# Patient Record
Sex: FEMALE | Race: WHITE | Employment: UNEMPLOYED | URBAN - METROPOLITAN AREA
[De-identification: names, ages, dates, MRNs, and addresses within clinical notes are randomized per-mention and may not be internally consistent; named-entity substitution may affect disease eponyms.]

---

## 2017-01-02 ENCOUNTER — APPOINTMENT (OUTPATIENT)
Dept: INFUSION THERAPY | Age: 62
End: 2017-01-02

## 2017-01-16 ENCOUNTER — APPOINTMENT (OUTPATIENT)
Dept: INFUSION THERAPY | Age: 62
End: 2017-01-16

## 2017-01-24 ENCOUNTER — OFFICE VISIT (OUTPATIENT)
Dept: SLEEP MEDICINE | Age: 62
End: 2017-01-24

## 2017-01-24 VITALS
HEART RATE: 108 BPM | HEIGHT: 64 IN | DIASTOLIC BLOOD PRESSURE: 68 MMHG | BODY MASS INDEX: 36.02 KG/M2 | SYSTOLIC BLOOD PRESSURE: 118 MMHG | OXYGEN SATURATION: 96 % | WEIGHT: 211 LBS

## 2017-01-24 DIAGNOSIS — G47.33 OSA (OBSTRUCTIVE SLEEP APNEA): Primary | ICD-10-CM

## 2017-01-24 DIAGNOSIS — I10 ESSENTIAL HYPERTENSION, BENIGN: ICD-10-CM

## 2017-01-24 NOTE — PATIENT INSTRUCTIONS
217 [facility] Miguel Angel. [place], [address]  Tel.  [phone]  Fax. 100 [facility] 60  [place], [address] [facility]  Tel.  [phone]  Fax. [phone] [address]  Tel.  [phone]  Fax. [phone]     Learning About CPAP for Sleep Apnea  What is CPAP? CPAP is a small machine that you use at home every night while you sleep. It increases air pressure in your throat to keep your airway open. When you have sleep apnea, this can help you sleep better so you feel much better. CPAP stands for \"continuous positive airway pressure. \"  The CPAP machine will have one of the following:  · A mask that covers your nose and mouth  · Prongs that fit into your nose  · A mask that covers your nose only, the most common type. This type is called NCPAP. The N stands for \"nasal.\"  Why is it done? CPAP is usually the best treatment for obstructive sleep apnea. It is the first treatment choice and the most widely used. Your doctor may suggest CPAP if you have:  · Moderate to severe sleep apnea. · Sleep apnea and coronary artery disease (CAD) or heart failure. How does it help? · CPAP can help you have more normal sleep, so you feel less sleepy and more alert during the daytime. · CPAP may help keep heart failure or other heart problems from getting worse. · NCPAP may help lower your blood pressure. · If you use CPAP, your bed partner may also sleep better because you are not snoring or restless. What are the side effects? Some people who use CPAP have:  · A dry or stuffy nose and a sore throat. · Irritated skin on the face. · Sore eyes. · Bloating. If you have any of these problems, work with your doctor to fix them. Here are some things you can try:  · Be sure the mask or nasal prongs fit well. · See if your doctor can adjust the pressure of your CPAP. · If your nose is dry, try a humidifier.   · If your nose is runny or stuffy, try decongestant medicine or a steroid nasal spray. If these things do not help, you might try a different type of machine. Some machines have air pressure that adjusts on its own. Others have air pressures that are different when you breathe in than when you breathe out. This may reduce discomfort caused by too much pressure in your nose. Where can you learn more? Go to Anzu.be  Enter Bernardomirian Mays in the search box to learn more about \"Learning About CPAP for Sleep Apnea. \"   © 6317-8818 Healthwise, [employer]. Care instructions adapted under license by Kirsty Soriano (which disclaims liability or warranty for this information). This care instruction is for use with your licensed healthcare professional. If you have questions about a medical condition or this instruction, always ask your healthcare professional. [address] any warranty or liability for your use of this information. Content Version: 7.2.54333; Last Revised: January 11, 2010  PROPER SLEEP HYGIENE    What to avoid  · Do not have drinks with caffeine, such as coffee or black tea, for 8 hours before bed. · Do not smoke or use other types of tobacco near bedtime. Nicotine is a stimulant and can keep you awake. · Avoid drinking alcohol late in the evening, because it can cause you to wake in the middle of the night. · Do not eat a big meal close to bedtime. If you are hungry, eat a light snack. · Do not drink a lot of water close to bedtime, because the need to urinate may wake you up during the night. · Do not read or watch TV in bed. Use the bed only for sleeping and sexual activity. What to try  · Go to bed at the same time every night, and wake up at the same time every morning. Do not take naps during the day. · Keep your bedroom quiet, dark, and cool. · Get regular exercise, but not within 3 to 4 hours of your bedtime. .  · Sleep on a comfortable pillow and mattress.   · If watching the clock makes you anxious, turn it facing away from you so you cannot see the time. · If you worry when you lie down, start a worry book. Well before bedtime, write down your worries, and then set the book and your concerns aside. · Try meditation or other relaxation techniques before you go to bed. · If you cannot fall asleep, get up and go to another room until you feel sleepy. Do something relaxing. Repeat your bedtime routine before you go to bed again. · Make your house quiet and calm about an hour before bedtime. Turn down the lights, turn off the TV, log off the computer, and turn down the volume on music. This can help you relax after a busy day. Drowsy Driving: The [facility] cites drowsiness as a causing factor in more than 323,320 police reported crashes annually, resulting in 76,000 injuries and 1,500 deaths. Other surveys suggest 55% of people polled have driven while drowsy in the past year, 23% had fallen asleep but not crashed, 3% crashed, and 2% had and accident due to drowsy driving. Who is at risk? Young Drivers: One study of drowsy driving accidents states that 55% of the drivers were under 25 years. Of those, 75% were male. Shift Workers and Travelers: People who work overnight or travel across time zones frequently are at higher risk of experiencing Circadian Rhythm Disorders. They are trying to work and function when their body is programed to sleep. Sleep Deprived: Lack of sleep has a serious impact on your ability to pay attention or focus on a task. Consistently getting less than the average of 8 hours your body needs creates partial or cumulative sleep deprivation. Untreated Sleep Disorders: Sleep Apnea, Narcolepsy, R.L.S., and other sleep disorders (untreated) prevent a person from getting enough restful sleep. This leads to excessive daytime sleepiness and increases the risk for drowsy driving accidents by up to 7 times.   Medications / Alcohol: Even over the counter medications can cause drowsiness. Medications that impair a drivers attention should have a warning label. Alcohol naturally makes you sleepy and on its own can cause accidents. Combined with excessive drowsiness its effects are amplified. Signs of Drowsy Driving:   * You don't remember driving the last few miles   * You may drift out of your jennyfer   * You are unable to focus and your thoughts wander   * You may yawn more often than normal   * You have difficulty keeping your eyes open / nodding off   * Missing traffic signs, speeding, or tailgating  Prevention-   Good sleep hygiene, lifestyle and behavioral choices have the most impact on drowsy driving. There is no substitute for sleep and the average person requires 8 hours nightly. If you find yourself driving drowsy, stop and sleep. Consider the sleep hygiene tips provided during your visit as well. Medication Refill Policy: Refills for all medications require 1 week advance notice. Please have your pharmacy fax a refill request. We are unable to fax, or call in \"controled substance\" medications and you will need to pick these prescriptions up from our office. [facility]HP Activation    Thank you for requesting access to [facility]. Please follow the instructions below to securely access and download your online medical record. [facility]HP allows you to send messages to your doctor, view your test results, renew your prescriptions, schedule appointments, and more. How Do I Sign Up? 1. In your internet browser, go to [email or web address]. 2. Click on the First Time User? Click Here link in the Sign In box. You will see the New Member Sign Up page. 3. Enter your NEHP Access Code exactly as it appears below. You will not need to use this code after youve completed the sign-up process. If you do not sign up before the expiration date, you must request a new code. [facility] Access Code:  Activation code not generated  Current RaftOut Status: Active (This is the date your RaftOut access code will )    4. Enter the last four digits of your Social Security Number (xxxx) and Date of Birth (mm/dd/yyyy) as indicated and click Submit. You will be taken to the next sign-up page. 5. Create a aCommercet ID. This will be your RaftOut login ID and cannot be changed, so think of one that is secure and easy to remember. 6. Create a RaftOut password. You can change your password at any time. 7. Enter your Password Reset Question and Answer. This can be used at a later time if you forget your password. 8. Enter your e-mail address. You will receive e-mail notification when new information is available in [address] 9. Click Sign Up. You can now view and download portions of your medical record. 10. Click the Download Summary menu link to download a portable copy of your medical information. Additional Information    If you have questions, please call [phone]. Remember, RaftOut is NOT to be used for urgent needs. For medical emergencies, dial 911.

## 2017-01-24 NOTE — PROGRESS NOTES
217 [facility]., Miguel Angel. [place], [address]  Tel.  [phone]  Fax. 100 [facility] 60  [place], [address] [facility]  Tel.  [phone]  Fax. [phone] [address]   Tel.  [phone]  Fax. [phone]     S>Dhara Mcpherson is a 64 y.o. female seen for a positive airway pressure follow-up. She reports no problems using the device. She is 23% compliant over the past 30 days. The following problems are identified:    Drowsiness no Problems exhaling no   Snoring no Forget to put on yes   Mask Comfortable yes Can't fall asleep no   Dry Mouth no Mask falls off no   Air Leaking no Frequent awakenings no       She admits that her sleep is improved when PAP therapy is used. She on some nights feels that she is not getting enough pressure and takes the mask off without realizing that she has done so and would return to sleep without therapy. Cord to device was loose - patient did not realize initially but later resolved the issue. Initial mask kept getting dislodge new mask works better. She states that she was getting wrong supplies from the home medical company. Allergies   Allergen Reactions    Hydrocodone Nausea and Vomiting    Lisinopril Cough    Lortab [Hydrocodone-Acetaminophen] Nausea and Vomiting    Percocet [Oxycodone-Acetaminophen] Nausea and Vomiting       She has a current medication list which includes the following prescription(s): pregabalin, hydromorphone, insulin glargine, losartan, nebulizer & compressor, albuterol-ipratropium, omeprazole, constulose, furosemide, humalog kwikpen, metformin, diane pen needle, potassium chloride sr, propranolol, ondansetron, ferrous sulfate, amitriptyline, ropinirole, albuterol, and fluticasone-salmeterol. .      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 [place] Sleepiness Score: 2   and Modified F.O.S.Q. Score Total / 2: 18      O>    Visit Vitals    /68    Pulse (!) 108    Ht 5' 4\" (1.626 m)    Wt 211 lb (95.7 kg)    SpO2 96%    BMI 36.22 kg/m2         General:   Not in acute distress   Eyes:  Anicteric sclerae, no obvious strabismus   Nose:  No obvious nasal septum deviation    Oropharynx:   Class 4 oropharyngeal outlet, thick tongue base, uvula not seen due to low-lying soft palate, narrow tonsilo-pharyngeal pilars   Tonsils:   tonsils are not visualized due to low-lying soft palate   Neck:   midline trachea   Chest/Lungs:  Equal lung expansion, clear on auscultation    CVS:  Normal rate, regular rhythm; no JVD   Skin:  Warm to touch; no obvious rashes   Neuro:  No focal deficits ; no obvious tremor    Psych:  Normal affect,  normal countenance;           A>    ICD-10-CM ICD-9-CM    1. IBIS (obstructive sleep apnea) G47.33 327.23 SLEEP LAB (PAP TITRATION)   2. Essential hypertension, benign I10 401.1    3. BMI 36.0-36.9,adult Z68.36 V85.36      AHI = 8.9. On Bi - Level :  10/04 cmH2O. Compliant:      no    Therapeutic Response:  Negative    P>    * We have recommended a dedicated weight loss through appropriate diet and an exercise regiment as significant weight reduction has been shown to reduce severity of obstructive sleep apnea. * Follow-up Disposition:  Return if symptoms worsen or fail to improve. * She was asked to contact our office for any problems regarding PAP therapy. * Counseling was provided regarding the importance of regular PAP use and on proper sleep hygiene and safe driving.     * Re-enforced proper and regular cleaning for the device. Thank you for allowing us to participate in your patient's medical care. Kumar Hernandez MD, FAASM  Diplomate American Board of Sleep Medicine  Diplomate in Sleep Medicine - ABP  Electronically signed.

## 2017-01-30 ENCOUNTER — APPOINTMENT (OUTPATIENT)
Dept: INFUSION THERAPY | Age: 62
End: 2017-01-30

## 2017-02-13 ENCOUNTER — APPOINTMENT (OUTPATIENT)
Dept: INFUSION THERAPY | Age: 62
End: 2017-02-13

## 2017-02-13 ENCOUNTER — TELEPHONE (OUTPATIENT)
Dept: HEMATOLOGY | Age: 62
End: 2017-02-13

## 2017-02-13 ENCOUNTER — HOSPITAL ENCOUNTER (EMERGENCY)
Age: 62
Discharge: HOME | End: 2017-02-13
Attending: EMERGENCY MEDICINE
Payer: MEDICARE

## 2017-02-13 VITALS
TEMPERATURE: 98.8 F | SYSTOLIC BLOOD PRESSURE: 155 MMHG | WEIGHT: 216.38 LBS | RESPIRATION RATE: 23 BRPM | HEART RATE: 103 BPM | DIASTOLIC BLOOD PRESSURE: 71 MMHG | OXYGEN SATURATION: 98 % | HEIGHT: 64 IN | BODY MASS INDEX: 36.94 KG/M2

## 2017-02-13 DIAGNOSIS — R05.4 COUGH SYNCOPE: Primary | ICD-10-CM

## 2017-02-13 DIAGNOSIS — R55 COUGH SYNCOPE: Primary | ICD-10-CM

## 2017-02-13 LAB
ALBUMIN SERPL BCP-MCNC: 3.3 G/DL (ref 3.5–5)
ALBUMIN/GLOB SERPL: 0.7 {RATIO} (ref 1.1–2.2)
ALP SERPL-CCNC: 150 U/L (ref 45–117)
ALT SERPL-CCNC: 30 U/L (ref 12–78)
ANION GAP BLD CALC-SCNC: 8 MMOL/L (ref 5–15)
APTT PPP: 27 SEC (ref 22.1–32.5)
AST SERPL W P-5'-P-CCNC: 22 U/L (ref 15–37)
BASOPHILS # BLD AUTO: 0 K/UL (ref 0–0.1)
BASOPHILS # BLD: 1 % (ref 0–1)
BILIRUB SERPL-MCNC: 0.5 MG/DL (ref 0.2–1)
BUN SERPL-MCNC: 11 MG/DL (ref 6–20)
BUN/CREAT SERPL: 20 (ref 12–20)
CALCIUM SERPL-MCNC: 8.8 MG/DL (ref 8.5–10.1)
CHLORIDE SERPL-SCNC: 101 MMOL/L (ref 97–108)
CO2 SERPL-SCNC: 28 MMOL/L (ref 21–32)
CREAT SERPL-MCNC: 0.54 MG/DL (ref 0.55–1.02)
EOSINOPHIL # BLD: 0.1 K/UL (ref 0–0.4)
EOSINOPHIL NFR BLD: 2 % (ref 0–7)
ERYTHROCYTE [DISTWIDTH] IN BLOOD BY AUTOMATED COUNT: 19.5 % (ref 11.5–14.5)
GLOBULIN SER CALC-MCNC: 4.8 G/DL (ref 2–4)
GLUCOSE SERPL-MCNC: 165 MG/DL (ref 65–100)
HCT VFR BLD AUTO: 29.8 % (ref 35–47)
HGB BLD-MCNC: 8.7 G/DL (ref 11.5–16)
INR PPP: 1.2 (ref 0.9–1.1)
LYMPHOCYTES # BLD AUTO: 27 % (ref 12–49)
LYMPHOCYTES # BLD: 1.6 K/UL (ref 0.8–3.5)
MCH RBC QN AUTO: 23.5 PG (ref 26–34)
MCHC RBC AUTO-ENTMCNC: 29.2 G/DL (ref 30–36.5)
MCV RBC AUTO: 80.3 FL (ref 80–99)
MONOCYTES # BLD: 0.5 K/UL (ref 0–1)
MONOCYTES NFR BLD AUTO: 8 % (ref 5–13)
NEUTS SEG # BLD: 3.7 K/UL (ref 1.8–8)
NEUTS SEG NFR BLD AUTO: 62 % (ref 32–75)
PLATELET # BLD AUTO: 94 K/UL (ref 150–400)
POTASSIUM SERPL-SCNC: 3.8 MMOL/L (ref 3.5–5.1)
PROT SERPL-MCNC: 8.1 G/DL (ref 6.4–8.2)
PROTHROMBIN TIME: 11.8 SEC (ref 9–11.1)
RBC # BLD AUTO: 3.71 M/UL (ref 3.8–5.2)
SODIUM SERPL-SCNC: 137 MMOL/L (ref 136–145)
THERAPEUTIC RANGE,PTTT: NORMAL SECS (ref 58–77)
WBC # BLD AUTO: 5.9 K/UL (ref 3.6–11)

## 2017-02-13 PROCEDURE — 99284 EMERGENCY DEPT VISIT MOD MDM: CPT

## 2017-02-13 PROCEDURE — 85610 PROTHROMBIN TIME: CPT | Performed by: EMERGENCY MEDICINE

## 2017-02-13 PROCEDURE — 36415 COLL VENOUS BLD VENIPUNCTURE: CPT | Performed by: EMERGENCY MEDICINE

## 2017-02-13 PROCEDURE — 85730 THROMBOPLASTIN TIME PARTIAL: CPT | Performed by: EMERGENCY MEDICINE

## 2017-02-13 PROCEDURE — 80053 COMPREHEN METABOLIC PANEL: CPT | Performed by: EMERGENCY MEDICINE

## 2017-02-13 PROCEDURE — 85025 COMPLETE CBC W/AUTO DIFF WBC: CPT | Performed by: EMERGENCY MEDICINE

## 2017-02-13 NOTE — ED NOTES
I have reviewed discharge instructions with the patient. The patient verbalized understanding. Pt ambulates to front door with steady gait and in stable condition.

## 2017-02-13 NOTE — ED PROVIDER NOTES
HPI Comments: 64 y.o. female with extensive past medical history, please see list, significant for diabetes, COPD, back pain, restless leg syndrome, fibromyalgia, GERD, diabetes, hypercholesteremia, asthma, liver disease, and esophageal varices in cirrhosis who presents from home with chief complaint of syncope. Patient states she had an episode of syncope yesterday and another one 3 days ago. Patient reports she was just sitting on her bed 3 days and started to cough. Patient then remembers waking up with a nose bleed that lasted for a couple of minutes. Patient reports both episodes were similar in nature and has a hx of these types of syncopal episodes, which typically occur \"every year. \" Patient reports she usually has these episodes with bleeding from cirrhosis. Patient also complains of right leg swelling that is greater than the left. Patient admits she has been dealing with this leg swelling issue for approximately 3 years. Patient reports she is currently taking her medications as prescribed by her PCP and Dr. Kemi Craig, her nephrologist. Patient states hx of diabetes and is currently a smoker. Patient denies black tarry stool, drinking alcohol, blood in clots (had 2 doppler studies in past), and blood in stool. There are no other acute medical concerns at this time. Social hx: Tobacco Use: Yes (1 pack a day), Alcohol Use: Yes    PCP: Antonietta King NP  Gastroenterology: Patrick Baron MD    Note written by Luli Carl, as dictated by Jose Barton MD 4:55 PM      The history is provided by the patient.         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 ([facility]     Liver disease     Other and unspecified hyperlipidemia     Restless leg syndrome     Type II or unspecified type diabetes mellitus without mention of complication, uncontrolled     Unspecified essential hypertension        Past Surgical History:   Procedure Laterality Date    Hx back surgery      Hx hysterectomy       plus 1/2 of an ovary removed    Hx carpal tunnel release       on right    Pr colsc flx w/rmvl of tumor polyp lesion snare tq  5/30/2013          Upper gi endoscopy,ligat varix  2/6/2015               Family History:   Problem Relation Age of Onset    Diabetes Mother     Stroke Sister     Diabetes Paternal Aunt     Diabetes Paternal Uncle     Heart Disease Neg Hx        Social History     Social History    Marital status:      Spouse name: N/A    Number of children: N/A    Years of education: N/A     Occupational History    Not on file. Social History Main Topics    Smoking status: Current Every Day Smoker     Packs/day: 1.00     Years: 40.00    Smokeless tobacco: Never Used    Alcohol use Yes      Comment: rare    Drug use: No    Sexual activity: Not on file     Other Topics Concern    Not on file     Social History Narrative    Lives in [facility] with . Has 2 daughters and 1 granddaughter who she cares for. On disability for her back. Used to work as a  and . Used to bowl and fish. ALLERGIES: Hydrocodone; Lisinopril; Lortab [hydrocodone-acetaminophen]; and Percocet [oxycodone-acetaminophen]    Review of Systems   Constitutional: Negative for appetite change, chills and fever. HENT: Positive for nosebleeds. Negative for rhinorrhea, sore throat and trouble swallowing. Eyes: Negative for photophobia. Respiratory: Negative for cough and shortness of breath. Cardiovascular: Positive for leg swelling. Negative for chest pain and palpitations. Gastrointestinal: Negative for abdominal pain, nausea and vomiting. Genitourinary: Negative for dysuria, frequency and hematuria. Musculoskeletal: Negative for arthralgias. Neurological: Positive for syncope. Negative for dizziness and weakness. Psychiatric/Behavioral: Negative for behavioral problems. The patient is not nervous/anxious. All other systems reviewed and are negative. Vitals:    02/13/17 1453   BP: 176/74   Pulse: (!) 122   Resp: 20   Temp: 98.3 °F (36.8 °C)   SpO2: 97%   Weight: 98.1 kg (216 lb 6 oz)   Height: 5' 4\" (1.626 m)            Physical Exam   Constitutional: She appears well-developed and well-nourished. HENT:   Head: Normocephalic and atraumatic. Mouth/Throat: Oropharynx is clear and moist.   Eyes: EOM are normal. Pupils are equal, round, and reactive to light. Neck: Normal range of motion. Neck supple. Cardiovascular: Normal rate, regular rhythm, normal heart sounds and intact distal pulses. Exam reveals no gallop and no friction rub. No murmur heard. Pulmonary/Chest: Effort normal. No respiratory distress. She has no wheezes. She has no rales. Abdominal: Soft. There is no tenderness. There is no rebound. Patient has small bruising on the abdomen   Musculoskeletal: Normal range of motion. She exhibits tenderness. Patient has edema that worse on the right side   Neurological: She is alert. No cranial nerve deficit. Motor; symmetric   Skin: No erythema. Patient has a well-healed surgical scar in the lumbat region   Psychiatric: She has a normal mood and affect. Her behavior is normal.   Nursing note and vitals reviewed. Note written by Luli Carbajal, as dictated by Mike Castellon MD 4:55 PM    [facility]  ED Course       Procedures    CONSULT NOTE:  6:04 PM Mike Castellon MD spoke with Dr. Isabella Fink, Consult for hepatology. Discussed available diagnostic tests and clinical findings. He is in agreement with care plans as outlined.   Dr. Isabella Fink recommends discharging the patient for follow up with him in a few days.

## 2017-02-13 NOTE — TELEPHONE ENCOUNTER
Patient called complaining of syncope and epistaxis and has not gone to ER or sought any medical attention. Patient states she had this happen previously and Dr. Francisco Owen found a GI bleed. Spoke with April who instructed me to advise the patient to go to ER for evaluation. Patient will come to [facility] and wanted me to advise Dr. Francisco Owen of her call.

## 2017-02-13 NOTE — ED TRIAGE NOTES
Pt c/o a buzzing sensation after coughing and then she passes out, pt stated when she wakes up she had a bloody nose, pt stated she was sent by md to check for bleeding from her cirrhosis, pt stated she passed out yesterday and Friday, denies any vomit in blood- pt stated it will happen, denies blood in stools or dark tarry stools

## 2017-02-13 NOTE — DISCHARGE INSTRUCTIONS
Cough: Care Instructions  Your Care Instructions  A cough is your body's response to something that bothers your throat or airways. Many things can cause a cough. You might cough because of a cold or the flu, bronchitis, or asthma. Smoking, postnasal drip, allergies, and stomach acid that backs up into your throat also can cause coughs. A cough is a symptom, not a disease. Most coughs stop when the cause, such as a cold, goes away. You can take a few steps at home to cough less and feel better. Follow-up care is a key part of your treatment and safety. Be sure to make and go to all appointments, and call your doctor if you are having problems. It's also a good idea to know your test results and keep a list of the medicines you take. How can you care for yourself at home? · Drink lots of water and other fluids. This helps thin the mucus and soothes a dry or sore throat. Honey or lemon juice in hot water or tea may ease a dry cough. · Take cough medicine as directed by your doctor. · Prop up your head on pillows to help you breathe and ease a dry cough. · Try cough drops to soothe a dry or sore throat. Cough drops don't stop a cough. Medicine-flavored cough drops are no better than candy-flavored drops or hard candy. · Do not smoke. Avoid secondhand smoke. If you need help quitting, talk to your doctor about stop-smoking programs and medicines. These can increase your chances of quitting for good. When should you call for help? Call 911 anytime you think you may need emergency care. For example, call if:  · You have severe trouble breathing. Call your doctor now or seek immediate medical care if:  · You cough up blood. · You have new or worse trouble breathing. · You have a new or higher fever. · You have a new rash.   Watch closely for changes in your health, and be sure to contact your doctor if:  · You cough more deeply or more often, especially if you notice more mucus or a change in the color of your mucus. · You have new symptoms, such as a sore throat, an earache, or sinus pain. · You do not get better as expected. Where can you learn more? Go to http://joanna-linnette.info/. Enter D279 in the search box to learn more about \"Cough: Care Instructions. \"  Current as of: May 27, 2016  Content Version: 11.1  © 5464-4636 [employer]. Care instructions adapted under license by [employer] (which disclaims liability or warranty for this information). If you have questions about a medical condition or this instruction, always ask your healthcare professional. [place] [address] any warranty or liability for your use of this information. We hope that we have addressed all of your medical concerns. The examination and treatment you received in the Emergency Department were for an emergent problem and were not intended as complete care. It is important that you follow up with your healthcare provider(s) for ongoing care. If your symptoms worsen or do not improve as expected, and you are unable to reach your usual health care provider(s), you should return to the Emergency Department. Today's healthcare is undergoing tremendous change, and patient satisfaction surveys are one of the many tools to assess the quality of medical care. You may receive a survey from the [employer] regarding your experience in the Emergency Department. I hope that your experience has been completely positive, particularly the medical care that I provided. As such, please participate in the survey; anything less than excellent does not meet my expectations or intentions. [facility] and [address] [place] [address] participate in nationally recognized quality of care measures.   If your blood pressure is greater than 120/80, as reported below, we urge that you seek medical care to address the potential of high blood pressure, commonly known as hypertension. Hypertension can be hereditary or can be caused by certain medical conditions, pain, stress, or \"white coat syndrome. \"       Please make an appointment with your health care provider(s) for follow up of your Emergency Department visit. VITALS:   Patient Vitals for the past 8 hrs:   Temp Pulse Resp BP SpO2   02/13/17 1800 - (!) 103 23 155/71 98 %   02/13/17 1730 - (!) 105 15 165/72 98 %   02/13/17 1453 98.3 °F (36.8 °C) (!) 122 20 176/74 97 %          Thank you for allowing us to provide you with medical care today. We realize that you have many choices for your emergency care needs. Please choose us in the future for any continued health care needs. Quinn Crisostomo MD    [address] [place] [address].   Office: [phone]            Recent Results (from the past 24 hour(s))   CBC WITH AUTOMATED DIFF    Collection Time: 02/13/17  3:54 PM   Result Value Ref Range    WBC 5.9 3.6 - 11.0 K/uL    RBC 3.71 (L) 3.80 - 5.20 M/uL    HGB 8.7 (L) 11.5 - 16.0 g/dL    HCT 29.8 (L) 35.0 - 47.0 %    MCV 80.3 80.0 - 99.0 FL    MCH 23.5 (L) 26.0 - 34.0 PG    MCHC 29.2 (L) 30.0 - 36.5 g/dL    RDW 19.5 (H) 11.5 - 14.5 %    PLATELET 94 (L) 761 - 400 K/uL    NEUTROPHILS 62 32 - 75 %    LYMPHOCYTES 27 12 - 49 %    MONOCYTES 8 5 - 13 %    EOSINOPHILS 2 0 - 7 %    BASOPHILS 1 0 - 1 %    ABS. NEUTROPHILS 3.7 1.8 - 8.0 K/UL    ABS. LYMPHOCYTES 1.6 0.8 - 3.5 K/UL    ABS. MONOCYTES 0.5 0.0 - 1.0 K/UL    ABS. EOSINOPHILS 0.1 0.0 - 0.4 K/UL    ABS.  BASOPHILS 0.0 0.0 - 0.1 K/UL   METABOLIC PANEL, COMPREHENSIVE    Collection Time: 02/13/17  3:54 PM   Result Value Ref Range    Sodium 137 136 - 145 mmol/L    Potassium 3.8 3.5 - 5.1 mmol/L    Chloride 101 97 - 108 mmol/L    CO2 28 21 - 32 mmol/L    Anion gap 8 5 - 15 mmol/L    Glucose 165 (H) 65 - 100 mg/dL    BUN 11 6 - 20 MG/DL    Creatinine 0.54 (L) 0.55 - 1.02 MG/DL    BUN/Creatinine ratio 20 12 - 20      GFR est AA >60 >60 ml/min/1.73m2    GFR est non-AA >60 >60 ml/min/1.73m2    Calcium 8.8 8.5 - 10.1 MG/DL    Bilirubin, total 0.5 0.2 - 1.0 MG/DL    ALT (SGPT) 30 12 - 78 U/L    AST (SGOT) 22 15 - 37 U/L    Alk. phosphatase 150 (H) 45 - 117 U/L    Protein, total 8.1 6.4 - 8.2 g/dL    Albumin 3.3 (L) 3.5 - 5.0 g/dL    Globulin 4.8 (H) 2.0 - 4.0 g/dL    A-G Ratio 0.7 (L) 1.1 - 2.2     PROTHROMBIN TIME + INR    Collection Time: 02/13/17  3:54 PM   Result Value Ref Range    INR 1.2 (H) 0.9 - 1.1      Prothrombin time 11.8 (H) 9.0 - 11.1 sec   PTT    Collection Time: 02/13/17  3:54 PM   Result Value Ref Range    aPTT 27.0 22.1 - 32.5 sec    aPTT, therapeutic range     58.0 - 77.0 SECS       No results found.

## 2017-02-27 ENCOUNTER — APPOINTMENT (OUTPATIENT)
Dept: INFUSION THERAPY | Age: 62
End: 2017-02-27

## 2017-03-09 ENCOUNTER — HOSPITAL ENCOUNTER (OUTPATIENT)
Dept: NON INVASIVE DIAGNOSTICS | Age: 62
Discharge: HOME | End: 2017-03-09
Payer: MEDICARE

## 2017-03-09 DIAGNOSIS — Z00.6 EXAMINATION OF PARTICIPANT IN CLINICAL TRIAL: ICD-10-CM

## 2017-03-09 PROCEDURE — 93005 ELECTROCARDIOGRAM TRACING: CPT

## 2017-03-10 LAB
ATRIAL RATE: 97 BPM
CALCULATED P AXIS, ECG09: 73 DEGREES
CALCULATED R AXIS, ECG10: 59 DEGREES
CALCULATED T AXIS, ECG11: 67 DEGREES
DIAGNOSIS, 93000: NORMAL
P-R INTERVAL, ECG05: 166 MS
Q-T INTERVAL, ECG07: 368 MS
QRS DURATION, ECG06: 82 MS
QTC CALCULATION (BEZET), ECG08: 467 MS
VENTRICULAR RATE, ECG03: 97 BPM

## 2017-03-28 ENCOUNTER — HOSPITAL ENCOUNTER (INPATIENT)
Age: 62
LOS: 2 days | Discharge: HOME | DRG: 871 | End: 2017-03-30
Attending: EMERGENCY MEDICINE | Admitting: INTERNAL MEDICINE
Payer: MEDICARE

## 2017-03-28 ENCOUNTER — APPOINTMENT (OUTPATIENT)
Dept: GENERAL RADIOLOGY | Age: 62
DRG: 871 | End: 2017-03-28
Attending: EMERGENCY MEDICINE
Payer: MEDICARE

## 2017-03-28 ENCOUNTER — APPOINTMENT (OUTPATIENT)
Dept: CT IMAGING | Age: 62
DRG: 871 | End: 2017-03-28
Attending: EMERGENCY MEDICINE
Payer: MEDICARE

## 2017-03-28 DIAGNOSIS — R91.8 HILAR MASS: ICD-10-CM

## 2017-03-28 DIAGNOSIS — J44.1 COPD EXACERBATION (HCC): ICD-10-CM

## 2017-03-28 DIAGNOSIS — J96.01 ACUTE RESPIRATORY FAILURE WITH HYPOXIA (HCC): Primary | ICD-10-CM

## 2017-03-28 DIAGNOSIS — Z92.29 HISTORY OF ANTICOAGULANT THERAPY: ICD-10-CM

## 2017-03-28 DIAGNOSIS — Z91.199 NONCOMPLIANCE: ICD-10-CM

## 2017-03-28 PROBLEM — J44.9 COPD (CHRONIC OBSTRUCTIVE PULMONARY DISEASE) (HCC): Status: ACTIVE | Noted: 2017-03-28

## 2017-03-28 LAB
ALBUMIN SERPL BCP-MCNC: 3.2 G/DL (ref 3.5–5)
ALBUMIN/GLOB SERPL: 0.6 {RATIO} (ref 1.1–2.2)
ALP SERPL-CCNC: 102 U/L (ref 45–117)
ALT SERPL-CCNC: 21 U/L (ref 12–78)
ANION GAP BLD CALC-SCNC: 10 MMOL/L (ref 5–15)
AST SERPL W P-5'-P-CCNC: 13 U/L (ref 15–37)
BASOPHILS # BLD AUTO: 0 K/UL (ref 0–0.1)
BASOPHILS # BLD: 0 % (ref 0–1)
BILIRUB SERPL-MCNC: 1.2 MG/DL (ref 0.2–1)
BNP SERPL-MCNC: 195 PG/ML (ref 0–125)
BUN SERPL-MCNC: 11 MG/DL (ref 6–20)
BUN/CREAT SERPL: 21 (ref 12–20)
CALCIUM SERPL-MCNC: 8.6 MG/DL (ref 8.5–10.1)
CHLORIDE SERPL-SCNC: 101 MMOL/L (ref 97–108)
CK SERPL-CCNC: 42 U/L (ref 26–192)
CO2 SERPL-SCNC: 26 MMOL/L (ref 21–32)
CREAT SERPL-MCNC: 0.52 MG/DL (ref 0.55–1.02)
EOSINOPHIL # BLD: 0 K/UL (ref 0–0.4)
EOSINOPHIL NFR BLD: 0 % (ref 0–7)
ERYTHROCYTE [DISTWIDTH] IN BLOOD BY AUTOMATED COUNT: 18.4 % (ref 11.5–14.5)
GLOBULIN SER CALC-MCNC: 5.2 G/DL (ref 2–4)
GLUCOSE BLD STRIP.AUTO-MCNC: 359 MG/DL (ref 65–100)
GLUCOSE SERPL-MCNC: 149 MG/DL (ref 65–100)
HCT VFR BLD AUTO: 28.1 % (ref 35–47)
HGB BLD-MCNC: 8.1 G/DL (ref 11.5–16)
LACTATE SERPL-SCNC: 2.1 MMOL/L (ref 0.4–2)
LYMPHOCYTES # BLD AUTO: 14 % (ref 12–49)
LYMPHOCYTES # BLD: 1.7 K/UL (ref 0.8–3.5)
MCH RBC QN AUTO: 22 PG (ref 26–34)
MCHC RBC AUTO-ENTMCNC: 28.8 G/DL (ref 30–36.5)
MCV RBC AUTO: 76.4 FL (ref 80–99)
MONOCYTES # BLD: 1 K/UL (ref 0–1)
MONOCYTES NFR BLD AUTO: 8 % (ref 5–13)
NEUTS SEG # BLD: 9.5 K/UL (ref 1.8–8)
NEUTS SEG NFR BLD AUTO: 78 % (ref 32–75)
PLATELET # BLD AUTO: 78 K/UL (ref 150–400)
POTASSIUM SERPL-SCNC: 4 MMOL/L (ref 3.5–5.1)
PROT SERPL-MCNC: 8.4 G/DL (ref 6.4–8.2)
RBC # BLD AUTO: 3.68 M/UL (ref 3.8–5.2)
RBC MORPH BLD: ABNORMAL
RBC MORPH BLD: ABNORMAL
SERVICE CMNT-IMP: ABNORMAL
SODIUM SERPL-SCNC: 137 MMOL/L (ref 136–145)
TROPONIN I SERPL-MCNC: <0.04 NG/ML
WBC # BLD AUTO: 12.2 K/UL (ref 3.6–11)
WBC MORPH BLD: ABNORMAL

## 2017-03-28 PROCEDURE — 74011250637 HC RX REV CODE- 250/637: Performed by: INTERNAL MEDICINE

## 2017-03-28 PROCEDURE — 36415 COLL VENOUS BLD VENIPUNCTURE: CPT | Performed by: INTERNAL MEDICINE

## 2017-03-28 PROCEDURE — 80053 COMPREHEN METABOLIC PANEL: CPT | Performed by: EMERGENCY MEDICINE

## 2017-03-28 PROCEDURE — 93005 ELECTROCARDIOGRAM TRACING: CPT

## 2017-03-28 PROCEDURE — 94640 AIRWAY INHALATION TREATMENT: CPT

## 2017-03-28 PROCEDURE — 74011250636 HC RX REV CODE- 250/636: Performed by: INTERNAL MEDICINE

## 2017-03-28 PROCEDURE — 74011250636 HC RX REV CODE- 250/636: Performed by: EMERGENCY MEDICINE

## 2017-03-28 PROCEDURE — 77010033678 HC OXYGEN DAILY

## 2017-03-28 PROCEDURE — 82962 GLUCOSE BLOOD TEST: CPT

## 2017-03-28 PROCEDURE — 74011000250 HC RX REV CODE- 250: Performed by: EMERGENCY MEDICINE

## 2017-03-28 PROCEDURE — 74011636320 HC RX REV CODE- 636/320: Performed by: EMERGENCY MEDICINE

## 2017-03-28 PROCEDURE — 83605 ASSAY OF LACTIC ACID: CPT | Performed by: INTERNAL MEDICINE

## 2017-03-28 PROCEDURE — 71260 CT THORAX DX C+: CPT

## 2017-03-28 PROCEDURE — 65660000000 HC RM CCU STEPDOWN

## 2017-03-28 PROCEDURE — 99284 EMERGENCY DEPT VISIT MOD MDM: CPT

## 2017-03-28 PROCEDURE — 71010 XR CHEST PORT: CPT

## 2017-03-28 PROCEDURE — 83880 ASSAY OF NATRIURETIC PEPTIDE: CPT | Performed by: EMERGENCY MEDICINE

## 2017-03-28 PROCEDURE — 96374 THER/PROPH/DIAG INJ IV PUSH: CPT

## 2017-03-28 PROCEDURE — 84484 ASSAY OF TROPONIN QUANT: CPT | Performed by: EMERGENCY MEDICINE

## 2017-03-28 PROCEDURE — 85025 COMPLETE CBC W/AUTO DIFF WBC: CPT | Performed by: EMERGENCY MEDICINE

## 2017-03-28 PROCEDURE — 82550 ASSAY OF CK (CPK): CPT | Performed by: EMERGENCY MEDICINE

## 2017-03-28 PROCEDURE — 77030013140 HC MSK NEB VYRM -A

## 2017-03-28 RX ORDER — SODIUM CHLORIDE 0.9 % (FLUSH) 0.9 %
10 SYRINGE (ML) INJECTION
Status: COMPLETED | OUTPATIENT
Start: 2017-03-28 | End: 2017-03-28

## 2017-03-28 RX ORDER — INSULIN LISPRO 100 [IU]/ML
20 INJECTION, SOLUTION INTRAVENOUS; SUBCUTANEOUS
Status: DISCONTINUED | OUTPATIENT
Start: 2017-03-29 | End: 2017-03-30 | Stop reason: HOSPADM

## 2017-03-28 RX ORDER — DEXTROSE 50 % IN WATER (D50W) INTRAVENOUS SYRINGE
12.5-25 AS NEEDED
Status: DISCONTINUED | OUTPATIENT
Start: 2017-03-28 | End: 2017-03-30 | Stop reason: HOSPADM

## 2017-03-28 RX ORDER — INSULIN LISPRO 100 [IU]/ML
INJECTION, SOLUTION INTRAVENOUS; SUBCUTANEOUS
Status: DISCONTINUED | OUTPATIENT
Start: 2017-03-28 | End: 2017-03-30 | Stop reason: HOSPADM

## 2017-03-28 RX ORDER — ALBUTEROL SULFATE 0.83 MG/ML
2.5 SOLUTION RESPIRATORY (INHALATION)
Status: DISCONTINUED | OUTPATIENT
Start: 2017-03-28 | End: 2017-03-30 | Stop reason: HOSPADM

## 2017-03-28 RX ORDER — PROPRANOLOL HYDROCHLORIDE 10 MG/1
20 TABLET ORAL 2 TIMES DAILY
Status: DISCONTINUED | OUTPATIENT
Start: 2017-03-29 | End: 2017-03-30 | Stop reason: HOSPADM

## 2017-03-28 RX ORDER — FACIAL-BODY WIPES
10 EACH TOPICAL DAILY PRN
Status: DISCONTINUED | OUTPATIENT
Start: 2017-03-28 | End: 2017-03-30 | Stop reason: HOSPADM

## 2017-03-28 RX ORDER — FLUTICASONE FUROATE AND VILANTEROL 100; 25 UG/1; UG/1
1 POWDER RESPIRATORY (INHALATION) DAILY
Status: DISCONTINUED | OUTPATIENT
Start: 2017-03-29 | End: 2017-03-30 | Stop reason: HOSPADM

## 2017-03-28 RX ORDER — LACTULOSE 10 G/15ML
20 SOLUTION ORAL; RECTAL 2 TIMES DAILY
Status: DISCONTINUED | OUTPATIENT
Start: 2017-03-29 | End: 2017-03-30 | Stop reason: HOSPADM

## 2017-03-28 RX ORDER — SODIUM CHLORIDE 0.9 % (FLUSH) 0.9 %
5-10 SYRINGE (ML) INJECTION EVERY 8 HOURS
Status: DISCONTINUED | OUTPATIENT
Start: 2017-03-28 | End: 2017-03-30 | Stop reason: HOSPADM

## 2017-03-28 RX ORDER — AMITRIPTYLINE HYDROCHLORIDE 50 MG/1
150 TABLET, FILM COATED ORAL
Status: DISCONTINUED | OUTPATIENT
Start: 2017-03-28 | End: 2017-03-30 | Stop reason: HOSPADM

## 2017-03-28 RX ORDER — GABAPENTIN 300 MG/1
600 CAPSULE ORAL 3 TIMES DAILY
Status: DISCONTINUED | OUTPATIENT
Start: 2017-03-28 | End: 2017-03-30 | Stop reason: HOSPADM

## 2017-03-28 RX ORDER — NALOXONE HYDROCHLORIDE 0.4 MG/ML
0.4 INJECTION, SOLUTION INTRAMUSCULAR; INTRAVENOUS; SUBCUTANEOUS AS NEEDED
Status: DISCONTINUED | OUTPATIENT
Start: 2017-03-28 | End: 2017-03-30 | Stop reason: HOSPADM

## 2017-03-28 RX ORDER — VALSARTAN 80 MG/1
80 TABLET ORAL DAILY
Status: DISCONTINUED | OUTPATIENT
Start: 2017-03-29 | End: 2017-03-30 | Stop reason: HOSPADM

## 2017-03-28 RX ORDER — INSULIN GLARGINE 100 [IU]/ML
60 INJECTION, SOLUTION SUBCUTANEOUS DAILY
Status: DISCONTINUED | OUTPATIENT
Start: 2017-03-29 | End: 2017-03-30 | Stop reason: HOSPADM

## 2017-03-28 RX ORDER — IPRATROPIUM BROMIDE AND ALBUTEROL SULFATE 2.5; .5 MG/3ML; MG/3ML
3 SOLUTION RESPIRATORY (INHALATION)
Status: DISCONTINUED | OUTPATIENT
Start: 2017-03-28 | End: 2017-03-30 | Stop reason: HOSPADM

## 2017-03-28 RX ORDER — ONDANSETRON 2 MG/ML
4 INJECTION INTRAMUSCULAR; INTRAVENOUS
Status: DISCONTINUED | OUTPATIENT
Start: 2017-03-28 | End: 2017-03-30 | Stop reason: HOSPADM

## 2017-03-28 RX ORDER — SODIUM CHLORIDE 0.9 % (FLUSH) 0.9 %
5-10 SYRINGE (ML) INJECTION AS NEEDED
Status: DISCONTINUED | OUTPATIENT
Start: 2017-03-28 | End: 2017-03-30 | Stop reason: HOSPADM

## 2017-03-28 RX ORDER — OXYCODONE AND ACETAMINOPHEN 5; 325 MG/1; MG/1
1 TABLET ORAL
Status: DISCONTINUED | OUTPATIENT
Start: 2017-03-28 | End: 2017-03-28

## 2017-03-28 RX ORDER — INSULIN GLARGINE 100 [IU]/ML
120 INJECTION, SOLUTION SUBCUTANEOUS DAILY
Status: DISCONTINUED | OUTPATIENT
Start: 2017-03-29 | End: 2017-03-28 | Stop reason: SDUPTHER

## 2017-03-28 RX ORDER — IPRATROPIUM BROMIDE AND ALBUTEROL SULFATE 2.5; .5 MG/3ML; MG/3ML
3 SOLUTION RESPIRATORY (INHALATION)
Status: COMPLETED | OUTPATIENT
Start: 2017-03-28 | End: 2017-03-28

## 2017-03-28 RX ORDER — ENOXAPARIN SODIUM 100 MG/ML
40 INJECTION SUBCUTANEOUS EVERY 24 HOURS
Status: DISCONTINUED | OUTPATIENT
Start: 2017-03-29 | End: 2017-03-30

## 2017-03-28 RX ORDER — LEVOFLOXACIN 5 MG/ML
750 INJECTION, SOLUTION INTRAVENOUS EVERY 24 HOURS
Status: DISCONTINUED | OUTPATIENT
Start: 2017-03-28 | End: 2017-03-30

## 2017-03-28 RX ORDER — HYDROMORPHONE HYDROCHLORIDE 2 MG/1
2 TABLET ORAL
Status: DISCONTINUED | OUTPATIENT
Start: 2017-03-28 | End: 2017-03-29

## 2017-03-28 RX ORDER — ACETAMINOPHEN 325 MG/1
650 TABLET ORAL
Status: DISCONTINUED | OUTPATIENT
Start: 2017-03-28 | End: 2017-03-30 | Stop reason: HOSPADM

## 2017-03-28 RX ORDER — SODIUM CHLORIDE 9 MG/ML
50 INJECTION, SOLUTION INTRAVENOUS
Status: COMPLETED | OUTPATIENT
Start: 2017-03-28 | End: 2017-03-28

## 2017-03-28 RX ORDER — SODIUM CHLORIDE 9 MG/ML
50 INJECTION, SOLUTION INTRAVENOUS CONTINUOUS
Status: DISCONTINUED | OUTPATIENT
Start: 2017-03-28 | End: 2017-03-29

## 2017-03-28 RX ORDER — ROPINIROLE 1 MG/1
4 TABLET, FILM COATED ORAL
Status: DISCONTINUED | OUTPATIENT
Start: 2017-03-28 | End: 2017-03-30 | Stop reason: HOSPADM

## 2017-03-28 RX ORDER — MAGNESIUM SULFATE 100 %
4 CRYSTALS MISCELLANEOUS AS NEEDED
Status: DISCONTINUED | OUTPATIENT
Start: 2017-03-28 | End: 2017-03-30 | Stop reason: HOSPADM

## 2017-03-28 RX ORDER — FUROSEMIDE 20 MG/1
20 TABLET ORAL DAILY
Status: DISCONTINUED | OUTPATIENT
Start: 2017-03-29 | End: 2017-03-30 | Stop reason: HOSPADM

## 2017-03-28 RX ADMIN — AMITRIPTYLINE HYDROCHLORIDE 150 MG: 50 TABLET, FILM COATED ORAL at 23:17

## 2017-03-28 RX ADMIN — ROPINIROLE HYDROCHLORIDE 4 MG: 1 TABLET, FILM COATED ORAL at 21:15

## 2017-03-28 RX ADMIN — INSULIN LISPRO 5 UNITS: 100 INJECTION, SOLUTION INTRAVENOUS; SUBCUTANEOUS at 21:23

## 2017-03-28 RX ADMIN — Medication 10 ML: at 17:53

## 2017-03-28 RX ADMIN — METHYLPREDNISOLONE SODIUM SUCCINATE 40 MG: 40 INJECTION, POWDER, FOR SOLUTION INTRAMUSCULAR; INTRAVENOUS at 23:17

## 2017-03-28 RX ADMIN — LEVOFLOXACIN 750 MG: 5 INJECTION, SOLUTION INTRAVENOUS at 23:18

## 2017-03-28 RX ADMIN — IPRATROPIUM BROMIDE AND ALBUTEROL SULFATE 3 ML: .5; 3 SOLUTION RESPIRATORY (INHALATION) at 16:51

## 2017-03-28 RX ADMIN — Medication 10 ML: at 21:15

## 2017-03-28 RX ADMIN — SODIUM CHLORIDE 50 ML/HR: 900 INJECTION, SOLUTION INTRAVENOUS at 17:53

## 2017-03-28 RX ADMIN — HYDROMORPHONE HYDROCHLORIDE 2 MG: 2 TABLET ORAL at 20:35

## 2017-03-28 RX ADMIN — SODIUM CHLORIDE 100 ML/HR: 900 INJECTION, SOLUTION INTRAVENOUS at 23:18

## 2017-03-28 RX ADMIN — GABAPENTIN 600 MG: 300 CAPSULE ORAL at 23:17

## 2017-03-28 RX ADMIN — METHYLPREDNISOLONE SODIUM SUCCINATE 125 MG: 125 INJECTION, POWDER, FOR SOLUTION INTRAMUSCULAR; INTRAVENOUS at 16:13

## 2017-03-28 RX ADMIN — IPRATROPIUM BROMIDE AND ALBUTEROL SULFATE 3 ML: .5; 3 SOLUTION RESPIRATORY (INHALATION) at 16:13

## 2017-03-28 RX ADMIN — METHYLPREDNISOLONE SODIUM SUCCINATE 40 MG: 40 INJECTION, POWDER, FOR SOLUTION INTRAMUSCULAR; INTRAVENOUS at 18:55

## 2017-03-28 RX ADMIN — IOPAMIDOL 100 ML: 612 INJECTION, SOLUTION INTRAVENOUS at 17:53

## 2017-03-28 NOTE — IP AVS SNAPSHOT
[address] [facility] 
[phone] Patient: Carla Porter MRN: [id number] OLD:[birth year] You are allergic to the following Allergen Reactions Hydrocodone Nausea and Vomiting Lisinopril Cough Lortab (Hydrocodone-Acetaminophen) Nausea and Vomiting Percocet (Oxycodone-Acetaminophen) Nausea and Vomiting Immunizations Administered for This Admission Name Date Influenza Vaccine (Quad) PF 3/30/2017 Recent Documentation Height Weight BMI OB Status Smoking Status 1.626 m 93.8 kg 35.5 kg/m2 Hysterectomy Current Every Day Smoker Emergency Contacts Name Discharge Info Relation Home Work Mobile Corinne Leader DISCHARGE CAREGIVER [3] Spouse [3] [phone] About your hospitalization You were admitted on:  March 28, 2017 You last received care in the:  [facility] 2 CARDIOPULMONARY CARE You were discharged on:  March 30, 2017 Unit phone number:  [phone] Why you were hospitalized Your primary diagnosis was:  Respiratory Failure (Hcc) Your diagnoses also included:  Copd (Chronic Obstructive Pulmonary Disease) (Hcc), Judeen [place] (Nonalcoholic Steatohepatitis), Diabetes Mellitus With Neurological Manifestations, Uncontrolled (Hcc), Essential Hypertension, Benign, Thrombocytopenia (Hcc), Cirrhosis (Hcc), Anemia, Sepsis (Hcc), Cap (Community Acquired Pneumonia), Jimbo (Obstructive Sleep Apnea), Tobacco Abuse, Neuropathy Providers Seen During Your Hospitalizations Provider Role Specialty Primary office phone Dez Mai MD Attending Provider Emergency Medicine [phone] Valdez Rodriguez MD Attending Provider Hospitalist [phone] Edwina Joshi MD Attending Provider Internal Medicine [phone] Your Primary Care Physician (PCP) Primary Care Physician Office Phone Office Fax Kirstin Espinosa [phone] Follow-up Information Follow up With Details Comments Contact Info Ryan Toro NP In 1 week  [address] [place] [address] 
[phone] Your Appointments Monday April 10, 2017 10:20 AM EDT  
LAB with MD [facility] [address] [place] [address]) [facility] [address] [id number] [phone] Monday April 17, 2017 10:30 AM EDT Follow Up with MD [facility] [address] [place] [address]) [facility] [address] [id number] [phone] Thursday May 11, 2017  9:30 PM EDT SLEEP TITRATION with BEDROOM 1 [facility] AT [facility] (SLEEP LAB [address] [facility]) [address] [place] [address] [address] [place] [address]  
[phone] 1. Do not take a nap the day of the study  2. No caffeine after 12 noon the day of the study  3. Bring a 2 piece sleeping garment  4. Hair should be clean and dry, no oils, sprays, powders and remove wigs, weaves or other hair accessories  6. Patient should eat dinner prior to arriving for the test and a light breakfast will be provided upon discharge in the morning  7. Patient encouraged to bring activity items such as books, magazines, laptop, IPAD, etc, as well as toiletry items needed for the next morning  8. Bring all medications with you to the center  9. For specific questions please contact the sleep center directly, [phone] to [phone]  10. Do not arrive more than 15 minutes prior to appt time and use the doorbell to enter the sleep center.   
  
    
Sleep Center at [address] Entrance across from the [place] parking [address] (seventh floor) [address] (parking: in the parking deck) Phone number: [phone]  Do not arrive more than 15 minutes prior to appt time and use the doorbell to the left of the door to enter the sleep center. Current Discharge Medication List  
  
START taking these medications Dose & Instructions Dispensing Information Comments Morning Noon Evening Bedtime  
 guaiFENesin  mg ER tablet Commonly known as:  [address] Your next dose is: Today Dose:  600 mg Take 1 Tab by mouth two (2) times a day. Quantity:  14 Tab Refills:  0  
     
   
   
  
   
  
 levoFLOXacin 750 mg tablet Commonly known as:  Aldean Lefort Your next dose is:  Tomorrow Dose:  750 mg Take 1 Tab by mouth every twenty-four (24) hours. Quantity:  6 Tab Refills:  0  
     
  
   
   
   
  
 predniSONE 10 mg tablet Commonly known as:  Cristiana Manfred Your next dose is:  Tomorrow Dose:  40 mg Take 4 Tabs by mouth daily (with breakfast). 4 tab once a day for 2 days, 3 tab once a day for 2 days, 2 tab once a day for 2 days, then 1 tab once a day for 2 days. Quantity:  20 Tab Refills:  0 CONTINUE these medications which have NOT CHANGED Dose & Instructions Dispensing Information Comments Morning Noon Evening Bedtime ADVAIR DISKUS 500-50 mcg/dose diskus inhaler Generic drug:  fluticasone-salmeterol Your next dose is: Today Dose:  1 Puff Take 1 Puff by inhalation two (2) times a day. Refills:  0  
     
   
   
  
   
  
 albuterol-ipratropium 2.5 mg-0.5 mg/3 ml Nebu Commonly known as:  Betsy Foot Your next dose is: Today Dose:  3 mL  
3 mL by Nebulization route every four (4) hours as needed. Quantity:  30 Nebule Refills:  0  
     
   
   
  
   
  
 amitriptyline 150 mg tablet Commonly known as:  ELAVIL Your next dose is: Today Dose:  150 mg Take 150 mg by mouth nightly. Refills:  0 CONSTULOSE 10 gram/15 mL solution Generic drug:  lactulose Your next dose is: Today 
  
   
 take 2 tablespoonfuls by mouth twice a day Quantity:  1000 mL Refills:  5  
     
   
   
  
   
  
 ferrous sulfate 325 mg (65 mg iron) tablet Your next dose is:  Tomorrow Dose:  325 mg Take 1 Tab by mouth daily (with breakfast). Quantity:  30 Tab Refills:  1  
     
  
   
   
   
  
 furosemide 20 mg tablet Commonly known as:  LASIX Your next dose is:  Tomorrow 
  
   
 take 1 tablet by mouth once daily Quantity:  30 Tab Refills:  5 HumaLOG KwikPen 100 unit/mL kwikpen Generic drug:  insulin lispro Your next dose is: Today with dinner 
  
   
 inject 35 units with meals (+5 UNITS FOR EVERY 50MG/DL ABOVE 150 MG/DL) -  UNITS PER DAY Quantity:  45 mL Refills:  11 HYDROmorphone 2 mg tablet Commonly known as:  DILAUDID Your next dose is:  Anytime today Dose:  2 mg Take 1 Tab by mouth every four (4) hours as needed for Pain. Max Daily Amount: 12 mg. Quantity:  10 Tab Refills:  0  
     
   
   
   
  
 insulin glargine 100 unit/mL (3 mL) pen Commonly known as:  LANTUS SOLOSTAR Your next dose is:  Tomorrow Inject 120 units every morning as 2 separate shots of 60 units back to back--Dose change 11/18/16--updated med list--did not send prescription to the pharmacy Quantity:  45 mL Refills:  11  
     
  
   
   
   
  
 losartan 25 mg tablet Commonly known as:  COZAAR Your next dose is:  Tomorrow Dose:  25 mg Take 1 Tab by mouth daily. Replaces lisinopril for blood pressure and kidney protection Quantity:  30 Tab Refills:  11  
     
  
   
   
   
  
 metFORMIN 1,000 mg tablet Commonly known as:  GLUCOPHAGE Your next dose is: Today 
  
   
 take 1 tablet by mouth twice a day with meals Quantity:  180 Tab Refills:  3 Bambi Pen Needle 32 gauge x 5/32\" Ndle Generic drug:  Insulin Needles (Disposable)  
   
 use as directed four times a day Quantity:  100 Pen Needle Refills:  11 Nebulizer & Compressor machine UAD Quantity:  1 Each Refills:  0  
     
   
   
   
  
 omeprazole 20 mg capsule Commonly known as:  PRILOSEC Your next dose is:  Tomorrow 
  
   
 take 1 capsule by mouth once daily Quantity:  30 Cap Refills:  5  
     
  
   
   
   
  
 ondansetron 4 mg disintegrating tablet Commonly known as:  ZOFRAN ODT Your next dose is:  As needed 
  
   
 dissolve 1 tablet ON TONGUE every 8 hours if needed for nausea Quantity:  45 Tab Refills:  3  
     
   
   
   
  
 potassium chloride SR 10 mEq tablet Commonly known as:  KLOR-CON 10 Your next dose is:  Tomorrow Take 1 tab once daily Refills:  0  
     
  
   
   
   
  
 pregabalin 100 mg capsule Commonly known as:  Sidonie Lycoming Your next dose is: Today Dose:  100 mg Take 1 Cap by mouth three (3) times daily. Max Daily Amount: 300 mg. Quantity:  90 Cap Refills:  5 PROAIR HFA 90 mcg/actuation inhaler Generic drug:  albuterol Your next dose is: Today Dose:  2 Puff Take 2 Puffs by inhalation every four (4) hours as needed. Refills:  0  
     
   
   
  
   
  
 propranolol 20 mg tablet Commonly known as:  INDERAL Your next dose is: Today Dose:  20 mg Take 1 Tab by mouth two (2) times a day. Quantity:  60 Tab Refills:  11  
     
   
   
  
   
  
 rOPINIRole 4 mg Tab TAB Commonly known as:  Myriam Jean-Baptiste Your next dose is: Today Dose:  4 mg Take 4 mg by mouth nightly. Refills:  0 Where to Get Your Medications Information on where to get these meds will be given to you by the nurse or doctor. ! Ask your nurse or doctor about these medications albuterol-ipratropium 2.5 mg-0.5 mg/3 ml Nebu  
 guaiFENesin  mg ER tablet  
 levoFLOXacin 750 mg tablet  
 predniSONE 10 mg tablet Discharge Instructions Patient Discharge Instructions Pt Name  Trina Smith Date of Birth 1955 Age  64 y.o. Medical Record Number  [id number] PCP Yandy Kirkland NP Admit date:  3/28/2017 @    [address] [place] [address] Room Number  2203/01 Date of Discharge 3/30/2017 Admission Diagnoses:     Respiratory failure ([address] Allergies Allergen Reactions  Hydrocodone Nausea and Vomiting  Lisinopril Cough  Lortab [Hydrocodone-Acetaminophen] Nausea and Vomiting  Percocet [Oxycodone-Acetaminophen] Nausea and Vomiting You were admitted to [address] [place] [address] for  Respiratory failure [facility]) YOUR OTHER MEDICAL DIAGNOSES INCLUDE (BUT NOT LIMITED TO ): 
Present on Admission: 
 COPD (chronic obstructive pulmonary disease) ([address]  BLADERAS (nonalcoholic steatohepatitis)  Diabetes mellitus with neurological manifestations, uncontrolled ([address]  Essential hypertension, benign  Thrombocytopenia ([address]  Cirrhosis ([address]  Anemia  Sepsis ([address]  CAP (community acquired pneumonia)  IBSI (obstructive sleep apnea)  Tobacco abuse  Neuropathy  Respiratory failure ([address] DIET:  Cardiac Diet and Diabetic Diet Recommended activity: Activity as tolerated Follow up : Follow-up Information Follow up With Details Comments Contact Andry Christiansen NP In 1 week  [address] [place] [address] 
[phone] Skilled nursing facility MD responsible for above upon discharge. · It is important that you take the medication exactly as they are prescribed. · Keep your medication in the bottles provided by the pharmacist and keep a list of the medication names, dosages, and times to be taken in your wallet. · Do not take other medications without consulting your doctor. ADDITIONAL INFORMATION: If you experience any of the following symptoms or have any health problem not listed below, then please call your primary care physician or return to the emergency room if you cannot get hold of your doctor: Fever, chills, nausea, vomiting, diarrhea, change in mentation, falling, bleeding, shortness of breath. I understand that if any problems occur once I am discharged, I am supposed to call my Primary care physician for further care or seek help in the Emergency Department at the nearest Healthcare facility. I have had an opportunity to discuss my clinical issues with my doctor and nursing staff. I understand and acknowledge receipt of the above instructions. Physician's or R.N.'s Signature                                                            Date/Time Patient or Representative Signature                                                 Date/Time Discharge Orders None ACO Transitions of Care Introducing [employer] [address] offers a voluntary care coordination program to provide high quality service and care to [facility] fee-for-service beneficiaries. Claudia Hernandez was designed to help you enhance your health and well-being through the following services: ? Transitions of Care  support for individuals who are transitioning from one care setting to another (example: Hospital to home). ?  Chronic and Complex Care Coordination  support for individuals and caregivers of those with serious or chronic illnesses or with more than one chronic (ongoing) condition and those who take a number of different medications. If you meet specific medical criteria, a [facility] may call you directly to coordinate your care with your primary care physician and your other care providers. For questions about the [facility] programs, please, contact your physicians office. For general questions or additional information about Accountable Care Organizations: 
Please visit www.medicare.gov/acos. html or call 1-800-MEDICARE (4-945.695.6621) TTY users should call 1-834.354.6964. [employer] Announcement We are excited to announce that we are making your provider's discharge notes available to you in [employer]. You will see these notes when they are completed and signed by the physician that discharged you from your recent hospital stay. If you have any questions or concerns about any information you see in [employer], please call the Health Information Department where you were seen or reach out to your Primary Care Provider for more information about your plan of care. Introducing [facility] & HEALTH SERVICES! Dear Pierce Mayer: Thank you for requesting a [employer] account. Our records indicate that you already have an active [employer] account. You can access your account anytime at [email or web address] Ma Did you know that you can access your hospital and ER discharge instructions at any time in [employer]? You can also review all of your test results from your hospital stay or ER visit. Additional Information If you have questions, please visit the Frequently Asked Questions section of the [employer] website at [email or web address] Remember, [employer] is NOT to be used for urgent needs. For medical emergencies, dial 911. Now available from your iPhone and Android! General Information Please provide this summary of care documentation to your next provider. Patient Signature:  ____________________________________________________________ Date:  ____________________________________________________________  
  
[facility] Provider Signature:  ____________________________________________________________ Date:  ____________________________________________________________

## 2017-03-28 NOTE — ED NOTES
TRANSFER - OUT REPORT:    Verbal report given to Kirstin FLORES(name) on Jahaira Marino  being transferred to [facility](unit) for routine progression of care       Report consisted of patients Situation, Background, Assessment and   Recommendations(SBAR). Information from the following report(s) SBAR, ED Summary and OR Summary was reviewed with the receiving nurse. Lines:   Peripheral IV 03/28/17 Left Forearm (Active)   Site Assessment Clean, dry, & intact 3/28/2017  4:12 PM   Phlebitis Assessment 0 3/28/2017  4:12 PM   Infiltration Assessment 0 3/28/2017  4:12 PM   Dressing Status Clean, dry, & intact 3/28/2017  4:12 PM   Dressing Type Transparent 3/28/2017  4:12 PM   Hub Color/Line Status Pink 3/28/2017  4:12 PM        Opportunity for questions and clarification was provided.       Patient transported with:   O2 @ 2 liters and transport

## 2017-03-28 NOTE — ED PROVIDER NOTES
HPI Comments: Ria Cortes is a 64 y.o. female with PMHx significant for DM, COPD, GERD , who presents ambulatory to ED [facility] ED with cc of worsening dyspnea on exertion since last night. Associated symptoms include dry cough and wheezing. She reports an episode of left sided CP last night that resolved independently. Patient notes using her albuterol inhaler today. She reports seeing her PCP today who sent her to the ED for evaluation of PE. Patient admits to tobacco use (1ppd) with her last cigarette yesterday. Patient denies having a pulmologist. She specifically denies any fever or abdominal pain. PCP: Randall Mckeon NP      There are no other complaints, changes, or physical findings at this time. Written by Raquel Collet, ED Scribe, as dictated by Nando Dewitt M.D       The history is provided by the patient. No  was used.         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 (HCC)     Liver disease     Other and unspecified hyperlipidemia     Restless leg syndrome     Type II or unspecified type diabetes mellitus without mention of complication, uncontrolled     Unspecified essential hypertension        Past Surgical History:   Procedure Laterality Date    HX BACK SURGERY      HX CARPAL TUNNEL RELEASE      on right    HX HYSTERECTOMY      plus 1/2 of an ovary removed    OR COLSC FLX W/RMVL OF TUMOR POLYP LESION SNARE TQ  5/30/2013         UPPER GI ENDOSCOPY,LIGAT VARIX  2/6/2015              Family History:   Problem Relation Age of Onset    Diabetes Mother     Stroke Sister     Diabetes Paternal Aunt     Diabetes Paternal Uncle     Heart Disease Neg Hx        Social History     Social History    Marital status:      Spouse name: N/A    Number of children: N/A    Years of education: N/A     Occupational History    Not on file. Social History Main Topics    Smoking status: Current Every Day Smoker     Packs/day: 1.00     Years: 40.00    Smokeless tobacco: Never Used    Alcohol use Yes      Comment: rare    Drug use: No    Sexual activity: Not on file     Other Topics Concern    Not on file     Social History Narrative    Lives in [place] with . Has 2 daughters and 1 granddaughter who she cares for. On disability for her back. Used to work as a  and . Used to bowl and fish. ALLERGIES: Hydrocodone; Lisinopril; Lortab [hydrocodone-acetaminophen]; and Percocet [oxycodone-acetaminophen]    Review of Systems   Constitutional: Negative. Negative for chills, diaphoresis and fever. HENT: Negative. Negative for congestion, sore throat and trouble swallowing. Eyes: Negative. Negative for photophobia, pain and redness. Respiratory: Positive for cough, shortness of breath and wheezing. Negative for chest tightness. Cardiovascular: Positive for chest pain. Negative for palpitations. Gastrointestinal: Negative. Negative for abdominal pain, blood in stool, diarrhea, nausea and vomiting. Genitourinary: Negative for difficulty urinating, dysuria and frequency. Musculoskeletal: Negative. Negative for arthralgias, myalgias, neck pain and neck stiffness. Skin: Negative. Neurological: Negative. Negative for dizziness, tremors, seizures, syncope, speech difficulty, light-headedness and headaches. Psychiatric/Behavioral: Negative. Negative for confusion. The patient is not nervous/anxious. All other systems reviewed and are negative.     Patient Vitals for the past 12 hrs:   Temp Pulse Resp BP SpO2   03/28/17 1734 - - - 133/54 90 %   03/28/17 1633 - - - - 94 %   03/28/17 1547 98.8 °F (37.1 °C) (!) 112 28 159/69 (!) 89 %         Physical Exam   Constitutional: She is oriented to person, place, and time. She appears well-developed and well-nourished. Patient is obese and smells of smoke   HENT:   Head: Normocephalic and atraumatic. Eyes: Conjunctivae and EOM are normal.   Neck: Normal range of motion. Neck supple. Cardiovascular: Normal rate and regular rhythm. Pulmonary/Chest:   Coarse wheezing throughout all lung fields  Poor inspiratory effort  Patient has a hacking cough   Abdominal: Soft. She exhibits no distension. There is no tenderness. Musculoskeletal: Normal range of motion. Neurological: She is alert and oriented to person, place, and time. Skin: Skin is warm and dry. Psychiatric: She has a normal mood and affect. Nursing note and vitals reviewed. MDM  Number of Diagnoses or Management Options  Acute respiratory failure with hypoxia ([address]):   COPD exacerbation ([address]):   Hilar mass:   History of anticoagulant therapy:   Noncompliance:   Diagnosis management comments: Patient presents with SOB. DDx: COPD/Asthma exacerbation, CHF exacerbation, ACS, PE, PNA, PTX, Anxiety. Will get labs and cxr and ekg. Low suspicion for PE given on wheezing exam.       Amount and/or Complexity of Data Reviewed  Clinical lab tests: ordered and reviewed  Tests in the radiology section of CPT®: ordered and reviewed  Tests in the medicine section of CPT®: reviewed and ordered  Review and summarize past medical records: yes  Discuss the patient with other providers: yes (Hospitalist)  Independent visualization of images, tracings, or specimens: yes      ED Course       Procedures    EKG interpretation: (Preliminary) 15:57  Rhythm: sinus tachycardia; and regular . Rate (approx.): 111; Axis: normal; UT interval: normal; QRS interval: normal ; ST/T wave: normal; Other findings: otherwise normal.  Written by Flaco Hogan ED Scribgloria, as dictated by Erin Diamond M.D.     PROGRESS NOTE  5:10 PM Informed by tech when patient ambulates she dstated to 80 with RR in the 35s. Patient states her symptoms have not improved after nebulizer treatment and Solu-Medrol. Plan to admit her for hypoxic respiratory failure. Written by Gely Liang ED Scribe, as dictated by Sally Doss M.D.    CONSULT NOTE:   5:21 Karen Jones M.D spoke with Dr. Estefania Huertas,   Specialty: Hospitalist  Discussed pt's hx, disposition, and available diagnostic and imaging results. Reviewed care plans. Consultant will evaluate pt for admission. Written by Gely Liang ED Scribe, as dictated by Sally Doss M.D.        LABORATORY TESTS:  Recent Results (from the past 12 hour(s))   EKG, 12 LEAD, INITIAL    Collection Time: 03/28/17  3:57 PM   Result Value Ref Range    Ventricular Rate 111 BPM    Atrial Rate 111 BPM    P-R Interval 154 ms    QRS Duration 82 ms    Q-T Interval 332 ms    QTC Calculation (Bezet) 451 ms    Calculated P Axis 76 degrees    Calculated R Axis 62 degrees    Calculated T Axis 75 degrees    Diagnosis       Sinus tachycardia  Otherwise normal ECG  When compared with ECG of 09-MAR-2017 12:17,  No significant change was found     CBC WITH AUTOMATED DIFF    Collection Time: 03/28/17  4:11 PM   Result Value Ref Range    WBC 12.2 (H) 3.6 - 11.0 K/uL    RBC 3.68 (L) 3.80 - 5.20 M/uL    HGB 8.1 (L) 11.5 - 16.0 g/dL    HCT 28.1 (L) 35.0 - 47.0 %    MCV 76.4 (L) 80.0 - 99.0 FL    MCH 22.0 (L) 26.0 - 34.0 PG    MCHC 28.8 (L) 30.0 - 36.5 g/dL    RDW 18.4 (H) 11.5 - 14.5 %    PLATELET 78 (L) 801 - 400 K/uL    NEUTROPHILS 78 (H) 32 - 75 %    LYMPHOCYTES 14 12 - 49 %    MONOCYTES 8 5 - 13 %    EOSINOPHILS 0 0 - 7 %    BASOPHILS 0 0 - 1 %    ABS. NEUTROPHILS 9.5 (H) 1.8 - 8.0 K/UL    ABS. LYMPHOCYTES 1.7 0.8 - 3.5 K/UL    ABS. MONOCYTES 1.0 0.0 - 1.0 K/UL    ABS. EOSINOPHILS 0.0 0.0 - 0.4 K/UL    ABS.  BASOPHILS 0.0 0.0 - 0.1 K/UL    RBC COMMENTS ANISOCYTOSIS  1+        RBC COMMENTS HYPOCHROMIA  1+        WBC COMMENTS TOXIC GRANULATION     METABOLIC PANEL, COMPREHENSIVE    Collection Time: 03/28/17  4:11 PM   Result Value Ref Range    Sodium 137 136 - 145 mmol/L    Potassium 4.0 3.5 - 5.1 mmol/L    Chloride 101 97 - 108 mmol/L    CO2 26 21 - 32 mmol/L    Anion gap 10 5 - 15 mmol/L    Glucose 149 (H) 65 - 100 mg/dL    BUN 11 6 - 20 MG/DL    Creatinine 0.52 (L) 0.55 - 1.02 MG/DL    BUN/Creatinine ratio 21 (H) 12 - 20      GFR est AA >60 >60 ml/min/1.73m2    GFR est non-AA >60 >60 ml/min/1.73m2    Calcium 8.6 8.5 - 10.1 MG/DL    Bilirubin, total 1.2 (H) 0.2 - 1.0 MG/DL    ALT (SGPT) 21 12 - 78 U/L    AST (SGOT) 13 (L) 15 - 37 U/L    Alk. phosphatase 102 45 - 117 U/L    Protein, total 8.4 (H) 6.4 - 8.2 g/dL    Albumin 3.2 (L) 3.5 - 5.0 g/dL    Globulin 5.2 (H) 2.0 - 4.0 g/dL    A-G Ratio 0.6 (L) 1.1 - 2.2     CK W/ REFLX CKMB    Collection Time: 03/28/17  4:11 PM   Result Value Ref Range    CK 42 26 - 192 U/L   TROPONIN I    Collection Time: 03/28/17  4:11 PM   Result Value Ref Range    Troponin-I, Qt. <0.04 <0.05 ng/mL   PRO-BNP    Collection Time: 03/28/17  4:11 PM   Result Value Ref Range    NT pro- (H) 0 - 125 PG/ML       IMAGING RESULTS:  CT CHEST W CONT   Final Result   INDICATION: Shortness of breath with hypoxemia. Possible hilar mass.     COMPARISON: Earlier chest x-ray same date as well as CT 11/10/2016     TECHNIQUE:   Multislice helical CT arteriography was performed from the diaphragm to the  thoracic inlet during uneventful rapid bolus intravenous administration of 100  mL of Isovue 300. Lung and soft tissue windows were generated. Coronal and  sagittal reformatted images were post processed. CT dose reduction was achieved through use of a standardized protocol tailored  for this examination and automatic exposure control for dose modulation.        FINDINGS:  LUNGS: There are multiple patchy areas of airspace process scattered throughout  both lungs. These involve both upper and lower lung fields.  The most  concentrated area of parenchymal consolidation is in the lingula which extends  anteriorly and borders the anterior pleural surface at the level of the aortic  arch. This corresponds to one of the densities noted on the previous portable  chest x-ray. PLEURA: No pleural effusion or pneumothorax   TRACHEA/BRONCHI: Patent         MEDIASTINUM/LAURA: There is no mediastinal or hilar adenopathy or mass.     THYROID: Tiny subcentimeter lucency noted in the left lobe of the thyroid gland.        AORTA: The aorta enhances normally without evidence of aneurysm or dissection.     UPPER ABDOMEN: Splenomegaly and varices again noted consistent with portal  hypertension noted on previous study. There is opacification of the portal vein.     BONES: Unremarkable      IMPRESSION  IMPRESSION:   1. Multiple foci of airspace process scattered throughout both lungs. This was  not present on previous studies and is consistent with airspace process which is  acute. Most significant area of infiltrate is in the anterior lingula. 2. Findings in the upper abdomen again consistent with portal hypertension and  splenomegaly. .   XR CHEST PORT   Final Result   EXAM: XR CHEST PORT     INDICATION: Chest Pain     COMPARISON: 11/19/2016     FINDINGS: A portable AP radiograph of the chest was obtained at 1649 hours. The  heart size is within normal limits. There is mild atherosclerotic change of the  aorta. There is an ill-defined nodule-like density in the left perihilar region  measuring approximately 3 x 2 cm. This could represent neoplastic process.     Additionally, there is now apparent widening of the superior mediastinum left  greater than right could relate adenopathy. This could be better evaluated with  PA and lateral chest film.     Right lung is clear.     IMPRESSION  IMPRESSION:  1. Abnormal exam as described above. Left perihilar opacity could represent a  nodule from neoplastic process. This possibly adenopathy in mediastinum. This  could be better evaluated with PA and lateral chest or CT of the chest.       MEDICATIONS GIVEN:  Medications   albuterol-ipratropium (DUO-NEB) 2.5 MG-0.5 MG/3 ML (not administered)   methylPREDNISolone (PF) (SOLU-MEDROL) injection 40 mg (not administered)   albuterol (PROVENTIL VENTOLIN) nebulizer solution 2.5 mg (not administered)   sodium chloride (NS) flush 5-10 mL (not administered)   sodium chloride (NS) flush 5-10 mL (not administered)   acetaminophen (TYLENOL) tablet 650 mg (not administered)   oxyCODONE-acetaminophen (PERCOCET) 5-325 mg per tablet 1 Tab (not administered)   naloxone (NARCAN) injection 0.4 mg (not administered)   ondansetron (ZOFRAN) injection 4 mg (not administered)   bisacodyl (DULCOLAX) suppository 10 mg (not administered)   enoxaparin (LOVENOX) injection 40 mg (not administered)   albuterol-ipratropium (DUO-NEB) 2.5 MG-0.5 MG/3 ML (3 mL Nebulization Given 3/28/17 1651)   albuterol-ipratropium (DUO-NEB) 2.5 MG-0.5 MG/3 ML (3 mL Nebulization Given 3/28/17 1613)   methylPREDNISolone (PF) (SOLU-MEDROL) injection 125 mg (125 mg IntraVENous Given 3/28/17 1613)   0.9% sodium chloride infusion (50 mL/hr IntraVENous New Bag 3/28/17 1753)   iopamidol (ISOVUE 300) 61 % contrast injection 100 mL (100 mL IntraVENous Given 3/28/17 1753)   sodium chloride (NS) flush 10 mL (10 mL IntraVENous Given 3/28/17 1753)       IMPRESSION:  1. Acute respiratory failure with hypoxia ([address]    2. COPD exacerbation ([address]    3. Noncompliance    4. History of anticoagulant therapy    5. Hilar mass        PLAN:    1. Admit to Hospitalist    Admission Note:  5:23 PM  Patient is being admitted to the hospital by Dr. Claudia Solitario. The results of their tests and reasons for their admission have been discussed with them and available family. They convey agreement and understanding for the need to be admitted and for their admission diagnosis. Written by Emily Capps, SUKHI Scribgloria, as dictated by Ninfa Kelley M.D.     This note is prepared by Muna Dominguez, acting as Scribe for  Iban Estevez M.D. Iban Estevez M.D,: The scribe's documentation has been prepared under my direction and personally reviewed by me in its entirety. I confirm that the note above accurately reflects all work, treatment, procedures, and medical decision making performed by me.

## 2017-03-28 NOTE — ED NOTES
Bedside shift change report given to Gagandeep Newberry (oncoming nurse) by Holger Nguyen (offgoing nurse). Report included the following information SBAR, Kardex, ED Summary and MAR.      Pt has bed assigned attempted to call report to room 2203

## 2017-03-29 LAB
ALBUMIN SERPL BCP-MCNC: 2.9 G/DL (ref 3.5–5)
ALBUMIN/GLOB SERPL: 0.6 {RATIO} (ref 1.1–2.2)
ALP SERPL-CCNC: 91 U/L (ref 45–117)
ALT SERPL-CCNC: 19 U/L (ref 12–78)
ANION GAP BLD CALC-SCNC: 8 MMOL/L (ref 5–15)
AST SERPL W P-5'-P-CCNC: 12 U/L (ref 15–37)
ATRIAL RATE: 111 BPM
BASOPHILS # BLD AUTO: 0 K/UL (ref 0–0.1)
BASOPHILS # BLD: 0 % (ref 0–1)
BILIRUB SERPL-MCNC: 0.9 MG/DL (ref 0.2–1)
BNP SERPL-MCNC: 101 PG/ML (ref 0–100)
BUN SERPL-MCNC: 12 MG/DL (ref 6–20)
BUN/CREAT SERPL: 23 (ref 12–20)
CALCIUM SERPL-MCNC: 8.4 MG/DL (ref 8.5–10.1)
CALCULATED P AXIS, ECG09: 76 DEGREES
CALCULATED R AXIS, ECG10: 62 DEGREES
CALCULATED T AXIS, ECG11: 75 DEGREES
CHLORIDE SERPL-SCNC: 101 MMOL/L (ref 97–108)
CO2 SERPL-SCNC: 27 MMOL/L (ref 21–32)
CREAT SERPL-MCNC: 0.53 MG/DL (ref 0.55–1.02)
DIAGNOSIS, 93000: NORMAL
DIFFERENTIAL METHOD BLD: ABNORMAL
EOSINOPHIL # BLD: 0 K/UL (ref 0–0.4)
EOSINOPHIL NFR BLD: 0 % (ref 0–7)
ERYTHROCYTE [DISTWIDTH] IN BLOOD BY AUTOMATED COUNT: 18.2 % (ref 11.5–14.5)
EST. AVERAGE GLUCOSE BLD GHB EST-MCNC: 169 MG/DL
FERRITIN SERPL-MCNC: 17 NG/ML (ref 8–252)
GLOBULIN SER CALC-MCNC: 5.1 G/DL (ref 2–4)
GLUCOSE BLD STRIP.AUTO-MCNC: 250 MG/DL (ref 65–100)
GLUCOSE BLD STRIP.AUTO-MCNC: 295 MG/DL (ref 65–100)
GLUCOSE BLD STRIP.AUTO-MCNC: 315 MG/DL (ref 65–100)
GLUCOSE BLD STRIP.AUTO-MCNC: 364 MG/DL (ref 65–100)
GLUCOSE SERPL-MCNC: 276 MG/DL (ref 65–100)
HBA1C MFR BLD: 7.5 % (ref 4.2–6.3)
HCT VFR BLD AUTO: 26.7 % (ref 35–47)
HGB BLD-MCNC: 7.8 G/DL (ref 11.5–16)
IRON SATN MFR SERPL: 5 % (ref 20–50)
IRON SERPL-MCNC: 20 UG/DL (ref 35–150)
LACTATE SERPL-SCNC: 1 MMOL/L (ref 0.4–2)
LYMPHOCYTES # BLD AUTO: 10 % (ref 12–49)
LYMPHOCYTES # BLD: 0.8 K/UL (ref 0.8–3.5)
MCH RBC QN AUTO: 22.5 PG (ref 26–34)
MCHC RBC AUTO-ENTMCNC: 29.2 G/DL (ref 30–36.5)
MCV RBC AUTO: 76.9 FL (ref 80–99)
MONOCYTES # BLD: 0.3 K/UL (ref 0–1)
MONOCYTES NFR BLD AUTO: 3 % (ref 5–13)
NEUTS SEG # BLD: 7.3 K/UL (ref 1.8–8)
NEUTS SEG NFR BLD AUTO: 87 % (ref 32–75)
P-R INTERVAL, ECG05: 154 MS
PLATELET # BLD AUTO: 77 K/UL (ref 150–400)
POTASSIUM SERPL-SCNC: 4.2 MMOL/L (ref 3.5–5.1)
PROT SERPL-MCNC: 8 G/DL (ref 6.4–8.2)
Q-T INTERVAL, ECG07: 332 MS
QRS DURATION, ECG06: 82 MS
QTC CALCULATION (BEZET), ECG08: 451 MS
RBC # BLD AUTO: 3.47 M/UL (ref 3.8–5.2)
RBC MORPH BLD: ABNORMAL
SERVICE CMNT-IMP: ABNORMAL
SODIUM SERPL-SCNC: 136 MMOL/L (ref 136–145)
TIBC SERPL-MCNC: 384 UG/DL (ref 250–450)
TROPONIN I SERPL-MCNC: <0.04 NG/ML
VENTRICULAR RATE, ECG03: 111 BPM
WBC # BLD AUTO: 8.4 K/UL (ref 3.6–11)
WBC MORPH BLD: ABNORMAL

## 2017-03-29 PROCEDURE — 84484 ASSAY OF TROPONIN QUANT: CPT | Performed by: INTERNAL MEDICINE

## 2017-03-29 PROCEDURE — 94640 AIRWAY INHALATION TREATMENT: CPT

## 2017-03-29 PROCEDURE — 36415 COLL VENOUS BLD VENIPUNCTURE: CPT | Performed by: INTERNAL MEDICINE

## 2017-03-29 PROCEDURE — 74011250637 HC RX REV CODE- 250/637: Performed by: INTERNAL MEDICINE

## 2017-03-29 PROCEDURE — G8978 MOBILITY CURRENT STATUS: HCPCS

## 2017-03-29 PROCEDURE — 80053 COMPREHEN METABOLIC PANEL: CPT | Performed by: INTERNAL MEDICINE

## 2017-03-29 PROCEDURE — G8989 SELF CARE D/C STATUS: HCPCS

## 2017-03-29 PROCEDURE — 82728 ASSAY OF FERRITIN: CPT | Performed by: INTERNAL MEDICINE

## 2017-03-29 PROCEDURE — 74011250636 HC RX REV CODE- 250/636: Performed by: INTERNAL MEDICINE

## 2017-03-29 PROCEDURE — G8988 SELF CARE GOAL STATUS: HCPCS

## 2017-03-29 PROCEDURE — 83540 ASSAY OF IRON: CPT | Performed by: INTERNAL MEDICINE

## 2017-03-29 PROCEDURE — 97162 PT EVAL MOD COMPLEX 30 MIN: CPT

## 2017-03-29 PROCEDURE — 97530 THERAPEUTIC ACTIVITIES: CPT

## 2017-03-29 PROCEDURE — 83880 ASSAY OF NATRIURETIC PEPTIDE: CPT | Performed by: INTERNAL MEDICINE

## 2017-03-29 PROCEDURE — 74011636637 HC RX REV CODE- 636/637: Performed by: INTERNAL MEDICINE

## 2017-03-29 PROCEDURE — 83036 HEMOGLOBIN GLYCOSYLATED A1C: CPT | Performed by: INTERNAL MEDICINE

## 2017-03-29 PROCEDURE — G8987 SELF CARE CURRENT STATUS: HCPCS

## 2017-03-29 PROCEDURE — 97165 OT EVAL LOW COMPLEX 30 MIN: CPT

## 2017-03-29 PROCEDURE — 74011000250 HC RX REV CODE- 250: Performed by: INTERNAL MEDICINE

## 2017-03-29 PROCEDURE — 77010033678 HC OXYGEN DAILY

## 2017-03-29 PROCEDURE — 85025 COMPLETE CBC W/AUTO DIFF WBC: CPT | Performed by: INTERNAL MEDICINE

## 2017-03-29 PROCEDURE — 74011250637 HC RX REV CODE- 250/637: Performed by: NURSE PRACTITIONER

## 2017-03-29 PROCEDURE — 97116 GAIT TRAINING THERAPY: CPT

## 2017-03-29 PROCEDURE — 83605 ASSAY OF LACTIC ACID: CPT | Performed by: INTERNAL MEDICINE

## 2017-03-29 PROCEDURE — G8980 MOBILITY D/C STATUS: HCPCS

## 2017-03-29 PROCEDURE — G8979 MOBILITY GOAL STATUS: HCPCS

## 2017-03-29 PROCEDURE — 82962 GLUCOSE BLOOD TEST: CPT

## 2017-03-29 PROCEDURE — 65660000000 HC RM CCU STEPDOWN

## 2017-03-29 RX ORDER — HYDROMORPHONE HYDROCHLORIDE 2 MG/1
1 TABLET ORAL
Status: DISCONTINUED | OUTPATIENT
Start: 2017-03-29 | End: 2017-03-30 | Stop reason: HOSPADM

## 2017-03-29 RX ORDER — LANOLIN ALCOHOL/MO/W.PET/CERES
325 CREAM (GRAM) TOPICAL
Status: DISCONTINUED | OUTPATIENT
Start: 2017-03-30 | End: 2017-03-30 | Stop reason: HOSPADM

## 2017-03-29 RX ADMIN — GABAPENTIN 600 MG: 300 CAPSULE ORAL at 21:32

## 2017-03-29 RX ADMIN — IPRATROPIUM BROMIDE AND ALBUTEROL SULFATE 3 ML: .5; 3 SOLUTION RESPIRATORY (INHALATION) at 00:25

## 2017-03-29 RX ADMIN — INSULIN GLARGINE 60 UNITS: 100 INJECTION, SOLUTION SUBCUTANEOUS at 09:30

## 2017-03-29 RX ADMIN — INSULIN LISPRO 5 UNITS: 100 INJECTION, SOLUTION INTRAVENOUS; SUBCUTANEOUS at 18:10

## 2017-03-29 RX ADMIN — INSULIN GLARGINE 60 UNITS: 100 INJECTION, SOLUTION SUBCUTANEOUS at 09:31

## 2017-03-29 RX ADMIN — INSULIN LISPRO 5 UNITS: 100 INJECTION, SOLUTION INTRAVENOUS; SUBCUTANEOUS at 21:38

## 2017-03-29 RX ADMIN — METHYLPREDNISOLONE SODIUM SUCCINATE 40 MG: 40 INJECTION, POWDER, FOR SOLUTION INTRAMUSCULAR; INTRAVENOUS at 12:40

## 2017-03-29 RX ADMIN — IPRATROPIUM BROMIDE AND ALBUTEROL SULFATE 3 ML: .5; 3 SOLUTION RESPIRATORY (INHALATION) at 16:13

## 2017-03-29 RX ADMIN — LACTULOSE 20 G: 10 SOLUTION ORAL at 18:12

## 2017-03-29 RX ADMIN — METHYLPREDNISOLONE SODIUM SUCCINATE 40 MG: 40 INJECTION, POWDER, FOR SOLUTION INTRAMUSCULAR; INTRAVENOUS at 06:00

## 2017-03-29 RX ADMIN — GABAPENTIN 600 MG: 300 CAPSULE ORAL at 09:29

## 2017-03-29 RX ADMIN — IPRATROPIUM BROMIDE AND ALBUTEROL SULFATE 3 ML: .5; 3 SOLUTION RESPIRATORY (INHALATION) at 08:50

## 2017-03-29 RX ADMIN — IPRATROPIUM BROMIDE AND ALBUTEROL SULFATE 3 ML: .5; 3 SOLUTION RESPIRATORY (INHALATION) at 12:19

## 2017-03-29 RX ADMIN — FLUTICASONE FUROATE AND VILANTEROL TRIFENATATE 1 PUFF: 100; 25 POWDER RESPIRATORY (INHALATION) at 10:26

## 2017-03-29 RX ADMIN — ACETAMINOPHEN 650 MG: 325 TABLET, FILM COATED ORAL at 09:37

## 2017-03-29 RX ADMIN — INSULIN LISPRO 20 UNITS: 100 INJECTION, SOLUTION INTRAVENOUS; SUBCUTANEOUS at 09:34

## 2017-03-29 RX ADMIN — INSULIN LISPRO 20 UNITS: 100 INJECTION, SOLUTION INTRAVENOUS; SUBCUTANEOUS at 18:09

## 2017-03-29 RX ADMIN — Medication 10 ML: at 15:24

## 2017-03-29 RX ADMIN — AMITRIPTYLINE HYDROCHLORIDE 150 MG: 50 TABLET, FILM COATED ORAL at 21:32

## 2017-03-29 RX ADMIN — VALSARTAN 80 MG: 80 TABLET ORAL at 09:32

## 2017-03-29 RX ADMIN — GABAPENTIN 600 MG: 300 CAPSULE ORAL at 18:09

## 2017-03-29 RX ADMIN — METHYLPREDNISOLONE SODIUM SUCCINATE 40 MG: 40 INJECTION, POWDER, FOR SOLUTION INTRAMUSCULAR; INTRAVENOUS at 18:09

## 2017-03-29 RX ADMIN — PROPRANOLOL HYDROCHLORIDE 20 MG: 10 TABLET ORAL at 18:09

## 2017-03-29 RX ADMIN — INSULIN LISPRO 7 UNITS: 100 INJECTION, SOLUTION INTRAVENOUS; SUBCUTANEOUS at 12:40

## 2017-03-29 RX ADMIN — ENOXAPARIN SODIUM 40 MG: 40 INJECTION SUBCUTANEOUS at 09:28

## 2017-03-29 RX ADMIN — IPRATROPIUM BROMIDE AND ALBUTEROL SULFATE 3 ML: .5; 3 SOLUTION RESPIRATORY (INHALATION) at 19:11

## 2017-03-29 RX ADMIN — HYDROMORPHONE HYDROCHLORIDE 1 MG: 2 TABLET ORAL at 21:36

## 2017-03-29 RX ADMIN — PROPRANOLOL HYDROCHLORIDE 20 MG: 10 TABLET ORAL at 09:31

## 2017-03-29 RX ADMIN — INSULIN LISPRO 5 UNITS: 100 INJECTION, SOLUTION INTRAVENOUS; SUBCUTANEOUS at 09:35

## 2017-03-29 RX ADMIN — Medication 10 ML: at 21:32

## 2017-03-29 RX ADMIN — LACTULOSE 20 G: 10 SOLUTION ORAL at 10:28

## 2017-03-29 RX ADMIN — ROPINIROLE HYDROCHLORIDE 4 MG: 1 TABLET, FILM COATED ORAL at 21:32

## 2017-03-29 RX ADMIN — FUROSEMIDE 20 MG: 20 TABLET ORAL at 09:29

## 2017-03-29 RX ADMIN — INSULIN LISPRO 20 UNITS: 100 INJECTION, SOLUTION INTRAVENOUS; SUBCUTANEOUS at 12:40

## 2017-03-29 RX ADMIN — Medication 10 ML: at 06:49

## 2017-03-29 NOTE — PROGRESS NOTES
Primary Nurse Ani Lloyd and Daphne De Leon RN performed a dual skin assessment on this patient Impairment noted- see wound doc flow sheet: abrasion left elbow from recent fall PTA.    Nithin score is 18

## 2017-03-29 NOTE — CDMP QUERY
Please clarify if this patient is being treated/managed for:    =>What is the clinical significance of pts abnormal CCT findings? 1. Portal hypertension POA?    =>Other Explanation of clinical findings  =>Unable to Determine (no explanation of clinical findings)    The medical record reflects the following clinical findings, treatment, and risk factors:    Risk Factors: pmh: COPD/asthma, cirrhosis d/t BALDERAS with chr TTP, h/o esoph varix, chr LE pain 2/2 diab neuropathy, ppd smoker. Clinical Indicators: BMI: 35.5kg, cxr-nodule-like density in the left perihilar region-3 x 2 cm, could represent neoplastic process w/ new widening of the superior mediastinum L>R could be adenopathy. CCT=patchy ASD ant & lingula. Wbc-12.2, HR-112, dyspneic in respiratory distress, desats 89%    Treatment: IV steroids, nebs, O2, advair, ssi, serial CE, BC, IV LVQ, advair, cont lactulose-Lasix-propranolol-Neurontin, lvnx, prn dilaudid. Please clarify and document your clinical opinion in the progress notes and discharge summary. Thanks!   Sergei Ledesma [facility], . [address]

## 2017-03-29 NOTE — CDMP QUERY
Please clarify if this patient is being treated/managed for:    =>5. What is the clinical significance of pts abnormal CXR showing L hilar mass/3.2cm nodule like density in active smoker w/ current PNA, with new widening mediastinum L>R which radiologist states could be adenopathy? Neoplastic L lung mass POA? Benign L lung mass POA? Unspecified L lung mass POA? Associated PNA related process POA?    =>Other Explanation of clinical findings  =>Unable to Determine (no explanation of clinical findings)    The medical record reflects the following clinical findings, treatment, and risk factors:    Risk Factors: pmh: COPD/asthma, cirrhosis d/t BALDERAS with chr TTP, h/o esoph varix, chr LE pain 2/2 diab neuropathy, ppd smoker. Clinical Indicators: BMI: 35.5kg, cxr-nodule-like density in the left perihilar region-3 x 2 cm, could represent neoplastic process w/ new widening of the superior mediastinum L>R could be adenopathy. CCT=patchy ASD ant & lingula. Wbc-12.2, HR-112, dyspneic in respiratory distress, desats 89%    Treatment: IV steroids, nebs, O2, advair, ssi, serial CE, BC, IV LVQ, advair, cont lactulose-Lasix-propranolol-Neurontin, lvnx, prn dilaudid. Please clarify and document your clinical opinion in the progress notes and discharge summary. Thanks!   Elise Carcamo, [facility]. [address]

## 2017-03-29 NOTE — H&P
Hospitalist Admission Note    NAME:  Florina Billy   :   1955   MRN:   [id number]     Date of admit:  3/28/2017    PCP: Jett Mares NP    Assessment/Plan:      Severe sepsis POA with WBC 12,200, , lactate 2.1  Bilateral patchy community-acquired pneumonia POA  Acute respiratory distress with hypoxia and MARTINEZ POA   Admit  O2  IV steroids and nebs, continue advair  IV levaquin  Check lactate  Blood culture  Serial labs  IVF    COPD (chronic obstructive pulmonary disease) with acute exacerbation POA  Actively wheezing in ED, severe MARTINEZ  IV steroids, nebs, O2, advair  Wean O2 as tolerated    DM type 2 with neuropathy, on long-term insulin POA  Baseline 120 units lantus and humalog 30 to 40 units with SSI  Hyperglycemia will worsen with the sterodis  Continue home lantus  Humalog 20 units with meals plus SSI    Essential HTN POA  Continue ARB  PRN hydralazine    Left sided chest pain POA due to pneumonia  PRN pain meds  Repeat cardiac enzymes in AM    Cirrhosis with BADLERAS POA  History of esophageal varices POA  Continue lasix and propranolol  Continue lactulose    Chronic LE neuropathy POA  Neurontin(no longer takes lyrica)  PRN dilaudid  Continue elvail    Chronic thrombocytopenia POA due to cirrhosis, baseline PLTs 70,000 to 100,000  Currently at baseline    DVT prophylaxis: lovenox SQ    Stress ulcer prophylaxis: Not indicated    Code status: Full code    Next of Kin:     Subjective:     CHIEF COMPLAINT: \"I started having left-sided chest pain, getting   short of breath when I exert myself, and coughing more for the past   day. \"      HISTORY OF PRESENT ILLNESS: A 71-year-old white female with   known COPD/asthma. She has cirrhosis due to BALDERAS with chronic   thrombocytopenia and history of esophageal varices. She has chronic   pain in her legs from her diabetic neuropathy. At baseline she does not   wear home oxygen. She does have a home nebulizer but does not use   it much.  She does use rescue inhalers and Advair. She continues to   smoke 1 pack of cigarettes per day.       The patient was driving home from [place] the past 2 days. Over   the past 24 hours, she began to develop some increasing left-sided   chest pain that was worse when she would cough or take a breath. She began to get progressively short of breath when she would exert   herself and began to hear herself actively wheezing. She has a chronic   baseline cough, but says it became worse than her baseline cough   over the past 24 hours. She has not had any new leg swelling or leg   pain. No hemoptysis. No fevers or chills. No nausea, vomiting, or   abdominal pain. She does get more short of breath when she lays flat,   but she says she normally sleeps on a few pillows at baseline.       Because of the chest pain, the increasing dyspnea on exertion and the   wheezing, she went to see her primary care provider. They referred her   to the emergency room. In the emergency room, she was tachypneic   and actively wheezing. She dropped her room air saturations at rest as   low as 89%. Chest x-ray had questionable left hilar mass. CT scan of   the chest showed a patchy bilateral airspace disease, which was new;   the most significant areas in the anterior and lingula. She was given   nebulizers and steroids. We were called to admit the patient.       Past Medical History:   Diagnosis Date    Asthma     Back pain     COPD (chronic obstructive pulmonary disease) (HCC)     Diabetes (HCC)     Esophageal varices in cirrhosis ([facility] [address]     6/2014 banding x 2    Fibromyalgia     Gastrointestinal disorder     GERD (gastroesophageal reflux disease)     Hypercholesteremia     Liver cirrhosis secondary to BALDERAS (HCC)     Liver disease     Other and unspecified hyperlipidemia     Restless leg syndrome     Type II or unspecified type diabetes mellitus without mention of complication, uncontrolled     Unspecified essential hypertension Past Surgical History:   Procedure Laterality Date    HX BACK SURGERY      HX CARPAL TUNNEL RELEASE      on right    HX HYSTERECTOMY      plus 1/2 of an ovary removed    LA COLSC FLX W/RMVL OF TUMOR POLYP LESION SNARE TQ  5/30/2013         UPPER GI ENDOSCOPY,LIGAT VARIX  2/6/2015            Social History:  Social History   Substance Use Topics    Smoking status: Current Every Day Smoker     Packs/day: 1.00     Years: 40.00    Smokeless tobacco: Never Used    Alcohol use Yes      Comment: rare    Lives with     FAMILY HISTORY:  Family History   Problem Relation Age of Onset    Diabetes Mother     Stroke Sister     Diabetes Paternal Aunt     Diabetes Paternal Uncle     Heart Disease Neg Hx    2 children healthy    Allergies   Allergen Reactions    Hydrocodone Nausea and Vomiting    Lisinopril Cough    Lortab [Hydrocodone-Acetaminophen] Nausea and Vomiting    Percocet [Oxycodone-Acetaminophen] Nausea and Vomiting        Prior to Admission medications    Medication Sig Start Date End Date Taking? Authorizing Provider   insulin glargine (LANTUS SOLOSTAR) 100 unit/mL (3 mL) pen Inject 120 units every morning as 2 separate shots of 60 units back to back--Dose change 11/18/16--updated med list--did not send prescription to the pharmacy 11/18/16  Yes Bashir Campos MD   losartan (COZAAR) 25 mg tablet Take 1 Tab by mouth daily. Replaces lisinopril for blood pressure and kidney protection 11/10/16  Yes Bashir Campos MD   albuterol-ipratropium (DUO-NEB) 2.5 mg-0.5 mg/3 ml nebu 3 mL by Nebulization route every four (4) hours as needed.  11/10/16  Yes Alma Dominguez MD   omeprazole (PRILOSEC) 20 mg capsule take 1 capsule by mouth once daily 10/24/16  Yes Cathi Barksdale NP   furosemide (LASIX) 20 mg tablet take 1 tablet by mouth once daily 9/21/16  Yes Cathi Barksdale NP   metFORMIN (GLUCOPHAGE) 1,000 mg tablet take 1 tablet by mouth twice a day with meals 7/25/16  Yes Gianluca ZAIDI Norma Connors MD   ondansetron (ZOFRAN ODT) 4 mg disintegrating tablet dissolve 1 tablet ON TONGUE every 8 hours if needed for nausea 4/12/16  Yes April G ALEJANDRO Barksdale   rOPINIRole (REQUIP) 4 mg tab TAB Take 4 mg by mouth nightly. Yes Historical Provider   pregabalin (LYRICA) 100 mg capsule Take 1 Cap by mouth three (3) times daily. Max Daily Amount: 300 mg. 11/21/16   Rosa Benson MD   HYDROmorphone (DILAUDID) 2 mg tablet Take 1 Tab by mouth every four (4) hours as needed for Pain. Max Daily Amount: 12 mg. 11/19/16   Beni Herzog MD   Nebulizer & Compressor machine UAD 11/10/16   Zana West MD   CONSTULOSE 10 gram/15 mL solution take 2 tablespoonfuls by mouth twice a day 9/21/16 April G Staci Sullivan NP   HUMALOG KWIKPEN 100 unit/mL kwikpen inject 35 units with meals (+5 UNITS FOR EVERY 50MG/DL ABOVE 150 MG/DL) -  UNITS PER DAY 9/21/16   Rosa Benson MD   TREVOR PEN NEEDLE 32 gauge x 5/32\" ndle use as directed four times a day 6/29/16   Rosa Benson MD   potassium chloride SR (KLOR-CON 10) 10 mEq tablet Take 1 tab once daily 6/7/16   Rosa Benson MD   propranolol (INDERAL) 20 mg tablet Take 1 Tab by mouth two (2) times a day. 5/24/16   Rosa Benson MD   ferrous sulfate 325 mg (65 mg iron) tablet Take 1 Tab by mouth daily (with breakfast). 3/3/16   Nicol Webb NP   amitriptyline (ELAVIL) 150 mg tablet Take 150 mg by mouth nightly. Historical Provider   albuterol (PROAIR HFA) 90 mcg/actuation inhaler Take 2 Puffs by inhalation every four (4) hours as needed. Leonarda Osman MD   fluticasone-salmeterol (ADVAIR DISKUS) 500-50 mcg/dose diskus inhaler Take 1 Puff by inhalation two (2) times a day.     Historical Provider       REVIEW OF SYSTEMS:  bold equals positive    Yes Total of 12 systems reviewed as follows:  Constitutional: fever Chills  weight loss  Eyes:   Diplopia visual changes  eye pain  ENT:   coryza  Sore throat Dysphagia  Respiratory:  Acute on chronic cough Hemoptysis Dyspnea on exertion  Cards:  Left sided chest pain  orthopnea Chronic LE edema  GI:   Nausea/vomiting Diarrhea abdominal pain    melena  BRBPR  Genitourinary:  frequency  dysuria hematuria  Integument:  Rash Ulcers  Nodules  Hematologic:  Easy bruising, negative gum/nose bleeding  Endocrine: Polyuria/poldipsia heat/cold intolerance  Musculoskel: Myalgias Joint paiin/swelling/redness Gout    Chronic neuropathy pain  Neurological:  Headaches  focal motor or sensory changes    Objective:   VITALS:    Patient Vitals for the past 24 hrs:   Temp Pulse Resp BP SpO2   17 1918 - (!) 101 20 140/57 97 %   17 1904 - (!) 101 14 - 97 %   17 1734 - - - 133/54 90 %   17 1712 - - - - (!) 89 %   17 1658 - - - - 92 %   17 1633 - - - - 94 %   17 1630 - - - - 93 %   17 1555 - - - - 90 %   17 1547 98.8 °F (37.1 °C) (!) 112 28 159/69 (!) 89 %     Temp (24hrs), Av.8 °F (37.1 °C), Min:98.8 °F (37.1 °C), Max:98.8 °F (37.1 °C)      O2 Device: Room air    PHYSICAL EXAM:   General:    Alert, cooperative in no distress, dyspneic with activity    HEENT: Normocephalic, atraumatic    PERRL, EOMI  Sclera no icterus    Nasal mucosa without masses or discharge  Hearing intact to voice    Oropharynx without erythema or exudate  No thrush  Pink MM  Neck:  No meningismus, trachea midline, no carotid bruits     Thyroid not enlarged, no nodules or tenderness  Lungs:   Decreased BS with diffuse expiratory wheezes bilaterally. Crackles at bases    No accessory muscle use or retractions. Heart:   Regular rate and rhythm,  no murmur or gallop. Trace LE edema    Peripheral pulses 2+, symmetric  Abdomen:   Soft, non-tender. Not distended. Bowel sounds normal.     No masses, No Hepatosplenomegaly, No Rebound or guarding  Lymph nodes: No cervical or inguinal KIKI  Musculoskeletal:  No Joint swelling, erythema, warmth. No Cyanosis or clubbing  Skin:      No rashes  .       Not Jaundiced No nodules or thickening    Normal capillary refill  Neurologic: Alert and oriented X 3, follows commands     Cranial nerves 2 to 12 intact    Symmetric motor strength bilaterally         LAB DATA REVIEWED:    Recent Results (from the past 24 hour(s))   EKG, 12 LEAD, INITIAL    Collection Time: 03/28/17  3:57 PM   Result Value Ref Range    Ventricular Rate 111 BPM    Atrial Rate 111 BPM    P-R Interval 154 ms    QRS Duration 82 ms    Q-T Interval 332 ms    QTC Calculation (Bezet) 451 ms    Calculated P Axis 76 degrees    Calculated R Axis 62 degrees    Calculated T Axis 75 degrees    Diagnosis       Sinus tachycardia  Otherwise normal ECG  When compared with ECG of 09-MAR-2017 12:17,  No significant change was found     CBC WITH AUTOMATED DIFF    Collection Time: 03/28/17  4:11 PM   Result Value Ref Range    WBC 12.2 (H) 3.6 - 11.0 K/uL    RBC 3.68 (L) 3.80 - 5.20 M/uL    HGB 8.1 (L) 11.5 - 16.0 g/dL    HCT 28.1 (L) 35.0 - 47.0 %    MCV 76.4 (L) 80.0 - 99.0 FL    MCH 22.0 (L) 26.0 - 34.0 PG    MCHC 28.8 (L) 30.0 - 36.5 g/dL    RDW 18.4 (H) 11.5 - 14.5 %    PLATELET 78 (L) 098 - 400 K/uL    NEUTROPHILS 78 (H) 32 - 75 %    LYMPHOCYTES 14 12 - 49 %    MONOCYTES 8 5 - 13 %    EOSINOPHILS 0 0 - 7 %    BASOPHILS 0 0 - 1 %    ABS. NEUTROPHILS 9.5 (H) 1.8 - 8.0 K/UL    ABS. LYMPHOCYTES 1.7 0.8 - 3.5 K/UL    ABS. MONOCYTES 1.0 0.0 - 1.0 K/UL    ABS. EOSINOPHILS 0.0 0.0 - 0.4 K/UL    ABS.  BASOPHILS 0.0 0.0 - 0.1 K/UL    RBC COMMENTS ANISOCYTOSIS  1+        RBC COMMENTS HYPOCHROMIA  1+        WBC COMMENTS TOXIC GRANULATION     METABOLIC PANEL, COMPREHENSIVE    Collection Time: 03/28/17  4:11 PM   Result Value Ref Range    Sodium 137 136 - 145 mmol/L    Potassium 4.0 3.5 - 5.1 mmol/L    Chloride 101 97 - 108 mmol/L    CO2 26 21 - 32 mmol/L    Anion gap 10 5 - 15 mmol/L    Glucose 149 (H) 65 - 100 mg/dL    BUN 11 6 - 20 MG/DL    Creatinine 0.52 (L) 0.55 - 1.02 MG/DL    BUN/Creatinine ratio 21 (H) 12 - 20      GFR est AA >60 >60 ml/min/1.73m2    GFR est non-AA >60 >60 ml/min/1.73m2    Calcium 8.6 8.5 - 10.1 MG/DL    Bilirubin, total 1.2 (H) 0.2 - 1.0 MG/DL    ALT (SGPT) 21 12 - 78 U/L    AST (SGOT) 13 (L) 15 - 37 U/L    Alk. phosphatase 102 45 - 117 U/L    Protein, total 8.4 (H) 6.4 - 8.2 g/dL    Albumin 3.2 (L) 3.5 - 5.0 g/dL    Globulin 5.2 (H) 2.0 - 4.0 g/dL    A-G Ratio 0.6 (L) 1.1 - 2.2     CK W/ REFLX CKMB    Collection Time: 03/28/17  4:11 PM   Result Value Ref Range    CK 42 26 - 192 U/L   TROPONIN I    Collection Time: 03/28/17  4:11 PM   Result Value Ref Range    Troponin-I, Qt. <0.04 <0.05 ng/mL   PRO-BNP    Collection Time: 03/28/17  4:11 PM   Result Value Ref Range    NT pro- (H) 0 - 125 PG/ML       I have reviewed the results of all available imaging studies. Yes I have personally reviewed the actual    xray     EKG as read by me shows ST rate 111, normal axis, no LVH  Normal intervals, no ischemic ST-T changes    CXR read by radiology and reviewed by myself shows FINDINGS:   A portable AP radiograph of the chest was obtained at 1649 hours. The  heart size is within normal limits. There is mild atherosclerotic change of the  aorta. There is an ill-defined nodule-like density in the left perihilar region  measuring approximately 3 x 2 cm. This could represent neoplastic process. Additionally, there is now apparent widening of the superior mediastinum left  greater than right could relate adenopathy. This could be better evaluated with  PA and lateral chest film. Right lung is clear. IMPRESSION:  1. Abnormal exam as described above. Left perihilar opacity could represent a  nodule from neoplastic process. This possibly adenopathy in mediastinum. This  could be better evaluated with PA and lateral chest or CT of the chest.    CT scan chest FINDINGS:  LUNGS: There are multiple patchy areas of airspace process scattered throughout  both lungs. These involve both upper and lower lung fields.  The most  concentrated area of parenchymal consolidation is in the lingula which extends  anteriorly and borders the anterior pleural surface at the level of the aortic  arch. This corresponds to one of the densities noted on the previous portable  chest x-ray. PLEURA: No pleural effusion or pneumothorax   TRACHEA/BRONCHI: Patent  MEDIASTINUM/LAURA: There is no mediastinal or hilar adenopathy or mass. THYROID: Tiny subcentimeter lucency noted in the left lobe of the thyroid gland. AORTA: The aorta enhances normally without evidence of aneurysm or dissection. UPPER ABDOMEN: Splenomegaly and varices again noted consistent with portal  hypertension noted on previous study. There is opacification of the portal vein. BONES: Unremarkable   IMPRESSION:   1. Multiple foci of airspace process scattered throughout both lungs. This was  not present on previous studies and is consistent with airspace process which is  acute. Most significant area of infiltrate is in the anterior lingula. 2. Findings in the upper abdomen again consistent with portal hypertension and  splenomegaly. .  ___________________________________________________    Inpatient is warranted for this patient because they presents with increasing SOB. I have a high level of concern for Pneumonia  I anticipate the stay in the hospital will span at least 2 midnights. My assessment of the clinical condition and my plan of care is as outlined above  __________________________________________________    Care Plan discussed with:    Patient, ED Doc     I saw the patient personally, took a history and did a complete physical exam at the bedside. I performed complex decision making in coming up with a diagnostic and treatment plan for the patient. I reviewed the patient's past medical records, current laboratory and radiology results, and actual Xray films/EKG. I have also discussed this case with the involved ED physician.     Risk of deterioration: High    Total Time Coordinating Admission:  65    minutes    Total Critical Care Time:     Admitting Physician: Burnice Carrel, MD

## 2017-03-29 NOTE — PROGRESS NOTES
Hospitalist Progress Note    NAME: Marialuisa Reese   :  1955   MRN:  [id number]       Interim Hospital Summary: 64 y.o. female whom presented on 3/28/2017 with      Assessment / Plan:  Severe sepsis POA (with WBC 12,200, , lactate 2.1 in ER)  Bilateral patchy community-acquired pneumonia POA  Acute respiratory distress with hypoxia and MARTINEZ POA   COPD (chronic obstructive pulmonary disease) with acute exacerbation POA  - Leukocytosis has resolved; WBC 8.4  - O2 @ 2L via n/c, O2 Sat 94%; wean O2 as tolerate  - IV steroids (will start taper tomorrow), continue with nebs, Breo (uses advair at home)   - continue with IV levaquin  - lactate 1.0 this am  - d/c IVF  - chest CT; Multiple foci of airspace process scattered throughout both lungs. This was  not present on previous studies and is consistent with airspace process which is  acute.       DM type 2 with neuropathy, on long-term insulin POA  -  Baseline 120 units lantus and humalog 30 to 40 units with SSI  -  Hyperglycemia will worsen with the sterodis  -  Continue home lantus  -  Humalog 20 units with meals plus SSI  -  HgbA1C 7.5 (slowly trending down; it was 8.7 on 2016)     Essential HTN POA  -  Continue with Valsartn  -  PRN hydralazine     Left sided chest pain POA due to pneumonia  -  PRN pain meds; decreased the pain medication dose, pt was sound asleep during rounds (around 11am), difficult to wake her up. -  Repeat cardiac enzymes (-)     Cirrhosis with BALDERAS POA  History of esophageal varices POA  -  Continue lasix and propranolol  -  Continue lactulose  -  Incidental findings from chest CT; portal hypertension and splenomegaly.   Follow up with her GI specialist as outpatient    Chronic LE neuropathy POA  -  Neurontin(no longer takes lyrica)  -  PRN dilaudid  -  Continue with elvail     Chronic thrombocytopenia POA due to cirrhosis, baseline PLTs 70,000 to 100,000  -  Currently at baseline    IBIS  - has been recommended to use CPAP machine    Iron deficiency anemia  - no s/sx of bleeding, will follow h & h  - continue Iron supplements    Tobacco Abuse   - offered Nicotine patch, pt declined  - discussed about the importance of smoking cessation, pt nodded her head during conversation    Morbid Obesity  - discussed about life style changes and adopting health diet with exercise as she feels better    DVT prophylaxis: lovenox SQ     Stress ulcer prophylaxis: Not indicated     Code status: Full code     Next of Kin:      Recommended Disposition: Home w/Family     Subjective:     Chief Complaint / Reason for Physician Visit  \"I am sleepy\". Discussed with RN events overnight. Review of Systems:  Symptom Y/N Comments  Symptom Y/N Comments   Fever/Chills n   Chest Pain n    Poor Appetite n   Edema     Cough y   Abdominal Pain     Sputum n   Joint Pain     SOB/MARTINEZ y   Pruritis/Rash     Nausea/vomit n   Tolerating PT/OT     Diarrhea n   Tolerating Diet     Constipation n   Other       Could NOT obtain due to:      Objective:     VITALS:   Last 24hrs VS reviewed since prior progress note.  Most recent are:  Patient Vitals for the past 24 hrs:   Temp Pulse Resp BP SpO2   03/29/17 1219 - - - - 94 %   03/29/17 1203 97.8 °F (36.6 °C) 82 18 125/57 98 %   03/29/17 0850 - - - - 96 %   03/29/17 0723 97.3 °F (36.3 °C) 92 18 137/63 98 %   03/29/17 0408 97.9 °F (36.6 °C) 98 19 117/72 98 %   03/29/17 0025 - - - - 97 %   03/28/17 2346 98 °F (36.7 °C) (!) 104 20 129/78 98 %   03/28/17 2013 98.1 °F (36.7 °C) (!) 103 19 159/64 98 %   03/28/17 1918 - (!) 101 20 140/57 97 %   03/28/17 1904 - (!) 101 14 - 97 %   03/28/17 1734 - - - 133/54 90 %   03/28/17 1712 - - - - (!) 89 %   03/28/17 1658 - - - - 92 %   03/28/17 1633 - - - - 94 %   03/28/17 1630 - - - - 93 %   03/28/17 1555 - - - - 90 %   03/28/17 1547 98.8 °F (37.1 °C) (!) 112 28 159/69 (!) 89 %       Intake/Output Summary (Last 24 hours) at 03/29/17 1231  Last data filed at 03/29/17 0408   Gross per 24 hour   Intake           633.33 ml   Output             1100 ml   Net          -466.67 ml        PHYSICAL EXAM:  General: WD, WN. She was very sleepy during rounds, but was able to carry short conversation, cooperative, no acute distress    EENT:  EOMI. Anicteric sclerae. MMM  Resp:  Tight air exchange with diffuse expiratory wheezing . No accessory muscle use  CV:  Regular  rhythm,  trace of pitting edema   GI:  Soft, obese, Non tender.  +Bowel sounds  Neurologic:  Alert and oriented X 3, normal speech when awake  Psych:   Good insight. Not anxious nor agitated  Skin:  No rashes. No jaundice    Reviewed most current lab test results and cultures  YES  Reviewed most current radiology test results   YES  Review and summation of old records today    NO  Reviewed patient's current orders and MAR    YES  PMH/SH reviewed - no change compared to H&P  ________________________________________________________________________  Care Plan discussed with:    Comments   Patient y    Family      RN y    Care Manager y    Consultant                       y Multidiciplinary team rounds were held today with , nursing, pharmacist and clinical coordinator. Patient's plan of care was discussed; medications were reviewed and discharge planning was addressed. ________________________________________________________________________  Total NON critical care TIME:  30  Minutes    Total CRITICAL CARE TIME Spent:   Minutes non procedure based      Comments   >50% of visit spent in counseling and coordination of care     ________________________________________________________________________  Enrrique Stevenson NP     Procedures: see electronic medical records for all procedures/Xrays and details which were not copied into this note but were reviewed prior to creation of Plan. LABS:  I reviewed today's most current labs and imaging studies.   Pertinent labs include:  Recent Labs      03/29/17   0200  03/28/17   1611   WBC  8.4 12.2*   HGB  7.8*  8.1*   HCT  26.7*  28.1*   PLT  77*  78*     Recent Labs      03/29/17   0200  03/28/17   1611   NA  136  137   K  4.2  4.0   CL  101  101   CO2  27  26   GLU  276*  149*   BUN  12  11   CREA  0.53*  0.52*   CA  8.4*  8.6   ALB  2.9*  3.2*   TBILI  0.9  1.2*   SGOT  12*  13*   ALT  19  21       Signed: )Sagar Lee, NP

## 2017-03-29 NOTE — PROGRESS NOTES
Occupational Therapy EVALUATION/discharge  Patient: Griffin Harding (36 y.o. female)  Date: 3/29/2017  Primary Diagnosis: COPD (chronic obstructive pulmonary disease) (HCC)        Precautions: fall       ASSESSMENT:   Based on the objective data described below, the patient presents with decreased balance, decreased activity tolerance and reported decline in her ability to perform IADLs at home due to increased MARTINEZ and fatigue. Pt received supine via 2 L with O2 95%, asking why therapy was ordered. Pt was independent for bed mobility and sit to stand. Pt demonstrated slightly unsteady mobility to bathroom, appeared woozy and dizzy. However, pt denied any symptoms and daughter reported this is how pt appears when she is MARTINEZ. Pt's O2 stable on room air with activity and >94% however, pt requesting 2 L at end of session for comfort. Pt is completed ADLs mod I to min A due to decreased functional reach R foot. Pt reported multiple falls at home and despite attempts at education and rationale for therapy, pt declined further therapy acutely or at discharge. Pt reported she is up ad dustin. Further skilled acute occupational therapy is not indicated at this time. Discharge Recommendations: Pt is declining all HH and OP recommendations. Further Equipment Recommendations for Discharge: None      SUBJECTIVE:   Patient stated Karely Rudolph are you here?     OBJECTIVE DATA SUMMARY:   HISTORY:   Past Medical History:   Diagnosis Date    Asthma     Back pain     [facility]     Diabetes ([facility]     Esophageal varices in cirrhosis ([facility]     6/2014 banding x 2    Fibromyalgia     Gastrointestinal disorder     GERD (gastroesophageal reflux disease)     Hypercholesteremia     Liver cirrhosis secondary to BALDERAS (HCC)     Liver disease     Other and unspecified hyperlipidemia     Restless leg syndrome     Type II or unspecified type diabetes mellitus without mention of complication, uncontrolled     Unspecified essential hypertension      Past Surgical History:   Procedure Laterality Date    HX BACK SURGERY      HX CARPAL TUNNEL RELEASE      on right    HX HYSTERECTOMY      plus 1/2 of an ovary removed    NE COLSC FLX W/RMVL OF TUMOR POLYP LESION SNARE TQ  5/30/2013         UPPER GI ENDOSCOPY,LIGAT VARIX  2/6/2015            Prior Level of Function/Home Situation: patient was independent with all aspects of functional mobility and ADLs. She lives with her  and has family close by. Patient has cut back on activities due to lack of energy and breathing. She does not cook as much and unable to perform household cleaning. Patient reports multiple falls recently, one resulting in an ankle fracture and another a toe injury. Expanded or extensive additional review of patient history: COPD, recent L ankle fx from fall in [facility] parking lot    [address]: Private residence  # Steps to Enter: 3  Rails to Enter: Yes  Hand Rails : Left  One/Two Story Residence: One story  Living Alone: No  Support Systems: Family member(s), Spouse/Significant Other/Partner  Patient Expects to be Discharged to[de-identified] Private residence  Current DME Used/Available at Home: Eveleen Frames, straight, Shower chair  Tub or Shower Type: Shower  [x]  Right hand dominant   []  Left hand dominant    EXAMINATION OF PERFORMANCE DEFICITS:  Cognitive/Behavioral Status:                      Skin: intact    Edema: B LEs    Hearing: Auditory  Auditory Impairment: None    Vision/Perceptual:                                     Range of Motion:    AROM: Within functional limits  PROM: Within functional limits                      Strength:    Strength: Generally decreased, functional                Coordination:  Coordination: Within functional limits  Fine Motor Skills-Upper: Left Intact; Right Intact    Gross Motor Skills-Upper: Left Intact; Right Intact    Tone & Sensation:    Tone: Normal  Sensation: Intact Balance:  Sitting: Intact; Without support  Standing: Impaired; Without support  Standing - Static: Good  Standing - Dynamic : Fair    Functional Mobility and Transfers for ADLs:  Bed Mobility:  Rolling: Independent  Supine to Sit: Independent  Scooting: Independent    Transfers:  Sit to Stand: Independent  Stand to Sit: Independent  Bed to Chair: Independent  Toilet Transfer : Stand-by asssistance    ADL Assessment:  Feeding: Independent    Oral Facial Hygiene/Grooming: Stand-by assistance    Bathing: Stand-by assistance    Upper Body Dressing: Stand-by assistance    Lower Body Dressing: Minimum assistance (R sock)    Toileting: Stand by assistance                ADL Intervention and task modifications:     Pt educated on role of OT and required verbal cues for safety and proper hand placement during ADLs/functional transfers. Educated role of OT and use of OT to assist with energy conservation education as well as home safety during ADL and IADL tasks. Functional Measure:  Barthel Index:    Bathin  Bladder: 10  Bowels: 10  Groomin  Dressing: 10  Feeding: 10  Mobility: 0  Stairs: 0  Toilet Use: 10  Transfer (Bed to Chair and Back): 10  Total: 70       Barthel and G-code impairment scale:  Percentage of impairment CH  0% CI  1-19% CJ  20-39% CK  40-59% CL  60-79% CM  80-99% CN  100%   Barthel Score 0-100 100 99-80 79-60 59-40 20-39 1-19   0   Barthel Score 0-20 20 17-19 13-16 9-12 5-8 1-4 0      The Barthel ADL Index: Guidelines  1. The index should be used as a record of what a patient does, not as a record of what a patient could do. 2. The main aim is to establish degree of independence from any help, physical or verbal, however minor and for whatever reason. 3. The need for supervision renders the patient not independent. 4. A patient's performance should be established using the best available evidence.  Asking the patient, friends/relatives and nurses are the usual sources, but direct observation and common sense are also important. However direct testing is not needed. 5. Usually the patient's performance over the preceding 24-48 hours is important, but occasionally longer periods will be relevant. 6. Middle categories imply that the patient supplies over 50 per cent of the effort. 7. Use of aids to be independent is allowed. Harlee Cowden., Barthel, D.W. ([id number]). Functional evaluation: the Barthel Index. [address] (14)2. Shon Noriega christiano RADHA Stark, [place] Rolon, Stefan Burr., [place], [address] [facility] (1999). Measuring the change indisability after inpatient rehabilitation; comparison of the responsiveness of the Barthel Index and Functional [place] Measure. Journal of Neurology, Neurosurgery, and Psychiatry, 66(4), 213-263. GINGER Bonilla, WILBUR Lui, & Jarred Mancini M.A. (2004.) Assessment of post-stroke quality of life in cost-effectiveness studies: The usefulness of the Barthel Index and the EuroQoL-5D. Quality of Life Research, 13, 403-60         G codes: In compliance with CMSs Claims Based Outcome Reporting, the following G-code set was chosen for this patient based on their primary functional limitation being treated: The outcome measure chosen to determine the severity of the functional limitation was the Barthel Index with a score of 70/100 which was correlated with the impairment scale. ?  Self Care:     - CURRENT STATUS: CJ - 20%-39% impaired, limited or restricted    - GOAL STATUS: CJ - 20%-39% impaired, limited or restricted    - D/C STATUS:  CJ - 20%-39% impaired, limited or restricted     Occupational Therapy Evaluation Charge Determination   History Examination Decision-Making   MEDIUM Complexity : Expanded review of history including physical, cognitive and psychosocial  history  MEDIUM Complexity : 3-5 performance deficits relating to physical, cognitive , or psychosocial skils that result in activity limitations and / or participation restrictions MEDIUM Complexity : Patient may present with comorbidities that affect occupational performnce. Miniml to moderate modification of tasks or assistance (eg, physical or verbal ) with assesment(s) is necessary to enable patient to complete evaluation       Based on the above components, the patient evaluation is determined to be of the following complexity level: MEDIUM  Pain:  Pain Scale 1: Numeric (0 - 10)  Pain Intensity 1: 6  Pain Location 1: Back        Pain Intervention(s) 1: Emotional support  Activity Tolerance:   VSS via room air  Please refer to the flowsheet for vital signs taken during this treatment. After treatment:   [x]  Patient left in no apparent distress sitting up in chair  []  Patient left in no apparent distress in bed  [x]  Call bell left within reach  [x]  Nursing notified  [x]  Caregiver present  []  Bed alarm activated    COMMUNICATION/EDUCATION:   Communication/Collaboration:  []      Home safety education was provided and the patient/caregiver indicated understanding. [x]      Patient/family have participated as able and agree with findings and recommendations. []      Patient is unable to participate in plan of care at this time.   Findings and recommendations were discussed with: Physical Therapist and Registered Nurse    Liam Hernandez OT  Time Calculation: 25 mins

## 2017-03-29 NOTE — CDMP QUERY
Please clarify if this patient is being treated/managed for:    =>What is the clinical significance of pts abnormal CCT findings? 3. Splenomegally POA?    =>Other Explanation of clinical findings  =>Unable to Determine (no explanation of clinical findings)    The medical record reflects the following clinical findings, treatment, and risk factors:    Risk Factors: pmh: COPD/asthma, cirrhosis d/t BALDERAS with chr TTP, h/o esoph varix, chr LE pain 2/2 diab neuropathy, ppd smoker. Clinical Indicators: BMI: 35.5kg, cxr-nodule-like density in the left perihilar region-3 x 2 cm, could represent neoplastic process w/ new widening of the superior mediastinum L>R could be adenopathy. CCT=patchy ASD ant & lingula. Wbc-12.2, HR-112, dyspneic in respiratory distress, desats 89%    Treatment: IV steroids, nebs, O2, advair, ssi, serial CE, BC, IV LVQ, advair, cont lactulose-Lasix-propranolol-Neurontin, lvnx, prn dilaudid. Please clarify and document your clinical opinion in the progress notes and discharge summary. Thanks!   Eugene Villegas [facility], . [address]

## 2017-03-29 NOTE — PROGRESS NOTES
Bedside shift change report given to Raine Lau (oncoming nurse) by Anaid Almonte RN (offgoing nurse). Report included the following information SBAR.

## 2017-03-29 NOTE — DIABETES MGMT
DTC Progress Note    Recommendations/ Comments: Noted pt started on Lantus and mealtime insulin today. Will continue to follow as needed. Chart reviewed on Griffin Harding for hyperglycemia. Patient is a 64 y.o. female with known history of Type 2 Diabetes on Metformin 1,000mg bid, Humalog 35 units ac tid, and Lantus 120 units daily at home. A1c:   Lab Results   Component Value Date/Time    Hemoglobin A1c 7.5 03/29/2017 02:00 AM    Hemoglobin A1c 7.4 11/01/2016 02:59 PM       Recent Glucose Results:   Lab Results   Component Value Date/Time     (H) 03/29/2017 02:00 AM     (H) 03/28/2017 04:11 PM    GLUCPOC 315 (H) 03/29/2017 11:24 AM    GLUCPOC 295 (H) 03/29/2017 08:32 AM    GLUCPOC 359 (H) 03/28/2017 09:21 PM        Lab Results   Component Value Date/Time    Creatinine 0.53 03/29/2017 02:00 AM       Active Orders   Diet    DIET DIABETIC CONSISTENT CARB Regular        PO intake: No data found. Current hospital DM medication:   -Lantus 120 units daily   -Humalog normal sensitivity correction  -Humalog 20 units ac tid    Will continue to follow as needed.     Thank you    Torie Stewart, [address] [facility]  Office: [phone]

## 2017-03-29 NOTE — PROGRESS NOTES
physical Therapy EVALUATION/DISCHARGE  Patient: Vilma Pereira (9 y.o. female)  Date: 3/29/2017  Primary Diagnosis: COPD (chronic obstructive pulmonary disease) ([facility])        Precautions:      ASSESSMENT :  Based on the objective data described below, the patient presents with decreased strength, good functional mobility, fair O2 sats on RA and 2L O2, decreased endurance, and unsteady gait following admission for COPD. PTA patient was independent with all aspects of functional mobility and ADLs. She lives with her  and has family close by. Patient has cut back on activities due to lack of energy and breathing. She does not cook as much and unable to perform household cleaning. Currently, patient received in bed on 2L O2. Patient is independent with bed mobility and transfers. Once standing, patient appears woozy and dizzy although denying. Patient with unsteady gait to the bathroom, requiring CGA. She demonstrates increased path deviations and trunk sway. Patient denies need for therapy during hospital stay. Educated patient at length regarding role and therapy at home, in which patient is refusing. Patient is up ad dustin in the room. O2 sats remained 91-95% on RA with ambulation although patient donned 2LO2 at end of session for \"comfort and need\". Notified nursing. Will sign off. Skilled physical therapy is not indicated at this time. PLAN :  Discharge Recommendations: None (declining need)  Further Equipment Recommendations for Discharge: none-owns SPC     SUBJECTIVE:   Patient stated This is how I always walk.     OBJECTIVE DATA SUMMARY:   HISTORY:    Past Medical History:   Diagnosis Date    Asthma     Back pain     [facility]     Diabetes ([facility]     Esophageal varices in cirrhosis ([facility] [address]     6/2014 banding x 2    Fibromyalgia     Gastrointestinal disorder     GERD (gastroesophageal reflux disease)     Hypercholesteremia     Liver cirrhosis [facility] to BALDERAS ([facility] [address]     Liver disease     Other and unspecified hyperlipidemia     Restless leg syndrome     Type II or unspecified type diabetes mellitus without mention of complication, uncontrolled     Unspecified essential hypertension      Past Surgical History:   Procedure Laterality Date    HX BACK SURGERY      HX CARPAL TUNNEL RELEASE      on right    HX HYSTERECTOMY      plus 1/2 of an ovary removed    [place] COLSC FLX W/RMVL OF TUMOR POLYP LESION SNARE TQ  5/30/2013         UPPER GI ENDOSCOPY,LIGAT VARIX  2/6/2015          Prior Level of Function/Home Situation: patient was independent with all aspects of functional mobility and ADLs. She lives with her  and has family close by. Patient has cut back on activities due to lack of energy and breathing. She does not cook as much and unable to perform household cleaning. Patient reports multiple falls recently, one resulting in an ankle fracture and another a toe injury. Personal factors and/or comorbidities impacting plan of care: COPD, history of falls    Home Situation  Home Environment: Private residence  # Steps to Enter: 3  Rails to Enter: Yes  Hand Rails : Left  One/Two Story Residence: One story  Living Alone: No  Support Systems: Family member(s), Spouse/Significant Other/Partner  Patient Expects to be Discharged to[de-identified] Private residence  Current DME Used/Available at Home: Girtha Sharon, straight, Shower chair  Tub or Shower Type: Shower    EXAMINATION/PRESENTATION/DECISION MAKING:   Critical Behavior:              Hearing:   Auditory  Auditory Impairment: None  Skin:  Appears intact  Edema: mild  Range Of Motion:  AROM: Within functional limits           PROM: Within functional limits           Strength:    Strength: Generally decreased, functional                    Tone & Sensation:   Tone: Normal              Sensation: Intact               Coordination:  Coordination: Within functional limits  Vision:      Functional Mobility:  Bed Mobility:  Rolling: Independent  Supine to Sit: Independent     Scooting: Independent  Transfers:  Sit to Stand: Independent  Stand to Sit: Independent        Bed to Chair: Independent              Balance:   Sitting: Intact; Without support  Standing: Impaired; Without support  Standing - Static: Good  Standing - Dynamic : Fair  Ambulation/Gait Training:  Distance (ft): 30 Feet (ft)  Assistive Device: Gait belt  Ambulation - Level of Assistance: Contact guard assistance;Stand-by asssistance     Gait Description (WDL): Exceptions to WDL  Gait Abnormalities: Decreased step clearance;Trunk sway increased        Base of Support: Widened     Speed/Mey: Pace decreased (<100 feet/min)  Step Length: Right shortened;Left shortened                         Stairs: Therapeutic Exercises:       Functional Measure:  Barthel Index:    Bathin  Bladder: 10  Bowels: 10  Groomin  Dressing: 10  Feeding: 10  Mobility: 0  Stairs: 0  Toilet Use: 10  Transfer (Bed to Chair and Back): 10  Total: 70       Barthel and G-code impairment scale:  Percentage of impairment CH  0% CI  1-19% CJ  20-39% CK  40-59% CL  60-79% CM  80-99% CN  100%   Barthel Score 0-100 100 99-80 79-60 59-40 20-39 1-19   0   Barthel Score 0-20 20 17-19 13-16 9-12 5-8 1-4 0      The Barthel ADL Index: Guidelines  1. The index should be used as a record of what a patient does, not as a record of what a patient could do. 2. The main aim is to establish degree of independence from any help, physical or verbal, however minor and for whatever reason. 3. The need for supervision renders the patient not independent. 4. A patient's performance should be established using the best available evidence. Asking the patient, friends/relatives and nurses are the usual sources, but direct observation and common sense are also important. However direct testing is not needed.   5. Usually the patient's performance over the preceding 24-48 hours is important, but occasionally longer periods will be relevant. 6. Middle categories imply that the patient supplies over 50 per cent of the effort. 7. Use of aids to be independent is allowed. Carley Izaguirre., Barthel, NUBIA. ([id number]). Functional evaluation: the Barthel Index. [address] [facility] (14)2. Joanne Chappell christiano RADHA Stark, Nina Wade., Nina Palms., [place], [address] (1999). Measuring the change indisability after inpatient rehabilitation; comparison of the responsiveness of the Barthel Index and Functional [place] Measure. Journal of Neurology, Neurosurgery, and Psychiatry, 66(4), 872-633. Raymond Leach, N.J.A, WILBUR Lui, & Jovany Sadler MLISSY. (2004.) Assessment of post-stroke quality of life in cost-effectiveness studies: The usefulness of the Barthel Index and the EuroQoL-5D. Quality of Life Research, 13, 677-33         G codes: In compliance with CMSs Claims Based Outcome Reporting, the following G-code set was chosen for this patient based on their primary functional limitation being treated: The outcome measure chosen to determine the severity of the functional limitation was the Barthel with a score of 70/100 which was correlated with the impairment scale.     ? Mobility - Walking and Moving Around:     - CURRENT STATUS: CJ - 20%-39% impaired, limited or restricted    - GOAL STATUS: CJ - 20%-39% impaired, limited or restricted    - D/C STATUS:  CJ - 20%-39% impaired, limited or restricted        Physical Therapy Evaluation Charge Determination   History Examination Presentation Decision-Making   MEDIUM  Complexity : 1-2 comorbidities / personal factors will impact the outcome/ POC  MEDIUM Complexity : 3 Standardized tests and measures addressing body structure, function, activity limitation and / or participation in recreation  MEDIUM Complexity : Evolving with changing characteristics  Other outcome measures Barthel  MEDIUM      Based on the above components, the patient evaluation is determined to be of the following complexity level: MEDIUM    Pain:  Pain Scale 1: Numeric (0 - 10)  Pain Intensity 1: 6  Pain Location 1: Back  Activity Tolerance: At baseline. O2 sats stable on RA with activity. Please refer to the flowsheet for vital signs taken during this treatment. After treatment:   [x]   Patient left in no apparent distress sitting up in chair  []   Patient left in no apparent distress in bed  [x]   Call bell left within reach  [x]   Nursing notified  [x]   Caregiver present  []   Bed alarm activated    COMMUNICATION/EDUCATION:   Communication/Collaboration:  [x]   Fall prevention education was provided and the patient/caregiver indicated understanding. [x]   Patient/family have participated as able and agree with findings and recommendations. []   Patient is unable to participate in plan of care at this time.   Findings and recommendations were discussed with: Occupational Therapist, Registered Nurse and     Thank you for this referral.  Gabriel Humphries, PT, DPT   Time Calculation: 32 mins

## 2017-03-29 NOTE — CDMP QUERY
Please clarify if this patient is being treated/managed for:    =>4. What is the clinical significance of pts h/o obesity with BMI: 35kg? Obesity POA?    =>Other Explanation of clinical findings  =>Unable to Determine (no explanation of clinical findings)    The medical record reflects the following clinical findings, treatment, and risk factors:    Risk Factors: pmh: COPD/asthma, cirrhosis d/t BALDERAS with chr TTP, h/o esoph varix, chr LE pain 2/2 diab neuropathy, ppd smoker. Clinical Indicators: BMI: 35.5kg, cxr-nodule-like density in the left perihilar region-3 x 2 cm, could represent neoplastic process w/ new widening of the superior mediastinum L>R could be adenopathy. CCT=patchy ASD ant & lingula. Wbc-12.2, HR-112, dyspneic in respiratory distress, desats 89%    Treatment: IV steroids, nebs, O2, advair, ssi, serial CE, BC, IV LVQ, advair, cont lactulose-Lasix-propranolol-Neurontin, lvnx, prn dilaudid. Please clarify and document your clinical opinion in the progress notes and discharge summary. Thanks!   Brennen Parker, [address] [facility], . [address]

## 2017-03-29 NOTE — CDMP QUERY
Please clarify if this patient is being treated/managed for:    =>What is the clinical significance of pts abnormal CCT findings? 2. Esophageal varices POA?    =>Other Explanation of clinical findings  =>Unable to Determine (no explanation of clinical findings)    The medical record reflects the following clinical findings, treatment, and risk factors:    Risk Factors: pmh: COPD/asthma, cirrhosis d/t BALDERAS with chr TTP, h/o esoph varix, chr LE pain 2/2 diab neuropathy, ppd smoker. Clinical Indicators: BMI: 35.5kg, cxr-nodule-like density in the left perihilar region-3 x 2 cm, could represent neoplastic process w/ new widening of the superior mediastinum L>R could be adenopathy. CCT=patchy ASD ant & lingula. Wbc-12.2, HR-112, dyspneic in respiratory distress, desats 89%    Treatment: IV steroids, nebs, O2, advair, ssi, serial CE, BC, IV LVQ, advair, cont lactulose-Lasix-propranolol-Neurontin, lvnx, prn dilaudid. Please clarify and document your clinical opinion in the progress notes and discharge summary. Thanks!   Krysta Islas [facility] . [address]

## 2017-03-29 NOTE — PROGRESS NOTES
Report received from [facility]. SBAR, Kardex, Procedure Summary, Intake/Output, MAR and Recent Results were discussed.     Katiana Samaniego

## 2017-03-30 ENCOUNTER — RESEARCH ENCOUNTER (OUTPATIENT)
Dept: HEMATOLOGY | Age: 62
End: 2017-03-30

## 2017-03-30 VITALS
TEMPERATURE: 97.4 F | OXYGEN SATURATION: 97 % | WEIGHT: 206.79 LBS | SYSTOLIC BLOOD PRESSURE: 133 MMHG | HEIGHT: 64 IN | BODY MASS INDEX: 35.3 KG/M2 | HEART RATE: 81 BPM | DIASTOLIC BLOOD PRESSURE: 66 MMHG | RESPIRATION RATE: 20 BRPM

## 2017-03-30 DIAGNOSIS — Z00.6 EXAMINATION OF PARTICIPANT IN CLINICAL TRIAL: Primary | ICD-10-CM

## 2017-03-30 PROBLEM — A41.9 SEPSIS (HCC): Status: ACTIVE | Noted: 2017-03-30

## 2017-03-30 PROBLEM — G47.33 OSA (OBSTRUCTIVE SLEEP APNEA): Status: ACTIVE | Noted: 2017-03-30

## 2017-03-30 PROBLEM — J96.90 RESPIRATORY FAILURE (HCC): Status: ACTIVE | Noted: 2017-03-30

## 2017-03-30 PROBLEM — Z72.0 TOBACCO ABUSE: Status: ACTIVE | Noted: 2017-03-30

## 2017-03-30 PROBLEM — G62.9 NEUROPATHY: Status: ACTIVE | Noted: 2017-03-30

## 2017-03-30 PROBLEM — J18.9 CAP (COMMUNITY ACQUIRED PNEUMONIA): Status: ACTIVE | Noted: 2017-03-30

## 2017-03-30 LAB
GLUCOSE BLD STRIP.AUTO-MCNC: 282 MG/DL (ref 65–100)
GLUCOSE BLD STRIP.AUTO-MCNC: 352 MG/DL (ref 65–100)
SERVICE CMNT-IMP: ABNORMAL
SERVICE CMNT-IMP: ABNORMAL

## 2017-03-30 PROCEDURE — 90471 IMMUNIZATION ADMIN: CPT

## 2017-03-30 PROCEDURE — 90686 IIV4 VACC NO PRSV 0.5 ML IM: CPT | Performed by: INTERNAL MEDICINE

## 2017-03-30 PROCEDURE — 77010033678 HC OXYGEN DAILY

## 2017-03-30 PROCEDURE — 94640 AIRWAY INHALATION TREATMENT: CPT

## 2017-03-30 PROCEDURE — 74011000250 HC RX REV CODE- 250: Performed by: INTERNAL MEDICINE

## 2017-03-30 PROCEDURE — 74011636637 HC RX REV CODE- 636/637: Performed by: INTERNAL MEDICINE

## 2017-03-30 PROCEDURE — 74011636637 HC RX REV CODE- 636/637: Performed by: NURSE PRACTITIONER

## 2017-03-30 PROCEDURE — 74011250637 HC RX REV CODE- 250/637: Performed by: INTERNAL MEDICINE

## 2017-03-30 PROCEDURE — 74011250636 HC RX REV CODE- 250/636: Performed by: INTERNAL MEDICINE

## 2017-03-30 PROCEDURE — 82962 GLUCOSE BLOOD TEST: CPT

## 2017-03-30 PROCEDURE — 74011250637 HC RX REV CODE- 250/637: Performed by: NURSE PRACTITIONER

## 2017-03-30 RX ORDER — GUAIFENESIN/DEXTROMETHORPHAN 100-10MG/5
5 SYRUP ORAL ONCE
Status: COMPLETED | OUTPATIENT
Start: 2017-03-30 | End: 2017-03-30

## 2017-03-30 RX ORDER — LEVOFLOXACIN 750 MG/1
750 TABLET ORAL EVERY 24 HOURS
Status: DISCONTINUED | OUTPATIENT
Start: 2017-03-30 | End: 2017-03-30 | Stop reason: HOSPADM

## 2017-03-30 RX ORDER — PREDNISONE 20 MG/1
40 TABLET ORAL
Status: DISCONTINUED | OUTPATIENT
Start: 2017-03-30 | End: 2017-03-30 | Stop reason: HOSPADM

## 2017-03-30 RX ORDER — IPRATROPIUM BROMIDE AND ALBUTEROL SULFATE 2.5; .5 MG/3ML; MG/3ML
3 SOLUTION RESPIRATORY (INHALATION)
Qty: 30 NEBULE | Refills: 0 | Status: ON HOLD | OUTPATIENT
Start: 2017-03-30 | End: 2018-01-01 | Stop reason: DRUGHIGH

## 2017-03-30 RX ORDER — GUAIFENESIN 600 MG/1
600 TABLET, EXTENDED RELEASE ORAL 2 TIMES DAILY
Qty: 14 TAB | Refills: 0 | Status: SHIPPED | OUTPATIENT
Start: 2017-03-30 | End: 2017-10-06

## 2017-03-30 RX ORDER — GUAIFENESIN 600 MG/1
600 TABLET, EXTENDED RELEASE ORAL 2 TIMES DAILY
Status: DISCONTINUED | OUTPATIENT
Start: 2017-03-30 | End: 2017-03-30 | Stop reason: HOSPADM

## 2017-03-30 RX ORDER — PREDNISONE 10 MG/1
40 TABLET ORAL
Qty: 20 TAB | Refills: 0 | Status: ON HOLD | OUTPATIENT
Start: 2017-03-30 | End: 2017-04-06

## 2017-03-30 RX ORDER — LEVOFLOXACIN 750 MG/1
750 TABLET ORAL EVERY 24 HOURS
Qty: 6 TAB | Refills: 0 | Status: SHIPPED | OUTPATIENT
Start: 2017-03-30 | End: 2017-10-06

## 2017-03-30 RX ADMIN — INSULIN LISPRO 9 UNITS: 100 INJECTION, SOLUTION INTRAVENOUS; SUBCUTANEOUS at 12:49

## 2017-03-30 RX ADMIN — INSULIN GLARGINE 60 UNITS: 100 INJECTION, SOLUTION SUBCUTANEOUS at 09:00

## 2017-03-30 RX ADMIN — GUAIFENESIN AND DEXTROMETHORPHAN 5 ML: 100; 10 SYRUP ORAL at 14:16

## 2017-03-30 RX ADMIN — FLUTICASONE FUROATE AND VILANTEROL TRIFENATATE 1 PUFF: 100; 25 POWDER RESPIRATORY (INHALATION) at 08:53

## 2017-03-30 RX ADMIN — FUROSEMIDE 20 MG: 20 TABLET ORAL at 08:51

## 2017-03-30 RX ADMIN — INSULIN LISPRO 20 UNITS: 100 INJECTION, SOLUTION INTRAVENOUS; SUBCUTANEOUS at 08:42

## 2017-03-30 RX ADMIN — LEVOFLOXACIN 750 MG: 750 TABLET, FILM COATED ORAL at 12:48

## 2017-03-30 RX ADMIN — HYDROMORPHONE HYDROCHLORIDE 1 MG: 2 TABLET ORAL at 01:28

## 2017-03-30 RX ADMIN — FERROUS SULFATE TAB 325 MG (65 MG ELEMENTAL FE) 325 MG: 325 (65 FE) TAB at 08:51

## 2017-03-30 RX ADMIN — INSULIN LISPRO 5 UNITS: 100 INJECTION, SOLUTION INTRAVENOUS; SUBCUTANEOUS at 08:43

## 2017-03-30 RX ADMIN — IPRATROPIUM BROMIDE AND ALBUTEROL SULFATE 3 ML: .5; 3 SOLUTION RESPIRATORY (INHALATION) at 07:55

## 2017-03-30 RX ADMIN — INFLUENZA VIRUS VACCINE 0.5 ML: 15; 15; 15; 15 SUSPENSION INTRAMUSCULAR at 14:39

## 2017-03-30 RX ADMIN — METHYLPREDNISOLONE SODIUM SUCCINATE 40 MG: 40 INJECTION, POWDER, FOR SOLUTION INTRAMUSCULAR; INTRAVENOUS at 08:47

## 2017-03-30 RX ADMIN — INSULIN LISPRO 20 UNITS: 100 INJECTION, SOLUTION INTRAVENOUS; SUBCUTANEOUS at 12:48

## 2017-03-30 RX ADMIN — GUAIFENESIN 600 MG: 600 TABLET, EXTENDED RELEASE ORAL at 10:11

## 2017-03-30 RX ADMIN — PROPRANOLOL HYDROCHLORIDE 20 MG: 10 TABLET ORAL at 08:51

## 2017-03-30 RX ADMIN — METHYLPREDNISOLONE SODIUM SUCCINATE 40 MG: 40 INJECTION, POWDER, FOR SOLUTION INTRAMUSCULAR; INTRAVENOUS at 01:28

## 2017-03-30 RX ADMIN — Medication 10 ML: at 01:28

## 2017-03-30 RX ADMIN — INSULIN GLARGINE 60 UNITS: 100 INJECTION, SOLUTION SUBCUTANEOUS at 08:44

## 2017-03-30 RX ADMIN — PREDNISONE 40 MG: 20 TABLET ORAL at 12:48

## 2017-03-30 RX ADMIN — VALSARTAN 80 MG: 80 TABLET ORAL at 08:51

## 2017-03-30 RX ADMIN — ENOXAPARIN SODIUM 40 MG: 40 INJECTION SUBCUTANEOUS at 08:47

## 2017-03-30 RX ADMIN — IPRATROPIUM BROMIDE AND ALBUTEROL SULFATE 3 ML: .5; 3 SOLUTION RESPIRATORY (INHALATION) at 11:09

## 2017-03-30 RX ADMIN — GABAPENTIN 600 MG: 300 CAPSULE ORAL at 08:51

## 2017-03-30 NOTE — PROGRESS NOTES
Patient discharged. I have reviewed discharge instructions with the patient. Telemetry and PIV were removed by primary care RN. The patient verbalized understanding. Prescriptions given to patient and instructed to call PCP to schedule a one-week follow-up appointment. Patient verbalized understanding. No personal belongings left in /bedsdie. Family members at bedside. Patient escorted by PCT to car.

## 2017-03-30 NOTE — DISCHARGE INSTRUCTIONS
Patient Discharge Instructions     Pt Name  Oscar Grant   Date of Birth 1955   Age  64 y.o. Medical Record Number  [id number]   PCP Diana Oreilly NP    Admit date:  3/28/2017 @    [address] [place] [address]    Room Number  2203/01   Date of Discharge 3/30/2017     Admission Diagnoses:     Respiratory failure ([facility] [address]          Allergies   Allergen Reactions    Hydrocodone Nausea and Vomiting    Lisinopril Cough    Lortab [Hydrocodone-Acetaminophen] Nausea and Vomiting    Percocet [Oxycodone-Acetaminophen] Nausea and Vomiting        You were admitted to [address] [place] [address] for  Respiratory failure ([facility] [address]    YOUR OTHER MEDICAL DIAGNOSES INCLUDE (BUT NOT LIMITED TO ):  Present on Admission:   COPD (chronic obstructive pulmonary disease) ([facility] [address]   BALDERAS (nonalcoholic steatohepatitis)   Diabetes mellitus with neurological manifestations, uncontrolled ([facility] [address]   Essential hypertension, benign   Thrombocytopenia (HCC)   Cirrhosis ([facility] [address]   Anemia   Sepsis ([facility] [address]   CAP (community acquired pneumonia)   IBIS (obstructive sleep apnea)   Tobacco abuse   Neuropathy   Respiratory failure (HCC)      DIET:  Cardiac Diet and Diabetic Diet     Recommended activity: Activity as tolerated  Follow up : Follow-up Information     Follow up With Details Comments Molly Noble NP In 1 week  [place] [id number]             Skilled nursing facility MD responsible for above upon discharge. · It is important that you take the medication exactly as they are prescribed. · Keep your medication in the bottles provided by the pharmacist and keep a list of the medication names, dosages, and times to be taken in your wallet. · Do not take other medications without consulting your doctor.        ADDITIONAL INFORMATION: If you experience any of the following symptoms or have any health problem not listed below, then please call your primary care physician or return to the emergency room if you cannot get hold of your doctor: Fever, chills, nausea, vomiting, diarrhea, change in mentation, falling, bleeding, shortness of breath. I understand that if any problems occur once I am discharged, I am supposed to call my Primary care physician for further care or seek help in the Emergency Department at the nearest Healthcare facility. I have had an opportunity to discuss my clinical issues with my doctor and nursing staff. I understand and acknowledge receipt of the above instructions.                                                                                                                                            Physician's or R.N.'s Signature                                                            Date/Time                                                                                                                                              Patient or Representative Signature                                                 Date/Time

## 2017-03-30 NOTE — PROGRESS NOTES
Bedside shift change report given to Damon Marte (oncoming nurse) by Cherry Vieira RN (offgoing nurse). Report included the following information SBAR.     0945: IV infiltrated. Pt refusing another one. MD paged. 10:02 MD aware of IV refusal. Awaiting new orders. 1050:   O2 sats on RA at rest: 98%. O2 sats on RA ambulatin%    1445: Tele removed. Discharge instructions reviewed with pt. Time allotted for questions and answers. Pt stable for discharge at this time.

## 2017-03-30 NOTE — DISCHARGE SUMMARY
Hospitalist Discharge Summary     Patient ID:  Ava Harris  [id number]  64 y.o.  1955    PCP on record: Laisha Fuentes NP    Admit date: 3/28/2017  Discharge date and time: 3/30/2017      DISCHARGE DIAGNOSIS:  Severe sepsis POA  Bilateral patchy community-acquired pneumonia POA  Acute respiratory distress with hypoxia and MARTINEZ POA   COPD (chronic obstructive pulmonary disease) with acute exacerbation POA    DM type 2 with neuropathy, on long-term insulin POA  Essential HTN POA   Left sided chest pain POA due to pneumonia  Cirrhosis with BALDERAS POA  History of esophageal varices POA  Chronic LE neuropathy POA  Chronic thrombocytopenia POA   IBIS  Iron deficiency anemia  Tobacco Abuse   Morbid Obesity    CONSULTATIONS:  None    Excerpted HPI from H&P of Dionicio Doyle MD:  A 60-year-old white female with   known COPD/asthma. She has cirrhosis due to BALDERAS with chronic   thrombocytopenia and history of esophageal varices. She has chronic   pain in her legs from her diabetic neuropathy. At baseline she does not   wear home oxygen. She does have a home nebulizer but does not use   it much. She does use rescue inhalers and Advair. She continues to   smoke 1 pack of cigarettes per day.       The patient was driving home from [place] the past 2 days. Over   the past 24 hours, she began to develop some increasing left-sided   chest pain that was worse when she would cough or take a breath. She began to get progressively short of breath when she would exert   herself and began to hear herself actively wheezing. She has a chronic   baseline cough, but says it became worse than her baseline cough   over the past 24 hours. She has not had any new leg swelling or leg   pain. No hemoptysis. No fevers or chills. No nausea, vomiting, or   abdominal pain.  She does get more short of breath when she lays flat,   but she says she normally sleeps on a few pillows at baseline.       Because of the chest pain, the increasing dyspnea on exertion and the   wheezing, she went to see her primary care provider. They referred her   to the emergency room. In the emergency room, she was tachypneic   and actively wheezing. She dropped her room air saturations at rest as   low as 89%. Chest x-ray had questionable left hilar mass. CT scan of   the chest showed a patchy bilateral airspace disease, which was new;   the most significant areas in the anterior and lingula. She was given   nebulizers and steroids. We were called to admit the patient.       ______________________________________________________________________  DISCHARGE SUMMARY/HOSPITAL COURSE:  for full details see H&P, daily progress notes, labs, consult notes. 64year old female admitted with chest pain. Chest pain work up was negative. She was diagnosed with bilateral community acquired pneumonia. She was treated initially treated with IV ABT, IV Solumedrol, and her usual COPD management. O2 was wean off to room air; resting 98% and 95% ambulating. Patient has been advised to continue with her usual COPD medications (Advair, Albuterol HFA or duoneb as need), complete steroid and antibiotic therapy. The patient is medically stable to discharged to home.     Below are the detail medical diagnosis that patient was treated it during this hospitalization;    Severe sepsis POA (with WBC 12,200, , lactate 2.1 in ER)  Bilateral patchy community-acquired pneumonia POA  Acute respiratory distress with hypoxia and MARTINEZ POA   COPD (chronic obstructive pulmonary disease) with acute exacerbation POA  - Leukocytosis has resolved; WBC 8.4  - O2 @ 2L via n/c, O2 Sat 94%; wean O2 as tolerate  - complete steroid and Levaquin as directed  - lactate 1.0   - chest CT; Multiple foci of airspace process scattered throughout both lungs.       DM type 2 with neuropathy, on long-term insulin POA  - resume her home diabetic management  - HgbA1C 7.5 (slowly trending down; it was 8.7 on 5/2016)      Essential HTN POA  - Continue with Valsartn    Left sided chest pain POA due to pneumonia   - cardiac enzymes (-)      Cirrhosis with BALDERAS POA  History of esophageal varices POA  - Continue lasix and propranolol  - Continue lactulose  - Incidental findings from chest CT; portal hypertension and splenomegaly. Follow up with her GI specialist as outpatient     Chronic LE neuropathy POA  - Neurontin(no longer takes lyrica)  - PRN dilaudid  - Continue with elavail      Chronic thrombocytopenia POA due to cirrhosis, baseline PLTs 70,000 to 100,000  - Currently at baseline     IBIS  - has been recommended to use CPAP machine     Iron deficiency anemia  - no s/sx of bleeding  - continue Iron supplements     Tobacco Abuse   - offered Nicotine patch, pt declined  - discussed about the importance of smoking cessation, pt nodded her head during conversation     Morbid Obesity  - discussed about life style changes and adopting health diet with exercise as she feels better        _______________________________________________________________________  Patient seen and examined by me on discharge day. Pertinent Findings:  Gen:    Not in distress  Chest: Clear in apex with decreased breath sounds at bases  CVS:   Regular rhythm. No edema  Abd:  Soft, not distended, not tender  Neuro:  Alert, orient x 4  _______________________________________________________________________  DISCHARGE MEDICATIONS:   Current Discharge Medication List      START taking these medications    Details   predniSONE (DELTASONE) 10 mg tablet Take 4 Tabs by mouth daily (with breakfast). 4 tab once a day for 2 days, 3 tab once a day for 2 days, 2 tab once a day for 2 days, then 1 tab once a day for 2 days. Qty: 20 Tab, Refills: 0      levoFLOXacin (LEVAQUIN) 750 mg tablet Take 1 Tab by mouth every twenty-four (24) hours.   Qty: 6 Tab, Refills: 0      guaiFENesin ER (MUCINEX) 600 mg ER tablet Take 1 Tab by mouth two (2) times a day.  Qty: 14 Tab, Refills: 0         CONTINUE these medications which have CHANGED    Details   albuterol-ipratropium (DUO-NEB) 2.5 mg-0.5 mg/3 ml nebu 3 mL by Nebulization route every four (4) hours as needed. Qty: 30 Nebule, Refills: 0         CONTINUE these medications which have NOT CHANGED    Details   insulin glargine (LANTUS SOLOSTAR) 100 unit/mL (3 mL) pen Inject 120 units every morning as 2 separate shots of 60 units back to back--Dose change 11/18/16--updated med list--did not send prescription to the pharmacy  Qty: 45 mL, Refills: 11      losartan (COZAAR) 25 mg tablet Take 1 Tab by mouth daily. Replaces lisinopril for blood pressure and kidney protection  Qty: 30 Tab, Refills: 11      omeprazole (PRILOSEC) 20 mg capsule take 1 capsule by mouth once daily  Qty: 30 Cap, Refills: 5      furosemide (LASIX) 20 mg tablet take 1 tablet by mouth once daily  Qty: 30 Tab, Refills: 5      metFORMIN (GLUCOPHAGE) 1,000 mg tablet take 1 tablet by mouth twice a day with meals  Qty: 180 Tab, Refills: 3      ondansetron (ZOFRAN ODT) 4 mg disintegrating tablet dissolve 1 tablet ON TONGUE every 8 hours if needed for nausea  Qty: 45 Tab, Refills: 3      amitriptyline (ELAVIL) 150 mg tablet Take 150 mg by mouth nightly. rOPINIRole (REQUIP) 4 mg tab TAB Take 4 mg by mouth nightly. albuterol (PROAIR HFA) 90 mcg/actuation inhaler Take 2 Puffs by inhalation every four (4) hours as needed. pregabalin (LYRICA) 100 mg capsule Take 1 Cap by mouth three (3) times daily. Max Daily Amount: 300 mg. Qty: 90 Cap, Refills: 5      HYDROmorphone (DILAUDID) 2 mg tablet Take 1 Tab by mouth every four (4) hours as needed for Pain.  Max Daily Amount: 12 mg.  Qty: 10 Tab, Refills: 0      Nebulizer & Compressor machine UAD  Qty: 1 Each, Refills: 0      CONSTULOSE 10 gram/15 mL solution take 2 tablespoonfuls by mouth twice a day  Qty: 1000 mL, Refills: 5      HUMALOG KWIKPEN 100 unit/mL kwikpen inject 35 units with meals (+5 UNITS FOR EVERY 50MG/DL ABOVE 150 MG/DL) -  UNITS PER DAY  Qty: 45 mL, Refills: 11      TREVOR PEN NEEDLE 32 gauge x 5/32\" ndle use as directed four times a day  Qty: 100 Pen Needle, Refills: 11      potassium chloride SR (KLOR-CON 10) 10 mEq tablet Take 1 tab once daily      propranolol (INDERAL) 20 mg tablet Take 1 Tab by mouth two (2) times a day. Qty: 60 Tab, Refills: 11      ferrous sulfate 325 mg (65 mg iron) tablet Take 1 Tab by mouth daily (with breakfast). Qty: 30 Tab, Refills: 1      fluticasone-salmeterol (ADVAIR DISKUS) 500-50 mcg/dose diskus inhaler Take 1 Puff by inhalation two (2) times a day. My Recommended Diet, Activity, Wound Care, and follow-up labs are listed in the patient's Discharge Insturctions which I have personally completed and reviewed.     _______________________________________________________________________  DISPOSITION:    Home with Family: y   Home with HH/PT/OT/RN:    SNF/LTC:    CARMINE:    OTHER:        Condition at Discharge:  Stable  _______________________________________________________________________  Follow up with:   PCP : Samson Lyons NP  Follow-up Information     Follow up With Details Comments Molly Noble NP In 1 week  [place] [phone]                Total time in minutes spent coordinating this discharge (includes going over instructions, follow-up, prescriptions, and preparing report for sign off to her PCP) :  35 minutes    Signed:  Sagar Ulrich NP

## 2017-04-03 ENCOUNTER — TELEPHONE (OUTPATIENT)
Dept: SLEEP MEDICINE | Age: 62
End: 2017-04-03

## 2017-04-05 ENCOUNTER — OFFICE VISIT (OUTPATIENT)
Dept: HEMATOLOGY | Age: 62
End: 2017-04-05

## 2017-04-06 ENCOUNTER — HOSPITAL ENCOUNTER (OUTPATIENT)
Dept: INTERVENTIONAL RADIOLOGY/VASCULAR | Age: 62
Discharge: HOME | End: 2017-04-06
Attending: INTERNAL MEDICINE | Admitting: INTERNAL MEDICINE
Payer: MEDICARE

## 2017-04-06 VITALS
SYSTOLIC BLOOD PRESSURE: 144 MMHG | WEIGHT: 205 LBS | TEMPERATURE: 98.5 F | BODY MASS INDEX: 35 KG/M2 | OXYGEN SATURATION: 98 % | HEART RATE: 110 BPM | HEIGHT: 64 IN | DIASTOLIC BLOOD PRESSURE: 59 MMHG | RESPIRATION RATE: 18 BRPM

## 2017-04-06 DIAGNOSIS — Z00.6 EXAMINATION OF PARTICIPANT IN CLINICAL TRIAL: ICD-10-CM

## 2017-04-06 PROCEDURE — C1894 INTRO/SHEATH, NON-LASER: HCPCS

## 2017-04-06 PROCEDURE — C1769 GUIDE WIRE: HCPCS

## 2017-04-06 PROCEDURE — C1892 INTRO/SHEATH,FIXED,PEEL-AWAY: HCPCS

## 2017-04-06 PROCEDURE — C1887 CATHETER, GUIDING: HCPCS

## 2017-04-06 PROCEDURE — 74011636320 HC RX REV CODE- 636/320: Performed by: RADIOLOGY

## 2017-04-06 PROCEDURE — C2628 CATHETER, OCCLUSION: HCPCS

## 2017-04-06 PROCEDURE — 77030014021 HC SYR ANGI FIX LOK MRTM -A

## 2017-04-06 PROCEDURE — 74011250636 HC RX REV CODE- 250/636: Performed by: RADIOLOGY

## 2017-04-06 PROCEDURE — 75889 VEIN X-RAY LIVER W/HEMODYNAM: CPT

## 2017-04-06 PROCEDURE — 99152 MOD SED SAME PHYS/QHP 5/>YRS: CPT

## 2017-04-06 PROCEDURE — 74011000250 HC RX REV CODE- 250: Performed by: RADIOLOGY

## 2017-04-06 RX ORDER — SODIUM CHLORIDE 9 MG/ML
25 INJECTION, SOLUTION INTRAVENOUS ONCE
Status: COMPLETED | OUTPATIENT
Start: 2017-04-06 | End: 2017-04-06

## 2017-04-06 RX ORDER — FENTANYL CITRATE 50 UG/ML
100 INJECTION, SOLUTION INTRAMUSCULAR; INTRAVENOUS
Status: DISCONTINUED | OUTPATIENT
Start: 2017-04-06 | End: 2017-04-06 | Stop reason: ALTCHOICE

## 2017-04-06 RX ORDER — LIDOCAINE HYDROCHLORIDE 20 MG/ML
20 INJECTION, SOLUTION INFILTRATION; PERINEURAL
Status: COMPLETED | OUTPATIENT
Start: 2017-04-06 | End: 2017-04-06

## 2017-04-06 RX ORDER — GABAPENTIN 800 MG/1
800 TABLET ORAL 3 TIMES DAILY
COMMUNITY
End: 2017-07-14 | Stop reason: CLARIF

## 2017-04-06 RX ORDER — MIDAZOLAM HYDROCHLORIDE 1 MG/ML
5 INJECTION, SOLUTION INTRAMUSCULAR; INTRAVENOUS
Status: DISCONTINUED | OUTPATIENT
Start: 2017-04-06 | End: 2017-04-06 | Stop reason: ALTCHOICE

## 2017-04-06 RX ORDER — SODIUM CHLORIDE 0.9 % (FLUSH) 0.9 %
10 SYRINGE (ML) INJECTION
Status: DISCONTINUED | OUTPATIENT
Start: 2017-04-06 | End: 2017-04-06 | Stop reason: HOSPADM

## 2017-04-06 RX ADMIN — IOPAMIDOL 15 ML: 612 INJECTION, SOLUTION INTRAVENOUS at 14:05

## 2017-04-06 RX ADMIN — FENTANYL CITRATE 25 MCG: 50 INJECTION, SOLUTION INTRAMUSCULAR; INTRAVENOUS at 13:19

## 2017-04-06 RX ADMIN — MIDAZOLAM HYDROCHLORIDE 1 MG: 1 INJECTION, SOLUTION INTRAMUSCULAR; INTRAVENOUS at 13:24

## 2017-04-06 RX ADMIN — LIDOCAINE HYDROCHLORIDE 10 ML: 20 INJECTION, SOLUTION INFILTRATION; PERINEURAL at 14:05

## 2017-04-06 RX ADMIN — FENTANYL CITRATE 25 MCG: 50 INJECTION, SOLUTION INTRAMUSCULAR; INTRAVENOUS at 13:46

## 2017-04-06 RX ADMIN — MIDAZOLAM HYDROCHLORIDE 1 MG: 1 INJECTION, SOLUTION INTRAMUSCULAR; INTRAVENOUS at 13:33

## 2017-04-06 RX ADMIN — SODIUM CHLORIDE 25 ML/HR: 900 INJECTION, SOLUTION INTRAVENOUS at 12:53

## 2017-04-06 RX ADMIN — FENTANYL CITRATE 25 MCG: 50 INJECTION, SOLUTION INTRAMUSCULAR; INTRAVENOUS at 13:43

## 2017-04-06 RX ADMIN — FENTANYL CITRATE 25 MCG: 50 INJECTION, SOLUTION INTRAMUSCULAR; INTRAVENOUS at 13:29

## 2017-04-06 RX ADMIN — MIDAZOLAM HYDROCHLORIDE 1 MG: 1 INJECTION, SOLUTION INTRAMUSCULAR; INTRAVENOUS at 13:18

## 2017-04-06 RX ADMIN — SODIUM BICARBONATE 1 ML: 0.2 INJECTION, SOLUTION INTRAVENOUS at 14:06

## 2017-04-06 RX ADMIN — MIDAZOLAM HYDROCHLORIDE 0.5 MG: 1 INJECTION, SOLUTION INTRAMUSCULAR; INTRAVENOUS at 13:54

## 2017-04-06 NOTE — IP AVS SNAPSHOT
[address] 
[phone] Patient: Cherry Cole MRN: [id number] SIE:[id number] You are allergic to the following Allergen Reactions Hydrocodone Nausea and Vomiting Lisinopril Cough Lortab (Hydrocodone-Acetaminophen) Nausea and Vomiting Percocet (Oxycodone-Acetaminophen) Nausea and Vomiting Recent Documentation Height Weight BMI OB Status Smoking Status 1.626 m 93 kg 35.19 kg/m2 Hysterectomy Current Every Day Smoker Emergency Contacts Name Discharge Info Relation Home Work Mobile Pennelope Pinks DISCHARGE CAREGIVER [3] Spouse [3] [phone] Chirag Mahoney  Spouse [3] [phone] About your hospitalization You were admitted on:  April 6, 2017 You last received care in the:  [facility] RAD ANGIO IR You were discharged on:  April 6, 2017 Unit phone number:  [phone] Why you were hospitalized Your primary diagnosis was:  Not on File Providers Seen During Your Hospitalizations Provider Role Specialty Primary office phone Gonzalo Valladares MD Attending Provider Hepatology [phone] Your Primary Care Physician (PCP) Primary Care Physician Office Phone Office Fax Campos Brownlee [phone] Follow-up Information None Your Appointments Monday April 10, 2017 10:20 AM EDT  
LAB with MD Sally Espinoton Diabetes and Endocrinology [facility]) [place] [address] [address] [phone] Monday April 17, 2017 10:30 AM EDT Follow Up with MD Marcelo Espino Diabetes and Endocrinology [facility] [place] [address] [address] [phone] Thursday May 11, 2017  9:30 PM EDT SLEEP TITRATION with BEDROOM 1 [facility] SLEEP CTR AT [facility] (SLEEP LAB [address]) [address] [place] [address] [address] Tamara 78  
[phone] 1. Do not take a nap the day of the study  2. No caffeine after 12 noon the day of the study  3. Bring a 2 piece sleeping garment  4. Hair should be clean and dry, no oils, sprays, powders and remove wigs, weaves or other hair accessories  6. Patient should eat dinner prior to arriving for the test and a light breakfast will be provided upon discharge in the morning  7. Patient encouraged to bring activity items such as books, magazines, laptop, IPAD, etc, as well as toiletry items needed for the next morning  8. Bring all medications with you to the center  9. For specific questions please contact the sleep center directly, 830a to 5p  10. Do not arrive more than 15 minutes prior to appt time and use the doorbell to enter the sleep center. Sleep Center at [address] [place] [address] Entrance across from the [facility] parking [address] (seventh floor) [address] (parking: in the parking deck) Phone number: [phone]  Do not arrive more than 15 minutes prior to appt time and use the doorbell to the left of the door to enter the sleep center. Current Discharge Medication List  
  
ASK your doctor about these medications Dose & Instructions Dispensing Information Comments Morning Noon Evening Bedtime ADVAIR DISKUS 500-50 mcg/dose diskus inhaler Generic drug:  fluticasone-salmeterol Your last dose was: Your next dose is:    
   
   
 Dose:  1 Puff Take 1 Puff by inhalation two (2) times a day. Refills:  0  
     
   
   
   
  
 albuterol-ipratropium 2.5 mg-0.5 mg/3 ml Nebu Commonly known as:  Ismael Sevilla Your last dose was: Your next dose is:    
   
   
 Dose:  3 mL  
3 mL by Nebulization route every four (4) hours as needed. Quantity:  30 Nebule Refills:  0  
     
   
   
   
  
 amitriptyline 150 mg tablet Commonly known as:  ELAVIL Your last dose was: Your next dose is:    
   
   
 Dose:  150 mg Take 150 mg by mouth nightly. Refills:  0  
     
   
   
   
  
 CONSTULOSE 10 gram/15 mL solution Generic drug:  lactulose Your last dose was: Your next dose is:    
   
   
 take 2 tablespoonfuls by mouth twice a day Quantity:  1000 mL Refills:  5  
     
   
   
   
  
 ferrous sulfate 325 mg (65 mg iron) tablet Your last dose was: Your next dose is:    
   
   
 Dose:  325 mg Take 1 Tab by mouth daily (with breakfast). Quantity:  30 Tab Refills:  1  
     
   
   
   
  
 furosemide 20 mg tablet Commonly known as:  LASIX Your last dose was: Your next dose is:    
   
   
 take 1 tablet by mouth once daily Quantity:  30 Tab Refills:  5  
     
   
   
   
  
 gabapentin 800 mg tablet Commonly known as:  NEURONTIN Your last dose was: Your next dose is:    
   
   
 Dose:  800 mg Take 800 mg by mouth three (3) times daily. Refills:  0  
     
   
   
   
  
 guaiFENesin  mg ER tablet Commonly known as:  Roe & Roe Your last dose was: Your next dose is:    
   
   
 Dose:  600 mg Take 1 Tab by mouth two (2) times a day. Quantity:  14 Tab Refills:  0 HumaLOG KwikPen 100 unit/mL kwikpen Generic drug:  insulin lispro Your last dose was: Your next dose is:    
   
   
 inject 35 units with meals (+5 UNITS FOR EVERY 50MG/DL ABOVE 150 MG/DL) -  UNITS PER DAY Quantity:  45 mL Refills:  11 HYDROmorphone 2 mg tablet Commonly known as:  DILAUDID Your last dose was: Your next dose is:    
   
   
 Dose:  2 mg Take 1 Tab by mouth every four (4) hours as needed for Pain. Max Daily Amount: 12 mg. Quantity:  10 Tab Refills:  0  
     
   
   
   
  
 insulin glargine 100 unit/mL (3 mL) pen Commonly known as:  LANTUS SOLOSTAR Your last dose was: Your next dose is:    
   
   
 Inject 120 units every morning as 2 separate shots of 60 units back to back--Dose change 11/18/16--updated med list--did not send prescription to the pharmacy Quantity:  45 mL Refills:  11  
     
   
   
   
  
 levoFLOXacin 750 mg tablet Commonly known as:  Verba Courser Your last dose was: Your next dose is:    
   
   
 Dose:  750 mg Take 1 Tab by mouth every twenty-four (24) hours. Quantity:  6 Tab Refills:  0  
     
   
   
   
  
 losartan 25 mg tablet Commonly known as:  COZAAR Your last dose was: Your next dose is:    
   
   
 Dose:  25 mg Take 1 Tab by mouth daily. Replaces lisinopril for blood pressure and kidney protection Quantity:  30 Tab Refills:  11  
     
   
   
   
  
 metFORMIN 1,000 mg tablet Commonly known as:  GLUCOPHAGE Your last dose was: Your next dose is:    
   
   
 take 1 tablet by mouth twice a day with meals Quantity:  180 Tab Refills:  3 Bamib Pen Needle 32 gauge x 5/32\" Ndle Generic drug:  Insulin Needles (Disposable) Your last dose was: Your next dose is:    
   
   
 use as directed four times a day Quantity:  100 Pen Needle Refills:  11 Nebulizer & Compressor machine Your last dose was: Your next dose is:    
   
   
 UAD Quantity:  1 Each Refills:  0  
     
   
   
   
  
 omeprazole 20 mg capsule Commonly known as:  PRILOSEC Your last dose was: Your next dose is:    
   
   
 take 1 capsule by mouth once daily Quantity:  30 Cap Refills:  5  
     
   
   
   
  
 ondansetron 4 mg disintegrating tablet Commonly known as:  ZOFRAN ODT Your last dose was: Your next dose is: dissolve 1 tablet ON TONGUE every 8 hours if needed for nausea Quantity:  45 Tab Refills:  3  
     
   
   
   
  
 potassium chloride SR 10 mEq tablet Commonly known as:  KLOR-CON 10 Your last dose was: Your next dose is: Take 1 tab once daily Refills:  0 PROAIR HFA 90 mcg/actuation inhaler Generic drug:  albuterol Your last dose was: Your next dose is:    
   
   
 Dose:  2 Puff Take 2 Puffs by inhalation every four (4) hours as needed. Refills:  0  
     
   
   
   
  
 propranolol 20 mg tablet Commonly known as:  INDERAL Your last dose was: Your next dose is:    
   
   
 Dose:  20 mg Take 1 Tab by mouth two (2) times a day. Quantity:  60 Tab Refills:  11  
     
   
   
   
  
 rOPINIRole 4 mg Tab TAB Commonly known as:  Arvella Res Your last dose was: Your next dose is:    
   
   
 Dose:  4 mg Take 4 mg by mouth nightly. Refills:  0 Discharge Instructions Kam Zambrano [address] Department of Interventional Radiology Venogram Discharge Instructions General Information: A venogram is a procedure that allows the interventional radiologist to take pictures of the blood flow in the vascular system. A radiology contrast (or dye) is injected into the veins so that the condition of the veins and valves may be visualized. This procedure can be used to diagnose narrowing of the veins or the presence of a blood clot in the deep veins of the body. During this process, a stent, filter or a special intravenous line could be placed. Home Care Instructions: You can resume your regular diet. Do not shower, bathe, swim, drink alcohol, or make any important legal decisions in the next 48 hours. Do not lift anything heavier than a gallon of milk for 5 days.  If you take Glucophage (metformin) for diabetes, do not take it for the next 48 hours. If you were asked to hold any blood thinners prior to the procedure, you may restart that medicine the day after the procedure is completed. Recline your car seat for the ride home. Call If: 
 You should call your Physician and/or the Radiology Nurse if you have any signs of infection; fever, drainage, redness, and/or swelling. If the puncture site should ooze, please call. Also call if you have any pain, decreased sensation, numbness, tingling, swelling, or change in color to the affected extremity. SEEK IMMEDIATE MEDICAL CARE IF YOUR PUNCTURE SITE STARTS TO BLEED. APPLY ENOUGH FIRM PRESSURE TO THE SITE WITH THE TIPS OF YOUR FINGERS TO STOP THE BLEEDING. Follow-Up Instructions:  Please see your ordering doctor as he/she has requested. To Reach Us: Side effects of sedation medications and other medications used today have been reviewed. Notify us of nausea, itching, hives, dizziness, or anything else out of the ordinary. Should you experience any of these significant changes, please call [phone] between the hours of 7:30 am and 10 pm or [phone] after hours. After hours, ask the  to page the [address] Technologist, and describe the problem to the technologist.  
 
 
 
Patient Signature: 
Date: 4/6/2017 Discharging Nurse: Beverley Lockwood RN Discharge Orders None ACO Transitions of Care Introducing [facility] [address] offers a voluntary care coordination program to provide high quality service and care to [facility] fee-for-service beneficiaries. Mansi Lerma was designed to help you enhance your health and well-being through the following services: ? Transitions of Care  support for individuals who are transitioning from one care setting to another (example: Hospital to home). ? Chronic and Complex Care Coordination  support for individuals and caregivers of those with serious or chronic illnesses or with more than one chronic (ongoing) condition and those who take a number of different medications. If you meet specific medical criteria, a [address] [place] [address] Rd may call you directly to coordinate your care with your primary care physician and your other care providers. For questions about the [facility] programs, please, contact your physicians office. For general questions or additional information about Accountable Care Organizations: 
Please visit www.medicare.gov/acos. html or call 1-800-MEDICARE (9-789.327.1351) Y users should call 8-550.980.7378. Introducing [facility]! Dear Kimmie Conklin: Thank you for requesting a [employer] account. Our records indicate that you already have an active [employer] account. You can access your account anytime at [email or web address] Did you know that you can access your hospital and ER discharge instructions at any time in [employer]? You can also review all of your test results from your hospital stay or ER visit. Additional Information If you have questions, please visit the Frequently Asked Questions section of the [employer] website at [email or web address] Remember, [employer] is NOT to be used for urgent needs. For medical emergencies, dial 911. Now available from your iPhone and Android! General Information Please provide this summary of care documentation to your next provider. Patient Signature:  ____________________________________________________________ Date:  ____________________________________________________________  
  
Garret Cyn Provider Signature:  ____________________________________________________________ Date:  ____________________________________________________________

## 2017-04-06 NOTE — DISCHARGE INSTRUCTIONS
[address] [facility] Drew Zambrano [address]  Department of Interventional Radiology       Venogram Discharge Instructions    General Information:   A venogram is a procedure that allows the interventional radiologist to take pictures of the blood flow in the vascular system. A radiology contrast (or dye) is injected into the veins so that the condition of the veins and valves may be visualized. This procedure can be used to diagnose narrowing of the veins or the presence of a blood clot in the deep veins of the body. During this process, a stent, filter or a special intravenous line could be placed. Home Care Instructions: You can resume your regular diet. Do not shower, bathe, swim, drink alcohol, or make any important legal decisions in the next 48 hours. Do not lift anything heavier than a gallon of milk for 5 days. If you take Glucophage (metformin) for diabetes, do not take it for the next 48 hours. If you were asked to hold any blood thinners prior to the procedure, you may restart that medicine the day after the procedure is completed. Recline your car seat for the ride home. Call If:   You should call your Physician and/or the Radiology Nurse if you have any signs of infection; fever, drainage, redness, and/or swelling. If the puncture site should ooze, please call. Also call if you have any pain, decreased sensation, numbness, tingling, swelling, or change in color to the affected extremity. SEEK IMMEDIATE MEDICAL CARE IF YOUR PUNCTURE SITE STARTS TO BLEED. APPLY ENOUGH FIRM PRESSURE TO THE SITE WITH THE TIPS OF YOUR FINGERS TO STOP THE BLEEDING. Follow-Up Instructions:  Please see your ordering doctor as he/she has requested. To Reach Us: Side effects of sedation medications and other medications used today have been reviewed. Notify us of nausea, itching, hives, dizziness, or anything else out of the ordinary.        Should you experience any of these significant changes, please call [phone] between the hours of 7:30 am and 10 pm or [phone] after hours.  After hours, ask the  to page the [address] Technologist, and describe the problem to the technologist.         Patient Signature:  Date: 4/6/2017  Discharging Nurse: Luis Cleary RN

## 2017-04-06 NOTE — IP AVS SNAPSHOT
Current Discharge Medication List  
  
ASK your doctor about these medications Dose & Instructions Dispensing Information Comments Morning Noon Evening Bedtime ADVAIR DISKUS 500-50 mcg/dose diskus inhaler Generic drug:  fluticasone-salmeterol Your last dose was: Your next dose is:    
   
   
 Dose:  1 Puff Take 1 Puff by inhalation two (2) times a day. Refills:  0  
     
   
   
   
  
 albuterol-ipratropium 2.5 mg-0.5 mg/3 ml Nebu Commonly known as:  Radha Laud Your last dose was: Your next dose is:    
   
   
 Dose:  3 mL  
3 mL by Nebulization route every four (4) hours as needed. Quantity:  30 Nebule Refills:  0  
     
   
   
   
  
 amitriptyline 150 mg tablet Commonly known as:  ELAVIL Your last dose was: Your next dose is:    
   
   
 Dose:  150 mg Take 150 mg by mouth nightly. Refills:  0  
     
   
   
   
  
 CONSTULOSE 10 gram/15 mL solution Generic drug:  lactulose Your last dose was: Your next dose is:    
   
   
 take 2 tablespoonfuls by mouth twice a day Quantity:  1000 mL Refills:  5  
     
   
   
   
  
 ferrous sulfate 325 mg (65 mg iron) tablet Your last dose was: Your next dose is:    
   
   
 Dose:  325 mg Take 1 Tab by mouth daily (with breakfast). Quantity:  30 Tab Refills:  1  
     
   
   
   
  
 furosemide 20 mg tablet Commonly known as:  LASIX Your last dose was: Your next dose is:    
   
   
 take 1 tablet by mouth once daily Quantity:  30 Tab Refills:  5  
     
   
   
   
  
 gabapentin 800 mg tablet Commonly known as:  NEURONTIN Your last dose was: Your next dose is:    
   
   
 Dose:  800 mg Take 800 mg by mouth three (3) times daily. Refills:  0  
     
   
   
   
  
 guaiFENesin  mg ER tablet Commonly known as:  [place] [address] Your last dose was: Your next dose is: Dose:  600 mg Take 1 Tab by mouth two (2) times a day. Quantity:  14 Tab Refills:  0 HumaLOG KwikPen 100 unit/mL kwikpen Generic drug:  insulin lispro Your last dose was: Your next dose is:    
   
   
 inject 35 units with meals (+5 UNITS FOR EVERY 50MG/DL ABOVE 150 MG/DL) -  UNITS PER DAY Quantity:  45 mL Refills:  11 HYDROmorphone 2 mg tablet Commonly known as:  DILAUDID Your last dose was: Your next dose is:    
   
   
 Dose:  2 mg Take 1 Tab by mouth every four (4) hours as needed for Pain. Max Daily Amount: 12 mg. Quantity:  10 Tab Refills:  0  
     
   
   
   
  
 insulin glargine 100 unit/mL (3 mL) pen Commonly known as:  LANTUS SOLOSTAR Your last dose was: Your next dose is:    
   
   
 Inject 120 units every morning as 2 separate shots of 60 units back to back--Dose change 11/18/16--updated med list--did not send prescription to the pharmacy Quantity:  45 mL Refills:  11  
     
   
   
   
  
 levoFLOXacin 750 mg tablet Commonly known as:  Neal More Your last dose was: Your next dose is:    
   
   
 Dose:  750 mg Take 1 Tab by mouth every twenty-four (24) hours. Quantity:  6 Tab Refills:  0  
     
   
   
   
  
 losartan 25 mg tablet Commonly known as:  COZAAR Your last dose was: Your next dose is:    
   
   
 Dose:  25 mg Take 1 Tab by mouth daily. Replaces lisinopril for blood pressure and kidney protection Quantity:  30 Tab Refills:  11  
     
   
   
   
  
 metFORMIN 1,000 mg tablet Commonly known as:  GLUCOPHAGE Your last dose was: Your next dose is:    
   
   
 take 1 tablet by mouth twice a day with meals Quantity:  180 Tab Refills:  3 Bambi Pen Needle 32 gauge x 5/32\" Ndle Generic drug:  Insulin Needles (Disposable) Your last dose was: Your next dose is: use as directed four times a day Quantity:  100 Pen Needle Refills:  11 Nebulizer & Compressor machine Your last dose was: Your next dose is:    
   
   
 UAD Quantity:  1 Each Refills:  0  
     
   
   
   
  
 omeprazole 20 mg capsule Commonly known as:  PRILOSEC Your last dose was: Your next dose is:    
   
   
 take 1 capsule by mouth once daily Quantity:  30 Cap Refills:  5  
     
   
   
   
  
 ondansetron 4 mg disintegrating tablet Commonly known as:  ZOFRAN ODT Your last dose was: Your next dose is:    
   
   
 dissolve 1 tablet ON TONGUE every 8 hours if needed for nausea Quantity:  45 Tab Refills:  3  
     
   
   
   
  
 potassium chloride SR 10 mEq tablet Commonly known as:  KLOR-CON 10 Your last dose was: Your next dose is: Take 1 tab once daily Refills:  0 PROAIR HFA 90 mcg/actuation inhaler Generic drug:  albuterol Your last dose was: Your next dose is:    
   
   
 Dose:  2 Puff Take 2 Puffs by inhalation every four (4) hours as needed. Refills:  0  
     
   
   
   
  
 propranolol 20 mg tablet Commonly known as:  INDERAL Your last dose was: Your next dose is:    
   
   
 Dose:  20 mg Take 1 Tab by mouth two (2) times a day. Quantity:  60 Tab Refills:  11  
     
   
   
   
  
 rOPINIRole 4 mg Tab TAB Commonly known as:  Hannah Gonzalez Your last dose was: Your next dose is:    
   
   
 Dose:  4 mg Take 4 mg by mouth nightly. Refills:  0

## 2017-04-06 NOTE — H&P
Radiology History and Physical    Patient: Eloisa Still 64 y.o. female       Chief Complaint: No chief complaint on file. 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 Social History Main Topics    Smoking status: Current Every Day Smoker     Packs/day: 1.00     Years: 40.00    Smokeless tobacco: Never Used    Alcohol use Yes      Comment: rare    Drug use: No    Sexual activity: Not on file     Other Topics Concern    Not on file     Social History Narrative    Lives in [facility] with . Has 2 daughters and 1 granddaughter who she cares for. On disability for her back. Used to work as a  and . Used to bowl and fish. Allergies:    Allergies   Allergen Reactions    Hydrocodone Nausea and Vomiting    Lisinopril Cough    Lortab [Hydrocodone-Acetaminophen] Nausea and Vomiting    Percocet [Oxycodone-Acetaminophen] Nausea and Vomiting       Current Medications:  Current Facility-Administered Medications Medication Dose Route Frequency    lidocaine (XYLOCAINE) 20 mg/mL (2 %) injection 400 mg  20 mL SubCUTAneous RAD ONCE    sodium bicarbonate (NEUT) injection 5 mL  5 mL SubCUTAneous RAD ONCE    iopamidol (ISOVUE 300) 61 % contrast injection 50 mL  50 mL IntraVENous RAD ONCE    fentaNYL citrate (PF) injection 100 mcg  100 mcg IntraVENous Multiple    0.9% sodium chloride infusion  25 mL/hr IntraVENous ONCE    midazolam (VERSED) injection 5 mg  5 mg IntraVENous Multiple    sodium chloride (NS) flush 10 mL  10 mL IntraVENous RAD ONCE        Physical Exam:  Blood pressure 148/53, pulse (!) 104, temperature 98.5 °F (36.9 °C), resp. rate 20, height 5' 4\" (1.626 m), weight 93 kg (205 lb), SpO2 98 %. LUNG: clear to auscultation bilaterally, HEART: regular rate and rhythm      Alerts:    Hospital Problems  Date Reviewed: 3/30/2017    None          Laboratory:    No results for input(s): HGB, HCT, WBC, PLT, INR, BUN, CREA, K, CRCLT, HGBEXT, HCTEXT, PLTEXT in the last 72 hours. No lab exists for component: PTT, PT, INREXT      Plan of Care/Planned Procedure:  Risks, benefits, and alternatives reviewed with patient and she agrees to proceed with the procedure.        Shirley Dotson MD

## 2017-04-06 NOTE — ROUTINE PROCESS
Pt. D/c'ed to home and transported to d/c lot via w/c. Discharge instructions reviewed with pt. and copy given. Verbalized understanding of instructions.

## 2017-04-13 ENCOUNTER — HOSPITAL ENCOUNTER (OUTPATIENT)
Dept: ULTRASOUND IMAGING | Age: 62
Discharge: HOME | End: 2017-04-13
Attending: INTERNAL MEDICINE
Payer: MEDICARE

## 2017-04-13 DIAGNOSIS — Z00.6 EXAMINATION OF PARTICIPANT IN CLINICAL TRIAL: ICD-10-CM

## 2017-04-13 PROCEDURE — 76700 US EXAM ABDOM COMPLETE: CPT

## 2017-05-02 DIAGNOSIS — D50.0 IRON DEFICIENCY ANEMIA DUE TO CHRONIC BLOOD LOSS: Primary | ICD-10-CM

## 2017-05-04 LAB
ERYTHROCYTE [DISTWIDTH] IN BLOOD BY AUTOMATED COUNT: 17.9 % (ref 12.3–15.4)
HCT VFR BLD AUTO: 25.8 % (ref 34–46.6)
HGB BLD-MCNC: 7.5 G/DL (ref 11.1–15.9)
IRON SATN MFR SERPL: 5 % (ref 15–55)
IRON SERPL-MCNC: 18 UG/DL (ref 27–139)
MCH RBC QN AUTO: 20.1 PG (ref 26.6–33)
MCHC RBC AUTO-ENTMCNC: 29.1 G/DL (ref 31.5–35.7)
MCV RBC AUTO: 69 FL (ref 79–97)
MORPHOLOGY BLD-IMP: ABNORMAL
PLATELET # BLD AUTO: ABNORMAL X10E3/UL (ref 150–379)
RBC # BLD AUTO: 3.73 X10E6/UL (ref 3.77–5.28)
TIBC SERPL-MCNC: 386 UG/DL (ref 250–450)
UIBC SERPL-MCNC: 368 UG/DL (ref 118–369)
WBC # BLD AUTO: 6.2 X10E3/UL (ref 3.4–10.8)

## 2017-05-05 ENCOUNTER — ANESTHESIA EVENT (OUTPATIENT)
Dept: ENDOSCOPY | Age: 62
End: 2017-05-05
Payer: MEDICARE

## 2017-05-05 ENCOUNTER — HOSPITAL ENCOUNTER (OUTPATIENT)
Age: 62
Setting detail: OUTPATIENT SURGERY
Discharge: HOME | End: 2017-05-05
Attending: INTERNAL MEDICINE | Admitting: INTERNAL MEDICINE
Payer: MEDICARE

## 2017-05-05 ENCOUNTER — ANESTHESIA (OUTPATIENT)
Dept: ENDOSCOPY | Age: 62
End: 2017-05-05
Payer: MEDICARE

## 2017-05-05 VITALS
RESPIRATION RATE: 9 BRPM | HEART RATE: 109 BPM | DIASTOLIC BLOOD PRESSURE: 82 MMHG | SYSTOLIC BLOOD PRESSURE: 165 MMHG | TEMPERATURE: 98.2 F | WEIGHT: 205 LBS | OXYGEN SATURATION: 92 % | BODY MASS INDEX: 35.19 KG/M2

## 2017-05-05 LAB
GLUCOSE BLD STRIP.AUTO-MCNC: 331 MG/DL (ref 65–100)
SERVICE CMNT-IMP: ABNORMAL

## 2017-05-05 PROCEDURE — 77030014243 HC BND LIG VRCES BSC -D: Performed by: INTERNAL MEDICINE

## 2017-05-05 PROCEDURE — 76060000031 HC ANESTHESIA FIRST 0.5 HR: Performed by: INTERNAL MEDICINE

## 2017-05-05 PROCEDURE — 74011250636 HC RX REV CODE- 250/636: Performed by: INTERNAL MEDICINE

## 2017-05-05 PROCEDURE — 76040000019: Performed by: INTERNAL MEDICINE

## 2017-05-05 PROCEDURE — 74011250636 HC RX REV CODE- 250/636

## 2017-05-05 PROCEDURE — 82962 GLUCOSE BLOOD TEST: CPT

## 2017-05-05 RX ORDER — PROPOFOL 10 MG/ML
INJECTION, EMULSION INTRAVENOUS AS NEEDED
Status: DISCONTINUED | OUTPATIENT
Start: 2017-05-05 | End: 2017-05-05 | Stop reason: HOSPADM

## 2017-05-05 RX ORDER — SODIUM CHLORIDE 0.9 % (FLUSH) 0.9 %
5-10 SYRINGE (ML) INJECTION EVERY 8 HOURS
Status: DISCONTINUED | OUTPATIENT
Start: 2017-05-05 | End: 2017-05-05 | Stop reason: HOSPADM

## 2017-05-05 RX ORDER — ONDANSETRON 2 MG/ML
4-8 INJECTION INTRAMUSCULAR; INTRAVENOUS ONCE
Status: COMPLETED | OUTPATIENT
Start: 2017-05-05 | End: 2017-05-05

## 2017-05-05 RX ORDER — DEXTROMETHORPHAN/PSEUDOEPHED 2.5-7.5/.8
1.2 DROPS ORAL
Status: DISCONTINUED | OUTPATIENT
Start: 2017-05-05 | End: 2017-05-05 | Stop reason: HOSPADM

## 2017-05-05 RX ORDER — SODIUM CHLORIDE 0.9 % (FLUSH) 0.9 %
5-10 SYRINGE (ML) INJECTION AS NEEDED
Status: DISCONTINUED | OUTPATIENT
Start: 2017-05-05 | End: 2017-05-05 | Stop reason: HOSPADM

## 2017-05-05 RX ORDER — MIDAZOLAM HYDROCHLORIDE 1 MG/ML
5-10 INJECTION, SOLUTION INTRAMUSCULAR; INTRAVENOUS
Status: DISCONTINUED | OUTPATIENT
Start: 2017-05-05 | End: 2017-05-05 | Stop reason: HOSPADM

## 2017-05-05 RX ORDER — SODIUM CHLORIDE 9 MG/ML
INJECTION, SOLUTION INTRAVENOUS
Status: DISCONTINUED | OUTPATIENT
Start: 2017-05-05 | End: 2017-05-05 | Stop reason: HOSPADM

## 2017-05-05 RX ORDER — FLUMAZENIL 0.1 MG/ML
0.2 INJECTION INTRAVENOUS
Status: DISCONTINUED | OUTPATIENT
Start: 2017-05-05 | End: 2017-05-05 | Stop reason: HOSPADM

## 2017-05-05 RX ORDER — FENTANYL CITRATE 50 UG/ML
50-200 INJECTION, SOLUTION INTRAMUSCULAR; INTRAVENOUS
Status: DISCONTINUED | OUTPATIENT
Start: 2017-05-05 | End: 2017-05-05 | Stop reason: HOSPADM

## 2017-05-05 RX ORDER — EPINEPHRINE 0.1 MG/ML
1 INJECTION INTRACARDIAC; INTRAVENOUS
Status: DISCONTINUED | OUTPATIENT
Start: 2017-05-05 | End: 2017-05-05 | Stop reason: HOSPADM

## 2017-05-05 RX ORDER — ATROPINE SULFATE 0.1 MG/ML
0.5 INJECTION INTRAVENOUS
Status: DISCONTINUED | OUTPATIENT
Start: 2017-05-05 | End: 2017-05-05 | Stop reason: HOSPADM

## 2017-05-05 RX ORDER — SODIUM CHLORIDE 9 MG/ML
50 INJECTION, SOLUTION INTRAVENOUS CONTINUOUS
Status: DISCONTINUED | OUTPATIENT
Start: 2017-05-05 | End: 2017-05-05 | Stop reason: HOSPADM

## 2017-05-05 RX ORDER — NALOXONE HYDROCHLORIDE 0.4 MG/ML
0.4 INJECTION, SOLUTION INTRAMUSCULAR; INTRAVENOUS; SUBCUTANEOUS
Status: DISCONTINUED | OUTPATIENT
Start: 2017-05-05 | End: 2017-05-05 | Stop reason: HOSPADM

## 2017-05-05 RX ADMIN — ONDANSETRON 4 MG: 2 INJECTION INTRAMUSCULAR; INTRAVENOUS at 09:07

## 2017-05-05 RX ADMIN — PROPOFOL 50 MG: 10 INJECTION, EMULSION INTRAVENOUS at 08:06

## 2017-05-05 RX ADMIN — PROPOFOL 50 MG: 10 INJECTION, EMULSION INTRAVENOUS at 08:15

## 2017-05-05 RX ADMIN — SODIUM CHLORIDE: 9 INJECTION, SOLUTION INTRAVENOUS at 07:58

## 2017-05-05 RX ADMIN — PROPOFOL 50 MG: 10 INJECTION, EMULSION INTRAVENOUS at 08:10

## 2017-05-05 NOTE — DISCHARGE INSTRUCTIONS
[address], MD, [facility]     April ALEJANDRO Sandhu PA-C Terris Bast, MD, [place], MD Deysi Garcia NP Helaine Bobo, NP        [address] [facility], [address] [facility], [address]     [phone]     FAX: [address], [address] [facility]     [phone]     FAX: [phone]         ENDOSCOPY WITH BANDING DISCHARGE INSTRUCTIONS    Nicko Cartagena  1955  Date: 5/5/2017    DISCOMFORT:  Use lozenges or warm salt water gargle for sore thoat  Apply warm compress to IV site if red. If redness or soreness persists call the office. You may experience gas and bloating. Walking and belching will help relieve this. You may experience chest pain or discomfort or feel as though food is \"sticking\" in your food pipe for a few days after the procedure. This is a normal feeling after banding of esophageal varices. DIET:  Regular food may dislodge the bands placed on the varices. For this reason you should only have liquid for the rest of today. Eat only soft food that does not need to be chewed all day tomorrow. You may advance to your regular diet in 2 days. ACTIVITY:  Spend the remainder of the day resting. Avoid any strenuous activity. You may not operate a vehicle for 12 hours. You may not engage in an occupation involving machinery or appliances for rest of today. Avoid making any critical decisions for at least 24 hour. Call the [employer] of [address] [place] [address] if you have any of the following:  Increasing chest or abdominal pain, nausea, vomiting, vomiting blood, abdominal distension or swelling, fever or chills, bloody discharge from nose or mouth or shortness of breath.     Follow-up Instructions:  Call Dr. Norman Garcia for any questions or problems at the phone number listed above.  If a biopsy was performed, you will be contacted by the office staff or Dr Noris Hurst within 1 week. If you have not heard from us by then you may call the office at the phone number listed above to inquire about the results. ENDOSCOPY FINDINGS:  A few varices were found in the esophagus (food tube). 3 bands were placed to seal the varices and reduce the risk of bleeding. DISCHARGE SUMMARY from the Nurse:   The following personal items collected during your admission are returned to you:   Dental Appliance: Dental Appliances: None  Vision:    Hearing Aid:    Jewelry:    Clothing:    Other Valuables:    Valuables sent to safe:

## 2017-05-05 NOTE — PROCEDURES
[address] MD, ALEJANDRO Zambrano PA-C Mardelle Macadam, MD, MD Deysi Kendrick NP Elza Bent, NP [address], [address] [facility], [address]     [phone]     FAX: [address] [place] [address], [address] [place] [address], 300 [facility] - [address]     [phone]     FAX: [phone]         UPPER ENDOSCOPY PROCEDURE NOTE    Bernadetteyajaira Prabhakar  1955    INDICATION: Cirrhosis. Screening for esophageal varices with variceal ligation if indicated. : Kyra Doyle MD    ANESTHESIA/SEDATION: Propofol per anesthesia    PROCEDURE DESCRIPTION:  [employer] consent was obtained from the patient for the procedure. All risks and benefits of the procedure explained. The patient was taken to the endoscopy suite and placed in the left lateral decubitus position. Following sequential administration of sedation to doses as indicated above the endoscope was inserted into the mouth and advanced under direct vision to the second portion of the duodenum. Careful inspection of upper gastrointestinal tract was made as the endoscope was inserted and withdrawn. Retroflexion of the endoscope to view of the cardia of the stomach was performed. After withdrawing the endoscope the banding devise was placed on the tip of the endoscope. The scope was then reinserted under direct inspection and advanced to the esophagus. Banding of esophageal varices was performed as described below. The scope was then removed. FINDINGS:  Esophagus:    A few medium esophageal varices were identified. Varices had red markings. Banding of esophageal varices was performed. Excellent hemostasis was achieved after banding.   Scars of prior banding    Stomach:   Mild portal hypertensive gastropathy of the body of the stomach. No gastric varicies identified. Retained food    Duodenum:   Retained food in the duodenal bulb    INTERVENTION:   3 bands placed were placed on esophageal varices. COMPLICATIONS: None. The patient tolerated the procedure well. EBL: Negligible. RECOMMENDATIONS:  Observe until discharge parameters are achieved. Liquid diet today. Soft food tomorrow. Resume general diet thereafter. Repeat endoscopy to reassess varices and need for additional banding in 6 months. Follow-up [facility] office as scheduled.       Jeri Mcduffie MD  5/5/2017  8:27 AM

## 2017-05-05 NOTE — IP AVS SNAPSHOT
[address] 
[phone] Patient: Cliff Fountain MRN: [id number]:[id number] You are allergic to the following Allergen Reactions Hydrocodone Nausea and Vomiting Lisinopril Cough Lortab (Hydrocodone-Acetaminophen) Nausea and Vomiting Percocet (Oxycodone-Acetaminophen) Nausea and Vomiting Recent Documentation Weight BMI OB Status Smoking Status 93 kg 35.19 kg/m2 Hysterectomy Current Every Day Smoker Emergency Contacts Name Discharge Info Relation Home Work Mobile MINDImaya Sandoval DISCHARGE CAREGIVER [3] Spouse [3] [phone] Chirag Mahoney  Spouse [3] [phone] About your hospitalization You were admitted on: May 5, 2017 You last received care in the:  [facility] ENDOSCOPY You were discharged on: May 5, 2017 Unit phone number:  [phone] Why you were hospitalized Your primary diagnosis was:  Not on File Providers Seen During Your Hospitalizations Provider Role Specialty Primary office phone Maci Ramirez MD Attending Provider Hepatology [phone] Your Primary Care Physician (PCP) Primary Care Physician Office Phone Office Fax Ziamarcelagloria Lovelacegalina [phone] Follow-up Information Follow up With Details Comments Contact Info Everett Vázquez NP   [address] [place] [address] 
[phone] Your Appointments Thursday May 11, 2017  9:30 PM EDT SLEEP TITRATION with BEDROOM 1 [facility] SLEEP [facility] AT [facility] (SLEEP LAB [address] [facility]) [address] [place] [address] [address] [place] [address].   
[phone] 1. Do not take a nap the day of the study  2. No caffeine after 12 noon the day of the study  3. Bring a 2 piece sleeping garment  4.  Hair should be clean and dry, no oils, sprays, powders and remove wigs, weaves or other hair accessories  6. Patient should eat dinner prior to arriving for the test and a light breakfast will be provided upon discharge in the morning  7. Patient encouraged to bring activity items such as books, magazines, laptop, IPAD, etc, as well as toiletry items needed for the next morning  8. Bring all medications with you to the center  9. For specific questions please contact the sleep center directly, 830a to 5p  10. Do not arrive more than 15 minutes prior to appt time and use the doorbell to enter the sleep center. Sleep Center at [facility] 30 Entrance across from the [place] parking [address] (seventh floor) [address] (parking: in the parking deck) Phone number: [phone]  Do not arrive more than 15 minutes prior to appt time and use the doorbell to the left of the door to enter the sleep center. Tuesday May 16, 2017 10:00 AM EDT RESEARCH with [facility] ALEJANDRO Silverio [facility] ROSENDOMar[facility] JunctionDEJUAN ([address] [place] [address]) 200 [facility] [phone] [address] [place] [address]  
[phone] Tuesday June 13, 2017 10:00 AM EDT RESEARCH with [facility]JANDRO [facility]io [facility] ([address] [place] [address]) 200 [facility] [phone] [address] [place] [address]  
[phone] Current Discharge Medication List  
  
CONTINUE these medications which have NOT CHANGED Dose & Instructions Dispensing Information Comments Morning Noon Evening Bedtime ADVAIR DISKUS 500-50 mcg/dose diskus inhaler Generic drug:  fluticasone-salmeterol Your last dose was: Your next dose is:    
   
   
 Dose:  1 Puff Take 1 Puff by inhalation two (2) times a day. Refills:  0  
     
   
   
   
  
 albuterol-ipratropium 2.5 mg-0.5 mg/3 ml Nebu Commonly known as:  Robin Gauthier Your last dose was: Your next dose is:    
   
   
 Dose:  3 mL  
3 mL by Nebulization route every four (4) hours as needed. Quantity:  30 Nebule Refills:  0  
     
   
   
   
  
 amitriptyline 150 mg tablet Commonly known as:  ELAVIL Your last dose was: Your next dose is:    
   
   
 Dose:  150 mg Take 150 mg by mouth nightly. Refills:  0  
     
   
   
   
  
 CONSTULOSE 10 gram/15 mL solution Generic drug:  lactulose Your last dose was: Your next dose is:    
   
   
 take 2 tablespoonfuls by mouth twice a day Quantity:  1000 mL Refills:  5  
     
   
   
   
  
 ferrous sulfate 325 mg (65 mg iron) tablet Your last dose was: Your next dose is:    
   
   
 Dose:  325 mg Take 1 Tab by mouth daily (with breakfast). Quantity:  30 Tab Refills:  1  
     
   
   
   
  
 furosemide 20 mg tablet Commonly known as:  LASIX Your last dose was: Your next dose is:    
   
   
 take 1 tablet by mouth once daily Quantity:  30 Tab Refills:  5  
     
   
   
   
  
 gabapentin 800 mg tablet Commonly known as:  NEURONTIN Your last dose was: Your next dose is:    
   
   
 Dose:  800 mg Take 800 mg by mouth three (3) times daily. Refills:  0  
     
   
   
   
  
 guaiFENesin  mg ER tablet Commonly known as:  [place] Your last dose was: Your next dose is:    
   
   
 Dose:  600 mg Take 1 Tab by mouth two (2) times a day. Quantity:  14 Tab Refills:  0 HumaLOG KwikPen 100 unit/mL kwikpen Generic drug:  insulin lispro Your last dose was: Your next dose is:    
   
   
 inject 35 units with meals (+5 UNITS FOR EVERY 50MG/DL ABOVE 150 MG/DL) -  UNITS PER DAY Quantity:  45 mL Refills:  11 HYDROmorphone 2 mg tablet Commonly known as:  DILAUDID Your last dose was: Your next dose is:    
   
   
 Dose:  2 mg Take 1 Tab by mouth every four (4) hours as needed for Pain. Max Daily Amount: 12 mg. Quantity:  10 Tab Refills:  0  
     
   
   
   
  
 insulin glargine 100 unit/mL (3 mL) pen Commonly known as:  LANTUS SOLOSTAR Your last dose was: Your next dose is:    
   
   
 Inject 120 units every morning as 2 separate shots of 60 units back to back--Dose change 11/18/16--updated med list--did not send prescription to the pharmacy Quantity:  45 mL Refills:  11  
     
   
   
   
  
 levoFLOXacin 750 mg tablet Commonly known as:  Roberta Boyd Your last dose was: Your next dose is:    
   
   
 Dose:  750 mg Take 1 Tab by mouth every twenty-four (24) hours. Quantity:  6 Tab Refills:  0  
     
   
   
   
  
 losartan 25 mg tablet Commonly known as:  COZAAR Your last dose was: Your next dose is:    
   
   
 Dose:  25 mg Take 1 Tab by mouth daily. Replaces lisinopril for blood pressure and kidney protection Quantity:  30 Tab Refills:  11  
     
   
   
   
  
 metFORMIN 1,000 mg tablet Commonly known as:  GLUCOPHAGE Your last dose was: Your next dose is:    
   
   
 take 1 tablet by mouth twice a day with meals Quantity:  180 Tab Refills:  3 Bambi Pen Needle 32 gauge x 5/32\" Ndle Generic drug:  Insulin Needles (Disposable) Your last dose was: Your next dose is:    
   
   
 use as directed four times a day Quantity:  100 Pen Needle Refills:  11 Nebulizer & Compressor machine Your last dose was: Your next dose is:    
   
   
 UAD Quantity:  1 Each Refills:  0  
     
   
   
   
  
 omeprazole 20 mg capsule Commonly known as:  PRILOSEC Your last dose was: Your next dose is:    
   
   
 take 1 capsule by mouth once daily Quantity:  30 Cap Refills:  5  
     
   
   
   
  
 ondansetron 4 mg disintegrating tablet Commonly known as:  ZOFRAN ODT Your last dose was: Your next dose is:    
   
   
 dissolve 1 tablet ON TONGUE every 8 hours if needed for nausea Quantity:  45 Tab Refills:  3  
     
   
   
   
  
 potassium chloride SR 10 mEq tablet Commonly known as:  KLOR-CON 10 Your last dose was: Your next dose is: Take 1 tab once daily Refills:  0 PROAIR HFA 90 mcg/actuation inhaler Generic drug:  albuterol Your last dose was: Your next dose is:    
   
   
 Dose:  2 Puff Take 2 Puffs by inhalation every four (4) hours as needed. Refills:  0  
     
   
   
   
  
 propranolol 20 mg tablet Commonly known as:  INDERAL Your last dose was: Your next dose is:    
   
   
 Dose:  20 mg Take 1 Tab by mouth two (2) times a day. Quantity:  60 Tab Refills:  11  
     
   
   
   
  
 rOPINIRole 4 mg Tab TAB Commonly known as:  Karl Arriola Your last dose was: Your next dose is:    
   
   
 Dose:  4 mg Take 4 mg by mouth nightly. Refills:  0 Discharge Instructions [address] [place] [address] Jeny Mejía MD, ALEJANDRO Kennedy PA-C Elihu Safe, MD, MD Deysi Kramer NP Yves Grimes, NP [address] [facility], [address] [facility] 22. 
   [phone] FAX: [address] [place] [address], [address] [place] [address], [address] 
   [phone] FAX: [phone] ENDOSCOPY WITH BANDING DISCHARGE INSTRUCTIONS Axel Hewitt 1955 Date: 5/5/2017 DISCOMFORT: 
Use lozenges or warm salt water gargle for sore thoat Apply warm compress to IV site if red. If redness or soreness persists call the office. You may experience gas and bloating. Walking and belching will help relieve this. You may experience chest pain or discomfort or feel as though food is \"sticking\" in your food pipe for a few days after the procedure. This is a normal feeling after banding of esophageal varices. DIET: 
Regular food may dislodge the bands placed on the varices. For this reason you should only have liquid for the rest of today. Eat only soft food that does not need to be chewed all day tomorrow. You may advance to your regular diet in 2 days. ACTIVITY: 
Spend the remainder of the day resting. Avoid any strenuous activity. You may not operate a vehicle for 12 hours. You may not engage in an occupation involving machinery or appliances for rest of today. Avoid making any critical decisions for at least 24 hour. Call the [employer] [facility] [employer] [address] [employer] if you have any of the following: 
Increasing chest or abdominal pain, nausea, vomiting, vomiting blood, abdominal distension or swelling, fever or chills, bloody discharge from nose or mouth or shortness of breath. Follow-up Instructions: 
Call Dr. Jennifer Treadwell for any questions or problems at the phone number listed above. If a biopsy was performed, you will be contacted by the office staff or Dr Jennifer Treadwell within 1 week. If you have not heard from us by then you may call the office at the phone number listed above to inquire about the results. ENDOSCOPY FINDINGS: 
A few varices were found in the esophagus (food tube). 3 bands were placed to seal the varices and reduce the risk of bleeding. DISCHARGE SUMMARY from the Nurse: The following personal items collected during your admission are returned to you:  
Dental Appliance: Dental Appliances: None Vision:   
Hearing Aid:   
Jewelry:   
Clothing:   
Other Valuables:   
Valuables sent to safe:   
 
 
Discharge Orders None ACO Transitions of Care Introducing [facility] offers a voluntary care coordination program to provide high quality service and care to [facility] fee-for-service beneficiaries. [facility] was designed to help you enhance your health and well-being through the following services: ? Transitions of Care  support for individuals who are transitioning from one care setting to another (example: Hospital to home). ? Chronic and Complex Care Coordination  support for individuals and caregivers of those with serious or chronic illnesses or with more than one chronic (ongoing) condition and those who take a number of different medications. If you meet specific medical criteria, a [address] [place] [address] Rd may call you directly to coordinate your care with your primary care physician and your other care providers. For questions about the [facility] programs, please, contact your physicians office. For general questions or additional information about Accountable Care Organizations: 
Please visit www.medicare.gov/acos. html or call 1-800-MEDICARE (9-604.142.8855) TTY users should call 7-625.957.4626. Introducing [facility]! Dear Marietta Navarro: Thank you for requesting a [employer] account. Our records indicate that you already have an active [employer] account. You can access your account anytime at [email or web address] Did you know that you can access your hospital and ER discharge instructions at any time in [employer]? You can also review all of your test results from your hospital stay or ER visit. Additional Information If you have questions, please visit the Frequently Asked Questions section of the [employer] website at [email or web address] Remember, [employer] is NOT to be used for urgent needs. For medical emergencies, dial 911. Now available from your iPhone and Android! General Information Please provide this summary of care documentation to your next provider. Patient Signature:  ____________________________________________________________ Date:  ____________________________________________________________  
  
Granda Smita Provider Signature:  ____________________________________________________________ Date:  ____________________________________________________________

## 2017-05-05 NOTE — ANESTHESIA POSTPROCEDURE EVALUATION
Post-Anesthesia Evaluation and Assessment    Patient: Nicko Cartagena MRN: [id number]  SSN: [id number]    YOB: 1955  Age: 64 y.o. Sex: female       Cardiovascular Function/Vital Signs  Visit Vitals    /76    Pulse (!) 112    Temp 36.8 °C (98.2 °F)    Resp 15    Wt 93 kg (205 lb)    SpO2 92%    BMI 35.19 kg/m2       Patient is status post MAC anesthesia for Procedure(s):  ESOPHAGOGASTRODUODENOSCOPY (EGD)  ENDOSCOPIC BANDING OR LIGATION. Nausea/Vomiting: None    Postoperative hydration reviewed and adequate. Pain:  Pain Scale 1: Numeric (0 - 10) (05/05/17 0842)  Pain Intensity 1: 5 (Generalized pain 5/10. Baseline pre-procedure 7/10) (05/05/17 6098)   Managed    Neurological Status: At baseline    Mental Status and Level of Consciousness: Arousable    Pulmonary Status:   O2 Device: Room air (05/05/17 0842)   Adequate oxygenation and airway patent    Complications related to anesthesia: None    Post-anesthesia assessment completed.  No concerns    Signed By: Deirdre Purdy MD     May 5, 2017

## 2017-05-05 NOTE — PERIOP NOTES
Banding of esophageal varices x 3 per MD. Minimal bleeding noted. No s/s of complications noted.     [employer]  REF [id number]  Lot [id number]  Exp 03/31/2018

## 2017-05-05 NOTE — H&P
[address], MD, FACP, [facility]     April ALEJANDRO Goddard PA-C Russ Haver, MD, MD Deysi Cantrell, ALEJANDRO Dee NP        [address] [facility], [address] [address] [facility].     [phone]     FAX: [address] [place] [address], [address] [place] [address], [address] [facility] [address]     [phone]     FAX: [phone]         PRE-PROCEDURE NOTE - EGD    H and P from last office visit reviewed. Allergies reviewed. Out-patient medicaton list reviewed. Patient Active Problem List   Diagnosis Code    Diabetes mellitus with neurological manifestations, uncontrolled ([facility] [address] E11.49, E11.65    Essential hypertension, benign I10    Hyperlipidemia LDL goal <100 E78.5    Thrombocytopenia (HCC) D69.6    Previous back surgery Z98.890    S/P CHACHO (total abdominal hysterectomy) Z90.710    Cirrhosis (HCC) K74.60    Acute upper GI bleeding K92.2    Anemia D64.9    GI bleed K92.2    BALDERAS (nonalcoholic steatohepatitis) K75.81    Acute deep vein thrombosis (DVT) of right lower extremity (HCC) I82.401    COPD (chronic obstructive pulmonary disease) (HCC) J44.9    Sepsis (HCC) A41.9    CAP (community acquired pneumonia) J18.9    IBIS (obstructive sleep apnea) G47.33    Tobacco abuse Z72.0    Neuropathy G62.9    Respiratory failure (HCC) J96.90       Allergies   Allergen Reactions    Hydrocodone Nausea and Vomiting    Lisinopril Cough    Lortab [Hydrocodone-Acetaminophen] Nausea and Vomiting    Percocet [Oxycodone-Acetaminophen] Nausea and Vomiting       No current facility-administered medications on file prior to encounter. Current Outpatient Prescriptions on File Prior to Encounter   Medication Sig Dispense Refill    gabapentin (NEURONTIN) 800 mg tablet Take 800 mg by mouth three (3) times daily.       albuterol-ipratropium (DUO-NEB) 2.5 mg-0.5 mg/3 ml nebu 3 mL by Nebulization route every four (4) hours as needed. 30 Nebule 0    levoFLOXacin (LEVAQUIN) 750 mg tablet Take 1 Tab by mouth every twenty-four (24) hours. 6 Tab 0    guaiFENesin ER (MUCINEX) 600 mg ER tablet Take 1 Tab by mouth two (2) times a day. 14 Tab 0    HYDROmorphone (DILAUDID) 2 mg tablet Take 1 Tab by mouth every four (4) hours as needed for Pain. Max Daily Amount: 12 mg. 10 Tab 0    insulin glargine (LANTUS SOLOSTAR) 100 unit/mL (3 mL) pen Inject 120 units every morning as 2 separate shots of 60 units back to back--Dose change 11/18/16--updated med list--did not send prescription to the pharmacy 45 mL 11    losartan (COZAAR) 25 mg tablet Take 1 Tab by mouth daily. Replaces lisinopril for blood pressure and kidney protection 30 Tab 11    Nebulizer & Compressor machine UAD 1 Each 0    omeprazole (PRILOSEC) 20 mg capsule take 1 capsule by mouth once daily 30 Cap 5    CONSTULOSE 10 gram/15 mL solution take 2 tablespoonfuls by mouth twice a day 1000 mL 5    furosemide (LASIX) 20 mg tablet take 1 tablet by mouth once daily 30 Tab 5    HUMALOG KWIKPEN 100 unit/mL kwikpen inject 35 units with meals (+5 UNITS FOR EVERY 50MG/DL ABOVE 150 MG/DL) -  UNITS PER DAY 45 mL 11    metFORMIN (GLUCOPHAGE) 1,000 mg tablet take 1 tablet by mouth twice a day with meals 180 Tab 3    TREVOR PEN NEEDLE 32 gauge x 5/32\" ndle use as directed four times a day 100 Pen Needle 11    potassium chloride SR (KLOR-CON 10) 10 mEq tablet Take 1 tab once daily      propranolol (INDERAL) 20 mg tablet Take 1 Tab by mouth two (2) times a day. 60 Tab 11    ondansetron (ZOFRAN ODT) 4 mg disintegrating tablet dissolve 1 tablet ON TONGUE every 8 hours if needed for nausea 45 Tab 3    ferrous sulfate 325 mg (65 mg iron) tablet Take 1 Tab by mouth daily (with breakfast). 30 Tab 1    amitriptyline (ELAVIL) 150 mg tablet Take 150 mg by mouth nightly.       rOPINIRole (REQUIP) 4 mg tab TAB Take 4 mg by mouth nightly.  albuterol (PROAIR HFA) 90 mcg/actuation inhaler Take 2 Puffs by inhalation every four (4) hours as needed.  fluticasone-salmeterol (ADVAIR DISKUS) 500-50 mcg/dose diskus inhaler Take 1 Puff by inhalation two (2) times a day. For EGD to assess for esophageal and gastric varices. Plan to perform banding if indicated based upon variceal size and appearance. The risks of the procedure were discussed with the patient. These included reaction to anesthesia, pain, perforation and bleeding. All questions were answered. The patient wishes to proceed with the procedure. PHYSICAL EXAMINATION:  VS: per nursing note  General: No acute distress. Eyes: Sclera anicteric. ENT: No oral lesions. Thyroid normal.  Nodes: No adenopathy. Skin: No spider angiomata. No jaundice. No palmar erythema. Respiratory: Lungs clear to auscultation. Cardiovascular: Regular heart rate. No murmurs. No JVD. Abdomen: Soft non-tender, liver size normal to percussion/palpation. Spleen not palpable. No obvious ascites. Extremities: No edema. No muscle wasting. No gross arthritic changes. Neurologic: Alert and oriented. Cranial nerves grossly intact. No asterixis. MOST RECENT LABORATORY STUDIES:  Lab Results   Component Value Date/Time    WBC 6.2 05/03/2017 12:00 AM    HGB 7.5 05/03/2017 12:00 AM    HCT 25.8 05/03/2017 12:00 AM    PLATELET Comment 86/45/1889 12:00 AM    MCV 69 05/03/2017 12:00 AM     Lab Results   Component Value Date/Time    INR 1.2 02/13/2017 03:54 PM    INR 1.1 11/10/2016 02:48 PM    INR 1.1 03/07/2016 02:05 PM    Prothrombin time 11.8 02/13/2017 03:54 PM    Prothrombin time 11.5 11/10/2016 02:48 PM    Prothrombin time 11.6 03/07/2016 02:05 PM       ASSESSMENT AND PLAN:  EGD to assess for esophageal and/or gastric varices. Conscious sedation with fentanyl and versed.     Norlene Leyden, MD  [place] [place] [address] [address] [facility], [facility] [address], [place]  22.  [phone]

## 2017-05-05 NOTE — ANESTHESIA PREPROCEDURE EVALUATION
Anesthetic History   No history of anesthetic complications            Review of Systems / Medical History  Patient summary reviewed, nursing notes reviewed and pertinent labs reviewed    Pulmonary  Within defined limits  COPD    Sleep apnea    Asthma        Neuro/Psych   Within defined limits           Cardiovascular  Within defined limits  Hypertension                   GI/Hepatic/Renal  Within defined limits   GERD      Liver disease     Endo/Other  Within defined limits  Diabetes    Obesity     Other Findings              Physical Exam    Airway  Mallampati: II  TM Distance: > 6 cm  Neck ROM: normal range of motion   Mouth opening: Normal     Cardiovascular  Regular rate and rhythm,  S1 and S2 normal,  no murmur, click, rub, or gallop             Dental  No notable dental hx       Pulmonary  Breath sounds clear to auscultation               Abdominal  GI exam deferred       Other Findings            Anesthetic Plan    ASA: 3  Anesthesia type: MAC          Induction: Intravenous  Anesthetic plan and risks discussed with: Patient

## 2017-05-05 NOTE — ROUTINE PROCESS
Arcadio Nunez  1955  [id number]    Situation:  Verbal report received from: Fidel Ibrahim  Procedure: Procedure(s) with comments:  ESOPHAGOGASTRODUODENOSCOPY (EGD) - egd  ENDOSCOPIC BANDING OR LIGATION    Background:    Preoperative diagnosis: egd  Postoperative diagnosis: Esophageal Varices    :  Dr. Celia Stevens  Assistant(s): Endoscopy Technician-1: Lelo Randall  Endoscopy RN-1: Bindu Read RN    Specimens: * No specimens in log *  H. Pylori  no    Assessment:  Intra-procedure medications     Anesthesia gave intra-procedure sedation and medications, see anesthesia flow sheet yes    Intravenous fluids: NS@ KVO     Vital signs stable     Abdominal assessment: round and soft     Recommendation:  Discharge patient per MD order.     Family or Friend   Permission to share finding with family or friend yes

## 2017-05-16 ENCOUNTER — HOSPITAL ENCOUNTER (OUTPATIENT)
Dept: NON INVASIVE DIAGNOSTICS | Age: 62
Discharge: HOME | End: 2017-05-16
Payer: MEDICARE

## 2017-05-16 DIAGNOSIS — Z00.6 EXAMINATION OF PARTICIPANT IN CLINICAL TRIAL: ICD-10-CM

## 2017-05-16 LAB
ATRIAL RATE: 108 BPM
CALCULATED P AXIS, ECG09: 76 DEGREES
CALCULATED R AXIS, ECG10: 60 DEGREES
CALCULATED T AXIS, ECG11: 60 DEGREES
DIAGNOSIS, 93000: NORMAL
P-R INTERVAL, ECG05: 174 MS
Q-T INTERVAL, ECG07: 336 MS
QRS DURATION, ECG06: 86 MS
QTC CALCULATION (BEZET), ECG08: 450 MS
VENTRICULAR RATE, ECG03: 108 BPM

## 2017-05-16 PROCEDURE — 93005 ELECTROCARDIOGRAM TRACING: CPT

## 2017-05-16 RX ORDER — LANOLIN ALCOHOL/MO/W.PET/CERES
325 CREAM (GRAM) TOPICAL
Qty: 30 TAB | Refills: 5 | Status: SHIPPED | OUTPATIENT
Start: 2017-05-16 | End: 2017-12-20 | Stop reason: SDUPTHER

## 2017-06-13 RX ORDER — ONDANSETRON 4 MG/1
TABLET, ORALLY DISINTEGRATING ORAL
Qty: 45 TAB | Refills: 3 | Status: SHIPPED | OUTPATIENT
Start: 2017-06-13 | End: 2018-01-01 | Stop reason: SDUPTHER

## 2017-06-19 RX ORDER — POTASSIUM CHLORIDE 750 MG/1
TABLET, FILM COATED, EXTENDED RELEASE ORAL
Qty: 60 TAB | Refills: 5 | Status: SHIPPED | OUTPATIENT
Start: 2017-06-19 | End: 2018-01-01 | Stop reason: SDUPTHER

## 2017-06-19 RX ORDER — FUROSEMIDE 20 MG/1
TABLET ORAL
Qty: 30 TAB | Refills: 5 | Status: SHIPPED | OUTPATIENT
Start: 2017-06-19 | End: 2018-04-09 | Stop reason: SDUPTHER

## 2017-06-19 RX ORDER — OMEPRAZOLE 20 MG/1
CAPSULE, DELAYED RELEASE ORAL
Qty: 30 CAP | Refills: 5 | Status: SHIPPED | OUTPATIENT
Start: 2017-06-19 | End: 2018-01-14 | Stop reason: SDUPTHER

## 2017-07-06 ENCOUNTER — HOSPITAL ENCOUNTER (OUTPATIENT)
Dept: SLEEP MEDICINE | Age: 62
Discharge: HOME | End: 2017-07-06
Attending: INTERNAL MEDICINE
Payer: MEDICARE

## 2017-07-06 VITALS
HEART RATE: 112 BPM | DIASTOLIC BLOOD PRESSURE: 63 MMHG | SYSTOLIC BLOOD PRESSURE: 133 MMHG | TEMPERATURE: 98.4 F | WEIGHT: 210 LBS | BODY MASS INDEX: 35.85 KG/M2 | HEIGHT: 64 IN | OXYGEN SATURATION: 94 %

## 2017-07-06 DIAGNOSIS — G47.33 OSA (OBSTRUCTIVE SLEEP APNEA): ICD-10-CM

## 2017-07-06 PROCEDURE — 95811 POLYSOM 6/>YRS CPAP 4/> PARM: CPT | Performed by: INTERNAL MEDICINE

## 2017-07-10 ENCOUNTER — TELEPHONE (OUTPATIENT)
Dept: SLEEP MEDICINE | Age: 62
End: 2017-07-10

## 2017-07-10 NOTE — TELEPHONE ENCOUNTER
Titration study interpreted. * Device pressure change to Bi - Level  12/06 cmH2O by lead technologist either remotely or at PAP clinic (notify patient).     * PAP card download in 4 weeks.     * Office visit in 6 months or as needed.

## 2017-07-14 ENCOUNTER — TELEPHONE (OUTPATIENT)
Dept: SLEEP MEDICINE | Age: 62
End: 2017-07-14

## 2017-07-14 RX ORDER — GABAPENTIN 600 MG/1
TABLET ORAL
Qty: 180 TAB | Refills: 11 | Status: SHIPPED | OUTPATIENT
Start: 2017-07-14 | End: 2018-01-01 | Stop reason: SDUPTHER

## 2017-07-14 NOTE — TELEPHONE ENCOUNTER
Patient was called 7/14/17 to notify of pressure adjustment per doctor's request on her Bi-Level device from 10cm/4cm to 12cm/6cm done remotely in lab. See will schedule a follow up in 6 months or call if problems develop sooner.

## 2017-07-31 NOTE — TELEPHONE ENCOUNTER
Patient stated that she takes Gabapentin 2 tabs tid for her feet pain and stated she stated that she still continues to be in constant pain. She states that her pain has been on going for two months and she does not know what she should do. Please advise.

## 2017-07-31 NOTE — TELEPHONE ENCOUNTER
Patient stated she did try the lyrica for one month with no relief. She also stated that it cost more.

## 2017-07-31 NOTE — TELEPHONE ENCOUNTER
Did she ever try the Lyrica, per Dr. Nereyda Hurley last note he recommended the next step would be Lyrica 100mg Three times per day.

## 2017-08-01 RX ORDER — TRAMADOL HYDROCHLORIDE 50 MG/1
TABLET ORAL
Qty: 120 TAB | Refills: 5 | Status: SHIPPED | OUTPATIENT
Start: 2017-08-01 | End: 2018-03-05 | Stop reason: SDUPTHER

## 2017-08-01 NOTE — TELEPHONE ENCOUNTER
Pt notified of message per Dr. Juan Barnes and voiced understanding of what was read to her. She would like to try the Tramadol.

## 2017-08-01 NOTE — TELEPHONE ENCOUNTER
----- Message from Real Hartman sent at 7/31/2017  5:33 PM EDT -----  Regarding: /Telephone  Pt returning call from practice. Best contact number is [phone].

## 2017-08-01 NOTE — TELEPHONE ENCOUNTER
Please let her know that I don't have much else to offer but I can prescribe tramadol if she would like to try this in addition to the gabapentin. She would take one 50 mg tablet at breakfast and bedtime for the pain and if no effect, she can take 2 tabs with breakfast and bedtime. This is a controlled substance and would need to be faxed to the pharmacy of her choice. Ask her if she would like to try this and if so, pend to me and I'll take care of this.

## 2017-08-06 ENCOUNTER — APPOINTMENT (OUTPATIENT)
Dept: GENERAL RADIOLOGY | Age: 62
End: 2017-08-06
Attending: PHYSICIAN ASSISTANT
Payer: MEDICARE

## 2017-08-06 ENCOUNTER — HOSPITAL ENCOUNTER (EMERGENCY)
Age: 62
Discharge: HOME | End: 2017-08-06
Attending: EMERGENCY MEDICINE
Payer: MEDICARE

## 2017-08-06 VITALS
TEMPERATURE: 97.9 F | HEART RATE: 100 BPM | BODY MASS INDEX: 35.85 KG/M2 | WEIGHT: 210 LBS | OXYGEN SATURATION: 98 % | HEIGHT: 64 IN | SYSTOLIC BLOOD PRESSURE: 154 MMHG | DIASTOLIC BLOOD PRESSURE: 70 MMHG | RESPIRATION RATE: 20 BRPM

## 2017-08-06 DIAGNOSIS — R07.89 CHEST WALL PAIN: ICD-10-CM
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
## 2017-08-06 PROCEDURE — 71101 X-RAY EXAM UNILAT RIBS/CHEST: CPT

## 2017-08-06 PROCEDURE — 74011250636 HC RX REV CODE- 250/636: Performed by: PHYSICIAN ASSISTANT

## 2017-08-06 PROCEDURE — 74011250637 HC RX REV CODE- 250/637: Performed by: PHYSICIAN ASSISTANT

## 2017-08-06 PROCEDURE — 99282 EMERGENCY DEPT VISIT SF MDM: CPT

## 2017-08-06 PROCEDURE — 96372 THER/PROPH/DIAG INJ SC/IM: CPT

## 2017-08-06 RX ORDER — ONDANSETRON 4 MG/1
4 TABLET, ORALLY DISINTEGRATING ORAL
Status: COMPLETED | OUTPATIENT
Start: 2017-08-06 | End: 2017-08-06

## 2017-08-06 RX ORDER — OXYCODONE AND ACETAMINOPHEN 5; 325 MG/1; MG/1
1 TABLET ORAL
Status: DISCONTINUED | OUTPATIENT
Start: 2017-08-06 | End: 2017-08-06 | Stop reason: CLARIF

## 2017-08-06 RX ORDER — HYDROMORPHONE HYDROCHLORIDE 1 MG/ML
1 INJECTION, SOLUTION INTRAMUSCULAR; INTRAVENOUS; SUBCUTANEOUS
Status: COMPLETED | OUTPATIENT
Start: 2017-08-06 | End: 2017-08-06

## 2017-08-06 RX ORDER — HYDROMORPHONE HYDROCHLORIDE 2 MG/1
2 TABLET ORAL
Qty: 10 TAB | Refills: 0 | Status: SHIPPED | OUTPATIENT
Start: 2017-08-06 | End: 2017-10-06

## 2017-08-06 RX ORDER — DIAZEPAM 5 MG/1
5 TABLET ORAL
Status: COMPLETED | OUTPATIENT
Start: 2017-08-06 | End: 2017-08-06

## 2017-08-06 RX ADMIN — DIAZEPAM 5 MG: 5 TABLET ORAL at 15:58

## 2017-08-06 RX ADMIN — ONDANSETRON 4 MG: 4 TABLET, ORALLY DISINTEGRATING ORAL at 16:15

## 2017-08-06 RX ADMIN — HYDROMORPHONE HYDROCHLORIDE 1 MG: 1 INJECTION, SOLUTION INTRAMUSCULAR; INTRAVENOUS; SUBCUTANEOUS at 16:15

## 2017-08-06 NOTE — DISCHARGE INSTRUCTIONS
Preventing Falls: Care Instructions  Your Care Instructions  Getting around your home safely can be a challenge if you have injuries or health problems that make it easy for you to fall. Loose rugs and furniture in walkways are among the dangers for many older people who have problems walking or who have poor eyesight. People who have conditions such as arthritis, osteoporosis, or dementia also have to be careful not to fall. You can make your home safer with a few simple measures. Follow-up care is a key part of your treatment and safety. Be sure to make and go to all appointments, and call your doctor if you are having problems. It's also a good idea to know your test results and keep a list of the medicines you take. How can you care for yourself at home? Taking care of yourself  · You may get dizzy if you do not drink enough water. To prevent dehydration, drink plenty of fluids, enough so that your urine is light yellow or clear like water. Choose water and other caffeine-free clear liquids. If you have kidney, heart, or liver disease and have to limit fluids, talk with your doctor before you increase the amount of fluids you drink. · Exercise regularly to improve your strength, muscle tone, and balance. Walk if you can. Swimming may be a good choice if you cannot walk easily. · Have your vision and hearing checked each year or any time you notice a change. If you have trouble seeing and hearing, you might not be able to avoid objects and could lose your balance. · Know the side effects of the medicines you take. Ask your doctor or pharmacist whether the medicines you take can affect your balance. Sleeping pills or sedatives can affect your balance. · Limit the amount of alcohol you drink. Alcohol can impair your balance and other senses. · Ask your doctor whether calluses or corns on your feet need to be removed.  If you wear loose-fitting shoes because of calluses or corns, you can lose your balance and fall. · Talk to your doctor if you have numbness in your feet. Preventing falls at home  · Remove raised doorway thresholds, throw rugs, and clutter. Repair loose carpet or raised areas in the floor. · Move furniture and electrical cords to keep them out of walking paths. · Use nonskid floor wax, and wipe up spills right away, especially on ceramic tile floors. · If you use a walker or cane, put rubber tips on it. If you use crutches, clean the bottoms of them regularly with an abrasive pad, such as steel wool. · Keep your house well lit, especially [place], and outside walkways. Use night-lights in areas such as hallways and bathrooms. Add extra light switches or use remote switches (such as switches that go on or off when you clap your hands) to make it easier to turn lights on if you have to get up during the night. · Install sturdy handrails on stairways. · Move items in your cabinets so that the things you use a lot are on the lower shelves (about waist level). · Keep a cordless phone and a flashlight with new batteries by your bed. If possible, put a phone in each of the main rooms of your house, or carry a cell phone in case you fall and cannot reach a phone. Or, you can wear a device around your neck or wrist. You push a button that sends a signal for help. · Wear low-heeled shoes that fit well and give your feet good support. Use footwear with nonskid soles. Check the heels and soles of your shoes for wear. Repair or replace worn heels or soles. · Do not wear socks without shoes on wood floors. · Walk on the grass when the sidewalks are slippery. If you live in an area that gets snow and ice in the winter, sprinkle salt on slippery steps and sidewalks. Preventing falls in the bath  · Install grab bars and nonskid mats inside and outside your shower or tub and near the toilet and sinks. · Use shower chairs and bath benches.   · Use a hand-held shower head that will allow you to sit while showering. · Get into a tub or shower by putting the weaker leg in first. Get out of a tub or shower with your strong side first.  · Repair loose toilet seats and consider installing a raised toilet seat to make getting on and off the toilet easier. · Keep your bathroom door unlocked while you are in the shower. Where can you learn more? Go to http://joanna-linnette.info/. Enter 0476 [id number] 69 [id number] in the search box to learn more about \"Preventing Falls: Care Instructions. \"  Current as of: August 4, 2016  Content Version: 11.3  © 6579-8816 [employer]. Care instructions adapted under license by [employer] (which disclaims liability or warranty for this information). If you have questions about a medical condition or this instruction, always ask your healthcare professional. [place] [address] any warranty or liability for your use of this information. Musculoskeletal Chest Pain: Care Instructions  Your Care Instructions  Chest pain is not always a sign that something is wrong with your heart or that you have another serious problem. The doctor thinks your chest pain is caused by strained muscles or ligaments, inflamed chest cartilage, or another problem in your chest, rather than by your heart. You may need more tests to find the cause of your chest pain. Follow-up care is a key part of your treatment and safety. Be sure to make and go to all appointments, and call your doctor if you are having problems. Its also a good idea to know your test results and keep a list of the medicines you take. How can you care for yourself at home? · Take pain medicines exactly as directed. ¨ If the doctor gave you a prescription medicine for pain, take it as prescribed. ¨ If you are not taking a prescription pain medicine, ask your doctor if you can take an over-the-counter medicine. · Rest and protect the sore area.   · Stop, change, or take a break from any activity that may be causing your pain or soreness. · Put ice or a cold pack on the sore area for 10 to 20 minutes at a time. Try to do this every 1 to 2 hours for the next 3 days (when you are awake) or until the swelling goes down. Put a thin cloth between the ice and your skin. · After 2 or 3 days, apply a heating pad set on low or a warm cloth to the area that hurts. Some doctors suggest that you go back and forth between hot and cold. · Do not wrap or tape your ribs for support. This may cause you to take smaller breaths, which could increase your risk of lung problems. · Mentholated creams such as Bengay or Icy Hot may soothe sore muscles. Follow the instructions on the package. · Follow your doctor's instructions for exercising. · Gentle stretching and massage may help you get better faster. Stretch slowly to the point just before pain begins, and hold the stretch for at least 15 to 30 seconds. Do this 3 or 4 times a day. Stretch just after you have applied heat. · As your pain gets better, slowly return to your normal activities. Any increased pain may be a sign that you need to rest a while longer. When should you call for help? Call 911 anytime you think you may need emergency care. For example, call if:  · You have chest pain or pressure. This may occur with:  ¨ Sweating. ¨ Shortness of breath. ¨ Nausea or vomiting. ¨ Pain that spreads from the chest to the neck, jaw, or one or both shoulders or arms. ¨ Dizziness or lightheadedness. ¨ A fast or uneven pulse. After calling 911, chew 1 adult-strength aspirin. Wait for an ambulance. Do not try to drive yourself. · You have sudden chest pain and shortness of breath, or you cough up blood. Call your doctor now or seek immediate medical care if:  · You have any trouble breathing. · Your chest pain gets worse.   · Your chest pain occurs consistently with exercise and is relieved by rest.  Watch closely for changes in your health, and be sure to contact your doctor if:  · Your chest pain does not get better after 1 week. Where can you learn more? Go to http://joanna-linnette.info/. Enter V293 in the search box to learn more about \"Musculoskeletal Chest Pain: Care Instructions. \"  Current as of: March 20, 2017  Content Version: 11.3  © 8025-4682 [employer]. Care instructions adapted under license by [employer] (which disclaims liability or warranty for this information). If you have questions about a medical condition or this instruction, always ask your healthcare professional. [place] [address] any warranty or liability for your use of this information.

## 2017-08-06 NOTE — ED PROVIDER NOTES
HPI Comments: Candida Lomax is a 64 y.o. female with PMhx significant for T2 DM, COPD, fibromyalgia, asthma who presents ambulatory to the ED with cc of left sided rib pain s/p GLF that occurred 2 days ago. Pt states that she was outside smoking when she slipped and fell onto her right side. She states since then she has experienced constant left sided rib pain that is worse with movement, alleviated with rest. She denies any head injury or LOC. She denies any N/V, fever, or further symptoms. Social Hx: + Tobacco (1ppd), - EtOH, - Illicit Drugs    PCP: LANCE PA, NP    There are no other complaints, changes or physical findings at this time. The history is provided by the patient. No  was used.         Past Medical History:   Diagnosis Date    Asthma     Back pain     COPD (chronic obstructive pulmonary disease) (HCC)     Diabetes (HCC)     Esophageal varices in cirrhosis ([facility] [place] [address]     6/2014 banding x 2    Fibromyalgia     Gastrointestinal disorder     GERD (gastroesophageal reflux disease)     Hypercholesteremia     Liver cirrhosis secondary to BALDERAS (HCC)     Liver disease     Other and unspecified hyperlipidemia     Restless leg syndrome     Type II or unspecified type diabetes mellitus without mention of complication, uncontrolled     Unspecified essential hypertension        Past Surgical History:   Procedure Laterality Date    HX BACK SURGERY      HX CARPAL TUNNEL RELEASE      on right    HX HYSTERECTOMY      plus 1/2 of an ovary removed    CT COLSC FLX W/RMVL OF TUMOR POLYP LESION SNARE TQ  5/30/2013         UPPER GI ENDOSCOPY,LIGAT VARIX  2/6/2015              Family History:   Problem Relation Age of Onset    Diabetes Mother     Stroke Sister     Diabetes Paternal Aunt     Diabetes Paternal Uncle     Heart Disease Neg Hx        Social History     Social History    Marital status:      Spouse name: N/A    Number of children: N/A    Years of education: N/A     Occupational History    Not on file. Social History Main Topics    Smoking status: Current Every Day Smoker     Packs/day: 1.00     Years: 40.00    Smokeless tobacco: Never Used    Alcohol use Yes      Comment: rare    Drug use: No    Sexual activity: Not on file     Other Topics Concern    Not on file     Social History Narrative    Lives in [facility] with . Has 2 daughters and 1 granddaughter who she cares for. On disability for her back. Used to work as a  and . Used to bowl and fish. ALLERGIES: Hydrocodone; Lisinopril; Lortab [hydrocodone-acetaminophen]; and Percocet [oxycodone-acetaminophen]    Review of Systems   Constitutional: Negative. Negative for fever. HENT: Negative. Eyes: Negative. Respiratory: Negative. Cardiovascular: Negative. Gastrointestinal: Negative. Negative for nausea and vomiting. Genitourinary: Negative. Musculoskeletal:        Positive for left sided rib pain. Skin: Negative. Neurological: Negative. Hematological: Negative. Psychiatric/Behavioral: Negative. All other systems reviewed and are negative. Vitals:    08/06/17 1413 08/06/17 1626   BP: 154/70    Pulse: (!) 110 100   Resp: 20 20   Temp: 97.9 °F (36.6 °C)    SpO2: 97% 98%   Weight: 95.3 kg (210 lb)    Height: 5' 4\" (1.626 m)             Physical Exam   Constitutional: She is oriented to person, place, and time. She appears well-developed and well-nourished. No distress. HENT:   Head: Normocephalic and atraumatic. Right Ear: External ear normal.   Left Ear: External ear normal.   Nose: Nose normal.   Mouth/Throat: Oropharynx is clear and moist. No oropharyngeal exudate. Eyes: Conjunctivae and EOM are normal. Pupils are equal, round, and reactive to light. Right eye exhibits no discharge. Left eye exhibits no discharge. No scleral icterus. Neck: Normal range of motion. Neck supple. No JVD present.  No tracheal deviation present. Cardiovascular: Normal rate, regular rhythm, normal heart sounds and intact distal pulses. Exam reveals no gallop and no friction rub. No murmur heard. Pulmonary/Chest: Effort normal and breath sounds normal. No respiratory distress. She has no wheezes. She has no rales. She exhibits no tenderness. Abdominal: Soft. Bowel sounds are normal. She exhibits no distension and no mass. There is no tenderness. There is no rebound and no guarding. Musculoskeletal: Normal range of motion. She exhibits no edema. There is bruising and tenderness along the left anterolateral ribs and abdominal wall. Lymphadenopathy:     She has no cervical adenopathy. Neurological: She is alert and oriented to person, place, and time. She has normal reflexes. No cranial nerve deficit. She exhibits normal muscle tone. Coordination normal.   Skin: Skin is warm and dry. She is not diaphoretic. Psychiatric: She has a normal mood and affect. Her behavior is normal. Judgment and thought content normal.   Nursing note and vitals reviewed. MDM  Number of Diagnoses or Management Options  Diagnosis management comments: DDx: Sprain, strain, fracture, pneumothorax       Amount and/or Complexity of Data Reviewed  Tests in the radiology section of CPT®: ordered and reviewed  Review and summarize past medical records: yes    Patient Progress  Patient progress: stable    ED Course       Procedures      IMAGING RESULTS:  XR RIBS LT W PA CXR MIN 3 V   Final Result   EXAM:  XR RIBS LT W PA CXR MIN 3 V     INDICATION:   Pain     COMPARISON: None.     FINDINGS: A frontal radiograph of the chest and 3 oblique views of the left ribs  demonstrate no fracture. There is no pneumothorax or pleural effusion. The heart  is normal size.     IMPRESSION  IMPRESSION:  No rib fracture identified.        MEDICATIONS GIVEN:  Medications   diazePAM (VALIUM) tablet 5 mg (5 mg Oral Given 8/6/17 9264)   HYDROmorphone (PF) (DILAUDID) injection 1 mg (1 mg IntraMUSCular Given 8/6/17 1615)   ondansetron (ZOFRAN ODT) tablet 4 mg (4 mg Oral Given 8/6/17 1615)       IMPRESSION:  1. Fall, initial encounter    2. Chest wall pain        PLAN:  1. Current Discharge Medication List      START taking these medications    Details   !! HYDROmorphone (DILAUDID) 2 mg tablet Take 1 Tab by mouth every six (6) hours as needed for Pain. Max Daily Amount: 8 mg. Qty: 10 Tab, Refills: 0       !! - Potential duplicate medications found. Please discuss with provider. CONTINUE these medications which have NOT CHANGED    Details   !! HYDROmorphone (DILAUDID) 2 mg tablet Take 1 Tab by mouth every four (4) hours as needed for Pain. Max Daily Amount: 12 mg.  Qty: 10 Tab, Refills: 0       !! - Potential duplicate medications found. Please discuss with provider. 2.   Follow-up Information     Follow up With Details Comments Contact Info    Eliazar Starr NP In 2 days As needed [place] [phone]          Return to ED if worse     Discharge Note:  4:20 PM  The patient has been re-evaluated and is ready for discharge. Reviewed available results with patient. Counseled patient on diagnosis and care plan. Patient has expressed understanding, and all questions have been answered. Patient agrees with plan and agrees to follow up as recommended, or return to the ED if their symptoms worsen. Discharge instructions have been provided and explained to the patient, along with reasons to return to the ED. Attestation: This note is prepared by Bernarda Ingram, acting as Scribe for [employer]. JANEL Teresa: The scribe's documentation has been prepared under my direction and personally reviewed by me in its entirety. I confirm that the note above accurately reflects all work, treatment, procedures, and medical decision making performed by me.

## 2017-08-17 RX ORDER — METFORMIN HYDROCHLORIDE 1000 MG/1
TABLET ORAL
Qty: 180 TAB | Refills: 3 | Status: SHIPPED | OUTPATIENT
Start: 2017-08-17 | End: 2018-01-01 | Stop reason: SDUPTHER

## 2017-08-21 RX ORDER — SODIUM CHLORIDE 9 MG/ML
25 INJECTION, SOLUTION INTRAVENOUS CONTINUOUS
Status: DISPENSED | OUTPATIENT
Start: 2017-08-24 | End: 2017-08-25

## 2017-08-24 ENCOUNTER — HOSPITAL ENCOUNTER (OUTPATIENT)
Dept: INFUSION THERAPY | Age: 62
Discharge: HOME | End: 2017-08-24
Payer: MEDICARE

## 2017-08-24 VITALS
TEMPERATURE: 96.9 F | HEART RATE: 95 BPM | RESPIRATION RATE: 18 BRPM | OXYGEN SATURATION: 95 % | DIASTOLIC BLOOD PRESSURE: 70 MMHG | SYSTOLIC BLOOD PRESSURE: 139 MMHG

## 2017-08-24 PROCEDURE — 96365 THER/PROPH/DIAG IV INF INIT: CPT

## 2017-08-24 PROCEDURE — 74011250636 HC RX REV CODE- 250/636: Performed by: PHYSICIAN ASSISTANT

## 2017-08-24 PROCEDURE — 96374 THER/PROPH/DIAG INJ IV PUSH: CPT

## 2017-08-24 RX ORDER — SODIUM CHLORIDE 9 MG/ML
25 INJECTION, SOLUTION INTRAVENOUS AS NEEDED
Status: DISPENSED | OUTPATIENT
Start: 2017-08-24 | End: 2017-08-25

## 2017-08-24 RX ORDER — SODIUM CHLORIDE 0.9 % (FLUSH) 0.9 %
5-10 SYRINGE (ML) INJECTION AS NEEDED
Status: ACTIVE | OUTPATIENT
Start: 2017-08-24 | End: 2017-08-25

## 2017-08-24 RX ADMIN — Medication 10 ML: at 11:20

## 2017-08-24 RX ADMIN — SODIUM CHLORIDE 25 ML/HR: 900 INJECTION, SOLUTION INTRAVENOUS at 11:23

## 2017-08-24 RX ADMIN — FERRIC CARBOXYMALTOSE INJECTION 750 MG: 50 INJECTION, SOLUTION INTRAVENOUS at 11:32

## 2017-08-24 NOTE — PROGRESS NOTES
1110 Pt admit to [facility] for Day 1 of 2 Injectafer ambulatory in stable condition. Accompanied by supportive daughter, Francisco Marquez. Assessment completed, continues to have anemia with ice cravings. Continues to have back pain which she feels has been improved by her new bed. . No new concerns voiced. Peripheral IV access established with positive blood return. Normal Saline at [facility]. Patient education given on Iron. Visit Vitals    /72 (BP 1 Location: Right arm, BP Patient Position: Sitting)    Pulse (!) 101    Temp 98.5 °F (36.9 °C)    Resp 18    SpO2 98%       Medications:  Normal Saline  Injectefer over 15 minutes    1225 Pt tolerated treatment well, monitored X 30 minutes post infusion. . Peripheral IV access removed at discharge. D/c home ambulatory in no distress. Pt aware of next appointment scheduled for Injectafer #2 8/31/17.

## 2017-08-30 RX ORDER — SODIUM CHLORIDE 0.9 % (FLUSH) 0.9 %
5-10 SYRINGE (ML) INJECTION AS NEEDED
Status: CANCELLED | OUTPATIENT
Start: 2017-08-31 | End: 2017-08-31

## 2017-08-30 RX ORDER — SODIUM CHLORIDE 9 MG/ML
25 INJECTION, SOLUTION INTRAVENOUS AS NEEDED
Status: CANCELLED | OUTPATIENT
Start: 2017-08-31 | End: 2017-08-31

## 2017-08-31 ENCOUNTER — HOSPITAL ENCOUNTER (OUTPATIENT)
Dept: INFUSION THERAPY | Age: 62
Discharge: HOME | End: 2017-08-31
Payer: MEDICARE

## 2017-08-31 VITALS — SYSTOLIC BLOOD PRESSURE: 155 MMHG | DIASTOLIC BLOOD PRESSURE: 78 MMHG | HEART RATE: 103 BPM

## 2017-08-31 PROCEDURE — 96374 THER/PROPH/DIAG INJ IV PUSH: CPT

## 2017-08-31 PROCEDURE — 96365 THER/PROPH/DIAG IV INF INIT: CPT

## 2017-08-31 PROCEDURE — 74011250636 HC RX REV CODE- 250/636: Performed by: PHYSICIAN ASSISTANT

## 2017-08-31 RX ADMIN — FERRIC CARBOXYMALTOSE INJECTION 750 MG: 50 INJECTION, SOLUTION INTRAVENOUS at 15:50

## 2017-08-31 NOTE — PROGRESS NOTES
0 Pt admit to [facility] for Day 2 of 2 Injectafer ambulatory in stable condition. Accompanied by supportive daughter, Emi Fink. Assessment completed, continues to have anemia with ice cravings. No new concerns voiced. Peripheral IV access established with positive blood return. Normal Saline at [facility]. Patient education given on Iron. Visit Vitals    /78    Pulse (!) 103       Medications:  Normal Saline  Injectefer over 15 minutes    1645 Pt tolerated treatment well, monitored X 30 minutes post infusion. . Peripheral IV access removed at discharge. D/c home ambulatory in no distress.  Pt to follow up with MD.

## 2017-09-12 DIAGNOSIS — Z00.6 EXAMINATION OF PARTICIPANT IN CLINICAL TRIAL: Primary | ICD-10-CM

## 2017-09-25 RX ORDER — INSULIN LISPRO 100 [IU]/ML
INJECTION, SOLUTION INTRAVENOUS; SUBCUTANEOUS
Qty: 45 ML | Refills: 11 | Status: SHIPPED | OUTPATIENT
Start: 2017-09-25 | End: 2017-10-06 | Stop reason: SDUPTHER

## 2017-10-02 ENCOUNTER — HOSPITAL ENCOUNTER (OUTPATIENT)
Dept: INTERVENTIONAL RADIOLOGY/VASCULAR | Age: 62
Discharge: HOME | End: 2017-10-02
Attending: INTERNAL MEDICINE | Admitting: INTERNAL MEDICINE

## 2017-10-02 VITALS
TEMPERATURE: 97.9 F | WEIGHT: 210 LBS | BODY MASS INDEX: 35.85 KG/M2 | HEIGHT: 64 IN | RESPIRATION RATE: 20 BRPM | HEART RATE: 102 BPM | OXYGEN SATURATION: 96 % | DIASTOLIC BLOOD PRESSURE: 77 MMHG | SYSTOLIC BLOOD PRESSURE: 166 MMHG

## 2017-10-02 DIAGNOSIS — Z00.6 EXAMINATION OF PARTICIPANT IN CLINICAL TRIAL: ICD-10-CM

## 2017-10-02 LAB
GLUCOSE BLD STRIP.AUTO-MCNC: 226 MG/DL (ref 65–100)
SERVICE CMNT-IMP: ABNORMAL

## 2017-10-02 PROCEDURE — 74011000250 HC RX REV CODE- 250: Performed by: RADIOLOGY

## 2017-10-02 PROCEDURE — C1894 INTRO/SHEATH, NON-LASER: HCPCS

## 2017-10-02 PROCEDURE — C1887 CATHETER, GUIDING: HCPCS

## 2017-10-02 PROCEDURE — 82962 GLUCOSE BLOOD TEST: CPT

## 2017-10-02 PROCEDURE — C2628 CATHETER, OCCLUSION: HCPCS

## 2017-10-02 PROCEDURE — 74011250636 HC RX REV CODE- 250/636: Performed by: RADIOLOGY

## 2017-10-02 PROCEDURE — 74011636320 HC RX REV CODE- 636/320: Performed by: RADIOLOGY

## 2017-10-02 PROCEDURE — C1892 INTRO/SHEATH,FIXED,PEEL-AWAY: HCPCS

## 2017-10-02 PROCEDURE — 75889 VEIN X-RAY LIVER W/HEMODYNAM: CPT

## 2017-10-02 PROCEDURE — 77030014021 HC SYR ANGI FIX LOK MRTM -A

## 2017-10-02 PROCEDURE — C1769 GUIDE WIRE: HCPCS

## 2017-10-02 RX ORDER — SODIUM CHLORIDE 9 MG/ML
25 INJECTION, SOLUTION INTRAVENOUS CONTINUOUS
Status: DISCONTINUED | OUTPATIENT
Start: 2017-10-02 | End: 2017-10-02 | Stop reason: HOSPADM

## 2017-10-02 RX ORDER — FLUMAZENIL 0.1 MG/ML
0.5 INJECTION INTRAVENOUS ONCE
Status: DISCONTINUED | OUTPATIENT
Start: 2017-10-02 | End: 2017-10-02 | Stop reason: HOSPADM

## 2017-10-02 RX ORDER — NALOXONE HYDROCHLORIDE 0.4 MG/ML
0.4 INJECTION, SOLUTION INTRAMUSCULAR; INTRAVENOUS; SUBCUTANEOUS AS NEEDED
Status: DISCONTINUED | OUTPATIENT
Start: 2017-10-02 | End: 2017-10-02 | Stop reason: HOSPADM

## 2017-10-02 RX ORDER — HYDROMORPHONE HYDROCHLORIDE 1 MG/ML
2 INJECTION, SOLUTION INTRAMUSCULAR; INTRAVENOUS; SUBCUTANEOUS
Status: DISCONTINUED | OUTPATIENT
Start: 2017-10-02 | End: 2017-10-02 | Stop reason: HOSPADM

## 2017-10-02 RX ORDER — LIDOCAINE HYDROCHLORIDE 20 MG/ML
20 INJECTION, SOLUTION INFILTRATION; PERINEURAL
Status: COMPLETED | OUTPATIENT
Start: 2017-10-02 | End: 2017-10-02

## 2017-10-02 RX ORDER — FENTANYL CITRATE 50 UG/ML
100 INJECTION, SOLUTION INTRAMUSCULAR; INTRAVENOUS
Status: DISCONTINUED | OUTPATIENT
Start: 2017-10-02 | End: 2017-10-02 | Stop reason: HOSPADM

## 2017-10-02 RX ORDER — MIDAZOLAM HYDROCHLORIDE 1 MG/ML
5 INJECTION, SOLUTION INTRAMUSCULAR; INTRAVENOUS
Status: DISCONTINUED | OUTPATIENT
Start: 2017-10-02 | End: 2017-10-02 | Stop reason: HOSPADM

## 2017-10-02 RX ADMIN — MIDAZOLAM HYDROCHLORIDE 1 MG: 1 INJECTION, SOLUTION INTRAMUSCULAR; INTRAVENOUS at 11:22

## 2017-10-02 RX ADMIN — HYDROMORPHONE HYDROCHLORIDE 1 MG: 1 INJECTION, SOLUTION INTRAMUSCULAR; INTRAVENOUS; SUBCUTANEOUS at 11:19

## 2017-10-02 RX ADMIN — HYDROMORPHONE HYDROCHLORIDE 0.5 MG: 1 INJECTION, SOLUTION INTRAMUSCULAR; INTRAVENOUS; SUBCUTANEOUS at 11:41

## 2017-10-02 RX ADMIN — MIDAZOLAM HYDROCHLORIDE 1 MG: 1 INJECTION, SOLUTION INTRAMUSCULAR; INTRAVENOUS at 11:18

## 2017-10-02 RX ADMIN — LIDOCAINE HYDROCHLORIDE 4 ML: 20 INJECTION, SOLUTION INFILTRATION; PERINEURAL at 11:25

## 2017-10-02 RX ADMIN — IOPAMIDOL 13 ML: 612 INJECTION, SOLUTION INTRAVENOUS at 11:45

## 2017-10-02 RX ADMIN — HYDROMORPHONE HYDROCHLORIDE 0.5 MG: 1 INJECTION, SOLUTION INTRAMUSCULAR; INTRAVENOUS; SUBCUTANEOUS at 11:48

## 2017-10-02 RX ADMIN — MIDAZOLAM HYDROCHLORIDE 0.5 MG: 1 INJECTION, SOLUTION INTRAMUSCULAR; INTRAVENOUS at 11:27

## 2017-10-02 NOTE — DISCHARGE INSTRUCTIONS
Home Care Instructions: You can resume your regular diet. Do not shower, bathe, swim, drink alcohol, or make any important legal decisions in the next 48 hours. Do not lift anything heavier than a gallon of milk for 5 days. If you take Glucophage (metformin) for diabetes, do not take it for the next 48 hours. If you were asked to hold any blood thinners prior to the procedure, you may restart that medicine the day after the procedure is completed. Recline your car seat for the ride home. You may remove neck bandage tomorrow and let warm soap and water wash over puncture site; replace with bandaid. Keep replacing site with bandaid for at least 3 days until site is covered. Call If:   You should call your Physician and/or the Radiology Nurse if you have any signs of infection; fever, drainage, redness, and/or swelling. If the puncture site should ooze, please call. Also call if you have any pain, decreased sensation, numbness, tingling, swelling, or change in color to the affected extremity. SEEK IMMEDIATE MEDICAL CARE IF YOUR PUNCTURE SITE STARTS TO BLEED. APPLY ENOUGH FIRM PRESSURE TO THE SITE WITH THE TIPS OF YOUR FINGERS TO STOP THE BLEEDING. Follow-Up Instructions:  Please see your ordering doctor as he/she has requested. To Reach Us: Side effects of sedation medications and other medications used today have been reviewed. Notify us of nausea, itching, hives, dizziness, or anything else out of the ordinary. Should you experience any of these significant changes, please call [phone] between the hours of 7:30 am and 10 pm or [phone] after hours.  After hours, ask the  to page the [address] Technologist, and describe the problem to the technologist.         Patient Signature:  Date: 10/2/2017  Discharging Nurse: Jamir Schumacher RN

## 2017-10-02 NOTE — IP AVS SNAPSHOT
[address] 
[phone] Patient: Parth Shelton MRN: [id number] Current Discharge Medication List  
  
ASK your doctor about these medications Dose & Instructions Dispensing Information Comments Morning Noon Evening Bedtime ADVAIR DISKUS 500-50 mcg/dose diskus inhaler Generic drug:  fluticasone-salmeterol Your last dose was: Your next dose is:    
   
   
 Dose:  1 Puff Take 1 Puff by inhalation two (2) times a day. Refills:  0  
     
   
   
   
  
 albuterol-ipratropium 2.5 mg-0.5 mg/3 ml Nebu Commonly known as:  Alexia Keenan Your last dose was: Your next dose is:    
   
   
 Dose:  3 mL  
3 mL by Nebulization route every four (4) hours as needed. Quantity:  30 Nebule Refills:  0  
     
   
   
   
  
 amitriptyline 150 mg tablet Commonly known as:  ELAVIL Your last dose was: Your next dose is:    
   
   
 Dose:  150 mg Take 150 mg by mouth nightly. Refills:  0  
     
   
   
   
  
 CONSTULOSE 10 gram/15 mL solution Generic drug:  lactulose Your last dose was: Your next dose is:    
   
   
 take 2 tablespoonfuls by mouth twice a day Quantity:  1000 mL Refills:  5  
     
   
   
   
  
 ferrous sulfate 325 mg (65 mg iron) tablet Your last dose was: Your next dose is:    
   
   
 Dose:  325 mg Take 1 Tab by mouth daily (with breakfast). Quantity:  30 Tab Refills:  5  
     
   
   
   
  
 furosemide 20 mg tablet Commonly known as:  LASIX Your last dose was: Your next dose is:    
   
   
 take 1 tablet by mouth once daily Quantity:  30 Tab Refills:  5  
     
   
   
   
  
 gabapentin 600 mg tablet Commonly known as:  NEURONTIN Your last dose was: Your next dose is:    
   
   
 take 2 tablets by mouth three times a day (NEW HIGHER DOSE) Quantity:  180 Tab Refills:  11 New higher dose replaces prior script on file  
    
   
   
   
  
 guaiFENesin  mg ER tablet Commonly known as:  [employer] Your last dose was: Your next dose is:    
   
   
 Dose:  600 mg Take 1 Tab by mouth two (2) times a day. Quantity:  14 Tab Refills:  0 HumaLOG KwikPen 100 unit/mL kwikpen Generic drug:  insulin lispro Your last dose was: Your next dose is:    
   
   
 inject 35 units with meals (+5 UNITS FOR EVERY 50 MG/DL ABOVE 150 MG/DL)  UNITS PER DAY Quantity:  45 mL Refills:  11  
     
   
   
   
  
 * HYDROmorphone 2 mg tablet Commonly known as:  DILAUDID Your last dose was: Your next dose is:    
   
   
 Dose:  2 mg Take 1 Tab by mouth every four (4) hours as needed for Pain. Max Daily Amount: 12 mg. Quantity:  10 Tab Refills:  0  
     
   
   
   
  
 * HYDROmorphone 2 mg tablet Commonly known as:  DILAUDID Your last dose was: Your next dose is:    
   
   
 Dose:  2 mg Take 1 Tab by mouth every six (6) hours as needed for Pain. Max Daily Amount: 8 mg. Quantity:  10 Tab Refills:  0  
     
   
   
   
  
 insulin glargine 100 unit/mL (3 mL) Inpn Commonly known as:  LANTUS SOLOSTAR Your last dose was: Your next dose is:    
   
   
 Inject 120 units every morning as 2 separate shots of 60 units back to back--Dose change 11/18/16--updated med list--did not send prescription to the pharmacy Quantity:  45 mL Refills:  11  
     
   
   
   
  
 levoFLOXacin 750 mg tablet Commonly known as:  Maude [place] Your last dose was: Your next dose is:    
   
   
 Dose:  750 mg Take 1 Tab by mouth every twenty-four (24) hours. Quantity:  6 Tab Refills:  0  
     
   
   
   
  
 losartan 25 mg tablet Commonly known as:  COZAAR Your last dose was:     
   
Your next dose is:    
   
   
 Dose:  25 mg  
 Take 1 Tab by mouth daily. Replaces lisinopril for blood pressure and kidney protection Quantity:  30 Tab Refills:  11  
     
   
   
   
  
 metFORMIN 1,000 mg tablet Commonly known as:  GLUCOPHAGE Your last dose was: Your next dose is:    
   
   
 take 1 tablet by mouth twice a day with meals Quantity:  180 Tab Refills:  3 Bambi Pen Needle 32 gauge x 5/32\" Ndle Generic drug:  Insulin Needles (Disposable) Your last dose was: Your next dose is:    
   
   
 use as directed four times a day Quantity:  100 Pen Needle Refills:  11 Nebulizer & Compressor machine Your last dose was: Your next dose is:    
   
   
 UAD Quantity:  1 Each Refills:  0  
     
   
   
   
  
 omeprazole 20 mg capsule Commonly known as:  PRILOSEC Your last dose was: Your next dose is:    
   
   
 take 1 capsule by mouth once daily Quantity:  30 Cap Refills:  5  
     
   
   
   
  
 ondansetron 4 mg disintegrating tablet Commonly known as:  ZOFRAN ODT Your last dose was: Your next dose is:    
   
   
 dissolve 1 tablet ON TONGUE every 8 hours if needed for nausea Quantity:  45 Tab Refills:  3  
     
   
   
   
  
 potassium chloride SR 10 mEq tablet Commonly known as:  KLOR-CON 10 Your last dose was: Your next dose is:    
   
   
 take 1 tablet by mouth twice a day Quantity:  60 Tab Refills:  5 PROAIR HFA 90 mcg/actuation inhaler Generic drug:  albuterol Your last dose was: Your next dose is:    
   
   
 Dose:  2 Puff Take 2 Puffs by inhalation every four (4) hours as needed. Refills:  0  
     
   
   
   
  
 propranolol 20 mg tablet Commonly known as:  INDERAL Your last dose was: Your next dose is:    
   
   
 Dose:  20 mg Take 1 Tab by mouth two (2) times a day. Quantity:  60 Tab Refills:  11  
     
   
   
   
  
 rOPINIRole 4 mg Tab TAB Commonly known as:  Baron Velez Your last dose was: Your next dose is:    
   
   
 Dose:  4 mg Take 4 mg by mouth nightly. Refills:  0  
     
   
   
   
  
 traMADol 50 mg tablet Commonly known as:  ULTRAM  
   
Your last dose was: Your next dose is: Take 1-2 tablets twice daily as needed for diabetic neuropathy pain. Dx. O26.566 Quantity:  120 Tab Refills:  5 Fax to [facility]: [phone] * Notice: This list has 2 medication(s) that are the same as other medications prescribed for you. Read the directions carefully, and ask your doctor or other care provider to review them with you.

## 2017-10-02 NOTE — H&P
Radiology History and Physical    Patient: Ria Cortes 58 y.o. female       Chief Complaint: No chief complaint on file. 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 Social History Main Topics    Smoking status: Current Every Day Smoker     Packs/day: 1.00     Years: 40.00    Smokeless tobacco: Current User    Alcohol use Yes      Comment: rare    Drug use: No    Sexual activity: Not on file     Other Topics Concern    Not on file     Social History Narrative    Lives in [facility] with . Has 2 daughters and 1 granddaughter who she cares for. On disability for her back. Used to work as a  and . Used to bowl and fish. Allergies:    Allergies   Allergen Reactions    Hydrocodone Nausea and Vomiting    Lisinopril Cough    Lortab [Hydrocodone-Acetaminophen] Nausea and Vomiting    Percocet [Oxycodone-Acetaminophen] Nausea and Vomiting       Current Medications:  Current Facility-Administered Medications Medication Dose Route Frequency    lidocaine (XYLOCAINE) 20 mg/mL (2 %) injection 400 mg  20 mL SubCUTAneous RAD ONCE    iopamidol (ISOVUE 300) 61 % contrast injection 100 mL  100 mL IntraVENous RAD ONCE    fentaNYL citrate (PF) injection 100 mcg  100 mcg IntraVENous Multiple    midazolam (VERSED) injection 5 mg  5 mg IntraVENous Multiple    flumazenil (ROMAZICON) 0.1 mg/mL injection 0.5 mg  0.5 mg IntraVENous ONCE    naloxone (NARCAN) injection 0.4 mg  0.4 mg IntraVENous PRN    0.9% sodium chloride infusion  25 mL/hr IntraVENous CONTINUOUS    HYDROmorphone (PF) (DILAUDID) injection 2 mg  2 mg IntraVENous Multiple        Physical Exam:  Blood pressure 145/55, pulse 93, temperature 97.9 °F (36.6 °C), resp. rate 20, height 5' 4\" (1.626 m), weight 95.3 kg (210 lb), SpO2 97 %. LUNG: clear to auscultation bilaterally, HEART: regular rate and rhythm      Alerts:    Hospital Problems  Date Reviewed: 3/30/2017    None          Laboratory:    No results for input(s): HGB, HCT, WBC, PLT, INR, BUN, CREA, K, CRCLT, HGBEXT, HCTEXT, PLTEXT in the last 72 hours. No lab exists for component: PTT, PT, INREXT      Plan of Care/Planned Procedure:  Risks, benefits, and alternatives reviewed with patient and she agrees to proceed with the procedure.        Ernesto Borjas MD

## 2017-10-02 NOTE — PROGRESS NOTES
Problem: Patient Education: Go to Patient Education Activity  Goal: Patient/Family Education  Outcome: Progressing Towards Goal  Reviewed pain management plan with patient and/or family to include using dilaudid as pain med of choice to manage pain. Reviewed with patient and/or family possible side effects of pain med. Reviewed sedation plan to include medicating under conscious sedation using versed and dilaudidPatient provided with discharge instructions and stated understanding. Patient provided with discharge instructions and stated understanding. IV.   Reviewed potential side effects with patient and possible interactions with patient's current meds

## 2017-10-02 NOTE — IP AVS SNAPSHOT
2700 [facility] [address] [place] [address] 
[phone] Patient: Marialuisa Reese MRN: [id number] KMF:[id number] You are allergic to the following Allergen Reactions Hydrocodone Nausea and Vomiting Lisinopril Cough Lortab (Hydrocodone-Acetaminophen) Nausea and Vomiting Percocet (Oxycodone-Acetaminophen) Nausea and Vomiting Recent Documentation Height Weight BMI OB Status Smoking Status 1.626 m 95.3 kg 36.05 kg/m2 Hysterectomy Current Every Day Smoker Emergency Contacts Name Discharge Info Relation Home Work Mobile Leonel Sprain DISCHARGE CAREGIVER [3] Spouse [3] [phone] Chirag Mahoney  Spouse [3] [phone] About your hospitalization You were admitted on:  October 2, 2017 [facility] You were discharged on:  October 2, 2017 Unit phone number:  [phone] Why you were hospitalized Your primary diagnosis was:  Not on File Providers Seen During Your Hospitalizations Provider Role Specialty Primary office phone Juan Manuel Robison MD Attending Provider Hepatology [phone] Your Primary Care Physician (PCP) Primary Care Physician Office Phone Office Fax Dayne Davila [phone] Follow-up Information None Your Appointments Wednesday October 04, 2017  9:15 AM EDT Fibroscan with ALEJANDRO Wills 75 ([address] [place] [address]) 200 [facility] Miguel Angel [phone] [address] [place] [address]  
[phone] Wednesday October 04, 2017  9:30 AM EDT RESEARCH with ALEJANDRO Roberts 75 ([address] [place] [address]) 200 [facility] Miguel Angel [phone] [address] [place] [address]  
[phone] Friday October 06, 2017  2:10 PM EDT Follow Up with Eunice Guerrier MD  
[facility] [address] [place] [address]) [facility] [address] [facility] [address] [id number] [phone] Current Discharge Medication List  
  
ASK your doctor about these medications Dose & Instructions Dispensing Information Comments Morning Noon Evening Bedtime ADVAIR DISKUS 500-50 mcg/dose diskus inhaler Generic drug:  fluticasone-salmeterol Your last dose was: Your next dose is:    
   
   
 Dose:  1 Puff Take 1 Puff by inhalation two (2) times a day. Refills:  0  
     
   
   
   
  
 albuterol-ipratropium 2.5 mg-0.5 mg/3 ml Nebu Commonly known as:  John Bence Your last dose was: Your next dose is:    
   
   
 Dose:  3 mL  
3 mL by Nebulization route every four (4) hours as needed. Quantity:  30 Nebule Refills:  0  
     
   
   
   
  
 amitriptyline 150 mg tablet Commonly known as:  ELAVIL Your last dose was: Your next dose is:    
   
   
 Dose:  150 mg Take 150 mg by mouth nightly. Refills:  0  
     
   
   
   
  
 CONSTULOSE 10 gram/15 mL solution Generic drug:  lactulose Your last dose was: Your next dose is:    
   
   
 take 2 tablespoonfuls by mouth twice a day Quantity:  1000 mL Refills:  5  
     
   
   
   
  
 ferrous sulfate 325 mg (65 mg iron) tablet Your last dose was: Your next dose is:    
   
   
 Dose:  325 mg Take 1 Tab by mouth daily (with breakfast). Quantity:  30 Tab Refills:  5  
     
   
   
   
  
 furosemide 20 mg tablet Commonly known as:  LASIX Your last dose was: Your next dose is:    
   
   
 take 1 tablet by mouth once daily Quantity:  30 Tab Refills:  5  
     
   
   
   
  
 gabapentin 600 mg tablet Commonly known as:  NEURONTIN Your last dose was: Your next dose is:    
   
   
 take 2 tablets by mouth three times a day (NEW HIGHER DOSE) Quantity:  180 Tab Refills:  11  
 New higher dose replaces prior script on file  
    
   
   
   
  
 guaiFENesin  mg ER tablet Commonly known as:  VenJuvo Your last dose was: Your next dose is:    
   
   
 Dose:  600 mg Take 1 Tab by mouth two (2) times a day. Quantity:  14 Tab Refills:  0 HumaLOG KwikPen 100 unit/mL kwikpen Generic drug:  insulin lispro Your last dose was: Your next dose is:    
   
   
 inject 35 units with meals (+5 UNITS FOR EVERY 50 MG/DL ABOVE 150 MG/DL)  UNITS PER DAY Quantity:  45 mL Refills:  11  
     
   
   
   
  
 * HYDROmorphone 2 mg tablet Commonly known as:  DILAUDID Your last dose was: Your next dose is:    
   
   
 Dose:  2 mg Take 1 Tab by mouth every four (4) hours as needed for Pain. Max Daily Amount: 12 mg. Quantity:  10 Tab Refills:  0  
     
   
   
   
  
 * HYDROmorphone 2 mg tablet Commonly known as:  DILAUDID Your last dose was: Your next dose is:    
   
   
 Dose:  2 mg Take 1 Tab by mouth every six (6) hours as needed for Pain. Max Daily Amount: 8 mg. Quantity:  10 Tab Refills:  0  
     
   
   
   
  
 insulin glargine 100 unit/mL (3 mL) Inpn Commonly known as:  LANTUS SOLOSTAR Your last dose was: Your next dose is:    
   
   
 Inject 120 units every morning as 2 separate shots of 60 units back to back--Dose change 11/18/16--updated med list--did not send prescription to the pharmacy Quantity:  45 mL Refills:  11  
     
   
   
   
  
 levoFLOXacin 750 mg tablet Commonly known as:  Rljoceline Sierrae Your last dose was: Your next dose is:    
   
   
 Dose:  750 mg Take 1 Tab by mouth every twenty-four (24) hours. Quantity:  6 Tab Refills:  0  
     
   
   
   
  
 losartan 25 mg tablet Commonly known as:  COZAAR Your last dose was:     
   
Your next dose is:    
   
   
 Dose:  25 mg  
 Take 1 Tab by mouth daily. Replaces lisinopril for blood pressure and kidney protection Quantity:  30 Tab Refills:  11  
     
   
   
   
  
 metFORMIN 1,000 mg tablet Commonly known as:  GLUCOPHAGE Your last dose was: Your next dose is:    
   
   
 take 1 tablet by mouth twice a day with meals Quantity:  180 Tab Refills:  3 Bambi Pen Needle 32 gauge x 5/32\" Ndle Generic drug:  Insulin Needles (Disposable) Your last dose was: Your next dose is:    
   
   
 use as directed four times a day Quantity:  100 Pen Needle Refills:  11 Nebulizer & Compressor machine Your last dose was: Your next dose is:    
   
   
 UAD Quantity:  1 Each Refills:  0  
     
   
   
   
  
 omeprazole 20 mg capsule Commonly known as:  PRILOSEC Your last dose was: Your next dose is:    
   
   
 take 1 capsule by mouth once daily Quantity:  30 Cap Refills:  5  
     
   
   
   
  
 ondansetron 4 mg disintegrating tablet Commonly known as:  ZOFRAN ODT Your last dose was: Your next dose is:    
   
   
 dissolve 1 tablet ON TONGUE every 8 hours if needed for nausea Quantity:  45 Tab Refills:  3  
     
   
   
   
  
 potassium chloride SR 10 mEq tablet Commonly known as:  KLOR-CON 10 Your last dose was: Your next dose is:    
   
   
 take 1 tablet by mouth twice a day Quantity:  60 Tab Refills:  5 PROAIR HFA 90 mcg/actuation inhaler Generic drug:  albuterol Your last dose was: Your next dose is:    
   
   
 Dose:  2 Puff Take 2 Puffs by inhalation every four (4) hours as needed. Refills:  0  
     
   
   
   
  
 propranolol 20 mg tablet Commonly known as:  INDERAL Your last dose was: Your next dose is:    
   
   
 Dose:  20 mg Take 1 Tab by mouth two (2) times a day. Quantity:  60 Tab Refills:  11  
     
   
   
   
  
 rOPINIRole 4 mg Tab TAB Commonly known as:  Boneta Capes Your last dose was: Your next dose is:    
   
   
 Dose:  4 mg Take 4 mg by mouth nightly. Refills:  0  
     
   
   
   
  
 traMADol 50 mg tablet Commonly known as:  ULTRAM  
   
Your last dose was: Your next dose is: Take 1-2 tablets twice daily as needed for diabetic neuropathy pain. Dx. J14.226 Quantity:  120 Tab Refills:  5 Fax to [facility]: [phone] * Notice: This list has 2 medication(s) that are the same as other medications prescribed for you. Read the directions carefully, and ask your doctor or other care provider to review them with you. Discharge Instructions Home Care Instructions: You can resume your regular diet. Do not shower, bathe, swim, drink alcohol, or make any important legal decisions in the next 48 hours. Do not lift anything heavier than a gallon of milk for 5 days. If you take Glucophage (metformin) for diabetes, do not take it for the next 48 hours. If you were asked to hold any blood thinners prior to the procedure, you may restart that medicine the day after the procedure is completed. Recline your car seat for the ride home. You may remove neck bandage tomorrow and let warm soap and water wash over puncture site; replace with bandaid. Keep replacing site with bandaid for at least 3 days until site is covered. Call If: 
 You should call your Physician and/or the Radiology Nurse if you have any signs of infection; fever, drainage, redness, and/or swelling. If the puncture site should ooze, please call. Also call if you have any pain, decreased sensation, numbness, tingling, swelling, or change in color to the affected extremity. SEEK IMMEDIATE MEDICAL CARE IF YOUR PUNCTURE SITE STARTS TO BLEED. APPLY ENOUGH FIRM PRESSURE TO THE SITE WITH THE TIPS OF YOUR FINGERS TO STOP THE BLEEDING. Follow-Up Instructions:  Please see your ordering doctor as he/she has requested. To Reach Us: Side effects of sedation medications and other medications used today have been reviewed. Notify us of nausea, itching, hives, dizziness, or anything else out of the ordinary. Should you experience any of these significant changes, please call [phone] between the hours of 7:30 am and 10 pm or [phone] after hours. After hours, ask the  to page the [address] Technologist, and describe the problem to the technologist.  
 
 
 
Patient Signature: 
Date: 10/2/2017 Discharging Nurse: Vinnie Houston RN Discharge Orders None ACO Transitions of Care Introducing [facility] [address] offers a voluntary care coordination program to provide high quality service and care to [facility] fee-for-service beneficiaries. Maria Esther Arnold was designed to help you enhance your health and well-being through the following services: ? Transitions of Care  support for individuals who are transitioning from one care setting to another (example: Hospital to home). ? Chronic and Complex Care Coordination  support for individuals and caregivers of those with serious or chronic illnesses or with more than one chronic (ongoing) condition and those who take a number of different medications. If you meet specific medical criteria, a [facility] [address] may call you directly to coordinate your care with your primary care physician and your other care providers. For questions about the [facility] programs, please, contact your physicians office. For general questions or additional information about Accountable Care Organizations: 
Please visit www.medicare.gov/acos. html or call 1-800-MEDICARE (1-237.512.8964) TTY users should call 7-221.401.3429. Introducing [facility]! Dear Ladonna Councilman: Thank you for requesting a [employer] account. Our records indicate that you already have an active [employer] account. You can access your account anytime at [email or web address]. [email or web address]/Hear It First Did you know that you can access your hospital and ER discharge instructions at any time in [employer]? You can also review all of your test results from your hospital stay or ER visit. Additional Information If you have questions, please visit the Frequently Asked Questions section of the [employer] website at [email or web address] It [email or web address] Remember, Mobile[employer] is NOT to be used for urgent needs. For medical emergencies, dial 911. Now available from your iPhone and Android! General Information Please provide this summary of care documentation to your next provider. Patient Signature:  ____________________________________________________________ Date:  ____________________________________________________________  
  
[facility] Provider Signature:  ____________________________________________________________ Date:  ____________________________________________________________

## 2017-10-02 NOTE — ROUTINE PROCESS
Pt discharged to home with belongings and instructions via wheelchair with daughter. Right neck dsg dry and intact. Pt and daughter verbalized understanding of instructions.

## 2017-10-04 ENCOUNTER — HOSPITAL ENCOUNTER (OUTPATIENT)
Dept: NON INVASIVE DIAGNOSTICS | Age: 62
Discharge: HOME | End: 2017-10-04
Payer: MEDICARE

## 2017-10-04 DIAGNOSIS — Z00.6 EXAMINATION OF PARTICIPANT IN CLINICAL TRIAL: ICD-10-CM

## 2017-10-04 DIAGNOSIS — K74.60 CIRRHOSIS OF LIVER WITHOUT ASCITES, UNSPECIFIED HEPATIC CIRRHOSIS TYPE (HCC): ICD-10-CM

## 2017-10-04 DIAGNOSIS — K76.6 PORTAL HYPERTENSION (HCC): ICD-10-CM

## 2017-10-04 DIAGNOSIS — K75.81 NASH (NONALCOHOLIC STEATOHEPATITIS): Primary | ICD-10-CM

## 2017-10-04 LAB
ATRIAL RATE: 88 BPM
CALCULATED P AXIS, ECG09: 73 DEGREES
CALCULATED R AXIS, ECG10: 50 DEGREES
CALCULATED T AXIS, ECG11: 58 DEGREES
DIAGNOSIS, 93000: NORMAL
P-R INTERVAL, ECG05: 172 MS
Q-T INTERVAL, ECG07: 380 MS
QRS DURATION, ECG06: 86 MS
QTC CALCULATION (BEZET), ECG08: 459 MS
VENTRICULAR RATE, ECG03: 88 BPM

## 2017-10-04 PROCEDURE — 93005 ELECTROCARDIOGRAM TRACING: CPT

## 2017-10-06 ENCOUNTER — OFFICE VISIT (OUTPATIENT)
Dept: ENDOCRINOLOGY | Age: 62
End: 2017-10-06

## 2017-10-06 ENCOUNTER — TELEPHONE (OUTPATIENT)
Dept: ENDOCRINOLOGY | Age: 62
End: 2017-10-06

## 2017-10-06 VITALS
DIASTOLIC BLOOD PRESSURE: 58 MMHG | BODY MASS INDEX: 33.32 KG/M2 | SYSTOLIC BLOOD PRESSURE: 138 MMHG | WEIGHT: 195.2 LBS | HEIGHT: 64 IN | HEART RATE: 101 BPM

## 2017-10-06 DIAGNOSIS — E78.5 HYPERLIPIDEMIA LDL GOAL <100: ICD-10-CM

## 2017-10-06 DIAGNOSIS — I10 ESSENTIAL HYPERTENSION, BENIGN: ICD-10-CM

## 2017-10-06 LAB — HBA1C MFR BLD HPLC: 7 %

## 2017-10-06 RX ORDER — INSULIN LISPRO 100 [IU]/ML
INJECTION, SOLUTION INTRAVENOUS; SUBCUTANEOUS
Qty: 45 ML | Refills: 11
Start: 2017-10-06 | End: 2018-01-01 | Stop reason: SDUPTHER

## 2017-10-06 RX ORDER — INSULIN GLARGINE 100 [IU]/ML
INJECTION, SOLUTION SUBCUTANEOUS
Qty: 45 ML | Refills: 11 | Status: SHIPPED | OUTPATIENT
Start: 2017-10-06 | End: 2018-04-12 | Stop reason: SDUPTHER

## 2017-10-06 NOTE — PROGRESS NOTES
Chief Complaint   Patient presents with    Diabetes     pcp and pharmacy confirmed    Diabetic Foot Exam     due     History of Present Illness: Terri Rodríguez is a 58 y.o. female here for follow up of diabetes. Weight down 16 lbs since last visit in 11/16. States that all of her weight loss is just in the past few weeks as she continued to have leg swelling until recently when this has gone down on its own. Did go to the ER after last visit and did not have a blood clot. Was admitted in 3/17 for pneumonia and needed steroids and abx. This happened after coming back from [place] where she has been helping care for her younger sister. Has been taking lantus 120 units in the morning as 2 shots of 60 units back to back. Doesn't usually take the humalog during the day as she was finding that she was going low with taking the full dose and has not been eating as much during the day. As a result will just take a dose at bedtime if her sugar is over 150. But there are times when she drops from 200 to 80 with just the bedtime scale. May have a low sugar at night 3 times a month. Still taking metformin bid aside from holding it earlier this week for her liver test.  The tramadol has been helping her pain but she has only been taking 1 tab daily and occ 2 tabs per day as she states the pharmacy is only giving her 28 tabs at a time so I told her we would like into why this is as my prescription is written for 120 tabs for 30 days. Cough resolved with changing from lisinopril to losartan. Current Outpatient Prescriptions   Medication Sig    HUMALOG KWIKPEN 100 unit/mL kwikpen inject 35 units with meals (+5 UNITS FOR EVERY 50 MG/DL ABOVE 150 MG/DL)  UNITS PER DAY    metFORMIN (GLUCOPHAGE) 1,000 mg tablet take 1 tablet by mouth twice a day with meals    traMADol (ULTRAM) 50 mg tablet Take 1-2 tablets twice daily as needed for diabetic neuropathy pain.   Dx. N27.958    gabapentin (NEURONTIN) 600 mg tablet take 2 tablets by mouth three times a day (NEW HIGHER DOSE)    omeprazole (PRILOSEC) 20 mg capsule take 1 capsule by mouth once daily    furosemide (LASIX) 20 mg tablet take 1 tablet by mouth once daily    potassium chloride SR (KLOR-CON 10) 10 mEq tablet take 1 tablet by mouth twice a day    ondansetron (ZOFRAN ODT) 4 mg disintegrating tablet dissolve 1 tablet ON TONGUE every 8 hours if needed for nausea    ferrous sulfate 325 mg (65 mg iron) tablet Take 1 Tab by mouth daily (with breakfast).  albuterol-ipratropium (DUO-NEB) 2.5 mg-0.5 mg/3 ml nebu 3 mL by Nebulization route every four (4) hours as needed.  insulin glargine (LANTUS SOLOSTAR) 100 unit/mL (3 mL) pen Inject 120 units every morning as 2 separate shots of 60 units back to back--Dose change 11/18/16--updated med list--did not send prescription to the pharmacy    losartan (COZAAR) 25 mg tablet Take 1 Tab by mouth daily. Replaces lisinopril for blood pressure and kidney protection    Nebulizer & Compressor machine UAD    CONSTULOSE 10 gram/15 mL solution take 2 tablespoonfuls by mouth twice a day    TREVOR PEN NEEDLE 32 gauge x 5/32\" ndle use as directed four times a day    amitriptyline (ELAVIL) 150 mg tablet Take 150 mg by mouth nightly.  rOPINIRole (REQUIP) 4 mg tab TAB Take 4 mg by mouth nightly.  albuterol (PROAIR HFA) 90 mcg/actuation inhaler Take 2 Puffs by inhalation every four (4) hours as needed.  fluticasone-salmeterol (ADVAIR DISKUS) 500-50 mcg/dose diskus inhaler Take 1 Puff by inhalation two (2) times a day.  propranolol (INDERAL) 20 mg tablet Take 1 Tab by mouth two (2) times a day. No current facility-administered medications for this visit.       Allergies   Allergen Reactions    Hydrocodone Nausea and Vomiting    Lisinopril Cough    Lortab [Hydrocodone-Acetaminophen] Nausea and Vomiting    Percocet [Oxycodone-Acetaminophen] Nausea and Vomiting     Review of Systems:  - Eyes: no blurry vision or double vision  - Cardiovascular: no chest pain  - Respiratory: no shortness of breath  - Musculoskeletal: no myalgias  - Neurological: (+) numbness/tingling in extremities    Physical Examination:  Blood pressure 138/58, pulse (!) 101, height 5' 4\" (1.626 m), weight 195 lb 3.2 oz (88.5 kg). - General: pleasant, no distress, good eye contact   - Neck: no carotid bruits  - Cardiovascular: regular, normal rate, nl s1 and s2, no m/r/g,   - Respiratory: clear bilaterally  - Integumentary: no edema,   - Psychiatric: normal mood and affect         Diabetic foot exam performed by Bashir Campos MD     Measurement  Response Nurse Comment Physician Comment   Monofilament  R - reduced sensation with micro filament  L - reduced sensation with micro filament     Pulse DP R - 2+ (normal)  L - 2+ (normal)     Vibration R - decreased  L - decreased     Structural deformity R - None  L - None     Skin Integrity / Deformity R - Mild - callus  L - Mild - callus        Reviewed by:                 Data Reviewed: Component      Latest Ref Rng & Units 3/29/2017 3/29/2017           2:00 AM  2:00 AM   Sodium      136 - 145 mmol/L  136   Potassium      3.5 - 5.1 mmol/L  4.2   Chloride      97 - 108 mmol/L  101   CO2      21 - 32 mmol/L  27   Anion gap      5 - 15 mmol/L  8   Glucose      65 - 100 mg/dL  276 (H)   BUN      6 - 20 MG/DL  12   Creatinine      0.55 - 1.02 MG/DL  0.53 (L)   BUN/Creatinine ratio      12 - 20    23 (H)   GFR est AA      >60 ml/min/1.73m2  >60   GFR est non-AA      >60 ml/min/1.73m2  >60   Calcium      8.5 - 10.1 MG/DL  8.4 (L)   Bilirubin, total      0.2 - 1.0 MG/DL  0.9   ALT (SGPT)      12 - 78 U/L  19   AST      15 - 37 U/L  12 (L)   Alk.  phosphatase      45 - 117 U/L  91   Protein, total      6.4 - 8.2 g/dL  8.0   Albumin      3.5 - 5.0 g/dL  2.9 (L)   Globulin      2.0 - 4.0 g/dL  5.1 (H)   A-G Ratio      1.1 - 2.2    0.6 (L)   Hemoglobin A1c, (calculated)      4.2 - 6.3 % 7.5 (H)    Est. average glucose      mg/dL 169      Component      Latest Ref Rng & Units 10/6/2017           3:03 PM   Hemoglobin A1c (POC)      % 7.0       Assessment/Plan:     1. Type II or unspecified type diabetes mellitus without mention of complication, uncontrolled: her most recent Hgb A1c was 7% in 10/17 down from 7.5% in 3/17 up form 7.4% in 11/16 down from 8.7% in 6/16 stable from 1/16 up from 9.2% in 9/15 up from 6.8% in 4/15 (s/p transfusion in 2/15) down from 7.4% in 12/14 down from 8.4% in 4/14 up from 6.9% in 11/13 down from 8.9% in 7/13 stable from 4/13 down from 10.2% in November down from 10.4% in August up from 8.9% in April 2012 up from 8.4% in December up from 7.9% in June 2011. Her A1c has greatly improved taking both shot of lantus in the morning to help with compliance. Will slightly decrease her dose now that she has lost weight to avoid hypoglycemia. Will only use correction humalog at this point with a less aggressive scale. - decrease lantus to 110 units in the morning as 2 shots of 55 units back to back  - take humalog 4 units for every 50 mg/dl above 150 mg/dl  - cont metformin 1g bid  - cont gabapentin 1200 mg tid  - cont tramadol 50 mg up to 2 tabs bid  - check bs up to 4 times daily due to fluctuating sugars  - foot exam done 10/17  - microalbumin nl 4/15, up to 30.5 in 6/16--started lisinopril 10 mg daily and down to 8 in 11/16  - check A1c and cmp and microalbumin prior to next visit  - optho 8/17      2. Unspecified essential hypertension: her BP was at goal < 140/90   - cont losartan 25 mg daily  - cont propranolol 20 mg bid      3. Other and unspecified hyperlipidemia: Given DM, Goal LDL < 100, non-HDL < 130, and TG < 150. LDL 90 in December 2011 and 95 in April 2012 off simvastatin.   However, she restarted this on her own in April 2012 and LDL 62 in August and 52 in November and 40 in 8/13 so I stopped the simvastatin in 8/13 and LDL still 79 in 11/13 and 70 in 12/14 and 88 in 1/16 and 87 in 11/16 so will keep her off this. - stay off simva 20 mg daily  - check lipids prior to next visit           Patient Instructions   1) Your Hemoglobin A1c is a 3 month marker of your diabetes control. Goal is less than 7% which means your average blood sugar is less than 150. Your Hemoglobin A1c is 7% which means your diabetes is under better control than 7.4% at your last check. Continue to work on your diet and exercise and take all your medications as directed. 2) To be safe, decrease your lantus slightly to 110 units in the morning as 2 shots of 55 units back to back. 3) I think it's fine to only take humalog if your sugar is over 150 but take it based on the less aggressive scale below:  Blood sugar            Under 150  None    150-200  4 units      201-250  8 units     251-300  12 units      301-350  16 units      351-400  20 units    4) I will send you a reminder letter 3-4 weeks prior to your next visit and you will have the order already in the [facility] system so you can just go sometime in the 3-7 days before the next visit to have your labs drawn. Follow-up Disposition:  Return in about 6 months (around 4/6/2018).     Copy sent to:  Jose Rios [phone] [facility])  Dr. Ariana Solis via [facility]

## 2017-10-06 NOTE — PATIENT INSTRUCTIONS
1) Your Hemoglobin A1c is a 3 month marker of your diabetes control. Goal is less than 7% which means your average blood sugar is less than 150. Your Hemoglobin A1c is 7% which means your diabetes is under better control than 7.4% at your last check. Continue to work on your diet and exercise and take all your medications as directed. 2) To be safe, decrease your lantus slightly to 110 units in the morning as 2 shots of 55 units back to back. 3) I think it's fine to only take humalog if your sugar is over 150 but take it based on the less aggressive scale below:  Blood sugar            Under 150  None    150-200  4 units      201-250  8 units     251-300  12 units      301-350  16 units      351-400  20 units    4) I will send you a reminder letter 3-4 weeks prior to your next visit and you will have the order already in the [facility] system so you can just go sometime in the 3-7 days before the next visit to have your labs drawn.

## 2017-10-06 NOTE — MR AVS SNAPSHOT
Visit Information Date & Time Provider Department Dept. Phone Encounter #  
 10/6/2017  2:10 PM Krishna Heredia, 1024 [facility] Diabetes and Endocrinology [phone] [id number] Follow-up Instructions Return in about 6 months (around 4/6/2018). Your Appointments 11/28/2017 10:30 AM  
RESEARCH with HUMZA Nicholsoneti 75 ([facility]) Appt Note: Conatus 14 Week 32 #[id number]; Conatus 14 Week 640 [address] [place] [phone] Formerly [facility] [address]  
[phone]  
  
   
 [address] [place] [address]  
  
    
 1/23/2018 10:00 AM  
RESEARCH with ALEJANDRO Rojas 75 ([facility]) Appt Note: Conatus 14 Week 640 [address] [phone] Formerly [facility] [address]  
[phone]  
  
   
 [address] [place] [address]  
  
    
 3/20/2018 10:00 AM  
RESEARCH with ALEJANDRO Rojas 75 ([facility]) Appt Note: Conatus 14 Week 640 [address] [place] [phone] [address] [place] [address]  
[phone] Upcoming Health Maintenance Date Due  
 PAP AKA CERVICAL CYTOLOGY 8/26/1976 BREAST CANCER SCRN MAMMOGRAM 8/26/2005 ZOSTER VACCINE AGE 60> 6/26/2015 FOBT Q 1 YEAR AGE 50-75 3/1/2017 FOOT EXAM Q1 6/7/2017 INFLUENZA AGE 9 TO ADULT 8/1/2017 HEMOGLOBIN A1C Q6M 9/29/2017 MICROALBUMIN Q1 11/1/2017 LIPID PANEL Q1 11/1/2017 EYE EXAM RETINAL OR DILATED Q1 8/31/2018 DTaP/Tdap/Td series (2 - Td) 11/19/2026 Allergies as of 10/6/2017  Review Complete On: 10/6/2017 By: Krishna Heredia MD  
  
 Severity Noted Reaction Type Reactions Hydrocodone  02/05/2015   Systemic Nausea and Vomiting Lisinopril  11/10/2016    Cough Lortab [Hydrocodone-acetaminophen]  11/17/2016    Nausea and Vomiting Percocet [Oxycodone-acetaminophen]  02/05/2015   Systemic Nausea and Vomiting Current Immunizations  Reviewed on 8/31/2017 Name Date Influenza Vaccine 10/6/2014, 10/3/2013 Influenza Vaccine (Quad) PF 3/30/2017  2:39 PM  
 Pneumococcal Vaccine (Unspecified Type) 10/3/2013 Tdap 11/19/2016  2:59 PM  
  
 Not reviewed this visit You Were Diagnosed With   
  
 Codes Comments Diabetes mellitus with neurological manifestations, uncontrolled ([facility] 75.)    -  Primary ICD-10-CM: E11.49, E11.65 ICD-9-CM: 250.62 Vitals BP Pulse Height(growth percentile) Weight(growth percentile) BMI OB Status 138/58 (!) 101 5' 4\" (1.626 m) 195 lb 3.2 oz (88.5 kg) 33.51 kg/m2 Hysterectomy Smoking Status Current Every Day Smoker Vitals History BMI and BSA Data Body Mass Index Body Surface Area  
 33.51 kg/m 2 2 m 2 Preferred Pharmacy Pharmacy Name Phone [facility] [address] [phone] Your Updated Medication List  
  
   
This list is accurate as of: 10/6/17  3:33 PM.  Always use your most recent med list.  
  
  
  
  
 ADVAIR DISKUS 500-50 mcg/dose diskus inhaler Generic drug:  fluticasone-salmeterol Take 1 Puff by inhalation two (2) times a day. albuterol-ipratropium 2.5 mg-0.5 mg/3 ml Nebu Commonly known as:  DUO-NEB  
3 mL by Nebulization route every four (4) hours as needed. amitriptyline 150 mg tablet Commonly known as:  ELAVIL Take 150 mg by mouth nightly. CONSTULOSE 10 gram/15 mL solution Generic drug:  lactulose  
take 2 tablespoonfuls by mouth twice a day  
  
 ferrous sulfate 325 mg (65 mg iron) tablet Take 1 Tab by mouth daily (with breakfast). furosemide 20 mg tablet Commonly known as:  LASIX  
take 1 tablet by mouth once daily  
  
 gabapentin 600 mg tablet Commonly known as:  NEURONTIN  
take 2 tablets by mouth three times a day (NEW HIGHER DOSE) insulin glargine 100 unit/mL (3 mL) Inpn Commonly known as:  LANTUS SOLOSTAR  
 Inject 110 units every morning as 2 separate shots of 55 units back to back  
  
 insulin lispro 100 unit/mL kwikpen Commonly known as:  HumaLOG KwikPen Inject as needed up to 3 times per day for sugars over 150: 4 units for every 50 points--Dose change 10/6/17--updated med list--did not send prescription to the pharmacy  
  
 losartan 25 mg tablet Commonly known as:  COZAAR Take 1 Tab by mouth daily. Replaces lisinopril for blood pressure and kidney protection  
  
 metFORMIN 1,000 mg tablet Commonly known as:  GLUCOPHAGE  
take 1 tablet by mouth twice a day with meals Bambi Pen Needle 32 gauge x 5/32\" Ndle Generic drug:  Insulin Needles (Disposable)  
use as directed four times a day Nebulizer & Compressor machine UAD  
  
 omeprazole 20 mg capsule Commonly known as:  PRILOSEC  
take 1 capsule by mouth once daily  
  
 ondansetron 4 mg disintegrating tablet Commonly known as:  ZOFRAN ODT  
dissolve 1 tablet ON TONGUE every 8 hours if needed for nausea  
  
 potassium chloride SR 10 mEq tablet Commonly known as:  KLOR-CON 10  
take 1 tablet by mouth twice a day PROAIR HFA 90 mcg/actuation inhaler Generic drug:  albuterol Take 2 Puffs by inhalation every four (4) hours as needed. propranolol 20 mg tablet Commonly known as:  INDERAL Take 1 Tab by mouth two (2) times a day. rOPINIRole 4 mg Tab TAB Commonly known as:  Martínez Ramus Take 4 mg by mouth nightly. traMADol 50 mg tablet Commonly known as:  ULTRAM  
Take 1-2 tablets twice daily as needed for diabetic neuropathy pain. Dx. D15.870 Prescriptions Sent to Pharmacy Refills  
 insulin glargine (LANTUS SOLOSTAR) 100 unit/mL (3 mL) inpn 11 Sig: Inject 110 units every morning as 2 separate shots of 55 units back to back Class: Normal  
 Pharmacy: [facility] [place], [address]  #: [phone] We Performed the Following AMB POC HEMOGLOBIN A1C [88167 CPT(R)] Follow-up Instructions Return in about 6 months (around 4/6/2018). To-Do List   
 10/16/2017 8:30 AM  
  Appointment with [address] at [facility] ([phone]) General  NPO DIET RESTICTIONS Please be NPO (nothing by mouth) for 6- 8 hours prior to procedure. GENERAL INSTRUCTIONS 1. Bring any non [facility] facility films/reports pertaining to the area being studied with you on the day of appointment. 2. A written order with a valid diagnosis and Physicians signature is required for all scheduled tests. 3. Check in at registration 30 minutes before your appointment time unless you were instructed to do otherwise. Patient Instructions 1) Your Hemoglobin A1c is a 3 month marker of your diabetes control. Goal is less than 7% which means your average blood sugar is less than 150. Your Hemoglobin A1c is 7% which means your diabetes is under better control than 7.4% at your last check. Continue to work on your diet and exercise and take all your medications as directed. 2) To be safe, decrease your lantus slightly to 110 units in the morning as 2 shots of 55 units back to back. 3) I think it's fine to only take humalog if your sugar is over 150 but take it based on the less aggressive scale below: 
Blood sugar Under 150  None 150-200  4 units 201-250  8 units 251-300  12 units 301-350  16 units 351-400  20 units 4) I will send you a reminder letter 3-4 weeks prior to your next visit and you will have the order already in the [facility] system so you can just go sometime in the 3-7 days before the next visit to have your labs drawn. Introducing [address]! Dear Alexa Kendall: Thank you for requesting a [employer] account. Our records indicate that you already have an active [employer] account. You can access your account anytime at [email or web address] Did you know that you can access your hospital and ER discharge instructions at any time in [employer] You can also review all of your test results from your hospital stay or ER visit. Additional Information If you have questions, please visit the Frequently Asked Questions section of the [employer] website at [email or web address] Remember, [employer] is NOT to be used for urgent needs. For medical emergencies, dial 911. Now available from your iPhone and Android! Please provide this summary of care documentation to your next provider. Your primary care clinician is listed as Krysta Skinner. If you have any questions after today's visit, please call [phone].

## 2017-10-06 NOTE — TELEPHONE ENCOUNTER
Per Jayden Nunez with [facility] Ms. [employer] will only allow her 28 tablets every 7 days with zero copay. He stated that she could obtain a refill after the 7th day. This has been in effect with this insurance for the past 4-6 months due to controlled substances being observed closely.

## 2017-10-06 NOTE — TELEPHONE ENCOUNTER
Call [facility] and find out why they are only giving her 28 tabs of Tramadol at a time as I wrote for 120 tabs on my prescription 8/1/17.

## 2017-10-12 ENCOUNTER — TELEPHONE (OUTPATIENT)
Dept: ENDOCRINOLOGY | Age: 62
End: 2017-10-12

## 2017-10-12 NOTE — TELEPHONE ENCOUNTER
I spoke with Ana Jack with [facility] and she stated that [employer] is only going to allow her 56 tablets per 30 days. ( 28 tablets for 7 days) do you want to pursue a PA. She stated that insurance companies are tracking the tramadol scripts more closely.

## 2017-10-12 NOTE — TELEPHONE ENCOUNTER
Patient is requesting a call back in regards to her tramadol. She stated that she is having issues with the pharmacy. She can be reached at [phone].

## 2017-10-16 ENCOUNTER — HOSPITAL ENCOUNTER (OUTPATIENT)
Dept: ULTRASOUND IMAGING | Age: 62
Discharge: HOME | End: 2017-10-16
Attending: NURSE PRACTITIONER
Payer: MEDICARE

## 2017-10-16 DIAGNOSIS — K74.60 CIRRHOSIS OF LIVER WITHOUT ASCITES, UNSPECIFIED HEPATIC CIRRHOSIS TYPE (HCC): ICD-10-CM

## 2017-10-16 DIAGNOSIS — K75.81 NASH (NONALCOHOLIC STEATOHEPATITIS): ICD-10-CM

## 2017-10-16 DIAGNOSIS — K76.6 PORTAL HYPERTENSION (HCC): ICD-10-CM

## 2017-10-16 PROCEDURE — 76705 ECHO EXAM OF ABDOMEN: CPT

## 2017-10-19 ENCOUNTER — TELEPHONE (OUTPATIENT)
Dept: ENDOCRINOLOGY | Age: 62
End: 2017-10-19

## 2017-10-19 NOTE — TELEPHONE ENCOUNTER
Completed my portion on 10/12/17 through covermymeds but apparently the form was never processed so a new authorization was submitted today.

## 2017-11-12 RX ORDER — LOSARTAN POTASSIUM 25 MG/1
TABLET ORAL
Qty: 30 TAB | Refills: 11 | Status: SHIPPED | OUTPATIENT
Start: 2017-11-12 | End: 2018-01-01 | Stop reason: SDUPTHER

## 2017-11-28 ENCOUNTER — DOCUMENTATION ONLY (OUTPATIENT)
Dept: HEMATOLOGY | Age: 62
End: 2017-11-28

## 2017-11-28 DIAGNOSIS — K74.69 OTHER CIRRHOSIS OF LIVER (HCC): Primary | ICD-10-CM

## 2017-11-28 DIAGNOSIS — K74.60 CIRRHOSIS OF LIVER WITHOUT ASCITES, UNSPECIFIED HEPATIC CIRRHOSIS TYPE (HCC): ICD-10-CM

## 2017-11-29 LAB
AFP L3 MFR SERPL: 8.9 % (ref 0–9.9)
AFP SERPL-MCNC: 5.5 NG/ML (ref 0–8)
IRON SATN MFR SERPL: 9 % (ref 15–55)
IRON SERPL-MCNC: 31 UG/DL (ref 27–139)
TIBC SERPL-MCNC: 362 UG/DL (ref 250–450)
UIBC SERPL-MCNC: 331 UG/DL (ref 118–369)

## 2017-11-30 NOTE — PROGRESS NOTES
hgb in study labs was 12.4, continue with oral supplementation of one a day iron and follow-up per protocol. Pt advised.

## 2017-12-27 RX ORDER — LACTULOSE 10 G/15ML
SOLUTION ORAL
Qty: 1000 ML | Refills: 5 | Status: SHIPPED | OUTPATIENT
Start: 2017-12-27 | End: 2019-01-01 | Stop reason: SDUPTHER

## 2018-01-01 ENCOUNTER — HOSPITAL ENCOUNTER (OUTPATIENT)
Dept: VASCULAR SURGERY | Age: 63
Discharge: HOME | End: 2018-12-20
Attending: NURSE PRACTITIONER
Payer: MEDICARE

## 2018-01-01 ENCOUNTER — HOSPITAL ENCOUNTER (OUTPATIENT)
Dept: INFUSION THERAPY | Age: 63
Discharge: HOME | End: 2018-10-31
Payer: MEDICARE

## 2018-01-01 ENCOUNTER — DOCUMENTATION ONLY (OUTPATIENT)
Dept: HEMATOLOGY | Age: 63
End: 2018-01-01

## 2018-01-01 ENCOUNTER — OFFICE VISIT (OUTPATIENT)
Dept: HEMATOLOGY | Age: 63
End: 2018-01-01

## 2018-01-01 ENCOUNTER — TELEPHONE (OUTPATIENT)
Dept: ENDOCRINOLOGY | Age: 63
End: 2018-01-01

## 2018-01-01 ENCOUNTER — APPOINTMENT (OUTPATIENT)
Dept: MRI IMAGING | Age: 63
End: 2018-01-01
Attending: INTERNAL MEDICINE
Payer: MEDICARE

## 2018-01-01 ENCOUNTER — APPOINTMENT (OUTPATIENT)
Dept: CT IMAGING | Age: 63
End: 2018-01-01
Attending: EMERGENCY MEDICINE
Payer: MEDICARE

## 2018-01-01 ENCOUNTER — TELEPHONE (OUTPATIENT)
Dept: HEMATOLOGY | Age: 63
End: 2018-01-01

## 2018-01-01 ENCOUNTER — APPOINTMENT (OUTPATIENT)
Dept: GENERAL RADIOLOGY | Age: 63
End: 2018-01-01
Attending: EMERGENCY MEDICINE
Payer: MEDICARE

## 2018-01-01 ENCOUNTER — HOSPITAL ENCOUNTER (OUTPATIENT)
Dept: ULTRASOUND IMAGING | Age: 63
Discharge: HOME | End: 2018-10-22
Attending: NURSE PRACTITIONER
Payer: MEDICARE

## 2018-01-01 ENCOUNTER — OFFICE VISIT (OUTPATIENT)
Dept: ENDOCRINOLOGY | Age: 63
End: 2018-01-01

## 2018-01-01 ENCOUNTER — HOSPITAL ENCOUNTER (OUTPATIENT)
Dept: INFUSION THERAPY | Age: 63
Discharge: HOME | End: 2018-11-07
Payer: MEDICARE

## 2018-01-01 ENCOUNTER — HOSPITAL ENCOUNTER (OUTPATIENT)
Age: 63
Setting detail: OBSERVATION
Discharge: HOME | End: 2018-11-15
Attending: EMERGENCY MEDICINE | Admitting: INTERNAL MEDICINE
Payer: MEDICARE

## 2018-01-01 VITALS
WEIGHT: 187 LBS | DIASTOLIC BLOOD PRESSURE: 85 MMHG | OXYGEN SATURATION: 96 % | BODY MASS INDEX: 34.2 KG/M2 | SYSTOLIC BLOOD PRESSURE: 165 MMHG | HEART RATE: 90 BPM | TEMPERATURE: 97.2 F

## 2018-01-01 VITALS
HEIGHT: 62 IN | TEMPERATURE: 98.5 F | HEART RATE: 72 BPM | DIASTOLIC BLOOD PRESSURE: 57 MMHG | RESPIRATION RATE: 18 BRPM | SYSTOLIC BLOOD PRESSURE: 119 MMHG | BODY MASS INDEX: 34.36 KG/M2 | OXYGEN SATURATION: 93 % | WEIGHT: 186.73 LBS

## 2018-01-01 VITALS
BODY MASS INDEX: 36.1 KG/M2 | HEART RATE: 104 BPM | WEIGHT: 191.2 LBS | HEIGHT: 61 IN | SYSTOLIC BLOOD PRESSURE: 149 MMHG | DIASTOLIC BLOOD PRESSURE: 68 MMHG

## 2018-01-01 VITALS
DIASTOLIC BLOOD PRESSURE: 73 MMHG | TEMPERATURE: 98.7 F | RESPIRATION RATE: 18 BRPM | SYSTOLIC BLOOD PRESSURE: 164 MMHG | HEART RATE: 104 BPM

## 2018-01-01 VITALS
HEART RATE: 101 BPM | BODY MASS INDEX: 34.8 KG/M2 | SYSTOLIC BLOOD PRESSURE: 145 MMHG | DIASTOLIC BLOOD PRESSURE: 59 MMHG | WEIGHT: 184.2 LBS | OXYGEN SATURATION: 93 % | TEMPERATURE: 98.3 F

## 2018-01-01 VITALS
DIASTOLIC BLOOD PRESSURE: 70 MMHG | RESPIRATION RATE: 18 BRPM | TEMPERATURE: 98.2 F | HEART RATE: 84 BPM | SYSTOLIC BLOOD PRESSURE: 137 MMHG

## 2018-01-01 VITALS
TEMPERATURE: 98.5 F | WEIGHT: 196.8 LBS | DIASTOLIC BLOOD PRESSURE: 62 MMHG | HEIGHT: 61 IN | BODY MASS INDEX: 37.16 KG/M2 | OXYGEN SATURATION: 95 % | HEART RATE: 108 BPM | SYSTOLIC BLOOD PRESSURE: 146 MMHG

## 2018-01-01 DIAGNOSIS — E78.5 HYPERLIPIDEMIA LDL GOAL <100: ICD-10-CM

## 2018-01-01 DIAGNOSIS — K74.69 OTHER CIRRHOSIS OF LIVER (HCC): ICD-10-CM

## 2018-01-01 DIAGNOSIS — M79.661 PAIN OF RIGHT LOWER LEG: Primary | ICD-10-CM

## 2018-01-01 DIAGNOSIS — I85.10 SECONDARY ESOPHAGEAL VARICES WITHOUT BLEEDING (HCC): ICD-10-CM

## 2018-01-01 DIAGNOSIS — D50.8 OTHER IRON DEFICIENCY ANEMIA: ICD-10-CM

## 2018-01-01 DIAGNOSIS — K75.81 NASH (NONALCOHOLIC STEATOHEPATITIS): Primary | ICD-10-CM

## 2018-01-01 DIAGNOSIS — D50.8 IRON DEFICIENCY ANEMIA SECONDARY TO INADEQUATE DIETARY IRON INTAKE: ICD-10-CM

## 2018-01-01 DIAGNOSIS — G45.9 TIA (TRANSIENT ISCHEMIC ATTACK): ICD-10-CM

## 2018-01-01 DIAGNOSIS — I10 ESSENTIAL HYPERTENSION, BENIGN: ICD-10-CM

## 2018-01-01 DIAGNOSIS — K75.81 NASH (NONALCOHOLIC STEATOHEPATITIS): ICD-10-CM

## 2018-01-01 DIAGNOSIS — Z51.81 THERAPEUTIC DRUG MONITORING: ICD-10-CM

## 2018-01-01 DIAGNOSIS — K92.1 GASTROINTESTINAL HEMORRHAGE WITH MELENA: ICD-10-CM

## 2018-01-01 DIAGNOSIS — K74.69 OTHER CIRRHOSIS OF LIVER (HCC): Primary | ICD-10-CM

## 2018-01-01 DIAGNOSIS — Z72.0 TOBACCO ABUSE: ICD-10-CM

## 2018-01-01 DIAGNOSIS — G45.9 TIA (TRANSIENT ISCHEMIC ATTACK): Primary | ICD-10-CM

## 2018-01-01 DIAGNOSIS — D69.6 THROMBOCYTOPENIA (HCC): ICD-10-CM

## 2018-01-01 DIAGNOSIS — M79.661 PAIN OF RIGHT LOWER LEG: ICD-10-CM

## 2018-01-01 DIAGNOSIS — N30.00 ACUTE CYSTITIS WITHOUT HEMATURIA: ICD-10-CM

## 2018-01-01 DIAGNOSIS — D50.9 IRON DEFICIENCY ANEMIA, UNSPECIFIED IRON DEFICIENCY ANEMIA TYPE: Primary | ICD-10-CM

## 2018-01-01 DIAGNOSIS — E66.9 OBESITY (BMI 30.0-34.9): ICD-10-CM

## 2018-01-01 DIAGNOSIS — I65.23 BILATERAL CAROTID ARTERY STENOSIS: ICD-10-CM

## 2018-01-01 DIAGNOSIS — K76.6 PORTAL HYPERTENSION (HCC): ICD-10-CM

## 2018-01-01 LAB
AFP L3 MFR SERPL: 8.8 % (ref 0–9.9)
AFP L3 MFR SERPL: 9.7 % (ref 0–9.9)
AFP SERPL-MCNC: 4.4 NG/ML (ref 0–8)
AFP SERPL-MCNC: 5.7 NG/ML (ref 0–8)
ALBUMIN SERPL-MCNC: 3.2 G/DL (ref 3.5–5)
ALBUMIN SERPL-MCNC: 3.4 G/DL (ref 3.5–5)
ALBUMIN SERPL-MCNC: 3.7 G/DL (ref 3.6–4.8)
ALBUMIN SERPL-MCNC: 3.7 G/DL (ref 3.6–4.8)
ALBUMIN SERPL-MCNC: 3.9 G/DL (ref 3.6–4.8)
ALBUMIN SERPL-MCNC: 3.9 G/DL (ref 3.6–4.8)
ALBUMIN/GLOB SERPL: 0.7 {RATIO} (ref 1.1–2.2)
ALBUMIN/GLOB SERPL: 0.7 {RATIO} (ref 1.1–2.2)
ALBUMIN/GLOB SERPL: 1 {RATIO} (ref 1.2–2.2)
ALBUMIN/GLOB SERPL: 1 {RATIO} (ref 1.2–2.2)
ALBUMIN/GLOB SERPL: 1.1 {RATIO} (ref 1.2–2.2)
ALBUMIN/GLOB SERPL: 1.2 {RATIO} (ref 1.2–2.2)
ALP SERPL-CCNC: 128 U/L (ref 45–117)
ALP SERPL-CCNC: 136 IU/L (ref 39–117)
ALP SERPL-CCNC: 144 U/L (ref 45–117)
ALP SERPL-CCNC: 145 IU/L (ref 39–117)
ALP SERPL-CCNC: 166 IU/L (ref 39–117)
ALP SERPL-CCNC: 174 IU/L (ref 39–117)
ALT SERPL-CCNC: 25 IU/L (ref 0–32)
ALT SERPL-CCNC: 28 IU/L (ref 0–32)
ALT SERPL-CCNC: 29 IU/L (ref 0–32)
ALT SERPL-CCNC: 33 IU/L (ref 0–32)
ALT SERPL-CCNC: 33 U/L (ref 12–78)
ALT SERPL-CCNC: 34 U/L (ref 12–78)
AMMONIA PLAS-SCNC: 59 UMOL/L
ANION GAP SERPL CALC-SCNC: 4 MMOL/L (ref 5–15)
ANION GAP SERPL CALC-SCNC: 6 MMOL/L (ref 5–15)
APPEARANCE UR: CLEAR
AST SERPL-CCNC: 23 IU/L (ref 0–40)
AST SERPL-CCNC: 23 IU/L (ref 0–40)
AST SERPL-CCNC: 31 IU/L (ref 0–40)
AST SERPL-CCNC: 32 U/L (ref 15–37)
AST SERPL-CCNC: 35 U/L (ref 15–37)
AST SERPL-CCNC: 38 IU/L (ref 0–40)
BACTERIA SPEC CULT: ABNORMAL
BACTERIA SPEC CULT: ABNORMAL
BACTERIA SPEC CULT: NORMAL
BACTERIA SPEC CULT: NORMAL
BACTERIA URNS QL MICRO: ABNORMAL /HPF
BASOPHILS # BLD: 0 K/UL (ref 0–0.1)
BASOPHILS NFR BLD: 1 % (ref 0–1)
BILIRUB SERPL-MCNC: 0.7 MG/DL (ref 0.2–1)
BILIRUB SERPL-MCNC: 0.8 MG/DL (ref 0.2–1)
BILIRUB SERPL-MCNC: 0.8 MG/DL (ref 0–1.2)
BILIRUB SERPL-MCNC: 0.8 MG/DL (ref 0–1.2)
BILIRUB SERPL-MCNC: 0.9 MG/DL (ref 0–1.2)
BILIRUB SERPL-MCNC: 1 MG/DL (ref 0–1.2)
BILIRUB UR QL: NEGATIVE
BUN SERPL-MCNC: 10 MG/DL (ref 8–27)
BUN SERPL-MCNC: 11 MG/DL (ref 8–27)
BUN SERPL-MCNC: 8 MG/DL (ref 6–20)
BUN SERPL-MCNC: 9 MG/DL (ref 6–20)
BUN/CREAT SERPL: 17 (ref 12–20)
BUN/CREAT SERPL: 18 (ref 12–20)
BUN/CREAT SERPL: 20 (ref 12–28)
BUN/CREAT SERPL: 22 (ref 12–28)
BUN/CREAT SERPL: 24 (ref 12–28)
BUN/CREAT SERPL: 28 (ref 12–28)
CALCIUM SERPL-MCNC: 8.2 MG/DL (ref 8.5–10.1)
CALCIUM SERPL-MCNC: 8.2 MG/DL (ref 8.5–10.1)
CALCIUM SERPL-MCNC: 8.3 MG/DL (ref 8.7–10.3)
CALCIUM SERPL-MCNC: 8.7 MG/DL (ref 8.7–10.3)
CALCIUM SERPL-MCNC: 8.8 MG/DL (ref 8.7–10.3)
CALCIUM SERPL-MCNC: 8.8 MG/DL (ref 8.7–10.3)
CC UR VC: ABNORMAL
CHLORIDE SERPL-SCNC: 103 MMOL/L (ref 97–108)
CHLORIDE SERPL-SCNC: 104 MMOL/L (ref 96–106)
CHLORIDE SERPL-SCNC: 105 MMOL/L (ref 97–108)
CHLORIDE SERPL-SCNC: 96 MMOL/L (ref 96–106)
CHLORIDE SERPL-SCNC: 97 MMOL/L (ref 96–106)
CHLORIDE SERPL-SCNC: 98 MMOL/L (ref 96–106)
CHOLEST SERPL-MCNC: 143 MG/DL
CK MB CFR SERPL CALC: NORMAL % (ref 0–2.5)
CK MB SERPL-MCNC: <1 NG/ML (ref 5–25)
CK SERPL-CCNC: 50 U/L (ref 26–192)
CO2 SERPL-SCNC: 23 MMOL/L (ref 20–29)
CO2 SERPL-SCNC: 24 MMOL/L (ref 20–29)
CO2 SERPL-SCNC: 24 MMOL/L (ref 20–29)
CO2 SERPL-SCNC: 27 MMOL/L (ref 20–29)
CO2 SERPL-SCNC: 27 MMOL/L (ref 21–32)
CO2 SERPL-SCNC: 29 MMOL/L (ref 21–32)
COLOR UR: ABNORMAL
CREAT SERPL-MCNC: 0.4 MG/DL (ref 0.57–1)
CREAT SERPL-MCNC: 0.42 MG/DL (ref 0.57–1)
CREAT SERPL-MCNC: 0.47 MG/DL (ref 0.55–1.02)
CREAT SERPL-MCNC: 0.5 MG/DL (ref 0.57–1)
CREAT SERPL-MCNC: 0.51 MG/DL (ref 0.55–1.02)
CREAT SERPL-MCNC: 0.55 MG/DL (ref 0.57–1)
DIFFERENTIAL METHOD BLD: ABNORMAL
EOSINOPHIL # BLD: 0.1 K/UL (ref 0–0.4)
EOSINOPHIL NFR BLD: 1 % (ref 0–7)
EPITH CASTS URNS QL MICRO: ABNORMAL /LPF
ERYTHROCYTE [DISTWIDTH] IN BLOOD BY AUTOMATED COUNT: 15.4 % (ref 12.3–15.4)
ERYTHROCYTE [DISTWIDTH] IN BLOOD BY AUTOMATED COUNT: 16.2 % (ref 12.3–15.4)
ERYTHROCYTE [DISTWIDTH] IN BLOOD BY AUTOMATED COUNT: 16.6 % (ref 12.3–15.4)
ERYTHROCYTE [DISTWIDTH] IN BLOOD BY AUTOMATED COUNT: 18 % (ref 11.5–14.5)
ERYTHROCYTE [DISTWIDTH] IN BLOOD BY AUTOMATED COUNT: 18 % (ref 11.5–14.5)
ERYTHROCYTE [DISTWIDTH] IN BLOOD BY AUTOMATED COUNT: 18.9 % (ref 12.3–15.4)
EST. AVERAGE GLUCOSE BLD GHB EST-MCNC: 148 MG/DL
EST. AVERAGE GLUCOSE BLD GHB EST-MCNC: 154 MG/DL
FERRITIN SERPL-MCNC: 358 NG/ML (ref 15–150)
FERRITIN SERPL-MCNC: 36 NG/ML (ref 15–150)
FERRITIN SERPL-MCNC: 44 NG/ML (ref 15–150)
GLOBULIN SER CALC-MCNC: 3.1 G/DL (ref 1.5–4.5)
GLOBULIN SER CALC-MCNC: 3.5 G/DL (ref 1.5–4.5)
GLOBULIN SER CALC-MCNC: 3.7 G/DL (ref 1.5–4.5)
GLOBULIN SER CALC-MCNC: 3.8 G/DL (ref 1.5–4.5)
GLOBULIN SER CALC-MCNC: 4.3 G/DL (ref 2–4)
GLOBULIN SER CALC-MCNC: 4.7 G/DL (ref 2–4)
GLUCOSE BLD STRIP.AUTO-MCNC: 144 MG/DL (ref 65–100)
GLUCOSE BLD STRIP.AUTO-MCNC: 163 MG/DL (ref 65–100)
GLUCOSE BLD STRIP.AUTO-MCNC: 165 MG/DL (ref 65–100)
GLUCOSE BLD STRIP.AUTO-MCNC: 169 MG/DL (ref 65–100)
GLUCOSE BLD STRIP.AUTO-MCNC: 200 MG/DL (ref 65–100)
GLUCOSE BLD STRIP.AUTO-MCNC: 208 MG/DL (ref 65–100)
GLUCOSE BLD STRIP.AUTO-MCNC: 214 MG/DL (ref 65–100)
GLUCOSE SERPL-MCNC: 139 MG/DL (ref 65–99)
GLUCOSE SERPL-MCNC: 171 MG/DL (ref 65–99)
GLUCOSE SERPL-MCNC: 205 MG/DL (ref 65–100)
GLUCOSE SERPL-MCNC: 256 MG/DL (ref 65–100)
GLUCOSE SERPL-MCNC: 321 MG/DL (ref 65–99)
GLUCOSE SERPL-MCNC: 396 MG/DL (ref 65–99)
GLUCOSE UR STRIP.AUTO-MCNC: NEGATIVE MG/DL
HBA1C MFR BLD: 6.8 % (ref 4.2–6.3)
HBA1C MFR BLD: 7 % (ref 4.8–5.6)
HCT VFR BLD AUTO: 28.4 % (ref 34–46.6)
HCT VFR BLD AUTO: 32.5 % (ref 34–46.6)
HCT VFR BLD AUTO: 33.9 % (ref 35–47)
HCT VFR BLD AUTO: 36.1 % (ref 34–46.6)
HCT VFR BLD AUTO: 36.3 % (ref 34–46.6)
HCT VFR BLD AUTO: 36.9 % (ref 35–47)
HDLC SERPL-MCNC: 41 MG/DL
HDLC SERPL: 3.5 {RATIO} (ref 0–5)
HGB BLD-MCNC: 10.3 G/DL (ref 11.1–15.9)
HGB BLD-MCNC: 10.9 G/DL (ref 11.5–16)
HGB BLD-MCNC: 11.5 G/DL (ref 11.1–15.9)
HGB BLD-MCNC: 11.8 G/DL (ref 11.5–16)
HGB BLD-MCNC: 12.4 G/DL (ref 11.1–15.9)
HGB BLD-MCNC: 9.3 G/DL (ref 11.1–15.9)
HGB UR QL STRIP: NEGATIVE
HYALINE CASTS URNS QL MICRO: ABNORMAL /LPF (ref 0–5)
IMM GRANULOCYTES # BLD: 0 K/UL (ref 0–0.04)
IMM GRANULOCYTES NFR BLD AUTO: 0 % (ref 0–0.5)
INR PPP: 1.1 (ref 0.8–1.2)
INR PPP: 1.1 (ref 0.9–1.1)
IRON SATN MFR SERPL: 22 % (ref 15–55)
IRON SATN MFR SERPL: 31 % (ref 15–55)
IRON SATN MFR SERPL: 7 % (ref 15–55)
IRON SERPL-MCNC: 22 UG/DL (ref 27–139)
IRON SERPL-MCNC: 65 UG/DL (ref 27–139)
IRON SERPL-MCNC: 86 UG/DL (ref 27–139)
KETONES UR QL STRIP.AUTO: NEGATIVE MG/DL
LDLC SERPL CALC-MCNC: 77.6 MG/DL (ref 0–100)
LEUKOCYTE ESTERASE UR QL STRIP.AUTO: ABNORMAL
LIPID PROFILE,FLP: NORMAL
LYMPHOCYTES # BLD: 1.6 K/UL (ref 0.8–3.5)
LYMPHOCYTES NFR BLD: 26 % (ref 12–49)
MCH RBC QN AUTO: 28.7 PG (ref 26.6–33)
MCH RBC QN AUTO: 28.8 PG (ref 26.6–33)
MCH RBC QN AUTO: 29.3 PG (ref 26–34)
MCH RBC QN AUTO: 29.7 PG (ref 26–34)
MCH RBC QN AUTO: 30.1 PG (ref 26.6–33)
MCH RBC QN AUTO: 31.2 PG (ref 26.6–33)
MCHC RBC AUTO-ENTMCNC: 31.7 G/DL (ref 31.5–35.7)
MCHC RBC AUTO-ENTMCNC: 31.9 G/DL (ref 31.5–35.7)
MCHC RBC AUTO-ENTMCNC: 32 G/DL (ref 30–36.5)
MCHC RBC AUTO-ENTMCNC: 32.2 G/DL (ref 30–36.5)
MCHC RBC AUTO-ENTMCNC: 32.7 G/DL (ref 31.5–35.7)
MCHC RBC AUTO-ENTMCNC: 34.2 G/DL (ref 31.5–35.7)
MCV RBC AUTO: 88 FL (ref 79–97)
MCV RBC AUTO: 90 FL (ref 79–97)
MCV RBC AUTO: 91 FL (ref 79–97)
MCV RBC AUTO: 91.6 FL (ref 80–99)
MCV RBC AUTO: 92.4 FL (ref 80–99)
MCV RBC AUTO: 95 FL (ref 79–97)
MONOCYTES # BLD: 0.4 K/UL (ref 0–1)
MONOCYTES NFR BLD: 7 % (ref 5–13)
MORPHOLOGY BLD-IMP: ABNORMAL
NEUTS SEG # BLD: 3.9 K/UL (ref 1.8–8)
NEUTS SEG NFR BLD: 65 % (ref 32–75)
NITRITE UR QL STRIP.AUTO: POSITIVE
NRBC # BLD: 0 K/UL (ref 0–0.01)
NRBC # BLD: 0 K/UL (ref 0–0.01)
NRBC BLD-RTO: 0 PER 100 WBC
NRBC BLD-RTO: 0 PER 100 WBC
PH UR STRIP: 6 [PH] (ref 5–8)
PLATELET # BLD AUTO: 100 X10E3/UL (ref 150–379)
PLATELET # BLD AUTO: 67 X10E3/UL (ref 150–379)
PLATELET # BLD AUTO: 67 X10E3/UL (ref 150–379)
PLATELET # BLD AUTO: 85 K/UL (ref 150–400)
PLATELET # BLD AUTO: 95 K/UL (ref 150–400)
PLATELET # BLD AUTO: ABNORMAL X10E3/UL
PMV BLD AUTO: 11 FL (ref 8.9–12.9)
PMV BLD AUTO: 11.7 FL (ref 8.9–12.9)
POTASSIUM SERPL-SCNC: 3.6 MMOL/L (ref 3.5–5.1)
POTASSIUM SERPL-SCNC: 4.1 MMOL/L (ref 3.5–5.1)
POTASSIUM SERPL-SCNC: 4.2 MMOL/L (ref 3.5–5.2)
POTASSIUM SERPL-SCNC: 4.6 MMOL/L (ref 3.5–5.2)
PROT SERPL-MCNC: 6.8 G/DL (ref 6–8.5)
PROT SERPL-MCNC: 7.4 G/DL (ref 6–8.5)
PROT SERPL-MCNC: 7.4 G/DL (ref 6–8.5)
PROT SERPL-MCNC: 7.5 G/DL (ref 6.4–8.2)
PROT SERPL-MCNC: 7.7 G/DL (ref 6–8.5)
PROT SERPL-MCNC: 8.1 G/DL (ref 6.4–8.2)
PROT UR STRIP-MCNC: NEGATIVE MG/DL
PROTHROMBIN TIME: 11.5 SEC (ref 9.1–12)
PROTHROMBIN TIME: 11.6 SEC (ref 9–11.1)
RBC # BLD AUTO: 3.24 X10E6/UL (ref 3.77–5.28)
RBC # BLD AUTO: 3.42 X10E6/UL (ref 3.77–5.28)
RBC # BLD AUTO: 3.67 M/UL (ref 3.8–5.2)
RBC # BLD AUTO: 3.98 X10E6/UL (ref 3.77–5.28)
RBC # BLD AUTO: 4 X10E6/UL (ref 3.77–5.28)
RBC # BLD AUTO: 4.03 M/UL (ref 3.8–5.2)
RBC #/AREA URNS HPF: ABNORMAL /HPF (ref 0–5)
SERVICE CMNT-IMP: ABNORMAL
SERVICE CMNT-IMP: NORMAL
SODIUM SERPL-SCNC: 134 MMOL/L (ref 134–144)
SODIUM SERPL-SCNC: 135 MMOL/L (ref 134–144)
SODIUM SERPL-SCNC: 136 MMOL/L (ref 136–145)
SODIUM SERPL-SCNC: 138 MMOL/L (ref 134–144)
SODIUM SERPL-SCNC: 138 MMOL/L (ref 136–145)
SODIUM SERPL-SCNC: 140 MMOL/L (ref 134–144)
SP GR UR REFRACTOMETRY: 1.01 (ref 1–1.03)
TIBC SERPL-MCNC: 278 UG/DL (ref 250–450)
TIBC SERPL-MCNC: 290 UG/DL (ref 250–450)
TIBC SERPL-MCNC: 300 UG/DL (ref 250–450)
TRIGL SERPL-MCNC: 122 MG/DL (ref ?–150)
TROPONIN I SERPL-MCNC: <0.05 NG/ML
UA: UC IF INDICATED,UAUC: ABNORMAL
UIBC SERPL-MCNC: 192 UG/DL (ref 118–369)
UIBC SERPL-MCNC: 225 UG/DL (ref 118–369)
UIBC SERPL-MCNC: 278 UG/DL (ref 118–369)
UROBILINOGEN UR QL STRIP.AUTO: 2 EU/DL (ref 0.2–1)
VLDLC SERPL CALC-MCNC: 24.4 MG/DL
WBC # BLD AUTO: 5 X10E3/UL (ref 3.4–10.8)
WBC # BLD AUTO: 5.6 X10E3/UL (ref 3.4–10.8)
WBC # BLD AUTO: 6.1 K/UL (ref 3.6–11)
WBC # BLD AUTO: 6.4 K/UL (ref 3.6–11)
WBC # BLD AUTO: 7.8 X10E3/UL (ref 3.4–10.8)
WBC # BLD AUTO: 9.1 X10E3/UL (ref 3.4–10.8)
WBC URNS QL MICRO: ABNORMAL /HPF (ref 0–4)

## 2018-01-01 PROCEDURE — 74011250637 HC RX REV CODE- 250/637: Performed by: HOSPITALIST

## 2018-01-01 PROCEDURE — G8989 SELF CARE D/C STATUS: HCPCS | Performed by: OCCUPATIONAL THERAPIST

## 2018-01-01 PROCEDURE — 74011636637 HC RX REV CODE- 636/637: Performed by: INTERNAL MEDICINE

## 2018-01-01 PROCEDURE — 97165 OT EVAL LOW COMPLEX 30 MIN: CPT | Performed by: OCCUPATIONAL THERAPIST

## 2018-01-01 PROCEDURE — 80061 LIPID PANEL: CPT

## 2018-01-01 PROCEDURE — 77010033678 HC OXYGEN DAILY

## 2018-01-01 PROCEDURE — 74011250636 HC RX REV CODE- 250/636: Performed by: INTERNAL MEDICINE

## 2018-01-01 PROCEDURE — 96374 THER/PROPH/DIAG INJ IV PUSH: CPT

## 2018-01-01 PROCEDURE — G8988 SELF CARE GOAL STATUS: HCPCS | Performed by: OCCUPATIONAL THERAPIST

## 2018-01-01 PROCEDURE — 82962 GLUCOSE BLOOD TEST: CPT

## 2018-01-01 PROCEDURE — 36415 COLL VENOUS BLD VENIPUNCTURE: CPT

## 2018-01-01 PROCEDURE — 74011250636 HC RX REV CODE- 250/636: Performed by: NURSE PRACTITIONER

## 2018-01-01 PROCEDURE — 99218 HC RM OBSERVATION: CPT

## 2018-01-01 PROCEDURE — 74011250637 HC RX REV CODE- 250/637: Performed by: INTERNAL MEDICINE

## 2018-01-01 PROCEDURE — G8979 MOBILITY GOAL STATUS: HCPCS

## 2018-01-01 PROCEDURE — 84484 ASSAY OF TROPONIN QUANT: CPT

## 2018-01-01 PROCEDURE — G8980 MOBILITY D/C STATUS: HCPCS

## 2018-01-01 PROCEDURE — 74011000258 HC RX REV CODE- 258: Performed by: EMERGENCY MEDICINE

## 2018-01-01 PROCEDURE — 87086 URINE CULTURE/COLONY COUNT: CPT

## 2018-01-01 PROCEDURE — 70544 MR ANGIOGRAPHY HEAD W/O DYE: CPT

## 2018-01-01 PROCEDURE — 85610 PROTHROMBIN TIME: CPT

## 2018-01-01 PROCEDURE — 93970 EXTREMITY STUDY: CPT

## 2018-01-01 PROCEDURE — 82550 ASSAY OF CK (CPK): CPT

## 2018-01-01 PROCEDURE — 80053 COMPREHEN METABOLIC PANEL: CPT

## 2018-01-01 PROCEDURE — 93880 EXTRACRANIAL BILAT STUDY: CPT

## 2018-01-01 PROCEDURE — 96365 THER/PROPH/DIAG IV INF INIT: CPT

## 2018-01-01 PROCEDURE — 82140 ASSAY OF AMMONIA: CPT

## 2018-01-01 PROCEDURE — G8978 MOBILITY CURRENT STATUS: HCPCS

## 2018-01-01 PROCEDURE — 77030029684 HC NEB SM VOL KT MONA -A

## 2018-01-01 PROCEDURE — 71046 X-RAY EXAM CHEST 2 VIEWS: CPT

## 2018-01-01 PROCEDURE — 96375 TX/PRO/DX INJ NEW DRUG ADDON: CPT

## 2018-01-01 PROCEDURE — 87186 SC STD MICRODIL/AGAR DIL: CPT

## 2018-01-01 PROCEDURE — 85027 COMPLETE CBC AUTOMATED: CPT

## 2018-01-01 PROCEDURE — G8987 SELF CARE CURRENT STATUS: HCPCS | Performed by: OCCUPATIONAL THERAPIST

## 2018-01-01 PROCEDURE — 74011000258 HC RX REV CODE- 258: Performed by: INTERNAL MEDICINE

## 2018-01-01 PROCEDURE — 81001 URINALYSIS AUTO W/SCOPE: CPT

## 2018-01-01 PROCEDURE — 99284 EMERGENCY DEPT VISIT MOD MDM: CPT

## 2018-01-01 PROCEDURE — 83036 HEMOGLOBIN GLYCOSYLATED A1C: CPT

## 2018-01-01 PROCEDURE — 76705 ECHO EXAM OF ABDOMEN: CPT

## 2018-01-01 PROCEDURE — 70551 MRI BRAIN STEM W/O DYE: CPT

## 2018-01-01 PROCEDURE — 70450 CT HEAD/BRAIN W/O DYE: CPT

## 2018-01-01 PROCEDURE — 96366 THER/PROPH/DIAG IV INF ADDON: CPT

## 2018-01-01 PROCEDURE — 85025 COMPLETE CBC W/AUTO DIFF WBC: CPT

## 2018-01-01 PROCEDURE — 74011250636 HC RX REV CODE- 250/636: Performed by: EMERGENCY MEDICINE

## 2018-01-01 PROCEDURE — 97161 PT EVAL LOW COMPLEX 20 MIN: CPT

## 2018-01-01 PROCEDURE — 87077 CULTURE AEROBIC IDENTIFY: CPT

## 2018-01-01 RX ORDER — INSULIN LISPRO 100 [IU]/ML
INJECTION, SOLUTION INTRAVENOUS; SUBCUTANEOUS
Qty: 15 ML | Refills: 11 | Status: SHIPPED | OUTPATIENT
Start: 2018-01-01 | End: 2019-01-01

## 2018-01-01 RX ORDER — SODIUM CHLORIDE 9 MG/ML
25 INJECTION, SOLUTION INTRAVENOUS CONTINUOUS
Status: DISPENSED | OUTPATIENT
Start: 2018-01-01 | End: 2018-01-01

## 2018-01-01 RX ORDER — FLUTICASONE FUROATE AND VILANTEROL 200; 25 UG/1; UG/1
1 POWDER RESPIRATORY (INHALATION) 2 TIMES DAILY
Status: DISCONTINUED | OUTPATIENT
Start: 2018-01-01 | End: 2018-01-01 | Stop reason: HOSPADM

## 2018-01-01 RX ORDER — LOSARTAN POTASSIUM 25 MG/1
TABLET ORAL
Qty: 30 TAB | Refills: 11 | Status: SHIPPED | OUTPATIENT
Start: 2018-01-01

## 2018-01-01 RX ORDER — LOSARTAN POTASSIUM 25 MG/1
25 TABLET ORAL DAILY
Status: DISCONTINUED | OUTPATIENT
Start: 2018-01-01 | End: 2018-01-01 | Stop reason: HOSPADM

## 2018-01-01 RX ORDER — ACETAMINOPHEN 650 MG/1
650 SUPPOSITORY RECTAL
Status: DISCONTINUED | OUTPATIENT
Start: 2018-01-01 | End: 2018-01-01 | Stop reason: HOSPADM

## 2018-01-01 RX ORDER — METFORMIN HYDROCHLORIDE 1000 MG/1
TABLET ORAL
Qty: 180 TAB | Refills: 3 | Status: SHIPPED | OUTPATIENT
Start: 2018-01-01 | End: 2019-01-01

## 2018-01-01 RX ORDER — PREDNISONE 20 MG/1
TABLET ORAL
Refills: 0 | COMMUNITY
Start: 2018-01-01 | End: 2018-01-01

## 2018-01-01 RX ORDER — PEN NEEDLE, DIABETIC 32GX 5/32"
NEEDLE, DISPOSABLE MISCELLANEOUS
Qty: 100 PEN NEEDLE | Refills: 11 | Status: ON HOLD | OUTPATIENT
Start: 2018-01-01 | End: 2018-01-01

## 2018-01-01 RX ORDER — AMITRIPTYLINE HYDROCHLORIDE 50 MG/1
150 TABLET, FILM COATED ORAL
Status: DISCONTINUED | OUTPATIENT
Start: 2018-01-01 | End: 2018-01-01 | Stop reason: HOSPADM

## 2018-01-01 RX ORDER — FUROSEMIDE 20 MG/1
20 TABLET ORAL DAILY
Status: DISCONTINUED | OUTPATIENT
Start: 2018-01-01 | End: 2018-01-01 | Stop reason: HOSPADM

## 2018-01-01 RX ORDER — SODIUM CHLORIDE 0.9 % (FLUSH) 0.9 %
5-10 SYRINGE (ML) INJECTION AS NEEDED
Status: DISCONTINUED | OUTPATIENT
Start: 2018-01-01 | End: 2018-01-01 | Stop reason: HOSPADM

## 2018-01-01 RX ORDER — POTASSIUM CHLORIDE 750 MG/1
TABLET, FILM COATED, EXTENDED RELEASE ORAL
Qty: 60 TAB | Refills: 5 | Status: ON HOLD | COMMUNITY
Start: 2018-01-01 | End: 2018-01-01 | Stop reason: DRUGHIGH

## 2018-01-01 RX ORDER — ONDANSETRON 4 MG/1
TABLET, ORALLY DISINTEGRATING ORAL
Qty: 45 TAB | Refills: 3 | Status: SHIPPED | OUTPATIENT
Start: 2018-01-01

## 2018-01-01 RX ORDER — PROPRANOLOL HYDROCHLORIDE 20 MG/1
20 TABLET ORAL 2 TIMES DAILY
Qty: 60 TAB | Refills: 11 | Status: SHIPPED | OUTPATIENT
Start: 2018-01-01 | End: 2019-01-01

## 2018-01-01 RX ORDER — LANOLIN ALCOHOL/MO/W.PET/CERES
1 CREAM (GRAM) TOPICAL
Status: DISCONTINUED | OUTPATIENT
Start: 2018-01-01 | End: 2018-01-01 | Stop reason: HOSPADM

## 2018-01-01 RX ORDER — SODIUM CHLORIDE 0.9 % (FLUSH) 0.9 %
5-10 SYRINGE (ML) INJECTION EVERY 8 HOURS
Status: DISCONTINUED | OUTPATIENT
Start: 2018-01-01 | End: 2018-01-01 | Stop reason: SDUPTHER

## 2018-01-01 RX ORDER — GUAIFENESIN 100 MG/5ML
81 LIQUID (ML) ORAL DAILY
Status: DISCONTINUED | OUTPATIENT
Start: 2018-01-01 | End: 2018-01-01 | Stop reason: HOSPADM

## 2018-01-01 RX ORDER — TRAMADOL HYDROCHLORIDE 50 MG/1
150 TABLET ORAL 3 TIMES DAILY
COMMUNITY
End: 2018-01-01

## 2018-01-01 RX ORDER — AMPICILLIN 250 MG/1
250 CAPSULE ORAL
Qty: 12 CAP | Refills: 0 | Status: SHIPPED | OUTPATIENT
Start: 2018-01-01 | End: 2018-01-01 | Stop reason: SDUPTHER

## 2018-01-01 RX ORDER — AMOXICILLIN AND CLAVULANATE POTASSIUM 875; 125 MG/1; MG/1
TABLET, FILM COATED ORAL
Refills: 0 | COMMUNITY
Start: 2018-01-01 | End: 2018-01-01

## 2018-01-01 RX ORDER — TRAMADOL HYDROCHLORIDE 50 MG/1
TABLET ORAL
Qty: 120 TAB | Refills: 5 | Status: ON HOLD | OUTPATIENT
Start: 2018-01-01 | End: 2018-01-01 | Stop reason: DRUGHIGH

## 2018-01-01 RX ORDER — TRAMADOL HYDROCHLORIDE 50 MG/1
50 TABLET ORAL
Status: DISCONTINUED | OUTPATIENT
Start: 2018-01-01 | End: 2018-01-01 | Stop reason: HOSPADM

## 2018-01-01 RX ORDER — GABAPENTIN 300 MG/1
1200 CAPSULE ORAL 3 TIMES DAILY
Status: DISCONTINUED | OUTPATIENT
Start: 2018-01-01 | End: 2018-01-01 | Stop reason: HOSPADM

## 2018-01-01 RX ORDER — FLUTICASONE FUROATE AND VILANTEROL 200; 25 UG/1; UG/1
1 POWDER RESPIRATORY (INHALATION) DAILY
Status: DISCONTINUED | OUTPATIENT
Start: 2018-01-01 | End: 2018-01-01

## 2018-01-01 RX ORDER — PANTOPRAZOLE SODIUM 40 MG/1
40 TABLET, DELAYED RELEASE ORAL
Status: DISCONTINUED | OUTPATIENT
Start: 2018-01-01 | End: 2018-01-01 | Stop reason: HOSPADM

## 2018-01-01 RX ORDER — IPRATROPIUM BROMIDE AND ALBUTEROL SULFATE 2.5; .5 MG/3ML; MG/3ML
3 SOLUTION RESPIRATORY (INHALATION)
COMMUNITY
End: 2019-01-01

## 2018-01-01 RX ORDER — INSULIN GLARGINE 100 [IU]/ML
55 INJECTION, SOLUTION SUBCUTANEOUS
Status: DISCONTINUED | OUTPATIENT
Start: 2018-01-01 | End: 2018-01-01 | Stop reason: HOSPADM

## 2018-01-01 RX ORDER — DEXTROSE 50 % IN WATER (D50W) INTRAVENOUS SYRINGE
25
Status: DISCONTINUED | OUTPATIENT
Start: 2018-01-01 | End: 2018-01-01

## 2018-01-01 RX ORDER — CEPHALEXIN 250 MG/1
250 CAPSULE ORAL 3 TIMES DAILY
Qty: 9 CAP | Refills: 0 | Status: SHIPPED | OUTPATIENT
Start: 2018-01-01 | End: 2018-01-01

## 2018-01-01 RX ORDER — LANOLIN ALCOHOL/MO/W.PET/CERES
CREAM (GRAM) TOPICAL
Qty: 90 TAB | Refills: 1 | Status: SHIPPED | OUTPATIENT
Start: 2018-01-01

## 2018-01-01 RX ORDER — ONDANSETRON 4 MG/1
4 TABLET, ORALLY DISINTEGRATING ORAL
Status: DISCONTINUED | OUTPATIENT
Start: 2018-01-01 | End: 2018-01-01 | Stop reason: HOSPADM

## 2018-01-01 RX ORDER — ENOXAPARIN SODIUM 100 MG/ML
40 INJECTION SUBCUTANEOUS EVERY 24 HOURS
Status: DISCONTINUED | OUTPATIENT
Start: 2018-01-01 | End: 2018-01-01 | Stop reason: HOSPADM

## 2018-01-01 RX ORDER — INSULIN LISPRO 100 [IU]/ML
INJECTION, SOLUTION INTRAVENOUS; SUBCUTANEOUS
Status: DISCONTINUED | OUTPATIENT
Start: 2018-01-01 | End: 2018-01-01 | Stop reason: HOSPADM

## 2018-01-01 RX ORDER — PROMETHAZINE HYDROCHLORIDE AND DEXTROMETHORPHAN HYDROBROMIDE 6.25; 15 MG/5ML; MG/5ML
SYRUP ORAL
Refills: 0 | COMMUNITY
Start: 2018-01-01 | End: 2018-01-01

## 2018-01-01 RX ORDER — GUAIFENESIN 100 MG/5ML
81 LIQUID (ML) ORAL DAILY
Qty: 30 TAB | Refills: 6 | Status: SHIPPED | OUTPATIENT
Start: 2018-01-01

## 2018-01-01 RX ORDER — SYRINGE AND NEEDLE,INSULIN,1ML 31GX15/64"
SYRINGE, EMPTY DISPOSABLE MISCELLANEOUS
Qty: 100 PEN NEEDLE | Refills: 11 | Status: SHIPPED | OUTPATIENT
Start: 2018-01-01 | End: 2019-01-01

## 2018-01-01 RX ORDER — ATORVASTATIN CALCIUM 40 MG/1
40 TABLET, FILM COATED ORAL
Qty: 30 TAB | Refills: 6 | Status: SHIPPED | OUTPATIENT
Start: 2018-01-01

## 2018-01-01 RX ORDER — GABAPENTIN 600 MG/1
TABLET ORAL
Qty: 180 TAB | Refills: 11 | Status: SHIPPED | OUTPATIENT
Start: 2018-01-01 | End: 2019-01-01 | Stop reason: DRUGHIGH

## 2018-01-01 RX ORDER — PROPRANOLOL HYDROCHLORIDE 10 MG/1
20 TABLET ORAL 2 TIMES DAILY
Status: DISCONTINUED | OUTPATIENT
Start: 2018-01-01 | End: 2018-01-01 | Stop reason: HOSPADM

## 2018-01-01 RX ORDER — IPRATROPIUM BROMIDE AND ALBUTEROL SULFATE 2.5; .5 MG/3ML; MG/3ML
3 SOLUTION RESPIRATORY (INHALATION)
Status: DISCONTINUED | OUTPATIENT
Start: 2018-01-01 | End: 2018-01-01 | Stop reason: HOSPADM

## 2018-01-01 RX ORDER — HUMAN INSULIN 100 [IU]/ML
INJECTION, SUSPENSION SUBCUTANEOUS
Qty: 3 VIAL | Refills: 11 | Status: SHIPPED | OUTPATIENT
Start: 2018-01-01 | End: 2018-01-01 | Stop reason: SDUPTHER

## 2018-01-01 RX ORDER — SODIUM CHLORIDE 0.9 % (FLUSH) 0.9 %
5-10 SYRINGE (ML) INJECTION EVERY 8 HOURS
Status: DISCONTINUED | OUTPATIENT
Start: 2018-01-01 | End: 2018-01-01 | Stop reason: HOSPADM

## 2018-01-01 RX ORDER — AMITRIPTYLINE HYDROCHLORIDE 150 MG/1
150 TABLET, FILM COATED ORAL
COMMUNITY
Start: 2018-01-01

## 2018-01-01 RX ORDER — POTASSIUM CHLORIDE 750 MG/1
10 TABLET, FILM COATED, EXTENDED RELEASE ORAL 2 TIMES DAILY
COMMUNITY
End: 2019-01-01

## 2018-01-01 RX ORDER — OMEPRAZOLE 20 MG/1
CAPSULE, DELAYED RELEASE ORAL
Qty: 30 CAP | Refills: 5 | Status: SHIPPED | OUTPATIENT
Start: 2018-01-01

## 2018-01-01 RX ORDER — ROPINIROLE 1 MG/1
4 TABLET, FILM COATED ORAL
Status: DISCONTINUED | OUTPATIENT
Start: 2018-01-01 | End: 2018-01-01 | Stop reason: HOSPADM

## 2018-01-01 RX ORDER — AMPICILLIN 250 MG/1
250 CAPSULE ORAL
Qty: 12 CAP | Refills: 0 | Status: SHIPPED | OUTPATIENT
Start: 2018-01-01 | End: 2018-01-01

## 2018-01-01 RX ORDER — DEXTROSE 50 % IN WATER (D50W) INTRAVENOUS SYRINGE
12.5-25 AS NEEDED
Status: DISCONTINUED | OUTPATIENT
Start: 2018-01-01 | End: 2018-01-01 | Stop reason: HOSPADM

## 2018-01-01 RX ORDER — CEPHALEXIN 500 MG/1
500 CAPSULE ORAL 2 TIMES DAILY
Qty: 20 CAP | Refills: 0 | Status: SHIPPED | OUTPATIENT
Start: 2018-01-01 | End: 2018-01-01

## 2018-01-01 RX ORDER — ACETAMINOPHEN 325 MG/1
650 TABLET ORAL
Status: DISCONTINUED | OUTPATIENT
Start: 2018-01-01 | End: 2018-01-01 | Stop reason: HOSPADM

## 2018-01-01 RX ORDER — TRAMADOL HYDROCHLORIDE 50 MG/1
50 TABLET ORAL
Qty: 120 TAB | Refills: 5 | COMMUNITY
Start: 2018-01-01 | End: 2019-01-01

## 2018-01-01 RX ORDER — ATORVASTATIN CALCIUM 20 MG/1
20 TABLET, FILM COATED ORAL
Status: DISCONTINUED | OUTPATIENT
Start: 2018-01-01 | End: 2018-01-01 | Stop reason: HOSPADM

## 2018-01-01 RX ORDER — MAGNESIUM SULFATE 100 %
4 CRYSTALS MISCELLANEOUS AS NEEDED
Status: DISCONTINUED | OUTPATIENT
Start: 2018-01-01 | End: 2018-01-01 | Stop reason: HOSPADM

## 2018-01-01 RX ADMIN — FERROUS SULFATE TAB 325 MG (65 MG ELEMENTAL FE) 325 MG: 325 (65 FE) TAB at 11:36

## 2018-01-01 RX ADMIN — FUROSEMIDE 20 MG: 20 TABLET ORAL at 11:34

## 2018-01-01 RX ADMIN — LACTULOSE 30 G: 10 SOLUTION ORAL at 08:28

## 2018-01-01 RX ADMIN — AMITRIPTYLINE HYDROCHLORIDE 150 MG: 50 TABLET, FILM COATED ORAL at 21:19

## 2018-01-01 RX ADMIN — Medication 10 ML: at 22:07

## 2018-01-01 RX ADMIN — TRAMADOL HYDROCHLORIDE 50 MG: 50 TABLET, FILM COATED ORAL at 22:22

## 2018-01-01 RX ADMIN — INSULIN GLARGINE 55 UNITS: 100 INJECTION, SOLUTION SUBCUTANEOUS at 08:19

## 2018-01-01 RX ADMIN — INSULIN GLARGINE 55 UNITS: 100 INJECTION, SOLUTION SUBCUTANEOUS at 16:59

## 2018-01-01 RX ADMIN — GABAPENTIN 600 MG: 300 CAPSULE ORAL at 22:07

## 2018-01-01 RX ADMIN — INSULIN LISPRO 2 UNITS: 100 INJECTION, SOLUTION INTRAVENOUS; SUBCUTANEOUS at 08:29

## 2018-01-01 RX ADMIN — FERRIC CARBOXYMALTOSE INJECTION 750 MG: 50 INJECTION, SOLUTION INTRAVENOUS at 14:55

## 2018-01-01 RX ADMIN — ASPIRIN 81 MG CHEWABLE TABLET 81 MG: 81 TABLET CHEWABLE at 08:28

## 2018-01-01 RX ADMIN — FLUTICASONE FUROATE AND VILANTEROL TRIFENATATE 1 PUFF: 200; 25 POWDER RESPIRATORY (INHALATION) at 09:17

## 2018-01-01 RX ADMIN — FERROUS SULFATE TAB 325 MG (65 MG ELEMENTAL FE) 325 MG: 325 (65 FE) TAB at 08:28

## 2018-01-01 RX ADMIN — GABAPENTIN 1200 MG: 300 CAPSULE ORAL at 17:03

## 2018-01-01 RX ADMIN — ASPIRIN 81 MG CHEWABLE TABLET 81 MG: 81 TABLET CHEWABLE at 22:06

## 2018-01-01 RX ADMIN — GABAPENTIN 1200 MG: 300 CAPSULE ORAL at 08:28

## 2018-01-01 RX ADMIN — INSULIN GLARGINE 55 UNITS: 100 INJECTION, SOLUTION SUBCUTANEOUS at 08:28

## 2018-01-01 RX ADMIN — INSULIN LISPRO 2 UNITS: 100 INJECTION, SOLUTION INTRAVENOUS; SUBCUTANEOUS at 16:59

## 2018-01-01 RX ADMIN — LACTULOSE 30 G: 10 SOLUTION ORAL at 17:07

## 2018-01-01 RX ADMIN — PROPRANOLOL HYDROCHLORIDE 20 MG: 10 TABLET ORAL at 11:36

## 2018-01-01 RX ADMIN — AMITRIPTYLINE HYDROCHLORIDE 150 MG: 50 TABLET, FILM COATED ORAL at 22:05

## 2018-01-01 RX ADMIN — PANTOPRAZOLE SODIUM 40 MG: 40 TABLET, DELAYED RELEASE ORAL at 08:28

## 2018-01-01 RX ADMIN — TRAMADOL HYDROCHLORIDE 50 MG: 50 TABLET, FILM COATED ORAL at 21:25

## 2018-01-01 RX ADMIN — LACTULOSE 30 G: 10 SOLUTION ORAL at 11:40

## 2018-01-01 RX ADMIN — FLUTICASONE FUROATE AND VILANTEROL TRIFENATATE 1 PUFF: 200; 25 POWDER RESPIRATORY (INHALATION) at 22:35

## 2018-01-01 RX ADMIN — SODIUM CHLORIDE 25 ML/HR: 900 INJECTION, SOLUTION INTRAVENOUS at 14:55

## 2018-01-01 RX ADMIN — Medication 10 ML: at 06:00

## 2018-01-01 RX ADMIN — FUROSEMIDE 20 MG: 20 TABLET ORAL at 08:28

## 2018-01-01 RX ADMIN — ATORVASTATIN CALCIUM 20 MG: 20 TABLET, FILM COATED ORAL at 21:17

## 2018-01-01 RX ADMIN — LACTULOSE 30 G: 10 SOLUTION ORAL at 21:00

## 2018-01-01 RX ADMIN — CEFTRIAXONE 1 G: 1 INJECTION, POWDER, FOR SOLUTION INTRAMUSCULAR; INTRAVENOUS at 08:29

## 2018-01-01 RX ADMIN — Medication 10 ML: at 22:00

## 2018-01-01 RX ADMIN — PROPRANOLOL HYDROCHLORIDE 20 MG: 10 TABLET ORAL at 22:05

## 2018-01-01 RX ADMIN — ONDANSETRON 4 MG: 4 TABLET, ORALLY DISINTEGRATING ORAL at 00:48

## 2018-01-01 RX ADMIN — ROPINIROLE HYDROCHLORIDE 4 MG: 1 TABLET, FILM COATED ORAL at 22:05

## 2018-01-01 RX ADMIN — PROPRANOLOL HYDROCHLORIDE 20 MG: 10 TABLET ORAL at 17:03

## 2018-01-01 RX ADMIN — FLUTICASONE FUROATE AND VILANTEROL TRIFENATATE 1 PUFF: 200; 25 POWDER RESPIRATORY (INHALATION) at 17:01

## 2018-01-01 RX ADMIN — TRAMADOL HYDROCHLORIDE 50 MG: 50 TABLET, FILM COATED ORAL at 08:40

## 2018-01-01 RX ADMIN — INSULIN LISPRO 3 UNITS: 100 INJECTION, SOLUTION INTRAVENOUS; SUBCUTANEOUS at 08:19

## 2018-01-01 RX ADMIN — ROPINIROLE HYDROCHLORIDE 4 MG: 1 TABLET, FILM COATED ORAL at 21:19

## 2018-01-01 RX ADMIN — INSULIN LISPRO 2 UNITS: 100 INJECTION, SOLUTION INTRAVENOUS; SUBCUTANEOUS at 11:40

## 2018-01-01 RX ADMIN — PANTOPRAZOLE SODIUM 40 MG: 40 TABLET, DELAYED RELEASE ORAL at 11:34

## 2018-01-01 RX ADMIN — PROPRANOLOL HYDROCHLORIDE 20 MG: 10 TABLET ORAL at 08:28

## 2018-01-01 RX ADMIN — GABAPENTIN 1200 MG: 300 CAPSULE ORAL at 11:34

## 2018-01-01 RX ADMIN — INSULIN LISPRO 3 UNITS: 100 INJECTION, SOLUTION INTRAVENOUS; SUBCUTANEOUS at 12:01

## 2018-01-01 RX ADMIN — CEFTRIAXONE 1 G: 1 INJECTION, POWDER, FOR SOLUTION INTRAMUSCULAR; INTRAVENOUS at 14:28

## 2018-01-01 RX ADMIN — INSULIN LISPRO 2 UNITS: 100 INJECTION, SOLUTION INTRAVENOUS; SUBCUTANEOUS at 21:25

## 2018-01-01 RX ADMIN — LOSARTAN POTASSIUM 25 MG: 25 TABLET ORAL at 08:28

## 2018-01-01 RX ADMIN — Medication 10 ML: at 14:02

## 2018-01-01 RX ADMIN — FERRIC CARBOXYMALTOSE INJECTION 750 MG: 50 INJECTION, SOLUTION INTRAVENOUS at 15:09

## 2018-01-01 RX ADMIN — GABAPENTIN 1200 MG: 300 CAPSULE ORAL at 21:17

## 2018-01-01 RX ADMIN — LOSARTAN POTASSIUM 25 MG: 25 TABLET ORAL at 11:34

## 2018-01-12 ENCOUNTER — TELEPHONE (OUTPATIENT)
Dept: ENDOCRINOLOGY | Age: 63
End: 2018-01-12

## 2018-01-12 NOTE — TELEPHONE ENCOUNTER
Patient called to talk to you about why her pharmacy will not fill her Tramadol prescription. She can be reached at:   [phone].

## 2018-01-12 NOTE — TELEPHONE ENCOUNTER
I spoke with patient and she stated that she is not out of Tramadol however when she went to  her other medication the pharmacy told her she could not get a refill on the tramadol. She stated that she did not ask for a refill. I informed her that whenever she does obtain a refill she will only be allowed to get a certain amount per her insurance. Patient will contact me if she has trouble obtaining a refill when it is time.

## 2018-01-15 RX ORDER — OMEPRAZOLE 20 MG/1
CAPSULE, DELAYED RELEASE ORAL
Qty: 30 CAP | Refills: 5 | Status: SHIPPED | OUTPATIENT
Start: 2018-01-15 | End: 2018-01-01 | Stop reason: SDUPTHER

## 2018-01-23 ENCOUNTER — RESEARCH ENCOUNTER (OUTPATIENT)
Dept: HEMATOLOGY | Age: 63
End: 2018-01-23

## 2018-01-23 ENCOUNTER — HOSPITAL ENCOUNTER (OUTPATIENT)
Dept: LAB | Age: 63
Discharge: HOME | End: 2018-01-23
Payer: MEDICARE

## 2018-01-23 DIAGNOSIS — D50.8 OTHER IRON DEFICIENCY ANEMIA: ICD-10-CM

## 2018-01-23 PROCEDURE — 83550 IRON BINDING TEST: CPT

## 2018-01-23 PROCEDURE — 83036 HEMOGLOBIN GLYCOSYLATED A1C: CPT

## 2018-01-23 PROCEDURE — 36415 COLL VENOUS BLD VENIPUNCTURE: CPT

## 2018-01-23 NOTE — PROGRESS NOTES
Patient was seen today as part of clinical research protocol for management of BALDERAS cirrhosis, Conatus 14, week 40. Updated history and physical examination per protocol was performed as well as lab studies. Symptoms reported at this time include: post-prandial nausea, and dry mouth/thirst.  She denies symptoms of GI bleeding. Patient is having <1 bowel movement daily with bid use of lactulose. Patient has underlying cirrhosis and is is in need of the following surveillance testing:  EGD, this is pending for 2/8/2018 with Dr Mary Pickens in the near future. US in 10/2017 was unremarkable. Will obtain Hgb A1c and iron panel and consider repeat administration of IV iron if appropriate. Suspect her symptoms are due to poor DM control and gastroparesis. Will forward her labs to Dr Michael Stanton. Patient to follow-up per protocol.     Asif Phillip PA-C  [facility] [place] [address], [address] [place]  [place]  22.  [phone]

## 2018-01-24 LAB
EST. AVERAGE GLUCOSE BLD GHB EST-MCNC: 243 MG/DL
HBA1C MFR BLD: 10.1 % (ref 4.8–5.6)
IRON SATN MFR SERPL: 14 % (ref 15–55)
IRON SERPL-MCNC: 50 UG/DL (ref 27–139)
TIBC SERPL-MCNC: 358 UG/DL (ref 250–450)
UIBC SERPL-MCNC: 308 UG/DL (ref 118–369)

## 2018-01-29 NOTE — PROGRESS NOTES
Pt notified of elevation in Hgb A1c and need for improved DM control. Iron stores low, but adequate with stable Hgb.

## 2018-02-08 ENCOUNTER — ANESTHESIA (OUTPATIENT)
Dept: ENDOSCOPY | Age: 63
End: 2018-02-08
Payer: MEDICARE

## 2018-02-08 ENCOUNTER — HOSPITAL ENCOUNTER (OUTPATIENT)
Age: 63
Setting detail: OUTPATIENT SURGERY
Discharge: HOME | End: 2018-02-08
Attending: INTERNAL MEDICINE | Admitting: INTERNAL MEDICINE
Payer: MEDICARE

## 2018-02-08 ENCOUNTER — ANESTHESIA EVENT (OUTPATIENT)
Dept: ENDOSCOPY | Age: 63
End: 2018-02-08
Payer: MEDICARE

## 2018-02-08 VITALS
SYSTOLIC BLOOD PRESSURE: 148 MMHG | DIASTOLIC BLOOD PRESSURE: 75 MMHG | TEMPERATURE: 98.1 F | OXYGEN SATURATION: 94 % | HEART RATE: 102 BPM | RESPIRATION RATE: 19 BRPM

## 2018-02-08 LAB
GLUCOSE BLD STRIP.AUTO-MCNC: 160 MG/DL (ref 65–100)
SERVICE CMNT-IMP: ABNORMAL

## 2018-02-08 PROCEDURE — 76040000019: Performed by: INTERNAL MEDICINE

## 2018-02-08 PROCEDURE — 76060000031 HC ANESTHESIA FIRST 0.5 HR: Performed by: INTERNAL MEDICINE

## 2018-02-08 PROCEDURE — 74011250636 HC RX REV CODE- 250/636: Performed by: INTERNAL MEDICINE

## 2018-02-08 PROCEDURE — 74011250636 HC RX REV CODE- 250/636

## 2018-02-08 PROCEDURE — 74011000250 HC RX REV CODE- 250

## 2018-02-08 PROCEDURE — 77030014243 HC BND LIG VRCES BSC -D: Performed by: INTERNAL MEDICINE

## 2018-02-08 PROCEDURE — 82962 GLUCOSE BLOOD TEST: CPT

## 2018-02-08 RX ORDER — DEXTROMETHORPHAN/PSEUDOEPHED 2.5-7.5/.8
1.2 DROPS ORAL
Status: DISCONTINUED | OUTPATIENT
Start: 2018-02-08 | End: 2018-02-08 | Stop reason: HOSPADM

## 2018-02-08 RX ORDER — FENTANYL CITRATE 50 UG/ML
50-200 INJECTION, SOLUTION INTRAMUSCULAR; INTRAVENOUS
Status: DISCONTINUED | OUTPATIENT
Start: 2018-02-08 | End: 2018-02-08 | Stop reason: HOSPADM

## 2018-02-08 RX ORDER — NALOXONE HYDROCHLORIDE 0.4 MG/ML
0.4 INJECTION, SOLUTION INTRAMUSCULAR; INTRAVENOUS; SUBCUTANEOUS
Status: DISCONTINUED | OUTPATIENT
Start: 2018-02-08 | End: 2018-02-08 | Stop reason: HOSPADM

## 2018-02-08 RX ORDER — ATROPINE SULFATE 0.1 MG/ML
0.5 INJECTION INTRAVENOUS
Status: DISCONTINUED | OUTPATIENT
Start: 2018-02-08 | End: 2018-02-08 | Stop reason: HOSPADM

## 2018-02-08 RX ORDER — MIDAZOLAM HYDROCHLORIDE 1 MG/ML
5-10 INJECTION, SOLUTION INTRAMUSCULAR; INTRAVENOUS
Status: DISCONTINUED | OUTPATIENT
Start: 2018-02-08 | End: 2018-02-08 | Stop reason: HOSPADM

## 2018-02-08 RX ORDER — SODIUM CHLORIDE 9 MG/ML
50 INJECTION, SOLUTION INTRAVENOUS CONTINUOUS
Status: DISCONTINUED | OUTPATIENT
Start: 2018-02-08 | End: 2018-02-08 | Stop reason: HOSPADM

## 2018-02-08 RX ORDER — PROPOFOL 10 MG/ML
INJECTION, EMULSION INTRAVENOUS AS NEEDED
Status: DISCONTINUED | OUTPATIENT
Start: 2018-02-08 | End: 2018-02-08 | Stop reason: HOSPADM

## 2018-02-08 RX ORDER — EPINEPHRINE 0.1 MG/ML
1 INJECTION INTRACARDIAC; INTRAVENOUS
Status: DISCONTINUED | OUTPATIENT
Start: 2018-02-08 | End: 2018-02-08 | Stop reason: HOSPADM

## 2018-02-08 RX ORDER — ONDANSETRON 2 MG/ML
4-8 INJECTION INTRAMUSCULAR; INTRAVENOUS ONCE
Status: COMPLETED | OUTPATIENT
Start: 2018-02-08 | End: 2018-02-08

## 2018-02-08 RX ORDER — SODIUM CHLORIDE 0.9 % (FLUSH) 0.9 %
5-10 SYRINGE (ML) INJECTION AS NEEDED
Status: DISCONTINUED | OUTPATIENT
Start: 2018-02-08 | End: 2018-02-08 | Stop reason: HOSPADM

## 2018-02-08 RX ORDER — SODIUM CHLORIDE 9 MG/ML
INJECTION, SOLUTION INTRAVENOUS
Status: DISCONTINUED | OUTPATIENT
Start: 2018-02-08 | End: 2018-02-08 | Stop reason: HOSPADM

## 2018-02-08 RX ORDER — LIDOCAINE HYDROCHLORIDE 20 MG/ML
INJECTION, SOLUTION EPIDURAL; INFILTRATION; INTRACAUDAL; PERINEURAL AS NEEDED
Status: DISCONTINUED | OUTPATIENT
Start: 2018-02-08 | End: 2018-02-08 | Stop reason: HOSPADM

## 2018-02-08 RX ORDER — SODIUM CHLORIDE 0.9 % (FLUSH) 0.9 %
5-10 SYRINGE (ML) INJECTION EVERY 8 HOURS
Status: DISCONTINUED | OUTPATIENT
Start: 2018-02-08 | End: 2018-02-08 | Stop reason: HOSPADM

## 2018-02-08 RX ORDER — FLUMAZENIL 0.1 MG/ML
0.2 INJECTION INTRAVENOUS
Status: DISCONTINUED | OUTPATIENT
Start: 2018-02-08 | End: 2018-02-08 | Stop reason: HOSPADM

## 2018-02-08 RX ADMIN — LIDOCAINE HYDROCHLORIDE 100 MG: 20 INJECTION, SOLUTION EPIDURAL; INFILTRATION; INTRACAUDAL; PERINEURAL at 13:35

## 2018-02-08 RX ADMIN — PROPOFOL 50 MG: 10 INJECTION, EMULSION INTRAVENOUS at 13:38

## 2018-02-08 RX ADMIN — ONDANSETRON 4 MG: 2 INJECTION INTRAMUSCULAR; INTRAVENOUS at 14:09

## 2018-02-08 RX ADMIN — PROPOFOL 50 MG: 10 INJECTION, EMULSION INTRAVENOUS at 13:39

## 2018-02-08 RX ADMIN — FENTANYL CITRATE 50 MCG: 50 INJECTION, SOLUTION INTRAMUSCULAR; INTRAVENOUS at 14:19

## 2018-02-08 RX ADMIN — PROPOFOL 50 MG: 10 INJECTION, EMULSION INTRAVENOUS at 13:45

## 2018-02-08 RX ADMIN — PROPOFOL 50 MG: 10 INJECTION, EMULSION INTRAVENOUS at 13:41

## 2018-02-08 RX ADMIN — SODIUM CHLORIDE: 9 INJECTION, SOLUTION INTRAVENOUS at 13:27

## 2018-02-08 RX ADMIN — PROPOFOL 50 MG: 10 INJECTION, EMULSION INTRAVENOUS at 13:35

## 2018-02-08 RX ADMIN — PROPOFOL 50 MG: 10 INJECTION, EMULSION INTRAVENOUS at 13:36

## 2018-02-08 NOTE — ROUTINE PROCESS
Mir Munoz  1955  [id number]    Situation:  Verbal report received from: ALDO Bermudez, RN  Procedure: Procedure(s) with comments:  ESOPHAGOGASTRODUODENOSCOPY (EGD)  ENDOSCOPIC BANDING OR LIGATION - 3 bands placed    Background:    Preoperative diagnosis: NONE LISTED  Postoperative diagnosis: 1. gastric varices  2. portal gastropathy  3. esophageal varices  4. gastritis with erosions    :  Dr. Ramiro Mcguire  Assistant(s): Endoscopy Technician-1: Alejandro Hope IV  Endoscopy RN-1: Atif Cuenca RN    Specimens: * No specimens in log *  H. Pylori  no    Assessment:  Intra-procedure medications   Anesthesia gave intra-procedure sedation and medications, see anesthesia flow sheet yes    Intravenous fluids: NS@ KVO     Vital signs stable     Abdominal assessment: round and soft     Recommendation:  Discharge patient per MD order.     Family or Friend   Permission to share finding with family or friend yes

## 2018-02-08 NOTE — DISCHARGE INSTRUCTIONS
70 Dwayne Arizmendi MD, [address], [place], [place]       Steph Sides, NP    JANEL Francis, BEATRIZ-BC Cain Soulier, ALEJANDRO De La Fuente, ALEJANDRO Love [facility] 136    at [address], [address] [place]  22.    [phone]    FAX: [address] [place] [address]    at [facility], [address] [place] [address], [address]    [phone]    FAX: [phone]       ENDOSCOPY WITH BANDING DISCHARGE INSTRUCTIONS    Pia Ramirez  1955  Date: 2/8/2018    DISCOMFORT:  Use lozenges or warm salt water gargle for sore thoat  Apply warm compress to IV site if red. If redness or soreness persists call the office. You may experience gas and bloating. Walking and belching will help relieve this. You may experience chest pain or discomfort or feel as though food is \"sticking\" in your food pipe for a few days after the procedure. This is a normal feeling after banding of esophageal varices. DIET:  Regular food may dislodge the bands placed on the varices. For this reason you should only have liquid for the rest of today. Eat only soft food that does not need to be chewed all day tomorrow. You may advance to your regular diet in 2 days. ACTIVITY:  Spend the remainder of the day resting. Avoid any strenuous activity. You may not operate a vehicle for 12 hours. You may not engage in an occupation involving machinery or appliances for rest of today. Avoid making any critical decisions for at least 24 hour.     Call the [employer] [phone] of [phone] if you have any of the following:  Increasing chest or abdominal pain, nausea, vomiting, vomiting blood, abdominal distension or swelling, fever or chills, bloody discharge from nose or mouth or shortness of breath. Follow-up Instructions:  Call Dr. Brayan Loza for any questions or problems at the phone number listed above. If a biopsy was performed, you will be contacted by the office staff or Dr Brayan Loza within 1 week. If you have not heard from us by then you may call the office at the phone number listed above to inquire about the results. ENDOSCOPY FINDINGS:  Multiple varices were found in the esophagus (food tube). 3 bands were placed to seal the varices and reduce the risk of bleeding. DISCHARGE SUMMARY from the Nurse:   The following personal items collected during your admission are returned to you:   Dental Appliance: Dental Appliances: None  Vision: Visual Aid: None  Hearing Aid:    Jewelry:    Clothing:    Other Valuables:    Valuables sent to safe:

## 2018-02-08 NOTE — H&P
70 Dwayne Arizmendi MD, FACP, [place], [place]       ALEJANDRO Archuleta, JANEL Pablo, BEATRIZ-BC   Gautam Gutierrez, ALEJANDRO Barrett Jen[address] 136    at [address], [address] [facility]    [address] [facility] [place]  22.    [phone]    FAX: [address] [place] [address] Avenue    at [address], [address] [facility], [address]    [phone]    FAX: [phone]       PRE-PROCEDURE NOTE - EGD    H and P from last office visit reviewed. Allergies reviewed. Out-patient medicaton list reviewed. Patient Active Problem List   Diagnosis Code    Diabetes mellitus with neurological manifestations, uncontrolled ([facility] E11.49, E11.65    Essential hypertension, benign I10    Hyperlipidemia LDL goal <100 E78.5    Thrombocytopenia (HCC) D69.6    Previous back surgery Z98.890    S/P CHACHO (total abdominal hysterectomy) Z90.710    Cirrhosis (HCC) K74.60    Acute upper GI bleeding K92.2    Anemia D64.9    GI bleed K92.2    BALDERAS (nonalcoholic steatohepatitis) K75.81    Acute deep vein thrombosis (DVT) of right lower extremity (HCC) I82.401    COPD (chronic obstructive pulmonary disease) (HCC) J44.9    Sepsis (HCC) A41.9    CAP (community acquired pneumonia) J18.9    IBIS (obstructive sleep apnea) G47.33    Tobacco abuse Z72.0    Neuropathy G62.9    Respiratory failure (HCC) J96.90       Allergies   Allergen Reactions    Hydrocodone Nausea and Vomiting    Lisinopril Cough    Lortab [Hydrocodone-Acetaminophen] Nausea and Vomiting    Percocet [Oxycodone-Acetaminophen] Nausea and Vomiting       No current facility-administered medications on file prior to encounter.       Current Outpatient Prescriptions on File Prior to Encounter   Medication Sig Dispense Refill    omeprazole (PRILOSEC) 20 mg capsule take 1 capsule by mouth once daily 30 Cap 5    CONSTULOSE 10 gram/15 mL solution take 2 tablespoonfuls by mouth twice a day 1000 mL 5    ferrous sulfate 325 mg (65 mg iron) tablet take 1 tablet by mouth once daily WITH BREAKFAST 90 Tab 1    losartan (COZAAR) 25 mg tablet take 1 tablet by mouth once daily REPLACES LISINOPRIL FOR BLOOD PRESSURE AND KIDNEY PROTECTION 30 Tab 11    insulin glargine (LANTUS SOLOSTAR) 100 unit/mL (3 mL) inpn Inject 110 units every morning as 2 separate shots of 55 units back to back 45 mL 11    insulin lispro (HUMALOG KWIKPEN) 100 unit/mL kwikpen Inject as needed up to 3 times per day for sugars over 150: 4 units for every 50 points--Dose change 10/6/17--updated med list--did not send prescription to the pharmacy 45 mL 11    metFORMIN (GLUCOPHAGE) 1,000 mg tablet take 1 tablet by mouth twice a day with meals 180 Tab 3    traMADol (ULTRAM) 50 mg tablet Take 1-2 tablets twice daily as needed for diabetic neuropathy pain. Dx. E11.449 120 Tab 5    gabapentin (NEURONTIN) 600 mg tablet take 2 tablets by mouth three times a day (NEW HIGHER DOSE) 180 Tab 11    furosemide (LASIX) 20 mg tablet take 1 tablet by mouth once daily 30 Tab 5    potassium chloride SR (KLOR-CON 10) 10 mEq tablet take 1 tablet by mouth twice a day 60 Tab 5    ondansetron (ZOFRAN ODT) 4 mg disintegrating tablet dissolve 1 tablet ON TONGUE every 8 hours if needed for nausea 45 Tab 3    albuterol-ipratropium (DUO-NEB) 2.5 mg-0.5 mg/3 ml nebu 3 mL by Nebulization route every four (4) hours as needed. 30 Nebule 0    Nebulizer & Compressor machine UAD 1 Each 0    TREVOR PEN NEEDLE 32 gauge x 5/32\" ndle use as directed four times a day 100 Pen Needle 11    amitriptyline (ELAVIL) 150 mg tablet Take 150 mg by mouth nightly.  rOPINIRole (REQUIP) 4 mg tab TAB Take 4 mg by mouth nightly.       albuterol (PROAIR HFA) 90 mcg/actuation inhaler Take 2 Puffs by inhalation every four (4) hours as needed.  fluticasone-salmeterol (ADVAIR DISKUS) 500-50 mcg/dose diskus inhaler Take 1 Puff by inhalation two (2) times a day.  propranolol (INDERAL) 20 mg tablet Take 1 Tab by mouth two (2) times a day. 61 Tab 11       For EGD to assess for esophageal and gastric varices. Plan to perform banding if indicated based upon variceal size and appearance. The risks of the procedure were discussed with the patient. These included reaction to anesthesia, pain, perforation and bleeding. All questions were answered. The patient wishes to proceed with the procedure. PHYSICAL EXAMINATION:  VS: per nursing note  General: No acute distress. Eyes: Sclera anicteric. ENT: No oral lesions. Thyroid normal.  Nodes: No adenopathy. Skin: No spider angiomata. No jaundice. No palmar erythema. Respiratory: Lungs clear to auscultation. Cardiovascular: Regular heart rate. No murmurs. No JVD. Abdomen: Soft non-tender, liver size normal to percussion/palpation. Spleen not palpable. No obvious ascites. Extremities: No edema. No muscle wasting. No gross arthritic changes. Neurologic: Alert and oriented. Cranial nerves grossly intact. No asterixis. MOST RECENT LABORATORY STUDIES:  Lab Results   Component Value Date/Time    WBC 6.2 05/03/2017 12:00 AM    HGB 7.5 (L) 05/03/2017 12:00 AM    HCT 25.8 (L) 05/03/2017 12:00 AM    PLATELET Comment 91/21/8532 12:00 AM    MCV 69 (L) 05/03/2017 12:00 AM     Lab Results   Component Value Date/Time    INR 1.2 (H) 02/13/2017 03:54 PM    INR 1.1 11/10/2016 02:48 PM    INR 1.1 03/07/2016 02:05 PM    Prothrombin time 11.8 (H) 02/13/2017 03:54 PM    Prothrombin time 11.5 (H) 11/10/2016 02:48 PM    Prothrombin time 11.6 03/07/2016 02:05 PM       ASSESSMENT AND PLAN:  EGD to assess for esophageal and/or gastric varices. Conscious sedation with fentanyl and versed.     Janes Yousif MD  Liver [place] [facility]. [place] [address]  [place], [place]  22.  [phone]

## 2018-02-08 NOTE — PROGRESS NOTES

## 2018-02-08 NOTE — ANESTHESIA PREPROCEDURE EVALUATION
Anesthetic History   No history of anesthetic complications            Review of Systems / Medical History  Patient summary reviewed, nursing notes reviewed and pertinent labs reviewed    Pulmonary    COPD    Sleep apnea    Asthma        Neuro/Psych   Within defined limits           Cardiovascular    Hypertension                   GI/Hepatic/Renal     GERD      Liver disease     Endo/Other    Diabetes    Obesity     Other Findings              Physical Exam    Airway  Mallampati: II  TM Distance: > 6 cm  Neck ROM: normal range of motion   Mouth opening: Normal     Cardiovascular  Regular rate and rhythm,  S1 and S2 normal,  no murmur, click, rub, or gallop             Dental  No notable dental hx       Pulmonary  Breath sounds clear to auscultation               Abdominal  GI exam deferred       Other Findings            Anesthetic Plan    ASA: 3  Anesthesia type: MAC          Induction: Intravenous  Anesthetic plan and risks discussed with: Patient

## 2018-02-08 NOTE — PROCEDURES
[address] Shirin Forbes MD, [address], [place], [place]       Matthias Augustin, ALEJANDRO Lizama, PA-C Gaylan Kocher, [facility]   ALEJANDRO Charles NP [address] [facility] 136    at [address]    [address] [place], [place] [address], [address] Mayo Nielson  22.    [phone]    FAX: 126 [address], [address]    [phone]    FAX: [phone]       UPPER ENDOSCOPY PROCEDURE NOTE    Terry Pedro Luis  1955    INDICATION: Cirrhosis. Screening for esophageal varices with variceal ligation if indicated. : Micky Rios MD    ANESTHESIA/SEDATION: Propofol was administered by anesthesia      PROCEDURE DESCRIPTION:  Infomed consent was obtained from the patient for the procedure. All risks and benefits of the procedure explained. The patient was taken to the endoscopy suite and placed in the left lateral decubitus position. Following sequential administration of sedation to doses as indicated above the endoscope was inserted into the mouth and advanced under direct vision to the second portion of the duodenum. Careful inspection of upper gastrointestinal tract was made as the endoscope was inserted and withdrawn. Retroflexion of the endoscope to view of the cardia of the stomach was performed. After withdrawing the endoscope the banding devise was placed on the tip of the endoscope. The scope was then reinserted under direct inspection and advanced to the esophagus. Banding of esophageal varices was performed as described below. The scope was then removed. FINDINGS:  Esophagus:    Multiple medium, esophageal varices were identified. Banding of esophageal varices was performed.   Excellent hemostasis was achieved after banding. Stomach:   Mild portal hypertensive gastropathy of the body of the, stomach. Gastritis of the antrum,   Multiple polyps in antrum. Gastric varicies identified. Duodenum:   Normal bulb and second portion    INTERVENTION:   3 bands placed were placed on esophageal varices. COMPLICATIONS: None. The patient tolerated the procedure well. EBL: Negligible. RECOMMENDATIONS:  Observe until discharge parameters are achieved. Liquid diet today. Soft food tomorrow. Resume general diet thereafter. Repeat endoscopy to reassess varices and need for additional banding in 6 months. Follow-up [facility] office as scheduled.       Hong De MD  2/8/2018  2:19 PM

## 2018-02-08 NOTE — IP AVS SNAPSHOT
7253 [facility] 74 
[phone] Patient: Karey Alexander MRN: [id number] About your hospitalization You were admitted on:  February 8, 2018 [facility] You were discharged on:  February 8, 2018 Why you were hospitalized Your primary diagnosis was:  Not on File Follow-up Information Follow up With Details Comments Contact Info Niles Saez NP   [address] [place] [address] 
[phone] Your Scheduled Appointments Tuesday March 20, 2018 10:00 AM EDT RESEARCH with April Khloe Xie NP [address] ([facility]) 200 [facility] [phone] [facility] 74  
[phone] Discharge Orders None A check lyndsey indicates which time of day the medication should be taken. My Medications CONTINUE taking these medications Instructions Each Dose to Equal  
 Morning Noon Evening Bedtime ADVAIR DISKUS 500-50 mcg/dose diskus inhaler Generic drug:  fluticasone-salmeterol Your last dose was: Your next dose is: Take 1 Puff by inhalation two (2) times a day. 1 Puff  
    
   
   
   
  
 albuterol-ipratropium 2.5 mg-0.5 mg/3 ml Nebu Commonly known as:  Londell Sang Your last dose was: Your next dose is:    
   
   
 3 mL by Nebulization route every four (4) hours as needed. 3 mL  
    
   
   
   
  
 amitriptyline 150 mg tablet Commonly known as:  ELAVIL Your last dose was: Your next dose is: Take 150 mg by mouth nightly. 150 mg  
    
   
   
   
  
 CONSTULOSE 10 gram/15 mL solution Generic drug:  lactulose Your last dose was: Your next dose is:    
   
   
 take 2 tablespoonfuls by mouth twice a day  
     
   
   
   
  
 ferrous sulfate 325 mg (65 mg iron) tablet Your last dose was: Your next dose is:    
   
   
 take 1 tablet by mouth once daily WITH BREAKFAST  
     
   
   
   
  
 furosemide 20 mg tablet Commonly known as:  LASIX Your last dose was: Your next dose is:    
   
   
 take 1 tablet by mouth once daily  
     
   
   
   
  
 gabapentin 600 mg tablet Commonly known as:  NEURONTIN Your last dose was: Your next dose is:    
   
   
 take 2 tablets by mouth three times a day (NEW HIGHER DOSE) insulin glargine 100 unit/mL (3 mL) Inpn Commonly known as:  LANTUS SOLOSTAR Your last dose was: Your next dose is:    
   
   
 Inject 110 units every morning as 2 separate shots of 55 units back to back  
     
   
   
   
  
 insulin lispro 100 unit/mL kwikpen Commonly known as:  HumaLOG KwikPen Your last dose was: Your next dose is:    
   
   
 Inject as needed up to 3 times per day for sugars over 150: 4 units for every 50 points--Dose change 10/6/17--updated med list--did not send prescription to the pharmacy  
     
   
   
   
  
 losartan 25 mg tablet Commonly known as:  COZAAR Your last dose was: Your next dose is:    
   
   
 take 1 tablet by mouth once daily REPLACES LISINOPRIL FOR BLOOD PRESSURE AND KIDNEY PROTECTION  
     
   
   
   
  
 metFORMIN 1,000 mg tablet Commonly known as:  GLUCOPHAGE Your last dose was: Your next dose is:    
   
   
 take 1 tablet by mouth twice a day with meals Bambi Pen Needle 32 gauge x 5/32\" Ndle Generic drug:  Insulin Needles (Disposable) Your last dose was: Your next dose is:    
   
   
 use as directed four times a day Nebulizer & Compressor machine Your last dose was: Your next dose is:    
   
   
 UAD  
     
   
   
   
  
 omeprazole 20 mg capsule Commonly known as:  PRILOSEC Your last dose was: Your next dose is:    
   
   
 take 1 capsule by mouth once daily  
     
   
   
   
  
 ondansetron 4 mg disintegrating tablet Commonly known as:  ZOFRAN ODT Your last dose was: Your next dose is:    
   
   
 dissolve 1 tablet ON TONGUE every 8 hours if needed for nausea  
     
   
   
   
  
 potassium chloride SR 10 mEq tablet Commonly known as:  KLOR-CON 10 Your last dose was: Your next dose is:    
   
   
 take 1 tablet by mouth twice a day PROAIR HFA 90 mcg/actuation inhaler Generic drug:  albuterol Your last dose was: Your next dose is: Take 2 Puffs by inhalation every four (4) hours as needed. 2 Puff  
    
   
   
   
  
 propranolol 20 mg tablet Commonly known as:  INDERAL Your last dose was: Your next dose is: Take 1 Tab by mouth two (2) times a day. 20 mg  
    
   
   
   
  
 rOPINIRole 4 mg Tab TAB Commonly known as:  Hitesh Sandra Your last dose was: Your next dose is: Take 4 mg by mouth nightly. 4 mg  
    
   
   
   
  
 traMADol 50 mg tablet Commonly known as:  ULTRAM  
   
Your last dose was: Your next dose is: Take 1-2 tablets twice daily as needed for diabetic neuropathy pain. Dx. H67.415 Discharge Instructions [address] 3642 [facility] Jerica Cole MD, Roger Aguilar, ALEJANDRO Gray, BEATRIZ Hastings-CIRO Dang, [facility]ANDRO [facility] 136 
  at [address], [address] [place] [address] 22. [phone] FAX: [address] [place] [address] 
  [address], [address] [facility] [address] 98 Lorena Rodriguez, [address] 
  [phone] FAX: [phone] ENDOSCOPY WITH BANDING DISCHARGE INSTRUCTIONS Janki Caro 1955 Date: 2/8/2018 DISCOMFORT: 
Use lozenges or warm salt water gargle for sore thoat Apply warm compress to IV site if red. If redness or soreness persists call the office. You may experience gas and bloating. Walking and belching will help relieve this. You may experience chest pain or discomfort or feel as though food is \"sticking\" in your food pipe for a few days after the procedure. This is a normal feeling after banding of esophageal varices. DIET: 
Regular food may dislodge the bands placed on the varices. For this reason you should only have liquid for the rest of today. Eat only soft food that does not need to be chewed all day tomorrow. You may advance to your regular diet in 2 days. ACTIVITY: 
Spend the remainder of the day resting. Avoid any strenuous activity. You may not operate a vehicle for 12 hours. You may not engage in an occupation involving machinery or appliances for rest of today. Avoid making any critical decisions for at least 24 hour. Call the [employer] of [address] if you have any of the following: 
Increasing chest or abdominal pain, nausea, vomiting, vomiting blood, abdominal distension or swelling, fever or chills, bloody discharge from nose or mouth or shortness of breath. Follow-up Instructions: 
Call Dr. Riki Bose for any questions or problems at the phone number listed above. If a biopsy was performed, you will be contacted by the office staff or Dr Riki Bose within 1 week. If you have not heard from us by then you may call the office at the phone number listed above to inquire about the results. ENDOSCOPY FINDINGS: 
Multiple varices were found in the esophagus (food tube). 3 bands were placed to seal the varices and reduce the risk of bleeding. DISCHARGE SUMMARY from the Nurse: The following personal items collected during your admission are returned to you:  
Dental Appliance: Dental Appliances: None Vision: Visual Aid: None Hearing Aid:   
Jewelry:   
Clothing:   
Other Valuables:   
Valuables sent to safe:   
 
 
[employer]O Transitions of Care Introducing [employer] [address] Polk offers a voluntary care coordination program to provide high quality service and care to [facility] fee-for-service beneficiaries. Kieran Mckeon was designed to help you enhance your health and well-being through the following services: ? Transitions of Care  support for individuals who are transitioning from one care setting to another (example: Hospital to home). ? Chronic and Complex Care Coordination  support for individuals and caregivers of those with serious or chronic illnesses or with more than one chronic (ongoing) condition and those who take a number of different medications. If you meet specific medical criteria, a [address] [place] [address] Rd may call you directly to coordinate your care with your primary care physician and your other care providers. For questions about the [facility] programs, please, contact your physicians office. For general questions or additional information about Accountable Care Organizations: 
Please visit www.medicare.gov/acos. html or call 1-800-MEDICARE (7-308.359.4973) TTY users should call 7-310.830.6662. Introducing [facility]! Dear Tonya Alves: Thank you for requesting a Meta account. Our records indicate that you already have an active Meta account. You can access your account anytime at [email or web address] Did you know that you can access your hospital and ER discharge instructions at any time in Meta? You can also review all of your test results from your hospital stay or ER visit. Additional Information If you have questions, please visit the Frequently Asked Questions section of the [employer] website at [email or web address]. [email or web address] Remember, MyChart is NOT to be used for urgent needs. For medical emergencies, dial 911. Now available from your iPhone and Android! Providers Seen During Your Hospitalization Provider Specialty Primary office phone Lamine Glass MD Hepatology [phone] Your Primary Care Physician (PCP) Primary Care Physician Office Phone Office Fax Connie Lowemargot [phone] You are allergic to the following Allergen Reactions Hydrocodone Nausea and Vomiting Lisinopril Cough Lortab (Hydrocodone-Acetaminophen) Nausea and Vomiting Percocet (Oxycodone-Acetaminophen) Nausea and Vomiting Recent Documentation OB Status Smoking Status Hysterectomy Current Every Day Smoker Emergency Contacts Name Discharge Info Relation Home Work Mobile Clara Found DISCHARGE CAREGIVER [3] Spouse [3] [phone] Patient Belongings The following personal items are in your possession at time of discharge: 
  Dental Appliances: None  Visual Aid: None Please provide this summary of care documentation to your next provider. Signatures-by signing, you are acknowledging that this After Visit Summary has been reviewed with you and you have received a copy. Patient Signature:  ____________________________________________________________ Date:  ____________________________________________________________  
  
Anabel Edwards Provider Signature:  ____________________________________________________________ Date:  ____________________________________________________________

## 2018-02-08 NOTE — ANESTHESIA POSTPROCEDURE EVALUATION
Post-Anesthesia Evaluation and Assessment    Patient: Jeane Meier MRN: [id number]  SSN: [id number]    YOB: 1955  Age: 58 y.o. Sex: female       Cardiovascular Function/Vital Signs  Visit Vitals    BP (!) 165/96    Pulse (!) 109    Temp 36.7 °C (98.1 °F)    Resp 19    SpO2 95%       Patient is status post MAC anesthesia for Procedure(s):  ESOPHAGOGASTRODUODENOSCOPY (EGD)  ENDOSCOPIC BANDING OR LIGATION. Nausea/Vomiting: None    Postoperative hydration reviewed and adequate. Pain:  Pain Scale 1: Adult Nonverbal Pain Scale (02/08/18 1355)  Pain Intensity 1: 0 (02/08/18 1355)   Managed    Neurological Status: At baseline    Mental Status and Level of Consciousness: Arousable    Pulmonary Status:   O2 Device: Nasal cannula (02/08/18 1355)   Adequate oxygenation and airway patent    Complications related to anesthesia: None    Post-anesthesia assessment completed.  No concerns    Signed By: Valentino Simmer, MD     February 8, 2018

## 2018-03-05 RX ORDER — TRAMADOL HYDROCHLORIDE 50 MG/1
TABLET ORAL
Qty: 120 TAB | Refills: 5 | Status: SHIPPED | OUTPATIENT
Start: 2018-03-05 | End: 2018-01-01 | Stop reason: SDUPTHER

## 2018-03-14 ENCOUNTER — HOSPITAL ENCOUNTER (EMERGENCY)
Age: 63
Discharge: HOME | End: 2018-03-14
Attending: EMERGENCY MEDICINE
Payer: MEDICARE

## 2018-03-14 ENCOUNTER — APPOINTMENT (OUTPATIENT)
Dept: GENERAL RADIOLOGY | Age: 63
End: 2018-03-14
Attending: EMERGENCY MEDICINE
Payer: MEDICARE

## 2018-03-14 VITALS
RESPIRATION RATE: 26 BRPM | WEIGHT: 199.3 LBS | HEART RATE: 106 BPM | BODY MASS INDEX: 37.63 KG/M2 | DIASTOLIC BLOOD PRESSURE: 48 MMHG | TEMPERATURE: 101.3 F | HEIGHT: 61 IN | OXYGEN SATURATION: 94 % | SYSTOLIC BLOOD PRESSURE: 187 MMHG

## 2018-03-14 DIAGNOSIS — J10.1 INFLUENZA B: Primary | ICD-10-CM

## 2018-03-14 LAB
ALBUMIN SERPL-MCNC: 3.6 G/DL (ref 3.5–5)
ALBUMIN/GLOB SERPL: 0.8 {RATIO} (ref 1.1–2.2)
ALP SERPL-CCNC: 145 U/L (ref 45–117)
ALT SERPL-CCNC: 36 U/L (ref 12–78)
ANION GAP SERPL CALC-SCNC: 9 MMOL/L (ref 5–15)
AST SERPL-CCNC: 38 U/L (ref 15–37)
BASOPHILS # BLD: 0 K/UL (ref 0–0.1)
BASOPHILS NFR BLD: 1 % (ref 0–1)
BILIRUB SERPL-MCNC: 0.6 MG/DL (ref 0.2–1)
BUN SERPL-MCNC: 14 MG/DL (ref 6–20)
BUN/CREAT SERPL: 26 (ref 12–20)
CALCIUM SERPL-MCNC: 8.5 MG/DL (ref 8.5–10.1)
CHLORIDE SERPL-SCNC: 100 MMOL/L (ref 97–108)
CO2 SERPL-SCNC: 28 MMOL/L (ref 21–32)
CREAT SERPL-MCNC: 0.53 MG/DL (ref 0.55–1.02)
DIFFERENTIAL METHOD BLD: ABNORMAL
EOSINOPHIL # BLD: 0 K/UL (ref 0–0.4)
EOSINOPHIL NFR BLD: 0 % (ref 0–7)
ERYTHROCYTE [DISTWIDTH] IN BLOOD BY AUTOMATED COUNT: 16.9 % (ref 11.5–14.5)
FLUAV AG NPH QL IA: NEGATIVE
FLUBV AG NOSE QL IA: POSITIVE
GLOBULIN SER CALC-MCNC: 4.8 G/DL (ref 2–4)
GLUCOSE SERPL-MCNC: 122 MG/DL (ref 65–100)
HCT VFR BLD AUTO: 34.5 % (ref 35–47)
HGB BLD-MCNC: 10.9 G/DL (ref 11.5–16)
IMM GRANULOCYTES # BLD: 0 K/UL (ref 0–0.04)
IMM GRANULOCYTES NFR BLD AUTO: 1 % (ref 0–0.5)
LACTATE BLD-SCNC: 1.8 MMOL/L (ref 0.4–2)
LACTATE SERPL-SCNC: 2.6 MMOL/L (ref 0.4–2)
LYMPHOCYTES # BLD: 1 K/UL (ref 0.8–3.5)
LYMPHOCYTES NFR BLD: 18 % (ref 12–49)
MCH RBC QN AUTO: 29 PG (ref 26–34)
MCHC RBC AUTO-ENTMCNC: 31.6 G/DL (ref 30–36.5)
MCV RBC AUTO: 91.8 FL (ref 80–99)
MONOCYTES # BLD: 0.6 K/UL (ref 0–1)
MONOCYTES NFR BLD: 10 % (ref 5–13)
NEUTS SEG # BLD: 4.1 K/UL (ref 1.8–8)
NEUTS SEG NFR BLD: 71 % (ref 32–75)
NRBC # BLD: 0 K/UL (ref 0–0.01)
NRBC BLD-RTO: 0 PER 100 WBC
PLATELET # BLD AUTO: 63 K/UL (ref 150–400)
PMV BLD AUTO: 11.9 FL (ref 8.9–12.9)
POTASSIUM SERPL-SCNC: 3.8 MMOL/L (ref 3.5–5.1)
PROT SERPL-MCNC: 8.4 G/DL (ref 6.4–8.2)
RBC # BLD AUTO: 3.76 M/UL (ref 3.8–5.2)
SODIUM SERPL-SCNC: 137 MMOL/L (ref 136–145)
WBC # BLD AUTO: 5.7 K/UL (ref 3.6–11)

## 2018-03-14 PROCEDURE — 80053 COMPREHEN METABOLIC PANEL: CPT | Performed by: EMERGENCY MEDICINE

## 2018-03-14 PROCEDURE — 99284 EMERGENCY DEPT VISIT MOD MDM: CPT

## 2018-03-14 PROCEDURE — 87804 INFLUENZA ASSAY W/OPTIC: CPT | Performed by: EMERGENCY MEDICINE

## 2018-03-14 PROCEDURE — 71045 X-RAY EXAM CHEST 1 VIEW: CPT

## 2018-03-14 PROCEDURE — 74011250636 HC RX REV CODE- 250/636: Performed by: EMERGENCY MEDICINE

## 2018-03-14 PROCEDURE — 85025 COMPLETE CBC W/AUTO DIFF WBC: CPT | Performed by: EMERGENCY MEDICINE

## 2018-03-14 PROCEDURE — 83605 ASSAY OF LACTIC ACID: CPT

## 2018-03-14 PROCEDURE — 96374 THER/PROPH/DIAG INJ IV PUSH: CPT

## 2018-03-14 PROCEDURE — 36415 COLL VENOUS BLD VENIPUNCTURE: CPT | Performed by: EMERGENCY MEDICINE

## 2018-03-14 PROCEDURE — 74011250637 HC RX REV CODE- 250/637: Performed by: EMERGENCY MEDICINE

## 2018-03-14 PROCEDURE — 93005 ELECTROCARDIOGRAM TRACING: CPT

## 2018-03-14 PROCEDURE — 87040 BLOOD CULTURE FOR BACTERIA: CPT | Performed by: EMERGENCY MEDICINE

## 2018-03-14 PROCEDURE — 83605 ASSAY OF LACTIC ACID: CPT | Performed by: EMERGENCY MEDICINE

## 2018-03-14 PROCEDURE — 96361 HYDRATE IV INFUSION ADD-ON: CPT

## 2018-03-14 RX ORDER — KETOROLAC TROMETHAMINE 30 MG/ML
15 INJECTION, SOLUTION INTRAMUSCULAR; INTRAVENOUS
Status: COMPLETED | OUTPATIENT
Start: 2018-03-14 | End: 2018-03-14

## 2018-03-14 RX ORDER — OSELTAMIVIR PHOSPHATE 75 MG/1
75 CAPSULE ORAL
Status: COMPLETED | OUTPATIENT
Start: 2018-03-14 | End: 2018-03-14

## 2018-03-14 RX ORDER — OSELTAMIVIR PHOSPHATE 75 MG/1
75 CAPSULE ORAL 2 TIMES DAILY
Qty: 10 CAP | Refills: 0 | Status: SHIPPED | OUTPATIENT
Start: 2018-03-14 | End: 2018-03-19

## 2018-03-14 RX ORDER — SODIUM CHLORIDE 0.9 % (FLUSH) 0.9 %
5-10 SYRINGE (ML) INJECTION AS NEEDED
Status: DISCONTINUED | OUTPATIENT
Start: 2018-03-14 | End: 2018-03-14 | Stop reason: HOSPADM

## 2018-03-14 RX ORDER — ACETAMINOPHEN 325 MG/1
650 TABLET ORAL
Status: DISCONTINUED | OUTPATIENT
Start: 2018-03-14 | End: 2018-03-14

## 2018-03-14 RX ORDER — PREDNISONE 20 MG/1
40 TABLET ORAL
Qty: 10 TAB | Refills: 0 | Status: SHIPPED | OUTPATIENT
Start: 2018-03-14 | End: 2018-03-19

## 2018-03-14 RX ADMIN — SODIUM CHLORIDE 1000 ML: 900 INJECTION, SOLUTION INTRAVENOUS at 15:51

## 2018-03-14 RX ADMIN — OSELTAMIVIR PHOSPHATE 75 MG: 75 CAPSULE ORAL at 16:33

## 2018-03-14 RX ADMIN — KETOROLAC TROMETHAMINE 15 MG: 30 INJECTION, SOLUTION INTRAMUSCULAR at 15:51

## 2018-03-14 NOTE — ED NOTES
Assumed care of patient at this time. Pt states that she has had a strong cough and as well as a COPD exacerbation for about 3 days. Pt denies chest pain but does have shortness of breath. She states that no one around her has been sick lately but she has been diagnosed with pneumonia in the past. Her cough is very strong and dry and is causing some gagging but she is unable to bring anything up. Pt denies urinary symptoms and congestion at this time.     Pt resting on the stretcher with  at bedside

## 2018-03-14 NOTE — DISCHARGE INSTRUCTIONS

## 2018-03-14 NOTE — ED NOTES
Pt ambulated with pulsox with RN. Gait was steady and O2 saturation 90-92% on RA during ambulation.  Pt tolerated well and did not report any increasing discomfort with ambulation

## 2018-03-14 NOTE — ED NOTES
MD Cervantes Palestinian at bedside to update patient on plan of care.  MD would like patient to ambulate with pulsox as well as a repeat lactic after 1,000mL bolus is finished

## 2018-03-14 NOTE — ED NOTES
Pt discharged by MD Ila Interiano. Pt provided with discharge instructions Rx and instructions on follow up care. Pt out of ED in stable condition accompanied by RN and .

## 2018-03-14 NOTE — ED NOTES
EMERGENCY DEPARTMENT HISTORY AND PHYSICAL EXAM      Date: 3/14/2018  Patient Name: Jeane Meier    History of Presenting Illness     Chief Complaint   Patient presents with    Fever     \"really sick, I keep a low fever\" since yesterday    Cough     non productive since for 3 days. +smoker 1.5 ppd       History Provided By: Patient and Patient's     HPI: Jeane Meier, 58 y.o. female with PMHx significant for HTN, HLD, Cirrhosis 2/2 ETOH abuse, COPD, IDDM, presents to the ED with cc of fever and cough x 3 days. Pt notes she has been having dry cough, fever, muscle aches, dec appetite and increased urinary frequency for 3 days. No sick contacts. Got flu shot. No abd pain,nausea,vomiting, dysuria, diarrhea, skin changes. Has tried tylenol for symptoms with no relief. No pain at this time. PCP: Gabby Lion NP    There are no other complaints, changes, or physical findings at this time.     Current Facility-Administered Medications   Medication Dose Route Frequency Provider Last Rate Last Dose    sodium chloride (NS) flush 5-10 mL  5-10 mL IntraVENous PRN Snehal Munoz MD        ketorolac (TORADOL) injection 15 mg  15 mg IntraVENous NOW Snehal Munoz MD        sodium chloride 0.9 % bolus infusion 1,000 mL  1,000 mL IntraVENous ONCE Clayton Vargas MD         Current Outpatient Prescriptions   Medication Sig Dispense Refill    traMADol (ULTRAM) 50 mg tablet take 1-2 tablets by mouth twice a day if needed for DIABETIC NEUROPATHY PAIN 120 Tab 5    omeprazole (PRILOSEC) 20 mg capsule take 1 capsule by mouth once daily 30 Cap 5    CONSTULOSE 10 gram/15 mL solution take 2 tablespoonfuls by mouth twice a day 1000 mL 5    ferrous sulfate 325 mg (65 mg iron) tablet take 1 tablet by mouth once daily WITH BREAKFAST 90 Tab 1    losartan (COZAAR) 25 mg tablet take 1 tablet by mouth once daily REPLACES LISINOPRIL FOR BLOOD PRESSURE AND KIDNEY PROTECTION 30 Tab 11    insulin glargine (LANTUS SOLOSTAR) 100 unit/mL (3 mL) inpn Inject 110 units every morning as 2 separate shots of 55 units back to back 45 mL 11    insulin lispro (HUMALOG KWIKPEN) 100 unit/mL kwikpen Inject as needed up to 3 times per day for sugars over 150: 4 units for every 50 points--Dose change 10/6/17--updated med list--did not send prescription to the pharmacy 45 mL 11    metFORMIN (GLUCOPHAGE) 1,000 mg tablet take 1 tablet by mouth twice a day with meals 180 Tab 3    gabapentin (NEURONTIN) 600 mg tablet take 2 tablets by mouth three times a day (NEW HIGHER DOSE) 180 Tab 11    furosemide (LASIX) 20 mg tablet take 1 tablet by mouth once daily 30 Tab 5    potassium chloride SR (KLOR-CON 10) 10 mEq tablet take 1 tablet by mouth twice a day 60 Tab 5    ondansetron (ZOFRAN ODT) 4 mg disintegrating tablet dissolve 1 tablet ON TONGUE every 8 hours if needed for nausea 45 Tab 3    albuterol-ipratropium (DUO-NEB) 2.5 mg-0.5 mg/3 ml nebu 3 mL by Nebulization route every four (4) hours as needed. 30 Nebule 0    Nebulizer & Compressor machine UAD 1 Each 0    TREVOR PEN NEEDLE 32 gauge x 5/32\" ndle use as directed four times a day 100 Pen Needle 11    propranolol (INDERAL) 20 mg tablet Take 1 Tab by mouth two (2) times a day. 60 Tab 11    amitriptyline (ELAVIL) 150 mg tablet Take 150 mg by mouth nightly.  rOPINIRole (REQUIP) 4 mg tab TAB Take 4 mg by mouth nightly.  albuterol (PROAIR HFA) 90 mcg/actuation inhaler Take 2 Puffs by inhalation every four (4) hours as needed.  fluticasone-salmeterol (ADVAIR DISKUS) 500-50 mcg/dose diskus inhaler Take 1 Puff by inhalation two (2) times a day.          Past History     Past Medical History:  Past Medical History:   Diagnosis Date    Asthma     Back pain     [facility]ronic obstructive [facility] disease) [facility]     Diabetes [facility]     Esophageal varices in cirrhosis ([facility]     6/2014 banding x 2    Fibromyalgia     Gastrointestinal disorder     GERD (gastroesophageal reflux disease)     Hypercholesteremia     Liver cirrhosis secondary to BALDERAS ([facility]     Liver disease     Other and unspecified hyperlipidemia     Restless leg syndrome     Type II or unspecified type diabetes mellitus without mention of complication, uncontrolled     Unspecified essential hypertension        Past Surgical History:  Past Surgical History:   Procedure Laterality Date    HX BACK SURGERY      HX CARPAL TUNNEL RELEASE      on right    HX HYSTERECTOMY      plus 1/2 of an ovary removed    GA COLSC FLX W/RMVL OF TUMOR POLYP LESION SNARE TQ  5/30/2013         UPPER GI ENDOSCOPY,LIGAT VARIX  2/6/2015            Family History:  Family History   Problem Relation Age of Onset    Diabetes Mother     Stroke Sister     Diabetes Paternal Aunt     Diabetes Paternal Uncle     Heart Disease Neg Hx        Social History:  Social History   Substance Use Topics    Smoking status: Current Every Day Smoker     Packs/day: 1.50     Years: 40.00    Smokeless tobacco: Former User    Alcohol use No      Comment: rare       Allergies: Allergies   Allergen Reactions    Hydrocodone Nausea and Vomiting    Lisinopril Cough    Lortab [Hydrocodone-Acetaminophen] Nausea and Vomiting    Percocet [Oxycodone-Acetaminophen] Nausea and Vomiting         Review of Systems   Review of Systems   Constitutional: Positive for appetite change, chills, fatigue and fever. Negative for activity change and diaphoresis. HENT: Positive for congestion and rhinorrhea. Negative for ear pain, nosebleeds, sinus pain, sore throat and trouble swallowing. Eyes: Negative. Respiratory: Positive for cough and wheezing. Negative for apnea, choking and shortness of breath. Cardiovascular: Negative. Gastrointestinal: Negative. Endocrine: Positive for polyuria. Negative for cold intolerance. Genitourinary: Positive for frequency. Negative for decreased urine volume, difficulty urinating, dysuria, hematuria and urgency. Musculoskeletal: Negative. Skin: Negative. Allergic/Immunologic: Negative. Neurological: Negative. Hematological: Negative. Psychiatric/Behavioral: Negative. Physical Exam   Physical Exam   Constitutional: She is oriented to person, place, and time. She appears well-developed and well-nourished. No distress. HENT:   Head: Normocephalic and atraumatic. Mouth/Throat: No oropharyngeal exudate. Dry oral mucosa   Eyes: Conjunctivae and EOM are normal. Right eye exhibits no discharge. Left eye exhibits no discharge. No scleral icterus. Neck: Normal range of motion. No JVD present. No tracheal deviation present. Cardiovascular: Regular rhythm. Exam reveals no gallop and no friction rub. No murmur heard. tachycardic   Pulmonary/Chest: Effort normal. No stridor. No respiratory distress. She has wheezes. She has no rales. She exhibits no tenderness. Abdominal: Soft. She exhibits no distension. There is no tenderness. There is no rebound and no guarding. Musculoskeletal: Normal range of motion. She exhibits no edema or deformity. Neurological: She is alert and oriented to person, place, and time. No cranial nerve deficit. Coordination normal.   Skin: Skin is warm and dry. No rash noted. She is not diaphoretic. No erythema. Psychiatric: She has a normal mood and affect. Her behavior is normal.         Diagnostic Study Results     Labs -   No results found for this or any previous visit (from the past 12 hour(s)). Radiologic Studies -   XR CHEST PORT    (Results Pending)     CT Results  (Last 48 hours)    None        CXR Results  (Last 48 hours)    None            Medical Decision Making   I am the first provider for this patient. I reviewed the vital signs, available nursing notes, past medical history, past surgical history, family history and social history. Vital Signs-Reviewed the patient's vital signs.   Patient Vitals for the past 12 hrs:   Temp Pulse Resp BP SpO2 03/14/18 1457 (!) 101.3 °F (38.5 °C) (!) 108 20 172/67 95 %       Pulse Oximetry Analysis - 95% on RA    Cardiac Monitor:   Rate: 108 bpm  Rhythm: Normal Sinus Rhythm      Records Reviewed: Nursing Notes and Old Medical Records    Provider Notes (Medical Decision Making):   15JJ F with multiple medical comorbidities presents with what appears to be clinically a viral syndrome. Due to tachycardia and fever, will rule out sepsis with blood cultures, UA, CXR, cbc, and basic met. Pt HD stable and in no respiratory distress making acute intervention unnecessary. Dry oral mucosa, will give IVF bolus and toradol for symptoms. Will send Flu A and B. Dispo per results. ED Course:   Initial assessment performed. The patients presenting problems have been discussed, and they are in agreement with the care plan formulated and outlined with them. I have encouraged them to ask questions as they arise throughout their visit. 6:48 PM  Pt with flu B. Will send home with tamiflu and prednisone burst for mild COPD exacerbation. Pt VSS and will follow up to PCP. Given close return precautions. All other labs/imaging negative. Disposition:  Discharge Note:  6:49 PM  The pt is ready for discharge. The pt's signs, symptoms, diagnosis, and discharge instructions have been discussed and pt has conveyed their understanding. The pt is to follow up as recommended or return to ER should their symptoms worsen. Plan has been discussed and pt is in agreement. PLAN:  1. Current Discharge Medication List        2. Follow-up Information     None        Return to ED if worse     Diagnosis     Clinical Impression: No diagnosis found.

## 2018-03-15 LAB
ATRIAL RATE: 109 BPM
CALCULATED P AXIS, ECG09: 76 DEGREES
CALCULATED R AXIS, ECG10: 77 DEGREES
CALCULATED T AXIS, ECG11: 83 DEGREES
DIAGNOSIS, 93000: NORMAL
P-R INTERVAL, ECG05: 166 MS
Q-T INTERVAL, ECG07: 316 MS
QRS DURATION, ECG06: 66 MS
QTC CALCULATION (BEZET), ECG08: 425 MS
VENTRICULAR RATE, ECG03: 109 BPM

## 2018-03-20 LAB
BACTERIA SPEC CULT: NORMAL
BACTERIA SPEC CULT: NORMAL
SERVICE CMNT-IMP: NORMAL
SERVICE CMNT-IMP: NORMAL

## 2018-03-27 ENCOUNTER — HOSPITAL ENCOUNTER (OUTPATIENT)
Dept: NON INVASIVE DIAGNOSTICS | Age: 63
Discharge: HOME | End: 2018-03-27
Payer: MEDICARE

## 2018-03-27 ENCOUNTER — RESEARCH ENCOUNTER (OUTPATIENT)
Dept: HEMATOLOGY | Age: 63
End: 2018-03-27

## 2018-03-27 DIAGNOSIS — K74.69 OTHER CIRRHOSIS OF LIVER (HCC): Primary | ICD-10-CM

## 2018-03-27 DIAGNOSIS — K76.6 PORTAL HYPERTENSION (HCC): ICD-10-CM

## 2018-03-27 DIAGNOSIS — D69.6 THROMBOCYTOPENIA (HCC): ICD-10-CM

## 2018-03-27 DIAGNOSIS — Z00.6 EXAM FOR CLINICAL RESEARCH: ICD-10-CM

## 2018-03-27 DIAGNOSIS — K75.81 NASH (NONALCOHOLIC STEATOHEPATITIS): ICD-10-CM

## 2018-03-27 DIAGNOSIS — I85.10 SECONDARY ESOPHAGEAL VARICES WITHOUT BLEEDING (HCC): ICD-10-CM

## 2018-03-27 LAB
ATRIAL RATE: 100 BPM
CALCULATED P AXIS, ECG09: 75 DEGREES
CALCULATED R AXIS, ECG10: 57 DEGREES
CALCULATED T AXIS, ECG11: 69 DEGREES
DIAGNOSIS, 93000: NORMAL
P-R INTERVAL, ECG05: 168 MS
Q-T INTERVAL, ECG07: 370 MS
QRS DURATION, ECG06: 78 MS
QTC CALCULATION (BEZET), ECG08: 477 MS
VENTRICULAR RATE, ECG03: 100 BPM

## 2018-03-27 PROCEDURE — 93005 ELECTROCARDIOGRAM TRACING: CPT

## 2018-03-29 RX ORDER — CIPROFLOXACIN 500 MG/1
500 TABLET ORAL 2 TIMES DAILY
Qty: 14 TAB | Refills: 0 | Status: SHIPPED | OUTPATIENT
Start: 2018-03-29 | End: 2018-04-05

## 2018-03-29 NOTE — PROGRESS NOTES
70 Dwayne Arizmendi MD, [address]       Ruth Agrawal NP    [place], PARITA Sarabia, [facility]   ALEJANDRO Gonzalez NP [address] [facility] 136    at [address]    [address] [place] [address], [address] [address]  22.    [phone]    FAX: [address] [place] [address] Avenue    [address], [address] [facility], [address]    [phone]    FAX: [phone]     Patient was seen today as part of clinical research protocol for management of BALDERAS cirrhosis, Conatus 14, week 50 (end of study visit). Updated history and physical examination per protocol was performed as well as lab studies. Symptoms reported at this time include: ongoing post-prandial nausea, and dry mouth/thirst. She denies symptoms of GI bleeding. Patient is having <1 bowel movement daily with bid use of lactulose. Patient has underlying cirrhosis and is is in need of the following surveillance testing:  US in April 2018, ordered today. EGD was performed 2/8/2018 with Dr Keely Zepeda. This demonstrated multiple medium, esophageal varices requiring placement of 2 bands. Repeat EGD will be in August 2018. The need for a healthier diet and exercise were discussed in detail today. Handouts were given. Patient to follow-up in 3 months.     Ruth Agrawal NP  [address] [address], [address] [place] [address], [address] [place] [address]  Ph: [phone]  Fax: [phone]

## 2018-04-09 ENCOUNTER — HOSPITAL ENCOUNTER (OUTPATIENT)
Dept: NON INVASIVE DIAGNOSTICS | Age: 63
Discharge: HOME | End: 2018-04-09
Payer: MEDICARE

## 2018-04-09 ENCOUNTER — RESEARCH ENCOUNTER (OUTPATIENT)
Dept: HEMATOLOGY | Age: 63
End: 2018-04-09

## 2018-04-09 DIAGNOSIS — D69.6 THROMBOCYTOPENIA (HCC): Primary | ICD-10-CM

## 2018-04-09 DIAGNOSIS — K75.81 NASH (NONALCOHOLIC STEATOHEPATITIS): ICD-10-CM

## 2018-04-09 DIAGNOSIS — K74.69 OTHER CIRRHOSIS OF LIVER (HCC): ICD-10-CM

## 2018-04-09 DIAGNOSIS — D50.8 OTHER IRON DEFICIENCY ANEMIA: ICD-10-CM

## 2018-04-09 DIAGNOSIS — Z00.6 EXAMINATION OF PARTICIPANT IN CLINICAL TRIAL: ICD-10-CM

## 2018-04-09 LAB
ATRIAL RATE: 101 BPM
CALCULATED P AXIS, ECG09: 75 DEGREES
CALCULATED R AXIS, ECG10: 54 DEGREES
CALCULATED T AXIS, ECG11: 64 DEGREES
DIAGNOSIS, 93000: NORMAL
P-R INTERVAL, ECG05: 176 MS
Q-T INTERVAL, ECG07: 358 MS
QRS DURATION, ECG06: 90 MS
QTC CALCULATION (BEZET), ECG08: 464 MS
VENTRICULAR RATE, ECG03: 101 BPM

## 2018-04-09 PROCEDURE — 93005 ELECTROCARDIOGRAM TRACING: CPT

## 2018-04-09 RX ORDER — FUROSEMIDE 20 MG/1
20 TABLET ORAL DAILY
Qty: 90 TAB | Refills: 3 | Status: SHIPPED | OUTPATIENT
Start: 2018-04-09 | End: 2019-01-01 | Stop reason: SDUPTHER

## 2018-04-09 NOTE — MR AVS SNAPSHOT
[address] [facility] [id number] [address] [place] [address] 
[phone] Patient: Boubacar Robert MRN: [id number] VWM:[birth year] Visit Information Date & Time Provider Department Dept. Phone Encounter #  
 4/9/2018 10:00 AM Cathi Butts, [address] [facility] [id number] Your Appointments 4/12/2018  9:10 AM  
Follow Up with Bright Che MD  
[facility] [address] [place] [address]) Appt Note: f/u         dm                6 mon [employer] [phone] [id number] [address] [place] [address] Road 5/17/2018 11:40 AM  
Any with Jp Almeida MD  
[address] [place] [address] ([address] [place] [address]) Appt Note: PAP f/up bring machine [phone]  
[address] [phone]  
  
    
 5/31/2018  2:00 PM  
PROCEDURE with Luther Zepeda MD  
[address] ([facility]) Appt Note: EGD  
 [address] [facility] [id number] [address] [place] [address]  
[phone]  
  
   
 [address] [facility] 505 [facility] [address]  
  
    
 7/10/2018  9:00 AM  
Follow Up with Cathi Rogers NP [address] ([address] [place] [address]) Appt Note: 3 month f/u  
 [address] [facility] [id number] [facility] [address]  
[address] Upcoming Health Maintenance Date Due  
 PAP AKA CERVICAL CYTOLOGY 8/26/1976 BREAST CANCER SCRN MAMMOGRAM 8/26/2005 ZOSTER VACCINE AGE 60> 6/26/2015 FOBT Q 1 YEAR AGE 50-75 3/1/2017 Influenza Age 5 to Adult 8/1/2017 MICROALBUMIN Q1 11/1/2017 LIPID PANEL Q1 11/1/2017 MEDICARE YEARLY EXAM 3/14/2018 HEMOGLOBIN A1C Q6M 7/23/2018 EYE EXAM RETINAL OR DILATED Q1 8/31/2018 FOOT EXAM Q1 10/6/2018 DTaP/Tdap/Td series (2 - Td) 11/19/2026 Allergies as of 4/9/2018  Review Complete On: 3/14/2018 By: Nelsy Zhao RN Severity Noted Reaction Type Reactions Hydrocodone  02/05/2015   Systemic Nausea and Vomiting Lisinopril  11/10/2016    Cough Lortab [Hydrocodone-acetaminophen]  11/17/2016    Nausea and Vomiting Percocet [Oxycodone-acetaminophen]  02/05/2015   Systemic Nausea and Vomiting Current Immunizations  Reviewed on 8/31/2017 Name Date Influenza Vaccine 10/6/2014, 10/3/2013 Influenza Vaccine (Quad) PF 3/30/2017  2:39 PM  
 Pneumococcal Vaccine (Unspecified Type) 10/3/2013 Tdap 11/19/2016  2:59 PM  
  
 Not reviewed this visit You Were Diagnosed With   
  
 Codes Comments Thrombocytopenia ([facility]    -  Primary ICD-10-CM: D69.6 ICD-9-CM: 287.5 Diabetes mellitus with neurological manifestations, uncontrolled ([facility]     ICD-10-CM: E11.49, E11.65 ICD-9-CM: 250.62 BALDERAS (nonalcoholic steatohepatitis)     ICD-10-CM: T73.09 ICD-9-CM: 571.8 Other cirrhosis of liver ([facility]     ICD-10-CM: C80.30 ICD-9-CM: 571.5 Other iron deficiency anemia     ICD-10-CM: D50.8 ICD-9-CM: 280.8 Vitals OB Status Smoking Status Hysterectomy Current Every Day Smoker Preferred Pharmacy Pharmacy Name Phone [id number] [address] [facility] [phone] Your Updated Medication List  
  
   
This list is accurate as of 4/9/18 10:31 AM.  Always use your most recent med list.  
  
  
  
  
 ADVAIR DISKUS 500-50 mcg/dose diskus inhaler Generic drug:  fluticasone-salmeterol Take 1 Puff by inhalation two (2) times a day. albuterol-ipratropium 2.5 mg-0.5 mg/3 ml Nebu Commonly known as:  DUO-NEB  
3 mL by Nebulization route every four (4) hours as needed. amitriptyline 150 mg tablet Commonly known as:  ELAVIL Take 150 mg by mouth nightly. CONSTULOSE 10 gram/15 mL solution Generic drug:  lactulose take 2 tablespoonfuls by mouth twice a day  
  
 ferrous sulfate 325 mg (65 mg iron) tablet  
take 1 tablet by mouth once daily WITH BREAKFAST  
  
 furosemide 20 mg tablet Commonly known as:  LASIX Take 1 Tab by mouth daily. gabapentin 600 mg tablet Commonly known as:  NEURONTIN  
take 2 tablets by mouth three times a day (NEW HIGHER DOSE) insulin glargine 100 unit/mL (3 mL) Inpn Commonly known as:  LANTUS SOLOSTAR U-100 INSULIN Inject 110 units every morning as 2 separate shots of 55 units back to back  
  
 insulin lispro 100 unit/mL kwikpen Commonly known as:  HumaLOG KwikPen Insulin Inject as needed up to 3 times per day for sugars over 150: 4 units for every 50 points--Dose change 10/6/17--updated med list--did not send prescription to the pharmacy  
  
 losartan 25 mg tablet Commonly known as:  COZAAR  
take 1 tablet by mouth once daily REPLACES LISINOPRIL FOR BLOOD PRESSURE AND KIDNEY PROTECTION  
  
 metFORMIN 1,000 mg tablet Commonly known as:  GLUCOPHAGE  
take 1 tablet by mouth twice a day with meals Bambi Pen Needle 32 gauge x 5/32\" Ndle Generic drug:  Insulin Needles (Disposable)  
use as directed four times a day Nebulizer & Compressor machine UAD  
  
 omeprazole 20 mg capsule Commonly known as:  PRILOSEC  
take 1 capsule by mouth once daily  
  
 ondansetron 4 mg disintegrating tablet Commonly known as:  ZOFRAN ODT  
dissolve 1 tablet ON TONGUE every 8 hours if needed for nausea  
  
 potassium chloride SR 10 mEq tablet Commonly known as:  KLOR-CON 10  
take 1 tablet by mouth twice a day PROAIR HFA 90 mcg/actuation inhaler Generic drug:  albuterol Take 2 Puffs by inhalation every four (4) hours as needed. propranolol 20 mg tablet Commonly known as:  INDERAL Take 1 Tab by mouth two (2) times a day. rOPINIRole 4 mg Tab TAB Commonly known as:  [place] Take 4 mg by mouth nightly. traMADol 50 mg tablet Commonly known as:  ULTRAM  
take 1-2 tablets by mouth twice a day if needed for DIABETIC NEUROPATHY PAIN Prescriptions Sent to Pharmacy Refills  
 furosemide (LASIX) 20 mg tablet 3 Sig: Take 1 Tab by mouth daily. Class: Normal  
 Pharmacy: [facility], [address] [place] [address] #: [phone] Route: Oral  
  
We Performed the Following CBC W/O DIFF [81841 CPT(R)] FERRITIN [32017 CPT(R)] IRON PROFILE [id number] CPT(R)] To-Do List   
 04/23/2018 8:30 AM  
  Appointment with [address] at [facility] ([phone]) General  NPO DIET RESTICTIONS Please be NPO (nothing by mouth) for 6- 8 hours prior to procedure. GENERAL INSTRUCTIONS 1. Bring any non [facility] facility films/reports pertaining to the area being studied with you on the day of appointment. 2. A written order with a valid diagnosis and Physicians signature is required for all scheduled tests. 3. Check in at registration 30 minutes before your appointment time unless you were instructed to do otherwise. Introducing [facility] & HEALTH SERVICES! Dear Tomas Harmon: Thank you for requesting a [employer] account. Our records indicate that you already have an active [employer] account. You can access your account anytime at [email or web address] Did you know that you can access your hospital and ER discharge instructions at any time in [employer]? You can also review all of your test results from your hospital stay or ER visit. Additional Information If you have questions, please visit the Frequently Asked Questions section of the [employer] website at [email or web address] Remember, [employer] is NOT to be used for urgent needs. For medical emergencies, dial 911. Now available from your iPhone and Android! Please provide this summary of care documentation to your next provider. Your primary care clinician is listed as Ismael Marinelli. If you have any questions after today's visit, please call [phone].

## 2018-04-09 NOTE — PROGRESS NOTES
70 Dwayne Arizmendi MD, [address], [place], [place]       ALEJANDRO Lorenzo, JANEL Odom, [facility]   ALEJANDRO Steele NP [address] [facility] 136    at [address], [address] [place] [address], [address].    [phone]    FAX: [address] [place] [address], [address]    [phone]    FAX: [phone]     Patient was seen today as part of clinical research protocol for management of BALDERAS cirrhosis, Conatus 14, 2 weeks post treatment/study. Updated history and physical examination per protocol was performed as well as lab studies. Symptoms reported at this time include: ongoing post-prandial nausea, and dry mouth/thirst. She denies symptoms of GI bleeding. Patient is having <1 bowel movement daily with bid use of lactulose. Patient has underlying cirrhosis and is is in need of the following surveillance testing:  US in April 2018, this has been ordered. EGD was performed 2/8/2018 with Dr Margie Ramos. This demonstrated multiple medium, esophageal varices requiring placement of 2 bands. Repeat EGD will be in August 2018. The need for a healthier diet and exercise were discussed in detail today. Handouts were given. Anemia. Iron studies were performed today due to 1 gm drop in Hgb. Will review results when available. Patient to follow-up in 3 months.     Magali Cabrera NP  [address] [facility], [address] [place] [address]  Ph: [phone]  Fax: [phone]

## 2018-04-10 LAB
ERYTHROCYTE [DISTWIDTH] IN BLOOD BY AUTOMATED COUNT: 16.3 % (ref 12.3–15.4)
FERRITIN SERPL-MCNC: 42 NG/ML (ref 15–150)
HCT VFR BLD AUTO: 30.2 % (ref 34–46.6)
HGB BLD-MCNC: 9.8 G/DL (ref 11.1–15.9)
IRON SATN MFR SERPL: 10 % (ref 15–55)
IRON SERPL-MCNC: 35 UG/DL (ref 27–139)
MCH RBC QN AUTO: 27.2 PG (ref 26.6–33)
MCHC RBC AUTO-ENTMCNC: 32.5 G/DL (ref 31.5–35.7)
MCV RBC AUTO: 84 FL (ref 79–97)
MORPHOLOGY BLD-IMP: ABNORMAL
PLATELET # BLD AUTO: 66 X10E3/UL (ref 150–379)
RBC # BLD AUTO: 3.6 X10E6/UL (ref 3.77–5.28)
TIBC SERPL-MCNC: 347 UG/DL (ref 250–450)
UIBC SERPL-MCNC: 312 UG/DL (ref 118–369)
WBC # BLD AUTO: 4.3 X10E3/UL (ref 3.4–10.8)

## 2018-04-11 ENCOUNTER — DOCUMENTATION ONLY (OUTPATIENT)
Dept: HEMATOLOGY | Age: 63
End: 2018-04-11

## 2018-04-11 NOTE — PROGRESS NOTES
Per Cathi Barksdale,NP - order placed for Injectafer 750 mg every 7 days - two doses. Patient informed order faxed to [facility]. Patient informed to call the office if she does not hear from the [facility] within the next 24-48 for scheduling. Patient repeated instructions and had no further questions.

## 2018-04-12 ENCOUNTER — OFFICE VISIT (OUTPATIENT)
Dept: ENDOCRINOLOGY | Age: 63
End: 2018-04-12

## 2018-04-12 VITALS
DIASTOLIC BLOOD PRESSURE: 56 MMHG | WEIGHT: 191.2 LBS | SYSTOLIC BLOOD PRESSURE: 122 MMHG | HEART RATE: 96 BPM | HEIGHT: 61 IN | BODY MASS INDEX: 36.1 KG/M2

## 2018-04-12 DIAGNOSIS — Z51.81 THERAPEUTIC DRUG MONITORING: ICD-10-CM

## 2018-04-12 DIAGNOSIS — E78.5 HYPERLIPIDEMIA LDL GOAL <100: ICD-10-CM

## 2018-04-12 DIAGNOSIS — I10 ESSENTIAL HYPERTENSION, BENIGN: ICD-10-CM

## 2018-04-12 RX ORDER — INSULIN GLARGINE 100 [IU]/ML
INJECTION, SOLUTION SUBCUTANEOUS
Qty: 45 ML | Refills: 11
Start: 2018-04-12 | End: 2018-01-01

## 2018-04-12 NOTE — LETTER
Name:Ryan Cullen B:[birth year] MR #:[id number] Provider Name: Rios Wheeler MD  
*FLTH-507* BSMG-491 (5/16) Page 1 of 5 Initial [employer] CONTROLLED SUBSTANCE AGREEMENT I may be prescribed medications that are controlled substances as part  of my treatment plan for management of my medical condition(s). The goal of my treatment plan is to maintain and/or improve my health and wellbeing. Because controlled substances have an increased risk of abuse or harm, continual re-evaluation is needed determine if the goals of my treatment plan are being met for my safety and the safety of others. Yuliana Gray  am entering into this Controlled Substance Agreement with my provider, Baltazar Nair NP at [address] [place] [address] . I understand that successful treatment requires mutual trust and honesty between me and my provider. I understand that there are state and federal laws and regulations which apply to the medications that my provider may prescribe that must be followed. I understand there are risks and benefits ts of taking the medicines that my provider may prescribe. I understand and agree that following this Agreement is necessary in continuing my provider-patient relationship and success of my treatment plan. As a part of my treatment plan, I agree to the following: COMMUNICATION: 
 
1. I will communicate fully with my provider about my medical condition(s), including the effect on my daily life and how well my medications are helping. I will tell my provider all of the medications that I take for any reason, including medications I receive from another health care provider, and will notify my provider about all issues, problems or concerns, including any side effects, which may be related to my medications. I understand that this information allows my provider to adjust my treatment plan to help manage my medical condition.  I understand that this information will become part of my permanent medical record. 2. I will notify my provider if I have a history of alcohol/drug misuse/addiction or if I have had treatment for alcohol/drug addiction in the past, or if I have a new problem with or concern about alcohol/drug use/addiction, because this increases the likelihood of high risk behaviors and may lead to serious medical conditions. 3. Females Only: I will notify my provider if I am or become pregnant, or if I intend to become pregnant, or if I intend to breastfeed. I understand that communication of these issues with my provider is important, due to possible effects my medication could have on an unborn fetus or breastfeeding child. Name:.Dhara Burns HOQ:[birth year] MR #:[id number] Provider Name: Brissa Dewey MD  
*KTGN-200* BSMG-491 (5/16) Page 2 of 5 Initial [employer] MISUSE OF MEDICATIONS / DRUGS: 
 
1. I agree to take all controlled substances as prescribed, and will not misuse or abuse any controlled substances prescribed by my provider. For my safety, I will not increase the amount of medicine I take without first talking with and getting permission from my provider. 2. If I have a medical emergency, another health care provider may prescribe me medication. If I seek emergency treatment, I will notify my provider within seventy-two (72) hours. 3. I understand that my provider may discuss my use and/or possible misuse/abuse of controlled substances and alcohol, as appropriate, with any health care provider involved in my care, pharmacist or legal authority. ILLEGAL DRUGS: 
 
1. I will not use illegal drugs of any kind, including but not limited to marijuana, heroin, cocaine, or any prescription drug which is not prescribed to me. DRUG DIVERSION / PRESCRIPTION FRAUD: 
 
1. I will not share, sell, trade, give away, or otherwise misuse my prescriptions or medications. 2. I will not alter any prescriptions provided to me by my provider. SINGLE PROVIDER: 
 
1. I agree that all controlled substances that I take will be prescribed only by my provider (or his/her covering provider) under this Agreement. This agreement does not prevent me from seeking emergency medical treatment or receiving pain management related to a surgery. PROTECTING MEDICATIONS: 
 
1. I am responsible for keeping my prescriptions and medications in a safe and secure place including safeguarding them from loss or theft. I understand that lost, stolen or damaged/destroyed prescriptions or medications will not be replaced. Name:.Dhara Granger  UGE:[birth year] MR #:[id number] Provider Name: Noemi Slaughter MD  
*QMJJ-313* BSMG-491 (5/16) Page 3 of 5 Initial [employer] PRESCRIPTION RENEWALS/REFILLS: 
 
1. I will follow my controlled substance medication schedule as prescribed by my provider. 2. I understand and agree that I will make any requests for renewals or refills of my prescriptions only at the time of an office visit or during my providers regular office hours subject to the prescription refill requirements of the individual practice. 3. I understand that my provider may not call in prescriptions for controlled substances to my pharmacy. 4. I understand that my provider may adjust or discontinue these medications as deemed appropriate for my medical treatment plan. This Agreement does not guarantee the prescription of controlled medications. 5. I agree that if my medications are adjusted or discontinued, I will properly dispose of any remaining medications. I understand that I will be required to dispose of any remaining controlled medications prior to being provided with any prescriptions for other controlled medications.  
 
 
1. I authorize my provider and my pharmacy to cooperate fully with any local, state, or federal law enforcement agency in the investigation of any possible misuse, sale, or other diversion of my controlled substance prescriptions or medications. RISKS: 
 
 
1. I understand that if I do not adhere to this Agreement in any way, my provider may change my prescriptions, stop prescribing controlled substances or end our provider-patient relationship. 2. If my provider decides to stop prescribing medication, or decides to end our provider-patient relationship,my provider may require that I taper my medications slowly. If necessary, my provider may also provide a prescription for other medications to treat my withdrawal symptoms. UNDERSTANDING THIS AGREEMENT: 
 
I understand that my provider may adjust or stop my prescriptions for controlled substances based on my medical condition and my treatment plan. I understand that this Agreement does not guarantee that I will be prescribed medications or controlled substances. I understand that controlled substances may be just one part 
of my treatment plan. My initial on each page and my signature below shows that I have read each page of this Agreement, I have had an opportunity to ask questions, and all of my questions have been answered to my satisfaction by my provider. By signing below, I agree to comply with this Agreement, and I understand that if I do not follow the Agreements listed above, my provider may stop 
 
 
 
_________________________________________  Date/Time 4/12/2018 9:26 AM   
             (Patient Signature)

## 2018-04-12 NOTE — LETTER
Name:Ryan Cullen AXQ:[birth year] MR #:[id number] Provider Name: Rios Wheeler MD  
*OJEK-964* BSMG-491 (5/16) Page 1 of 5 Initial [employer] CONTROLLED SUBSTANCE AGREEMENT I may be prescribed medications that are controlled substances as part  of my treatment plan for management of my medical condition(s). The goal of my treatment plan is to maintain and/or improve my health and wellbeing. Because controlled substances have an increased risk of abuse or harm, continual re-evaluation is needed determine if the goals of my treatment plan are being met for my safety and the safety of others. Yuliana Gray  am entering into this Controlled Substance Agreement with my provider, Rios Wheeler MD at [address] [place] [address] . I understand that successful treatment requires mutual trust and honesty between me and my provider. I understand that there are state and federal laws and regulations which apply to the medications that my provider may prescribe that must be followed. I understand there are risks and benefits ts of taking the medicines that my provider may prescribe. I understand and agree that following this Agreement is necessary in continuing my provider-patient relationship and success of my treatment plan. As a part of my treatment plan, I agree to the following: COMMUNICATION: 
 
1. I will communicate fully with my provider about my medical condition(s), including the effect on my daily life and how well my medications are helping. I will tell my provider all of the medications that I take for any reason, including medications I receive from another health care provider, and will notify my provider about all issues, problems or concerns, including any side effects, which may be related to my medications. I understand that this information allows my provider to adjust my treatment plan to help manage my medical condition.  I understand that this information will become part of my permanent medical record. 2. I will notify my provider if I have a history of alcohol/drug misuse/addiction or if I have had treatment for alcohol/drug addiction in the past, or if I have a new problem with or concern about alcohol/drug use/addiction, because this increases the likelihood of high risk behaviors and may lead to serious medical conditions. 3. Females Only: I will notify my provider if I am or become pregnant, or if I intend to become pregnant, or if I intend to breastfeed. I understand that communication of these issues with my provider is important, due to possible effects my medication could have on an unborn fetus or breastfeeding child. Name:.Dhara Kemp [facility]:[birth year] MR #:[id number] Provider Name: Dennys Arevalo MD  
*WXYM-422* BSMG-491 (5/16) Page 2 of 5 Initial [employer] MISUSE OF MEDICATIONS / DRUGS: 
 
1. I agree to take all controlled substances as prescribed, and will not misuse or abuse any controlled substances prescribed by my provider. For my safety, I will not increase the amount of medicine I take without first talking with and getting permission from my provider. 2. If I have a medical emergency, another health care provider may prescribe me medication. If I seek emergency treatment, I will notify my provider within seventy-two (72) hours. 3. I understand that my provider may discuss my use and/or possible misuse/abuse of controlled substances and alcohol, as appropriate, with any health care provider involved in my care, pharmacist or legal authority. ILLEGAL DRUGS: 
 
1. I will not use illegal drugs of any kind, including but not limited to marijuana, heroin, cocaine, or any prescription drug which is not prescribed to me. DRUG DIVERSION / PRESCRIPTION FRAUD: 
 
1. I will not share, sell, trade, give away, or otherwise misuse my prescriptions or medications. 2. I will not alter any prescriptions provided to me by my provider. SINGLE PROVIDER: 
 
1. I agree that all controlled substances that I take will be prescribed only by my provider (or his/her covering provider) under this Agreement. This agreement does not prevent me from seeking emergency medical treatment or receiving pain management related to a surgery. PROTECTING MEDICATIONS: 
 
1. I am responsible for keeping my prescriptions and medications in a safe and secure place including safeguarding them from loss or theft. I understand that lost, stolen or damaged/destroyed prescriptions or medications will not be replaced. Name:.Dhara Guevara JMT:[birth year] MR #:[id number] Provider Valentine Dye MD   
*UXRE-295* BSMG-491 (5/16) Page 3 of 5 Initial [employer] PRESCRIPTION RENEWALS/REFILLS: 
 
1. I will follow my controlled substance medication schedule as prescribed by my provider. 2. I understand and agree that I will make any requests for renewals or refills of my prescriptions only at the time of an office visit or during my providers regular office hours subject to the prescription refill requirements of the individual practice. 3. I understand that my provider may not call in prescriptions for controlled substances to my pharmacy. 4. I understand that my provider may adjust or discontinue these medications as deemed appropriate for my medical treatment plan. This Agreement does not guarantee the prescription of controlled medications. 5. I agree that if my medications are adjusted or discontinued, I will properly dispose of any remaining medications. I understand that I will be required to dispose of any remaining controlled medications prior to being provided with any prescriptions for other controlled medications.  
 
 
1. I authorize my provider and my pharmacy to cooperate fully with any local, state, or federal law enforcement agency in the investigation of any possible misuse, sale, or other diversion of my controlled substance prescriptions or medications. RISKS: 
 
 
1. I understand that if I do not adhere to this Agreement in any way, my provider may change my prescriptions, stop prescribing controlled substances or end our provider-patient relationship. 2. If my provider decides to stop prescribing medication, or decides to end our provider-patient relationship,my provider may require that I taper my medications slowly. If necessary, my provider may also provide a prescription for other medications to treat my withdrawal symptoms. UNDERSTANDING THIS AGREEMENT: 
 
I understand that my provider may adjust or stop my prescriptions for controlled substances based on my medical condition and my treatment plan. I understand that this Agreement does not guarantee that I will be prescribed medications or controlled substances. I understand that controlled substances may be just one part 
of my treatment plan. My initial on each page and my signature below shows that I have read each page of this Agreement, I have had an opportunity to ask questions, and all of my questions have been answered to my satisfaction by my provider. By signing below, I agree to comply with this Agreement, and I understand that if I do not follow the Agreements listed above, my provider may stop 
 
 
 
_________________________________________  Date/Time 4/12/2018 9:56 AM   
             (Patient Signature)

## 2018-04-12 NOTE — PATIENT INSTRUCTIONS
1) Starting today split your lantus to 55 units in the morning and 55 units in the evening. If you check your sugar in the evening before taking your 2nd shot and your reading is under 130, then take 50 units to be safe to avoid your sugar going low overnight. If it's ever under 100, eat a snack before you take your dose but only take 45 units of lantus. 2) Continuing using the lantus and humalog for now. The cheapest insulin that will replace your current dose of lantus is called NPH (Humulin or Novolin). This will be dosed the same way at 55 units twice daily. Look to see if one of these is covered. [employer] sells one vial of generic NPH for $25 if cost is ever an issue. The humalog can be replaced by regular insulin (humulin R or Novolin R) and this also is sold at [employer] for $25.    3) Once you get your insurance card, please go the lab after 4/23/18 to repeat your blood. There will be a urine drug screen collected at the time of the lab draw.

## 2018-04-12 NOTE — MR AVS SNAPSHOT
[address] [place] [address] [address] [id number] [phone] Patient: Jersey Sneed MRN: [id number] LKD:[id number]/[id number] Visit Information Date & Time Provider Department Dept. Phone Encounter #  
 4/12/2018  9:10 AM Pascual Moise, [address] [place] [address] Diabetes and Endocrinology [phone] [id number] Follow-up Instructions Return in about 6 months (around 10/12/2018). Your Appointments 5/17/2018 11:40 AM  
Any with Arvind Mondragon MD  
[id number] [facility] ([address]) Appt Note: PAP f/up bring machine [phone]  
[facility] [address] [place] [phone]  
  
    
 5/31/2018  2:00 PM  
PROCEDURE with MD Ana Bynum 75 ([address]) Appt Note: EGD  
 200 [facility] Miguel Angel [phone] [address] [place] [address]  
[phone]  
  
   
 200 [facility] Miguel Angel 505 [facility] [address]  
  
    
 7/10/2018  9:00 AM  
Follow Up with ALEJANDRO Dunlap 75 ([address]) Appt Note: 3 month f/u  
 200 [facility] Miguel Angel [phone] [facility] [address]  
59 [facility] [address] Upcoming Health Maintenance Date Due  
 PAP AKA CERVICAL CYTOLOGY 8/26/1976 BREAST CANCER SCRN MAMMOGRAM 8/26/2005 ZOSTER VACCINE AGE 60> 6/26/2015 FOBT Q 1 YEAR AGE 50-75 3/1/2017 Influenza Age 5 to Adult 8/1/2017 MICROALBUMIN Q1 11/1/2017 LIPID PANEL Q1 11/1/2017 MEDICARE YEARLY EXAM 3/14/2018 HEMOGLOBIN A1C Q6M 7/23/2018 EYE EXAM RETINAL OR DILATED Q1 8/31/2018 FOOT EXAM Q1 10/6/2018 DTaP/Tdap/Td series (2 - Td) 11/19/2026 Allergies as of 4/12/2018  Review Complete On: 4/12/2018 By: Pascual Moise MD  
  
 Severity Noted Reaction Type Reactions Hydrocodone  02/05/2015   Systemic Nausea and Vomiting Lisinopril  11/10/2016    Cough Lortab [Hydrocodone-acetaminophen]  11/17/2016    Nausea and Vomiting Percocet [Oxycodone-acetaminophen]  02/05/2015   Systemic Nausea and Vomiting Current Immunizations  Reviewed on 8/31/2017 Name Date Influenza Vaccine 10/6/2014, 10/3/2013 Influenza Vaccine (Quad) PF 3/30/2017  2:39 PM  
 Pneumococcal Vaccine (Unspecified Type) 10/3/2013 Tdap 11/19/2016  2:59 PM  
  
 Not reviewed this visit You Were Diagnosed With   
  
 Codes Comments Diabetes mellitus with neurological manifestations, uncontrolled ([facility] [address]    -  Primary ICD-10-CM: E11.49, E11.65 ICD-9-CM: 250.62 Essential hypertension, benign     ICD-10-CM: I10 
ICD-9-CM: 401.1 Hyperlipidemia LDL goal <100     ICD-10-CM: E78.5 ICD-9-CM: 272.4 Therapeutic drug monitoring     ICD-10-CM: Z51.81 
ICD-9-CM: V58.83 Vitals BP Pulse Height(growth percentile) Weight(growth percentile) BMI OB Status 122/56 96 5' 1\" (1.549 m) 191 lb 3.2 oz (86.7 kg) 36.13 kg/m2 Hysterectomy Smoking Status Current Every Day Smoker Vitals History BMI and BSA Data Body Mass Index Body Surface Area  
 36.13 kg/m 2 1.93 m 2 Preferred Pharmacy Pharmacy Name Phone [id number] [address] [facility] [address] [phone] Your Updated Medication List  
  
   
This list is accurate as of 4/12/18 10:18 AM.  Always use your most recent med list.  
  
  
  
  
 ADVAIR DISKUS 500-50 mcg/dose diskus inhaler Generic drug:  fluticasone-salmeterol Take 1 Puff by inhalation two (2) times a day. albuterol-ipratropium 2.5 mg-0.5 mg/3 ml Nebu Commonly known as:  DUO-NEB  
3 mL by Nebulization route every four (4) hours as needed. amitriptyline 150 mg tablet Commonly known as:  ELAVIL Take 150 mg by mouth nightly. CONSTULOSE 10 gram/15 mL solution Generic drug:  lactulose take 2 tablespoonfuls by mouth twice a day  
  
 ferrous sulfate 325 mg (65 mg iron) tablet  
take 1 tablet by mouth once daily WITH BREAKFAST  
  
 furosemide 20 mg tablet Commonly known as:  LASIX Take 1 Tab by mouth daily. gabapentin 600 mg tablet Commonly known as:  NEURONTIN  
take 2 tablets by mouth three times a day (NEW HIGHER DOSE) insulin glargine 100 unit/mL (3 mL) Inpn Commonly known as:  LANTUS SOLOSTAR U-100 INSULIN Inject 55 units twice daily--Dose change 4/12/18--updated med list--did not send prescription to the pharmacy  
  
 insulin lispro 100 unit/mL kwikpen Commonly known as:  HumaLOG KwikPen Insulin Inject as needed up to 3 times per day for sugars over 150: 4 units for every 50 points--Dose change 10/6/17--updated med list--did not send prescription to the pharmacy  
  
 losartan 25 mg tablet Commonly known as:  COZAAR  
take 1 tablet by mouth once daily REPLACES LISINOPRIL FOR BLOOD PRESSURE AND KIDNEY PROTECTION  
  
 metFORMIN 1,000 mg tablet Commonly known as:  GLUCOPHAGE  
take 1 tablet by mouth twice a day with meals Bambi Pen Needle 32 gauge x 5/32\" Ndle Generic drug:  Insulin Needles (Disposable)  
use as directed four times a day Nebulizer & Compressor machine UAD  
  
 omeprazole 20 mg capsule Commonly known as:  PRILOSEC  
take 1 capsule by mouth once daily  
  
 ondansetron 4 mg disintegrating tablet Commonly known as:  ZOFRAN ODT  
dissolve 1 tablet ON TONGUE every 8 hours if needed for nausea  
  
 potassium chloride SR 10 mEq tablet Commonly known as:  KLOR-CON 10  
take 1 tablet by mouth twice a day PROAIR HFA 90 mcg/actuation inhaler Generic drug:  albuterol Take 2 Puffs by inhalation every four (4) hours as needed. propranolol 20 mg tablet Commonly known as:  INDERAL Take 1 Tab by mouth two (2) times a day. rOPINIRole 4 mg Tab TAB Commonly known as:  Rl Duane Take 4 mg by mouth nightly. traMADol 50 mg tablet Commonly known as:  ULTRAM  
take 1-2 tablets by mouth twice a day if needed for DIABETIC NEUROPATHY PAIN We Performed the Following 10-PANEL URINE DRUG SCREEN [[id number] Custom] Follow-up Instructions Return in about 6 months (around 10/12/2018). To-Do List   
 04/20/2018 11:00 AM  
  Appointment with [facility] at [address] ([phone])  
  
 04/23/2018 8:30 AM  
  Appointment with [address] [place] [address] at [facility] ([phone]) General  NPO DIET RESTICTIONS Please be NPO (nothing by mouth) for 6- 8 hours prior to procedure. GENERAL INSTRUCTIONS 1. Bring any non [facility] facility films/reports pertaining to the area being studied with you on the day of appointment. 2. A written order with a valid diagnosis and Physicians signature is required for all scheduled tests. 3. Check in at registration 30 minutes before your appointment time unless you were instructed to do otherwise. 04/27/2018 1:00 PM  
  Appointment with [address] [place] [address] at [address] [place] [address] Road ([phone]) Patient Instructions 1) Starting today split your lantus to 55 units in the morning and 55 units in the evening. If you check your sugar in the evening before taking your 2nd shot and your reading is under 130, then take 50 units to be safe to avoid your sugar going low overnight. If it's ever under 100, eat a snack before you take your dose but only take 45 units of lantus. 2) Continuing using the lantus and humalog for now. The cheapest insulin that will replace your current dose of lantus is called NPH (Humulin or Novolin). This will be dosed the same way at 55 units twice daily. Look to see if one of these is covered. [employer] sells one vial of generic NPH for $25 if cost is ever an issue.   The humalog can be replaced by regular insulin (humulin R or Novolin R) and this also is sold at [employer] for $25. 
 
 3) Once you get your insurance card, please go the lab after 4/23/18 to repeat your blood. There will be a urine drug screen collected at the time of the lab draw. Introducing [facility]! Dear Krzysztof Duke: Thank you for requesting a [employer] account. Our records indicate that you already have an active [employer] account. You can access your account anytime at [email or web address] Did you know that you can access your hospital and ER discharge instructions at any time in [employer]? You can also review all of your test results from your hospital stay or ER visit. Additional Information If you have questions, please visit the Frequently Asked Questions section of the [employer] website at [email or web address] Remember, [employer] is NOT to be used for urgent needs. For medical emergencies, dial 911. Now available from your iPhone and Android! Please provide this summary of care documentation to your next provider. Your primary care clinician is listed as Judith Chapa. If you have any questions after today's visit, please call [phone].

## 2018-04-12 NOTE — PROGRESS NOTES
Chief Complaint   Patient presents with    Diabetes     pcp and pharmacy confirmed     History of Present Illness: Zenobia Minor is a 58 y.o. female here for follow up of diabetes. Weight down 4 lbs since last visit in 10/17. Her  plans to retire in the next few months and she will be going onto medicare part D so the cost of the lantus and humalog will become prohibitive. Has days where her appetite isn't as good and can have a low sugar on occasion in the late evening or overnight. Has also had higher readings at times over 200 due to stress and from her liver disease. She just finished a study with Dr. Geovanny Reyes. Will need another EGD this summer as she had varices that were banded in 2/18. She continues to use tramadol 1-2 tabs up to twice daily and this is still controlling her neuropathic pain.  reviewed and no other providers are providing opiates at this time. We reviewed the need for a signed controlled substance agreement in order to continue receiving tramadol from my office and she signed this today. We also discussed the need for urine drug screens in the future and she will have one collected with her next set of labs. Current Outpatient Prescriptions   Medication Sig    furosemide (LASIX) 20 mg tablet Take 1 Tab by mouth daily.     traMADol (ULTRAM) 50 mg tablet take 1-2 tablets by mouth twice a day if needed for DIABETIC NEUROPATHY PAIN    omeprazole (PRILOSEC) 20 mg capsule take 1 capsule by mouth once daily    CONSTULOSE 10 gram/15 mL solution take 2 tablespoonfuls by mouth twice a day    ferrous sulfate 325 mg (65 mg iron) tablet take 1 tablet by mouth once daily WITH BREAKFAST    losartan (COZAAR) 25 mg tablet take 1 tablet by mouth once daily REPLACES LISINOPRIL FOR BLOOD PRESSURE AND KIDNEY PROTECTION    insulin glargine (LANTUS SOLOSTAR) 100 unit/mL (3 mL) inpn Inject 110 units every morning as 2 separate shots of 55 units back to back    insulin lispro (HUMALOG KWIKPEN) 100 unit/mL kwikpen Inject as needed up to 3 times per day for sugars over 150: 4 units for every 50 points--Dose change 10/6/17--updated med list--did not send prescription to the pharmacy    metFORMIN (GLUCOPHAGE) 1,000 mg tablet take 1 tablet by mouth twice a day with meals    gabapentin (NEURONTIN) 600 mg tablet take 2 tablets by mouth three times a day (NEW HIGHER DOSE)    potassium chloride SR (KLOR-CON 10) 10 mEq tablet take 1 tablet by mouth twice a day    ondansetron (ZOFRAN ODT) 4 mg disintegrating tablet dissolve 1 tablet ON TONGUE every 8 hours if needed for nausea    albuterol-ipratropium (DUO-NEB) 2.5 mg-0.5 mg/3 ml nebu 3 mL by Nebulization route every four (4) hours as needed.  Nebulizer & Compressor machine UAD    TREVOR PEN NEEDLE 32 gauge x 5/32\" ndle use as directed four times a day    propranolol (INDERAL) 20 mg tablet Take 1 Tab by mouth two (2) times a day.  amitriptyline (ELAVIL) 150 mg tablet Take 150 mg by mouth nightly.  rOPINIRole (REQUIP) 4 mg tab TAB Take 4 mg by mouth nightly.  albuterol (PROAIR HFA) 90 mcg/actuation inhaler Take 2 Puffs by inhalation every four (4) hours as needed.  fluticasone-salmeterol (ADVAIR DISKUS) 500-50 mcg/dose diskus inhaler Take 1 Puff by inhalation two (2) times a day. No current facility-administered medications for this visit. Allergies   Allergen Reactions    Hydrocodone Nausea and Vomiting    Lisinopril Cough    Lortab [Hydrocodone-Acetaminophen] Nausea and Vomiting    Percocet [Oxycodone-Acetaminophen] Nausea and Vomiting     Review of Systems:  - Eyes: no blurry vision or double vision  - Cardiovascular: no chest pain  - Respiratory: no shortness of breath  - Musculoskeletal: no myalgias  - Neurological: (+) numbness/tingling in extremities    Physical Examination:  Blood pressure 122/56, pulse 96, height 5' 1\" (1.549 m), weight 191 lb 3.2 oz (86.7 kg).   - General: pleasant, no distress, good eye contact   - Neck: no carotid bruits  - Cardiovascular: regular, normal rate, nl s1 and s2, no m/r/g,   - Respiratory: clear bilaterally  - Integumentary: no edema,   - Psychiatric: normal mood and affect    Data Reviewed:   Component      Latest Ref Rng & Units 3/14/2018 1/23/2018           3:19 PM 12:00 AM   Sodium      136 - 145 mmol/L 137    Potassium      3.5 - 5.1 mmol/L 3.8    Chloride      97 - 108 mmol/L 100    CO2      21 - 32 mmol/L 28    Anion gap      5 - 15 mmol/L 9    Glucose      65 - 100 mg/dL 122 (H)    BUN      6 - 20 MG/DL 14    Creatinine      0.55 - 1.02 MG/DL 0.53 (L)    BUN/Creatinine ratio      12 - 20   26 (H)    GFR est AA      >60 ml/min/1.73m2 >60    GFR est non-AA      >60 ml/min/1.73m2 >60    Calcium      8.5 - 10.1 MG/DL 8.5    Bilirubin, total      0.2 - 1.0 MG/DL 0.6    ALT (SGPT)      12 - 78 U/L 36    AST      15 - 37 U/L 38 (H)    Alk. phosphatase      45 - 117 U/L 145 (H)    Protein, total      6.4 - 8.2 g/dL 8.4 (H)    Albumin      3.5 - 5.0 g/dL 3.6    Globulin      2.0 - 4.0 g/dL 4.8 (H)    A-G Ratio      1.1 - 2.2   0.8 (L)    Hemoglobin A1c, (calculated)      4.8 - 5.6 %  10.1 (H)   Estimated average glucose      mg/dL  243       Assessment/Plan:     1. Type II diabetes mellitus with neuropathy, uncontrolled: her most recent Hgb A1c was 10.1% in 1/18 up from 7% in 10/17 down from 7.5% in 3/17 up form 7.4% in 11/16 down from 8.7% in 6/16 stable from 1/16 up from 9.2% in 9/15 up from 6.8% in 4/15 (s/p transfusion in 2/15) down from 7.4% in 12/14 down from 8.4% in 4/14 up from 6.9% in 11/13 down from 8.9% in 7/13 stable from 4/13 down from 10.2% in November down from 10.4% in August up from 8.9% in April 2012 up from 8.4% in December up from 7.9% in June 2011. Her A1c was worse in 1/18 so hopefully will be able to get this back down in the future. We discussed splitting the dose again to allow for her to adjust the evening dose based on her reading to avoid over night lows. Also discussed alternatives to lantus and humalog in the future  - split lantus to 55 units twice daily (50 units if bs 100-130 at night and 45 if bs < 100 at night)  - take humalog 4 units for every 50 mg/dl above 150 mg/dl  - cont metformin 1g bid  - cont gabapentin 1200 mg tid  - cont tramadol 50 mg up to 2 tabs bid  - check bs up to 4 times daily due to fluctuating sugars  - foot exam done 10/17  - microalbumin nl 4/15, up to 30.5 in 6/16--started lisinopril 10 mg daily and down to 8 in 11/16  - check A1c and cmp and microalbumin in 2 weeks  - check A1c and cmp prior to next visit  - optho 8/17      2. Unspecified essential hypertension: her BP was at goal < 140/90   - cont losartan 25 mg daily  - cont propranolol 20 mg bid      3. Other and unspecified hyperlipidemia: Given DM, Goal LDL < 100, non-HDL < 130, and TG < 150. LDL 90 in December 2011 and 95 in April 2012 off simvastatin. However, she restarted this on her own in April 2012 and LDL 62 in August and 52 in November and 40 in 8/13 so I stopped the simvastatin in 8/13 and LDL still 79 in 11/13 and 70 in 12/14 and 88 in 1/16 and 87 in 11/16 so will keep her off this. - stay off simva 20 mg daily  - check lipids in 2 weeks       4. Therapeutic drug monitoring:  Had her sign a pain contract today for her tramadol. She still benefits from this medication at her current dose. - cont tramadol 50 mg 1-2 tabs up to bid as needed for neuropathic pain  - collect urine drug screen in 2 weeks    We spent 50 minutes of face to face time together and > 50% of the time was spent in counseling regarding management of all the conditions above. Patient Instructions   1) Starting today split your lantus to 55 units in the morning and 55 units in the evening. If you check your sugar in the evening before taking your 2nd shot and your reading is under 130, then take 50 units to be safe to avoid your sugar going low overnight.   If it's ever under 100, eat a snack before you take your dose but only take 45 units of lantus. 2) Continuing using the lantus and humalog for now. The cheapest insulin that will replace your current dose of lantus is called NPH (Humulin or Novolin). This will be dosed the same way at 55 units twice daily. Look to see if one of these is covered. [facility] sells one vial of generic NPH for $25 if cost is ever an issue. The humalog can be replaced by regular insulin (humulin R or Novolin R) and this also is sold at [facility] for $25.    3) Once you get your insurance card, please go the lab after 4/23/18 to repeat your blood. There will be a urine drug screen collected at the time of the lab draw. Follow-up Disposition:  Return in about 6 months (around 10/12/2018).     Copy sent to:  Natalia rCystal [phone] [facility])  Dr. Kristy Bear via [facility]    Lab follow up: 5/16/18    Sent her the following message in a letter:    Resulted Orders   HEMOGLOBIN A1C WITH EAG   Result Value Ref Range    Hemoglobin A1c 7.3 (H) 4.8 - 5.6 %      Comment:               Pre-diabetes: 5.7 - 6.4           Diabetes: >6.4           Glycemic control for adults with diabetes: <7.0      Estimated average glucose 163 mg/dL    Narrative    Performed at:  [address]  [id number]  : Devora Kendall MD, Phone:  [phone]   MICROALBUMIN, UR, RAND W/ MICROALB/CREAT RATIO   Result Value Ref Range    Creatinine, urine 120.5 Not Estab. mg/dL    Microalbumin, urine 14.6 Not Estab. ug/mL    Microalb/Creat ratio (ug/mg creat.) 12.1 0.0 - 30.0 mg/g creat    Narrative    Performed at:  [address]  [id number]  : Devora Kendall MD, Phone:  [phone]   METABOLIC PANEL, COMPREHENSIVE   Result Value Ref Range    Glucose 108 (H) 65 - 99 mg/dL    BUN 12 8 - 27 mg/dL    Creatinine 0.47 (L) 0.57 - 1.00 mg/dL    GFR est non- >59 mL/min/1.73 GFR est  >59 mL/min/1.73    BUN/Creatinine ratio 26 12 - 28    Sodium 140 134 - 144 mmol/L    Potassium 3.7 3.5 - 5.2 mmol/L    Chloride 100 96 - 106 mmol/L    CO2 28 18 - 29 mmol/L    Calcium 8.4 (L) 8.7 - 10.3 mg/dL    Protein, total 7.5 6.0 - 8.5 g/dL    Albumin 3.8 3.6 - 4.8 g/dL    GLOBULIN, TOTAL 3.7 1.5 - 4.5 g/dL    A-G Ratio 1.0 (L) 1.2 - 2.2    Bilirubin, total 0.7 0.0 - 1.2 mg/dL    Alk. phosphatase 123 (H) 39 - 117 IU/L    AST (SGOT) 38 0 - 40 IU/L    ALT (SGPT) 37 (H) 0 - 32 IU/L    Narrative    Performed at:  [address]  [id number]  : Johnathon Beltre MD, Phone:  [phone]   LIPID PANEL   Result Value Ref Range    Cholesterol, total 151 100 - 199 mg/dL    Triglyceride 70 0 - 149 mg/dL    HDL Cholesterol 56 >39 mg/dL    VLDL, calculated 14 5 - 40 mg/dL    LDL, calculated 81 0 - 99 mg/dL    Narrative    Performed at:  [address]  [id number]  : Johnathon Beltre MD, Phone:  [phone]   COMPLIANCE DRUG SCREEN/PRESCRIPTION MONITORING   Result Value Ref Range    Summary FINAL       Comment:      ====================================================================  [facility] COMP DRUG ANALYSIS,UR  ====================================================================  Test                             Result       Flag       Units  Drug Present    Tramadol                       PRESENT    O-Desmethyltramadol            PRESENT    N-Desmethyltramadol            PRESENT     Source of tramadol is a prescription medication. O-desmethyltramadol and N-desmethyltramadol are expected     metabolites of tramadol.     Gabapentin                     PRESENT    Cyclobenzaprine                PRESENT    Amitriptyline                  PRESENT    Nortriptyline                  PRESENT     Nortriptyline may be administered as a prescription drug; it is     also an expected metabolite of amitriptyline.  ====================================================================  Test                      Result    Flag   Units      Ref Range    Creatinine              126              mg/dL      >=20    ====================================================================  Declared Medications:   Medication list was not provided.  ====================================================================  For clinical consultation, please call [phone].  ====================================================================      Narrative    Performed at:  [address] [facility], [address] [facility], [address]  : Jessica Grove MD, Phone:  [phone]   CVD REPORT   Result Value Ref Range    INTERPRETATION Note       Comment:      Supplemental report is available. Narrative    Performed at:  [address] [place] [address]  [id number]  : Lenin Lazaro MD, Phone:  [phone]   DIABETES PATIENT EDUCATION   Result Value Ref Range    PDF Image Not applicable     Narrative    Performed at:  [address]  [address] [place] [address]  [id number]  : Lenin Lazaro MD, Phone:  [phone]       I wanted to update you on your recent lab results:    Hemoglobin A1c is a 3 month marker of your diabetes control. Goal is less than 7% which means your average blood sugar is less than 150. Your Hemoglobin A1c is 7.3% which means your diabetes is under much better control than 10.1% at your last check. Continue to work on your diet and exercise and take all your medications as directed.  -------------------------------------------------------------------------------------------------------------------  Total Cholesterol is the total number of cholesterol particles in your blood. Goal is less than 200. Your value is at goal.    Triglycerides are the short term fats in your blood. Goal is less than 150.   Your value is at goal.    HDL is the good cholesterol in your blood. Goal is more than 50. Your value is at goal.    LDL is the bad cholesterol in your blood. Goal is less than 100. Your value is at goal.    Continue to follow a low cholesterol diet. Try to limit the amount of fried foods, fatty foods, butter, gravy, red meat, ice cream, cheese, and eggs in your diet, which are all high in cholesterol.   -------------------------------------------------------------------------------------------------------------------  BUN and creatinine are markers of kidney function. Your values are normal. Although your creatinine is low, I'm not concerned about this as I only worry if your creatinine is high.  -------------------------------------------------------------------------------------------------------------------  ALT and AST are markers of liver function. Your ALT is just barely abnormal.  -------------------------------------------------------------------------------------------------------------------  Microalbumin/creatinine ratio is a marker of the amount of protein in your urine. Goal is less than 30. Your value is normal. This indicates that your kidneys are not being affected by your uncontrolled diabetes and/or blood pressure. Continue to take losartan to help protect your kidneys from the effects of diabetes and high blood pressure.  -------------------------------------------------------------------------------------------------------------------  Your urine drug screen appropriately showed the tramadol and gabapentin and amitriptyline.  -------------------------------------------------------------------------------------------------------------------  Take the enclosed lab slip to repeat your labs again prior to the next visit.

## 2018-04-20 ENCOUNTER — HOSPITAL ENCOUNTER (OUTPATIENT)
Dept: INFUSION THERAPY | Age: 63
Discharge: HOME | End: 2018-04-20
Payer: MEDICARE

## 2018-04-20 VITALS
SYSTOLIC BLOOD PRESSURE: 138 MMHG | HEART RATE: 102 BPM | RESPIRATION RATE: 18 BRPM | DIASTOLIC BLOOD PRESSURE: 63 MMHG | TEMPERATURE: 97.2 F

## 2018-04-20 PROCEDURE — 74011250636 HC RX REV CODE- 250/636: Performed by: NURSE PRACTITIONER

## 2018-04-20 PROCEDURE — 96365 THER/PROPH/DIAG IV INF INIT: CPT

## 2018-04-20 RX ORDER — SODIUM CHLORIDE 0.9 % (FLUSH) 0.9 %
5-10 SYRINGE (ML) INJECTION AS NEEDED
Status: ACTIVE | OUTPATIENT
Start: 2018-04-20 | End: 2018-04-21

## 2018-04-20 RX ORDER — SODIUM CHLORIDE 9 MG/ML
25 INJECTION, SOLUTION INTRAVENOUS AS NEEDED
Status: DISPENSED | OUTPATIENT
Start: 2018-04-20 | End: 2018-04-21

## 2018-04-20 RX ADMIN — FERRIC CARBOXYMALTOSE INJECTION 750 MG: 50 INJECTION, SOLUTION INTRAVENOUS at 12:20

## 2018-04-20 RX ADMIN — Medication 10 ML: at 12:50

## 2018-04-20 NOTE — PROGRESS NOTES
Outpatient Infusion Center Short Visit Progress Note    1110 Pt admit to [facility] for Injectafer 1 of 2 ambulatory in stable condition. Assessment completed. No new concerns voiced. Patient Vitals for the past 12 hrs:   Temp Pulse Resp BP   04/20/18 1247 97.2 °F (36.2 °C) (!) 102 18 138/63   04/20/18 1111 98.3 °F (36.8 °C) 100 16 150/74     R arm IV with positive blood return. Medications:  Injectafer IV    1250 Pt tolerated treatment well. D/c home ambulatory in no distress. Patient refused staying post 30 min no adverse reaction noted, pt stated she has had iron many times. Pt aware of next appointment scheduled for 4/27/18 at 1300.     Mehdi Dial RN

## 2018-04-23 ENCOUNTER — TELEPHONE (OUTPATIENT)
Dept: ENDOCRINOLOGY | Age: 63
End: 2018-04-23

## 2018-05-01 ENCOUNTER — HOSPITAL ENCOUNTER (OUTPATIENT)
Dept: INFUSION THERAPY | Age: 63
Discharge: HOME | End: 2018-05-01
Payer: MEDICARE

## 2018-05-01 VITALS
HEART RATE: 100 BPM | TEMPERATURE: 98 F | DIASTOLIC BLOOD PRESSURE: 77 MMHG | SYSTOLIC BLOOD PRESSURE: 146 MMHG | RESPIRATION RATE: 18 BRPM

## 2018-05-01 PROCEDURE — 96374 THER/PROPH/DIAG INJ IV PUSH: CPT

## 2018-05-01 PROCEDURE — 74011250636 HC RX REV CODE- 250/636: Performed by: NURSE PRACTITIONER

## 2018-05-01 RX ADMIN — FERRIC CARBOXYMALTOSE INJECTION 750 MG: 50 INJECTION, SOLUTION INTRAVENOUS at 13:32

## 2018-05-01 NOTE — PROGRESS NOTES
Outpatient Infusion Center - Chemotherapy Progress Note    1300 Pt admit to [facility] for Injectafer 2/2 ambulatory in stable condition. Assessment completed. No new concerns voiced. PIV access established in R arm with positive blood return. Line flushed, NS infusing. Visit Vitals    /77    Pulse 100    Temp 98 °F (36.7 °C)    Resp 18    Breastfeeding No       Medications:  NS  Injectafer    1400 Pt tolerated treatment well. PIV removed at discharge. D/c home ambulatory in no distress.  Pt aware to follow up with MD.

## 2018-05-02 ENCOUNTER — HOSPITAL ENCOUNTER (OUTPATIENT)
Dept: ULTRASOUND IMAGING | Age: 63
Discharge: HOME | End: 2018-05-02
Attending: NURSE PRACTITIONER
Payer: MEDICARE

## 2018-05-02 PROCEDURE — 76705 ECHO EXAM OF ABDOMEN: CPT

## 2018-05-10 ENCOUNTER — TELEPHONE (OUTPATIENT)
Dept: ENDOCRINOLOGY | Age: 63
End: 2018-05-10

## 2018-05-10 DIAGNOSIS — I10 ESSENTIAL HYPERTENSION, BENIGN: ICD-10-CM

## 2018-05-10 DIAGNOSIS — Z51.81 THERAPEUTIC DRUG MONITORING: ICD-10-CM

## 2018-05-10 DIAGNOSIS — E78.5 HYPERLIPIDEMIA LDL GOAL <100: ICD-10-CM

## 2018-05-10 NOTE — TELEPHONE ENCOUNTER
Please let her know a new lab order with the correct urine test has been placed in the system. Please fast for these labs.

## 2018-05-10 NOTE — TELEPHONE ENCOUNTER
Patient will be going to [facility] Friday (5-11-18) and needs a new order placed in the system. She can be reached at:  [phone].

## 2018-05-11 NOTE — TELEPHONE ENCOUNTER
I spoke with patient and she stated that she forgot to call me back but she did have her fasting labs drawn and she gave a urine.

## 2018-05-16 LAB
ALBUMIN SERPL-MCNC: 3.8 G/DL (ref 3.6–4.8)
ALBUMIN/CREAT UR: 12.1 MG/G CREAT (ref 0–30)
ALBUMIN/GLOB SERPL: 1 {RATIO} (ref 1.2–2.2)
ALP SERPL-CCNC: 123 IU/L (ref 39–117)
ALT SERPL-CCNC: 37 IU/L (ref 0–32)
AST SERPL-CCNC: 38 IU/L (ref 0–40)
BILIRUB SERPL-MCNC: 0.7 MG/DL (ref 0–1.2)
BUN SERPL-MCNC: 12 MG/DL (ref 8–27)
BUN/CREAT SERPL: 26 (ref 12–28)
CALCIUM SERPL-MCNC: 8.4 MG/DL (ref 8.7–10.3)
CHLORIDE SERPL-SCNC: 100 MMOL/L (ref 96–106)
CHOLEST SERPL-MCNC: 151 MG/DL (ref 100–199)
CO2 SERPL-SCNC: 28 MMOL/L (ref 18–29)
CREAT SERPL-MCNC: 0.47 MG/DL (ref 0.57–1)
CREAT UR-MCNC: 120.5 MG/DL
DRUGS UR: NORMAL
EST. AVERAGE GLUCOSE BLD GHB EST-MCNC: 163 MG/DL
GFR SERPLBLD CREATININE-BSD FMLA CKD-EPI: 106 ML/MIN/1.73
GFR SERPLBLD CREATININE-BSD FMLA CKD-EPI: 122 ML/MIN/1.73
GLOBULIN SER CALC-MCNC: 3.7 G/DL (ref 1.5–4.5)
GLUCOSE SERPL-MCNC: 108 MG/DL (ref 65–99)
HBA1C MFR BLD: 7.3 % (ref 4.8–5.6)
HDLC SERPL-MCNC: 56 MG/DL
INTERPRETATION, 910389: NORMAL
LDLC SERPL CALC-MCNC: 81 MG/DL (ref 0–99)
Lab: NORMAL
MICROALBUMIN UR-MCNC: 14.6 UG/ML
POTASSIUM SERPL-SCNC: 3.7 MMOL/L (ref 3.5–5.2)
PROT SERPL-MCNC: 7.5 G/DL (ref 6–8.5)
SODIUM SERPL-SCNC: 140 MMOL/L (ref 134–144)
TRIGL SERPL-MCNC: 70 MG/DL (ref 0–149)
VLDLC SERPL CALC-MCNC: 14 MG/DL (ref 5–40)

## 2018-05-31 ENCOUNTER — ANESTHESIA (OUTPATIENT)
Dept: ENDOSCOPY | Age: 63
End: 2018-05-31
Payer: MEDICARE

## 2018-05-31 ENCOUNTER — HOSPITAL ENCOUNTER (OUTPATIENT)
Age: 63
Setting detail: OUTPATIENT SURGERY
Discharge: HOME | End: 2018-05-31
Attending: INTERNAL MEDICINE | Admitting: INTERNAL MEDICINE
Payer: MEDICARE

## 2018-05-31 ENCOUNTER — ANESTHESIA EVENT (OUTPATIENT)
Dept: ENDOSCOPY | Age: 63
End: 2018-05-31
Payer: MEDICARE

## 2018-05-31 VITALS
WEIGHT: 191 LBS | TEMPERATURE: 98 F | BODY MASS INDEX: 36.09 KG/M2 | OXYGEN SATURATION: 96 % | DIASTOLIC BLOOD PRESSURE: 93 MMHG | HEART RATE: 100 BPM | SYSTOLIC BLOOD PRESSURE: 138 MMHG | RESPIRATION RATE: 19 BRPM

## 2018-05-31 PROBLEM — E66.01 SEVERE OBESITY (BMI 35.0-39.9): Status: ACTIVE | Noted: 2018-05-31

## 2018-05-31 LAB
GLUCOSE BLD STRIP.AUTO-MCNC: 215 MG/DL (ref 65–100)
SERVICE CMNT-IMP: ABNORMAL

## 2018-05-31 PROCEDURE — 77030014243 HC BND LIG VRCES BSC -D: Performed by: INTERNAL MEDICINE

## 2018-05-31 PROCEDURE — 76060000031 HC ANESTHESIA FIRST 0.5 HR: Performed by: INTERNAL MEDICINE

## 2018-05-31 PROCEDURE — 74011250636 HC RX REV CODE- 250/636: Performed by: INTERNAL MEDICINE

## 2018-05-31 PROCEDURE — 76040000019: Performed by: INTERNAL MEDICINE

## 2018-05-31 PROCEDURE — 74011000250 HC RX REV CODE- 250

## 2018-05-31 PROCEDURE — 82962 GLUCOSE BLOOD TEST: CPT

## 2018-05-31 PROCEDURE — 74011250636 HC RX REV CODE- 250/636

## 2018-05-31 RX ORDER — ATROPINE SULFATE 0.1 MG/ML
0.5 INJECTION INTRAVENOUS
Status: DISCONTINUED | OUTPATIENT
Start: 2018-05-31 | End: 2018-05-31 | Stop reason: HOSPADM

## 2018-05-31 RX ORDER — SODIUM CHLORIDE 9 MG/ML
INJECTION, SOLUTION INTRAVENOUS
Status: DISCONTINUED | OUTPATIENT
Start: 2018-05-31 | End: 2018-05-31 | Stop reason: HOSPADM

## 2018-05-31 RX ORDER — ONDANSETRON 2 MG/ML
4 INJECTION INTRAMUSCULAR; INTRAVENOUS ONCE
Status: COMPLETED | OUTPATIENT
Start: 2018-05-31 | End: 2018-05-31

## 2018-05-31 RX ORDER — FENTANYL CITRATE 50 UG/ML
25 INJECTION, SOLUTION INTRAMUSCULAR; INTRAVENOUS
Status: DISCONTINUED | OUTPATIENT
Start: 2018-05-31 | End: 2018-05-31 | Stop reason: HOSPADM

## 2018-05-31 RX ORDER — FLUMAZENIL 0.1 MG/ML
0.2 INJECTION INTRAVENOUS
Status: DISCONTINUED | OUTPATIENT
Start: 2018-05-31 | End: 2018-05-31 | Stop reason: HOSPADM

## 2018-05-31 RX ORDER — LIDOCAINE HYDROCHLORIDE 20 MG/ML
INJECTION, SOLUTION EPIDURAL; INFILTRATION; INTRACAUDAL; PERINEURAL AS NEEDED
Status: DISCONTINUED | OUTPATIENT
Start: 2018-05-31 | End: 2018-05-31 | Stop reason: HOSPADM

## 2018-05-31 RX ORDER — PROPOFOL 10 MG/ML
INJECTION, EMULSION INTRAVENOUS AS NEEDED
Status: DISCONTINUED | OUTPATIENT
Start: 2018-05-31 | End: 2018-05-31 | Stop reason: HOSPADM

## 2018-05-31 RX ORDER — ACETAMINOPHEN 500 MG
500 TABLET ORAL
COMMUNITY
End: 2018-01-01

## 2018-05-31 RX ORDER — SODIUM CHLORIDE 0.9 % (FLUSH) 0.9 %
5-10 SYRINGE (ML) INJECTION AS NEEDED
Status: DISCONTINUED | OUTPATIENT
Start: 2018-05-31 | End: 2018-05-31 | Stop reason: HOSPADM

## 2018-05-31 RX ORDER — NALOXONE HYDROCHLORIDE 0.4 MG/ML
0.4 INJECTION, SOLUTION INTRAMUSCULAR; INTRAVENOUS; SUBCUTANEOUS
Status: DISCONTINUED | OUTPATIENT
Start: 2018-05-31 | End: 2018-05-31 | Stop reason: HOSPADM

## 2018-05-31 RX ORDER — EPINEPHRINE 0.1 MG/ML
1 INJECTION INTRACARDIAC; INTRAVENOUS
Status: DISCONTINUED | OUTPATIENT
Start: 2018-05-31 | End: 2018-05-31 | Stop reason: HOSPADM

## 2018-05-31 RX ORDER — SODIUM CHLORIDE 9 MG/ML
50 INJECTION, SOLUTION INTRAVENOUS CONTINUOUS
Status: DISCONTINUED | OUTPATIENT
Start: 2018-05-31 | End: 2018-05-31 | Stop reason: HOSPADM

## 2018-05-31 RX ORDER — DEXTROMETHORPHAN/PSEUDOEPHED 2.5-7.5/.8
1.2 DROPS ORAL
Status: DISCONTINUED | OUTPATIENT
Start: 2018-05-31 | End: 2018-05-31 | Stop reason: HOSPADM

## 2018-05-31 RX ORDER — SODIUM CHLORIDE 0.9 % (FLUSH) 0.9 %
5-10 SYRINGE (ML) INJECTION EVERY 8 HOURS
Status: DISCONTINUED | OUTPATIENT
Start: 2018-05-31 | End: 2018-05-31 | Stop reason: HOSPADM

## 2018-05-31 RX ADMIN — FENTANYL CITRATE 25 MCG: 50 INJECTION, SOLUTION INTRAMUSCULAR; INTRAVENOUS at 15:58

## 2018-05-31 RX ADMIN — LIDOCAINE HYDROCHLORIDE 80 MG: 20 INJECTION, SOLUTION EPIDURAL; INFILTRATION; INTRACAUDAL; PERINEURAL at 14:56

## 2018-05-31 RX ADMIN — PROPOFOL 50 MG: 10 INJECTION, EMULSION INTRAVENOUS at 15:01

## 2018-05-31 RX ADMIN — PROPOFOL 100 MG: 10 INJECTION, EMULSION INTRAVENOUS at 14:56

## 2018-05-31 RX ADMIN — SODIUM CHLORIDE: 9 INJECTION, SOLUTION INTRAVENOUS at 14:30

## 2018-05-31 RX ADMIN — ONDANSETRON 4 MG: 2 INJECTION INTRAMUSCULAR; INTRAVENOUS at 15:58

## 2018-05-31 NOTE — IP AVS SNAPSHOT
2700 [facility] [address] [place] [address] 
[phone] Patient: Maya Hargrove MRN: [id number] IHO:9/60/1949 About your hospitalization You were admitted on:  May 31, 2018 You last received care in the:  [facility] ENDOSCOPY You were discharged on:  May 31, 2018 Why you were hospitalized Your primary diagnosis was:  Not on File Follow-up Information Follow up With Details Comments Contact Info Sindhu Allison NP   [address] [place] [address] [address] [place] [address] 
[phone] Your Scheduled Appointments Tuesday July 10, 2018  9:00 AM EDT Follow Up with April Cedric Yun NP [address] ([place]) 200 [facility] [phone] [address] [place] [address]  
[phone] Discharge Orders None A check lyndsey indicates which time of day the medication should be taken. My Medications CONTINUE taking these medications Instructions Each Dose to Equal  
 Morning Noon Evening Bedtime  
 acetaminophen 500 mg tablet Commonly known as:  TYLENOL Your last dose was: Your next dose is: Take 500 mg by mouth every six (6) hours as needed for Pain. 500 mg ADVAIR DISKUS 500-50 mcg/dose diskus inhaler Generic drug:  fluticasone-salmeterol Your last dose was: Your next dose is: Take 1 Puff by inhalation two (2) times a day. 1 Puff  
    
   
   
   
  
 albuterol-ipratropium 2.5 mg-0.5 mg/3 ml Nebu Commonly known as:  Briana Shove Your last dose was: Your next dose is:    
   
   
 3 mL by Nebulization route every four (4) hours as needed. 3 mL  
    
   
   
   
  
 amitriptyline 150 mg tablet Commonly known as:  ELAVIL Your last dose was: Your next dose is: Take 150 mg by mouth nightly. 150 mg  
    
   
   
   
  
 CONSTULOSE 10 gram/15 mL solution Generic drug:  lactulose Your last dose was: Your next dose is:    
   
   
 take 2 tablespoonfuls by mouth twice a day  
     
   
   
   
  
 ferrous sulfate 325 mg (65 mg iron) tablet Your last dose was: Your next dose is:    
   
   
 take 1 tablet by mouth once daily WITH BREAKFAST  
     
   
   
   
  
 furosemide 20 mg tablet Commonly known as:  LASIX Your last dose was: Your next dose is: Take 1 Tab by mouth daily. 20 mg  
    
   
   
   
  
 gabapentin 600 mg tablet Commonly known as:  NEURONTIN Your last dose was: Your next dose is:    
   
   
 take 2 tablets by mouth three times a day (NEW HIGHER DOSE) insulin glargine 100 unit/mL (3 mL) Inpn Commonly known as:  LANTUS SOLOSTAR U-100 INSULIN Your last dose was: Your next dose is:    
   
   
 Inject 55 units twice daily--Dose change 4/12/18--updated med list--did not send prescription to the pharmacy  
     
   
   
   
  
 insulin lispro 100 unit/mL kwikpen Commonly known as:  HumaLOG KwikPen Insulin Your last dose was: Your next dose is:    
   
   
 Inject as needed up to 3 times per day for sugars over 150: 4 units for every 50 points--Dose change 10/6/17--updated med list--did not send prescription to the pharmacy  
     
   
   
   
  
 losartan 25 mg tablet Commonly known as:  COZAAR Your last dose was: Your next dose is:    
   
   
 take 1 tablet by mouth once daily REPLACES LISINOPRIL FOR BLOOD PRESSURE AND KIDNEY PROTECTION  
     
   
   
   
  
 metFORMIN 1,000 mg tablet Commonly known as:  GLUCOPHAGE Your last dose was: Your next dose is:    
   
   
 take 1 tablet by mouth twice a day with meals Bambi Pen Needle 32 gauge x 5/32\" Ndle Generic drug:  Insulin Needles (Disposable) Your last dose was: Your next dose is: use as directed four times a day Nebulizer & Compressor machine Your last dose was: Your next dose is:    
   
   
 UAD  
     
   
   
   
  
 omeprazole 20 mg capsule Commonly known as:  PRILOSEC Your last dose was: Your next dose is:    
   
   
 take 1 capsule by mouth once daily  
     
   
   
   
  
 ondansetron 4 mg disintegrating tablet Commonly known as:  ZOFRAN ODT Your last dose was: Your next dose is:    
   
   
 dissolve 1 tablet ON TONGUE every 8 hours if needed for nausea  
     
   
   
   
  
 potassium chloride SR 10 mEq tablet Commonly known as:  KLOR-CON 10 Your last dose was: Your next dose is:    
   
   
 take 1 tablet by mouth twice a day PROAIR HFA 90 mcg/actuation inhaler Generic drug:  albuterol Your last dose was: Your next dose is: Take 2 Puffs by inhalation every four (4) hours as needed. 2 Puff  
    
   
   
   
  
 propranolol 20 mg tablet Commonly known as:  INDERAL Your last dose was: Your next dose is: Take 1 Tab by mouth two (2) times a day. 20 mg  
    
   
   
   
  
 rOPINIRole 4 mg Tab TAB Commonly known as:  Anitra Rojas Your last dose was: Your next dose is: Take 4 mg by mouth nightly. 4 mg  
    
   
   
   
  
 traMADol 50 mg tablet Commonly known as:  ULTRAM  
   
Your last dose was: Your next dose is:    
   
   
 take 1-2 tablets by mouth twice a day if needed for DIABETIC NEUROPATHY PAIN Opioid Education Prescription Opioids: What You Need to Know: 
 
Prescription opioids can be used to help relieve moderate-to-severe pain and are often prescribed following a surgery or injury, or for certain health conditions.   These medications can be an important part of treatment but also come with serious risks. Opioids are strong pain medicines. Examples include hydrocodone, oxycodone, fentanyl, and morphine. Heroin is an example of an illegal opioid. It is important to work with your health care provider to make sure you are getting the safest, most effective care. WHAT ARE THE RISKS AND SIDE EFFECTS OF OPIOID USE? Prescription opioids carry serious risks of addiction and overdose, especially with prolonged use. An opioid overdose, often marked by slow breathing, can cause sudden death. The use of prescription opioids can have a number of side effects as well, even when taken as directed. · Tolerance-meaning you might need to take more of a medication for the same pain relief · Physical dependence-meaning you have symptoms of withdrawal when the medication is stopped. Withdrawal symptoms can include nausea, sweating, chills, diarrhea, stomach cramps, and muscle aches. Withdrawal can last up to several weeks, depending on which drug you took and how long you took it. · Increased sensitivity to pain · Constipation · Nausea, vomiting, and dry mouth · Sleepiness and dizziness · Confusion · Depression · Low levels of testosterone that can result in lower sex drive, energy, and strength · Itching and sweating RISKS ARE GREATER WITH:      
· History of drug misuse, substance use disorder, or overdose · Mental health conditions (such as depression or anxiety) · Sleep apnea · Older age (72 years or older) · Pregnancy Avoid alcohol while taking prescription opioids. Also, unless specifically advised by your health care provider, medications to avoid include: · Benzodiazepines (such as Xanax or Valium) · Muscle relaxants (such as Soma or Flexeril) · Hypnotics (such as Ambien or Lunesta) · Other prescription opioids KNOW YOUR OPTIONS Talk to your health care provider about ways to manage your pain that don't involve prescription opioids. Some of these options may actually work better and have fewer risks and side effects. Options may include: 
· Pain relievers such as acetaminophen, ibuprofen, and naproxen · Some medications that are also used for depression or seizures · Physical therapy and exercise · Counseling to help patients learn how to cope better with triggers of pain and stress. · Application of heat or cold compress · Massage therapy · Relaxation techniques Be Informed Make sure you know the name of your medication, how much and how often to take it, and its potential risks & side effects. IF YOU ARE PRESCRIBED OPIOIDS FOR PAIN: 
· Never take opioids in greater amounts or more often than prescribed. Remember the goal is not to be pain-free but to manage your pain at a tolerable level. · Follow up with your primary care provider to: · Work together to create a plan on how to manage your pain. · Talk about ways to help manage your pain that don't involve prescription opioids. · Talk about any and all concerns and side effects. · Help prevent misuse and abuse. · Never sell or share prescription opioids · Help prevent misuse and abuse. · Store prescription opioids in a secure place and out of reach of others (this may include visitors, children, friends, and family). · Safely dispose of unused/unwanted prescription opioids: Find your community drug take-back program or your pharmacy mail-back program, or flush them down the toilet, following guidance from the Food and Drug Administration (www.fda.gov/Drugs/ResourcesForYou). · Visit www.cdc.gov/drugoverdose to learn about the risks of opioid abuse and overdose. · If you believe you may be struggling with addiction, tell your health care provider and ask for guidance or call [address] [place], [place] [address] at 7-250-039-QBYD. Discharge Instructions [address] [facility] [place] MD, Robert Paris, FAASLD ALEJANDRO Louie, JANEL [facility]   ALEJANDRO Esposito NP [address] [facility] [address] 
  at [address], [facility] [place]  [address]. 
  [phone] FAX: [address] [place] [address] Avenue 
  at [facility] 
  [address], [address] 98 [facility] Afia Herrera, [address] 
  [phone] FAX: [phone] ENDOSCOPY WITH BANDING DISCHARGE INSTRUCTIONS Janel Monson 1955 Date: 5/31/2018 DISCOMFORT: 
Use lozenges or warm salt water gargle for sore thoat Apply warm compress to IV site if red. If redness or soreness persists call the office. You may experience gas and bloating. Walking and belching will help relieve this. You may experience chest pain or discomfort or feel as though food is \"sticking\" in your food pipe for a few days after the procedure. This is a normal feeling after banding of esophageal varices. DIET: 
Regular food may dislodge the bands placed on the varices. For this reason you should only have liquid for the rest of today. Eat only soft food that does not need to be chewed all day tomorrow. You may advance to your regular diet in 2 days. ACTIVITY: 
Spend the remainder of the day resting. Avoid any strenuous activity. You may not operate a vehicle for 12 hours. You may not engage in an occupation involving machinery or appliances for rest of today. Avoid making any critical decisions for at least 24 hour.  
 
Call the [address] [address] of [address] if you have any of the following: 
Increasing chest or abdominal pain, nausea, vomiting, vomiting blood, abdominal distension or swelling, fever or chills, bloody discharge from nose or mouth or shortness of breath. Follow-up Instructions: 
Call Dr. Mickeal Aase for any questions or problems at the phone number listed above. If a biopsy was performed, you will be contacted by the office staff or Dr Mickeal Aase within 1 week. If you have not heard from us by then you may call the office at the phone number listed above to inquire about the results. ENDOSCOPY FINDINGS: 
Two varices were found in the esophagus (food tube). 2 bands were placed to seal the varices and reduce the risk of bleeding. DISCHARGE SUMMARY from the Nurse: The following personal items collected during your admission are returned to you:  
Dental Appliance:   
Vision: Visual Aid: Glasses Hearing Aid:   
Jewelry:   
Clothing:   
Other Valuables:   
Valuables sent to safe:   
 
 
O Transitions of Care Introducing [facility] [address] offers a voluntary care coordination program to provide high quality service and care to [facility] fee-for-service beneficiaries. Lizet Snyder was designed to help you enhance your health and well-being through the following services: ? Transitions of Care  support for individuals who are transitioning from one care setting to another (example: Hospital to home). ? Chronic and Complex Care Coordination  support for individuals and caregivers of those with serious or chronic illnesses or with more than one chronic (ongoing) condition and those who take a number of different medications. If you meet specific medical criteria, a [facility] [address] may call you directly to coordinate your care with your primary care physician and your other care providers. For questions about the [facility] programs, please, contact your physicians office. For general questions or additional information about Accountable Care Organizations: 
Please visit www.medicare.gov/acos. html or call 1-800-MEDICARE (4-963.209.4319) TTY users should call 5-870.689.4514. Introducing [facility]! Dear Saman Olson: Thank you for requesting a [employer]Reca! account. Our records indicate that you already have an active QReca! account. You can access your account anytime at [email or web address] Did you know that you can access your hospital and ER discharge instructions at any time in QReca!? You can also review all of your test results from your hospital stay or ER visit. Additional Information If you have questions, please visit the Frequently Asked Questions section of the QReca! website at [email or web address] Remember, QReca! is NOT to be used for urgent needs. For medical emergencies, dial 911. Now available from your iPhone and Android! Introducing Bulmaro Carson As a [employer] patient, I wanted to make you aware of our electronic visit tool called Bulmaro Carson. [employer] 24/7 allows you to connect within minutes with a medical provider 24 hours a day, seven days a week via a mobile device or tablet or logging into a secure website from your computer. You can access Bulmaro Carson from anywhere in the [place]. A virtual visit might be right for you when you have a simple condition and feel like you just dont want to get out of bed, or cant get away from work for an appointment, when your regular [employer] provider is not available (evenings, weekends or holidays), or when youre out of town and need minor care. Electronic visits cost only $49 and if the [employer] 24/7 provider determines a prescription is needed to treat your condition, one can be electronically transmitted to a nearby pharmacy*. Please take a moment to enroll today if you have not already done so. The enrollment process is free and takes just a few minutes.   To enroll, please download the [employer] 24/7 liza to your tablet or phone, or visit [email or web address] to enroll on your computer. And, as an [address] [place] [address] patient with a [employer] account, the results of your visits will be scanned into your electronic medical record and your primary care provider will be able to view the scanned results. We urge you to continue to see your regular [facility] provider for your ongoing medical care. And while your primary care provider may not be the one available when you seek a [employer] virtual visit, the peace of mind you get from getting a real diagnosis real time can be priceless. For more information on [employer], view our Frequently Asked Questions (FAQs) at [email or web address]. Sincerely, 
 
Amanda Lopez MD 
Chief Medical Officer [employer] *:  certain medications cannot be prescribed via [employer] Providers Seen During Your Hospitalization Provider Specialty Primary office phone Guillermo Klein MD Hepatology [phone] Your Primary Care Physician (PCP) Primary Care Physician Office Phone Office Fax Gianna Leyva [phone] You are allergic to the following Allergen Reactions Hydrocodone Nausea and Vomiting Lisinopril Cough Lortab (Hydrocodone-Acetaminophen) Nausea and Vomiting Percocet (Oxycodone-Acetaminophen) Nausea and Vomiting Recent Documentation Weight BMI OB Status Smoking Status 86.6 kg 36.09 kg/m2 Hysterectomy Current Every Day Smoker Emergency Contacts Name Discharge Info Relation Home Work [facility] DISCHARGE CAREGIVER [3] Spouse [3] [phone] JosselynChirag trinh  Spouse [3] [phone] Patient Belongings The following personal items are in your possession at time of discharge: 
     Visual Aid: Glasses Please provide this summary of care documentation to your next provider. Signatures-by signing, you are acknowledging that this After Visit Summary has been reviewed with you and you have received a copy. Patient Signature:  ____________________________________________________________ Date:  ____________________________________________________________  
  
Claudene Born Provider Signature:  ____________________________________________________________ Date:  ____________________________________________________________

## 2018-05-31 NOTE — PROGRESS NOTES
Patient rates pain to chest 8-/10. States she feels nauseated but no vomiting. VS: 109, 163/91, 94 % 2 L NC. Placed call to Dr. Sosa Notice reference pain. Awaiting return call.

## 2018-05-31 NOTE — DISCHARGE INSTRUCTIONS
70 Dwayne Arizmendi MD, [address], [facility] [place]       ALEJANDRO Moreira PA-C Casimiro Schuller, BEATRIZ-ALEJANDRO Martinez NP [facility]    at [place] [address] [facility] [address], [address] [address]  22.    [phone]    FAX: [address] [place] [address] Drive, [address] [facility], [address]    [phone]    FAX: [phone]       ENDOSCOPY WITH BANDING DISCHARGE INSTRUCTIONS    Josh Soctt  1955  Date: 5/31/2018    DISCOMFORT:  Use lozenges or warm salt water gargle for sore thoat  Apply warm compress to IV site if red. If redness or soreness persists call the office. You may experience gas and bloating. Walking and belching will help relieve this. You may experience chest pain or discomfort or feel as though food is \"sticking\" in your food pipe for a few days after the procedure. This is a normal feeling after banding of esophageal varices. DIET:  Regular food may dislodge the bands placed on the varices. For this reason you should only have liquid for the rest of today. Eat only soft food that does not need to be chewed all day tomorrow. You may advance to your regular diet in 2 days. ACTIVITY:  Spend the remainder of the day resting. Avoid any strenuous activity. You may not operate a vehicle for 12 hours. You may not engage in an occupation involving machinery or appliances for rest of today. Avoid making any critical decisions for at least 24 hour.     Call the [employer] of [address] [place] [address] if you have any of the following:  Increasing chest or abdominal pain, nausea, vomiting, vomiting blood, abdominal distension or swelling, fever or chills, bloody discharge from nose or mouth or shortness of breath. Follow-up Instructions:  Call Dr. Margie Ramos for any questions or problems at the phone number listed above. If a biopsy was performed, you will be contacted by the office staff or Dr Margie Ramos within 1 week. If you have not heard from us by then you may call the office at the phone number listed above to inquire about the results. ENDOSCOPY FINDINGS:  Two varices were found in the esophagus (food tube). 2 bands were placed to seal the varices and reduce the risk of bleeding. DISCHARGE SUMMARY from the Nurse:   The following personal items collected during your admission are returned to you:   Dental Appliance:    Vision: Visual Aid: Glasses  Hearing Aid:    Jewelry:    Clothing:    Other Valuables:    Valuables sent to safe:

## 2018-05-31 NOTE — H&P
70 Dwayne Arizmendi MD, FACP, [place], [place]       Rollie Goltz, NP Arlyss Eaves, PA-C Everlean Rape, ACNP-BC   Khadar Ralph, ALEJANDRO Mckenna Jenaro De Wu 136    at [address], [address] [place] [address], [facility].    [phone]    FAX: [address], [address]    [phone]    FAX: [phone]       PRE-PROCEDURE NOTE - EGD    H and P from last office visit reviewed. Allergies reviewed. Out-patient medicaton list reviewed. Patient Active Problem List   Diagnosis Code    Diabetes mellitus with neurological manifestations, uncontrolled ([facility] [address] E11.49, E11.65    Essential hypertension, benign I10    Hyperlipidemia LDL goal <100 E78.5    Thrombocytopenia (HCC) D69.6    Previous back surgery Z98.890    S/P CHACHO (total abdominal hysterectomy) Z90.710    Cirrhosis (HCC) K74.60    Acute upper GI bleeding K92.2    Anemia D64.9    GI bleed K92.2    BALDERAS (nonalcoholic steatohepatitis) K75.81    Acute deep vein thrombosis (DVT) of right lower extremity (HCC) I82.401    COPD (chronic obstructive pulmonary disease) (HCC) J44.9    Sepsis (HCC) A41.9    CAP (community acquired pneumonia) J18.9    IBIS (obstructive sleep apnea) G47.33    Tobacco abuse Z72.0    Neuropathy G62.9    Respiratory failure (HCC) J96.90    Severe obesity (BMI 35.0-39.9) (HCC) E66.01       Allergies   Allergen Reactions    Hydrocodone Nausea and Vomiting    Lisinopril Cough    Lortab [Hydrocodone-Acetaminophen] Nausea and Vomiting    Percocet [Oxycodone-Acetaminophen] Nausea and Vomiting       No current facility-administered medications on file prior to encounter.       Current Outpatient Prescriptions on File Prior to Encounter   Medication Sig Dispense Refill    insulin glargine (LANTUS SOLOSTAR U-100 INSULIN) 100 unit/mL (3 mL) inpn Inject 55 units twice daily--Dose change 4/12/18--updated med list--did not send prescription to the pharmacy 45 mL 11    furosemide (LASIX) 20 mg tablet Take 1 Tab by mouth daily. 90 Tab 3    traMADol (ULTRAM) 50 mg tablet take 1-2 tablets by mouth twice a day if needed for DIABETIC NEUROPATHY PAIN 120 Tab 5    omeprazole (PRILOSEC) 20 mg capsule take 1 capsule by mouth once daily 30 Cap 5    CONSTULOSE 10 gram/15 mL solution take 2 tablespoonfuls by mouth twice a day 1000 mL 5    ferrous sulfate 325 mg (65 mg iron) tablet take 1 tablet by mouth once daily WITH BREAKFAST 90 Tab 1    losartan (COZAAR) 25 mg tablet take 1 tablet by mouth once daily REPLACES LISINOPRIL FOR BLOOD PRESSURE AND KIDNEY PROTECTION 30 Tab 11    insulin lispro (HUMALOG KWIKPEN) 100 unit/mL kwikpen Inject as needed up to 3 times per day for sugars over 150: 4 units for every 50 points--Dose change 10/6/17--updated med list--did not send prescription to the pharmacy 45 mL 11    metFORMIN (GLUCOPHAGE) 1,000 mg tablet take 1 tablet by mouth twice a day with meals 180 Tab 3    gabapentin (NEURONTIN) 600 mg tablet take 2 tablets by mouth three times a day (NEW HIGHER DOSE) 180 Tab 11    potassium chloride SR (KLOR-CON 10) 10 mEq tablet take 1 tablet by mouth twice a day 60 Tab 5    ondansetron (ZOFRAN ODT) 4 mg disintegrating tablet dissolve 1 tablet ON TONGUE every 8 hours if needed for nausea 45 Tab 3    albuterol-ipratropium (DUO-NEB) 2.5 mg-0.5 mg/3 ml nebu 3 mL by Nebulization route every four (4) hours as needed. 30 Nebule 0    propranolol (INDERAL) 20 mg tablet Take 1 Tab by mouth two (2) times a day. 60 Tab 11    amitriptyline (ELAVIL) 150 mg tablet Take 150 mg by mouth nightly.  rOPINIRole (REQUIP) 4 mg tab TAB Take 4 mg by mouth nightly.       albuterol (PROAIR HFA) 90 mcg/actuation inhaler Take 2 Puffs by inhalation every four (4) hours as needed.  fluticasone-salmeterol (ADVAIR DISKUS) 500-50 mcg/dose diskus inhaler Take 1 Puff by inhalation two (2) times a day.  Nebulizer & Compressor machine UAD 1 Each 0    TREVOR PEN NEEDLE 32 gauge x 5/32\" ndle use as directed four times a day 100 Pen Needle 11       For EGD to assess for esophageal and gastric varices. Plan to perform banding if indicated based upon variceal size and appearance. The risks of the procedure were discussed with the patient. These included reaction to anesthesia, pain, perforation and bleeding. All questions were answered. The patient wishes to proceed with the procedure. PHYSICAL EXAMINATION:  VS: per nursing note  General: No acute distress. Eyes: Sclera anicteric. ENT: No oral lesions. Thyroid normal.  Nodes: No adenopathy. Skin: No spider angiomata. No jaundice. No palmar erythema. Respiratory: Lungs clear to auscultation. Cardiovascular: Regular heart rate. No murmurs. No JVD. Abdomen: Soft non-tender, liver size normal to percussion/palpation. Spleen not palpable. No obvious ascites. Extremities: No edema. No muscle wasting. No gross arthritic changes. Neurologic: Alert and oriented. Cranial nerves grossly intact. No asterixis. MOST RECENT LABORATORY STUDIES:  Lab Results   Component Value Date/Time    WBC 4.3 04/09/2018 11:08 AM    HGB 9.8 (L) 04/09/2018 11:08 AM    HCT 30.2 (L) 04/09/2018 11:08 AM    PLATELET 66 (LL) 02/94/6342 11:08 AM    MCV 84 04/09/2018 11:08 AM     Lab Results   Component Value Date/Time    INR 1.2 (H) 02/13/2017 03:54 PM    INR 1.1 11/10/2016 02:48 PM    INR 1.1 03/07/2016 02:05 PM    Prothrombin time 11.8 (H) 02/13/2017 03:54 PM    Prothrombin time 11.5 (H) 11/10/2016 02:48 PM    Prothrombin time 11.6 03/07/2016 02:05 PM       ASSESSMENT AND PLAN:  EGD to assess for esophageal and/or gastric varices.   Conscious sedation with fentanyl and versed.     MD Evonne Gutiérrez 13 [facility] [address] [facility] [address] [address] Melany Clark 7  Mayo Robertson  22.  [phone]

## 2018-05-31 NOTE — ANESTHESIA PREPROCEDURE EVALUATION
Anesthetic History               Review of Systems / Medical History  Patient summary reviewed, nursing notes reviewed and pertinent labs reviewed    Pulmonary    COPD: mild    Sleep apnea    Asthma : well controlled       Neuro/Psych             Comments: Diabetic neuropathy Cardiovascular    Hypertension                   GI/Hepatic/Renal     GERD      Liver disease    Comments: Glycogenic hepatopathy  varicies Endo/Other    Diabetes: type 2         Other Findings            Physical Exam    Airway  Mallampati: I  TM Distance: 4 - 6 cm  Neck ROM: normal range of motion   Mouth opening: Normal     Cardiovascular    Rhythm: regular  Rate: normal         Dental    Dentition: Edentulous     Pulmonary                 Abdominal         Other Findings            Anesthetic Plan    ASA: 3  Anesthesia type: MAC          Induction: Intravenous  Anesthetic plan and risks discussed with: Patient

## 2018-05-31 NOTE — PROCEDURES
[address] Shirin Forbes MD, [address], [place], [place]       Sarita Preston, JANEL Petersen, BIBIP-ALEJANDRO Lea NP [address] [facility] 136    at [address], [address] Mayo Nielson  22.    [phone]    FAX: [address] [place] [address], [address]    [phone]    FAX: [phone]       UPPER ENDOSCOPY PROCEDURE NOTE    [facility]  1955    INDICATION: Cirrhosis. Screening for esophageal varices with variceal ligation if indicated. : Ashley Noble MD    ANESTHESIA/SEDATION: Propofol was administered by anesthesia      PROCEDURE DESCRIPTION:  Inf[employer] consent was obtained from the patient for the procedure. All risks and benefits of the procedure explained. The patient was taken to the endoscopy suite and placed in the left lateral decubitus position. Following sequential administration of sedation to doses as indicated above the endoscope was inserted into the mouth and advanced under direct vision to the second portion of the duodenum. Careful inspection of upper gastrointestinal tract was made as the endoscope was inserted and withdrawn. Retroflexion of the endoscope to view of the cardia of the stomach was performed. After withdrawing the endoscope the banding devise was placed on the tip of the endoscope. The scope was then reinserted under direct inspection and advanced to the esophagus. Banding of esophageal varices was performed as described below. The scope was then removed. FINDINGS:  Esophagus: Two medium esophageal varices were identified. 1 with a red spot. Banding of esophageal varices was performed.   Excellent hemostasis was achieved after banding. Stomach:   Mild portal hypertensive gastropathy of the body of the, stomach. Gastric varicies identified. Duodenum:   Normal bulb and second portion    INTERVENTION:   2 bands placed were placed on esophageal varices. COMPLICATIONS: None. The patient tolerated the procedure well. EBL: Negligible. RECOMMENDATIONS:  Observe until discharge parameters are achieved. Liquid diet today. Soft food tomorrow. Resume general diet thereafter. Repeat endoscopy to reassess varices and need for additional banding in 6 months. Follow-up [facility] office as scheduled.       Tianna Bal MD  5/31/2018  3:10 PM

## 2018-05-31 NOTE — ANESTHESIA POSTPROCEDURE EVALUATION
Post-Anesthesia Evaluation and Assessment    Patient: Heather Desouza MRN: [id number]  SSN: [id number]    YOB: 1955  Age: 58 y.o. Sex: female       Cardiovascular Function/Vital Signs  Visit Vitals    /89    Pulse (!) 107    Temp 36.7 °C (98 °F)    Resp 18    Wt 86.6 kg (191 lb)    SpO2 94%    BMI 36.09 kg/m2       Patient is status post MAC anesthesia for Procedure(s):  ESOPHAGOGASTRODUODENOSCOPY (EGD)  ENDOSCOPIC BANDING OR LIGATION. Nausea/Vomiting: None    Postoperative hydration reviewed and adequate. Pain:  Pain Scale 1: Numeric (0 - 10) (05/31/18 1524)  Pain Intensity 1: 8 (05/31/18 1524)   Managed    Neurological Status: At baseline    Mental Status and Level of Consciousness: Arousable    Pulmonary Status:   O2 Device: Nasal cannula (05/31/18 1524)   Adequate oxygenation and airway patent    Complications related to anesthesia: None    Post-anesthesia assessment completed.  No concerns    Signed By: Patricia Fuentes MD     May 31, 2018

## 2018-05-31 NOTE — PERIOP NOTES
Initial RN admission and assessment performed and documented in Endoscopy navigator. Patient evaluated by anesthesia in pre-procedure holding. All procedural vital signs, airway assessment, and level of consciousness information monitored and recorded by anesthesia staff on the anesthesia record. Esophageal varices banded x2. No complications noted. Pt without chest pain, discomfort, or bleeding at this time. Report received from CRNA post procedure. Patient transported to recovery area by RN.     Endoscope was pre-cleaned at bedside immediately following procedure by Sarah Sevilla.

## 2018-07-10 PROBLEM — E66.01 SEVERE OBESITY (BMI 35.0-39.9): Status: RESOLVED | Noted: 2018-05-31 | Resolved: 2018-01-01

## 2018-07-10 PROBLEM — E66.9 OBESITY (BMI 30.0-34.9): Status: ACTIVE | Noted: 2018-01-01

## 2018-07-10 PROBLEM — J96.90 RESPIRATORY FAILURE (HCC): Status: RESOLVED | Noted: 2017-03-30 | Resolved: 2018-01-01

## 2018-07-10 NOTE — MR AVS SNAPSHOT
[address] [facility] .[phone] [address] [place] [address] 
[phone] Patient: Oscar Grant MRN: [id number] CZ Visit Information Date & Time Provider Department Dept. Phone Encounter #  
 7/10/2018  9:00 AM April GABRIELE Babcock, [facility] 219 [id number] Follow-up Instructions Return in about 3 months (around 10/10/2018). Your Appointments 10/5/2018 11:00 AM  
Any with Briseyda Fleming MD  
[address] [facility] ([facility]) Appt Note: 6 month follow up Joseph Rico 7 [phone]  
[phone]  
  
   
 [address] [facility] [address] [facility] [id number]  
  
    
 10/11/2018  9:30 AM  
Follow Up with Toni Gomez MD  
[facility]) Appt Note: f/u          dm            6 month  
 [address] [facility] [address] [phone] [address] [place] [address]  
  
    
 10/11/2018 10:00 AM  
Follow Up with ALEJANDRO Tello 75 ([facility]) Appt Note: Follow up [address] [facility] .[phone] [address] [place] [address]  
[phone]  
  
   
 [address] [facility] [address]  
  
    
 2018 12:00 PM  
PROCEDURE with MD Estrellita Espositoeti 75 [facility]) Appt Note: EGD  
 [address] [facility] .[phone] [facility] [address] [facility] [address]  
[address] Miguel Angel [address] Upcoming Health Maintenance Date Due  
 PAP AKA CERVICAL CYTOLOGY 1976 BREAST CANCER SCRN MAMMOGRAM 2005 ZOSTER VACCINE AGE 60> 2015 FOBT Q 1 YEAR AGE 50-75 3/1/2017 MEDICARE YEARLY EXAM 3/14/2018 Influenza Age 5 to Adult 2018 FOOT EXAM Q1 10/6/2018 HEMOGLOBIN A1C Q6M 2018 MICROALBUMIN Q1 2019 LIPID PANEL Q1 2019 EYE EXAM RETINAL OR DILATED Q1 5/18/2019 DTaP/Tdap/Td series (2 - Td) 11/19/2026 Allergies as of 7/10/2018  Review Complete On: 7/10/2018 By: Gonzalo Ibrahim Severity Noted Reaction Type Reactions Hydrocodone  02/05/2015   Systemic Nausea and Vomiting Lisinopril  11/10/2016    Cough Lortab [Hydrocodone-acetaminophen]  11/17/2016    Nausea and Vomiting Percocet [Oxycodone-acetaminophen]  02/05/2015   Systemic Nausea and Vomiting Current Immunizations  Reviewed on 5/1/2018 Name Date Influenza Vaccine 10/6/2014, 10/3/2013 Influenza Vaccine (Quad) PF 3/30/2017  2:39 PM  
 Pneumococcal Vaccine (Unspecified Type) 10/3/2013 Tdap 11/19/2016  2:59 PM  
  
 Not reviewed this visit You Were Diagnosed With   
  
 Codes Comments Other cirrhosis of liver ([facility].)    -  Primary ICD-10-CM: L77.43 ICD-9-CM: 571.5 BALDERAS (nonalcoholic steatohepatitis)     ICD-10-CM: I09.25 ICD-9-CM: 571.8 Gastrointestinal hemorrhage with melena     ICD-10-CM: K92.1 ICD-9-CM: 578.1 Thrombocytopenia ([facility].)     ICD-10-CM: D69.6 ICD-9-CM: 287.5 Other iron deficiency anemia     ICD-10-CM: D50.8 ICD-9-CM: 280.8 Obesity (BMI 30.0-34.9)     ICD-10-CM: Y94.5 ICD-9-CM: 278.00 Essential hypertension, benign     ICD-10-CM: I10 
ICD-9-CM: 401.1 Tobacco abuse     ICD-10-CM: Z72.0 ICD-9-CM: 305.1 Vitals BP Pulse Temp Weight(growth percentile) SpO2 BMI  
 145/59 (BP 1 Location: Left arm, BP Patient Position: Sitting) (!) 101 98.3 °F (36.8 °C) (Tympanic) 184 lb 3.2 oz (83.6 kg) 93% 34.8 kg/m2 OB Status Smoking Status Hysterectomy Current Every Day Smoker BMI and BSA Data Body Mass Index Body Surface Area 34.8 kg/m 2 1.9 m 2 Preferred Pharmacy Pharmacy Name Phone [phone] [address] [phone] Your Updated Medication List  
  
   
 This list is accurate as of 7/10/18  9:49 AM.  Always use your most recent med list.  
  
  
  
  
 acetaminophen 500 mg tablet Commonly known as:  TYLENOL Take 500 mg by mouth every six (6) hours as needed for Pain. ADVAIR DISKUS 500-50 mcg/dose diskus inhaler Generic drug:  fluticasone-salmeterol Take 1 Puff by inhalation two (2) times a day. albuterol-ipratropium 2.5 mg-0.5 mg/3 ml Nebu Commonly known as:  DUO-NEB  
3 mL by Nebulization route every four (4) hours as needed. amitriptyline 150 mg tablet Commonly known as:  ELAVIL Take 150 mg by mouth nightly. CONSTULOSE 10 gram/15 mL solution Generic drug:  lactulose  
take 2 tablespoonfuls by mouth twice a day  
  
 ferrous sulfate 325 mg (65 mg iron) tablet  
take 1 tablet by mouth once daily WITH BREAKFAST  
  
 furosemide 20 mg tablet Commonly known as:  LASIX Take 1 Tab by mouth daily. gabapentin 600 mg tablet Commonly known as:  NEURONTIN  
take 2 tablets by mouth three times a day (NEW HIGHER DOSE) insulin glargine 100 unit/mL (3 mL) Inpn Commonly known as:  LANTUS SOLOSTAR U-100 INSULIN Inject 55 units twice daily--Dose change 4/12/18--updated med list--did not send prescription to the pharmacy  
  
 insulin lispro 100 unit/mL kwikpen Commonly known as:  HumaLOG KwikPen Insulin Inject as needed up to 3 times per day for sugars over 150: 4 units for every 50 points--Dose change 10/6/17--updated med list--did not send prescription to the pharmacy  
  
 losartan 25 mg tablet Commonly known as:  COZAAR  
take 1 tablet by mouth once daily REPLACES LISINOPRIL FOR BLOOD PRESSURE AND KIDNEY PROTECTION  
  
 metFORMIN 1,000 mg tablet Commonly known as:  GLUCOPHAGE  
take 1 tablet by mouth twice a day with meals Bambi Pen Needle 32 gauge x 5/32\" Ndle Generic drug:  Insulin Needles (Disposable)  
use as directed four times a day Nebulizer & Compressor machine UAD  
  
 omeprazole 20 mg capsule Commonly known as:  PRILOSEC  
take 1 capsule by mouth once daily  
  
 ondansetron 4 mg disintegrating tablet Commonly known as:  ZOFRAN ODT  
dissolve 1 tablet ON TONGUE every 8 hours if needed for nausea  
  
 potassium chloride SR 10 mEq tablet Commonly known as:  KLOR-CON 10  
take 1 tablet by mouth twice a day PROAIR HFA 90 mcg/actuation inhaler Generic drug:  albuterol Take 2 Puffs by inhalation every four (4) hours as needed. propranolol 20 mg tablet Commonly known as:  INDERAL Take 1 Tab by mouth two (2) times a day. rOPINIRole 4 mg Tab TAB Commonly known as:  Willena Baltazar Take 4 mg by mouth nightly. traMADol 50 mg tablet Commonly known as:  ULTRAM  
take 1-2 tablets by mouth twice a day if needed for DIABETIC NEUROPATHY PAIN We Performed the Following AFP WITH AFP-L3% [[id number] Custom] CBC W/O DIFF [13491 CPT(R)] FERRITIN [60807 CPT(R)] IRON PROFILE C183553 CPT(R)] METABOLIC PANEL, COMPREHENSIVE [78589 CPT(R)] PROTHROMBIN TIME + INR [21539 CPT(R)] UPPER GI ENDOSCOPY,DIAGNOSIS [29590 CPT(R)] Follow-up Instructions Return in about 3 months (around 10/10/2018). To-Do List   
 11/10/2018 Imaging:  [facility]! Dear [place]: Thank you for requesting a [employer] account. Our records indicate that you already have an active [employer] account. You can access your account anytime at [email or web address] Did you know that you can access your hospital and ER discharge instructions at any time in [employer]? You can also review all of your test results from your hospital stay or ER visit. Additional Information If you have questions, please visit the Frequently Asked Questions section of the [employer] website at [email or web address] Remember, [employer] is NOT to be used for urgent needs. For medical emergencies, dial 911. Now available from your iPhone and Android! Please provide this summary of care documentation to your next provider. Your primary care clinician is listed as Gilbert Cramer. If you have any questions after today's visit, please call [phone].

## 2018-07-10 NOTE — PROGRESS NOTES
1. Have you been to the ER, urgent care clinic since your last visit? Hospitalized since your last visit? No    2. Have you seen or consulted any other health care providers outside of the [address] [place] [address] since your last visit? Include any pap smears or colon screening. No   Chief Complaint   Patient presents with    Follow-up     3 month follow up      Visit Vitals    /59 (BP 1 Location: Left arm, BP Patient Position: Sitting)    Pulse (!) 101    Temp 98.3 °F (36.8 °C) (Tympanic)    Wt 184 lb 3.2 oz (83.6 kg)    SpO2 93%    BMI 34.8 kg/m2     PHQ over the last two weeks 7/10/2018   Little interest or pleasure in doing things Not at all   Feeling down, depressed or hopeless Not at all   Total Score PHQ 2 0     Learning Assessment 7/10/2018   PRIMARY LEARNER Patient   HIGHEST LEVEL OF EDUCATION - PRIMARY LEARNER  -   BARRIERS PRIMARY LEARNER NONE   CO-LEARNER CAREGIVER No   PRIMARY LANGUAGE ENGLISH   LEARNER PREFERENCE PRIMARY LISTENING   LEARNING SPECIAL TOPICS -   ANSWERED BY patient    RELATIONSHIP SELF     Abuse Screening Questionnaire 7/10/2018   Do you ever feel afraid of your partner? N   Are you in a relationship with someone who physically or mentally threatens you? N   Is it safe for you to go home?  Angie Evans

## 2018-07-10 NOTE — PROGRESS NOTES
[address] Dwayne Arizmendi MD, FACP, [facility], [place] April ALEJANDRO Vance PA-C Francoise Milian, ACNP-BC   ALEJANDRO Russell NP [email or web address] 136    at [address], [address]    [phone]    FAX: [address] [place] [address], [address]    [phone]    FAX: [phone]     Patient Care Team:  Herchel Gowers, NP as PCP - General (Family Practice)  Geovanna Bennett MD (Hematology and Oncology)  Aria Guevara MD as Consulting Provider (Endocrinology)  Mandi Peters RN as Care Coordinator (Hepatology)  Laine Abreu MD (Hepatology)    Patient Active Problem List   Diagnosis Code    Diabetes mellitus with neurological manifestations, uncontrolled ([facility] E11.49, E11.65    Essential hypertension, benign I10    Hyperlipidemia LDL goal <100 E78.5    Thrombocytopenia ([facility] D69.6    Previous back surgery Z98.890    S/P CHACHO (total abdominal hysterectomy) Z90.710    Cirrhosis ([facility] K74.60    Anemia D64.9    GI bleed K92.2    BALDERAS (nonalcoholic steatohepatitis) K75.81    Acute deep vein thrombosis (DVT) of right lower extremity (HCC) I82.401    COPD (chronic obstructive pulmonary disease) ([facility] J44.9    Sepsis ([facility] A41.9    CAP (community acquired pneumonia) J18.9    IBIS (obstructive sleep apnea) G47.33    Tobacco abuse Z72.0    Neuropathy G62.9    Obesity (BMI 30.0-34. 9) E66.9       Dameon Huang is a 58 y.o. 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  The active problem list, all pertinent past medical history, medications, endoscopic studies, radiologic findings and laboratory findings related to the liver disorder were reviewed with the patient. Liver biopsies in the past have confirmed cirrhosis. Patient participated in a long-term "[employer] clinical trial utilizing an anti-inflammatory agent to improve her liver disease. This has now ended and patient returns for a regular follow up visit. Last EGD in May 2018 demonstrated 2 medium-sized esophageal varices that were banded. Last US in May 2018 ruled out [address] Hepatic encephalopathy is currently being treated with lactulose 45 mL BID. Patient has only 3 BMs weekly with this regimen. She reports mild confusion and forgetfulness today. Patient has a history of significant iron deficiency anemia requiring daily iron supplements and iron infusions in the past.     Today, patient complains of ongoing severe fatigue and abdominal tenderness. Patient denies change in bowel habits, dark urine, myalgias, arthralgias, pruritus and jaundice. ALLERGIES  Allergies   Allergen Reactions    Hydrocodone Nausea and Vomiting    Lisinopril Cough    Lortab [Hydrocodone-Acetaminophen] Nausea and Vomiting    Percocet [Oxycodone-Acetaminophen] Nausea and Vomiting     Current Outpatient Prescriptions   Medication Sig    acetaminophen (TYLENOL) 500 mg tablet Take 500 mg by mouth every six (6) hours as needed for Pain.  insulin glargine (LANTUS SOLOSTAR U-100 INSULIN) 100 unit/mL (3 mL) inpn Inject 55 units twice daily--Dose change 4/12/18--updated med list--did not send prescription to the pharmacy    furosemide (LASIX) 20 mg tablet Take 1 Tab by mouth daily.     traMADol (ULTRAM) 50 mg tablet take 1-2 tablets by mouth twice a day if needed for DIABETIC NEUROPATHY PAIN    omeprazole (PRILOSEC) 20 mg capsule take 1 capsule by mouth once daily    CONSTULOSE 10 gram/15 mL solution take 2 tablespoonfuls by mouth twice a day    ferrous sulfate 325 mg (65 mg iron) tablet take 1 tablet by mouth once daily WITH BREAKFAST    losartan (COZAAR) 25 mg tablet take 1 tablet by mouth once daily REPLACES LISINOPRIL FOR BLOOD PRESSURE AND KIDNEY PROTECTION    insulin lispro (HUMALOG KWIKPEN) 100 unit/mL kwikpen Inject as needed up to 3 times per day for sugars over 150: 4 units for every 50 points--Dose change 10/6/17--updated med list--did not send prescription to the pharmacy    metFORMIN (GLUCOPHAGE) 1,000 mg tablet take 1 tablet by mouth twice a day with meals    gabapentin (NEURONTIN) 600 mg tablet take 2 tablets by mouth three times a day (NEW HIGHER DOSE)    potassium chloride SR (KLOR-CON 10) 10 mEq tablet take 1 tablet by mouth twice a day    ondansetron (ZOFRAN ODT) 4 mg disintegrating tablet dissolve 1 tablet ON TONGUE every 8 hours if needed for nausea    albuterol-ipratropium (DUO-NEB) 2.5 mg-0.5 mg/3 ml nebu 3 mL by Nebulization route every four (4) hours as needed.  Nebulizer & Compressor machine UAD    TREVOR PEN NEEDLE 32 gauge x 5/32\" ndle use as directed four times a day    propranolol (INDERAL) 20 mg tablet Take 1 Tab by mouth two (2) times a day.  amitriptyline (ELAVIL) 150 mg tablet Take 150 mg by mouth nightly.  rOPINIRole (REQUIP) 4 mg tab TAB Take 4 mg by mouth nightly.  albuterol (PROAIR HFA) 90 mcg/actuation inhaler Take 2 Puffs by inhalation every four (4) hours as needed.  fluticasone-salmeterol (ADVAIR DISKUS) 500-50 mcg/dose diskus inhaler Take 1 Puff by inhalation two (2) times a day. No current facility-administered medications for this visit. SYSTEM REVIEW NOT RELATED TO LIVER DISEASE OR REVIEWED ABOVE:  Constitution systems: Negative for fever, chills, weight gain, weight loss. Eyes: Negative for visual changes. ENT: Negative for sore throat, painful swallowing. Respiratory: Negative for cough, hemoptysis, SOB. Cardiology: Negative for chest pain, palpitations. GI:  Negative for constipation or diarrhea. : Negative for urinary frequency, dysuria, hematuria, nocturia. Skin: Negative for rash. Hematology: Negative for easy bruising, blood clots. Musculo-skelatal: Negative for back pain, muscle pain, weakness. Neurologic: Negative for headaches, dizziness, vertigo, memory problems not related to HE. Psychology: Negative for anxiety, depression. FAMILY/SOCIAL HISTORY:  The patient is . The patient has 2 children and 2 grandchildren. The patient currently smokes 1 ppd x 50 yrs. The patient had consumed heavily in the past. The patient has consumed alcohol only rarely since the mid-1990s. The patient used to work as  and . The patient is currently receiving disability. PHYSICAL EXAMINATION:  Visit Vitals    /59 (BP 1 Location: Left arm, BP Patient Position: Sitting)    Pulse (!) 101    Temp 98.3 °F (36.8 °C) (Tympanic)    Wt 184 lb 3.2 oz (83.6 kg)    SpO2 93%    BMI 34.8 kg/m2     General: Obese. No acute distress. Eyes: Sclera anicteric. ENT: No oral lesions. Thyroid normal.  Nodes: No adenopathy. Skin: Spider angiomata. No jaundice. No palmar erythema. Respiratory: Lungs clear to auscultation. Cardiovascular: Regular heart rate. No murmurs. No JVD. Abdomen: Obese. Soft non-tender. Liver enlarged, hard and tender upon palpation. Spleen not palpable. No obvious ascites. Extremities: Right LE edema, tenderness upon palpation. No muscle wasting. No gross arthritic changes. Neurologic: Alert and oriented. Cranial nerves grossly intact. No asterixsis.     LABORATORY STUDIES:  [facility] [place] [facility] [address] Units 7/10/2018 5/11/2018   WBC 3.4 - 10.8 x10E3/uL 5.6    ANC 1.8 - 8.0 K/UL     HGB 11.1 - 15.9 g/dL 12.4    PLT x10E3/uL CANCELED    INR 0.8 - 1.2 1.1    AST 0 - 40 IU/L 23 38   ALT 0 - 32 IU/L 28 37 (H)   Alk Phos 39 - 117 IU/L 174 (H) 123 (H)   Bili, Total 0.0 - 1.2 mg/dL 0.8 0.7   Albumin 3.6 - 4.8 g/dL 3.9 3.8   BUN 8 - 27 mg/dL 11 12   Creat 0.57 - 1.00 mg/dL 0.55 (L) 0.47 (L)   Na 134 - 144 mmol/L 135 140   K 3.5 - 5.2 mmol/L 4.2 3.7   Cl 96 - 106 mmol/L 96 100   CO2 20 - 29 mmol/L 24 28   Glucose 65 - 99 mg/dL 396 (H) 108 (H)     Serologies Ferritin Iron % Saturation   Latest Ref Rng & Units 15 - 150 ng/mL 15 - 55 %   7/10/2018 44 22   4/9/2018 42 10 (L)   1/23/2018  14 (L)   11/28/2017  9 (LL)   5/3/2017  5 (LL)   3/29/2017 17 5 (L)   3/7/2016 25 48   3/1/2016  7 (L)     SEROLOGIES:  Serologies Latest Ref Rng 8/15/2013   Hep A Ab, Total Negative Positive (A)   Hep B Surface Ag Negative Negative   Hep B Core Ab, Total Negative Negative   Hep B Surface Ab  0.21   Ferritin 15 - 150 ng/mL 34   Iron % Saturation 15 - 55 % 18   JUDITH, IFA  Negative   ASMCA 0 - 19 Units 4   M2 Ab 0.0 - 20.0 Units 4.0   Alpha-1 antitrypsin level 90 - 200 mg/dL 126     8/2013. Anti-HCV negative. LIVER HISTOLOGY:  12/2013. Liver biopsy. Cirrhosis. Macrovesicular steatosis involving 5% of liver parenchyma  1/2015. Liver biopsy. Cirrhosis. Macrovesicular steatosis involving 5-10% of liver parenchyma    ENDOSCOPIC PROCEDURES:  5/2013. Colonoscopy by Dr Maria Fernanda Gauthier. Hyperplastic polyp. 9/2015. EGD by MLS. A few medium esophageal varices were identified.  Banding of esophageal varices (3) was performed. Moderate portal hypertensive gastropathy of the body of the, stomach. No gastric varices identified. 3/2016. EGD by Orlando Pineda. Grade 1/4 distal esophageal varices no stigmata of bleeding - not banded. Multiple gastric varices high in fundus greater curvature - no stigmata, mild erosive antritis looks like NSAIDs.  3/2016. Colonoscopy by Orlando Pineda. Inadequate prep for transverse colon otherwise normal exam.  5/2018. EGD by MLS. Two medium esophageal varices were identified and banded. 1 with a red spot. Mild portal hypertensive gastropathy of the body of the stomach. Gastric varicies identified. RADIOLOGY:  7/2013. CT scan abdomen with and without IV contrast. Changes consistent with fatty infiltration and cirrhosis.  No liver mass lesions. No dilated bile ducts. No bile duct strictures. No ascites. 12/2015. Abdominal ultrasound. Enlarged heterogeneously echogenic liver with a nodular contour compatible with cirrhosis. No hepatic mass lesion is identified. Mild splenomegaly. 5/2018. US of liver. Enlarged cirrhotic liver is demonstrated without focal liver mass. No ascites. OTHER TESTING:  Not available or performed    ASSESSMENT AND PLAN:  Cirrhosis secondary to BALDERAS given that she has several features of metabolic syndrome. Her labs are stable today with normal liver function. Will continue to monitor patient on a regular basis. Weight loss. Discussed with patient the importance of losing weight and following a healthy diet. This will improve her liver disease and overall health. DM II. Glucose is significantly elevated today. Encouraged patient to continue regular follow up with her endocrinologist to manage this. Discussed the importance of following a diabetic diet and exercise as tolerated. Iron deficiency anemia. Resolved with iron infusions and daily iron supplements. She will continue oral FE. Discussed in detail the importance of not taking anything for pain except what is prescribed and OTC acetaminophen (Tylenol). Discussed the need to keep the acetaminophen dose less than 3,000 mg in a 24 hour period due to her advanced liver disease. She verbalized understanding of this. The patient is likely to need a liver transplant in the future. There is no reason to initiate liver transplant evaluation testing at this time with normal liver function. Hepatic encephalopathy. Directed patient to increase lactulose (45 mL BID to TID) to achieve 2-3 soft bowel movements daily. She is tolerating this well but she still has mild confusion. The patient was directed to continue all current medications at the current dosages.   There are no contraindications for the patient to take any medications that are necessary for treatment of other medical issues. The patient was counseled regarding alcohol consumption. [address] screening has recently been performed and does not suggest [address] The next liver imaging study will be performed in 11/2018. [address] [place] [address] in 3 months.     Gema Bolton NP  [facility] [place] [address]  Ph: [phone]  Fax: [phone]  Email: [email or web address]

## 2018-10-11 PROBLEM — E66.01 SEVERE OBESITY (HCC): Status: ACTIVE | Noted: 2018-01-01

## 2018-10-11 NOTE — MR AVS SNAPSHOT
[address] [facility] Miguel Angel [phone] [address] 
[phone] Patient: Merna Stephens MRN: [id number] Visit Information Date & Time Provider Department Dept. Phone Encounter #  
 10/11/2018  3:00 PM Cathi Christianson, [facility] 219 [id number] Your Appointments 10/12/2018  2:30 PM  
Follow Up with MD Willy Monique [facility]) Appt Note: f/u          dm            6 month; f/u  
 [address] [id number] [address] [place] [address]  
  
    
 11/1/2018 12:00 PM  
PROCEDURE with Marquez Alaniz MD  
[address] ([facility]) Appt Note: EGD  
 [address] [facility] Miguel Angel [phone] [address]  
[phone]  
  
   
 [place] [address]  
  
    
 2/4/2019 11:10 AM  
Follow Up with Cathi Alaniz NP [address] ([facility]) Appt Note: Follow up with April [address] [facility] [place] [phone] [address] Upcoming Health Maintenance Date Due  
 PAP AKA CERVICAL CYTOLOGY 8/26/1976 Shingrix Vaccine Age 50> (1 of 2) 8/26/2005 BREAST CANCER SCRN MAMMOGRAM 8/26/2005 FOBT Q 1 YEAR AGE 50-75 3/1/2017 MEDICARE YEARLY EXAM 3/14/2018 Influenza Age 5 to Adult 8/1/2018 FOOT EXAM Q1 10/6/2018 HEMOGLOBIN A1C Q6M 4/8/2019 MICROALBUMIN Q1 5/11/2019 LIPID PANEL Q1 5/11/2019 EYE EXAM RETINAL OR DILATED Q1 5/18/2019 DTaP/Tdap/Td series (2 - Td) 11/19/2026 Allergies as of 10/11/2018  Review Complete On: 10/11/2018 By: Nani Tam Severity Noted Reaction Type Reactions Hydrocodone  02/05/2015   Systemic Nausea and Vomiting Lisinopril  11/10/2016    Cough Lortab [Hydrocodone-acetaminophen]  11/17/2016    Nausea and Vomiting Percocet [Oxycodone-acetaminophen]  02/05/2015   Systemic Nausea and Vomiting Current Immunizations  Reviewed on 5/1/2018 Name Date Influenza Vaccine 10/6/2014, 10/3/2013 Influenza Vaccine (Quad) PF 3/30/2017  2:39 PM  
 Pneumococcal Vaccine (Unspecified Type) 10/3/2013 Tdap 11/19/2016  2:59 PM  
  
 Not reviewed this visit You Were Diagnosed With   
  
 Codes Comments BALDERAS (nonalcoholic steatohepatitis)    -  Primary ICD-10-CM: D02.49 ICD-9-CM: 571.8 Gastrointestinal hemorrhage with melena     ICD-10-CM: K92.1 ICD-9-CM: 578.1 Other cirrhosis of liver ([facility]     ICD-10-CM: M20.00 ICD-9-CM: 571.5 Iron deficiency anemia secondary to inadequate dietary iron intake     ICD-10-CM: D50.8 ICD-9-CM: 280.1 Vitals BP Pulse Temp Height(growth percentile) Weight(growth percentile) 146/62 (BP 1 Location: Left arm, BP Patient Position: Sitting) (!) 108 98.5 °F (36.9 °C) (Tympanic) 5' 1\" (1.549 m) 196 lb 12.8 oz (89.3 kg) SpO2 BMI OB Status Smoking Status 95% 37.19 kg/m2 Hysterectomy Current Every Day Smoker BMI and BSA Data Body Mass Index Body Surface Area  
 37.19 kg/m 2 1.96 m 2 Preferred Pharmacy Pharmacy Name Phone [phone] [address] [place] [address] [phone] Your Updated Medication List  
  
   
This list is accurate as of 10/11/18  3:53 PM.  Always use your most recent med list.  
  
  
  
  
 acetaminophen 500 mg tablet Commonly known as:  TYLENOL Take 500 mg by mouth every six (6) hours as needed for Pain. ADVAIR DISKUS 500-50 mcg/dose diskus inhaler Generic drug:  fluticasone-salmeterol Take 1 Puff by inhalation two (2) times a day. albuterol-ipratropium 2.5 mg-0.5 mg/3 ml Nebu Commonly known as:  DUO-NEB  
3 mL by Nebulization route every four (4) hours as needed. amitriptyline 150 mg tablet Commonly known as:  ELAVIL Take 150 mg by mouth nightly. amoxicillin-clavulanate 875-125 mg per tablet Commonly known as:  AUGMENTIN  
take 1 tablet by mouth every 12 hours CONSTULOSE 10 gram/15 mL solution Generic drug:  lactulose  
take 2 tablespoonfuls by mouth twice a day  
  
 ferrous sulfate 325 mg (65 mg iron) tablet  
take 1 tablet by mouth once daily with BREAKFAST  
  
 furosemide 20 mg tablet Commonly known as:  LASIX Take 1 Tab by mouth daily. gabapentin 600 mg tablet Commonly known as:  NEURONTIN  
take 2 tablets by mouth three times a day  
  
 insulin glargine 100 unit/mL (3 mL) Inpn Commonly known as:  LANTUS SOLOSTAR U-100 INSULIN Inject 55 units twice daily--Dose change 4/12/18--updated med list--did not send prescription to the pharmacy  
  
 insulin lispro 100 unit/mL kwikpen Commonly known as:  HumaLOG KwikPen Insulin Inject as needed up to 3 times per day for sugars over 150: 4 units for every 50 points. Max 50 units per day  
  
 losartan 25 mg tablet Commonly known as:  COZAAR  
take 1 tablet by mouth once daily REPLACES LISINOPRIL FOR BLOOD PRESSURE AND KIDNEY PROTECTION  
  
 metFORMIN 1,000 mg tablet Commonly known as:  GLUCOPHAGE  
take 1 tablet by mouth twice a day with meals Bambi Pen Needle 32 gauge x 5/32\" Ndle Generic drug:  Insulin Needles (Disposable)  
use as directed four times a day Nebulizer & Compressor machine UAD  
  
 omeprazole 20 mg capsule Commonly known as:  PRILOSEC  
take 1 capsule by mouth once daily  
  
 ondansetron 4 mg disintegrating tablet Commonly known as:  ZOFRAN ODT  
dissolve 1 tablet ON TONGUE every 8 hours if needed for nausea  
  
 potassium chloride SR 10 mEq tablet Commonly known as:  KLOR-CON 10  
take 1 tablet by mouth twice a day  
  
 predniSONE 20 mg tablet Commonly known as:  DELTASONE  
take 2 tablets by mouth once daily for 5 days PROAIR HFA 90 mcg/actuation inhaler Generic drug:  albuterol Take 2 Puffs by inhalation every four (4) hours as needed. promethazine-dextromethorphan 6.25-15 mg/5 mL syrup Commonly known as:  PROMETHAZINE-DM  
take 1 to 2 teaspoonfuls by mouth every 4 hours if needed  
  
 propranolol 20 mg tablet Commonly known as:  INDERAL Take 1 Tab by mouth two (2) times a day. rOPINIRole 4 mg Tab TAB Commonly known as:  Leydi Bering Take 4 mg by mouth nightly. traMADol 50 mg tablet Commonly known as:  ULTRAM  
take 1-2 tablets by mouth twice a day if needed for DIABETIC NEUROPATHY PAIN We Performed the Following AFP WITH AFP-L3% [PSP85767 Custom] CBC W/O DIFF [44431 CPT(R)] FERRITIN [88615 CPT(R)] IRON PROFILE [id number] CPT(R)] To-Do List   
 10/11/2018 Imaging:  [facility]! Dear Lorna Lamb: Thank you for requesting a [employer] [employer] account. Our records indicate that you already have an active [employer] account. You can access your account anytime at [email or web address] Did you know that you can access your hospital and ER discharge instructions at any time in [employer]? You can also review all of your test results from your hospital stay or ER visit. Additional Information If you have questions, please visit the Frequently Asked Questions section of the [employer] website at [email or web address] Remember, [employer] is NOT to be used for urgent needs. For medical emergencies, dial 911. Now available from your iPhone and Android! Please provide this summary of care documentation to your next provider. Your primary care clinician is listed as Zarina Wakefield. If you have any questions after today's visit, please call [phone].

## 2018-10-11 NOTE — PROGRESS NOTES
1. Have you been to the ER, urgent care clinic since your last visit? Hospitalized since your last visit? No    2. Have you seen or consulted any other health care providers outside of the [address] [place] [address] since your last visit? Include any pap smears or colon screening. Yes Where: Yes, PCP     Chief Complaint   Patient presents with    Follow-up     Visit Vitals    /62 (BP 1 Location: Left arm, BP Patient Position: Sitting)    Pulse (!) 108    Temp 98.5 °F (36.9 °C) (Tympanic)    Ht 5' 1\" (1.549 m)    Wt 196 lb 12.8 oz (89.3 kg)    SpO2 95%    BMI 37.19 kg/m2     PHQ over the last two weeks 10/11/2018   Little interest or pleasure in doing things Not at all   Feeling down, depressed, irritable, or hopeless Not at all   Total Score PHQ 2 0     Learning Assessment 10/11/2018   PRIMARY LEARNER Patient   HIGHEST LEVEL OF EDUCATION - PRIMARY LEARNER  -   BARRIERS PRIMARY LEARNER NONE   CO-LEARNER CAREGIVER -   PRIMARY LANGUAGE ENGLISH   LEARNER PREFERENCE PRIMARY LISTENING   LEARNING SPECIAL TOPICS -   ANSWERED BY patient   RELATIONSHIP SELF     Abuse Screening Questionnaire 10/11/2018   Do you ever feel afraid of your partner? N   Are you in a relationship with someone who physically or mentally threatens you? N   Is it safe for you to go home?  Ana Watson

## 2018-10-15 NOTE — PROGRESS NOTES
[address] Dwayne Arizmendi MD, Miri Lyons, [place] [place], [place]       April G [address], NP    JANEL Walters, [facility]   Morro Mittal, ALEJANDRO [facility] [address]    at [facility]    [address] [facility], [address] [facility], [facility].    [phone]    FAX: [address] [place] [address], [address], [address]    [phone]    FAX: [phone]     Patient Care Team:  Maik Stock NP as PCP - General (Family Practice)  Edwardo Cleary MD (Hematology and Oncology)  India Villareal MD as Consulting Provider (Endocrinology)  Loulou Truong MD (Hepatology)    Patient Active Problem List   Diagnosis Code    [facility] manifestations, [facility] E11.49, E11.65    Essential hypertension, benign I10    Hyperlipidemia LDL goal <100 E78.5    Thrombocytopenia ([facility] D69.6    Previous back surgery Z98.890    S/P CHACHO (total abdominal hysterectomy) Z90.710    Cirrhosis ([facility] K74.60    Anemia D64.9    GI bleed K92.2    BALDERAS (nonalcoholic steatohepatitis) K75.81    Acute deep vein thrombosis (DVT) of right lower extremity (HCC) I82.401    COPD (chronic obstructive pulmonary disease) ([facility] J44.9    Sepsis ([facility] A41.9    CAP (community acquired pneumonia) J18.9    IBIS (obstructive sleep apnea) G47.33    Tobacco abuse Z72.0    Neuropathy G62.9    Obesity (BMI 30.0-34. 9) E66.9    Severe obesity ([facility] E66.01       Kemal Wesley is a 61 y.o. 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  The active problem list, all pertinent past medical history, medications, endoscopic studies, radiologic findings and laboratory findings related to the liver disorder were reviewed with the patient. Liver biopsies in the past have confirmed cirrhosis. Patient participated in a long-term [place] clinical trial utilizing an anti-inflammatory agent to improve her liver disease. Last EGD in May 2018 demonstrated 2 medium-sized esophageal varices that were banded. Last US in May 2018 ruled out [address] Unfortunately, patient has gained 12 lbs since last office visit 3 months ago. Hepatic encephalopathy is currently being treated with lactulose 45 mL BID. Patient reports regular bowel movements today. Patient has a history of significant iron deficiency anemia requiring daily iron supplements and iron infusions in the past. She reports taking her iron supplements as directed. Today, patient complains of ongoing severe fatigue and abdominal tenderness. Patient denies a change in bowel habits, loss of appetite, dark urine, myalgias, arthralgias, pruritus and jaundice. ALLERGIES  Allergies   Allergen Reactions    Hydrocodone Nausea and Vomiting    Lisinopril Cough    Lortab [Hydrocodone-Acetaminophen] Nausea and Vomiting    Percocet [Oxycodone-Acetaminophen] Nausea and Vomiting     Current Outpatient Prescriptions   Medication Sig    amoxicillin-clavulanate (AUGMENTIN) 875-125 mg per tablet take 1 tablet by mouth every 12 hours    predniSONE (DELTASONE) 20 mg tablet take 2 tablets by mouth once daily for 5 days    promethazine-dextromethorphan (PROMETHAZINE-DM) 6.25-15 mg/5 mL syrup take 1 to 2 teaspoonfuls by mouth every 4 hours if needed    ferrous sulfate 325 mg (65 mg iron) tablet take 1 tablet by mouth once daily with BREAKFAST    traMADol (ULTRAM) 50 mg tablet take 1-2 tablets by mouth twice a day if needed for DIABETIC NEUROPATHY PAIN    insulin lispro (HUMALOG KWIKPEN INSULIN) 100 unit/mL kwikpen Inject as needed up to 3 times per day for sugars over 150: 4 units for every 50 points.   Max 50 units per day    metFORMIN (GLUCOPHAGE) 1,000 mg tablet take 1 tablet by mouth twice a day with meals    ondansetron (ZOFRAN ODT) 4 mg disintegrating tablet dissolve 1 tablet ON TONGUE every 8 hours if needed for nausea    TREVOR PEN NEEDLE 32 gauge x 5/32\" ndle use as directed four times a day    potassium chloride SR (KLOR-CON 10) 10 mEq tablet take 1 tablet by mouth twice a day    gabapentin (NEURONTIN) 600 mg tablet take 2 tablets by mouth three times a day    acetaminophen (TYLENOL) 500 mg tablet Take 500 mg by mouth every six (6) hours as needed for Pain.  insulin glargine (LANTUS SOLOSTAR U-100 INSULIN) 100 unit/mL (3 mL) inpn Inject 55 units twice daily--Dose change 4/12/18--updated med list--did not send prescription to the pharmacy    furosemide (LASIX) 20 mg tablet Take 1 Tab by mouth daily.  omeprazole (PRILOSEC) 20 mg capsule take 1 capsule by mouth once daily    CONSTULOSE 10 gram/15 mL solution take 2 tablespoonfuls by mouth twice a day    losartan (COZAAR) 25 mg tablet take 1 tablet by mouth once daily REPLACES LISINOPRIL FOR BLOOD PRESSURE AND KIDNEY PROTECTION    albuterol-ipratropium (DUO-NEB) 2.5 mg-0.5 mg/3 ml nebu 3 mL by Nebulization route every four (4) hours as needed.  Nebulizer & Compressor machine UAD    propranolol (INDERAL) 20 mg tablet Take 1 Tab by mouth two (2) times a day.  amitriptyline (ELAVIL) 150 mg tablet Take 150 mg by mouth nightly.  rOPINIRole (REQUIP) 4 mg tab TAB Take 4 mg by mouth nightly.  albuterol (PROAIR HFA) 90 mcg/actuation inhaler Take 2 Puffs by inhalation every four (4) hours as needed.  fluticasone-salmeterol (ADVAIR DISKUS) 500-50 mcg/dose diskus inhaler Take 1 Puff by inhalation two (2) times a day. No current facility-administered medications for this visit. SYSTEM REVIEW NOT RELATED TO LIVER DISEASE OR REVIEWED ABOVE:  Constitution systems: Negative for fever, chills, weight gain, weight loss. Eyes: Negative for visual changes.   ENT: Negative for sore throat, painful swallowing. Respiratory: Negative for cough, hemoptysis, SOB. Cardiology: Negative for chest pain, palpitations. GI:  Negative for constipation or diarrhea. : Negative for urinary frequency, dysuria, hematuria, nocturia. Skin: Negative for rash. Hematology: Negative for easy bruising, blood clots. Musculo-skelatal: Negative for back pain, muscle pain, weakness. Neurologic: Negative for headaches, dizziness, vertigo, memory problems not related to HE. Psychology: Negative for anxiety, depression. FAMILY/SOCIAL HISTORY:  The patient is . The patient has 2 children and 2 grandchildren. The patient currently smokes 1 ppd x 50 yrs. The patient had consumed heavily in the past. The patient has consumed alcohol only rarely since the mid-1990s. The patient used to work as  and . The patient is currently receiving disability. PHYSICAL EXAMINATION:  Visit Vitals    /62 (BP 1 Location: Left arm, BP Patient Position: Sitting)    Pulse (!) 108    Temp 98.5 °F (36.9 °C) (Tympanic)    Ht 5' 1\" (1.549 m)    Wt 196 lb 12.8 oz (89.3 kg)    SpO2 95%    BMI 37.19 kg/m2     General: Obese. No acute distress. Eyes: Sclera anicteric. ENT: No oral lesions. Thyroid normal.  Nodes: No adenopathy. Skin: Spider angiomata. No jaundice. No palmar erythema. Respiratory: Lungs clear to auscultation. Cardiovascular: Regular heart rate. No murmurs. No JVD. Abdomen: Soft but tender upon palpation. Liver enlarged, hard and tender upon palpation. Spleen not palpable. No obvious ascites. Extremities: Right LE edema, tenderness upon palpation. No muscle wasting. No gross arthritic changes. Neurologic: Alert and oriented. Cranial nerves grossly intact. No asterixsis.     LABORATORY STUDIES:  Liver [place] [facility] [id number] Units 10/11/2018 10/8/2018   WBC 3.4 - 10.8 x10E3/uL 9.1    ANC 1.8 - 8.0 K/UL     HGB 11.1 - 15.9 g/dL 9.3 (L)     - 379 x10E3/uL 100 (LL)    INR 0.8 - 1.2     AST 0 - 40 IU/L  23   ALT 0 - 32 IU/L  25   Alk Phos 39 - 117 IU/L  136 (H)   Bili, Total 0.0 - 1.2 mg/dL  1.0   Albumin 3.6 - 4.8 g/dL  3.7   BUN 8 - 27 mg/dL  11   Creat 0.57 - 1.00 mg/dL  0.50 (L)   Na 134 - 144 mmol/L  134   K 3.5 - 5.2 mmol/L  4.2   Cl 96 - 106 mmol/L  97   CO2 20 - 29 mmol/L  23   Glucose 65 - 99 mg/dL  139 (H)   Serologies Latest Ref Rng & Units 10/11/2018    Ferritin 15 - 150 ng/mL 36    Iron % Saturation 15 - 55 % 7 (LL)      Serologies Ferritin Iron % Saturation   Latest Ref Rng & Units 15 - 150 ng/mL 15 - 55 %   7/10/2018 44 22   4/9/2018 42 10 (L)   1/23/2018  14 (L)   11/28/2017  9 (LL)   5/3/2017  5 (LL)   3/29/2017 17 5 (L)   3/7/2016 25 48   3/1/2016  7 (L)     SEROLOGIES:  Serologies Latest Ref Rng 8/15/2013   Hep A Ab, Total Negative Positive (A)   Hep B Surface Ag Negative Negative   Hep B Core Ab, Total Negative Negative   Hep B Surface Ab  0.21   Ferritin 15 - 150 ng/mL 34   Iron % Saturation 15 - 55 % 18   JUDITH, IFA  Negative   ASMCA 0 - 19 Units 4   M2 Ab 0.0 - 20.0 Units 4.0   Alpha-1 antitrypsin level 90 - 200 mg/dL 126     8/2013. Anti-HCV negative. LIVER HISTOLOGY:  12/2013. Liver biopsy. Cirrhosis. Macrovesicular steatosis involving 5% of liver parenchyma  1/2015. Liver biopsy. Cirrhosis. Macrovesicular steatosis involving 5-10% of liver parenchyma    ENDOSCOPIC PROCEDURES:  5/2013. Colonoscopy by Dr Jesus Manuel Ward. Hyperplastic polyp. 9/2015. EGD by [facility]. A few medium esophageal varices were identified.  Banding of esophageal varices (3) was performed. Moderate portal hypertensive gastropathy of the body of the, stomach. No gastric varices identified. 3/2016. EGD by Gigi Oleary. Grade 1/4 distal esophageal varices no stigmata of bleeding - not banded. Multiple gastric varices high in fundus greater curvature - no stigmata, mild erosive antritis looks like NSAIDs.  3/2016. Colonoscopy by Gigi Oleary.  Inadequate prep for transverse colon otherwise normal exam.  5/2018. EGD by [facility]. Two medium esophageal varices were identified and banded. 1 with a red spot. Mild portal hypertensive gastropathy of the body of the stomach. Gastric varicies identified. RADIOLOGY:  7/2013. CT scan abdomen with and without IV contrast. Changes consistent with fatty infiltration and cirrhosis. No liver mass lesions. No dilated bile ducts. No bile duct strictures. No ascites. 12/2015. Abdominal ultrasound. Enlarged heterogeneously echogenic liver with a nodular contour compatible with cirrhosis. No hepatic mass lesion is identified. Mild splenomegaly. 5/2018. US of liver. Enlarged cirrhotic liver is demonstrated without focal liver mass. No ascites. OTHER TESTING:  Not available or performed    ASSESSMENT AND PLAN:  Cirrhosis secondary to BALDERAS given that she has several features of metabolic syndrome. Her labs are stable today with normal liver function. Will continue to monitor patient on a regular basis. Weight loss. Discussed with patient the importance of losing weight and following a healthy diet. This will improve her liver disease and overall health. Unfortunately, she has gained weight over time. DM II. Encouraged patient to continue regular follow up with her endocrinologist to manage this. Discussed the importance of following a diabetic diet and exercise as tolerated. Iron deficiency anemia. Resolves with iron infusions and daily iron supplements. She will continue oral FE. She is iron deficient again today. Will order Injectafer x 2 to improve her symptoms. Discussed in detail the importance of not taking anything for pain except what is prescribed and OTC acetaminophen (Tylenol). She verbalized understanding of this. The patient is likely to need a liver transplant in the future. There is no reason to initiate liver transplant evaluation testing at this time with normal liver function. Hepatic encephalopathy. Directed patient to increase lactulose (45 mL BID to TID) to achieve 2-3 soft bowel movements daily. She is tolerating this well but she still has mild confusion. Abdominal pain. Patient is tender upon palpation and reports ongoing pain today. Ordered an abdominal US today to rule out abnormality. The patient was directed to continue all current medications at the current dosages. There are no contraindications for the patient to take any medications that are necessary for treatment of other medical issues. The patient was counseled regarding alcohol consumption. [address] screening has recently been performed and does not suggest [address] The next liver imaging study will be performed in 11/2018. This was ordered today. [address] [place] [address] in 3 months.     Kristina Jiang NP  [facility] [place] [facility] [place] [address], [address] [place] [address]  Ph: [phone]  Fax: [phone]

## 2018-10-16 PROBLEM — Z51.81 THERAPEUTIC DRUG MONITORING: Status: ACTIVE | Noted: 2018-01-01

## 2018-10-16 NOTE — PROGRESS NOTES
Chief Complaint Patient presents with  Diabetes  
  pcp and pharmacy confirmed  Diabetic Foot Exam  
  due History of Present Illness: Kemal Wesley is a 61 y.o. female here for follow up of diabetes. Weight stable since last visit in 4/18. Went back to taking both shots of lantus in the morning as this was easier and has been doing 55 units back to back in different locations. Has only had 2 readings under 80 in the morning over the past few months. Majority are in the 150-200s or less but can be higher if she eats more carbs at night. Had EGD in 5/18 and had 2 bands placed and will have repeat EGD next month and another liver ultrasound next week. Has had more bruising on her legs and her plt count is still low at 100 but up from 66 in 4/18. Majority of the time her pain is controlled on 1 tab of tramadol tid and will still need up to 4 tabs daily when pain is more severe. Not receiving any other opioids at this time. Still on elavil at night. On closer review of her med list, I have not written propranolol since May 2016 and gave 11 refills which would have run out in May 2017 and she doesn't think she has gotten this from anyone else so this may be a reason for tachycardia, HTN and continued varices. May need to change to NPH in the new year for cost. 
 
Current Outpatient Prescriptions Medication Sig  
 amitriptyline (ELAVIL) 150 mg tablet Take 1 Tab by mouth nightly.  ferrous sulfate 325 mg (65 mg iron) tablet take 1 tablet by mouth once daily with BREAKFAST  traMADol (ULTRAM) 50 mg tablet take 1-2 tablets by mouth twice a day if needed for DIABETIC NEUROPATHY PAIN  
 insulin lispro (HUMALOG KWIKPEN INSULIN) 100 unit/mL kwikpen Inject as needed up to 3 times per day for sugars over 150: 4 units for every 50 points. Max 50 units per day  metFORMIN (GLUCOPHAGE) 1,000 mg tablet take 1 tablet by mouth twice a day with meals  ondansetron (ZOFRAN ODT) 4 mg disintegrating tablet dissolve 1 tablet ON TONGUE every 8 hours if needed for nausea  TREVOR PEN NEEDLE 32 gauge x 5/32\" ndle use as directed four times a day  potassium chloride SR (KLOR-CON 10) 10 mEq tablet take 1 tablet by mouth twice a day  gabapentin (NEURONTIN) 600 mg tablet take 2 tablets by mouth three times a day  acetaminophen (TYLENOL) 500 mg tablet Take 500 mg by mouth every six (6) hours as needed for Pain.  insulin glargine (LANTUS SOLOSTAR U-100 INSULIN) 100 unit/mL (3 mL) inpn Inject 55 units twice daily--Dose change 4/12/18--updated med list--did not send prescription to the pharmacy  furosemide (LASIX) 20 mg tablet Take 1 Tab by mouth daily.  omeprazole (PRILOSEC) 20 mg capsule take 1 capsule by mouth once daily  CONSTULOSE 10 gram/15 mL solution take 2 tablespoonfuls by mouth twice a day  losartan (COZAAR) 25 mg tablet take 1 tablet by mouth once daily REPLACES LISINOPRIL FOR BLOOD PRESSURE AND KIDNEY PROTECTION  
 albuterol-ipratropium (DUO-NEB) 2.5 mg-0.5 mg/3 ml nebu 3 mL by Nebulization route every four (4) hours as needed.  Nebulizer & Compressor machine UAD  propranolol (INDERAL) 20 mg tablet Take 1 Tab by mouth two (2) times a day.  rOPINIRole (REQUIP) 4 mg tab TAB Take 4 mg by mouth nightly.  albuterol (PROAIR HFA) 90 mcg/actuation inhaler Take 2 Puffs by inhalation every four (4) hours as needed.  fluticasone-salmeterol (ADVAIR DISKUS) 500-50 mcg/dose diskus inhaler Take 1 Puff by inhalation two (2) times a day. No current facility-administered medications for this visit. Allergies Allergen Reactions  Hydrocodone Nausea and Vomiting  Lisinopril Cough  Lortab [Hydrocodone-Acetaminophen] Nausea and Vomiting  Percocet [Oxycodone-Acetaminophen] Nausea and Vomiting Review of Systems: - Eyes: no blurry vision or double vision - Cardiovascular: no chest pain - Respiratory: no shortness of breath - Musculoskeletal: no myalgias - Neurological: (+) numbness/tingling in extremities Physical Examination: 
Blood pressure 149/68, pulse (!) 104, height 5' 1\" (1.549 m), weight 191 lb 3.2 oz (86.7 kg). - General: pleasant, no distress, good eye contact  
- Neck: no carotid bruits - Cardiovascular: regular, (+) tachycardic, nl s1 and s2, no m/r/g,  
- Respiratory: clear bilaterally - Integumentary: no edema,  
- Psychiatric: normal mood and affect Diabetic foot exam:  
 
Left Foot: 
 Visual Exam: callous - mild Pulse DP: 2+ (normal) Filament test: normal sensation Vibratory sensation: diminished Right Foot: 
 Visual Exam: callous - mild Pulse DP: 2+ (normal) Filament test: normal sensation Vibratory sensation: diminished Data Reviewed:  
Component Latest Ref Rng & Units 10/8/2018 10/8/2018 12:11 PM 12:11 PM  
Glucose 65 - 99 mg/dL 139 (H) BUN 
    8 - 27 mg/dL 11 Creatinine 
    0.57 - 1.00 mg/dL 0.50 (L) GFR est non-AA 
    >59 mL/min/1.73 103 GFR est AA 
    >59 mL/min/1.73 119 BUN/Creatinine ratio 12 - 28 22 Sodium 134 - 144 mmol/L 134 Potassium 3.5 - 5.2 mmol/L 4.2 Chloride 96 - 106 mmol/L 97   
CO2 
    20 - 29 mmol/L 23 Calcium 8.7 - 10.3 mg/dL 8.8 Protein, total 
    6.0 - 8.5 g/dL 7.4 Albumin 3.6 - 4.8 g/dL 3.7 GLOBULIN, TOTAL 
    1.5 - 4.5 g/dL 3.7 A-G Ratio 1.2 - 2.2 1.0 (L) Bilirubin, total 
    0.0 - 1.2 mg/dL 1.0 Alk. phosphatase 39 - 117 IU/L 136 (H) AST 
    0 - 40 IU/L 23 ALT (SGPT) 0 - 32 IU/L 25 Hemoglobin A1c, (calculated) 4.8 - 5.6 %  7.0 (H) Estimated average glucose 
    mg/dL  154 Assessment/Plan: 1.  Type II diabetes mellitus with neuropathy, uncontrolled: her most recent Hgb A1c was 7% in 10/18 down from 7.3% in 4/18 down from 10.1% in 1/18 up from 7% in 10/17 down from 7.5% in 3/17 up form 7.4% in 11/16 down from 8.7% in 6/16 stable from 1/16 up from 9.2% in 9/15 up from 6.8% in 4/15 (s/p transfusion in 2/15) down from 7.4% in 12/14 down from 8.4% in 4/14 up from 6.9% in 11/13 down from 8.9% in 7/13 stable from 4/13 down from 10.2% in November down from 10.4% in August up from 8.9% in April 2012 up from 8.4% in December up from 7.9% in June 2011. Her A1c is very good so no changes are needed. - cont lantus 110 units in the morning as 2 shots of 55 units back to back 
- take humalog 4 units for every 50 mg/dl above 150 mg/dl 
- cont metformin 1g bid 
- cont gabapentin 1200 mg tid 
- cont tramadol 50 mg up to 2 tabs bid 
- check bs up to 4 times daily due to fluctuating sugars 
- foot exam done 10/18 
- microalbumin nl 4/15, up to 30.5 in 6/16--started lisinopril 10 mg daily and down to 8 in 11/16 and 12.1 in 4/18 
- check A1c and cmp and microalbumin prior to next visit 
- optho 5/18 2. Unspecified essential hypertension: her BP was above goal < 140/90 but does not appear to be on propranolol so gave her a new rx today 
- cont losartan 25 mg daily 
- cont propranolol 20 mg bid 3. Other and unspecified hyperlipidemia: Given DM, Goal LDL < 100, non-HDL < 130, and TG < 150. LDL 90 in December 2011 and 95 in April 2012 off simvastatin. However, she restarted this on her own in April 2012 and LDL 62 in August and 52 in November and 40 in 8/13 so I stopped the simvastatin in 8/13 and LDL still 79 in 11/13 and 70 in 12/14 and 88 in 1/16 and 87 in 11/16 and 81 in 4/18 so will keep her off this. - stay off simva 20 mg daily - check lipids prior to next visit 4. Therapeutic drug monitoring:  Had her sign a pain contract in 4/18 for her tramadol. She still benefits from this medication at her current dose. - cont tramadol 50 mg 1-2 tabs up to bid as needed for neuropathic pain 
- urine drug screen normal in 4/18 Patient Instructions 1) Check your bottles at home as I don't think you have a current supply of propranolol. This pill is a blood pressure pill but also helps prevent varices. You will take one 20 mg tab with breakfast and dinner. This is IN ADDITION to the losartan which is a pill for blood pressure and kidney protection. If you don't have the propranolol, fill the prescription. 2) Your A1c is very good at 7% down from 7.3%. Follow-up Disposition: 
Return in about 6 months (around 4/16/2019). Copy sent to: 
Tawnya Bird [phone] ([facility]) Dr. Thad Jane via [facility]

## 2018-10-16 NOTE — MR AVS SNAPSHOT
[address] [facility] [address] [address] [phone] Patient: Tomeka Sky MRN: [id number] GOK:9/[birth year]19 Visit Information Date & Time Provider Department Dept. Phone Encounter #  
 10/16/2018 10:30 AM Lindy Gudino, [address] [place] [address] Diabetes and Endocrinology [phone] Follow-up Instructions Return in about 6 months (around 4/16/2019). Your Appointments 11/1/2018 12:00 PM  
PROCEDURE with MD [address] [facility] Appt Note: EGD  
 [address] [facility] [phone] [address] [place] [address]  
[phone]  
  
   
 [address] [address]  
  
    
 2/4/2019 11:10 AM  
Follow Up with ALE[address]AN[address] ([facility] Appt Note: Follow up with April [address] [place] [facility] [phone] [facility] [id number]  
[address] [facility] Miguel Angel [address] Upcoming Health Maintenance Date Due  
 PAP AKA CERVICAL CYTOLOGY 8/26/1976 Shingrix Vaccine Age 50> (1 of 2) 8/26/2005 BREAST CANCER SCRN MAMMOGRAM 8/26/2005 FOBT Q 1 YEAR AGE 50-75 3/1/2017 MEDICARE YEARLY EXAM 3/14/2018 Influenza Age 5 to Adult 8/1/2018 FOOT EXAM Q1 10/6/2018 HEMOGLOBIN A1C Q6M 4/8/2019 MICROALBUMIN Q1 5/11/2019 LIPID PANEL Q1 5/11/2019 EYE EXAM RETINAL OR DILATED Q1 5/18/2019 DTaP/Tdap/Td series (2 - Td) 11/19/2026 Allergies as of 10/16/2018  Review Complete On: 10/16/2018 By: Lindy Gudino MD  
  
 Severity Noted Reaction Type Reactions Hydrocodone  02/05/2015   Systemic Nausea and Vomiting Lisinopril  11/10/2016    Cough Lortab [Hydrocodone-acetaminophen]  11/17/2016    Nausea and Vomiting Percocet [Oxycodone-acetaminophen]  02/05/2015   Systemic Nausea and Vomiting Current Immunizations  Reviewed on 5/1/2018 Name Date Influenza Vaccine 10/6/2014, 10/3/2013 Influenza Vaccine (Quad) PF 3/30/2017  2:39 PM  
 Pneumococcal Vaccine (Unspecified Type) 10/3/2013 Tdap 11/19/2016  2:59 PM  
  
 Not reviewed this visit You Were Diagnosed With   
  
 Codes Comments Diabetes mellitus with neurological manifestations, uncontrolled ([facility]    -  Primary ICD-10-CM: E11.49, E11.65 ICD-9-CM: 250.62 Essential hypertension, benign     ICD-10-CM: I10 
ICD-9-CM: 401.1 Hyperlipidemia LDL goal <100     ICD-10-CM: E78.5 ICD-9-CM: 272.4 Vitals BP Pulse Height(growth percentile) Weight(growth percentile) BMI OB Status 149/68 (!) 104 5' 1\" (1.549 m) 191 lb 3.2 oz (86.7 kg) 36.13 kg/m2 Hysterectomy Smoking Status Current Every Day Smoker Vitals History BMI and BSA Data Body Mass Index Body Surface Area  
 36.13 kg/m 2 1.93 m 2 Preferred Pharmacy Pharmacy Name Phone [id number] [address] Arliss [place]igler [phone] Your Updated Medication List  
  
   
This list is accurate as of 10/16/18 11:37 AM.  Always use your most recent med list.  
  
  
  
  
 acetaminophen 500 mg tablet Commonly known as:  TYLENOL Take 500 mg by mouth every six (6) hours as needed for Pain. ADVAIR DISKUS 500-50 mcg/dose diskus inhaler Generic drug:  fluticasone-salmeterol Take 1 Puff by inhalation two (2) times a day. albuterol-ipratropium 2.5 mg-0.5 mg/3 ml Nebu Commonly known as:  DUO-NEB  
3 mL by Nebulization route every four (4) hours as needed. amitriptyline 150 mg tablet Commonly known as:  ELAVIL Take 1 Tab by mouth nightly. CONSTULOSE 10 gram/15 mL solution Generic drug:  lactulose  
take 2 tablespoonfuls by mouth twice a day  
  
 ferrous sulfate 325 mg (65 mg iron) tablet  
take 1 tablet by mouth once daily with BREAKFAST  
  
 furosemide 20 mg tablet Commonly known as:  LASIX Take 1 Tab by mouth daily. gabapentin 600 mg tablet Commonly known as:  NEURONTIN  
take 2 tablets by mouth three times a day  
  
 insulin glargine 100 unit/mL (3 mL) Inpn Commonly known as:  LANTUS SOLOSTAR U-100 INSULIN Inject 55 units twice daily--Dose change 4/12/18--updated med list--did not send prescription to the pharmacy  
  
 insulin lispro 100 unit/mL kwikpen Commonly known as:  HumaLOG KwikPen Insulin Inject as needed up to 3 times per day for sugars over 150: 4 units for every 50 points. Max 50 units per day  
  
 losartan 25 mg tablet Commonly known as:  COZAAR  
take 1 tablet by mouth once daily REPLACES LISINOPRIL FOR BLOOD PRESSURE AND KIDNEY PROTECTION  
  
 metFORMIN 1,000 mg tablet Commonly known as:  GLUCOPHAGE  
take 1 tablet by mouth twice a day with meals Bambi Pen Needle 32 gauge x 5/32\" Ndle Generic drug:  Insulin Needles (Disposable)  
use as directed four times a day Nebulizer & Compressor machine UAD  
  
 omeprazole 20 mg capsule Commonly known as:  PRILOSEC  
take 1 capsule by mouth once daily  
  
 ondansetron 4 mg disintegrating tablet Commonly known as:  ZOFRAN ODT  
dissolve 1 tablet ON TONGUE every 8 hours if needed for nausea  
  
 potassium chloride SR 10 mEq tablet Commonly known as:  KLOR-CON 10  
take 1 tablet by mouth twice a day PROAIR HFA 90 mcg/actuation inhaler Generic drug:  albuterol Take 2 Puffs by inhalation every four (4) hours as needed. propranolol 20 mg tablet Commonly known as:  INDERAL Take 1 Tab by mouth two (2) times a day. rOPINIRole 4 mg Tab TAB Commonly known as:  Leydi Bering Take 4 mg by mouth nightly. traMADol 50 mg tablet Commonly known as:  ULTRAM  
take 1-2 tablets by mouth twice a day if needed for DIABETIC NEUROPATHY PAIN Prescriptions Printed Refills  
 propranolol (INDERAL) 20 mg tablet 11 Sig: Take 1 Tab by mouth two (2) times a day. Class: Print Route: Oral  
  
We Performed the Following HEMOGLOBIN A1C WITH EAG [35338 CPT(R)] LIPID PANEL [94671 CPT(R)] METABOLIC PANEL, COMPREHENSIVE [72562 CPT(R)] MICROALBUMIN, UR, RAND W/ MICROALB/CREAT RATIO [id number] CPT(R)] Follow-up Instructions Return in about 6 months (around 4/16/2019). To-Do List   
 10/22/2018 10:15 AM  
  Appointment with [facility] ([phone]) General  NPO DIET RESTICTIONS Please be NPO (nothing by mouth) for 6- 8 hours prior to procedure. GENERAL INSTRUCTIONS 1. Bring any non [facility] facility films/reports pertaining to the area being studied with you on the day of appointment. 2. A written order with a valid diagnosis and Physicians signature is required for all scheduled tests. 3. Check in at registration 30 minutes before your appointment time unless you were instructed to do otherwise. Patient Instructions 1) Check your bottles at home as I don't think you have a current supply of propranolol. This pill is a blood pressure pill but also helps prevent varices. You will take one 20 mg tab with breakfast and dinner. This is IN ADDITION to the losartan which is a pill for blood pressure and kidney protection. If you don't have the propranolol, fill the prescription. 2) Your A1c is very good at 7% down from 7.3%. Introducing [facility]! Dear Anil De Paz: Thank you for requesting a [employer] account. Our records indicate that you already have an active [employer] account. You can access your account anytime at [email or web address] Did you know that you can access your hospital and ER discharge instructions at any time in [employer]? You can also review all of your test results from your hospital stay or ER visit. Additional Information If you have questions, please visit the Frequently Asked Questions section of the tok tok tok website at [email or web address] Remember, tok tok tok is NOT to be used for urgent needs. For medical emergencies, dial 911. Now available from your iPhone and Android! Please provide this summary of care documentation to your next provider. Your primary care clinician is listed as Betito Law. If you have any questions after today's visit, please call [phone].

## 2018-10-16 NOTE — PATIENT INSTRUCTIONS
1) Check your bottles at home as I don't think you have a current supply of propranolol. This pill is a blood pressure pill but also helps prevent varices. You will take one 20 mg tab with breakfast and dinner. This is IN ADDITION to the losartan which is a pill for blood pressure and kidney protection. If you don't have the propranolol, fill the prescription. 2) Your A1c is very good at 7% down from 7.3%.

## 2018-10-18 NOTE — TELEPHONE ENCOUNTER
Patient called to talk to you about Losartan and Propanolol. She can be reached at:  [phone].

## 2018-10-18 NOTE — TELEPHONE ENCOUNTER
Patient had called for clarity on the losartan and the propanolol. She stated that she did not have any propanolol at home so she was obtaining the new script. I informed her her per Dr. Chaparrita Spivey note that she is to take the losartan one tab daily and the propanolol bid with breakfast and dinner as noted. I informed her that this data is on her instruction sheet given by Dr. Arsenio Valentino and she voiced understanding.

## 2018-10-25 NOTE — PROGRESS NOTES
Spoke with patient about up coming iron infusions. Confirmed dates and location with patient. Informed patient iron levels need to be checked 4-6 weeks after the last infusion. Per patient request, mailed lab order to her with range of dates to have labs drawn. Instructed patient to call after labs are drawn so our office can check for results. Patient expressed an understanding and had no further questions.

## 2018-10-31 NOTE — PROGRESS NOTES
Outpatient Infusion Center Short Visit Progress Note [id number] [phone] Pt admit to [facility] ambulatory in stable condition. Assessment completed. No new concerns voiced. Patient Vitals for the past 12 hrs: 
 Temp Pulse Resp BP  
10/31/18 1415 98.7 °F (37.1 °C) (!) 104 18 164/73 PIv with positive blood return flushed and de-accessed per protocol. Medications: 
Injectafer 1530 Pt tolerated treatment well. D/c home ambulatory in no distress. Pt aware of next appointment scheduled for 11/7/18.

## 2018-11-08 NOTE — PROGRESS NOTES
Outpatient Infusion Center Short Visit Progress Note 1440 Pt admit to [facility] for Shauna Garcia ambulatory in stable condition. Assessment completed. No new concerns voiced. Patient Vitals for the past 12 hrs: 
 Temp Pulse Resp BP  
11/07/18 1438 98.2 °F (36.8 °C) 84 18 137/70 PIv with positive blood return. Lab drawn, flushed and de-accessed per protocol. Medications: 
Injectafer 1540 Pt tolerated treatment well. D/c home ambulatory in no distress. Pt [facility] all treatments. [facility]

## 2018-11-13 PROBLEM — G45.9 TIA (TRANSIENT ISCHEMIC ATTACK): Status: ACTIVE | Noted: 2018-01-01

## 2018-11-13 PROBLEM — K21.9 GERD (GASTROESOPHAGEAL REFLUX DISEASE): Status: ACTIVE | Noted: 2018-01-01

## 2018-11-13 NOTE — ED PROVIDER NOTES
EMERGENCY DEPARTMENT HISTORY AND PHYSICAL EXAM 
 
 
Date: 11/13/2018 Patient Name: Verena Salamanca History of Presenting Illness Chief Complaint Patient presents with  Extremity Weakness  
  pt reports she was sent by PCP office for possible TIA, reports left side deficit on Friday, no symptoms today History Provided By: Patient HPI: Verena Salamanca, 61 y.o. female with PMHx significant for DM, COPD, DM, HLD, and cirrhosis, presents ambulatory to the ED with cc of persistent LUE and LLE weakness x 5 days. Pt states she first noticed sx's on 11/9, during which she was dropping objects from her left hand and walking into nearby objects. She denies any dizziness, but felt as though she was unable to ambulate as her equilibrium was off. Pt reports since then she has had blurred vision and slurred speech. She notes sx's have improved, but not resolved. Pt was seen at her PCP's office and was referred to the ED for possible TIA. Of note, pt is not currently on any anticoagulants due to hx of cirrhosis, secondary to prior EtOH use. Pt denies any EtOH use currently. She states she has been admitted for elevated ammonia levels in the past. She reports chronic SOB due to hx of COPD and tobacco use, but denies any worsening currently. Pt specifically denies any headache or chest pain. There are no other complaints, changes, or physical findings at this time. PCP: Kika Pavon NP Current Facility-Administered Medications Medication Dose Route Frequency Provider Last Rate Last Dose  sodium chloride (NS) flush 5-10 mL  5-10 mL IntraVENous Q8H Crystal Awa, DO      
 sodium chloride (NS) flush 5-10 mL  5-10 mL IntraVENous PRN Crystal Awa, DO      
 
Current Outpatient Medications Medication Sig Dispense Refill  omeprazole (PRILOSEC) 20 mg capsule take 1 capsule by mouth once daily 30 Cap 5  
 amitriptyline (ELAVIL) 150 mg tablet Take 1 Tab by mouth nightly.  propranolol (INDERAL) 20 mg tablet Take 1 Tab by mouth two (2) times a day. 60 Tab 11  
 ferrous sulfate 325 mg (65 mg iron) tablet take 1 tablet by mouth once daily with BREAKFAST 90 Tab 1  
 traMADol (ULTRAM) 50 mg tablet take 1-2 tablets by mouth twice a day if needed for DIABETIC NEUROPATHY PAIN 120 Tab 5  
 insulin lispro (HUMALOG KWIKPEN INSULIN) 100 unit/mL kwikpen Inject as needed up to 3 times per day for sugars over 150: 4 units for every 50 points. Max 50 units per day 15 mL 11  
 metFORMIN (GLUCOPHAGE) 1,000 mg tablet take 1 tablet by mouth twice a day with meals 180 Tab 3  
 ondansetron (ZOFRAN ODT) 4 mg disintegrating tablet dissolve 1 tablet ON TONGUE every 8 hours if needed for nausea 45 Tab 3  
 TREVOR PEN NEEDLE 32 gauge x 5/32\" ndle use as directed four times a day 100 Pen Needle 11  
 potassium chloride SR (KLOR-CON 10) 10 mEq tablet take 1 tablet by mouth twice a day 60 Tab 5  
 gabapentin (NEURONTIN) 600 mg tablet take 2 tablets by mouth three times a day 180 Tab 11  
 acetaminophen (TYLENOL) 500 mg tablet Take 500 mg by mouth every six (6) hours as needed for Pain.  insulin glargine (LANTUS SOLOSTAR U-100 INSULIN) 100 unit/mL (3 mL) inpn Inject 55 units twice daily--Dose change 4/12/18--updated med list--did not send prescription to the pharmacy 45 mL 11  
 furosemide (LASIX) 20 mg tablet Take 1 Tab by mouth daily. 90 Tab 3  
 CONSTULOSE 10 gram/15 mL solution take 2 tablespoonfuls by mouth twice a day 1000 mL 5  
 losartan (COZAAR) 25 mg tablet take 1 tablet by mouth once daily REPLACES LISINOPRIL FOR BLOOD PRESSURE AND KIDNEY PROTECTION 30 Tab 11  
 albuterol-ipratropium (DUO-NEB) 2.5 mg-0.5 mg/3 ml nebu 3 mL by Nebulization route every four (4) hours as needed. 30 Nebule 0  
 Nebulizer & Compressor machine UAD 1 Each 0  
 rOPINIRole (REQUIP) 4 mg tab TAB Take 4 mg by mouth nightly.     
 albuterol (PROAIR HFA) 90 mcg/actuation inhaler Take 2 Puffs by inhalation every four (4) hours as needed.  fluticasone-salmeterol (ADVAIR DISKUS) 500-50 mcg/dose diskus inhaler Take 1 Puff by inhalation two (2) times a day. Past History Past Medical History: 
Past Medical History:  
Diagnosis Date  Asthma  Back pain  COPD (chronic obstructive pulmonary disease) (HCC)  Diabetes ([facility]  Esophageal varices in cirrhosis ([facility]   
 6/2014 banding x 2  
 Fibromyalgia  Gastrointestinal disorder  GERD (gastroesophageal reflux disease)  Hypercholesteremia  Liver cirrhosis secondary to BALDERAS ([facility]  Liver disease  Other and unspecified hyperlipidemia  Restless leg syndrome  Type II or unspecified type diabetes mellitus without mention of complication, uncontrolled  Unspecified essential hypertension Past Surgical History: 
Past Surgical History:  
Procedure Laterality Date  HX BACK SURGERY    
 HX CARPAL TUNNEL RELEASE    
 on right  HX HYSTERECTOMY plus 1/2 of an ovary removed  UT COLSC FLX W/RMVL OF TUMOR POLYP LESION SNARE TQ  5/30/2013  UPPER GI ENDOSCOPY,LIGAT VARIX  2/6/2015 Family History: 
Family History Problem Relation Age of Onset  Diabetes Mother  Stroke Sister  Diabetes Paternal Aunt  Diabetes Paternal Uncle  Heart Disease Neg Hx Social History: 
Social History Tobacco Use  Smoking status: Current Every Day Smoker Packs/day: 1.00 Years: 40.00 Pack years: 40.00  Smokeless tobacco: Former User Substance Use Topics  Alcohol use: No  
  Comment: rare  Drug use: No  
 
 
Allergies: Allergies Allergen Reactions  Hydrocodone Nausea and Vomiting  Lisinopril Cough  Lortab [Hydrocodone-Acetaminophen] Nausea and Vomiting  Percocet [Oxycodone-Acetaminophen] Nausea and Vomiting Review of Systems Review of Systems Constitutional: Negative for appetite change, chills, fatigue and fever. HENT: Negative. Negative for congestion, rhinorrhea, sinus pressure and sore throat. Eyes: Positive for visual disturbance (blurred vision). Respiratory: Positive for shortness of breath (chronic). Negative for cough, choking, chest tightness and wheezing. Cardiovascular: Negative. Negative for chest pain, palpitations and leg swelling. Gastrointestinal: Negative for abdominal pain, constipation, diarrhea, nausea and vomiting. Endocrine: Negative. Genitourinary: Negative. Negative for difficulty urinating, dysuria, flank pain and urgency. Musculoskeletal: Positive for gait problem. Skin: Negative. Neurological: Positive for speech difficulty (slurred) and weakness. Negative for dizziness, light-headedness, numbness and headaches. Psychiatric/Behavioral: Negative. All other systems reviewed and are negative. Physical Exam  
Physical Exam  
Constitutional: She is oriented to person, place, and time. She appears well-developed and well-nourished. HENT:  
Head: Normocephalic and atraumatic. Right Ear: External ear normal.  
Left Ear: External ear normal.  
Mouth/Throat: Oropharynx is clear and moist.  
Eyes: Conjunctivae and EOM are normal. Pupils are equal, round, and reactive to light. Neck: Normal range of motion. Neck supple. No JVD present. No tracheal deviation present. Cardiovascular: Normal rate, regular rhythm, normal heart sounds and intact distal pulses. No murmur heard. Pulmonary/Chest: Effort normal and breath sounds normal. No stridor. No respiratory distress. She has no wheezes. She has no rales. Abdominal: Soft. Bowel sounds are normal. She exhibits no distension. There is no tenderness. There is no rebound and no guarding. obese Musculoskeletal: Normal range of motion. She exhibits no edema or tenderness. Neurological: She is alert and oriented to person, place, and time. No cranial nerve deficit. No nystagmus, no dysmetria, mild slurred speech and mild weakness in left upper and lower extremities compared to right, no asterixis Skin: Skin is warm and dry. Psychiatric: She has a normal mood and affect. Her behavior is normal.  
Nursing note and vitals reviewed. Diagnostic Study Results Labs - Recent Results (from the past 12 hour(s)) CBC WITH AUTOMATED DIFF Collection Time: 11/13/18 12:53 PM  
Result Value Ref Range WBC 6.1 3.6 - 11.0 K/uL  
 RBC 4.03 3.80 - 5.20 M/uL  
 HGB 11.8 11.5 - 16.0 g/dL HCT 36.9 35.0 - 47.0 % MCV 91.6 80.0 - 99.0 FL  
 MCH 29.3 26.0 - 34.0 PG  
 MCHC 32.0 30.0 - 36.5 g/dL  
 RDW 18.0 (H) 11.5 - 14.5 % PLATELET 95 (L) 309 - 400 K/uL MPV 11.0 8.9 - 12.9 FL  
 NRBC 0.0 0  WBC ABSOLUTE NRBC 0.00 0.00 - 0.01 K/uL NEUTROPHILS 65 32 - 75 % LYMPHOCYTES 26 12 - 49 % MONOCYTES 7 5 - 13 % EOSINOPHILS 1 0 - 7 % BASOPHILS 1 0 - 1 % IMMATURE GRANULOCYTES 0 0.0 - 0.5 % ABS. NEUTROPHILS 3.9 1.8 - 8.0 K/UL  
 ABS. LYMPHOCYTES 1.6 0.8 - 3.5 K/UL  
 ABS. MONOCYTES 0.4 0.0 - 1.0 K/UL  
 ABS. EOSINOPHILS 0.1 0.0 - 0.4 K/UL  
 ABS. BASOPHILS 0.0 0.0 - 0.1 K/UL  
 ABS. IMM. GRANS. 0.0 0.00 - 0.04 K/UL  
 DF AUTOMATED METABOLIC PANEL, COMPREHENSIVE Collection Time: 11/13/18 12:53 PM  
Result Value Ref Range Sodium 136 136 - 145 mmol/L Potassium 4.1 3.5 - 5.1 mmol/L Chloride 103 97 - 108 mmol/L  
 CO2 29 21 - 32 mmol/L Anion gap 4 (L) 5 - 15 mmol/L Glucose 256 (H) 65 - 100 mg/dL BUN 9 6 - 20 MG/DL Creatinine 0.51 (L) 0.55 - 1.02 MG/DL  
 BUN/Creatinine ratio 18 12 - 20 GFR est AA >60 >60 ml/min/1.73m2 GFR est non-AA >60 >60 ml/min/1.73m2 Calcium 8.2 (L) 8.5 - 10.1 MG/DL Bilirubin, total 0.8 0.2 - 1.0 MG/DL  
 ALT (SGPT) 34 12 - 78 U/L  
 AST (SGOT) 32 15 - 37 U/L Alk. phosphatase 144 (H) 45 - 117 U/L Protein, total 8.1 6.4 - 8.2 g/dL Albumin 3.4 (L) 3.5 - 5.0 g/dL Globulin 4.7 (H) 2.0 - 4.0 g/dL A-G Ratio 0.7 (L) 1.1 - 2.2 PROTHROMBIN TIME + INR Collection Time: 11/13/18 12:53 PM  
Result Value Ref Range INR 1.1 0.9 - 1.1 Prothrombin time 11.6 (H) 9.0 - 11.1 sec  
CK W/ CKMB & INDEX Collection Time: 11/13/18 12:53 PM  
Result Value Ref Range CK 50 26 - 192 U/L  
 CK - MB <1.0 <3.6 NG/ML  
 CK-MB Index Cannot be calculated 0 - 2.5 URINALYSIS W/ REFLEX CULTURE Collection Time: 11/13/18 12:53 PM  
Result Value Ref Range Color YELLOW/STRAW Appearance CLEAR CLEAR Specific gravity 1.010 1.003 - 1.030    
 pH (UA) 6.0 5.0 - 8.0 Protein NEGATIVE  NEG mg/dL Glucose NEGATIVE  NEG mg/dL Ketone NEGATIVE  NEG mg/dL Bilirubin NEGATIVE  NEG Blood NEGATIVE  NEG Urobilinogen 2.0 (H) 0.2 - 1.0 EU/dL Nitrites POSITIVE (A) NEG Leukocyte Esterase SMALL (A) NEG    
 WBC 5-10 0 - 4 /hpf  
 RBC 0-5 0 - 5 /hpf Epithelial cells FEW FEW /lpf Bacteria 4+ (A) NEG /hpf  
 UA:UC IF INDICATED URINE CULTURE ORDERED (A) CNI Hyaline cast 0-2 0 - 5 /lpf  
TROPONIN I Collection Time: 11/13/18 12:53 PM  
Result Value Ref Range Troponin-I, Qt. <0.05 <0.05 ng/mL AMMONIA Collection Time: 11/13/18 12:59 PM  
Result Value Ref Range Ammonia 59 (H) <32 UMOL/L Radiologic Studies -  
CT Results  (Last 48 hours)  
          
 11/13/18 1316  CT HEAD WO CONT Final result Impression:  IMPRESSION: No acute intracranial hemorrhage, mass or infarct. Narrative:  INDICATION: TIA. Extremity weakness, left-sided deficit on Friday. Exam: Noncontrast CT of the brain is performed with 5 mm collimation. CT dose reduction was achieved with the use of the standardized protocol  
tailored for this examination and automatic exposure control for dose  
modulation. FINDINGS: There is no acute intracranial hemorrhage, mass, mass effect or  
herniation.  Ventricular system is normal. The gray-white matter differentiation  
is well-preserved. The mastoid air cells are well pneumatized. The visualized  
paranasal sinuses are normal.  
   
  
  
 
CXR Results  (Last 48 hours)  
          
 11/13/18 1250  XR CHEST PA LAT Final result Impression:  IMPRESSION:  
NORMAL CHEST. Narrative:  INDICATION:  chest pain Frontal and lateral views of the chest show clear lungs. The heart, mediastinum  
and pulmonary vasculature are normal.  The bony thorax is unremarkable. No  
significant change 3/14/2018 Medical Decision Making I am the first provider for this patient. I reviewed the vital signs, available nursing notes, past medical history, past surgical history, family history and social history. Vital Signs-Reviewed the patient's vital signs. Patient Vitals for the past 12 hrs: 
 Temp Pulse Resp BP SpO2  
11/13/18 1220    149/65 97 % 11/13/18 1202 98.4 °F (36.9 °C) 98 16 (!) 114/98 97 % Pulse Oximetry Analysis - 97% on RA Cardiac Monitor:  
Rate: 98 bpm 
Rhythm: Normal Sinus Rhythm Records Reviewed: Nursing Notes, Old Medical Records, Previous Radiology Studies and Previous Laboratory Studies Provider Notes (Medical Decision Making): DDx: TIA, stroke, hemorrhagic versus ischemic, hyperammoniemia, metabolic derangement, UTI 
 
ED Course:  
Initial assessment performed. The patients presenting problems have been discussed, and they are in agreement with the care plan formulated and outlined with them. I have encouraged them to ask questions as they arise throughout their visit. CONSULT NOTE:  
1:54 PM 
Jodie Ward DO spoke with Magdiel Menard MD  
Specialty: Hospitalist 
Discussed pt's hx, disposition, and available diagnostic and imaging results. Reviewed care plans. Consultant will evaluate pt for admission. Written by Rick Cabrera ED Scribe, as dictated by Jodie Ward DO  
 
Critical Care Time: 0 Disposition: 
ADMIT NOTE: 
 1:55 PM 
The patient is being admitted to the hospital by Maddie Cedeno MD.  The results of their tests and reasons for their admission have been discussed with the patient and/or available family. They convey agreement and understanding for the need to be admitted and for their admission diagnosis. PLAN: 
1. Admit to hospitalist  
 
Diagnosis Clinical Impression: 1. TIA (transient ischemic attack) Attestations: This note is prepared by Merline Schlichter, acting as Scribe for Emil Salinas, [address] , DO: The scribe's documentation has been prepared under my direction and personally reviewed by me in its entirety. I confirm that the note above accurately reflects all work, treatment, procedures, and medical decision making performed by me.

## 2018-11-13 NOTE — ROUTINE PROCESS
TRANSFER - OUT REPORT: 
 
Verbal report given to SANDRA Martines(name) on Carolynn [facility]  being transferred to [address] [place] [address] (unit) for routine progression of care Report consisted of patients Situation, Background, Assessment and  
Recommendations(SBAR). Information from the following report(s) SBAR, ED Summary and MAR was reviewed with the receiving nurse. Lines:  
Peripheral IV 11/13/18 Right Antecubital (Active) Site Assessment Clean, dry, & intact 11/13/2018  1:07 PM  
Phlebitis Assessment 0 11/13/2018  1:07 PM  
Infiltration Assessment 0 11/13/2018  1:07 PM  
Dressing Status Clean, dry, & intact 11/13/2018  1:07 PM  
Dressing Type Transparent 11/13/2018  1:07 PM  
Hub Color/Line Status Pink;Flushed 11/13/2018  1:07 PM  
Action Taken Blood drawn 11/13/2018  1:07 PM  
  
 
Opportunity for questions and clarification was provided.

## 2018-11-13 NOTE — H&P
Hospitalist Admission NoteNAME: Rosa Elena Duff :  1955 MRN:  [id number] Date/Time:  2018 3:44 PM 
 
Patient PCP: Jim Stahl NP 
______________________________________________________________________ Given the patient's current clinical presentation, I have a high level of concern for decompensation if discharged from the emergency department. Complex decision making was performed, which includes reviewing the patient's available past medical records, laboratory results, and x-ray films. My assessment of this patient's clinical condition and my plan of care is as follows. Assessment / Plan: 
Transient Left Sided Weakness associated with slurred speech Suspect TIA (transient ischemic attack) Symptoms resolved day before yesterday Will observe overnight on neurotele for workup Check MRI/MRA Brain, Carotid Dopplers, 2D echo Start Px ASA and stating Neurology consult Diabetes mellitus with neurological manifestations, uncontrolled Hold metformin and continue Lantus 55 units BID 
SSi Smoking Counseled to quit Pt deferred nicotine patch at this time HTN Continue ARBs and Propanolol BP stable Chronic Thrombocytopenia due to liver disease 
stable Liver Cirrhosis with h/o hepatic enceph and esophageal vareces Continue Propanolol, Lasix Increase lactulose dose as above transient symptoms can be due to elevated ammonia level COPD (chronic obstructive pulmonary disease) Continue Px inhalers and PRN nebs IBIS (obstructive sleep apnea)  to bring CPAP from home GERD (gastroesophageal reflux disease) Continue PPIs Neuropathy/Chronic pain 
Continue home meds without change Code Status: Full Surrogate Decision Maker:  DVT Prophylaxis: Lovenox as INR ok with liver cirrhosis Baseline: functional  
  
Subjective: CHIEF COMPLAINT: referred from PCP office for TIA workup HISTORY OF PRESENT ILLNESS:    
 Lillian Randall is a 61 y.o.  female who is referred from PCP office for TIA workup. As per patient, she started to have left sided weakness including left arm, leg along with slurred speech started 3 days ago and symptoms completely got resolved 2 days ago. She went to PCP and was referred to ED for possible TIA. Pt reports chronic cough due to COPD, history of liver cirrhosis. Pt denies any fever, chills, headaches, dizziness, nausea, vomiting, diarrhea and any focal weakness at this time. We were asked to admit for work up and evaluation of the above problems. Past Medical History:  
Diagnosis Date  Asthma  Back pain  COPD (chronic obstructive pulmonary disease) (HCC)  Diabetes ([facility]  Esophageal varices in cirrhosis ([facility] [facility]   
 6/2014 banding x 2  
 Fibromyalgia  Gastrointestinal disorder  GERD (gastroesophageal reflux disease)  Hypercholesteremia  Liver cirrhosis secondary to BALDERAS ([facility] [address]  Liver disease  Other and unspecified hyperlipidemia  Restless leg syndrome  Type II or unspecified type diabetes mellitus without mention of complication, uncontrolled  Unspecified essential hypertension Past Surgical History:  
Procedure Laterality Date  HX BACK SURGERY    
 HX CARPAL TUNNEL RELEASE    
 on right  HX HYSTERECTOMY plus 1/2 of an ovary removed  [place] COLSC FLX W/RMVL OF TUMOR POLYP LESION SNARE TQ  5/30/2013  UPPER GI ENDOSCOPY,LIGAT VARIX  2/6/2015 Social History Tobacco Use  Smoking status: Current Every Day Smoker Packs/day: 1.00 Years: 40.00 Pack years: 40.00  Smokeless tobacco: Former User Substance Use Topics  Alcohol use: No  
  Comment: rare Family History Problem Relation Age of Onset  Diabetes Mother  Stroke Sister  Diabetes Paternal Aunt  Diabetes Paternal Uncle  Heart Disease Neg Hx Allergies Allergen Reactions  Hydrocodone Nausea and Vomiting  Lisinopril Cough  Lortab [Hydrocodone-Acetaminophen] Nausea and Vomiting  Percocet [Oxycodone-Acetaminophen] Nausea and Vomiting Prior to Admission medications Medication Sig Start Date End Date Taking? Authorizing Provider  
omeprazole (PRILOSEC) 20 mg capsule take 1 capsule by mouth once daily 11/8/18  Yes Cathi Gamble NP  
amitriptyline (ELAVIL) 150 mg tablet Take 1 Tab by mouth nightly. 10/16/18  Yes Marilee Lopez MD  
propranolol (INDERAL) 20 mg tablet Take 1 Tab by mouth two (2) times a day. 10/16/18  Yes Marilee Lopez MD  
ferrous sulfate 325 mg (65 mg iron) tablet take 1 tablet by mouth once daily with BREAKFAST 10/2/18  Yes Cathi Gamble NP  
traMADol (ULTRAM) 50 mg tablet take 1-2 tablets by mouth twice a day if needed for DIABETIC NEUROPATHY PAIN 10/1/18  Yes Marilee Lopez MD  
metFORMIN (GLUCOPHAGE) 1,000 mg tablet take 1 tablet by mouth twice a day with meals 8/23/18  Yes Marilee Lopez MD  
ondansetron (ZOFRAN ODT) 4 mg disintegrating tablet dissolve 1 tablet ON TONGUE every 8 hours if needed for nausea 8/1/18  Yes Cathi Gamble NP  
potassium chloride SR (KLOR-CON 10) 10 mEq tablet take 1 tablet by mouth twice a day 7/19/18  Yes Cathi Gamble NP  
gabapentin (NEURONTIN) 600 mg tablet take 2 tablets by mouth three times a day 7/19/18  Yes Marilee Lopez MD  
insulin glargine (LANTUS SOLOSTAR U-100 INSULIN) 100 unit/mL (3 mL) inpn Inject 55 units twice daily--Dose change 4/12/18--updated med list--did not send prescription to the pharmacy 4/12/18  Yes Marilee Lopez MD  
furosemide (LASIX) 20 mg tablet Take 1 Tab by mouth daily.  4/9/18  Yes Cathi Barksdale NP  
CONSTULOSE 10 gram/15 mL solution take 2 tablespoonfuls by mouth twice a day 12/27/17  Yes Cathi Gamble NP  
losartan (COZAAR) 25 mg tablet take 1 tablet by mouth once daily REPLACES LISINOPRIL FOR BLOOD PRESSURE AND KIDNEY PROTECTION 11/12/17  Yes Treasure Love MD  
albuterol-ipratropium (DUO-NEB) 2.5 mg-0.5 mg/3 ml nebu 3 mL by Nebulization route every four (4) hours as needed. 3/30/17  Yes Sagar Lee, ALEJANDRO  
rOPINIRole (REQUIP) 4 mg tab TAB Take 4 mg by mouth nightly. Yes Provider, Historical  
fluticasone-salmeterol (ADVAIR DISKUS) 500-50 mcg/dose diskus inhaler Take 1 Puff by inhalation two (2) times a day. Yes Provider, Historical  
insulin lispro (HUMALOG KWIKPEN INSULIN) 100 unit/mL kwikpen Inject as needed up to 3 times per day for sugars over 150: 4 units for every 50 points. Max 50 units per day 10/1/18   Treasure Love MD  
TREVOR PEN NEEDLE 32 gauge x 5/32\" ndle use as directed four times a day 7/19/18   Treasure Love MD  
acetaminophen (TYLENOL) 500 mg tablet Take 500 mg by mouth every six (6) hours as needed for Pain. Provider, Historical  
Nebulizer & Compressor machine UAD 11/10/16   Reena Soto MD  
albuterol (PROAIR HFA) 90 mcg/actuation inhaler Take 2 Puffs by inhalation every four (4) hours as needed. Other, MD Leonarda  
 
 
REVIEW OF SYSTEMS:    
I am not able to complete the review of systems because: The patient is intubated and sedated The patient has altered mental status due to his acute medical problems The patient has baseline aphasia from prior stroke(s) The patient has baseline dementia and is not reliable historian The patient is in acute medical distress and unable to provide information Total of 12 systems reviewed as follows:   
   POSITIVE= underlined text  Negative = text not underlined General:  fever, chills, sweats, generalized weakness, weight loss/gain,  
   loss of appetite Eyes:    blurred vision, eye pain, loss of vision, double vision ENT:    rhinorrhea, pharyngitis Respiratory:   cough, sputum production, SOB, MARTINEZ, wheezing, pleuritic pain Cardiology:   chest pain, palpitations, orthopnea, PND, edema, syncope Gastrointestinal:  abdominal pain , N/V, diarrhea, dysphagia, constipation, bleeding Genitourinary:  frequency, urgency, dysuria, hematuria, incontinence Muskuloskeletal :  arthralgia, myalgia, back pain Hematology:  easy bruising, nose or gum bleeding, lymphadenopathy Dermatological: rash, ulceration, pruritis, color change / jaundice Endocrine:   hot flashes or polydipsia Neurological:  headache, dizziness, confusion, focal weakness (2-3 days ago but now resolved), paresthesia, Speech difficulties, memory loss, gait difficulty Psychological: Feelings of anxiety, depression, agitation Objective: VITALS:   
Visit Vitals /65 Pulse 98 Temp 98.4 °F (36.9 °C) Resp 16 Ht 5' 2\" (1.575 m) Wt 84.7 kg (186 lb 11.7 oz) SpO2 97% BMI 34.15 kg/m² PHYSICAL EXAM: 
 
 
_______________________________________________________________________ Care Plan discussed with: 
  Comments Patient y Family  y  at bedside RN y   
Care Manager Consultant:     
_______________________________________________________________________ Expected  Disposition:  
Home with Family y HH/PT/OT/RN   
SNF/LTC   
CARMINE   
________________________________________________________________________ TOTAL TIME: 60 Minutes Critical Care Provided     Minutes non procedure based Comments  
 y Reviewed previous records  
>50% of visit spent in counseling and coordination of care y Discussion with patient and family and questions answered 
  
 
________________________________________________________________________ Signed: Amelia Loredo MD 
 
Procedures: see electronic medical records for all procedures/Xrays and details which were not copied into this note but were reviewed prior to creation of Plan. LAB DATA REVIEWED:   
Recent Results (from the past 24 hour(s)) CBC WITH AUTOMATED DIFF Collection Time: 11/13/18 12:53 PM  
Result Value Ref Range WBC 6.1 3.6 - 11.0 K/uL  
 RBC 4.03 3.80 - 5.20 M/uL  
 HGB 11.8 11.5 - 16.0 g/dL HCT 36.9 35.0 - 47.0 % MCV 91.6 80.0 - 99.0 FL  
 MCH 29.3 26.0 - 34.0 PG  
 MCHC 32.0 30.0 - 36.5 g/dL  
 RDW 18.0 (H) 11.5 - 14.5 % PLATELET 95 (L) 706 - 400 K/uL MPV 11.0 8.9 - 12.9 FL  
 NRBC 0.0 0  WBC ABSOLUTE NRBC 0.00 0.00 - 0.01 K/uL NEUTROPHILS 65 32 - 75 % LYMPHOCYTES 26 12 - 49 % MONOCYTES 7 5 - 13 % EOSINOPHILS 1 0 - 7 % BASOPHILS 1 0 - 1 % IMMATURE GRANULOCYTES 0 0.0 - 0.5 % ABS. NEUTROPHILS 3.9 1.8 - 8.0 K/UL  
 ABS. LYMPHOCYTES 1.6 0.8 - 3.5 K/UL  
 ABS. MONOCYTES 0.4 0.0 - 1.0 K/UL  
 ABS. EOSINOPHILS 0.1 0.0 - 0.4 K/UL  
 ABS. BASOPHILS 0.0 0.0 - 0.1 K/UL  
 ABS. IMM. GRANS. 0.0 0.00 - 0.04 K/UL  
 DF AUTOMATED METABOLIC PANEL, COMPREHENSIVE Collection Time: 11/13/18 12:53 PM  
Result Value Ref Range Sodium 136 136 - 145 mmol/L Potassium 4.1 3.5 - 5.1 mmol/L  Chloride 103 97 - 108 mmol/L  
 CO2 29 21 - 32 mmol/L  
 Anion gap 4 (L) 5 - 15 mmol/L Glucose 256 (H) 65 - 100 mg/dL BUN 9 6 - 20 MG/DL Creatinine 0.51 (L) 0.55 - 1.02 MG/DL  
 BUN/Creatinine ratio 18 12 - 20 GFR est AA >60 >60 ml/min/1.73m2 GFR est non-AA >60 >60 ml/min/1.73m2 Calcium 8.2 (L) 8.5 - 10.1 MG/DL Bilirubin, total 0.8 0.2 - 1.0 MG/DL  
 ALT (SGPT) 34 12 - 78 U/L  
 AST (SGOT) 32 15 - 37 U/L Alk. phosphatase 144 (H) 45 - 117 U/L Protein, total 8.1 6.4 - 8.2 g/dL Albumin 3.4 (L) 3.5 - 5.0 g/dL Globulin 4.7 (H) 2.0 - 4.0 g/dL A-G Ratio 0.7 (L) 1.1 - 2.2 PROTHROMBIN TIME + INR Collection Time: 11/13/18 12:53 PM  
Result Value Ref Range INR 1.1 0.9 - 1.1 Prothrombin time 11.6 (H) 9.0 - 11.1 sec  
CK W/ CKMB & INDEX Collection Time: 11/13/18 12:53 PM  
Result Value Ref Range CK 50 26 - 192 U/L  
 CK - MB <1.0 <3.6 NG/ML  
 CK-MB Index Cannot be calculated 0 - 2.5 URINALYSIS W/ REFLEX CULTURE Collection Time: 11/13/18 12:53 PM  
Result Value Ref Range Color YELLOW/STRAW Appearance CLEAR CLEAR Specific gravity 1.010 1.003 - 1.030    
 pH (UA) 6.0 5.0 - 8.0 Protein NEGATIVE  NEG mg/dL Glucose NEGATIVE  NEG mg/dL Ketone NEGATIVE  NEG mg/dL Bilirubin NEGATIVE  NEG Blood NEGATIVE  NEG Urobilinogen 2.0 (H) 0.2 - 1.0 EU/dL Nitrites POSITIVE (A) NEG Leukocyte Esterase SMALL (A) NEG    
 WBC 5-10 0 - 4 /hpf  
 RBC 0-5 0 - 5 /hpf Epithelial cells FEW FEW /lpf Bacteria 4+ (A) NEG /hpf  
 UA:UC IF INDICATED URINE CULTURE ORDERED (A) CNI Hyaline cast 0-2 0 - 5 /lpf  
TROPONIN I Collection Time: 11/13/18 12:53 PM  
Result Value Ref Range Troponin-I, Qt. <0.05 <0.05 ng/mL AMMONIA Collection Time: 11/13/18 12:59 PM  
Result Value Ref Range  Ammonia 59 (H) <32 UMOL/L

## 2018-11-14 PROBLEM — I65.23 BILATERAL CAROTID ARTERY STENOSIS: Status: ACTIVE | Noted: 2018-01-01

## 2018-11-14 NOTE — PROGRESS NOTES
Problem: Self Care Deficits Care Plan (Adult) Goal: *Acute Goals and Plan of Care (Insert Text) Occupational Therapy EVALUATION/discharge Patient: Soledad Kinsey (36 y.o. female) Date: 11/14/2018 Primary Diagnosis: TIA (transient ischemic attack) Precautions: standard ASSESSMENT:  
Based on the objective data described below, the patient presents with near baseline level of functioning for adls and functional mobility. Pt has no symptoms at this time reporting all that brought her to the hospital have resolved. Pt is independent and has a supportive  to assist her at home if needed. Pt has no concerns re: returning to her PLOF and adl routine. . 
Further skilled acute occupational therapy is not indicated at this time. Discharge Recommendations: None Further Equipment Recommendations for Discharge: none SUBJECTIVE:  
Patient stated I'm fine.  OBJECTIVE DATA SUMMARY:  
HISTORY:  
Past Medical History:  
Diagnosis Date  Asthma  Back pain  COPD (chronic obstructive pulmonary disease) (HCC)  Diabetes ([facility] [address]  Esophageal varices in cirrhosis ([facility] [address]   
 6/2014 banding x 2  
 Fibromyalgia  Gastrointestinal disorder  GERD (gastroesophageal reflux disease)  Hypercholesteremia  Liver cirrhosis secondary to BALDERAS ([facility] [address]  Liver disease  Other and unspecified hyperlipidemia  Restless leg syndrome  Type II or unspecified type diabetes mellitus without mention of complication, uncontrolled  Unspecified essential hypertension Past Surgical History:  
Procedure Laterality Date  HX BACK SURGERY    
 HX CARPAL TUNNEL RELEASE    
 on right  HX HYSTERECTOMY plus 1/2 of an ovary removed  [place] COLSC FLX W/RMVL OF TUMOR POLYP LESION SNARE TQ  5/30/2013  UPPER GI ENDOSCOPY,LIGAT VARIX  2/6/2015 Prior Level of Function/Environment/Context: indpendent, lives with her  who is supportive. Occupations in which the patient is/was successful, what are the barriers preventing that success: medical condition/risk factors Performance Patterns (routines, roles, habits, and rituals):  
Personal Interests and/or values:  
Expanded or extensive additional review of patient history: as above. Pt reports that she's stopped smoking in the past.   
 
Home Situation Home Environment: Private residence # Steps to Enter: 3 Rails to Enter: Yes Hand Rails : Bilateral 
One/Two Story Residence: One story Living Alone: No 
Support Systems: Spouse/Significant Other/Partner Patient Expects to be Discharged to[de-identified] Private residence Current DME Used/Available at Home: Nebulizer Tub or Shower Type: Shower Hand dominance: RightEXAMINATION OF PERFORMANCE DEFICITS: 
Cognitive/Behavioral Status: 
Neurologic State: Alert Orientation Level: Appropriate for age Cognition: Appropriate decision making; Appropriate for age attention/concentration; Follows commands Perception: Appears intact Perseveration: No perseveration noted Safety/Judgement: Awareness of environment; Fall prevention;Home safety; Insight into deficits Skin: generally intact Edema: none observed Hearing: Auditory Auditory Impairment: None Vision/Perceptual:   
Tracking: (scans room without difficulty) Corrective Lenses: Glasses Range of Motion: BUEs:   
AROM: Within functional limits PROM: Within functional limits Strength: 
BUEs;   
Strength: Within functional limits Coordination: 
Coordination: Within functional limits Fine Motor Skills-Upper: Left Intact; Right Intact Gross Motor Skills-Upper: Left Intact; Right Intact Good on coordination testing Tone & Sensation: 
 
Tone: Normal 
Sensation: Intact(B feet numbness from DM neuropathy) Balance: 
Sitting: Intact Standing: Intact Functional Mobility and Transfers for ADLs:Bed Mobility: Rolling: (pt seated EOB, not assessed) Scooting: Independent Transfers: 
Sit to Stand: Independent Stand to Sit: Independent Bed to Chair: Independent Bathroom Mobility: Independent Toilet Transfer : Independent ADL Assessment: 
Feeding: Independent Oral Facial Hygiene/Grooming: Independent Bathing: Independent Upper Body Dressing: Independent Lower Body Dressing: Independent Toileting: Independent ADL Intervention and task modifications: Pt performed seated coordination testing demonstrating intact coordination for adls. Pt ambulated to the bathroom with good balance and strength performed toilet transfer , standing grooming. Pt was able to  an object from the floor without difficulty. Cognitive Retraining Safety/Judgement: Awareness of environment; Fall prevention;Home safety; Insight into deficits Therapeutic Exercise: 
Encouraged pt to be active and try regular exercise with her MD approval.   
Discussed the risk factor of smoking  \"I've been smoking since I was 14\" Educated on BEFAST in context of improving health and decreasing risk factors. Pt and her  verbalized understanding. Functional Measure: 
Barthel Index: 
 
Bathin Bladder: 10 Bowels: 10 
Groomin Dressing: 10 Feeding: 10 Mobility: 10 Stairs: 5 Toilet Use: 10 Transfer (Bed to Chair and Back): 15 Total: 90 Barthel and G-code impairment scale: 
Percentage of impairment CH 
0% CI 
1-19% CJ 
20-39% CK 
40-59% CL 
60-79% CM 
80-99% CN 
100% Barthel Score 0-100 100 99-80 79-60 59-40 20-39 1-19 
 0 Barthel Score 0-20 20 17-19 13-16 9-12 5-8 1-4 0 The Barthel ADL Index: Guidelines 1. The index should be used as a record of what a patient does, not as a record of what a patient could do. 2. The main aim is to establish degree of independence from any help, physical or verbal, however minor and for whatever reason. 3. The need for supervision renders the patient not independent. 4. A patient's performance should be established using the best available evidence. Asking the patient, friends/relatives and nurses are the usual sources, but direct observation and common sense are also important. However direct testing is not needed. 5. Usually the patient's performance over the preceding 24-48 hours is important, but occasionally longer periods will be relevant. 6. Middle categories imply that the patient supplies over 50 per cent of the effort. 7. Use of aids to be independent is allowed. Kristi Boas., Barthel, DMyeshaW. ([id number]). Functional evaluation: the Barthel Index. [address] [facility] (14)2. ADITYA McmahonF, Keke Milian., [facility] [place], [address] (1999). Measuring the change indisability after inpatient rehabilitation; comparison of the responsiveness of the Barthel Index and Functional [place] Measure. Journal of Neurology, Neurosurgery, and Psychiatry, 66(4), 309-446. Karishma Hein N.J.DEJUAN, WILBUR Lui, & Cas Eng M.A. (2004.) Assessment of post-stroke quality of life in cost-effectiveness studies: The usefulness of the Barthel Index and the EuroQoL-5D. [facility], 43, 992-94 G codes: In compliance with CMSs Claims Based Outcome Reporting, the following G-code set was chosen for this patient based on their primary functional limitation being treated: The outcome measure chosen to determine the severity of the functional limitation was the Barthel Index with a score of 90/100 which was correlated with the impairment scale. ? Self Care:  
  - CURRENT STATUS: CI - 1%-19% impaired, limited or restricted  - GOAL STATUS: CI - 1%-19% impaired, limited or restricted  - D/C STATUS:  CI - 1%-19% impaired, limited or restricted Occupational Therapy Evaluation Charge Determination History Examination Decision-Making MEDIUM Complexity : Expanded review of history including physical, cognitive and psychosocial  history  MEDIUM Complexity : 3-5 performance deficits relating to physical, cognitive , or psychosocial skils that result in activity limitations and / or participation restrictions MEDIUM Complexity : Patient may present with comorbidities that affect occupational performnce. Miniml to moderate modification of tasks or assistance (eg, physical or verbal ) with assesment(s) is necessary to enable patient to complete evaluation Based on the above components, the patient evaluation is determined to be of the following complexity level: MEDIUM Pain: 
Pain Scale 1: Numeric (0 - 10) Pain Intensity 1: 0 Activity Tolerance:  
Good. No complaints After treatment:  
[]  Patient left in no apparent distress sitting up in chair 
[x]  Patient left in no apparent distress in bed 
[x]  Call bell left within reach [x]  Nursing notified 
[x]  Caregiver present 
[]  Bed alarm activated COMMUNICATION/EDUCATION:  
Communication/Collaboration: 
[x]      Home safety education was provided and the patient/caregiver indicated understanding. [x]      Patient/family have participated as able and agree with findings and recommendations. []      Patient is unable to participate in plan of care at this time. Findings and recommendations were discussed with: Registered Nurse FLORY Salazar/L Time Calculation: 11 mins

## 2018-11-14 NOTE — ROUTINE PROCESS
Bedside and Verbal shift change report given to Zoey Sndyer (oncoming nurse) by Bruno Saucedo (offgoing nurse). Report included the following information SBAR, Kardex, Intake/Output and MAR. Zone Phone:   [phone] Significant changes during shift:  New admit Patient Information Maribel Palomino 
61 y.o. 
11/13/2018 12:03 PM by Vidal Vargas MD. Maribel Palomino was admitted from Home 
 
Problem List 
 
Patient Active Problem List  
 Diagnosis Date Noted  TIA (transient ischemic attack) 11/13/2018  GERD (gastroesophageal reflux disease) 11/13/2018  Therapeutic drug monitoring 10/16/2018  Severe obesity ([address] 10/11/2018  Obesity (BMI 30.0-34.9) 07/10/2018  Sepsis ([address] 03/30/2017  CAP (community acquired pneumonia) 03/30/2017  IBIS (obstructive sleep apnea) 03/30/2017  Tobacco abuse 03/30/2017  Neuropathy 03/30/2017  COPD (chronic obstructive pulmonary disease) ([address] 03/28/2017  Acute deep vein thrombosis (DVT) of right lower extremity ([address] 11/10/2016  
 BALDERAS (nonalcoholic steatohepatitis) 03/10/2016  GI bleed 03/03/2016  Anemia 03/01/2016  Thrombocytopenia ([address] 08/15/2013  Previous back surgery 08/15/2013  S/P CHACHO (total abdominal hysterectomy) 08/15/2013  Cirrhosis ([address] 08/15/2013  Diabetes mellitus with neurological manifestations, uncontrolled ([address]  Essential hypertension, benign  Hyperlipidemia LDL goal <100 Past Medical History:  
Diagnosis Date  Asthma  Back pain  COPD (chronic obstructive pulmonary disease) (HCC)  Diabetes ([address]  Esophageal varices in cirrhosis ([address]   
 6/2014 banding x 2  
 Fibromyalgia  Gastrointestinal disorder  GERD (gastroesophageal reflux disease)  Hypercholesteremia  Liver cirrhosis secondary to BALDERAS ([address]  Liver disease  Other and unspecified hyperlipidemia  Restless leg syndrome  Type II or unspecified type diabetes mellitus without mention of complication, uncontrolled  Unspecified essential hypertension Core Measures: CVA: Yes Yes Activity Status: OOB to Chair Yes Ambulated this shift Yes Bed Rest No 
 
 
DVT prophylaxis: DVT prophylaxis Med- Yes DVT prophylaxis SCD or CRISTOPHER- No  
 
Wounds: (If Applicable) Wounds- No 
 
Patient Safety: 
 
Falls Score Total Score: 3 Safety Level_______ Bed Alarm On? No 
Sitter? No 
 
Plan for upcoming shift: neuro checks, echo Discharge Plan: Yes when all tests are done Active Consults: 
IP CONSULT TO NEUROLOGY

## 2018-11-14 NOTE — PROGRESS NOTES
PT note: 
 
Orders received and acknowledged. Chart reviewed and spoke with nursing. Patient currently off the floor for MRI. Will follow up for PT evaluation.   
 
Brittany Pathak, PT, DPT

## 2018-11-14 NOTE — PROGRESS NOTES
[id number] [address] [facility] Vascular  Preliminary Report:  Carotid Duplex Scan Right: 
Minimal plaque noted in the right carotid system. Right ICA velocities suggest less than 50% diameter reduction. Right vertebral artery flow is antegrade. Left: 
Mild plaque noted in the left carotid system. Left ICA velocities suggest less than 50% diameter reduction. Left vertebral artery flow is antegrade. Final report to follow.

## 2018-11-14 NOTE — PROGRESS NOTES
Problem: Falls - Risk of 
Goal: *Absence of Falls Document Joanie Carlin Fall Risk and appropriate interventions in the flowsheet. Outcome: Progressing Towards Goal 
Fall Risk Interventions: 
Mobility Interventions: Communicate number of staff needed for ambulation/transfer, OT consult for ADLs, Patient to call before getting OOB, PT Consult for mobility concerns, PT Consult for assist device competence Medication Interventions: Evaluate medications/consider consulting pharmacy, Patient to call before getting OOB, Teach patient to arise slowly History of Falls Interventions: Door open when patient unattended, Evaluate medications/consider consulting pharmacy

## 2018-11-14 NOTE — PROGRESS NOTES
physical Therapy neuro EVALUATION/discharge Patient: Matt Ponce (13 y.o. female) Date: 11/14/2018 Primary Diagnosis: TIA (transient ischemic attack) Precautions:     
 
ASSESSMENT : 
Based on the objective data described below, the patient presents with good strength, good functional mobility, and steady gait following admission for TIA. PTA patient lives with her . She is independent with all aspects of functional mobility and ADLS. She denies any falls. Currently, patient received sitting EOB, agreeable to PT. Patient reports she is back to baseline with no neuro deficits at this time. Patient ambulated in the room independently with safe and steady gait with no balance concerns. Educated patient on BEFAST due to TIA and will sign off. Skilled physical therapy is not indicated at this time. PLAN : 
Discharge Recommendations: None Further Equipment Recommendations for Discharge: none SUBJECTIVE:  
Patient stated I have been up and doing my own thing.  OBJECTIVE DATA SUMMARY:  
HISTORY:   
Past Medical History:  
Diagnosis Date  Asthma  Back pain  COPD (chronic obstructive pulmonary disease) (HCC)  Diabetes ([facility]  Esophageal varices in cirrhosis ([facility]   
 6/2014 banding x 2  
 Fibromyalgia  Gastrointestinal disorder  GERD (gastroesophageal reflux disease)  Hypercholesteremia  Liver cirrhosis secondary to BALDERAS ([facility]  Liver disease  Other and unspecified hyperlipidemia  Restless leg syndrome  Type II or unspecified type diabetes mellitus without mention of complication, uncontrolled  Unspecified essential hypertension Past Surgical History:  
Procedure Laterality Date  HX BACK SURGERY    
 HX CARPAL TUNNEL RELEASE    
 on right  HX HYSTERECTOMY plus 1/2 of an ovary removed  PA COLSC FLX W/RMVL OF TUMOR POLYP LESION SNARE TQ  5/30/2013  UPPER GI ENDOSCOPY,LIGAT VARIX  2/6/2015 Prior Level of Function/Home Situation: patient lives with her . She is independent with all aspects of functional mobility and ADLS. She denies any falls. Personal factors and/or comorbidities impacting plan of care: TIA Home Situation Home Environment: Private residence # Steps to Enter: 3 Rails to Enter: Yes Hand Rails : Bilateral 
One/Two Story Residence: One story Living Alone: No 
Support Systems: Spouse/Significant Other/Partner Patient Expects to be Discharged to[de-identified] Private residence Current DME Used/Available at Home: Nebulizer EXAMINATION/PRESENTATION/DECISION MAKING: Critical Behavior: 
Neurologic State: Alert Orientation Level: Oriented X4 Hearing: Auditory Auditory Impairment: None Skin:   
Edema:  
Range Of Motion: 
AROM: Within functional limits PROM: Within functional limits Strength:   
Strength: Within functional limits Tone & Sensation:  
Tone: Normal 
  
  
  
  
Sensation: Intact(B feet numbness from DM neuropathy) Coordination: 
Coordination: Within functional limits Vision:  
  
Functional Mobility: 
Bed Mobility: 
  
  
  
  
Transfers: 
Sit to Stand: Independent Stand to Sit: Independent Bed to Chair: Independent Balance:  
Sitting: Intact Standing: Intact Ambulation/Gait Training: 
Distance (ft): 60 Feet (ft) Ambulation - Level of Assistance: Independent Gait Description (WDL): Exceptions to [facility] Base of Support: Widened Functional Measure: 
Roxy Crea Balance Test: 
 
Sitting to Standin Standing Unsupported: 4 Sitting with Back Unsupported: 4 Standing to Sittin Transfers: 4 Standing Unsupported with Eyes Closed: 4 Standing Unsupported with Feet Together: 4 Reach Forward with Outstretched Arm: 4  Object: 4 Turn to Look Over Shoulders: 4 Turn 360 Degrees: 4 Alternate Foot on Step/Stool: 4 Standing Unsupported One Foot in Front: 4 Stand on One Le Total: 56 
 
 
 
56=Maximum possible score;  
0-20=High fall risk 21-40=Moderate fall risk 41-56=Low fall risk Wang Balance Test and G-code impairment scale: 
Percentage of Impairment CH 
 
0% 
 CI 
 
1-19% CJ 
 
20-39% CK 
 
40-59% CL 
 
60-79% CM 
 
80-99% CN  
 
100% Darcella Rain Score 0-56 56 45-55 34-44 23-33 12-22 1-11 0  
 
G codes: In compliance with CMSs Claims Based Outcome Reporting, the following G-code set was chosen for this patient based on their primary functional limitation being treated: The outcome measure chosen to determine the severity of the functional limitation was the Darcella Rain with a score of 56/56 which was correlated with the impairment scale. ? Mobility - Walking and Moving Around:  
  - CURRENT STATUS: CH - 0% impaired, limited or restricted  - GOAL STATUS: CH - 0% impaired, limited or restricted  - D/C STATUS:  CH - 0% impaired, limited or restricted Physical Therapy Evaluation Charge Determination History Examination Presentation Decision-Making MEDIUM  Complexity : 1-2 comorbidities / personal factors will impact the outcome/ POC  MEDIUM Complexity : 3 Standardized tests and measures addressing body structure, function, activity limitation and / or participation in recreation  MEDIUM Complexity : Evolving with changing characteristics  Other outcome measures Wang  LOW Based on the above components, the patient evaluation is determined to be of the following complexity level: LOW Pain: 
Pain Scale 1: Numeric (0 - 10) Pain Intensity 1: 0 Activity Tolerance: At baseline. Please refer to the flowsheet for vital signs taken during this treatment. After treatment:  
[x]         Patient left in no apparent distress sitting up in chair 
[]         Patient left in no apparent distress in bed 
[x]         Call bell left within reach [x]         Nursing notified []         Caregiver present 
[]         Bed alarm activated COMMUNICATION/EDUCATION:  
Patient and/or family was verbally educated on the BE FAST acronym for signs/symptoms of CVA and TIA. BE FAST was written on patient's communication board  for visual education and reinforcement. All questions answered with patient indicating good understanding. [x]   Fall prevention education was provided and the patient/caregiver indicated understanding. [x]   Patient/family have participated as able and agree with findings and recommendations. []   Patient is unable to participate in plan of care at this time. Findings and recommendations were discussed with: Occupational Therapist, Registered Nurse and  Thank you for this referral. 
Rosita Schultz, PT, DPT Time Calculation: 9 mins

## 2018-11-14 NOTE — PROGRESS NOTES
Bedside and Verbal shift change report given to [facility] (oncoming nurse) by Michael Pantoja (offgoing nurse). Report included the following information SBAR, Kardex and MAR. Zone Phone:   [phone] Significant changes during shift:  none Patient Information Deepti Peña 
61 y.o. 
11/13/2018 12:03 PM by Joy Blair MD. Deepti Peña was admitted from Home 
 
Problem List 
 
Patient Active Problem List  
 Diagnosis Date Noted  TIA (transient ischemic attack) 11/13/2018  GERD (gastroesophageal reflux disease) 11/13/2018  Therapeutic drug monitoring 10/16/2018  Severe obesity ([address] 10/11/2018  Obesity (BMI 30.0-34.9) 07/10/2018  Sepsis ([address] 03/30/2017  CAP (community acquired pneumonia) 03/30/2017  IBIS (obstructive sleep apnea) 03/30/2017  Tobacco abuse 03/30/2017  Neuropathy 03/30/2017  COPD (chronic obstructive pulmonary disease) ([address] 03/28/2017  Acute deep vein thrombosis (DVT) of right lower extremity ([address] 11/10/2016  
 BALDERAS (nonalcoholic steatohepatitis) 03/10/2016  GI bleed 03/03/2016  Anemia 03/01/2016  Thrombocytopenia ([address] 08/15/2013  Previous back surgery 08/15/2013  S/P CHACHO (total abdominal hysterectomy) 08/15/2013  Cirrhosis ([address] 08/15/2013  Diabetes mellitus with neurological manifestations, uncontrolled ([address]  Essential hypertension, benign  Hyperlipidemia LDL goal <100 Past Medical History:  
Diagnosis Date  Asthma  Back pain  COPD (chronic obstructive pulmonary disease) (HCC)  Diabetes ([address]  Esophageal varices in cirrhosis ([address]   
 6/2014 banding x 2  
 Fibromyalgia  Gastrointestinal disorder  GERD (gastroesophageal reflux disease)  Hypercholesteremia  Liver cirrhosis secondary to BALDERAS ([address]  Liver disease  Other and unspecified hyperlipidemia  Restless leg syndrome  Type II or unspecified type diabetes mellitus without mention of complication, uncontrolled  Unspecified essential hypertension Core Measures: CVA: Yes Yes CHF:No No 
PNA:No No 
 
 
 
Activity Status: OOB to Chair Yes Ambulated this shift Yes Bed Rest No 
 
Supplemental O2: (If Applicable) NC Yes NRB No 
Venti-mask No 
On 2 Liters/min DVT prophylaxis: DVT prophylaxis Med- No 
DVT prophylaxis SCD or CRISTOPHER- No  
 
Wounds: (If Applicable) Wounds- No 
 
Location none Patient Safety: 
 
Falls Score Total Score: 3 Safety Level_______ Bed Alarm On? No 
Sitter? No 
 
Plan for upcoming shift: MRI Discharge Plan: No  
 
Active Consults: 
IP CONSULT TO NEUROLOGY

## 2018-11-14 NOTE — PROGRESS NOTES
Reason for Admission:   transient ischemic attack RRAT Score:    35 Resources/supports as identified by patient/family:   Pt  and two daughters serve as support for her. Top Challenges facing patient (as identified by patient/family and CM): Finances/Medication cost?   There are no financial or medication cost challenges at this time. Transportation? There are no issues with pt transportation at this time. Support system or lack thereof? Pt  and two daughters are able to provide support to pt if needed. Living arrangements? Pt resides in a 1 story home with her  (2 stairs to entry). Self-care/ADLs/Cognition? Pt is able to complete her ADLs/IADLs with no assistance. Pt is also able to operate a vehicle. Current Advanced Directive/Advance Care Plan: There is no plan on file. Plan for utilizing home health: There are no plans to utilize [facility] at this time. Likelihood of readmission: High. Pt states that she is now able to recognize when she needs to seek medical treatment. Transition of Care Plan:  CM met with pt and her  concerning d/c planning. CM verified pt demographic, insurance, and PCP information. Pt resides in a one story home with her . Pt states that she is able to drive and perform ADLs with no assistance. She does not believe that she will need any additional services at this time. Pt seen by PT and OT, both have no recommendations for d/c at this time. Pt has 2 daughters that live nearby that are able to provide support if needed. Pt  is willing and able to transport pt home upon d/c. Pt is aware that she will have to follow up with PCP at discharge and she stated that she would like to make her own appt. CM will continue to follow pt for d/c planning needs and arrange services as necessary. Care Management Interventions PCP Verified by CM: Yes Mode of Transport at Discharge: Other (see comment)(Pt ) Transition of Care Consult (CM Consult): Discharge Planning Discharge Durable Medical Equipment: No 
Physical Therapy Consult: Yes Occupational Therapy Consult: Yes Current Support Network: Lives with Spouse, Own Home, Family Lives [place] Confirm Follow Up Transport: Family(Pt  ) Plan discussed with Pt/Family/Caregiver: Yes Discharge Location Discharge Placement: Home Veronica Doshi, Care Manager [phone]

## 2018-11-14 NOTE — CONSULTS
NEUROLOGY NOTE Chief Complaint Patient presents with  Extremity Weakness  
  pt reports she was sent by PCP office for possible TIA, reports left side deficit on Friday, no symptoms today Reason for Consult I have been asked to see the patient in neurological consultation by Farhad Simons MD to render advice and opinion regarding possible stroke HPI Verena Salamanca is a 61 y.o. female who presents to the hospital because of new onset left sided weakness and numbness along with dysarthria. Symptoms started on Friday 11/19/18 and lasted for around 2 days and then she was back to her baseline. She did call her GI physician and was told to go to the ER. MRI brain, MRA head and carotid US were normal.  
 
+DM, htn and 1 ppd smoking. ROS A ten system review of constitutional, cardiovascular, respiratory, musculoskeletal, endocrine, skin, SHEENT, genitourinary, psychiatric and neurologic systems was obtained and is unremarkable except as stated in HPI  
 
PMH Past Medical History:  
Diagnosis Date  Asthma  Back pain  COPD (chronic obstructive pulmonary disease) (HCC)  Diabetes ([address]  Esophageal varices in cirrhosis ([address]   
 6/2014 banding x 2  
 Fibromyalgia  Gastrointestinal disorder  GERD (gastroesophageal reflux disease)  Hypercholesteremia  Liver cirrhosis secondary to BALDERAS ([address]  Liver disease  Other and unspecified hyperlipidemia  Restless leg syndrome  Type II or unspecified type diabetes mellitus without mention of complication, uncontrolled  Unspecified essential hypertension [facility] Family History Problem Relation Age of Onset  Diabetes Mother  Stroke Sister  Diabetes Paternal Aunt  Diabetes Paternal Uncle  Heart Disease Neg Hx [address] Social History Socioeconomic History  Marital status:  Spouse name: Not on file  Number of children: Not on file  Years of education: Not on file  Highest education level: Not on file Social Needs  Financial resource strain: Not on file  Food insecurity - worry: Not on file  Food insecurity - inability: Not on file  Transportation needs - medical: Not on file  Transportation needs - non-medical: Not on file Occupational History  Not on file Tobacco Use  Smoking status: Current Every Day Smoker Packs/day: 1.00 Years: 40.00 Pack years: 40.00  Smokeless tobacco: Former User Substance and Sexual Activity  Alcohol use: No  
  Comment: rare  Drug use: No  
 Sexual activity: Not on file Other Topics Concern  Not on file Social History Narrative Lives in [facility] with . Has 2 daughters and 1 granddaughter who she cares for. On disability for her back. Used to work as a  and . Used to bowl and fish. ALLERGIES Allergies Allergen Reactions  Hydrocodone Nausea and Vomiting  Lisinopril Cough  Lortab [Hydrocodone-Acetaminophen] Nausea and Vomiting  Percocet [Oxycodone-Acetaminophen] Nausea and Vomiting PHYSICAL EXAMINATION:  
Patient Vitals for the past 24 hrs: 
 Temp Pulse Resp BP SpO2  
11/14/18 1148 98.5 °F (36.9 °C) 97 18 153/65 95 % 11/14/18 0804 98.4 °F (36.9 °C) 99 18 144/60 94 % 11/14/18 0452 98.6 °F (37 °C) 99 18 142/56 95 % 11/13/18 2336     97 % 11/13/18 2304 98.3 °F (36.8 °C) 91 18 167/71 95 % 11/13/18 1716 98.9 °F (37.2 °C) 86 18 161/61 97 % 11/13/18 1430    150/66 97 % 11/13/18 1400    143/65 97 % General:  
General appearance: Pt is in no acute distress Distal pulses are preserved Fundoscopic exam: attempted Neurological Examination:  
Mental Status:  AAO x3. Speech is fluent. Follows commands, has normal fund of knowledge, attention, short term recall, comprehension and insight. Cranial Nerves: Visual fields are full.  PERRL, Extraocular movements are full. Facial sensation intact. Facial movement intact. Hearing intact to conversation. Palate elevates symmetrically. Shoulder shrug symmetric. Tongue midline. Motor: Strength is 5/5 in all 4 ext. Normal tone. No atrophy. Sensation: Decreased PP distally in LE Reflexes: DTRs 2+ except absent at ankles. Plantar responses downgoing. Coordination/Cerebellar: Intact to finger-nose-finger Gait: deferred Skin: No significant bruising or lacerations. LAB DATA REVIEWED:   
Recent Results (from the past 24 hour(s)) GLUCOSE, POC Collection Time: 11/13/18  9:31 PM  
Result Value Ref Range Glucose (POC) 144 (H) 65 - 100 mg/dL Performed by Rachel Carter LIPID PANEL Collection Time: 11/14/18  4:52 AM  
Result Value Ref Range LIPID PROFILE Cholesterol, total 143 <200 MG/DL Triglyceride 122 <150 MG/DL  
 HDL Cholesterol 41 MG/DL  
 LDL, calculated 77.6 0 - 100 MG/DL VLDL, calculated 24.4 MG/DL  
 CHOL/HDL Ratio 3.5 0 - 5.0 HEMOGLOBIN A1C WITH EAG Collection Time: 11/14/18  4:52 AM  
Result Value Ref Range Hemoglobin A1c 6.8 (H) 4.2 - 6.3 % Est. average glucose 148 mg/dL CBC W/O DIFF Collection Time: 11/14/18  4:52 AM  
Result Value Ref Range WBC 6.4 3.6 - 11.0 K/uL  
 RBC 3.67 (L) 3.80 - 5.20 M/uL  
 HGB 10.9 (L) 11.5 - 16.0 g/dL HCT 33.9 (L) 35.0 - 47.0 % MCV 92.4 80.0 - 99.0 FL  
 MCH 29.7 26.0 - 34.0 PG  
 MCHC 32.2 30.0 - 36.5 g/dL  
 RDW 18.0 (H) 11.5 - 14.5 % PLATELET 85 (L) 890 - 400 K/uL MPV 11.7 8.9 - 12.9 FL  
 NRBC 0.0 0  WBC ABSOLUTE NRBC 0.00 0.00 - 0.01 K/uL METABOLIC PANEL, COMPREHENSIVE Collection Time: 11/14/18  4:52 AM  
Result Value Ref Range Sodium 138 136 - 145 mmol/L Potassium 3.6 3.5 - 5.1 mmol/L Chloride 105 97 - 108 mmol/L  
 CO2 27 21 - 32 mmol/L Anion gap 6 5 - 15 mmol/L Glucose 205 (H) 65 - 100 mg/dL BUN 8 6 - 20 MG/DL  Creatinine 0.47 (L) 0.55 - 1.02 MG/DL  
 BUN/Creatinine ratio 17 12 - 20 GFR est AA >60 >60 ml/min/1.73m2 GFR est non-AA >60 >60 ml/min/1.73m2 Calcium 8.2 (L) 8.5 - 10.1 MG/DL Bilirubin, total 0.7 0.2 - 1.0 MG/DL  
 ALT (SGPT) 33 12 - 78 U/L  
 AST (SGOT) 35 15 - 37 U/L Alk. phosphatase 128 (H) 45 - 117 U/L Protein, total 7.5 6.4 - 8.2 g/dL Albumin 3.2 (L) 3.5 - 5.0 g/dL Globulin 4.3 (H) 2.0 - 4.0 g/dL A-G Ratio 0.7 (L) 1.1 - 2.2 GLUCOSE, POC Collection Time: 11/14/18  6:03 AM  
Result Value Ref Range Glucose (POC) 214 (H) 65 - 100 mg/dL Performed by Mirta Lau (PCT) GLUCOSE, POC Collection Time: 11/14/18 11:39 AM  
Result Value Ref Range Glucose (POC) 208 (H) 65 - 100 mg/dL Performed by [address] [place] [address] Prior to Admission Medications Prescriptions Last Dose Informant Patient Reported? Taking? CONSTULOSE 10 gram/15 mL solution   No Yes Sig: take 2 tablespoonfuls by mouth twice a day TREVOR PEN NEEDLE 32 gauge x 5/32\" ndle   No No  
Sig: use as directed four times a day Nebulizer & Compressor machine   No No  
Sig: UAD  
acetaminophen (TYLENOL) 500 mg tablet   Yes No  
Sig: Take 500 mg by mouth every six (6) hours as needed for Pain. albuterol (PROAIR HFA) 90 mcg/actuation inhaler   Yes No  
Sig: Take 2 Puffs by inhalation every four (4) hours as needed. albuterol-ipratropium (DUO-NEB) 2.5 mg-0.5 mg/3 ml nebu   No Yes Sig: 3 mL by Nebulization route every four (4) hours as needed. amitriptyline (ELAVIL) 150 mg tablet   Yes Yes Sig: Take 1 Tab by mouth nightly. ferrous sulfate 325 mg (65 mg iron) tablet   No Yes Sig: take 1 tablet by mouth once daily with BREAKFAST  
fluticasone-salmeterol (ADVAIR DISKUS) 500-50 mcg/dose diskus inhaler   Yes Yes Sig: Take 1 Puff by inhalation two (2) times a day. furosemide (LASIX) 20 mg tablet   No Yes Sig: Take 1 Tab by mouth daily. gabapentin (NEURONTIN) 600 mg tablet   No Yes Sig: take 2 tablets by mouth three times a day  
insulin glargine (LANTUS SOLOSTAR U-100 INSULIN) 100 unit/mL (3 mL) inpn   No Yes Sig: Inject 55 units twice daily--Dose change 4/12/18--updated med list--did not send prescription to the pharmacy  
insulin lispro (HUMALOG KWIKPEN INSULIN) 100 unit/mL kwikpen   No No  
Sig: Inject as needed up to 3 times per day for sugars over 150: 4 units for every 50 points. Max 50 units per day  
losartan (COZAAR) 25 mg tablet   No Yes Sig: take 1 tablet by mouth once daily REPLACES LISINOPRIL FOR BLOOD PRESSURE AND KIDNEY PROTECTION  
metFORMIN (GLUCOPHAGE) 1,000 mg tablet   No Yes Sig: take 1 tablet by mouth twice a day with meals  
omeprazole (PRILOSEC) 20 mg capsule   No Yes Sig: take 1 capsule by mouth once daily  
ondansetron (ZOFRAN ODT) 4 mg disintegrating tablet   No Yes Sig: dissolve 1 tablet ON TONGUE every 8 hours if needed for nausea  
potassium chloride SR (KLOR-CON 10) 10 mEq tablet   No Yes Sig: take 1 tablet by mouth twice a day  
propranolol (INDERAL) 20 mg tablet   No Yes Sig: Take 1 Tab by mouth two (2) times a day. rOPINIRole (REQUIP) 4 mg tab TAB   Yes Yes Sig: Take 4 mg by mouth nightly. traMADol (ULTRAM) 50 mg tablet   No Yes Sig: take 1-2 tablets by mouth twice a day if needed for DIABETIC NEUROPATHY PAIN Facility-Administered Medications: None CURRENT MEDS Current Facility-Administered Medications Medication Dose Route Frequency  fluticasone-vilanterol (BREO ELLIPTA) 200mcg-25mcg/puff  1 Puff Inhalation BID  sodium chloride (NS) flush 5-10 mL  5-10 mL IntraVENous Q8H  
 amitriptyline (ELAVIL) tablet 150 mg  150 mg Oral QHS  lactulose (CHRONULAC) solution 30 g  30 g Oral BID  ferrous sulfate tablet 325 mg  1 Tab Oral DAILY WITH BREAKFAST  furosemide (LASIX) tablet 20 mg  20 mg Oral DAILY  gabapentin (NEURONTIN) capsule 1,200 mg  1,200 mg Oral TID  insulin glargine (LANTUS) injection 55 Units  55 Units SubCUTAneous ACB&D  
 insulin lispro (HUMALOG) injection   SubCUTAneous AC&HS  
 losartan (COZAAR) tablet 25 mg  25 mg Oral DAILY  pantoprazole (PROTONIX) tablet 40 mg  40 mg Oral ACB  propranolol (INDERAL) tablet 20 mg  20 mg Oral BID  rOPINIRole (REQUIP) tablet 4 mg  4 mg Oral QHS  sodium chloride (NS) flush 5-10 mL  5-10 mL IntraVENous Q8H  
 aspirin chewable tablet 81 mg  81 mg Oral DAILY  enoxaparin (LOVENOX) injection 40 mg  40 mg SubCUTAneous Q24H  
 atorvastatin (LIPITOR) tablet 20 mg  20 mg Oral QHS Stroke workup MRI Brain/MRA head 1. Minimal chronic supratentorial white matter disease with no acute 
infarction. 2.  No flow limiting stenosis or arterial occlusion. Carotids:  
1. Bilateral <50% stenosis of the internal carotid arteries. 2. No significant stenosis in the external carotid arteries 
bilaterally. TTE:  
Pending Stroke labs: 
HgBA1c Lab Results Component Value Date/Time Hemoglobin A1c 6.8 (H) 11/14/2018 04:52 AM  
 
LDL Lab Results Component Value Date/Time LDL, calculated 77.6 11/14/2018 04:52 AM  
 
 
IMPRESSION: 
Josee Mckay is a 61 y.o. female who presents with new onset left sided weakness and numbness lasting for 2 days and now symptoms have resolved. Imaging normal. Suspect TIA. RECOMMENDATIONS: 
- MRI brain w/o C - Normal 
- MRA head - Normal 
- Carotid US  - Normal 
- TTE - Pending - Telemetry - BP goal is less than 140/90 
- Stroke labs (HbA1c, lipid panel). - Goal HbA1c is less than 7 and LDL is less than 70 
- Continue ASA 81 mg daily 
- Cont atorvastatin 20 mg daily  
- ST/OT/PT yvonneal 
 
Thank you very much for this consultation.  
 
 
Evita Quick MD 
Neurologist

## 2018-11-14 NOTE — PROGRESS NOTES
Occupational Therapy Pt is undergoing testing at this time and is not available to initiate OT Evaluation. Will defer and continue to follow.

## 2018-11-14 NOTE — PROGRESS NOTES
Speech pathology note Reviewed chart and note patient admitted with left weakness and slurred speech with concern for CVA. MRI completed and showed minimal chronic supratentorial white matter disease with no acute infarction. Note patient passed the STAND and a regular diet was ordered. Discussed case with RN who reported no SLP related concerns. Introduced self and role of SLP to patient who reported motor speech function has returned to baseline. Formal SLP evaluation not clinically indicated at this time. Will sign off. Please re-consult if further needs arise. Thank you. Brittany Ag., CCC-SLP

## 2018-11-14 NOTE — PROGRESS NOTES
While attempting to assess patient and get medications together, patient very argumentative and demanding that her home meds be given to her now. I explained what medications the Dr had ordered for patient. This RN pulled all patient's meds and explained what each medication was and what it was for when I opened all the packaging after scanning meds. When I handed cup to patient, patient wanted to know what each pill was and what it was for. I explained that I had already opened them all and that I would have to look on her MAR to figure out which med was which at that point. Patient demanded that she be given her home inhaler. I informed her that we do not carry Advair but use BREO instead. Patient began working herself up and coughing, all the time telling me that something needed to be done about her breathing. Patient was clearly breathing at this point. This RN called pharmacy and requested that inhaler be released early and tubed to unit so I could give it to her. I informed patient that pharmacy was tubing the inhaler up to the floor. Patient tearful saying that she was going to have her  bring hers from home. Yvette Arzate RN charge nurse came into patient's room, and patient was stating that she was unable to use the [facility] [place] because she could not take a deep breath. Patient was placed on 2L 02, and this RN called respiratory for a breathing treatment for patient. Respiratory informed this RN that patient refused breathing treatment. Will continue to monitor patient's status.

## 2018-11-14 NOTE — PROGRESS NOTES
Pharmacy Clarification of the Prior to Admission Medication Regimen Retrospective to the Admission Medication Reconciliation The patient was interviewed regarding clarification of the prior to admission medication regimen. Patient's  was present in room and obtained permission from patient to discuss drug regimen with visitor(s) present. Patient was questioned regarding use of any other inhalers, topical products, over the counter medications, herbal medications, vitamin products or ophthalmic/nasal/otic medication use. Information Obtained From: Patient, Linsey Pederson Recommendations/Findings: The following amendments were made to the patient's active medication list on file at [facility]:  
 
1) Additions:  
? dextromethorphan-guaiFENesin (ROBITUSSIN-DM)  mg/5 mL syrup 2) Removals: None 3) Changes: 
? potassium chloride SR (KLOR-CON 10) 10 mEq tablet (Old regimen: 10 mEq BID /New regimen: 10 mEq daily) ? traMADol (ULTRAM) 50 mg tablet (Old regimen: 1-2 tablets BID PRN /New regimen: 3 tablets TID) 4) Pertinent Pharmacy Findings: None PTA medication list was corrected to the following:  
 
Prior to Admission Medications Prescriptions Last Dose Informant Patient Reported? Taking? CONSTULOSE 10 gram/15 mL solution 11/12/2018 at Unknown time Self No Yes Sig: take 2 tablespoonfuls by mouth twice a day  
acetaminophen (TYLENOL) 500 mg tablet 10/14/2018 at Unknown time Self Yes Yes Sig: Take 500 mg by mouth every six (6) hours as needed for Pain. albuterol (PROAIR HFA) 90 mcg/actuation inhaler 11/12/2018 at Unknown time Self Yes Yes Sig: Take 2 Puffs by inhalation every four (4) hours as needed for Wheezing. albuterol-ipratropium (DUO-NEB) 2.5 mg-0.5 mg/3 ml nebu 10/14/2018 at Unknown time Self Yes Yes Sig: 3 mL by Nebulization route daily as needed. amitriptyline (ELAVIL) 150 mg tablet 11/12/2018 at Unknown time Self Yes Yes Sig: Take 1 Tab by mouth nightly. dextromethorphan-guaiFENesin (ROBITUSSIN-DM)  mg/5 mL syrup 11/12/2018 at Unknown time Self Yes Yes Sig: Take 10 mL by mouth nightly as needed for Cough. ferrous sulfate 325 mg (65 mg iron) tablet 11/12/2018 at Unknown time Self No Yes Sig: take 1 tablet by mouth once daily with BREAKFAST  
fluticasone-salmeterol (ADVAIR DISKUS) 500-50 mcg/dose diskus inhaler 11/12/2018 at Unknown time Self Yes Yes Sig: Take 1 Puff by inhalation two (2) times a day. furosemide (LASIX) 20 mg tablet 11/12/2018 at Unknown time Self No Yes Sig: Take 1 Tab by mouth daily. gabapentin (NEURONTIN) 600 mg tablet 11/12/2018 at Unknown time Self No Yes Sig: take 2 tablets by mouth three times a day  
insulin glargine (LANTUS SOLOSTAR U-100 INSULIN) 100 unit/mL (3 mL) inpn 11/12/2018 at Unknown time Self No Yes Sig: Inject 55 units twice daily--Dose change 4/12/18--updated med list--did not send prescription to the pharmacy  
insulin lispro (HUMALOG KWIKPEN INSULIN) 100 unit/mL kwikpen 11/12/2018 at Unknown time Self No Yes Sig: Inject as needed up to 3 times per day for sugars over 150: 4 units for every 50 points. Max 50 units per day  
losartan (COZAAR) 25 mg tablet 11/12/2018 at Unknown time Self No Yes Sig: take 1 tablet by mouth once daily REPLACES LISINOPRIL FOR BLOOD PRESSURE AND KIDNEY PROTECTION  
metFORMIN (GLUCOPHAGE) 1,000 mg tablet 11/12/2018 at Unknown time Self No Yes Sig: take 1 tablet by mouth twice a day with meals  
omeprazole (PRILOSEC) 20 mg capsule 11/12/2018 at Unknown time Self No Yes Sig: take 1 capsule by mouth once daily  
ondansetron (ZOFRAN ODT) 4 mg disintegrating tablet 11/12/2018 at Unknown time Self No Yes Sig: dissolve 1 tablet ON TONGUE every 8 hours if needed for nausea  
potassium chloride SR (KLOR-CON 10) 10 mEq tablet 11/12/2018 at Unknown time Self Yes Yes Sig: Take 10 mEq by mouth daily. propranolol (INDERAL) 20 mg tablet 11/12/2018 at Unknown time Self No Yes Sig: Take 1 Tab by mouth two (2) times a day. rOPINIRole (REQUIP) 4 mg tab TAB 11/12/2018 at Unknown time Self Yes Yes Sig: Take 4 mg by mouth nightly. traMADol (ULTRAM) 50 mg tablet 11/12/2018 at Unknown time Self Yes Yes Sig: Take 150 mg by mouth three (3) times daily. Facility-Administered Medications: None Thank you, 
Lance Rader PharmD candidate Class of 2019

## 2018-11-14 NOTE — PROCEDURES
[facility] *** FINAL REPORT *** Name: Kan Brewster 
MRN: [id number]    Inpatient : 26 Aug 1955 [facility] Order #: [id number] [facility] Visit #: [id number] Date: 2018 TYPE OF TEST: Cerebrovascular Duplex REASON FOR TEST Transient ischemic attacks Right Carotid:- 
           Proximal               Mid                 Distal 
cm/s  Systolic  Diastolic  Systolic  Diastolic  Systolic  Diastolic CCA:     94.0       0.0                            91.0      19.0 Bulb: 
ECA:    133.0       0.0 ICA:     72.0      11.0       91.0      20.0       88.0      17.0 ICA/CCA:  0.8       0.6 ICA Stenosis: <50% Right Vertebral:- 
Finding: Antegrade Sys:       47.0 Soo:       11.0 Right Subclavian: Normal 
 
Left Carotid:- 
          Proximal                Mid                 Distal 
cm/s  Systolic  Diastolic  Systolic  Diastolic  Systolic  Diastolic CCA:    139.0      21.0                           122.0      26.0 Bulb: 
ECA:    177.0       9.0 ICA:    106.0      25.0       77.0      21.0       70.0      17.0 ICA/CCA:  0.9       1.0 ICA Stenosis: <50% Left Vertebral:- 
Finding: Antegrade Sys:       63.0 Soo:       14.0 Left Subclavian: Normal 
 
INTERPRETATION/FINDINGS 
PROCEDURE:  Carotid Duplex Examination using B-mode, color and 
spectral Doppler of the extracranial cerebrovascular arteries. 1. Bilateral <50% stenosis of the internal carotid arteries. 2. No significant stenosis in the external carotid arteries 
bilaterally. 3. Antegrade flow in both vertebral arteries. 4. Normal flow in both subclavian arteries. Plaque Morphology: 1. Heterogeneous plaque in the bulb and right ICA. 2. Heterogeneous plaque in the left ICA. ADDITIONAL COMMENTS I have personally reviewed the data relevant to the interpretation of 
this 
study. TECHNOLOGIST: Gwen Dupont. FERNANDEZ Bennett, RDMS Signed: 2018 10:12 AM 
 
PHYSICIAN: Cece Tanner.  Se Huffman MD 
 Signed: 11/14/2018 10:02 PM

## 2018-11-15 NOTE — DISCHARGE INSTRUCTIONS
Patient Discharge Instructions    Kemal Wesley / [id number] : 1955    Admitted 2018 Discharged: 11/15/2018         DISCHARGE DIAGNOSIS:       TIA (transient ischemic attack) (2018)      E coli UTI      Diabetes mellitus with neurological manifestations, uncontrolled ([facility] [address] ()      Essential hypertension, benign ()      Thrombocytopenia ([facility] [address] (8/15/2013)      Cirrhosis ([facility] [address] (8/15/2013)      BALDERAS (nonalcoholic steatohepatitis) (3/10/2016)          What to do at Home    1. Recommended diet: Diabetic Diet    2. Recommended activity: Activity as tolerated    3.  If you experience any of the following symptoms then please call your primary care physician or return to the emergency room if you cannot get hold of your doctor:   Fevers > 100.5, chills   Nausea or vomiting, persistent diarrhea > 24 hours   Blood in stool or black stools   Chest pain or SOB      Follow-up Information     Follow up With Specialties Details Why Contact Info    Baltazar Colvin NP Nurse Practitioner Schedule an appointment as soon as possible for a visit in 1 week Please call to schedule your hospital follow-up appointment [address] [place] [address] [address]      Basia Burr MD Neurology Go on 2018 Please follow up on 2018 at 9:40 arriving at 9:20 to register with photo ID, insurance card and current list of medication  Ruben SONI/ Man[facility] LambertMet[facility] [address]  [facility] [phone]      Hiro Nina MD Hepatology, Liver Disease, Internal Medicine  let Dr Carlos Glass know you started the aspirin and lipitor [address] [address]  [phone]          Complete antibiotics as directed(ampicillin), start in Am 2018    Use Asprin 81 mg and lipitor for stroke prevention    No driving a car/truck/motorcycle for 3 months per [employer], unless your doctors assess you after discharge and feel it is safe to resume driving sooner. Information obtained by :  I understand that if any problems occur once I am at home I am to contact my physician. I understand and acknowledge receipt of the instructions indicated above.                                                                                                                                            Physician's or R.N.'s Signature                                                                  Date/Time                                                                                                                                              Patient or Representative Signature                                                          Date/Time

## 2018-11-15 NOTE — PROGRESS NOTES
Patient received discharge instructions. Patient verbalized understanding of discharge instructions, follow up appointments, and new medications. Patient discharged home via wheelchair with .

## 2018-11-15 NOTE — PROGRESS NOTES
Hospitalist Progress NoteNAME: Rossi Way :  1955 MRN:  [id number] Admit date: 2018 Today's date: 18 PCP: ALEJANDRO Feliciano M.D. Cell [phone] Assessment / Plan: Suspected TIA POA Transient left sided weakness associated with slurred speech Symptoms resolved day before yesterday, sent from PCPs office Neuro exam non focal 
Tele with no atrial fib MRI brain Minimal chronic supratentorial white matter disease with no acute 
infarction. MRA brain no flow limiting stenosis or arterial occlusion. 2D echo pending ASA, pt has some concerns with her prior bleeding Neurology consult appreciated Ambulated well with PT 
D/C in AM 
 
Hyperlipidemia POA LDL 77.6 Statin 
  
Diabetes mellitus with neurological manifestations, uncontrolled Hold metformin and continue Lantus 55 units BID 
SSi 
  
Smoking POA Counseled to quit again today Pt deferred nicotine patch at this time HTN Continue ARBs and Propanolol BP stable 
  
Chronic Thrombocytopenia due to liver disease 
stable 
  
Liver Cirrhosis with h/o hepatic enceph and esophageal vareces Continue Propanolol, Lasix Increase lactulose dose as above transient symptoms can be due to elevated ammonia level 
  
COPD (chronic obstructive pulmonary disease) Continue Px inhalers and PRN nebs 
  
IBIS (obstructive sleep apnea)  to bring CPAP from home 
  
GERD (gastroesophageal reflux disease) Continue PPIs 
  
Neuropathy/Chronic pain 
Continue home meds without change Obesity POA Body mass index is 34.15 kg/m². 
  
Code Status: Full Surrogate Decision Maker:  
  
DVT Prophylaxis: Lovenox as INR ok with liver cirrhosis 
   
 
Subjective: Chief Complaint / Reason for Physician Visit f/u TIA \"no more weakness\". Discussed with RN events overnight. Left weakness and speech changes have resolved Upset that it took so long to get some of her meds last night Review of Systems: 
Symptom Y/N Comments  Symptom Y/N Comments Fever/Chills n   Chest Pain n   
Poor Appetite    Edema Cough n   Abdominal Pain n   
Sputum    Joint Pain SOB/MARTINEZ n   Headache Nausea/vomit    Tolerating PT/OT Diarrhea n   Tolerating Diet y Constipation    Other Could NOT obtain due to:   
 
Objective: VITALS:  
Last 24hrs VS reviewed since prior progress note. Most recent are: 
Patient Vitals for the past 24 hrs: 
 Temp Pulse Resp BP SpO2  
11/14/18 1936 99.1 °F (37.3 °C) 77 18 136/55 90 % 11/14/18 1550 97.8 °F (36.6 °C) 75 18 135/58 96 % 11/14/18 1148 98.5 °F (36.9 °C) 97 18 153/65 95 % 11/14/18 0804 98.4 °F (36.9 °C) 99 18 144/60 94 % 11/14/18 0452 98.6 °F (37 °C) 99 18 142/56 95 % 11/13/18 2336     97 % 11/13/18 2304 98.3 °F (36.8 °C) 91 18 167/71 95 % Intake/Output Summary (Last 24 hours) at 11/14/2018 2136 Last data filed at 11/14/2018 5594 Gross per 24 hour Intake 360 ml Output 0 ml Net 360 ml Wt Readings from Last 12 Encounters:  
11/13/18 84.7 kg (186 lb 11.7 oz)  
11/13/18 84.7 kg (186 lb 11.7 oz) 10/16/18 86.7 kg (191 lb 3.2 oz) 10/11/18 89.3 kg (196 lb 12.8 oz) 07/10/18 83.6 kg (184 lb 3.2 oz) 05/31/18 86.6 kg (191 lb) 04/12/18 86.7 kg (191 lb 3.2 oz) 03/14/18 90.4 kg (199 lb 4.7 oz) 10/06/17 88.5 kg (195 lb 3.2 oz)  
10/02/17 95.3 kg (210 lb) 08/06/17 95.3 kg (210 lb) 07/06/17 95.3 kg (210 lb) PHYSICAL EXAM: 
General: WD, WN. Alert, cooperative, no acute distress   
EENT:  PERRL. Anicteric sclerae. MMM Neck:  No meningismus, no thyromegaly Resp:  Decreased BS bilaterally, no wheezing or rales. No accessory muscle use CV:  Regular  rhythm,  No edema GI:  Soft, Non distended, Non tender.  +Bowel sounds, no rebound LN:  No cervical or inguinal KIKI Neurologic:  Alert and oriented X 3, normal speech, non focal motor exam 
 Psych:   Good insight. Not anxious nor agitated Skin:  No rashes. No jaundice Reviewed most current lab test results and cultures  YES Reviewed patient's current orders and MAR    YES 
PMH/SH reviewed - no change compared to H&P 
________________________________________________________________________Reviewed most current radiology test results   YES Review and summation of old records today    NO Care Plan discussed with: 
  Comments Patient x Family  x   
RN xx Care Manager Consultant     
                 x Multidiciplinary team rounds were held today with , nursing, pharmacist and clinical coordinator. Patient's plan of care was discussed; medications were reviewed and discharge planning was addressed. ________________________________________________________________________ Total NON critical care TIME:  25  Minutes Total CRITICAL CARE TIME Spent:   Minutes non procedure based Comments >50% of visit spent in counseling and coordination of care    
________________________________________________________________________ Dennys Gee MD  
 
Procedures: see electronic medical records for all procedures/Xrays and details which were not copied into this note but were reviewed prior to creation of Plan. LABS: 
I reviewed today's most current labs and imaging studies. Pertinent labs include: 
Recent Labs 11/14/18 
0452 11/13/18 
1253 WBC 6.4 6.1 HGB 10.9* 11.8 HCT 33.9* 36.9 PLT 85* 95* Recent Labs 11/14/18 
0452 11/13/18 
1253  136  
K 3.6 4.1  103 CO2 27 29 * 256* BUN 8 9 CREA 0.47* 0.51* CA 8.2* 8.2* ALB 3.2* 3.4* TBILI 0.7 0.8 SGOT 35 32 ALT 33 34 INR  --  1.1

## 2018-11-15 NOTE — DISCHARGE SUMMARY
Hospitalist Discharge  NoteNAME: Karol Severance :  1955 MRN:  [id number] Admit date: 2018 Discharge date: 11/15/18 PCP: Marleta Carrel, NP Discharge Diagnoses: Suspected TIA POA Transient left sided weakness associated with slurred speech E coli UTI POA Hyperlipidemia POA LDL 77.6 
  
Diabetes mellitus with neurological manifestations, uncontrolled 
  
Smoking POA Essential HTN POA 
  
Chronic Thrombocytopenia due to liver disease 
  
Liver Cirrhosis with h/o hepatic enceph and esophageal vareces 
  
COPD (chronic obstructive pulmonary disease)  
  
IBIS (obstructive sleep apnea)  
  
GERD (gastroesophageal reflux disease)  
  
Neuropathy/Chronic pain Obesity POA Body mass index is 34.15 kg/m². 
  
Code Status: Full Discharge Medications: 
Current Discharge Medication List  
  
START taking these medications Details  
aspirin 81 mg chewable tablet Take 1 Tab by mouth daily. Qty: 30 Tab, Refills: 6  
  
atorvastatin (LIPITOR) 40 mg tablet Take 1 Tab by mouth nightly. Qty: 30 Tab, Refills: 6  
  
ampicillin (PRINCIPEN) 500 mg capsule Take 1 Cap by mouth Before breakfast, lunch, dinner and at bedtime for 3 days. Qty: 12 Cap, Refills: 0 CONTINUE these medications which have NOT CHANGED Details  
albuterol-ipratropium (DUO-NEB) 2.5 mg-0.5 mg/3 ml nebu 3 mL by Nebulization route daily as needed. potassium chloride SR (KLOR-CON 10) 10 mEq tablet Take 10 mEq by mouth daily. dextromethorphan-guaiFENesin (ROBITUSSIN-DM)  mg/5 mL syrup Take 10 mL by mouth nightly as needed for Cough. omeprazole (PRILOSEC) 20 mg capsule take 1 capsule by mouth once daily 
Qty: 30 Cap, Refills: 5  
  
amitriptyline (ELAVIL) 150 mg tablet Take 1 Tab by mouth nightly. propranolol (INDERAL) 20 mg tablet Take 1 Tab by mouth two (2) times a day.  
Qty: 60 Tab, Refills: 11  
  
 ferrous sulfate 325 mg (65 mg iron) tablet take 1 tablet by mouth once daily with BREAKFAST Qty: 90 Tab, Refills: 1  
  
insulin lispro (HUMALOG KWIKPEN INSULIN) 100 unit/mL kwikpen Inject as needed up to 3 times per day for sugars over 150: 4 units for every 50 points. Max 50 units per day 
Qty: 15 mL, Refills: 11  
  
metFORMIN (GLUCOPHAGE) 1,000 mg tablet take 1 tablet by mouth twice a day with meals Qty: 180 Tab, Refills: 3  
  
ondansetron (ZOFRAN ODT) 4 mg disintegrating tablet dissolve 1 tablet ON TONGUE every 8 hours if needed for nausea Qty: 45 Tab, Refills: 3  
  
gabapentin (NEURONTIN) 600 mg tablet take 2 tablets by mouth three times a day 
Qty: 180 Tab, Refills: 11 Comments: New higher dose replaces prior script on file  
  
insulin glargine (LANTUS SOLOSTAR U-100 INSULIN) 100 unit/mL (3 mL) inpn Inject 55 units twice daily--Dose change 4/12/18--updated med list--did not send prescription to the pharmacy Qty: 45 mL, Refills: 11  
  
furosemide (LASIX) 20 mg tablet Take 1 Tab by mouth daily. Qty: 90 Tab, Refills: 3 CONSTULOSE 10 gram/15 mL solution take 2 tablespoonfuls by mouth twice a day 
Qty: 1000 mL, Refills: 5  
  
losartan (COZAAR) 25 mg tablet take 1 tablet by mouth once daily REPLACES LISINOPRIL FOR BLOOD PRESSURE AND KIDNEY PROTECTION Qty: 30 Tab, Refills: 11  
  
rOPINIRole (REQUIP) 4 mg tab TAB Take 4 mg by mouth nightly. albuterol (PROAIR HFA) 90 mcg/actuation inhaler Take 2 Puffs by inhalation every four (4) hours as needed for Wheezing. fluticasone-salmeterol (ADVAIR DISKUS) 500-50 mcg/dose diskus inhaler Take 1 Puff by inhalation two (2) times a day. STOP taking these medications  
  
 traMADol (ULTRAM) 50 mg tablet Comments:  
Reason for Stopping:   
   
 acetaminophen (TYLENOL) 500 mg tablet Comments:  
Reason for Stopping:   
   
 traMADol (ULTRAM) 50 mg tablet Comments:  
Reason for Stopping: Follow-up Information Follow up With Specialties Details Why Contact Info Jim Stahl NP Nurse Practitioner Schedule an appointment as soon as possible for a visit in 1 week Please call to schedule your hospital follow-up appointment [address] 
[phone] Estrella Singh MD Neurology Go on 11/28/2018 Please follow up on November 28, 2018 at 9:40 arriving at 9:20 to register with photo ID, insurance card and current list of medication  [facility] [address] [facility] [phone] Juan Garcia MD Hepatology, Liver Disease, Internal Medicine  let Dr Julia Srivastava know you started the aspirin and lipitor [address] 
[phone] Time spent on discharge:   I spent greater than 30 minutes on discharge, seeing and examining the patient, reconciling home meds and new meds, coordinating care with case management, doing the discharge papers and the D/C summary Discharge disposition: home Discharge Condition: Stable Summary of admission H+P(copied from Dr Cande Ramirez Note): CHIEF COMPLAINT: referred from PCP office for TIA workup 
  
HISTORY OF PRESENT ILLNESS:    
Joseph Florentino is a 61 y.o.  female who is referred from PCP office for TIA workup. As per patient, she started to have left sided weakness including left arm, leg along with slurred speech started 3 days ago and symptoms completely got resolved 2 days ago. She went to PCP and was referred to ED for possible TIA. Pt reports chronic cough due to COPD, history of liver cirrhosis. Pt denies any fever, chills, headaches, dizziness, nausea, vomiting, diarrhea and any focal weakness at this time.  
  
We were asked to admit for work up and evaluation of the above problems.  
  
    
Past Medical History:  
Diagnosis Date  Asthma    
 Back pain    
 COPD (chronic obstructive pulmonary disease) (HCC)    
  Diabetes ([address]    
 Esophageal varices in cirrhosis ([address]    
  6/2014 banding x 2  
 Fibromyalgia    
 Gastrointestinal disorder    
 GERD (gastroesophageal reflux disease)    
 Hypercholesteremia    
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    
 Liver disease    
 Other and unspecified hyperlipidemia    
 Restless leg syndrome    
 Type II or unspecified type diabetes mellitus without mention of complication, uncontrolled    
 Unspecified essential hypertension    
 
 
Admit pCXR Frontal and lateral views of the chest show clear lungs. The heart, mediastinum 
and pulmonary vasculature are normal.  The bony thorax is unremarkable. No 
significant change 3/14/2018 IMPRESSION: 
NORMAL CHEST Head CT scan FINDINGS:  
There is no acute intracranial hemorrhage, mass, mass effect or 
herniation. Ventricular system is normal. The gray-white matter differentiation 
is well-preserved. The mastoid air cells are well pneumatized. The visualized 
paranasal sinuses are normal. 
IMPRESSION: No acute intracranial hemorrhage, mass or infarct. MRI and MRA brain FINDINGS:   
Ventricles:  Midline, no hydrocephalus. Intracranial Hemorrhage:  None. Brain Parenchyma/Brainstem:  Minimal chronic T2 hyperintensity in the supratentorial periventricular white matter. No acute infarction. Basal Cisterns:  Normal.  
Flow Voids:  Normal. 
Additional Comments:  N/A. Posterior Circulation:  No flow limiting stenosis or occlusion. Anterior Circulation:  No flow limiting stenosis or occlusion. Additional Comments:  No evidence of aneurysm or vascular malformation. IMPRESSION: 
1. Minimal chronic supratentorial white matter disease with no acute infarction. 2.  No flow limiting stenosis or arterial occlusion. 
  
Carotid dopplers INTERPRETATION/FINDINGS 
PROCEDURE:  Carotid Duplex Examination using B-mode, color and 
spectral Doppler of the extracranial cerebrovascular arteries. 1. Bilateral <50% stenosis of the internal carotid arteries. 2. No significant stenosis in the external carotid arteries bilaterally. 3. Antegrade flow in both vertebral arteries. 4. Normal flow in both subclavian arteries. Plaque Morphology: 1. Heterogeneous plaque in the bulb and right ICA. 2. Heterogeneous plaque in the left ICA. Echo TTE results LEFT VENTRICLE: Size was normal. Systolic function was normal. Ejection 
fraction was estimated in the range of 55 % to 60 %. There were no 
regional wall motion abnormalities. Wall thickness was normal. 
RIGHT VENTRICLE: The size was normal. Systolic function was normal. Wall 
thickness was normal. 
LEFT ATRIUM: Size was normal. 
RIGHT ATRIUM: Size was normal. 
MITRAL VALVE: There was mild thickening. DOPPLER: There was trivial 
regurgitation. AORTIC VALVE: The valve was probably trileaflet. DOPPLER: There was no 
significant regurgitation. TRICUSPID VALVE: Normal valve structure. DOPPLER: There was mild 
regurgitation. PULMONIC VALVE: Not well visualized. AORTA: The root exhibited normal size. PERICARDIUM: There was no pericardial effusion. Hospital course: Suspected TIA POA Transient left sided weakness associated with slurred speech Symptoms resolved day before yesterday, sent from PCPs office Neuro exam non focal 
Tele with no atrial fib MRI brain Minimal chronic supratentorial white matter disease No acute Infarction. MRA brain no flow limiting stenosis or arterial occlusion. Carotid dopplers < 50% ICA stenosis, antegrade vertebral flow Normal RV function, trivial MR, No AS 
2D echo LVEF 55 to 60%, no regional wall motion changes ASA, pt has some concerns with her prior bleeding, will discuss with Dr Christian Bustillo Neurology consult appreciated Ambulated well with PT 
D/C home E coli UTI POA Also with enterococcus IV ceftriaxone in house PO ampicillin x 3 days Hyperlipidemia POA LDL 77.6 Statin lipitor 40 mg Po daily 
  
Diabetes mellitus with neurological manifestations, uncontrolled Metformin and continue Lantus 55 units BID 
SSi 
  
Smoking POA Counseled to quit again today Pt deferred nicotine patch at this time HTN Continue ARBs and Propanolol BP stable 
  
Chronic Thrombocytopenia due to liver disease 
stable 
  
Liver Cirrhosis with h/o hepatic enceph and esophageal vareces Continue Propanolol, Lasix Increase lactulose dose as above transient symptoms can be due to elevated ammonia level 
  
COPD (chronic obstructive pulmonary disease) Continue Px inhalers and PRN nebs 
  
IBIS (obstructive sleep apnea)  to bring CPAP from home 
  
GERD (gastroesophageal reflux disease) Continue PPIs 
  
Neuropathy/Chronic pain 
Continue home meds without change Obesity POA Body mass index is 34.15 kg/m². 
  
Code Status: Full Surrogate Decision Maker:  
  
DVT Prophylaxis: Lovenox as INR ok with liver cirrhosis 
   
 
Subjective: Chief Complaint / Reason for Physician Visit f/u TIA \"no more weakness\". Discussed with RN events overnight. Left weakness and speech changes have resolved Review of Systems: 
Symptom Y/N Comments  Symptom Y/N Comments Fever/Chills n   Chest Pain n   
Poor Appetite    Edema Cough n   Abdominal Pain n   
Sputum    Joint Pain SOB/MARTINEZ n   Headache Nausea/vomit    Tolerating PT/OT Diarrhea n   Tolerating Diet y Constipation    Other Could NOT obtain due to:   
 
Objective: VITALS:  
Last 24hrs VS reviewed since prior progress note. Most recent are: 
Patient Vitals for the past 24 hrs: 
 Temp Pulse Resp BP SpO2  
11/15/18 1222 98.5 °F (36.9 °C) 72 18 119/57 93 % 11/15/18 0821 98.7 °F (37.1 °C) 88 20 132/65 93 % 11/15/18 0316 98.2 °F (36.8 °C) 85 20 134/55 95 % 11/14/18 2351 96.5 °F (35.8 °C) 82 18 125/50 96 % 11/14/18 1936 99.1 °F (37.3 °C) 77 18 136/55 90 % 11/14/18 1550 97.8 °F (36.6 °C) 75 18 135/58 96 % Intake/Output Summary (Last 24 hours) at 11/15/2018 1447 Last data filed at 11/15/2018 1331 Gross per 24 hour Intake 590 ml Output  Net 590 ml Wt Readings from Last 12 Encounters:  
11/13/18 84.7 kg (186 lb 11.7 oz)  
11/13/18 84.7 kg (186 lb 11.7 oz) 10/16/18 86.7 kg (191 lb 3.2 oz) 10/11/18 89.3 kg (196 lb 12.8 oz) 07/10/18 83.6 kg (184 lb 3.2 oz) 05/31/18 86.6 kg (191 lb) 04/12/18 86.7 kg (191 lb 3.2 oz) 03/14/18 90.4 kg (199 lb 4.7 oz) 10/06/17 88.5 kg (195 lb 3.2 oz)  
10/02/17 95.3 kg (210 lb) 08/06/17 95.3 kg (210 lb) 07/06/17 95.3 kg (210 lb) PHYSICAL EXAM: 
General: WD, WN. Alert, cooperative, no acute distress   
EENT:  PERRL. Anicteric sclerae. MMM Resp:  Decreased BS bilaterally, no wheezing or rales. No accessory muscle use CV:  Regular  rhythm,  No edema GI:  Soft, Non distended, Non tender.  +Bowel sounds, no rebound Neurologic:  Alert and oriented X 3, normal speech, non focal motor exam, minimal asterixis Psych:   Not anxious nor agitated Skin:  No rashes. No jaundice Reviewed most current lab test results and cultures  YES Reviewed patient's current orders and MAR    YES 
PMH/SH reviewed - no change compared to H&P Reviewed most current radiology test results   YES Review and summation of old records today    NO Care Plan discussed with: 
  Comments Patient x Family  x   
RN xx Care Manager Consultant     
                 x Multidiciplinary team rounds were held today with , nursing, pharmacist and clinical coordinator. Patient's plan of care was discussed; medications were reviewed and discharge planning was addressed. ________________________________________________________________________ Comments >50% of visit spent in counseling and coordination of care    
________________________________________________________________________ Estrellita Lassiter MD  
 
Procedures: see electronic medical records for all procedures/Xrays and details which were not copied into this note but were reviewed prior to creation of Plan. LABS: 
I reviewed today's most current labs and imaging studies. Pertinent labs include: 
Recent Labs 11/14/18 
0452 11/13/18 
1253 WBC 6.4 6.1 HGB 10.9* 11.8 HCT 33.9* 36.9 PLT 85* 95* Recent Labs 11/14/18 0452 11/13/18 
1253  136  
K 3.6 4.1  103 CO2 27 29 * 256* BUN 8 9 CREA 0.47* 0.51* CA 8.2* 8.2* ALB 3.2* 3.4* TBILI 0.7 0.8 SGOT 35 32 ALT 33 34 INR  --  1.1

## 2018-11-15 NOTE — TELEPHONE ENCOUNTER
Patient called to ask you about taking a low dose of aspirin daily. She can be reached at:  [phone].

## 2018-11-16 NOTE — TELEPHONE ENCOUNTER
Kecia Leyva from [employer] would like a call back regarding getting authorization for patients Tramadol. She can be reached at [phone] ext [phone]. She stated that there is also a new number for prior [employer] to call and that is [phone].

## 2018-11-16 NOTE — TELEPHONE ENCOUNTER
Ms. Liliya Taylor called to review medications prescribed during her hospital stay this week \"to make sure they were ok with her liver condition\". She seemed very confused and unsure why she was taking certain meds. Made appointment for Monday 11/19 to follow up with Cathi Barksdale. Patient to bring meds for review and teaching as needed.

## 2018-11-19 NOTE — PROGRESS NOTES
[address] [facility],6Th Floor Michaelle Ramirez MD, JUVENAL PrattSLD ALEJANDRO Fuller, JANEL Givens, [facility]   ALEJANDRO Aldrich NP [address] [facility] [address] 
  at [address], [address] [address]  22. [phone] FAX: [address] [place] [address] 
  One [facility], [address] [place], [address] [place] [address] Street [address] 
  [phone] FAX: [phone] Patient Care Team: 
Jim Stahl NP as PCP - [facility]) Thalia Dawkins MD (Hematology and Oncology) Kofi Marquez MD as Consulting Provider (Endocrinology) Juan Garcia MD (Hepatology) Patient Active Problem List  
Diagnosis Code  Diabetes mellitus with neurological manifestations, uncontrolled (HCC) E11.49, E11.65  
 Essential hypertension, benign I10  
 Hyperlipidemia LDL goal <100 E78.5  Thrombocytopenia ([facility] D69.6  Previous back surgery Z98.890  
 S/P CHACHO (total abdominal hysterectomy) Z90.710  Cirrhosis ([address] K74.60  Anemia D64.9  
 GI bleed K92.2  
 BALDERAS (nonalcoholic steatohepatitis) K75.81  
 Acute deep vein thrombosis (DVT) of right lower extremity (HCC) I82.401  COPD (chronic obstructive pulmonary disease) (HCC) J44.9  Sepsis ([facility] A41.9  
 CAP (community acquired pneumonia) J18.9  IBIS (obstructive sleep apnea) G47.33  
 Tobacco abuse Z72.0  Neuropathy G62.9  Obesity (BMI 30.0-34. 9) E66.9  Severe obesity (HCC) E66.01  
 Therapeutic drug monitoring Z51.81  
 TIA (transient ischemic attack) G45.9  GERD (gastroesophageal reflux disease) K21.9  Bilateral carotid artery stenosis I65.23 Rosa Elena Duff is a 61 y.o.   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 The active problem list, all pertinent past medical history, medications, endoscopic studies, radiologic findings and laboratory findings related to the liver disorder were reviewed with the patient. Liver biopsies in the past have confirmed cirrhosis. Patient participated in a long-term Hinkle clinical trial utilizing an anti-inflammatory agent to improve her liver disease. Last EGD in May 2018 demonstrated 2 medium-sized esophageal varices that were banded. Repeat EGD was scheduled in early November 2018. Unfortunately, she missed this appointment. Last US in October 2018 ruled out [address] Unfortunately, patient has continued to gain weight over time. Hepatic encephalopathy is currently being treated with lactulose 45 mL BID. Patient reports regular bowel movements today. Patient has a history of significant iron deficiency anemia requiring daily iron supplements and iron infusions in the past. Last iron infusion was early November 2018. Since last visit, patient went to the ED after falling and having left-sided weakness. She was found to have had a TIA and was admitted, properly treated and discharged. She has some questions today regarding her prescribed medications. Today, patient complains of ongoing severe fatigue and abdominal tenderness. Patient denies a change in bowel habits, loss of appetite, dark urine, myalgias, arthralgias, pruritus and jaundice. ALLERGIES Allergies Allergen Reactions  Hydrocodone Nausea and Vomiting  Lisinopril Cough  Lortab [Hydrocodone-Acetaminophen] Nausea and Vomiting  Percocet [Oxycodone-Acetaminophen] Nausea and Vomiting Current Outpatient Medications Medication Sig  
 aspirin 81 mg chewable tablet Take 1 Tab by mouth daily.  ampicillin (PRINCIPEN) 250 mg capsule Take 1 Cap by mouth Before breakfast, lunch, dinner and at bedtime for 3 days.  albuterol-ipratropium (DUO-NEB) 2.5 mg-0.5 mg/3 ml nebu 3 mL by Nebulization route daily as needed.  potassium chloride SR (KLOR-CON 10) 10 mEq tablet Take 10 mEq by mouth daily.  dextromethorphan-guaiFENesin (ROBITUSSIN-DM)  mg/5 mL syrup Take 10 mL by mouth nightly as needed for Cough.  omeprazole (PRILOSEC) 20 mg capsule take 1 capsule by mouth once daily  amitriptyline (ELAVIL) 150 mg tablet Take 1 Tab by mouth nightly.  propranolol (INDERAL) 20 mg tablet Take 1 Tab by mouth two (2) times a day.  ferrous sulfate 325 mg (65 mg iron) tablet take 1 tablet by mouth once daily with BREAKFAST  insulin lispro (HUMALOG KWIKPEN INSULIN) 100 unit/mL kwikpen Inject as needed up to 3 times per day for sugars over 150: 4 units for every 50 points. Max 50 units per day  metFORMIN (GLUCOPHAGE) 1,000 mg tablet take 1 tablet by mouth twice a day with meals  ondansetron (ZOFRAN ODT) 4 mg disintegrating tablet dissolve 1 tablet ON TONGUE every 8 hours if needed for nausea  gabapentin (NEURONTIN) 600 mg tablet take 2 tablets by mouth three times a day  insulin glargine (LANTUS SOLOSTAR U-100 INSULIN) 100 unit/mL (3 mL) inpn Inject 55 units twice daily--Dose change 4/12/18--updated med list--did not send prescription to the pharmacy  furosemide (LASIX) 20 mg tablet Take 1 Tab by mouth daily.  CONSTULOSE 10 gram/15 mL solution take 2 tablespoonfuls by mouth twice a day  losartan (COZAAR) 25 mg tablet take 1 tablet by mouth once daily REPLACES LISINOPRIL FOR BLOOD PRESSURE AND KIDNEY PROTECTION  
 rOPINIRole (REQUIP) 4 mg tab TAB Take 4 mg by mouth nightly.  albuterol (PROAIR HFA) 90 mcg/actuation inhaler Take 2 Puffs by inhalation every four (4) hours as needed for Wheezing.  fluticasone-salmeterol (ADVAIR DISKUS) 500-50 mcg/dose diskus inhaler Take 1 Puff by inhalation two (2) times a day.  atorvastatin (LIPITOR) 40 mg tablet Take 1 Tab by mouth nightly. No current facility-administered medications for this visit. SYSTEM REVIEW NOT RELATED TO LIVER DISEASE OR REVIEWED ABOVE: 
Constitution systems: Negative for fever, chills, weight gain, weight loss. Eyes: Negative for visual changes. ENT: Negative for sore throat, painful swallowing. Respiratory: Negative for cough, hemoptysis, SOB. Cardiology: Negative for chest pain, palpitations. GI:  Negative for constipation or diarrhea. : Negative for urinary frequency, dysuria, hematuria, nocturia. Skin: Negative for rash. Hematology: Negative for easy bruising, blood clots. Musculo-skelatal: Negative for back pain, muscle pain, weakness. Neurologic: Negative for headaches, dizziness, vertigo, memory problems not related to HE. Psychology: Negative for anxiety, depression. FAMILY/SOCIAL HISTORY: 
The patient is . The patient has 2 children and 2 grandchildren. The patient currently smokes 1 ppd x 50 yrs. The patient had consumed heavily in the past. The patient has consumed alcohol only rarely since the mid-1990s. The patient used to work as  and . The patient is currently receiving disability. PHYSICAL EXAMINATION: 
Visit Vitals /85 (BP 1 Location: Left arm, BP Patient Position: Sitting) Pulse 90 Temp 97.2 °F (36.2 °C) (Tympanic) Wt 187 lb (84.8 kg) SpO2 96% BMI 34.20 kg/m² General: Obese. No acute distress. Eyes: Sclera anicteric. ENT: No oral lesions. Thyroid normal. 
Nodes: No adenopathy. Skin: Spider angiomata. No jaundice. No palmar erythema. Respiratory: Lungs clear to auscultation. Cardiovascular: Regular heart rate. No murmurs. No JVD. Abdomen: Soft but tender upon palpation. Liver enlarged, hard and tender upon palpation. Spleen not palpable. No obvious ascites. Extremities: Right LE edema, tenderness upon palpation. No muscle wasting. No gross arthritic changes. Neurologic: Alert and oriented. Cranial nerves grossly intact. No asterixsis. LABORATORY STUDIES: 
[facility] [place] [facility] [address] Units 11/19/2018 11/14/2018 WBC 3.4 - 10.8 x10E3/uL 7.8 6.4 ANC 1.8 - 8.0 K/UL    
HGB 11.1 - 15.9 g/dL 11.5 10.9 (L)  - 379 x10E3/uL 67 (LL) 85 (L) INR 0.9 - 1.1 AST 0 - 40 IU/L 38 35 ALT 0 - 32 IU/L 33 (H) 33 Alk Phos 39 - 117 IU/L 145 (H) 128 (H) Bili, Total 0.0 - 1.2 mg/dL 0.8 0.7 Albumin 3.6 - 4.8 g/dL 3.9 3.2 (L) BUN 8 - 27 mg/dL 11 8 Creat 0.57 - 1.00 mg/dL 0.40 (L) 0.47 (L) Na 134 - 144 mmol/L 140 138  
K 3.5 - 5.2 mmol/L 4.6 3.6 Cl 96 - 106 mmol/L 104 105 CO2 20 - 29 mmol/L 24 27 Glucose 65 - 99 mg/dL 171 (H) 205 (H) Ammonia <32 UMOL/L Serologies Ferritin Iron % Saturation Latest Ref Rng & Units 15 - 150 ng/mL 15 - 55 % 11/19/2018 358 (H) 31  
10/11/2018 36 7 (LL)  
7/10/2018 44 22  
4/9/2018 42 10 (L) SEROLOGIES: 
Serologies Latest Ref Rng 8/15/2013 Hep A Ab, Total Negative Positive (A) Hep B Surface Ag Negative Negative Hep B Core Ab, Total Negative Negative Hep B Surface Ab  0.21 Ferritin 15 - 150 ng/mL 34 Iron % Saturation 15 - 55 % 18 JUDITH, IFA  Negative ASMCA 0 - 19 Units 4  
M2 Ab 0.0 - 20.0 Units 4.0 Alpha-1 antitrypsin level 90 - 200 mg/dL 126  
 
8/2013. Anti-HCV negative. LIVER HISTOLOGY: 
12/2013. Liver biopsy. Cirrhosis. Macrovesicular steatosis involving 5% of liver parenchyma 1/2015. Liver biopsy. Cirrhosis. Macrovesicular steatosis involving 5-10% of liver parenchyma ENDOSCOPIC PROCEDURES: 
5/2013. Colonoscopy by Dr Jeneen Duane. Hyperplastic polyp. 9/2015. EGD by MLS. A few medium esophageal varices were identified.  Banding of esophageal varices (3) was performed. Moderate portal hypertensive gastropathy of the body of the, stomach. No gastric varices identified. 3/2016. EGD by Minerva Duarte.  Grade 1/4 distal esophageal varices no stigmata of bleeding - not banded. Multiple gastric varices high in fundus greater curvature - no stigmata, mild erosive antritis looks like NSAIDs. 
3/2016. Colonoscopy by Ronald Marie. Inadequate prep for transverse colon otherwise normal exam. 
5/2018. EGD by [facility]. Two medium esophageal varices were identified and banded. 1 with a red spot. Mild portal hypertensive gastropathy of the body of the stomach. Gastric varicies identified. RADIOLOGY: 
7/2013. CT scan abdomen with and without IV contrast. Changes consistent with fatty infiltration and cirrhosis. No liver mass lesions. No dilated bile ducts. No bile duct strictures. No ascites. 12/2015. Abdominal ultrasound. Enlarged heterogeneously echogenic liver with a nodular contour compatible with cirrhosis. No hepatic mass lesion is identified. Mild splenomegaly. 5/2018. US of liver. Enlarged cirrhotic liver is demonstrated without focal liver mass. No ascites. 10/2018. Ultrasound of liver. Echogenic consistent with cirrhosis. No liver mass lesions. No dilated bile ducts. No ascites. OTHER TESTING: 
Not available or performed ASSESSMENT AND PLAN: 
Cirrhosis secondary to BALDERAS given that she has several features of metabolic syndrome. Her labs are stable today with normal liver function. Will continue to monitor patient on a regular basis. Weight loss. Discussed with patient the importance of losing weight and following a healthy diet. This will improve her liver disease and overall health. Unfortunately, she has gained weight over time. DM II. Encouraged patient to continue regular follow up with her endocrinologist to manage this. Discussed the importance of following a diabetic diet and exercise as tolerated. Iron deficiency anemia. Resolves with iron infusions and daily iron supplements. She will continue oral FE. Iron/ferritin levels are good today.  
 
Discussed in detail the importance of not taking anything for pain except what is prescribed and OTC acetaminophen (Tylenol). She verbalized understanding of this. The patient is likely to need a liver transplant in the future. There is no reason to initiate liver transplant evaluation testing at this time with normal liver function. Hepatic encephalopathy. Directed patient to increase lactulose (45 mL BID to TID) to achieve 2-3 soft bowel movements daily. She is tolerating this well but she still has mild confusion. Esophageal varices. EGD was scheduled a few weeks ago and patient missed this appointment. This has been rescheduled. Encouraged patient to keep this appointment to prevent GI bleeding. Abdominal pain. Patient is tender upon palpation and reports ongoing pain today. US in October 2018 ruled out abnormality. TIA. Directed patient to continue low dose aspirin for now. Discussed patient starting prescribed Lipitor. This will benefit patient. Labs will be checked in 1 month after starting Lipitor. This is another reason she should follow a healthy diet and increase exercise. This was discussed with patient today. The patient was directed to continue all current medications at the current dosages. There are no contraindications for the patient to take any medications that are necessary for treatment of other medical issues. The patient was counseled regarding alcohol consumption. [address] screening has recently been performed and does not suggest [address] The next liver imaging study will be performed in 4/2019. [address] [place] [address] in 2-3 months. Lizeth Joshi NP [facility] [place] [address], [address] [facility], [address] [place] [address] Ph: [phone] Fax: [phone]

## 2018-11-19 NOTE — PROGRESS NOTES
1. Have you been to the ER, urgent care clinic since your last visit? Hospitalized since your last visit? yes pt was seen at [facility] for a mini stroke since her last visit. 2. Have you seen or consulted any other health care providers outside of the [address] [place] [address] since your last visit? Include any pap smears or colon screening. No  
Chief Complaint Patient presents with  Follow-up Visit Vitals /85 (BP 1 Location: Left arm, BP Patient Position: Sitting) Pulse 90 Temp 97.2 °F (36.2 °C) (Tympanic) Wt 187 lb (84.8 kg) SpO2 96% BMI 34.20 kg/m² PHQ over the last two weeks 11/19/2018 Little interest or pleasure in doing things Not at all Feeling down, depressed, irritable, or hopeless Not at all Total Score PHQ 2 0 Learning Assessment 11/19/2018 PRIMARY LEARNER Patient HIGHEST LEVEL OF EDUCATION - PRIMARY LEARNER  -  
BARRIERS PRIMARY LEARNER NONE  
CO-LEARNER CAREGIVER No  
PRIMARY LANGUAGE ENGLISH  
LEARNER PREFERENCE PRIMARY LISTENING  
LEARNING SPECIAL TOPICS -  
ANSWERED BY patient RELATIONSHIP SELF Abuse Screening Questionnaire 11/19/2018 Do you ever feel afraid of your partner? Parry Goldberg Are you in a relationship with someone who physically or mentally threatens you? Parry Goldberg Is it safe for you to go home? Ziyad Ernandez Fall Risk Assessment, last 12 mths 11/19/2018 Able to walk? Yes Fall in past 12 months? Yes Fall with injury? No  
Number of falls in past 12 months 2 Fall Risk Score 2

## 2018-11-21 NOTE — TELEPHONE ENCOUNTER
Patient had called and I informed her that Dr. Leisa Mortimer was out of the office until 11/26 and that he will do the prior auth upon his return. She voiced understanding.

## 2018-11-26 NOTE — TELEPHONE ENCOUNTER
----- Message from Angus Levy sent at 11/26/2018 11:17 AM EST -----  Regarding: Dr Mays [facility] refill  Pt (p)  [phone], requesting rx  Refill for her Tramadol  And the change for her two insulin medications. As discussed before,for her [facility] pharamacy the one listed in her chart.

## 2018-11-26 NOTE — TELEPHONE ENCOUNTER
Patient called to give a reminder about the PA for her Tramadol ( phone PA) due to insurance)She would also like for Dr. Willem Gillespie to switch her to the cheaper insulin that he discussed with her. She stated that her insurance will be cancelled on 12/7 and she will not be able to afford the insulin with the new insurance that she will be receiving.

## 2018-11-27 NOTE — TELEPHONE ENCOUNTER
Patient is requesting a call back regarding her message from yesterday. She can be reached at [phone].

## 2018-11-27 NOTE — TELEPHONE ENCOUNTER
Pt notified of message per Dr. Florencia Hernandez and voiced understanding of what was read to her. She stated that per her conversation with you at her last visit she was told that there was a cheaper insulin that you could put her on. She would like for it to be sent to the pharmacy.

## 2018-11-28 NOTE — TELEPHONE ENCOUNTER
Called and spoke with Virgil Burton with [facility] PA department and initiated a PA for the tramadol over the phone. He gave me a reference # of [id number] and told me that I should have a response from the clinical pharmacist within 52 hours. Called and spoke with patient and told her that I have started the PA for her tramadol and am waiting to hear back from her insurance. She said she was out of her tramadol as of yesterday but is trying to cope with the pain as best as possible and I told her we would let her know as soon as we hear back from her insurance. In terms of her insulin, she will be going on Medicare with a part D plan and we discussed the cheapest insulin is NPH from [facility]. She will need to purchase 3 vials to last about a month and I will have her continue taking 55 units twice daily. I explained about how to draw up insulin in the syringe with first drawing up 55 units of air and then injecting this into the bottle before drawing up the insulin and she can have the pharmacist show her how to use the syringes if she has questions. I will send a prescription for NPH vials and syringes to [facility] in [facility] but she can use up the lantus she has for now. she voiced understanding of this plan.

## 2018-11-29 NOTE — TELEPHONE ENCOUNTER
Received a denial letter for her tramadol and called the customer service line and spoke with Brenna to try and speak with a clinical pharmacist at this time and she said I was unable to do so and would need to file an appeal.  I then left a voicemail on the [id number] review line asking to receive a return call within 1 business day to initiate an appeal for this case.

## 2018-12-03 NOTE — TELEPHONE ENCOUNTER
Patient is calling to speak with you regarding her tramadol. She stated that the pharmacy will not fill it. She can be reached at [phone].

## 2018-12-03 NOTE — TELEPHONE ENCOUNTER
I called and spoke with Deysi FLETCHER and she stated that the Tramadol is still not approved. I gave her the data for Navi Saleem  (pharmacist) with [facility] and she will call him directly. She stated she will call me back with an update.

## 2018-12-03 NOTE — TELEPHONE ENCOUNTER
Please call the pharmacist to let her know it was just approved today and the approval should be in the system in the next 30 minutes.

## 2018-12-03 NOTE — TELEPHONE ENCOUNTER
Pharmacist Yessica Vaughn from [facility] called. He needs to do a peer to peer regarding pt's Rx Tramadol PA. He can be reach @ [phone].

## 2018-12-03 NOTE — TELEPHONE ENCOUNTER
Called and spoke with Jammie Wiggins and explained that she has been on the tramadol for 90 out of the last 110 days and he was able to approve her prescription for 120 tabs for 30 days and said the approval would be in the system in the next 30 minutes. Please call her to let her know this was approved and she should be able to pick this up later today.

## 2018-12-03 NOTE — TELEPHONE ENCOUNTER
Deysi from [facility] [address] called regarding PA on pt's Rx Tramadol. She wants to know if pt's prescription is been approved. She can be reach @ [phone].

## 2018-12-20 NOTE — PROCEDURES
[address]  *** FINAL REPORT ***    Name: Melchor Christy  MRN: [id number]    Outpatient  : 26 Aug 1955  HIS Order #: [id number]  [id number] [facility] Visit #: [id number]  Date: 20 Dec 2018    TYPE OF TEST: Peripheral Venous Testing    REASON FOR TEST  Pain in limb, Limb swelling    Right Leg:-  Deep venous thrombosis:           No  Superficial venous thrombosis:    No  Deep venous insufficiency:        Not examined  Superficial venous insufficiency: Not examined    Left Leg:-  Deep venous thrombosis:           No  Superficial venous thrombosis:    No  Deep venous insufficiency:        Not examined  Superficial venous insufficiency: Not examined      INTERPRETATION/FINDINGS  PROCEDURE:  Color duplex ultrasound imaging of lower extremity veins. FINDINGS:       Right: The common femoral, deep femoral, femoral, popliteal,  posterior tibial, peroneal, and great saphenous are patent and without   evidence of thrombus;  each is fully compressible and there is no  narrowing of the flow channel on color Doppler imaging. Phasic flow  is observed in the common femoral vein. Left:   The common femoral, deep femoral, femoral, popliteal,  posterior tibial, peroneal, and great saphenous are patent and without   evidence of thrombus;  each is fully compressible and there is no  narrowing of the flow channel on color Doppler imaging. Phasic flow  is observed in the common femoral vein. IMPRESSION:  No evidence of right or left lower extremity vein  thrombosis. ADDITIONAL COMMENTS    I have personally reviewed the data relevant to the interpretation of  this  study. TECHNOLOGIST: Trina Callahan [employer]  Signed: 2018 12:49 PM    PHYSICIAN: Mariah Tidwell MD  Signed: 2018 01:39 PM

## 2018-12-27 NOTE — PROGRESS NOTES
Returned patient's phone message left over the holiday. She is concerned about continued redness of LRE (slightly increased). U/S done last week negative for DVT. Taking Keflex prescribed. States that R foot burns. Provider on vacation this week. Will check with alternate provider for instructions and call patient.

## 2019-01-01 ENCOUNTER — APPOINTMENT (OUTPATIENT)
Dept: GENERAL RADIOLOGY | Age: 64
DRG: 870 | End: 2019-01-01
Attending: INTERNAL MEDICINE
Payer: MEDICARE

## 2019-01-01 ENCOUNTER — APPOINTMENT (OUTPATIENT)
Dept: GENERAL RADIOLOGY | Age: 64
DRG: 870 | End: 2019-01-01
Attending: EMERGENCY MEDICINE
Payer: MEDICARE

## 2019-01-01 ENCOUNTER — PATIENT OUTREACH (OUTPATIENT)
Dept: CASE MANAGEMENT | Age: 64
End: 2019-01-01

## 2019-01-01 ENCOUNTER — APPOINTMENT (OUTPATIENT)
Dept: CT IMAGING | Age: 64
DRG: 870 | End: 2019-01-01
Attending: EMERGENCY MEDICINE
Payer: MEDICARE

## 2019-01-01 ENCOUNTER — APPOINTMENT (OUTPATIENT)
Dept: ULTRASOUND IMAGING | Age: 64
DRG: 871 | End: 2019-01-01
Attending: INTERNAL MEDICINE
Payer: MEDICARE

## 2019-01-01 ENCOUNTER — APPOINTMENT (OUTPATIENT)
Dept: CT IMAGING | Age: 64
DRG: 871 | End: 2019-01-01
Attending: EMERGENCY MEDICINE
Payer: MEDICARE

## 2019-01-01 ENCOUNTER — APPOINTMENT (OUTPATIENT)
Dept: ULTRASOUND IMAGING | Age: 64
DRG: 870 | End: 2019-01-01
Attending: INTERNAL MEDICINE
Payer: MEDICARE

## 2019-01-01 ENCOUNTER — HOSPITAL ENCOUNTER (OUTPATIENT)
Dept: INFUSION THERAPY | Age: 64
Discharge: HOME | End: 2019-03-27
Payer: MEDICARE

## 2019-01-01 ENCOUNTER — APPOINTMENT (OUTPATIENT)
Dept: CT IMAGING | Age: 64
DRG: 870 | End: 2019-01-01
Attending: INTERNAL MEDICINE
Payer: MEDICARE

## 2019-01-01 ENCOUNTER — APPOINTMENT (OUTPATIENT)
Dept: GENERAL RADIOLOGY | Age: 64
DRG: 689 | End: 2019-01-01
Attending: EMERGENCY MEDICINE
Payer: MEDICARE

## 2019-01-01 ENCOUNTER — HOSPITAL ENCOUNTER (INPATIENT)
Age: 64
LOS: 12 days | Discharge: NURSING, REHAB OR LONG-TERM CARE | DRG: 870 | End: 2019-04-09
Attending: EMERGENCY MEDICINE | Admitting: HOSPITALIST
Payer: MEDICARE

## 2019-01-01 ENCOUNTER — TELEPHONE (OUTPATIENT)
Dept: ENDOCRINOLOGY | Age: 64
End: 2019-01-01

## 2019-01-01 ENCOUNTER — APPOINTMENT (OUTPATIENT)
Dept: GENERAL RADIOLOGY | Age: 64
DRG: 871 | End: 2019-01-01
Attending: EMERGENCY MEDICINE
Payer: MEDICARE

## 2019-01-01 ENCOUNTER — HOSPITAL ENCOUNTER (INPATIENT)
Age: 64
LOS: 4 days | DRG: 871 | End: 2019-06-03
Attending: EMERGENCY MEDICINE | Admitting: HOSPITALIST
Payer: MEDICARE

## 2019-01-01 ENCOUNTER — APPOINTMENT (OUTPATIENT)
Dept: GENERAL RADIOLOGY | Age: 64
DRG: 871 | End: 2019-01-01
Attending: INTERNAL MEDICINE
Payer: MEDICARE

## 2019-01-01 ENCOUNTER — HOSPITAL ENCOUNTER (INPATIENT)
Age: 64
LOS: 8 days | Discharge: NURSING, REHAB OR LONG-TERM CARE | DRG: 689 | End: 2019-05-22
Attending: EMERGENCY MEDICINE | Admitting: HOSPITALIST
Payer: MEDICARE

## 2019-01-01 ENCOUNTER — OFFICE VISIT (OUTPATIENT)
Dept: HEMATOLOGY | Age: 64
End: 2019-01-01

## 2019-01-01 ENCOUNTER — APPOINTMENT (OUTPATIENT)
Dept: GENERAL RADIOLOGY | Age: 64
DRG: 699 | End: 2019-01-01
Attending: INTERNAL MEDICINE
Payer: MEDICARE

## 2019-01-01 ENCOUNTER — HOSPITAL ENCOUNTER (OUTPATIENT)
Dept: INFUSION THERAPY | Age: 64
Discharge: HOME | End: 2019-04-03

## 2019-01-01 ENCOUNTER — HOSPITAL ENCOUNTER (INPATIENT)
Dept: ULTRASOUND IMAGING | Age: 64
Discharge: HOME | DRG: 871 | End: 2019-05-30
Attending: INTERNAL MEDICINE
Payer: MEDICARE

## 2019-01-01 ENCOUNTER — APPOINTMENT (OUTPATIENT)
Dept: CT IMAGING | Age: 64
DRG: 689 | End: 2019-01-01
Attending: INTERNAL MEDICINE
Payer: MEDICARE

## 2019-01-01 ENCOUNTER — DOCUMENTATION ONLY (OUTPATIENT)
Dept: HEMATOLOGY | Age: 64
End: 2019-01-01

## 2019-01-01 ENCOUNTER — APPOINTMENT (OUTPATIENT)
Dept: GENERAL RADIOLOGY | Age: 64
DRG: 870 | End: 2019-01-01
Attending: SPECIALIST
Payer: MEDICARE

## 2019-01-01 ENCOUNTER — TELEPHONE (OUTPATIENT)
Dept: HEMATOLOGY | Age: 64
End: 2019-01-01

## 2019-01-01 ENCOUNTER — HOSPITAL ENCOUNTER (INPATIENT)
Age: 64
LOS: 8 days | Discharge: NURSING, REHAB OR LONG-TERM CARE | DRG: 699 | End: 2019-05-02
Attending: EMERGENCY MEDICINE | Admitting: INTERNAL MEDICINE
Payer: MEDICARE

## 2019-01-01 ENCOUNTER — HOSPICE ADMISSION (OUTPATIENT)
Dept: HOSPICE | Facility: HOSPICE | Age: 64
End: 2019-01-01

## 2019-01-01 ENCOUNTER — HOSPITAL ENCOUNTER (EMERGENCY)
Age: 64
Discharge: HOME | End: 2019-01-01
Attending: EMERGENCY MEDICINE | Admitting: EMERGENCY MEDICINE
Payer: MEDICARE

## 2019-01-01 ENCOUNTER — APPOINTMENT (OUTPATIENT)
Dept: CT IMAGING | Age: 64
DRG: 871 | End: 2019-01-01
Attending: INTERNAL MEDICINE
Payer: MEDICARE

## 2019-01-01 VITALS
HEART RATE: 95 BPM | RESPIRATION RATE: 20 BRPM | OXYGEN SATURATION: 96 % | TEMPERATURE: 98.4 F | HEIGHT: 68 IN | BODY MASS INDEX: 28.87 KG/M2 | SYSTOLIC BLOOD PRESSURE: 114 MMHG | WEIGHT: 190.48 LBS | DIASTOLIC BLOOD PRESSURE: 65 MMHG

## 2019-01-01 VITALS
DIASTOLIC BLOOD PRESSURE: 68 MMHG | HEIGHT: 64 IN | TEMPERATURE: 98.2 F | RESPIRATION RATE: 17 BRPM | HEART RATE: 85 BPM | BODY MASS INDEX: 33.05 KG/M2 | WEIGHT: 193.56 LBS | SYSTOLIC BLOOD PRESSURE: 139 MMHG

## 2019-01-01 VITALS
TEMPERATURE: 98 F | DIASTOLIC BLOOD PRESSURE: 60 MMHG | SYSTOLIC BLOOD PRESSURE: 114 MMHG | RESPIRATION RATE: 18 BRPM | HEART RATE: 83 BPM | OXYGEN SATURATION: 99 %

## 2019-01-01 VITALS
SYSTOLIC BLOOD PRESSURE: 152 MMHG | HEART RATE: 94 BPM | HEIGHT: 64 IN | WEIGHT: 223.8 LBS | BODY MASS INDEX: 38.21 KG/M2 | DIASTOLIC BLOOD PRESSURE: 71 MMHG | OXYGEN SATURATION: 95 % | TEMPERATURE: 99.4 F

## 2019-01-01 VITALS
RESPIRATION RATE: 20 BRPM | SYSTOLIC BLOOD PRESSURE: 134 MMHG | DIASTOLIC BLOOD PRESSURE: 68 MMHG | WEIGHT: 178 LBS | HEART RATE: 82 BPM | TEMPERATURE: 97.7 F | BODY MASS INDEX: 27.06 KG/M2 | OXYGEN SATURATION: 93 %

## 2019-01-01 VITALS
TEMPERATURE: 97.9 F | HEART RATE: 81 BPM | BODY MASS INDEX: 42.44 KG/M2 | HEIGHT: 62 IN | WEIGHT: 230.6 LBS | RESPIRATION RATE: 18 BRPM | DIASTOLIC BLOOD PRESSURE: 47 MMHG | SYSTOLIC BLOOD PRESSURE: 136 MMHG | OXYGEN SATURATION: 94 %

## 2019-01-01 VITALS
SYSTOLIC BLOOD PRESSURE: 63 MMHG | HEART RATE: 84 BPM | BODY MASS INDEX: 36.15 KG/M2 | HEIGHT: 68 IN | DIASTOLIC BLOOD PRESSURE: 33 MMHG | OXYGEN SATURATION: 54 % | TEMPERATURE: 99.4 F | WEIGHT: 238.54 LBS

## 2019-01-01 DIAGNOSIS — Z71.89 COUNSELING REGARDING ADVANCE CARE PLANNING AND GOALS OF CARE: ICD-10-CM

## 2019-01-01 DIAGNOSIS — Z71.89 GOALS OF CARE, COUNSELING/DISCUSSION: ICD-10-CM

## 2019-01-01 DIAGNOSIS — I95.9 HYPOTENSION, UNSPECIFIED HYPOTENSION TYPE: ICD-10-CM

## 2019-01-01 DIAGNOSIS — G93.40 ENCEPHALOPATHY: ICD-10-CM

## 2019-01-01 DIAGNOSIS — K76.82 HEPATIC ENCEPHALOPATHY: ICD-10-CM

## 2019-01-01 DIAGNOSIS — R53.81 DEBILITY: ICD-10-CM

## 2019-01-01 DIAGNOSIS — K76.82 HEPATIC ENCEPHALOPATHY: Primary | ICD-10-CM

## 2019-01-01 DIAGNOSIS — R40.1 OBTUNDED: ICD-10-CM

## 2019-01-01 DIAGNOSIS — R40.0 SOMNOLENCE: Primary | ICD-10-CM

## 2019-01-01 DIAGNOSIS — N30.00 ACUTE CYSTITIS WITHOUT HEMATURIA: Primary | ICD-10-CM

## 2019-01-01 DIAGNOSIS — A41.9 SEPSIS, DUE TO UNSPECIFIED ORGANISM: ICD-10-CM

## 2019-01-01 DIAGNOSIS — M54.42 CHRONIC BILATERAL LOW BACK PAIN WITH BILATERAL SCIATICA: ICD-10-CM

## 2019-01-01 DIAGNOSIS — R50.9 FEVER, UNSPECIFIED FEVER CAUSE: ICD-10-CM

## 2019-01-01 DIAGNOSIS — E72.20 HYPERAMMONEMIA (HCC): ICD-10-CM

## 2019-01-01 DIAGNOSIS — R60.9 EDEMA, UNSPECIFIED TYPE: Primary | ICD-10-CM

## 2019-01-01 DIAGNOSIS — K74.69 OTHER CIRRHOSIS OF LIVER (HCC): Primary | ICD-10-CM

## 2019-01-01 DIAGNOSIS — M79.2 NEUROPATHIC PAIN: ICD-10-CM

## 2019-01-01 DIAGNOSIS — N30.01 ACUTE CYSTITIS WITH HEMATURIA: ICD-10-CM

## 2019-01-01 DIAGNOSIS — G89.29 CHRONIC BILATERAL LOW BACK PAIN WITH BILATERAL SCIATICA: ICD-10-CM

## 2019-01-01 DIAGNOSIS — Z51.5 COMFORT MEASURES ONLY STATUS: ICD-10-CM

## 2019-01-01 DIAGNOSIS — M54.41 CHRONIC BILATERAL LOW BACK PAIN WITH BILATERAL SCIATICA: ICD-10-CM

## 2019-01-01 DIAGNOSIS — N39.0 URINARY TRACT INFECTION WITHOUT HEMATURIA, SITE UNSPECIFIED: ICD-10-CM

## 2019-01-01 DIAGNOSIS — Z71.89 ADVANCED CARE PLANNING/COUNSELING DISCUSSION: ICD-10-CM

## 2019-01-01 DIAGNOSIS — R65.21 SEPTIC SHOCK (HCC): ICD-10-CM

## 2019-01-01 DIAGNOSIS — R41.0 DISORIENTATION: ICD-10-CM

## 2019-01-01 DIAGNOSIS — R06.02 SHORTNESS OF BREATH: ICD-10-CM

## 2019-01-01 DIAGNOSIS — A41.9 SEPTIC SHOCK (HCC): ICD-10-CM

## 2019-01-01 DIAGNOSIS — K52.9 COLITIS: Primary | ICD-10-CM

## 2019-01-01 DIAGNOSIS — R41.82 ALTERED MENTAL STATUS, UNSPECIFIED ALTERED MENTAL STATUS TYPE: ICD-10-CM

## 2019-01-01 LAB
ABO + RH BLD: NORMAL
AFP L3 MFR SERPL: 8.3 % (ref 0–9.9)
AFP SERPL-MCNC: 3.9 NG/ML (ref 0–8)
ALBUMIN SERPL-MCNC: 1.8 G/DL (ref 3.5–5)
ALBUMIN SERPL-MCNC: 2.3 G/DL (ref 3.5–5)
ALBUMIN SERPL-MCNC: 2.4 G/DL (ref 3.5–5)
ALBUMIN SERPL-MCNC: 2.5 G/DL (ref 3.5–5)
ALBUMIN SERPL-MCNC: 2.6 G/DL (ref 3.5–5)
ALBUMIN SERPL-MCNC: 2.7 G/DL (ref 3.5–5)
ALBUMIN SERPL-MCNC: 2.7 G/DL (ref 3.5–5)
ALBUMIN SERPL-MCNC: 2.8 G/DL (ref 3.5–5)
ALBUMIN SERPL-MCNC: 2.9 G/DL (ref 3.5–5)
ALBUMIN SERPL-MCNC: 2.9 G/DL (ref 3.5–5)
ALBUMIN SERPL-MCNC: 3.1 G/DL (ref 3.5–5)
ALBUMIN SERPL-MCNC: 3.3 G/DL (ref 3.5–5)
ALBUMIN SERPL-MCNC: 3.6 G/DL (ref 3.6–4.8)
ALBUMIN/GLOB SERPL: 0.4 {RATIO} (ref 1.1–2.2)
ALBUMIN/GLOB SERPL: 0.5 {RATIO} (ref 1.1–2.2)
ALBUMIN/GLOB SERPL: 0.6 {RATIO} (ref 1.1–2.2)
ALBUMIN/GLOB SERPL: 0.7 {RATIO} (ref 1.1–2.2)
ALBUMIN/GLOB SERPL: 0.8 {RATIO} (ref 1.1–2.2)
ALP SERPL-CCNC: 111 U/L (ref 45–117)
ALP SERPL-CCNC: 116 U/L (ref 45–117)
ALP SERPL-CCNC: 129 U/L (ref 45–117)
ALP SERPL-CCNC: 131 U/L (ref 45–117)
ALP SERPL-CCNC: 132 IU/L (ref 39–117)
ALP SERPL-CCNC: 133 U/L (ref 45–117)
ALP SERPL-CCNC: 186 U/L (ref 45–117)
ALP SERPL-CCNC: 187 U/L (ref 45–117)
ALP SERPL-CCNC: 200 U/L (ref 45–117)
ALP SERPL-CCNC: 204 U/L (ref 45–117)
ALP SERPL-CCNC: 205 U/L (ref 45–117)
ALP SERPL-CCNC: 215 U/L (ref 45–117)
ALP SERPL-CCNC: 235 U/L (ref 45–117)
ALP SERPL-CCNC: 62 U/L (ref 45–117)
ALP SERPL-CCNC: 62 U/L (ref 45–117)
ALP SERPL-CCNC: 66 U/L (ref 45–117)
ALP SERPL-CCNC: 72 U/L (ref 45–117)
ALP SERPL-CCNC: 72 U/L (ref 45–117)
ALP SERPL-CCNC: 80 U/L (ref 45–117)
ALP SERPL-CCNC: 82 U/L (ref 45–117)
ALP SERPL-CCNC: 87 U/L (ref 45–117)
ALT SERPL-CCNC: 156 U/L (ref 12–78)
ALT SERPL-CCNC: 161 U/L (ref 12–78)
ALT SERPL-CCNC: 178 U/L (ref 12–78)
ALT SERPL-CCNC: 19 IU/L (ref 0–32)
ALT SERPL-CCNC: 22 U/L (ref 12–78)
ALT SERPL-CCNC: 22 U/L (ref 12–78)
ALT SERPL-CCNC: 30 U/L (ref 12–78)
ALT SERPL-CCNC: 30 U/L (ref 12–78)
ALT SERPL-CCNC: 36 U/L (ref 12–78)
ALT SERPL-CCNC: 38 U/L (ref 12–78)
ALT SERPL-CCNC: 38 U/L (ref 12–78)
ALT SERPL-CCNC: 40 U/L (ref 12–78)
ALT SERPL-CCNC: 40 U/L (ref 12–78)
ALT SERPL-CCNC: 41 U/L (ref 12–78)
ALT SERPL-CCNC: 42 U/L (ref 12–78)
ALT SERPL-CCNC: 49 U/L (ref 12–78)
ALT SERPL-CCNC: 55 U/L (ref 12–78)
ALT SERPL-CCNC: 60 U/L (ref 12–78)
ALT SERPL-CCNC: 63 U/L (ref 12–78)
ALT SERPL-CCNC: 63 U/L (ref 12–78)
ALT SERPL-CCNC: 72 U/L (ref 12–78)
AMMONIA PLAS-SCNC: 110 UMOL/L
AMMONIA PLAS-SCNC: 19 UMOL/L
AMMONIA PLAS-SCNC: 19 UMOL/L
AMMONIA PLAS-SCNC: 34 UMOL/L
AMMONIA PLAS-SCNC: 39 UMOL/L
AMMONIA PLAS-SCNC: 42 UMOL/L
AMMONIA PLAS-SCNC: 50 UMOL/L
AMMONIA PLAS-SCNC: 54 UMOL/L
AMMONIA PLAS-SCNC: 59 UMOL/L
AMMONIA PLAS-SCNC: 60 UMOL/L
AMMONIA PLAS-SCNC: 63 UMOL/L
AMMONIA PLAS-SCNC: 64 UMOL/L
AMMONIA PLAS-SCNC: 92 UMOL/L
ANION GAP SERPL CALC-SCNC: 10 MMOL/L (ref 5–15)
ANION GAP SERPL CALC-SCNC: 11 MMOL/L (ref 5–15)
ANION GAP SERPL CALC-SCNC: 13 MMOL/L (ref 5–15)
ANION GAP SERPL CALC-SCNC: 14 MMOL/L (ref 5–15)
ANION GAP SERPL CALC-SCNC: 16 MMOL/L (ref 5–15)
ANION GAP SERPL CALC-SCNC: 3 MMOL/L (ref 5–15)
ANION GAP SERPL CALC-SCNC: 5 MMOL/L (ref 5–15)
ANION GAP SERPL CALC-SCNC: 6 MMOL/L (ref 5–15)
ANION GAP SERPL CALC-SCNC: 7 MMOL/L (ref 5–15)
ANION GAP SERPL CALC-SCNC: 8 MMOL/L (ref 5–15)
ANION GAP SERPL CALC-SCNC: 8 MMOL/L (ref 5–15)
ANION GAP SERPL CALC-SCNC: 9 MMOL/L (ref 5–15)
ANION GAP SERPL CALC-SCNC: 9 MMOL/L (ref 5–15)
APPEARANCE UR: ABNORMAL
APTT PPP: 24.2 SEC (ref 22.1–32)
APTT PPP: 26.2 SEC (ref 22.1–32)
ARTERIAL PATENCY WRIST A: ABNORMAL
ARTERIAL PATENCY WRIST A: ABNORMAL
ARTERIAL PATENCY WRIST A: YES
AST SERPL-CCNC: 18 U/L (ref 15–37)
AST SERPL-CCNC: 21 U/L (ref 15–37)
AST SERPL-CCNC: 25 IU/L (ref 0–40)
AST SERPL-CCNC: 25 U/L (ref 15–37)
AST SERPL-CCNC: 256 U/L (ref 15–37)
AST SERPL-CCNC: 31 U/L (ref 15–37)
AST SERPL-CCNC: 315 U/L (ref 15–37)
AST SERPL-CCNC: 32 U/L (ref 15–37)
AST SERPL-CCNC: 33 U/L (ref 15–37)
AST SERPL-CCNC: 34 U/L (ref 15–37)
AST SERPL-CCNC: 35 U/L (ref 15–37)
AST SERPL-CCNC: 35 U/L (ref 15–37)
AST SERPL-CCNC: 37 U/L (ref 15–37)
AST SERPL-CCNC: 39 U/L (ref 15–37)
AST SERPL-CCNC: 40 U/L (ref 15–37)
AST SERPL-CCNC: 41 U/L (ref 15–37)
AST SERPL-CCNC: 42 U/L (ref 15–37)
AST SERPL-CCNC: 465 U/L (ref 15–37)
AST SERPL-CCNC: 47 U/L (ref 15–37)
AST SERPL-CCNC: 58 U/L (ref 15–37)
AST SERPL-CCNC: 73 U/L (ref 15–37)
ATRIAL RATE: 109 BPM
ATRIAL RATE: 117 BPM
ATRIAL RATE: 126 BPM
ATRIAL RATE: 141 BPM
ATRIAL RATE: 90 BPM
BACTERIA SPEC CULT: ABNORMAL
BACTERIA SPEC CULT: NORMAL
BACTERIA URNS QL MICRO: ABNORMAL /HPF
BASE DEFICIT BLD-SCNC: 1 MMOL/L
BASE DEFICIT BLD-SCNC: 11 MMOL/L
BASE DEFICIT BLD-SCNC: 2 MMOL/L
BASE DEFICIT BLD-SCNC: 4 MMOL/L
BASE DEFICIT BLD-SCNC: 5 MMOL/L
BASE EXCESS BLD CALC-SCNC: 11 MMOL/L
BASE EXCESS BLD CALC-SCNC: 3 MMOL/L
BASOPHILS # BLD: 0 K/UL (ref 0–0.1)
BASOPHILS # BLD: 0.1 K/UL (ref 0–0.1)
BASOPHILS NFR BLD: 0 % (ref 0–1)
BASOPHILS NFR BLD: 1 % (ref 0–1)
BDY SITE: ABNORMAL
BILIRUB DIRECT SERPL-MCNC: 0.25 MG/DL (ref 0–0.4)
BILIRUB DIRECT SERPL-MCNC: 0.8 MG/DL (ref 0–0.2)
BILIRUB SERPL-MCNC: 0.5 MG/DL (ref 0–1.2)
BILIRUB SERPL-MCNC: 0.6 MG/DL (ref 0.2–1)
BILIRUB SERPL-MCNC: 0.7 MG/DL (ref 0.2–1)
BILIRUB SERPL-MCNC: 0.8 MG/DL (ref 0.2–1)
BILIRUB SERPL-MCNC: 0.8 MG/DL (ref 0.2–1)
BILIRUB SERPL-MCNC: 0.9 MG/DL (ref 0.2–1)
BILIRUB SERPL-MCNC: 1.1 MG/DL (ref 0.2–1)
BILIRUB SERPL-MCNC: 1.3 MG/DL (ref 0.2–1)
BILIRUB SERPL-MCNC: 1.4 MG/DL (ref 0.2–1)
BILIRUB SERPL-MCNC: 1.6 MG/DL (ref 0.2–1)
BILIRUB SERPL-MCNC: 1.7 MG/DL (ref 0.2–1)
BILIRUB SERPL-MCNC: 1.9 MG/DL (ref 0.2–1)
BILIRUB SERPL-MCNC: 2.1 MG/DL (ref 0.2–1)
BILIRUB SERPL-MCNC: 2.7 MG/DL (ref 0.2–1)
BILIRUB UR QL CFM: NEGATIVE
BILIRUB UR QL CFM: NEGATIVE
BILIRUB UR QL: NEGATIVE
BILIRUB UR QL: NEGATIVE
BLD PROD TYP BPU: NORMAL
BLOOD GROUP ANTIBODIES SERPL: NORMAL
BNP SERPL-MCNC: 35 PG/ML
BPU ID: NORMAL
BUN SERPL-MCNC: 11 MG/DL (ref 6–20)
BUN SERPL-MCNC: 13 MG/DL (ref 6–20)
BUN SERPL-MCNC: 16 MG/DL (ref 8–27)
BUN SERPL-MCNC: 17 MG/DL (ref 6–20)
BUN SERPL-MCNC: 19 MG/DL (ref 6–20)
BUN SERPL-MCNC: 20 MG/DL (ref 6–20)
BUN SERPL-MCNC: 21 MG/DL (ref 6–20)
BUN SERPL-MCNC: 25 MG/DL (ref 6–20)
BUN SERPL-MCNC: 26 MG/DL (ref 6–20)
BUN SERPL-MCNC: 27 MG/DL (ref 6–20)
BUN SERPL-MCNC: 36 MG/DL (ref 6–20)
BUN SERPL-MCNC: 41 MG/DL (ref 6–20)
BUN SERPL-MCNC: 43 MG/DL (ref 6–20)
BUN SERPL-MCNC: 44 MG/DL (ref 6–20)
BUN SERPL-MCNC: 46 MG/DL (ref 6–20)
BUN SERPL-MCNC: 46 MG/DL (ref 6–20)
BUN SERPL-MCNC: 48 MG/DL (ref 6–20)
BUN SERPL-MCNC: 48 MG/DL (ref 6–20)
BUN SERPL-MCNC: 53 MG/DL (ref 6–20)
BUN SERPL-MCNC: 55 MG/DL (ref 6–20)
BUN SERPL-MCNC: 7 MG/DL (ref 6–20)
BUN SERPL-MCNC: 8 MG/DL (ref 6–20)
BUN SERPL-MCNC: 8 MG/DL (ref 6–20)
BUN SERPL-MCNC: 9 MG/DL (ref 6–20)
BUN/CREAT SERPL: 10 (ref 12–20)
BUN/CREAT SERPL: 11 (ref 12–20)
BUN/CREAT SERPL: 11 (ref 12–20)
BUN/CREAT SERPL: 12 (ref 12–20)
BUN/CREAT SERPL: 12 (ref 12–20)
BUN/CREAT SERPL: 13 (ref 12–20)
BUN/CREAT SERPL: 14 (ref 12–20)
BUN/CREAT SERPL: 14 (ref 12–20)
BUN/CREAT SERPL: 15 (ref 12–20)
BUN/CREAT SERPL: 15 (ref 12–20)
BUN/CREAT SERPL: 16 (ref 12–20)
BUN/CREAT SERPL: 18 (ref 12–20)
BUN/CREAT SERPL: 19 (ref 12–20)
BUN/CREAT SERPL: 19 (ref 12–20)
BUN/CREAT SERPL: 20 (ref 12–20)
BUN/CREAT SERPL: 24 (ref 12–20)
BUN/CREAT SERPL: 25 (ref 12–20)
BUN/CREAT SERPL: 27 (ref 12–20)
BUN/CREAT SERPL: 28 (ref 12–20)
BUN/CREAT SERPL: 34 (ref 12–28)
BUN/CREAT SERPL: 35 (ref 12–20)
BUN/CREAT SERPL: 55 (ref 12–20)
BUN/CREAT SERPL: 57 (ref 12–20)
BUN/CREAT SERPL: 58 (ref 12–20)
BUN/CREAT SERPL: 58 (ref 12–20)
BUN/CREAT SERPL: 59 (ref 12–20)
BUN/CREAT SERPL: 63 (ref 12–20)
BUN/CREAT SERPL: 64 (ref 12–20)
BUN/CREAT SERPL: 72 (ref 12–20)
BUN/CREAT SERPL: 75 (ref 12–20)
CALCIUM SERPL-MCNC: 6.4 MG/DL (ref 8.5–10.1)
CALCIUM SERPL-MCNC: 6.5 MG/DL (ref 8.5–10.1)
CALCIUM SERPL-MCNC: 6.6 MG/DL (ref 8.5–10.1)
CALCIUM SERPL-MCNC: 6.7 MG/DL (ref 8.5–10.1)
CALCIUM SERPL-MCNC: 6.8 MG/DL (ref 8.5–10.1)
CALCIUM SERPL-MCNC: 7 MG/DL (ref 8.5–10.1)
CALCIUM SERPL-MCNC: 7 MG/DL (ref 8.5–10.1)
CALCIUM SERPL-MCNC: 7.1 MG/DL (ref 8.5–10.1)
CALCIUM SERPL-MCNC: 7.3 MG/DL (ref 8.5–10.1)
CALCIUM SERPL-MCNC: 7.4 MG/DL (ref 8.5–10.1)
CALCIUM SERPL-MCNC: 7.6 MG/DL (ref 8.5–10.1)
CALCIUM SERPL-MCNC: 7.7 MG/DL (ref 8.5–10.1)
CALCIUM SERPL-MCNC: 7.8 MG/DL (ref 8.5–10.1)
CALCIUM SERPL-MCNC: 8 MG/DL (ref 8.5–10.1)
CALCIUM SERPL-MCNC: 8 MG/DL (ref 8.5–10.1)
CALCIUM SERPL-MCNC: 8.1 MG/DL (ref 8.5–10.1)
CALCIUM SERPL-MCNC: 8.2 MG/DL (ref 8.5–10.1)
CALCIUM SERPL-MCNC: 8.3 MG/DL (ref 8.5–10.1)
CALCIUM SERPL-MCNC: 8.3 MG/DL (ref 8.5–10.1)
CALCIUM SERPL-MCNC: 8.5 MG/DL (ref 8.5–10.1)
CALCIUM SERPL-MCNC: 8.7 MG/DL (ref 8.5–10.1)
CALCIUM SERPL-MCNC: 8.7 MG/DL (ref 8.7–10.3)
CALCIUM SERPL-MCNC: 8.8 MG/DL (ref 8.5–10.1)
CALCIUM SERPL-MCNC: 8.8 MG/DL (ref 8.5–10.1)
CALCULATED P AXIS, ECG09: 69 DEGREES
CALCULATED P AXIS, ECG09: 70 DEGREES
CALCULATED P AXIS, ECG09: 72 DEGREES
CALCULATED P AXIS, ECG09: 76 DEGREES
CALCULATED R AXIS, ECG10: 40 DEGREES
CALCULATED R AXIS, ECG10: 53 DEGREES
CALCULATED R AXIS, ECG10: 53 DEGREES
CALCULATED R AXIS, ECG10: 69 DEGREES
CALCULATED R AXIS, ECG10: 69 DEGREES
CALCULATED T AXIS, ECG11: 61 DEGREES
CALCULATED T AXIS, ECG11: 65 DEGREES
CALCULATED T AXIS, ECG11: 66 DEGREES
CALCULATED T AXIS, ECG11: 67 DEGREES
CALCULATED T AXIS, ECG11: 72 DEGREES
CC UR VC: ABNORMAL
CHLORIDE SERPL-SCNC: 100 MMOL/L (ref 97–108)
CHLORIDE SERPL-SCNC: 101 MMOL/L (ref 97–108)
CHLORIDE SERPL-SCNC: 101 MMOL/L (ref 97–108)
CHLORIDE SERPL-SCNC: 102 MMOL/L (ref 97–108)
CHLORIDE SERPL-SCNC: 103 MMOL/L (ref 96–106)
CHLORIDE SERPL-SCNC: 104 MMOL/L (ref 97–108)
CHLORIDE SERPL-SCNC: 104 MMOL/L (ref 97–108)
CHLORIDE SERPL-SCNC: 105 MMOL/L (ref 97–108)
CHLORIDE SERPL-SCNC: 105 MMOL/L (ref 97–108)
CHLORIDE SERPL-SCNC: 106 MMOL/L (ref 97–108)
CHLORIDE SERPL-SCNC: 106 MMOL/L (ref 97–108)
CHLORIDE SERPL-SCNC: 107 MMOL/L (ref 97–108)
CHLORIDE SERPL-SCNC: 107 MMOL/L (ref 97–108)
CHLORIDE SERPL-SCNC: 108 MMOL/L (ref 97–108)
CHLORIDE SERPL-SCNC: 108 MMOL/L (ref 97–108)
CHLORIDE SERPL-SCNC: 109 MMOL/L (ref 97–108)
CHLORIDE SERPL-SCNC: 112 MMOL/L (ref 97–108)
CHLORIDE SERPL-SCNC: 113 MMOL/L (ref 97–108)
CHLORIDE SERPL-SCNC: 117 MMOL/L (ref 97–108)
CHLORIDE SERPL-SCNC: 96 MMOL/L (ref 97–108)
CHLORIDE SERPL-SCNC: 97 MMOL/L (ref 97–108)
CHLORIDE SERPL-SCNC: 98 MMOL/L (ref 97–108)
CHLORIDE SERPL-SCNC: 99 MMOL/L (ref 97–108)
CHLORIDE SERPL-SCNC: 99 MMOL/L (ref 97–108)
CK SERPL-CCNC: 47 U/L (ref 26–192)
CO2 SERPL-SCNC: 13 MMOL/L (ref 21–32)
CO2 SERPL-SCNC: 15 MMOL/L (ref 21–32)
CO2 SERPL-SCNC: 17 MMOL/L (ref 21–32)
CO2 SERPL-SCNC: 20 MMOL/L (ref 21–32)
CO2 SERPL-SCNC: 22 MMOL/L (ref 21–32)
CO2 SERPL-SCNC: 23 MMOL/L (ref 21–32)
CO2 SERPL-SCNC: 24 MMOL/L (ref 21–32)
CO2 SERPL-SCNC: 25 MMOL/L (ref 21–32)
CO2 SERPL-SCNC: 25 MMOL/L (ref 21–32)
CO2 SERPL-SCNC: 26 MMOL/L (ref 21–32)
CO2 SERPL-SCNC: 27 MMOL/L (ref 20–29)
CO2 SERPL-SCNC: 28 MMOL/L (ref 21–32)
CO2 SERPL-SCNC: 29 MMOL/L (ref 21–32)
CO2 SERPL-SCNC: 30 MMOL/L (ref 21–32)
CO2 SERPL-SCNC: 31 MMOL/L (ref 21–32)
CO2 SERPL-SCNC: 32 MMOL/L (ref 21–32)
CO2 SERPL-SCNC: 34 MMOL/L (ref 21–32)
CO2 SERPL-SCNC: 35 MMOL/L (ref 21–32)
CO2 SERPL-SCNC: 39 MMOL/L (ref 21–32)
COLOR UR: ABNORMAL
COMMENT, HOLDF: NORMAL
CREAT SERPL-MCNC: 0.35 MG/DL (ref 0.55–1.02)
CREAT SERPL-MCNC: 0.39 MG/DL (ref 0.55–1.02)
CREAT SERPL-MCNC: 0.47 MG/DL (ref 0.57–1)
CREAT SERPL-MCNC: 0.48 MG/DL (ref 0.55–1.02)
CREAT SERPL-MCNC: 0.52 MG/DL (ref 0.55–1.02)
CREAT SERPL-MCNC: 0.58 MG/DL (ref 0.55–1.02)
CREAT SERPL-MCNC: 0.58 MG/DL (ref 0.55–1.02)
CREAT SERPL-MCNC: 0.6 MG/DL (ref 0.55–1.02)
CREAT SERPL-MCNC: 0.62 MG/DL (ref 0.55–1.02)
CREAT SERPL-MCNC: 0.62 MG/DL (ref 0.55–1.02)
CREAT SERPL-MCNC: 0.63 MG/DL (ref 0.55–1.02)
CREAT SERPL-MCNC: 0.65 MG/DL (ref 0.55–1.02)
CREAT SERPL-MCNC: 0.68 MG/DL (ref 0.55–1.02)
CREAT SERPL-MCNC: 0.75 MG/DL (ref 0.55–1.02)
CREAT SERPL-MCNC: 0.76 MG/DL (ref 0.55–1.02)
CREAT SERPL-MCNC: 0.79 MG/DL (ref 0.55–1.02)
CREAT SERPL-MCNC: 0.8 MG/DL (ref 0.55–1.02)
CREAT SERPL-MCNC: 0.84 MG/DL (ref 0.55–1.02)
CREAT SERPL-MCNC: 0.85 MG/DL (ref 0.55–1.02)
CREAT SERPL-MCNC: 0.92 MG/DL (ref 0.55–1.02)
CREAT SERPL-MCNC: 0.93 MG/DL (ref 0.55–1.02)
CREAT SERPL-MCNC: 1.07 MG/DL (ref 0.55–1.02)
CREAT SERPL-MCNC: 1.08 MG/DL (ref 0.55–1.02)
CREAT SERPL-MCNC: 1.37 MG/DL (ref 0.55–1.02)
CREAT SERPL-MCNC: 1.55 MG/DL (ref 0.55–1.02)
CREAT SERPL-MCNC: 1.92 MG/DL (ref 0.55–1.02)
CREAT SERPL-MCNC: 2.31 MG/DL (ref 0.55–1.02)
CREAT SERPL-MCNC: 2.46 MG/DL (ref 0.55–1.02)
CROSSMATCH RESULT,%XM: NORMAL
DATE LAST DOSE: ABNORMAL
DATE LAST DOSE: ABNORMAL
DIAGNOSIS, 93000: NORMAL
DIFFERENTIAL METHOD BLD: ABNORMAL
EOSINOPHIL # BLD: 0 K/UL (ref 0–0.4)
EOSINOPHIL # BLD: 0.1 K/UL (ref 0–0.4)
EOSINOPHIL # BLD: 0.2 K/UL (ref 0–0.4)
EOSINOPHIL NFR BLD: 0 % (ref 0–7)
EOSINOPHIL NFR BLD: 1 % (ref 0–7)
EOSINOPHIL NFR BLD: 2 % (ref 0–7)
EOSINOPHIL NFR BLD: 3 % (ref 0–7)
EOSINOPHIL NFR BLD: 3 % (ref 0–7)
EPITH CASTS URNS QL MICRO: ABNORMAL /LPF
ERYTHROCYTE [DISTWIDTH] IN BLOOD BY AUTOMATED COUNT: 16.5 % (ref 12.3–15.4)
ERYTHROCYTE [DISTWIDTH] IN BLOOD BY AUTOMATED COUNT: 17.3 % (ref 11.5–14.5)
ERYTHROCYTE [DISTWIDTH] IN BLOOD BY AUTOMATED COUNT: 17.6 % (ref 11.5–14.5)
ERYTHROCYTE [DISTWIDTH] IN BLOOD BY AUTOMATED COUNT: 17.9 % (ref 11.5–14.5)
ERYTHROCYTE [DISTWIDTH] IN BLOOD BY AUTOMATED COUNT: 18 % (ref 11.5–14.5)
ERYTHROCYTE [DISTWIDTH] IN BLOOD BY AUTOMATED COUNT: 18.3 % (ref 11.5–14.5)
ERYTHROCYTE [DISTWIDTH] IN BLOOD BY AUTOMATED COUNT: 18.4 % (ref 11.5–14.5)
ERYTHROCYTE [DISTWIDTH] IN BLOOD BY AUTOMATED COUNT: 18.6 % (ref 11.5–14.5)
ERYTHROCYTE [DISTWIDTH] IN BLOOD BY AUTOMATED COUNT: 19 % (ref 11.5–14.5)
ERYTHROCYTE [DISTWIDTH] IN BLOOD BY AUTOMATED COUNT: 19.1 % (ref 11.5–14.5)
ERYTHROCYTE [DISTWIDTH] IN BLOOD BY AUTOMATED COUNT: 19.2 % (ref 11.5–14.5)
ERYTHROCYTE [DISTWIDTH] IN BLOOD BY AUTOMATED COUNT: 19.6 % (ref 11.5–14.5)
ERYTHROCYTE [DISTWIDTH] IN BLOOD BY AUTOMATED COUNT: 19.7 % (ref 11.5–14.5)
ERYTHROCYTE [DISTWIDTH] IN BLOOD BY AUTOMATED COUNT: 19.9 % (ref 11.5–14.5)
ERYTHROCYTE [DISTWIDTH] IN BLOOD BY AUTOMATED COUNT: 19.9 % (ref 11.5–14.5)
ERYTHROCYTE [DISTWIDTH] IN BLOOD BY AUTOMATED COUNT: 20.1 % (ref 11.5–14.5)
ERYTHROCYTE [DISTWIDTH] IN BLOOD BY AUTOMATED COUNT: 20.3 % (ref 11.5–14.5)
ERYTHROCYTE [DISTWIDTH] IN BLOOD BY AUTOMATED COUNT: 20.4 % (ref 11.5–14.5)
ERYTHROCYTE [DISTWIDTH] IN BLOOD BY AUTOMATED COUNT: 22.1 % (ref 11.5–14.5)
ERYTHROCYTE [DISTWIDTH] IN BLOOD BY AUTOMATED COUNT: 22.5 % (ref 11.5–14.5)
ERYTHROCYTE [DISTWIDTH] IN BLOOD BY AUTOMATED COUNT: 22.5 % (ref 11.5–14.5)
ERYTHROCYTE [DISTWIDTH] IN BLOOD BY AUTOMATED COUNT: 22.7 % (ref 11.5–14.5)
ERYTHROCYTE [DISTWIDTH] IN BLOOD BY AUTOMATED COUNT: 22.9 % (ref 11.5–14.5)
ERYTHROCYTE [DISTWIDTH] IN BLOOD BY AUTOMATED COUNT: 23.1 % (ref 11.5–14.5)
EST. AVERAGE GLUCOSE BLD GHB EST-MCNC: NORMAL MG/DL
FIBRINOGEN PPP-MCNC: 356 MG/DL (ref 200–475)
FLUAV AG NPH QL IA: NEGATIVE
FLUBV AG NOSE QL IA: NEGATIVE
GAS FLOW.O2 O2 DELIVERY SYS: ABNORMAL L/MIN
GAS FLOW.O2 SETTING OXYMISER: 14 BPM
GAS FLOW.O2 SETTING OXYMISER: 2 L/M
GAS FLOW.O2 SETTING OXYMISER: 3.5 L/M
GAS FLOW.O2 SETTING OXYMISER: 6 L/M
GLOBULIN SER CALC-MCNC: 3.5 G/DL (ref 2–4)
GLOBULIN SER CALC-MCNC: 3.7 G/DL (ref 2–4)
GLOBULIN SER CALC-MCNC: 3.8 G/DL (ref 2–4)
GLOBULIN SER CALC-MCNC: 3.9 G/DL (ref 2–4)
GLOBULIN SER CALC-MCNC: 4.1 G/DL (ref 2–4)
GLOBULIN SER CALC-MCNC: 4.3 G/DL (ref 2–4)
GLOBULIN SER CALC-MCNC: 4.5 G/DL (ref 2–4)
GLOBULIN SER CALC-MCNC: 4.6 G/DL (ref 2–4)
GLOBULIN SER CALC-MCNC: 4.9 G/DL (ref 2–4)
GLOBULIN SER CALC-MCNC: 5.3 G/DL (ref 2–4)
GLUCOSE BLD STRIP.AUTO-MCNC: 104 MG/DL (ref 65–100)
GLUCOSE BLD STRIP.AUTO-MCNC: 105 MG/DL (ref 65–100)
GLUCOSE BLD STRIP.AUTO-MCNC: 105 MG/DL (ref 65–100)
GLUCOSE BLD STRIP.AUTO-MCNC: 107 MG/DL (ref 65–100)
GLUCOSE BLD STRIP.AUTO-MCNC: 113 MG/DL (ref 65–100)
GLUCOSE BLD STRIP.AUTO-MCNC: 115 MG/DL (ref 65–100)
GLUCOSE BLD STRIP.AUTO-MCNC: 117 MG/DL (ref 65–100)
GLUCOSE BLD STRIP.AUTO-MCNC: 117 MG/DL (ref 65–100)
GLUCOSE BLD STRIP.AUTO-MCNC: 118 MG/DL (ref 65–100)
GLUCOSE BLD STRIP.AUTO-MCNC: 119 MG/DL (ref 65–100)
GLUCOSE BLD STRIP.AUTO-MCNC: 120 MG/DL (ref 65–100)
GLUCOSE BLD STRIP.AUTO-MCNC: 121 MG/DL (ref 65–100)
GLUCOSE BLD STRIP.AUTO-MCNC: 124 MG/DL (ref 65–100)
GLUCOSE BLD STRIP.AUTO-MCNC: 125 MG/DL (ref 65–100)
GLUCOSE BLD STRIP.AUTO-MCNC: 127 MG/DL (ref 65–100)
GLUCOSE BLD STRIP.AUTO-MCNC: 129 MG/DL (ref 65–100)
GLUCOSE BLD STRIP.AUTO-MCNC: 130 MG/DL (ref 65–100)
GLUCOSE BLD STRIP.AUTO-MCNC: 132 MG/DL (ref 65–100)
GLUCOSE BLD STRIP.AUTO-MCNC: 133 MG/DL (ref 65–100)
GLUCOSE BLD STRIP.AUTO-MCNC: 133 MG/DL (ref 65–100)
GLUCOSE BLD STRIP.AUTO-MCNC: 134 MG/DL (ref 65–100)
GLUCOSE BLD STRIP.AUTO-MCNC: 134 MG/DL (ref 65–100)
GLUCOSE BLD STRIP.AUTO-MCNC: 135 MG/DL (ref 65–100)
GLUCOSE BLD STRIP.AUTO-MCNC: 136 MG/DL (ref 65–100)
GLUCOSE BLD STRIP.AUTO-MCNC: 137 MG/DL (ref 65–100)
GLUCOSE BLD STRIP.AUTO-MCNC: 137 MG/DL (ref 65–100)
GLUCOSE BLD STRIP.AUTO-MCNC: 139 MG/DL (ref 65–100)
GLUCOSE BLD STRIP.AUTO-MCNC: 140 MG/DL (ref 65–100)
GLUCOSE BLD STRIP.AUTO-MCNC: 141 MG/DL (ref 65–100)
GLUCOSE BLD STRIP.AUTO-MCNC: 143 MG/DL (ref 65–100)
GLUCOSE BLD STRIP.AUTO-MCNC: 144 MG/DL (ref 65–100)
GLUCOSE BLD STRIP.AUTO-MCNC: 146 MG/DL (ref 65–100)
GLUCOSE BLD STRIP.AUTO-MCNC: 150 MG/DL (ref 65–100)
GLUCOSE BLD STRIP.AUTO-MCNC: 151 MG/DL (ref 65–100)
GLUCOSE BLD STRIP.AUTO-MCNC: 154 MG/DL (ref 65–100)
GLUCOSE BLD STRIP.AUTO-MCNC: 155 MG/DL (ref 65–100)
GLUCOSE BLD STRIP.AUTO-MCNC: 156 MG/DL (ref 65–100)
GLUCOSE BLD STRIP.AUTO-MCNC: 157 MG/DL (ref 65–100)
GLUCOSE BLD STRIP.AUTO-MCNC: 157 MG/DL (ref 65–100)
GLUCOSE BLD STRIP.AUTO-MCNC: 158 MG/DL (ref 65–100)
GLUCOSE BLD STRIP.AUTO-MCNC: 160 MG/DL (ref 65–100)
GLUCOSE BLD STRIP.AUTO-MCNC: 161 MG/DL (ref 65–100)
GLUCOSE BLD STRIP.AUTO-MCNC: 162 MG/DL (ref 65–100)
GLUCOSE BLD STRIP.AUTO-MCNC: 163 MG/DL (ref 65–100)
GLUCOSE BLD STRIP.AUTO-MCNC: 164 MG/DL (ref 65–100)
GLUCOSE BLD STRIP.AUTO-MCNC: 166 MG/DL (ref 65–100)
GLUCOSE BLD STRIP.AUTO-MCNC: 166 MG/DL (ref 65–100)
GLUCOSE BLD STRIP.AUTO-MCNC: 167 MG/DL (ref 65–100)
GLUCOSE BLD STRIP.AUTO-MCNC: 168 MG/DL (ref 65–100)
GLUCOSE BLD STRIP.AUTO-MCNC: 169 MG/DL (ref 65–100)
GLUCOSE BLD STRIP.AUTO-MCNC: 169 MG/DL (ref 65–100)
GLUCOSE BLD STRIP.AUTO-MCNC: 170 MG/DL (ref 65–100)
GLUCOSE BLD STRIP.AUTO-MCNC: 170 MG/DL (ref 65–100)
GLUCOSE BLD STRIP.AUTO-MCNC: 171 MG/DL (ref 65–100)
GLUCOSE BLD STRIP.AUTO-MCNC: 173 MG/DL (ref 65–100)
GLUCOSE BLD STRIP.AUTO-MCNC: 173 MG/DL (ref 65–100)
GLUCOSE BLD STRIP.AUTO-MCNC: 174 MG/DL (ref 65–100)
GLUCOSE BLD STRIP.AUTO-MCNC: 174 MG/DL (ref 65–100)
GLUCOSE BLD STRIP.AUTO-MCNC: 175 MG/DL (ref 65–100)
GLUCOSE BLD STRIP.AUTO-MCNC: 176 MG/DL (ref 65–100)
GLUCOSE BLD STRIP.AUTO-MCNC: 177 MG/DL (ref 65–100)
GLUCOSE BLD STRIP.AUTO-MCNC: 179 MG/DL (ref 65–100)
GLUCOSE BLD STRIP.AUTO-MCNC: 181 MG/DL (ref 65–100)
GLUCOSE BLD STRIP.AUTO-MCNC: 183 MG/DL (ref 65–100)
GLUCOSE BLD STRIP.AUTO-MCNC: 185 MG/DL (ref 65–100)
GLUCOSE BLD STRIP.AUTO-MCNC: 185 MG/DL (ref 65–100)
GLUCOSE BLD STRIP.AUTO-MCNC: 187 MG/DL (ref 65–100)
GLUCOSE BLD STRIP.AUTO-MCNC: 189 MG/DL (ref 65–100)
GLUCOSE BLD STRIP.AUTO-MCNC: 190 MG/DL (ref 65–100)
GLUCOSE BLD STRIP.AUTO-MCNC: 195 MG/DL (ref 65–100)
GLUCOSE BLD STRIP.AUTO-MCNC: 196 MG/DL (ref 65–100)
GLUCOSE BLD STRIP.AUTO-MCNC: 199 MG/DL (ref 65–100)
GLUCOSE BLD STRIP.AUTO-MCNC: 199 MG/DL (ref 65–100)
GLUCOSE BLD STRIP.AUTO-MCNC: 200 MG/DL (ref 65–100)
GLUCOSE BLD STRIP.AUTO-MCNC: 208 MG/DL (ref 65–100)
GLUCOSE BLD STRIP.AUTO-MCNC: 212 MG/DL (ref 65–100)
GLUCOSE BLD STRIP.AUTO-MCNC: 213 MG/DL (ref 65–100)
GLUCOSE BLD STRIP.AUTO-MCNC: 214 MG/DL (ref 65–100)
GLUCOSE BLD STRIP.AUTO-MCNC: 215 MG/DL (ref 65–100)
GLUCOSE BLD STRIP.AUTO-MCNC: 216 MG/DL (ref 65–100)
GLUCOSE BLD STRIP.AUTO-MCNC: 217 MG/DL (ref 65–100)
GLUCOSE BLD STRIP.AUTO-MCNC: 223 MG/DL (ref 65–100)
GLUCOSE BLD STRIP.AUTO-MCNC: 224 MG/DL (ref 65–100)
GLUCOSE BLD STRIP.AUTO-MCNC: 228 MG/DL (ref 65–100)
GLUCOSE BLD STRIP.AUTO-MCNC: 233 MG/DL (ref 65–100)
GLUCOSE BLD STRIP.AUTO-MCNC: 243 MG/DL (ref 65–100)
GLUCOSE BLD STRIP.AUTO-MCNC: 252 MG/DL (ref 65–100)
GLUCOSE BLD STRIP.AUTO-MCNC: 253 MG/DL (ref 65–100)
GLUCOSE BLD STRIP.AUTO-MCNC: 262 MG/DL (ref 65–100)
GLUCOSE BLD STRIP.AUTO-MCNC: 285 MG/DL (ref 65–100)
GLUCOSE BLD STRIP.AUTO-MCNC: 286 MG/DL (ref 65–100)
GLUCOSE BLD STRIP.AUTO-MCNC: 291 MG/DL (ref 65–100)
GLUCOSE BLD STRIP.AUTO-MCNC: 312 MG/DL (ref 65–100)
GLUCOSE BLD STRIP.AUTO-MCNC: 317 MG/DL (ref 65–100)
GLUCOSE BLD STRIP.AUTO-MCNC: 320 MG/DL (ref 65–100)
GLUCOSE BLD STRIP.AUTO-MCNC: 321 MG/DL (ref 65–100)
GLUCOSE BLD STRIP.AUTO-MCNC: 335 MG/DL (ref 65–100)
GLUCOSE BLD STRIP.AUTO-MCNC: 346 MG/DL (ref 65–100)
GLUCOSE BLD STRIP.AUTO-MCNC: 350 MG/DL (ref 65–100)
GLUCOSE BLD STRIP.AUTO-MCNC: 353 MG/DL (ref 65–100)
GLUCOSE BLD STRIP.AUTO-MCNC: 355 MG/DL (ref 65–100)
GLUCOSE BLD STRIP.AUTO-MCNC: 363 MG/DL (ref 65–100)
GLUCOSE BLD STRIP.AUTO-MCNC: 364 MG/DL (ref 65–100)
GLUCOSE BLD STRIP.AUTO-MCNC: 379 MG/DL (ref 65–100)
GLUCOSE BLD STRIP.AUTO-MCNC: 380 MG/DL (ref 65–100)
GLUCOSE BLD STRIP.AUTO-MCNC: 425 MG/DL (ref 65–100)
GLUCOSE BLD STRIP.AUTO-MCNC: 426 MG/DL (ref 65–100)
GLUCOSE BLD STRIP.AUTO-MCNC: 449 MG/DL (ref 65–100)
GLUCOSE BLD STRIP.AUTO-MCNC: 64 MG/DL (ref 65–100)
GLUCOSE BLD STRIP.AUTO-MCNC: 66 MG/DL (ref 65–100)
GLUCOSE BLD STRIP.AUTO-MCNC: 67 MG/DL (ref 65–100)
GLUCOSE BLD STRIP.AUTO-MCNC: 68 MG/DL (ref 65–100)
GLUCOSE BLD STRIP.AUTO-MCNC: 68 MG/DL (ref 65–100)
GLUCOSE BLD STRIP.AUTO-MCNC: 71 MG/DL (ref 65–100)
GLUCOSE BLD STRIP.AUTO-MCNC: 73 MG/DL (ref 65–100)
GLUCOSE BLD STRIP.AUTO-MCNC: 76 MG/DL (ref 65–100)
GLUCOSE BLD STRIP.AUTO-MCNC: 82 MG/DL (ref 65–100)
GLUCOSE BLD STRIP.AUTO-MCNC: 83 MG/DL (ref 65–100)
GLUCOSE BLD STRIP.AUTO-MCNC: 85 MG/DL (ref 65–100)
GLUCOSE BLD STRIP.AUTO-MCNC: 86 MG/DL (ref 65–100)
GLUCOSE BLD STRIP.AUTO-MCNC: 90 MG/DL (ref 65–100)
GLUCOSE BLD STRIP.AUTO-MCNC: 92 MG/DL (ref 65–100)
GLUCOSE BLD STRIP.AUTO-MCNC: 96 MG/DL (ref 65–100)
GLUCOSE SERPL-MCNC: 111 MG/DL (ref 65–100)
GLUCOSE SERPL-MCNC: 116 MG/DL (ref 65–100)
GLUCOSE SERPL-MCNC: 117 MG/DL (ref 65–100)
GLUCOSE SERPL-MCNC: 117 MG/DL (ref 65–100)
GLUCOSE SERPL-MCNC: 122 MG/DL (ref 65–100)
GLUCOSE SERPL-MCNC: 127 MG/DL (ref 65–100)
GLUCOSE SERPL-MCNC: 142 MG/DL (ref 65–100)
GLUCOSE SERPL-MCNC: 149 MG/DL (ref 65–100)
GLUCOSE SERPL-MCNC: 160 MG/DL (ref 65–100)
GLUCOSE SERPL-MCNC: 165 MG/DL (ref 65–100)
GLUCOSE SERPL-MCNC: 165 MG/DL (ref 65–100)
GLUCOSE SERPL-MCNC: 166 MG/DL (ref 65–100)
GLUCOSE SERPL-MCNC: 174 MG/DL (ref 65–100)
GLUCOSE SERPL-MCNC: 176 MG/DL (ref 65–100)
GLUCOSE SERPL-MCNC: 184 MG/DL (ref 65–100)
GLUCOSE SERPL-MCNC: 190 MG/DL (ref 65–100)
GLUCOSE SERPL-MCNC: 201 MG/DL (ref 65–100)
GLUCOSE SERPL-MCNC: 210 MG/DL (ref 65–100)
GLUCOSE SERPL-MCNC: 228 MG/DL (ref 65–100)
GLUCOSE SERPL-MCNC: 229 MG/DL (ref 65–100)
GLUCOSE SERPL-MCNC: 236 MG/DL (ref 65–100)
GLUCOSE SERPL-MCNC: 273 MG/DL (ref 65–100)
GLUCOSE SERPL-MCNC: 338 MG/DL (ref 65–100)
GLUCOSE SERPL-MCNC: 369 MG/DL (ref 65–100)
GLUCOSE SERPL-MCNC: 378 MG/DL (ref 65–100)
GLUCOSE SERPL-MCNC: 432 MG/DL (ref 65–100)
GLUCOSE SERPL-MCNC: 68 MG/DL (ref 65–100)
GLUCOSE SERPL-MCNC: 72 MG/DL (ref 65–100)
GLUCOSE SERPL-MCNC: 95 MG/DL (ref 65–99)
GLUCOSE SERPL-MCNC: 97 MG/DL (ref 65–100)
GLUCOSE UR STRIP.AUTO-MCNC: NEGATIVE MG/DL
HBA1C MFR BLD: 4.8 % (ref 4.2–6.3)
HCO3 BLD-SCNC: 19.4 MMOL/L (ref 22–26)
HCO3 BLD-SCNC: 20.1 MMOL/L (ref 22–26)
HCO3 BLD-SCNC: 21.8 MMOL/L (ref 22–26)
HCO3 BLD-SCNC: 23.6 MMOL/L (ref 22–26)
HCO3 BLD-SCNC: 23.8 MMOL/L (ref 22–26)
HCO3 BLD-SCNC: 27.1 MMOL/L (ref 22–26)
HCO3 BLD-SCNC: 35.3 MMOL/L (ref 22–26)
HCT VFR BLD AUTO: 26.8 % (ref 34–46.6)
HCT VFR BLD AUTO: 27 % (ref 35–47)
HCT VFR BLD AUTO: 27 % (ref 35–47)
HCT VFR BLD AUTO: 27.6 % (ref 35–47)
HCT VFR BLD AUTO: 27.6 % (ref 35–47)
HCT VFR BLD AUTO: 28.1 % (ref 35–47)
HCT VFR BLD AUTO: 29.9 % (ref 35–47)
HCT VFR BLD AUTO: 30.2 % (ref 35–47)
HCT VFR BLD AUTO: 30.3 % (ref 35–47)
HCT VFR BLD AUTO: 30.5 % (ref 35–47)
HCT VFR BLD AUTO: 30.5 % (ref 35–47)
HCT VFR BLD AUTO: 30.7 % (ref 35–47)
HCT VFR BLD AUTO: 32.7 % (ref 35–47)
HCT VFR BLD AUTO: 33.9 % (ref 35–47)
HCT VFR BLD AUTO: 34.5 % (ref 35–47)
HCT VFR BLD AUTO: 35 % (ref 35–47)
HCT VFR BLD AUTO: 36.1 % (ref 35–47)
HCT VFR BLD AUTO: 36.5 % (ref 35–47)
HCT VFR BLD AUTO: 36.9 % (ref 35–47)
HCT VFR BLD AUTO: 36.9 % (ref 35–47)
HCT VFR BLD AUTO: 37.1 % (ref 35–47)
HCT VFR BLD AUTO: 37.3 % (ref 35–47)
HCT VFR BLD AUTO: 37.3 % (ref 35–47)
HCT VFR BLD AUTO: 38.7 % (ref 35–47)
HCT VFR BLD AUTO: 39.8 % (ref 35–47)
HCT VFR BLD AUTO: 41.6 % (ref 35–47)
HCT VFR BLD AUTO: 42.2 % (ref 35–47)
HGB BLD-MCNC: 10.3 G/DL (ref 11.5–16)
HGB BLD-MCNC: 10.4 G/DL (ref 11.5–16)
HGB BLD-MCNC: 10.6 G/DL (ref 11.5–16)
HGB BLD-MCNC: 11.2 G/DL (ref 11.5–16)
HGB BLD-MCNC: 11.3 G/DL (ref 11.5–16)
HGB BLD-MCNC: 11.4 G/DL (ref 11.5–16)
HGB BLD-MCNC: 11.5 G/DL (ref 11.5–16)
HGB BLD-MCNC: 11.5 G/DL (ref 11.5–16)
HGB BLD-MCNC: 11.7 G/DL (ref 11.5–16)
HGB BLD-MCNC: 12.2 G/DL (ref 11.5–16)
HGB BLD-MCNC: 12.4 G/DL (ref 11.5–16)
HGB BLD-MCNC: 12.9 G/DL (ref 11.5–16)
HGB BLD-MCNC: 13 G/DL (ref 11.5–16)
HGB BLD-MCNC: 7.9 G/DL (ref 11.5–16)
HGB BLD-MCNC: 8.1 G/DL (ref 11.5–16)
HGB BLD-MCNC: 8.2 G/DL (ref 11.5–16)
HGB BLD-MCNC: 8.3 G/DL (ref 11.1–15.9)
HGB BLD-MCNC: 8.3 G/DL (ref 11.5–16)
HGB BLD-MCNC: 8.6 G/DL (ref 11.5–16)
HGB BLD-MCNC: 8.7 G/DL (ref 11.5–16)
HGB BLD-MCNC: 9 G/DL (ref 11.5–16)
HGB BLD-MCNC: 9 G/DL (ref 11.5–16)
HGB BLD-MCNC: 9.1 G/DL (ref 11.5–16)
HGB BLD-MCNC: 9.2 G/DL (ref 11.5–16)
HGB BLD-MCNC: 9.7 G/DL (ref 11.5–16)
HGB UR QL STRIP: ABNORMAL
HGB UR QL STRIP: ABNORMAL
HGB UR QL STRIP: NEGATIVE
HGB UR QL STRIP: NEGATIVE
IMM GRANULOCYTES # BLD AUTO: 0 K/UL (ref 0–0.04)
IMM GRANULOCYTES # BLD AUTO: 0.1 K/UL (ref 0–0.04)
IMM GRANULOCYTES # BLD AUTO: 0.2 K/UL (ref 0–0.04)
IMM GRANULOCYTES # BLD AUTO: 0.2 K/UL (ref 0–0.04)
IMM GRANULOCYTES # BLD AUTO: 0.3 K/UL (ref 0–0.04)
IMM GRANULOCYTES NFR BLD AUTO: 0 % (ref 0–0.5)
IMM GRANULOCYTES NFR BLD AUTO: 1 % (ref 0–0.5)
INR PPP: 1.1 (ref 0.8–1.2)
INR PPP: 1.2 (ref 0.9–1.1)
INR PPP: 1.3 (ref 0.9–1.1)
INR PPP: 1.4 (ref 0.9–1.1)
INR PPP: 1.4 (ref 0.9–1.1)
INR PPP: 1.8 (ref 0.9–1.1)
INR PPP: 2 (ref 0.9–1.1)
IRON SATN MFR SERPL: 6 % (ref 15–55)
IRON SERPL-MCNC: 22 UG/DL (ref 27–139)
KETONES UR QL STRIP.AUTO: ABNORMAL MG/DL
KETONES UR QL STRIP.AUTO: ABNORMAL MG/DL
KETONES UR QL STRIP.AUTO: NEGATIVE MG/DL
KETONES UR QL STRIP.AUTO: NEGATIVE MG/DL
LACTATE BLD-SCNC: 1.96 MMOL/L (ref 0.4–2)
LACTATE BLD-SCNC: 2.3 MMOL/L (ref 0.4–2)
LACTATE BLD-SCNC: 4.45 MMOL/L (ref 0.4–2)
LACTATE BLD-SCNC: 4.5 MMOL/L (ref 0.4–2)
LACTATE SERPL-SCNC: 1.3 MMOL/L (ref 0.4–2)
LACTATE SERPL-SCNC: 2 MMOL/L (ref 0.4–2)
LACTATE SERPL-SCNC: 2.3 MMOL/L (ref 0.4–2)
LACTATE SERPL-SCNC: 3.6 MMOL/L (ref 0.4–2)
LACTATE SERPL-SCNC: 5.5 MMOL/L (ref 0.4–2)
LACTATE SERPL-SCNC: 5.6 MMOL/L (ref 0.4–2)
LACTATE SERPL-SCNC: 5.8 MMOL/L (ref 0.4–2)
LACTATE SERPL-SCNC: 5.9 MMOL/L (ref 0.4–2)
LACTATE SERPL-SCNC: 6.3 MMOL/L (ref 0.4–2)
LACTATE SERPL-SCNC: 8.4 MMOL/L (ref 0.4–2)
LEUKOCYTE ESTERASE UR QL STRIP.AUTO: ABNORMAL
LYMPHOCYTES # BLD: 0.1 K/UL (ref 0.8–3.5)
LYMPHOCYTES # BLD: 0.4 K/UL (ref 0.8–3.5)
LYMPHOCYTES # BLD: 0.5 K/UL (ref 0.8–3.5)
LYMPHOCYTES # BLD: 0.6 K/UL (ref 0.8–3.5)
LYMPHOCYTES # BLD: 0.6 K/UL (ref 0.8–3.5)
LYMPHOCYTES # BLD: 0.8 K/UL (ref 0.8–3.5)
LYMPHOCYTES # BLD: 1.3 K/UL (ref 0.8–3.5)
LYMPHOCYTES # BLD: 1.4 K/UL (ref 0.8–3.5)
LYMPHOCYTES # BLD: 1.5 K/UL (ref 0.8–3.5)
LYMPHOCYTES # BLD: 1.5 K/UL (ref 0.8–3.5)
LYMPHOCYTES # BLD: 1.6 K/UL (ref 0.8–3.5)
LYMPHOCYTES # BLD: 1.6 K/UL (ref 0.8–3.5)
LYMPHOCYTES # BLD: 1.7 K/UL (ref 0.8–3.5)
LYMPHOCYTES NFR BLD: 1 % (ref 12–49)
LYMPHOCYTES NFR BLD: 15 % (ref 12–49)
LYMPHOCYTES NFR BLD: 19 % (ref 12–49)
LYMPHOCYTES NFR BLD: 19 % (ref 12–49)
LYMPHOCYTES NFR BLD: 22 % (ref 12–49)
LYMPHOCYTES NFR BLD: 22 % (ref 12–49)
LYMPHOCYTES NFR BLD: 24 % (ref 12–49)
LYMPHOCYTES NFR BLD: 25 % (ref 12–49)
LYMPHOCYTES NFR BLD: 28 % (ref 12–49)
LYMPHOCYTES NFR BLD: 3 % (ref 12–49)
LYMPHOCYTES NFR BLD: 4 % (ref 12–49)
LYMPHOCYTES NFR BLD: 5 % (ref 12–49)
LYMPHOCYTES NFR BLD: 5 % (ref 12–49)
LYMPHOCYTES NFR BLD: 6 % (ref 12–49)
LYMPHOCYTES NFR BLD: 6 % (ref 12–49)
LYMPHOCYTES NFR BLD: 7 % (ref 12–49)
MAGNESIUM SERPL-MCNC: 1.2 MG/DL (ref 1.6–2.4)
MAGNESIUM SERPL-MCNC: 1.5 MG/DL (ref 1.6–2.4)
MAGNESIUM SERPL-MCNC: 1.6 MG/DL (ref 1.6–2.4)
MAGNESIUM SERPL-MCNC: 1.9 MG/DL (ref 1.6–2.4)
MAGNESIUM SERPL-MCNC: 1.9 MG/DL (ref 1.6–2.4)
MAGNESIUM SERPL-MCNC: 2 MG/DL (ref 1.6–2.4)
MAGNESIUM SERPL-MCNC: 2.1 MG/DL (ref 1.6–2.4)
MAGNESIUM SERPL-MCNC: 2.1 MG/DL (ref 1.6–2.4)
MAGNESIUM SERPL-MCNC: 2.2 MG/DL (ref 1.6–2.4)
MAGNESIUM SERPL-MCNC: 2.5 MG/DL (ref 1.6–2.4)
MAGNESIUM SERPL-MCNC: 2.7 MG/DL (ref 1.6–2.4)
MCH RBC QN AUTO: 26 PG (ref 26.6–33)
MCH RBC QN AUTO: 26.7 PG (ref 26–34)
MCH RBC QN AUTO: 26.8 PG (ref 26–34)
MCH RBC QN AUTO: 26.9 PG (ref 26–34)
MCH RBC QN AUTO: 27 PG (ref 26–34)
MCH RBC QN AUTO: 27.1 PG (ref 26–34)
MCH RBC QN AUTO: 27.4 PG (ref 26–34)
MCH RBC QN AUTO: 28.6 PG (ref 26–34)
MCH RBC QN AUTO: 28.7 PG (ref 26–34)
MCH RBC QN AUTO: 28.8 PG (ref 26–34)
MCH RBC QN AUTO: 29 PG (ref 26–34)
MCH RBC QN AUTO: 29 PG (ref 26–34)
MCH RBC QN AUTO: 29.3 PG (ref 26–34)
MCH RBC QN AUTO: 29.4 PG (ref 26–34)
MCH RBC QN AUTO: 29.6 PG (ref 26–34)
MCH RBC QN AUTO: 29.8 PG (ref 26–34)
MCHC RBC AUTO-ENTMCNC: 28.3 G/DL (ref 30–36.5)
MCHC RBC AUTO-ENTMCNC: 28.6 G/DL (ref 30–36.5)
MCHC RBC AUTO-ENTMCNC: 29.5 G/DL (ref 30–36.5)
MCHC RBC AUTO-ENTMCNC: 29.7 G/DL (ref 30–36.5)
MCHC RBC AUTO-ENTMCNC: 29.8 G/DL (ref 30–36.5)
MCHC RBC AUTO-ENTMCNC: 30 G/DL (ref 30–36.5)
MCHC RBC AUTO-ENTMCNC: 30.2 G/DL (ref 30–36.5)
MCHC RBC AUTO-ENTMCNC: 30.4 G/DL (ref 30–36.5)
MCHC RBC AUTO-ENTMCNC: 30.4 G/DL (ref 30–36.5)
MCHC RBC AUTO-ENTMCNC: 30.7 G/DL (ref 30–36.5)
MCHC RBC AUTO-ENTMCNC: 30.8 G/DL (ref 30–36.5)
MCHC RBC AUTO-ENTMCNC: 30.8 G/DL (ref 30–36.5)
MCHC RBC AUTO-ENTMCNC: 31 G/DL (ref 30–36.5)
MCHC RBC AUTO-ENTMCNC: 31 G/DL (ref 30–36.5)
MCHC RBC AUTO-ENTMCNC: 31 G/DL (ref 31.5–35.7)
MCHC RBC AUTO-ENTMCNC: 31.2 G/DL (ref 30–36.5)
MCHC RBC AUTO-ENTMCNC: 31.4 G/DL (ref 30–36.5)
MCHC RBC AUTO-ENTMCNC: 31.5 G/DL (ref 30–36.5)
MCHC RBC AUTO-ENTMCNC: 31.5 G/DL (ref 30–36.5)
MCHC RBC AUTO-ENTMCNC: 31.7 G/DL (ref 30–36.5)
MCHC RBC AUTO-ENTMCNC: 31.8 G/DL (ref 30–36.5)
MCHC RBC AUTO-ENTMCNC: 32.3 G/DL (ref 30–36.5)
MCV RBC AUTO: 84 FL (ref 79–97)
MCV RBC AUTO: 86.3 FL (ref 80–99)
MCV RBC AUTO: 87.9 FL (ref 80–99)
MCV RBC AUTO: 88.5 FL (ref 80–99)
MCV RBC AUTO: 89.6 FL (ref 80–99)
MCV RBC AUTO: 89.7 FL (ref 80–99)
MCV RBC AUTO: 90.4 FL (ref 80–99)
MCV RBC AUTO: 90.7 FL (ref 80–99)
MCV RBC AUTO: 90.7 FL (ref 80–99)
MCV RBC AUTO: 90.8 FL (ref 80–99)
MCV RBC AUTO: 91 FL (ref 80–99)
MCV RBC AUTO: 91.4 FL (ref 80–99)
MCV RBC AUTO: 91.6 FL (ref 80–99)
MCV RBC AUTO: 91.7 FL (ref 80–99)
MCV RBC AUTO: 92.1 FL (ref 80–99)
MCV RBC AUTO: 92.3 FL (ref 80–99)
MCV RBC AUTO: 92.9 FL (ref 80–99)
MCV RBC AUTO: 93.6 FL (ref 80–99)
MCV RBC AUTO: 93.8 FL (ref 80–99)
MCV RBC AUTO: 94.1 FL (ref 80–99)
MCV RBC AUTO: 94.2 FL (ref 80–99)
MCV RBC AUTO: 94.4 FL (ref 80–99)
MCV RBC AUTO: 95 FL (ref 80–99)
MCV RBC AUTO: 95.6 FL (ref 80–99)
METAMYELOCYTES NFR BLD MANUAL: 2 %
MONOCYTES # BLD: 0 K/UL (ref 0–1)
MONOCYTES # BLD: 0.4 K/UL (ref 0–1)
MONOCYTES # BLD: 0.5 K/UL (ref 0–1)
MONOCYTES # BLD: 0.6 K/UL (ref 0–1)
MONOCYTES # BLD: 0.7 K/UL (ref 0–1)
MONOCYTES # BLD: 0.8 K/UL (ref 0–1)
MONOCYTES # BLD: 0.8 K/UL (ref 0–1)
MONOCYTES # BLD: 1 K/UL (ref 0–1)
MONOCYTES # BLD: 1.3 K/UL (ref 0–1)
MONOCYTES # BLD: 1.4 K/UL (ref 0–1)
MONOCYTES # BLD: 1.4 K/UL (ref 0–1)
MONOCYTES # BLD: 1.7 K/UL (ref 0–1)
MONOCYTES # BLD: 1.7 K/UL (ref 0–1)
MONOCYTES # BLD: 1.8 K/UL (ref 0–1)
MONOCYTES # BLD: 2.3 K/UL (ref 0–1)
MONOCYTES NFR BLD: 0 % (ref 5–13)
MONOCYTES NFR BLD: 10 % (ref 5–13)
MONOCYTES NFR BLD: 10 % (ref 5–13)
MONOCYTES NFR BLD: 11 % (ref 5–13)
MONOCYTES NFR BLD: 15 % (ref 5–13)
MONOCYTES NFR BLD: 3 % (ref 5–13)
MONOCYTES NFR BLD: 3 % (ref 5–13)
MONOCYTES NFR BLD: 4 % (ref 5–13)
MONOCYTES NFR BLD: 4 % (ref 5–13)
MONOCYTES NFR BLD: 5 % (ref 5–13)
MONOCYTES NFR BLD: 6 % (ref 5–13)
MONOCYTES NFR BLD: 7 % (ref 5–13)
MONOCYTES NFR BLD: 7 % (ref 5–13)
MONOCYTES NFR BLD: 8 % (ref 5–13)
MONOCYTES NFR BLD: 9 % (ref 5–13)
MONOCYTES NFR BLD: 9 % (ref 5–13)
MORPHOLOGY BLD-IMP: ABNORMAL
MUCOUS THREADS URNS QL MICRO: ABNORMAL /LPF
NEUTS BAND NFR BLD MANUAL: 1 %
NEUTS BAND NFR BLD MANUAL: 15 %
NEUTS BAND NFR BLD MANUAL: 15 %
NEUTS BAND NFR BLD MANUAL: 5 %
NEUTS SEG # BLD: 10.5 K/UL (ref 1.8–8)
NEUTS SEG # BLD: 11 K/UL (ref 1.8–8)
NEUTS SEG # BLD: 13.9 K/UL (ref 1.8–8)
NEUTS SEG # BLD: 17 K/UL (ref 1.8–8)
NEUTS SEG # BLD: 17.9 K/UL (ref 1.8–8)
NEUTS SEG # BLD: 19 K/UL (ref 1.8–8)
NEUTS SEG # BLD: 19.1 K/UL (ref 1.8–8)
NEUTS SEG # BLD: 19.8 K/UL (ref 1.8–8)
NEUTS SEG # BLD: 2.8 K/UL (ref 1.8–8)
NEUTS SEG # BLD: 24.6 K/UL (ref 1.8–8)
NEUTS SEG # BLD: 29.5 K/UL (ref 1.8–8)
NEUTS SEG # BLD: 3.3 K/UL (ref 1.8–8)
NEUTS SEG # BLD: 3.6 K/UL (ref 1.8–8)
NEUTS SEG # BLD: 4.3 K/UL (ref 1.8–8)
NEUTS SEG # BLD: 4.6 K/UL (ref 1.8–8)
NEUTS SEG # BLD: 4.6 K/UL (ref 1.8–8)
NEUTS SEG # BLD: 4.8 K/UL (ref 1.8–8)
NEUTS SEG # BLD: 40.2 K/UL (ref 1.8–8)
NEUTS SEG # BLD: 7.3 K/UL (ref 1.8–8)
NEUTS SEG # BLD: 7.6 K/UL (ref 1.8–8)
NEUTS SEG # BLD: 8 K/UL (ref 1.8–8)
NEUTS SEG NFR BLD: 54 % (ref 32–75)
NEUTS SEG NFR BLD: 61 % (ref 32–75)
NEUTS SEG NFR BLD: 63 % (ref 32–75)
NEUTS SEG NFR BLD: 67 % (ref 32–75)
NEUTS SEG NFR BLD: 68 % (ref 32–75)
NEUTS SEG NFR BLD: 68 % (ref 32–75)
NEUTS SEG NFR BLD: 69 % (ref 32–75)
NEUTS SEG NFR BLD: 75 % (ref 32–75)
NEUTS SEG NFR BLD: 78 % (ref 32–75)
NEUTS SEG NFR BLD: 84 % (ref 32–75)
NEUTS SEG NFR BLD: 87 % (ref 32–75)
NEUTS SEG NFR BLD: 87 % (ref 32–75)
NEUTS SEG NFR BLD: 88 % (ref 32–75)
NEUTS SEG NFR BLD: 89 % (ref 32–75)
NEUTS SEG NFR BLD: 91 % (ref 32–75)
NEUTS SEG NFR BLD: 92 % (ref 32–75)
NITRITE UR QL STRIP.AUTO: NEGATIVE
NRBC # BLD: 0 K/UL (ref 0–0.01)
NRBC # BLD: 0.02 K/UL (ref 0–0.01)
NRBC # BLD: 0.03 K/UL (ref 0–0.01)
NRBC # BLD: 0.04 K/UL (ref 0–0.01)
NRBC # BLD: 0.04 K/UL (ref 0–0.01)
NRBC # BLD: 0.05 K/UL (ref 0–0.01)
NRBC # BLD: 0.05 K/UL (ref 0–0.01)
NRBC BLD-RTO: 0 PER 100 WBC
NRBC BLD-RTO: 0.1 PER 100 WBC
NRBC BLD-RTO: 0.2 PER 100 WBC
NRBC BLD-RTO: 0.2 PER 100 WBC
NRBC BLD-RTO: 0.4 PER 100 WBC
NRBC BLD-RTO: 0.4 PER 100 WBC
NRBC BLD-RTO: 0.5 PER 100 WBC
O2/TOTAL GAS SETTING VFR VENT: 40 %
O2/TOTAL GAS SETTING VFR VENT: 50 %
P-R INTERVAL, ECG05: 140 MS
P-R INTERVAL, ECG05: 154 MS
P-R INTERVAL, ECG05: 162 MS
P-R INTERVAL, ECG05: 182 MS
P-R INTERVAL, ECG05: 96 MS
PCO2 BLD: 26.3 MMHG (ref 35–45)
PCO2 BLD: 35.7 MMHG (ref 35–45)
PCO2 BLD: 40.9 MMHG (ref 35–45)
PCO2 BLD: 45.5 MMHG (ref 35–45)
PCO2 BLD: 46.5 MMHG (ref 35–45)
PCO2 BLD: 49.7 MMHG (ref 35–45)
PCO2 BLD: 81.4 MMHG (ref 35–45)
PEEP RESPIRATORY: 6 CMH2O
PH BLD: 7 [PH] (ref 7.35–7.45)
PH BLD: 7.29 [PH] (ref 7.35–7.45)
PH BLD: 7.32 [PH] (ref 7.35–7.45)
PH BLD: 7.43 [PH] (ref 7.35–7.45)
PH BLD: 7.43 [PH] (ref 7.35–7.45)
PH BLD: 7.46 [PH] (ref 7.35–7.45)
PH BLD: 7.48 [PH] (ref 7.35–7.45)
PH UR STRIP: 5.5 [PH] (ref 5–8)
PH UR STRIP: 5.5 [PH] (ref 5–8)
PH UR STRIP: 6.5 [PH] (ref 5–8)
PH UR STRIP: 7.5 [PH] (ref 5–8)
PHOSPHATE SERPL-MCNC: 1.6 MG/DL (ref 2.6–4.7)
PHOSPHATE SERPL-MCNC: 1.7 MG/DL (ref 2.6–4.7)
PHOSPHATE SERPL-MCNC: 1.9 MG/DL (ref 2.6–4.7)
PHOSPHATE SERPL-MCNC: 3.1 MG/DL (ref 2.6–4.7)
PHOSPHATE SERPL-MCNC: 3.2 MG/DL (ref 2.6–4.7)
PHOSPHATE SERPL-MCNC: 3.3 MG/DL (ref 2.6–4.7)
PHOSPHATE SERPL-MCNC: 4.1 MG/DL (ref 2.6–4.7)
PHOSPHATE SERPL-MCNC: 4.1 MG/DL (ref 2.6–4.7)
PHOSPHATE SERPL-MCNC: 4.3 MG/DL (ref 2.6–4.7)
PHOSPHATE SERPL-MCNC: 4.6 MG/DL (ref 2.6–4.7)
PHOSPHATE SERPL-MCNC: 6.3 MG/DL (ref 2.6–4.7)
PHOSPHATE SERPL-MCNC: 6.8 MG/DL (ref 2.6–4.7)
PIP ISTAT,IPIP: 35
PLATELET # BLD AUTO: 100 K/UL (ref 150–400)
PLATELET # BLD AUTO: 107 K/UL (ref 150–400)
PLATELET # BLD AUTO: 118 K/UL (ref 150–400)
PLATELET # BLD AUTO: 118 K/UL (ref 150–400)
PLATELET # BLD AUTO: 122 K/UL (ref 150–400)
PLATELET # BLD AUTO: 128 K/UL (ref 150–400)
PLATELET # BLD AUTO: 149 K/UL (ref 150–400)
PLATELET # BLD AUTO: 157 K/UL (ref 150–400)
PLATELET # BLD AUTO: 159 K/UL (ref 150–400)
PLATELET # BLD AUTO: 175 K/UL (ref 150–400)
PLATELET # BLD AUTO: 189 K/UL (ref 150–400)
PLATELET # BLD AUTO: 60 K/UL (ref 150–400)
PLATELET # BLD AUTO: 64 K/UL (ref 150–400)
PLATELET # BLD AUTO: 71 K/UL (ref 150–400)
PLATELET # BLD AUTO: 73 K/UL (ref 150–400)
PLATELET # BLD AUTO: 74 K/UL (ref 150–400)
PLATELET # BLD AUTO: 75 K/UL (ref 150–400)
PLATELET # BLD AUTO: 79 K/UL (ref 150–400)
PLATELET # BLD AUTO: 81 K/UL (ref 150–400)
PLATELET # BLD AUTO: 84 X10E3/UL (ref 150–379)
PLATELET # BLD AUTO: 86 K/UL (ref 150–400)
PLATELET # BLD AUTO: 94 K/UL (ref 150–400)
PLATELET # BLD AUTO: 94 K/UL (ref 150–400)
PLATELET # BLD AUTO: 96 K/UL (ref 150–400)
PMV BLD AUTO: 10.7 FL (ref 8.9–12.9)
PMV BLD AUTO: 10.9 FL (ref 8.9–12.9)
PMV BLD AUTO: 11.3 FL (ref 8.9–12.9)
PMV BLD AUTO: 11.4 FL (ref 8.9–12.9)
PMV BLD AUTO: 11.5 FL (ref 8.9–12.9)
PMV BLD AUTO: 11.5 FL (ref 8.9–12.9)
PMV BLD AUTO: 11.6 FL (ref 8.9–12.9)
PMV BLD AUTO: 11.6 FL (ref 8.9–12.9)
PMV BLD AUTO: 11.8 FL (ref 8.9–12.9)
PMV BLD AUTO: 11.8 FL (ref 8.9–12.9)
PMV BLD AUTO: 12 FL (ref 8.9–12.9)
PMV BLD AUTO: 12.3 FL (ref 8.9–12.9)
PMV BLD AUTO: 12.3 FL (ref 8.9–12.9)
PMV BLD AUTO: 12.4 FL (ref 8.9–12.9)
PMV BLD AUTO: 12.5 FL (ref 8.9–12.9)
PMV BLD AUTO: 12.6 FL (ref 8.9–12.9)
PMV BLD AUTO: 12.7 FL (ref 8.9–12.9)
PMV BLD AUTO: 12.7 FL (ref 8.9–12.9)
PO2 BLD: 103 MMHG (ref 80–100)
PO2 BLD: 61 MMHG (ref 80–100)
PO2 BLD: 69 MMHG (ref 80–100)
PO2 BLD: 71 MMHG (ref 80–100)
PO2 BLD: 80 MMHG (ref 80–100)
PO2 BLD: 86 MMHG (ref 80–100)
PO2 BLD: 95 MMHG (ref 80–100)
POTASSIUM SERPL-SCNC: 3.1 MMOL/L (ref 3.5–5.1)
POTASSIUM SERPL-SCNC: 3.3 MMOL/L (ref 3.5–5.1)
POTASSIUM SERPL-SCNC: 3.3 MMOL/L (ref 3.5–5.1)
POTASSIUM SERPL-SCNC: 3.6 MMOL/L (ref 3.5–5.1)
POTASSIUM SERPL-SCNC: 3.7 MMOL/L (ref 3.5–5.1)
POTASSIUM SERPL-SCNC: 3.8 MMOL/L (ref 3.5–5.1)
POTASSIUM SERPL-SCNC: 3.9 MMOL/L (ref 3.5–5.1)
POTASSIUM SERPL-SCNC: 3.9 MMOL/L (ref 3.5–5.1)
POTASSIUM SERPL-SCNC: 4 MMOL/L (ref 3.5–5.1)
POTASSIUM SERPL-SCNC: 4 MMOL/L (ref 3.5–5.1)
POTASSIUM SERPL-SCNC: 4.1 MMOL/L (ref 3.5–5.1)
POTASSIUM SERPL-SCNC: 4.3 MMOL/L (ref 3.5–5.1)
POTASSIUM SERPL-SCNC: 4.4 MMOL/L (ref 3.5–5.1)
POTASSIUM SERPL-SCNC: 4.4 MMOL/L (ref 3.5–5.1)
POTASSIUM SERPL-SCNC: 4.4 MMOL/L (ref 3.5–5.2)
POTASSIUM SERPL-SCNC: 4.5 MMOL/L (ref 3.5–5.1)
POTASSIUM SERPL-SCNC: 4.6 MMOL/L (ref 3.5–5.1)
POTASSIUM SERPL-SCNC: 4.6 MMOL/L (ref 3.5–5.1)
POTASSIUM SERPL-SCNC: 4.7 MMOL/L (ref 3.5–5.1)
PROCALCITONIN SERPL-MCNC: 21.6 NG/ML
PROCALCITONIN SERPL-MCNC: 6.7 NG/ML
PROT SERPL-MCNC: 5.9 G/DL (ref 6.4–8.2)
PROT SERPL-MCNC: 6.2 G/DL (ref 6.4–8.2)
PROT SERPL-MCNC: 6.3 G/DL (ref 6.4–8.2)
PROT SERPL-MCNC: 6.5 G/DL (ref 6.4–8.2)
PROT SERPL-MCNC: 6.7 G/DL (ref 6.4–8.2)
PROT SERPL-MCNC: 6.8 G/DL (ref 6.4–8.2)
PROT SERPL-MCNC: 6.8 G/DL (ref 6.4–8.2)
PROT SERPL-MCNC: 6.9 G/DL (ref 6.4–8.2)
PROT SERPL-MCNC: 6.9 G/DL (ref 6.4–8.2)
PROT SERPL-MCNC: 7 G/DL (ref 6.4–8.2)
PROT SERPL-MCNC: 7.1 G/DL (ref 6.4–8.2)
PROT SERPL-MCNC: 7.2 G/DL (ref 6.4–8.2)
PROT SERPL-MCNC: 7.3 G/DL (ref 6.4–8.2)
PROT SERPL-MCNC: 7.7 G/DL (ref 6.4–8.2)
PROT SERPL-MCNC: 7.8 G/DL (ref 6.4–8.2)
PROT SERPL-MCNC: 7.9 G/DL (ref 6.4–8.2)
PROT UR STRIP-MCNC: 30 MG/DL
PROT UR STRIP-MCNC: NEGATIVE MG/DL
PROTHROMBIN TIME: 11.7 SEC (ref 9.1–12)
PROTHROMBIN TIME: 12.6 SEC (ref 9–11.1)
PROTHROMBIN TIME: 13.2 SEC (ref 9–11.1)
PROTHROMBIN TIME: 14.1 SEC (ref 9–11.1)
PROTHROMBIN TIME: 14.1 SEC (ref 9–11.1)
PROTHROMBIN TIME: 18.2 SEC (ref 9–11.1)
PROTHROMBIN TIME: 19.9 SEC (ref 9–11.1)
Q-T INTERVAL, ECG07: 304 MS
Q-T INTERVAL, ECG07: 324 MS
Q-T INTERVAL, ECG07: 340 MS
Q-T INTERVAL, ECG07: 364 MS
Q-T INTERVAL, ECG07: 368 MS
QRS DURATION, ECG06: 74 MS
QRS DURATION, ECG06: 78 MS
QRS DURATION, ECG06: 78 MS
QRS DURATION, ECG06: 82 MS
QRS DURATION, ECG06: 82 MS
QTC CALCULATION (BEZET), ECG08: 440 MS
QTC CALCULATION (BEZET), ECG08: 445 MS
QTC CALCULATION (BEZET), ECG08: 451 MS
QTC CALCULATION (BEZET), ECG08: 457 MS
QTC CALCULATION (BEZET), ECG08: 563 MS
RBC # BLD AUTO: 2.95 M/UL (ref 3.8–5.2)
RBC # BLD AUTO: 3.01 M/UL (ref 3.8–5.2)
RBC # BLD AUTO: 3.07 M/UL (ref 3.8–5.2)
RBC # BLD AUTO: 3.11 M/UL (ref 3.8–5.2)
RBC # BLD AUTO: 3.19 X10E6/UL (ref 3.77–5.28)
RBC # BLD AUTO: 3.2 M/UL (ref 3.8–5.2)
RBC # BLD AUTO: 3.21 M/UL (ref 3.8–5.2)
RBC # BLD AUTO: 3.26 M/UL (ref 3.8–5.2)
RBC # BLD AUTO: 3.33 M/UL (ref 3.8–5.2)
RBC # BLD AUTO: 3.36 M/UL (ref 3.8–5.2)
RBC # BLD AUTO: 3.37 M/UL (ref 3.8–5.2)
RBC # BLD AUTO: 3.38 M/UL (ref 3.8–5.2)
RBC # BLD AUTO: 3.55 M/UL (ref 3.8–5.2)
RBC # BLD AUTO: 3.62 M/UL (ref 3.8–5.2)
RBC # BLD AUTO: 3.68 M/UL (ref 3.8–5.2)
RBC # BLD AUTO: 3.82 M/UL (ref 3.8–5.2)
RBC # BLD AUTO: 3.91 M/UL (ref 3.8–5.2)
RBC # BLD AUTO: 3.91 M/UL (ref 3.8–5.2)
RBC # BLD AUTO: 3.96 M/UL (ref 3.8–5.2)
RBC # BLD AUTO: 3.98 M/UL (ref 3.8–5.2)
RBC # BLD AUTO: 4.07 M/UL (ref 3.8–5.2)
RBC # BLD AUTO: 4.08 M/UL (ref 3.8–5.2)
RBC # BLD AUTO: 4.09 M/UL (ref 3.8–5.2)
RBC # BLD AUTO: 4.23 M/UL (ref 3.8–5.2)
RBC # BLD AUTO: 4.44 M/UL (ref 3.8–5.2)
RBC # BLD AUTO: 4.48 M/UL (ref 3.8–5.2)
RBC #/AREA URNS HPF: ABNORMAL /HPF (ref 0–5)
RBC MORPH BLD: ABNORMAL
REPORTED DOSE,DOSE: ABNORMAL UNITS
REPORTED DOSE,DOSE: ABNORMAL UNITS
REPORTED DOSE/TIME,TMG: 158
REPORTED DOSE/TIME,TMG: ABNORMAL
SAMPLES BEING HELD,HOLD: NORMAL
SAO2 % BLD: 91 % (ref 92–97)
SAO2 % BLD: 92 % (ref 92–97)
SAO2 % BLD: 92 % (ref 92–97)
SAO2 % BLD: 94 % (ref 92–97)
SAO2 % BLD: 95 % (ref 92–97)
SAO2 % BLD: 97 % (ref 92–97)
SAO2 % BLD: 98 % (ref 92–97)
SERVICE CMNT-IMP: ABNORMAL
SERVICE CMNT-IMP: NORMAL
SODIUM SERPL-SCNC: 132 MMOL/L (ref 136–145)
SODIUM SERPL-SCNC: 133 MMOL/L (ref 136–145)
SODIUM SERPL-SCNC: 135 MMOL/L (ref 136–145)
SODIUM SERPL-SCNC: 136 MMOL/L (ref 136–145)
SODIUM SERPL-SCNC: 137 MMOL/L (ref 136–145)
SODIUM SERPL-SCNC: 137 MMOL/L (ref 136–145)
SODIUM SERPL-SCNC: 138 MMOL/L (ref 136–145)
SODIUM SERPL-SCNC: 138 MMOL/L (ref 136–145)
SODIUM SERPL-SCNC: 139 MMOL/L (ref 136–145)
SODIUM SERPL-SCNC: 139 MMOL/L (ref 136–145)
SODIUM SERPL-SCNC: 140 MMOL/L (ref 136–145)
SODIUM SERPL-SCNC: 141 MMOL/L (ref 136–145)
SODIUM SERPL-SCNC: 141 MMOL/L (ref 136–145)
SODIUM SERPL-SCNC: 142 MMOL/L (ref 134–144)
SODIUM SERPL-SCNC: 143 MMOL/L (ref 136–145)
SODIUM SERPL-SCNC: 144 MMOL/L (ref 136–145)
SODIUM SERPL-SCNC: 145 MMOL/L (ref 136–145)
SODIUM SERPL-SCNC: 146 MMOL/L (ref 136–145)
SODIUM SERPL-SCNC: 147 MMOL/L (ref 136–145)
SODIUM SERPL-SCNC: 149 MMOL/L (ref 136–145)
SODIUM SERPL-SCNC: 151 MMOL/L (ref 136–145)
SP GR UR REFRACTOMETRY: 1.01 (ref 1–1.03)
SP GR UR REFRACTOMETRY: 1.01 (ref 1–1.03)
SP GR UR REFRACTOMETRY: 1.02 (ref 1–1.03)
SP GR UR REFRACTOMETRY: 1.02 (ref 1–1.03)
SPECIMEN EXP DATE BLD: NORMAL
SPECIMEN TYPE: ABNORMAL
STATUS OF UNIT,%ST: NORMAL
THERAPEUTIC RANGE,PTTT: ABNORMAL SECS (ref 58–77)
THERAPEUTIC RANGE,PTTT: NORMAL SECS (ref 58–77)
TIBC SERPL-MCNC: 379 UG/DL (ref 250–450)
TOTAL RESP. RATE, ITRR: 16
TOTAL RESP. RATE, ITRR: 20
TOTAL RESP. RATE, ITRR: 26
TOTAL RESP. RATE, ITRR: 26
TOTAL RESP. RATE, ITRR: 29
TOTAL RESP. RATE, ITRR: 35
TOTAL RESP. RATE, ITRR: 37
TROPONIN I SERPL-MCNC: <0.05 NG/ML
TROPONIN I SERPL-MCNC: <0.05 NG/ML
UA: UC IF INDICATED,UAUC: ABNORMAL
UIBC SERPL-MCNC: 357 UG/DL (ref 118–369)
UNIT DIVISION, %UDIV: 0
UR CULT HOLD, URHOLD: NORMAL
UROBILINOGEN UR QL STRIP.AUTO: 0.2 EU/DL (ref 0.2–1)
UROBILINOGEN UR QL STRIP.AUTO: 1 EU/DL (ref 0.2–1)
UROBILINOGEN UR QL STRIP.AUTO: 1 EU/DL (ref 0.2–1)
UROBILINOGEN UR QL STRIP.AUTO: 4 EU/DL (ref 0.2–1)
VANCOMYCIN TROUGH SERPL-MCNC: 12.7 UG/ML (ref 5–10)
VANCOMYCIN TROUGH SERPL-MCNC: 20.8 UG/ML (ref 5–10)
VENTILATION MODE VENT: ABNORMAL
VENTRICULAR RATE, ECG03: 109 BPM
VENTRICULAR RATE, ECG03: 117 BPM
VENTRICULAR RATE, ECG03: 126 BPM
VENTRICULAR RATE, ECG03: 141 BPM
VENTRICULAR RATE, ECG03: 90 BPM
VT SETTING VENT: 500 ML
WBC # BLD AUTO: 11.4 K/UL (ref 3.6–11)
WBC # BLD AUTO: 11.7 K/UL (ref 3.6–11)
WBC # BLD AUTO: 12 K/UL (ref 3.6–11)
WBC # BLD AUTO: 15.3 K/UL (ref 3.6–11)
WBC # BLD AUTO: 20.1 K/UL (ref 3.6–11)
WBC # BLD AUTO: 20.3 K/UL (ref 3.6–11)
WBC # BLD AUTO: 21.7 K/UL (ref 3.6–11)
WBC # BLD AUTO: 22.6 K/UL (ref 3.6–11)
WBC # BLD AUTO: 22.7 K/UL (ref 3.6–11)
WBC # BLD AUTO: 22.8 K/UL (ref 3.6–11)
WBC # BLD AUTO: 28 K/UL (ref 3.6–11)
WBC # BLD AUTO: 33.1 K/UL (ref 3.6–11)
WBC # BLD AUTO: 43.2 K/UL (ref 3.6–11)
WBC # BLD AUTO: 5.1 K/UL (ref 3.6–11)
WBC # BLD AUTO: 5.4 K/UL (ref 3.6–11)
WBC # BLD AUTO: 5.5 K/UL (ref 3.6–11)
WBC # BLD AUTO: 5.7 K/UL (ref 3.6–11)
WBC # BLD AUTO: 6.3 K/UL (ref 3.6–11)
WBC # BLD AUTO: 6.7 K/UL (ref 3.6–11)
WBC # BLD AUTO: 6.7 K/UL (ref 3.6–11)
WBC # BLD AUTO: 6.8 K/UL (ref 3.6–11)
WBC # BLD AUTO: 7.1 X10E3/UL (ref 3.4–10.8)
WBC # BLD AUTO: 7.3 K/UL (ref 3.6–11)
WBC # BLD AUTO: 8.1 K/UL (ref 3.6–11)
WBC # BLD AUTO: 8.7 K/UL (ref 3.6–11)
WBC # BLD AUTO: 9.8 K/UL (ref 3.6–11)
WBC MORPH BLD: ABNORMAL
WBC URNS QL MICRO: ABNORMAL /HPF (ref 0–4)
YEAST BUDDING URNS QL: PRESENT
YEAST URNS QL MICRO: PRESENT

## 2019-01-01 PROCEDURE — 74011250636 HC RX REV CODE- 250/636: Performed by: INTERNAL MEDICINE

## 2019-01-01 PROCEDURE — 85025 COMPLETE CBC W/AUTO DIFF WBC: CPT

## 2019-01-01 PROCEDURE — 77030038269 HC DRN EXT URIN PURWCK BARD -A

## 2019-01-01 PROCEDURE — 83036 HEMOGLOBIN GLYCOSYLATED A1C: CPT

## 2019-01-01 PROCEDURE — 93005 ELECTROCARDIOGRAM TRACING: CPT

## 2019-01-01 PROCEDURE — 80053 COMPREHEN METABOLIC PANEL: CPT

## 2019-01-01 PROCEDURE — 84100 ASSAY OF PHOSPHORUS: CPT

## 2019-01-01 PROCEDURE — 74011000258 HC RX REV CODE- 258: Performed by: HOSPITALIST

## 2019-01-01 PROCEDURE — 77030011943

## 2019-01-01 PROCEDURE — 74011000258 HC RX REV CODE- 258: Performed by: INTERNAL MEDICINE

## 2019-01-01 PROCEDURE — 74011250636 HC RX REV CODE- 250/636: Performed by: HOSPITALIST

## 2019-01-01 PROCEDURE — 74011000250 HC RX REV CODE- 250: Performed by: NURSE PRACTITIONER

## 2019-01-01 PROCEDURE — 77030018786 HC NDL GD F/USND BARD -B

## 2019-01-01 PROCEDURE — 82140 ASSAY OF AMMONIA: CPT

## 2019-01-01 PROCEDURE — 71045 X-RAY EXAM CHEST 1 VIEW: CPT

## 2019-01-01 PROCEDURE — 74011250637 HC RX REV CODE- 250/637: Performed by: INTERNAL MEDICINE

## 2019-01-01 PROCEDURE — 82962 GLUCOSE BLOOD TEST: CPT

## 2019-01-01 PROCEDURE — 94640 AIRWAY INHALATION TREATMENT: CPT

## 2019-01-01 PROCEDURE — 87186 SC STD MICRODIL/AGAR DIL: CPT

## 2019-01-01 PROCEDURE — 74011250637 HC RX REV CODE- 250/637: Performed by: HOSPITALIST

## 2019-01-01 PROCEDURE — 74011000250 HC RX REV CODE- 250: Performed by: HOSPITALIST

## 2019-01-01 PROCEDURE — 77030008683 HC TU ET CUF COVD -A

## 2019-01-01 PROCEDURE — 94003 VENT MGMT INPAT SUBQ DAY: CPT

## 2019-01-01 PROCEDURE — 36600 WITHDRAWAL OF ARTERIAL BLOOD: CPT

## 2019-01-01 PROCEDURE — 74011636637 HC RX REV CODE- 636/637: Performed by: INTERNAL MEDICINE

## 2019-01-01 PROCEDURE — 36415 COLL VENOUS BLD VENIPUNCTURE: CPT

## 2019-01-01 PROCEDURE — P9047 ALBUMIN (HUMAN), 25%, 50ML: HCPCS | Performed by: INTERNAL MEDICINE

## 2019-01-01 PROCEDURE — 0BH17EZ INSERTION OF ENDOTRACHEAL AIRWAY INTO TRACHEA, VIA NATURAL OR ARTIFICIAL OPENING: ICD-10-PCS | Performed by: INTERNAL MEDICINE

## 2019-01-01 PROCEDURE — 74011000250 HC RX REV CODE- 250: Performed by: EMERGENCY MEDICINE

## 2019-01-01 PROCEDURE — 97116 GAIT TRAINING THERAPY: CPT

## 2019-01-01 PROCEDURE — 74011000250 HC RX REV CODE- 250: Performed by: INTERNAL MEDICINE

## 2019-01-01 PROCEDURE — 65660000000 HC RM CCU STEPDOWN

## 2019-01-01 PROCEDURE — 97110 THERAPEUTIC EXERCISES: CPT | Performed by: OCCUPATIONAL THERAPIST

## 2019-01-01 PROCEDURE — 87086 URINE CULTURE/COLONY COUNT: CPT

## 2019-01-01 PROCEDURE — 94760 N-INVAS EAR/PLS OXIMETRY 1: CPT

## 2019-01-01 PROCEDURE — 83880 ASSAY OF NATRIURETIC PEPTIDE: CPT

## 2019-01-01 PROCEDURE — 65610000006 HC RM INTENSIVE CARE

## 2019-01-01 PROCEDURE — 97530 THERAPEUTIC ACTIVITIES: CPT

## 2019-01-01 PROCEDURE — 97535 SELF CARE MNGMENT TRAINING: CPT

## 2019-01-01 PROCEDURE — 77030032490 HC SLV COMPR SCD KNE COVD -B

## 2019-01-01 PROCEDURE — 99285 EMERGENCY DEPT VISIT HI MDM: CPT

## 2019-01-01 PROCEDURE — 77010033678 HC OXYGEN DAILY

## 2019-01-01 PROCEDURE — 97162 PT EVAL MOD COMPLEX 30 MIN: CPT

## 2019-01-01 PROCEDURE — 82803 BLOOD GASES ANY COMBINATION: CPT

## 2019-01-01 PROCEDURE — 70450 CT HEAD/BRAIN W/O DYE: CPT

## 2019-01-01 PROCEDURE — 87077 CULTURE AEROBIC IDENTIFY: CPT

## 2019-01-01 PROCEDURE — 74011636637 HC RX REV CODE- 636/637: Performed by: HOSPITALIST

## 2019-01-01 PROCEDURE — 97116 GAIT TRAINING THERAPY: CPT | Performed by: PHYSICAL THERAPIST

## 2019-01-01 PROCEDURE — 83735 ASSAY OF MAGNESIUM: CPT

## 2019-01-01 PROCEDURE — 74011250637 HC RX REV CODE- 250/637: Performed by: NURSE PRACTITIONER

## 2019-01-01 PROCEDURE — C1751 CATH, INF, PER/CENT/MIDLINE: HCPCS

## 2019-01-01 PROCEDURE — 77030034848

## 2019-01-01 PROCEDURE — 77030020291 HC FLEXSEAL FMS BMS -C

## 2019-01-01 PROCEDURE — 80048 BASIC METABOLIC PNL TOTAL CA: CPT

## 2019-01-01 PROCEDURE — 96367 TX/PROPH/DG ADDL SEQ IV INF: CPT

## 2019-01-01 PROCEDURE — 74018 RADEX ABDOMEN 1 VIEW: CPT

## 2019-01-01 PROCEDURE — 81001 URINALYSIS AUTO W/SCOPE: CPT

## 2019-01-01 PROCEDURE — 86923 COMPATIBILITY TEST ELECTRIC: CPT

## 2019-01-01 PROCEDURE — 87804 INFLUENZA ASSAY W/OPTIC: CPT

## 2019-01-01 PROCEDURE — 74011250637 HC RX REV CODE- 250/637: Performed by: EMERGENCY MEDICINE

## 2019-01-01 PROCEDURE — 85610 PROTHROMBIN TIME: CPT

## 2019-01-01 PROCEDURE — 97166 OT EVAL MOD COMPLEX 45 MIN: CPT | Performed by: OCCUPATIONAL THERAPIST

## 2019-01-01 PROCEDURE — 51798 US URINE CAPACITY MEASURE: CPT

## 2019-01-01 PROCEDURE — 76450000000

## 2019-01-01 PROCEDURE — 96365 THER/PROPH/DIAG IV INF INIT: CPT

## 2019-01-01 PROCEDURE — C9113 INJ PANTOPRAZOLE SODIUM, VIA: HCPCS | Performed by: INTERNAL MEDICINE

## 2019-01-01 PROCEDURE — 77030037878 HC DRSG MEPILEX >48IN BORD MOLN -B

## 2019-01-01 PROCEDURE — 74011250637 HC RX REV CODE- 250/637

## 2019-01-01 PROCEDURE — 36569 INSJ PICC 5 YR+ W/O IMAGING: CPT | Performed by: INTERNAL MEDICINE

## 2019-01-01 PROCEDURE — 83605 ASSAY OF LACTIC ACID: CPT

## 2019-01-01 PROCEDURE — 77030040133 HC BLANKET THRM DISP CSZ -A

## 2019-01-01 PROCEDURE — 85027 COMPLETE CBC AUTOMATED: CPT

## 2019-01-01 PROCEDURE — 97163 PT EVAL HIGH COMPLEX 45 MIN: CPT

## 2019-01-01 PROCEDURE — 96366 THER/PROPH/DIAG IV INF ADDON: CPT

## 2019-01-01 PROCEDURE — 96375 TX/PRO/DX INJ NEW DRUG ADDON: CPT

## 2019-01-01 PROCEDURE — 84484 ASSAY OF TROPONIN QUANT: CPT

## 2019-01-01 PROCEDURE — 51702 INSERT TEMP BLADDER CATH: CPT

## 2019-01-01 PROCEDURE — 80076 HEPATIC FUNCTION PANEL: CPT

## 2019-01-01 PROCEDURE — 77030010547 HC BG URIN W/UMETER -A

## 2019-01-01 PROCEDURE — 84145 PROCALCITONIN (PCT): CPT

## 2019-01-01 PROCEDURE — 77030005563 HC CATH URETH INT MMGH -A

## 2019-01-01 PROCEDURE — 74011000258 HC RX REV CODE- 258: Performed by: PHYSICIAN ASSISTANT

## 2019-01-01 PROCEDURE — 97535 SELF CARE MNGMENT TRAINING: CPT | Performed by: OCCUPATIONAL THERAPIST

## 2019-01-01 PROCEDURE — 0DH67UZ INSERTION OF FEEDING DEVICE INTO STOMACH, VIA NATURAL OR ARTIFICIAL OPENING: ICD-10-PCS | Performed by: RADIOLOGY

## 2019-01-01 PROCEDURE — 74011636320 HC RX REV CODE- 636/320: Performed by: EMERGENCY MEDICINE

## 2019-01-01 PROCEDURE — 75810000455 HC PLCMT CENT VENOUS CATH LVL 2 5182

## 2019-01-01 PROCEDURE — 74011250636 HC RX REV CODE- 250/636: Performed by: EMERGENCY MEDICINE

## 2019-01-01 PROCEDURE — 85730 THROMBOPLASTIN TIME PARTIAL: CPT

## 2019-01-01 PROCEDURE — 97110 THERAPEUTIC EXERCISES: CPT

## 2019-01-01 PROCEDURE — 87040 BLOOD CULTURE FOR BACTERIA: CPT

## 2019-01-01 PROCEDURE — 74011250636 HC RX REV CODE- 250/636: Performed by: NURSE PRACTITIONER

## 2019-01-01 PROCEDURE — 74011000258 HC RX REV CODE- 258: Performed by: STUDENT IN AN ORGANIZED HEALTH CARE EDUCATION/TRAINING PROGRAM

## 2019-01-01 PROCEDURE — 96368 THER/DIAG CONCURRENT INF: CPT

## 2019-01-01 PROCEDURE — 74011000258 HC RX REV CODE- 258

## 2019-01-01 PROCEDURE — 77030029684 HC NEB SM VOL KT MONA -A

## 2019-01-01 PROCEDURE — 75810000137 HC PLCMT CENT VENOUS CATH

## 2019-01-01 PROCEDURE — 74011000258 HC RX REV CODE- 258: Performed by: EMERGENCY MEDICINE

## 2019-01-01 PROCEDURE — 77030013797 HC KT TRNSDUC PRSSR EDWD -A

## 2019-01-01 PROCEDURE — 74176 CT ABD & PELVIS W/O CONTRAST: CPT

## 2019-01-01 PROCEDURE — 76937 US GUIDE VASCULAR ACCESS: CPT

## 2019-01-01 PROCEDURE — 51701 INSERT BLADDER CATHETER: CPT

## 2019-01-01 PROCEDURE — 97530 THERAPEUTIC ACTIVITIES: CPT | Performed by: OCCUPATIONAL THERAPIST

## 2019-01-01 PROCEDURE — 80202 ASSAY OF VANCOMYCIN: CPT

## 2019-01-01 PROCEDURE — 02HV33Z INSERTION OF INFUSION DEVICE INTO SUPERIOR VENA CAVA, PERCUTANEOUS APPROACH: ICD-10-PCS | Performed by: INTERNAL MEDICINE

## 2019-01-01 PROCEDURE — 77030037870 HC GLD SHT PREVALON SAGE -B

## 2019-01-01 PROCEDURE — 74011636637 HC RX REV CODE- 636/637

## 2019-01-01 PROCEDURE — 71260 CT THORAX DX C+: CPT

## 2019-01-01 PROCEDURE — 74011636320 HC RX REV CODE- 636/320: Performed by: INTERNAL MEDICINE

## 2019-01-01 PROCEDURE — 74011250636 HC RX REV CODE- 250/636: Performed by: PHYSICIAN ASSISTANT

## 2019-01-01 PROCEDURE — 93041 RHYTHM ECG TRACING: CPT

## 2019-01-01 PROCEDURE — 76700 US EXAM ABDOM COMPLETE: CPT

## 2019-01-01 PROCEDURE — 74177 CT ABD & PELVIS W/CONTRAST: CPT

## 2019-01-01 PROCEDURE — L3710 EO ELAS W/METAL JNTS PRE OTS: HCPCS

## 2019-01-01 PROCEDURE — 97165 OT EVAL LOW COMPLEX 30 MIN: CPT

## 2019-01-01 PROCEDURE — 85018 HEMOGLOBIN: CPT

## 2019-01-01 PROCEDURE — 77030005518 HC CATH URETH FOL 2W BARD -B

## 2019-01-01 PROCEDURE — P9045 ALBUMIN (HUMAN), 5%, 250 ML: HCPCS | Performed by: INTERNAL MEDICINE

## 2019-01-01 PROCEDURE — 94002 VENT MGMT INPAT INIT DAY: CPT

## 2019-01-01 PROCEDURE — 5A1955Z RESPIRATORY VENTILATION, GREATER THAN 96 CONSECUTIVE HOURS: ICD-10-PCS | Performed by: INTERNAL MEDICINE

## 2019-01-01 PROCEDURE — 86900 BLOOD TYPING SEROLOGIC ABO: CPT

## 2019-01-01 PROCEDURE — 77030008771 HC TU NG SALEM SUMP -A

## 2019-01-01 PROCEDURE — 82550 ASSAY OF CK (CPK): CPT

## 2019-01-01 PROCEDURE — 96361 HYDRATE IV INFUSION ADD-ON: CPT

## 2019-01-01 PROCEDURE — 74011636320 HC RX REV CODE- 636/320

## 2019-01-01 PROCEDURE — 31500 INSERT EMERGENCY AIRWAY: CPT

## 2019-01-01 PROCEDURE — 77030005534 HC CATH URETH FOL TEMP SENS SIMS -B

## 2019-01-01 PROCEDURE — 99281 EMR DPT VST MAYX REQ PHY/QHP: CPT

## 2019-01-01 PROCEDURE — 97165 OT EVAL LOW COMPLEX 30 MIN: CPT | Performed by: OCCUPATIONAL THERAPIST

## 2019-01-01 RX ORDER — LACTULOSE 10 G/15ML
30 SOLUTION ORAL; RECTAL 3 TIMES DAILY
Status: DISCONTINUED | OUTPATIENT
Start: 2019-01-01 | End: 2019-01-01

## 2019-01-01 RX ORDER — INSULIN LISPRO 100 [IU]/ML
INJECTION, SOLUTION INTRAVENOUS; SUBCUTANEOUS
Qty: 1 VIAL | Refills: 1 | Status: SHIPPED | OUTPATIENT
Start: 2019-01-01 | End: 2019-01-01 | Stop reason: DRUGHIGH

## 2019-01-01 RX ORDER — ACETAMINOPHEN 325 MG/1
650 TABLET ORAL
Status: DISCONTINUED | OUTPATIENT
Start: 2019-01-01 | End: 2019-01-01

## 2019-01-01 RX ORDER — BUMETANIDE 0.25 MG/ML
2 INJECTION INTRAMUSCULAR; INTRAVENOUS ONCE
Status: COMPLETED | OUTPATIENT
Start: 2019-01-01 | End: 2019-01-01

## 2019-01-01 RX ORDER — IPRATROPIUM BROMIDE AND ALBUTEROL SULFATE 2.5; .5 MG/3ML; MG/3ML
3 SOLUTION RESPIRATORY (INHALATION)
Status: DISCONTINUED | OUTPATIENT
Start: 2019-01-01 | End: 2019-01-01 | Stop reason: HOSPADM

## 2019-01-01 RX ORDER — SODIUM CHLORIDE 0.9 % (FLUSH) 0.9 %
5-40 SYRINGE (ML) INJECTION EVERY 8 HOURS
Status: DISCONTINUED | OUTPATIENT
Start: 2019-01-01 | End: 2019-01-01 | Stop reason: HOSPADM

## 2019-01-01 RX ORDER — SPIRONOLACTONE 100 MG/1
200 TABLET, FILM COATED ORAL DAILY
Status: DISCONTINUED | OUTPATIENT
Start: 2019-01-01 | End: 2019-01-01 | Stop reason: HOSPADM

## 2019-01-01 RX ORDER — NICOTINE 7MG/24HR
1 PATCH, TRANSDERMAL 24 HOURS TRANSDERMAL DAILY
Status: DISCONTINUED | OUTPATIENT
Start: 2019-01-01 | End: 2019-01-01 | Stop reason: HOSPADM

## 2019-01-01 RX ORDER — LEVOFLOXACIN 5 MG/ML
750 INJECTION, SOLUTION INTRAVENOUS EVERY 24 HOURS
Status: DISCONTINUED | OUTPATIENT
Start: 2019-01-01 | End: 2019-01-01

## 2019-01-01 RX ORDER — SODIUM CHLORIDE 0.9 % (FLUSH) 0.9 %
5-40 SYRINGE (ML) INJECTION AS NEEDED
Status: DISCONTINUED | OUTPATIENT
Start: 2019-01-01 | End: 2019-01-01 | Stop reason: HOSPADM

## 2019-01-01 RX ORDER — FUROSEMIDE 20 MG/1
20 TABLET ORAL DAILY
Status: DISCONTINUED | OUTPATIENT
Start: 2019-01-01 | End: 2019-01-01 | Stop reason: HOSPADM

## 2019-01-01 RX ORDER — ACETAMINOPHEN 650 MG/1
650 SUPPOSITORY RECTAL
Status: DISCONTINUED | OUTPATIENT
Start: 2019-01-01 | End: 2019-01-01 | Stop reason: HOSPADM

## 2019-01-01 RX ORDER — LEVOFLOXACIN 5 MG/ML
750 INJECTION, SOLUTION INTRAVENOUS
Status: DISCONTINUED | OUTPATIENT
Start: 2019-01-01 | End: 2019-01-01

## 2019-01-01 RX ORDER — FLUCONAZOLE 2 MG/ML
200 INJECTION, SOLUTION INTRAVENOUS EVERY 24 HOURS
Status: DISCONTINUED | OUTPATIENT
Start: 2019-01-01 | End: 2019-01-01

## 2019-01-01 RX ORDER — FLUTICASONE FUROATE AND VILANTEROL 200; 25 UG/1; UG/1
1 POWDER RESPIRATORY (INHALATION) DAILY
Status: DISCONTINUED | OUTPATIENT
Start: 2019-01-01 | End: 2019-01-01 | Stop reason: HOSPADM

## 2019-01-01 RX ORDER — VANCOMYCIN HYDROCHLORIDE 250 MG/5ML
500 POWDER, FOR SOLUTION ORAL EVERY 6 HOURS
Status: DISCONTINUED | OUTPATIENT
Start: 2019-01-01 | End: 2019-01-01

## 2019-01-01 RX ORDER — SODIUM CHLORIDE 9 MG/ML
1000 INJECTION, SOLUTION INTRAVENOUS ONCE
Status: COMPLETED | OUTPATIENT
Start: 2019-01-01 | End: 2019-01-01

## 2019-01-01 RX ORDER — ALBUTEROL SULFATE 0.83 MG/ML
2.5 SOLUTION RESPIRATORY (INHALATION)
Status: DISCONTINUED | OUTPATIENT
Start: 2019-01-01 | End: 2019-01-01

## 2019-01-01 RX ORDER — LACTULOSE 10 G/15ML
30 SOLUTION ORAL; RECTAL
Status: DISCONTINUED | OUTPATIENT
Start: 2019-01-01 | End: 2019-01-01

## 2019-01-01 RX ORDER — SODIUM CHLORIDE 9 MG/ML
50 INJECTION, SOLUTION INTRAVENOUS CONTINUOUS
Status: DISCONTINUED | OUTPATIENT
Start: 2019-01-01 | End: 2019-01-01

## 2019-01-01 RX ORDER — DEXTROSE MONOHYDRATE 25 G/50ML
INJECTION, SOLUTION INTRAVENOUS
Status: COMPLETED
Start: 2019-01-01 | End: 2019-01-01

## 2019-01-01 RX ORDER — FLUTICASONE FUROATE AND VILANTEROL 200; 25 UG/1; UG/1
1 POWDER RESPIRATORY (INHALATION) DAILY
COMMUNITY
End: 2019-01-01

## 2019-01-01 RX ORDER — INSULIN GLARGINE 100 [IU]/ML
40 INJECTION, SOLUTION SUBCUTANEOUS EVERY 12 HOURS
Status: DISCONTINUED | OUTPATIENT
Start: 2019-01-01 | End: 2019-01-01

## 2019-01-01 RX ORDER — PROPOFOL 10 MG/ML
0-50 VIAL (ML) INTRAVENOUS
Status: DISCONTINUED | OUTPATIENT
Start: 2019-01-01 | End: 2019-01-01

## 2019-01-01 RX ORDER — NOREPINEPHRINE BITARTRATE/D5W 8 MG/250ML
2-30 PLASTIC BAG, INJECTION (ML) INTRAVENOUS
Status: DISPENSED | OUTPATIENT
Start: 2019-01-01 | End: 2019-01-01

## 2019-01-01 RX ORDER — FUROSEMIDE 80 MG/1
80 TABLET ORAL DAILY
Status: DISCONTINUED | OUTPATIENT
Start: 2019-01-01 | End: 2019-01-01 | Stop reason: HOSPADM

## 2019-01-01 RX ORDER — MAGNESIUM SULFATE 100 %
4 CRYSTALS MISCELLANEOUS AS NEEDED
Status: DISCONTINUED | OUTPATIENT
Start: 2019-01-01 | End: 2019-01-01

## 2019-01-01 RX ORDER — SPIRONOLACTONE 25 MG/1
100 TABLET ORAL 2 TIMES DAILY
Status: DISCONTINUED | OUTPATIENT
Start: 2019-01-01 | End: 2019-01-01 | Stop reason: HOSPADM

## 2019-01-01 RX ORDER — NOREPINEPHRINE BITARTRATE/D5W 8 MG/250ML
2-16 PLASTIC BAG, INJECTION (ML) INTRAVENOUS
Status: DISCONTINUED | OUTPATIENT
Start: 2019-01-01 | End: 2019-01-01

## 2019-01-01 RX ORDER — SODIUM CHLORIDE 9 MG/ML
125 INJECTION, SOLUTION INTRAVENOUS CONTINUOUS
Status: DISCONTINUED | OUTPATIENT
Start: 2019-01-01 | End: 2019-01-01

## 2019-01-01 RX ORDER — PROPRANOLOL HYDROCHLORIDE 20 MG/1
20 TABLET ORAL 2 TIMES DAILY
Status: ON HOLD | COMMUNITY
End: 2019-01-01

## 2019-01-01 RX ORDER — PHENYLEPHRINE HCL IN 0.9% NACL 0.4MG/10ML
SYRINGE (ML) INTRAVENOUS
Status: DISPENSED
Start: 2019-01-01 | End: 2019-01-01

## 2019-01-01 RX ORDER — FUROSEMIDE 40 MG/1
80 TABLET ORAL DAILY
Status: DISCONTINUED | OUTPATIENT
Start: 2019-01-01 | End: 2019-01-01

## 2019-01-01 RX ORDER — MIDAZOLAM HYDROCHLORIDE 1 MG/ML
1 INJECTION, SOLUTION INTRAMUSCULAR; INTRAVENOUS
Status: COMPLETED | OUTPATIENT
Start: 2019-01-01 | End: 2019-01-01

## 2019-01-01 RX ORDER — ONDANSETRON 2 MG/ML
4 INJECTION INTRAMUSCULAR; INTRAVENOUS
Status: DISCONTINUED | OUTPATIENT
Start: 2019-01-01 | End: 2019-01-01 | Stop reason: HOSPADM

## 2019-01-01 RX ORDER — INSULIN GLARGINE 100 [IU]/ML
15 INJECTION, SOLUTION SUBCUTANEOUS DAILY
Qty: 1 VIAL | Refills: 1 | Status: SHIPPED | OUTPATIENT
Start: 2019-01-01 | End: 2019-01-01

## 2019-01-01 RX ORDER — HYDROCORTISONE ACETATE 25 MG/1
25 SUPPOSITORY RECTAL EVERY 12 HOURS
Status: DISCONTINUED | OUTPATIENT
Start: 2019-01-01 | End: 2019-01-01

## 2019-01-01 RX ORDER — LEVOFLOXACIN 5 MG/ML
750 INJECTION, SOLUTION INTRAVENOUS ONCE
Status: COMPLETED | OUTPATIENT
Start: 2019-01-01 | End: 2019-01-01

## 2019-01-01 RX ORDER — PROPRANOLOL HYDROCHLORIDE 10 MG/1
20 TABLET ORAL 2 TIMES DAILY
Status: DISCONTINUED | OUTPATIENT
Start: 2019-01-01 | End: 2019-01-01 | Stop reason: HOSPADM

## 2019-01-01 RX ORDER — NOREPINEPHRINE BITARTRATE/D5W 8 MG/250ML
2-200 PLASTIC BAG, INJECTION (ML) INTRAVENOUS
Status: DISCONTINUED | OUTPATIENT
Start: 2019-01-01 | End: 2019-01-01 | Stop reason: DRUGHIGH

## 2019-01-01 RX ORDER — MUPIROCIN 20 MG/G
OINTMENT TOPICAL 2 TIMES DAILY
Status: DISCONTINUED | OUTPATIENT
Start: 2019-01-01 | End: 2019-01-01

## 2019-01-01 RX ORDER — IPRATROPIUM BROMIDE AND ALBUTEROL SULFATE 2.5; .5 MG/3ML; MG/3ML
SOLUTION RESPIRATORY (INHALATION)
Status: DISPENSED
Start: 2019-01-01 | End: 2019-01-01

## 2019-01-01 RX ORDER — METRONIDAZOLE 500 MG/100ML
500 INJECTION, SOLUTION INTRAVENOUS EVERY 8 HOURS
Status: DISCONTINUED | OUTPATIENT
Start: 2019-01-01 | End: 2019-01-01

## 2019-01-01 RX ORDER — PREDNISONE 10 MG/1
TABLET ORAL
Qty: 18 TAB | Refills: 0 | Status: SHIPPED | OUTPATIENT
Start: 2019-01-01 | End: 2019-01-01

## 2019-01-01 RX ORDER — LACTULOSE 10 G/15ML
SOLUTION ORAL
Qty: 1000 ML | Refills: 5 | Status: SHIPPED | OUTPATIENT
Start: 2019-01-01 | End: 2019-01-01

## 2019-01-01 RX ORDER — POTASSIUM CHLORIDE 20 MEQ/1
40 TABLET, EXTENDED RELEASE ORAL
Status: COMPLETED | OUTPATIENT
Start: 2019-01-01 | End: 2019-01-01

## 2019-01-01 RX ORDER — DEXTROSE MONOHYDRATE 100 MG/ML
125-250 INJECTION, SOLUTION INTRAVENOUS AS NEEDED
Status: DISCONTINUED | OUTPATIENT
Start: 2019-01-01 | End: 2019-01-01

## 2019-01-01 RX ORDER — HALOPERIDOL 5 MG/ML
4 INJECTION INTRAMUSCULAR ONCE
Status: COMPLETED | OUTPATIENT
Start: 2019-01-01 | End: 2019-01-01

## 2019-01-01 RX ORDER — MORPHINE SULFATE 2 MG/ML
4 INJECTION, SOLUTION INTRAMUSCULAR; INTRAVENOUS
Status: DISCONTINUED | OUTPATIENT
Start: 2019-01-01 | End: 2019-01-01 | Stop reason: HOSPADM

## 2019-01-01 RX ORDER — INSULIN LISPRO 100 [IU]/ML
10 INJECTION, SOLUTION INTRAVENOUS; SUBCUTANEOUS ONCE
Status: COMPLETED | OUTPATIENT
Start: 2019-01-01 | End: 2019-01-01

## 2019-01-01 RX ORDER — ACETAMINOPHEN 650 MG/1
650 SUPPOSITORY RECTAL
Status: COMPLETED | OUTPATIENT
Start: 2019-01-01 | End: 2019-01-01

## 2019-01-01 RX ORDER — IBUPROFEN 200 MG
1 TABLET ORAL EVERY 24 HOURS
COMMUNITY

## 2019-01-01 RX ORDER — INSULIN LISPRO 100 [IU]/ML
INJECTION, SOLUTION INTRAVENOUS; SUBCUTANEOUS
Status: DISCONTINUED | OUTPATIENT
Start: 2019-01-01 | End: 2019-01-01 | Stop reason: HOSPADM

## 2019-01-01 RX ORDER — LANOLIN ALCOHOL/MO/W.PET/CERES
400 CREAM (GRAM) TOPICAL
COMMUNITY

## 2019-01-01 RX ORDER — INSULIN LISPRO 100 [IU]/ML
2-15 INJECTION, SOLUTION INTRAVENOUS; SUBCUTANEOUS
COMMUNITY

## 2019-01-01 RX ORDER — INSULIN GLARGINE 100 [IU]/ML
110 INJECTION, SOLUTION SUBCUTANEOUS DAILY
Status: ON HOLD | COMMUNITY
End: 2019-01-01 | Stop reason: SDUPTHER

## 2019-01-01 RX ORDER — ALBUMIN HUMAN 250 G/1000ML
12.5 SOLUTION INTRAVENOUS EVERY 12 HOURS
Status: DISCONTINUED | OUTPATIENT
Start: 2019-01-01 | End: 2019-01-01

## 2019-01-01 RX ORDER — BUMETANIDE 0.25 MG/ML
2 INJECTION INTRAMUSCULAR; INTRAVENOUS EVERY 12 HOURS
Status: DISCONTINUED | OUTPATIENT
Start: 2019-01-01 | End: 2019-01-01

## 2019-01-01 RX ORDER — PANTOPRAZOLE SODIUM 40 MG/1
40 TABLET, DELAYED RELEASE ORAL
Status: DISCONTINUED | OUTPATIENT
Start: 2019-01-01 | End: 2019-01-01 | Stop reason: HOSPADM

## 2019-01-01 RX ORDER — INSULIN GLARGINE 100 [IU]/ML
10 INJECTION, SOLUTION SUBCUTANEOUS ONCE
Status: COMPLETED | OUTPATIENT
Start: 2019-01-01 | End: 2019-01-01

## 2019-01-01 RX ORDER — FLUCONAZOLE 100 MG/1
200 TABLET ORAL EVERY 24 HOURS
Status: DISCONTINUED | OUTPATIENT
Start: 2019-01-01 | End: 2019-01-01

## 2019-01-01 RX ORDER — QUETIAPINE FUMARATE 25 MG/1
12.5 TABLET, FILM COATED ORAL
Status: DISCONTINUED | OUTPATIENT
Start: 2019-01-01 | End: 2019-01-01

## 2019-01-01 RX ORDER — GUAIFENESIN 100 MG/5ML
81 LIQUID (ML) ORAL DAILY
Status: DISCONTINUED | OUTPATIENT
Start: 2019-01-01 | End: 2019-01-01 | Stop reason: HOSPADM

## 2019-01-01 RX ORDER — HEPARIN SODIUM 5000 [USP'U]/ML
5000 INJECTION, SOLUTION INTRAVENOUS; SUBCUTANEOUS EVERY 8 HOURS
Status: DISCONTINUED | OUTPATIENT
Start: 2019-01-01 | End: 2019-01-01

## 2019-01-01 RX ORDER — IPRATROPIUM BROMIDE AND ALBUTEROL SULFATE 2.5; .5 MG/3ML; MG/3ML
3 SOLUTION RESPIRATORY (INHALATION)
Status: COMPLETED | OUTPATIENT
Start: 2019-01-01 | End: 2019-01-01

## 2019-01-01 RX ORDER — METOPROLOL TARTRATE 25 MG/1
25 TABLET, FILM COATED ORAL 2 TIMES DAILY
COMMUNITY

## 2019-01-01 RX ORDER — AMITRIPTYLINE HYDROCHLORIDE 50 MG/1
100 TABLET, FILM COATED ORAL
Status: DISCONTINUED | OUTPATIENT
Start: 2019-01-01 | End: 2019-01-01 | Stop reason: HOSPADM

## 2019-01-01 RX ORDER — METOPROLOL TARTRATE 25 MG/1
12.5 TABLET, FILM COATED ORAL EVERY 12 HOURS
Status: DISCONTINUED | OUTPATIENT
Start: 2019-01-01 | End: 2019-01-01 | Stop reason: HOSPADM

## 2019-01-01 RX ORDER — HEPARIN SODIUM 5000 [USP'U]/ML
INJECTION, SOLUTION INTRAVENOUS; SUBCUTANEOUS
Status: DISPENSED
Start: 2019-01-01 | End: 2019-01-01

## 2019-01-01 RX ORDER — DEXTROSE MONOHYDRATE 25 G/50ML
50 INJECTION, SOLUTION INTRAVENOUS
Status: COMPLETED | OUTPATIENT
Start: 2019-01-01 | End: 2019-01-01

## 2019-01-01 RX ORDER — QUETIAPINE FUMARATE 25 MG/1
25 TABLET, FILM COATED ORAL
Status: DISCONTINUED | OUTPATIENT
Start: 2019-01-01 | End: 2019-01-01 | Stop reason: HOSPADM

## 2019-01-01 RX ORDER — SODIUM CHLORIDE 9 MG/ML
75 INJECTION, SOLUTION INTRAVENOUS CONTINUOUS
Status: DISCONTINUED | OUTPATIENT
Start: 2019-01-01 | End: 2019-01-01

## 2019-01-01 RX ORDER — POTASSIUM CHLORIDE 1.5 G/1.77G
40 POWDER, FOR SOLUTION ORAL
Status: ACTIVE | OUTPATIENT
Start: 2019-01-01 | End: 2019-01-01

## 2019-01-01 RX ORDER — LEVOFLOXACIN 5 MG/ML
750 INJECTION, SOLUTION INTRAVENOUS
Status: COMPLETED | OUTPATIENT
Start: 2019-01-01 | End: 2019-01-01

## 2019-01-01 RX ORDER — MUPIROCIN 20 MG/G
OINTMENT TOPICAL 2 TIMES DAILY
Status: COMPLETED | OUTPATIENT
Start: 2019-01-01 | End: 2019-01-01

## 2019-01-01 RX ORDER — AMITRIPTYLINE HYDROCHLORIDE 50 MG/1
150 TABLET, FILM COATED ORAL
Status: DISCONTINUED | OUTPATIENT
Start: 2019-01-01 | End: 2019-01-01 | Stop reason: HOSPADM

## 2019-01-01 RX ORDER — LANOLIN ALCOHOL/MO/W.PET/CERES
1 CREAM (GRAM) TOPICAL
Status: DISCONTINUED | OUTPATIENT
Start: 2019-01-01 | End: 2019-01-01 | Stop reason: HOSPADM

## 2019-01-01 RX ORDER — CEFEPIME HYDROCHLORIDE 2 G/1
INJECTION, POWDER, FOR SOLUTION INTRAVENOUS
Status: DISPENSED
Start: 2019-01-01 | End: 2019-01-01

## 2019-01-01 RX ORDER — ASPIRIN 81 MG/1
81 TABLET ORAL DAILY
Status: DISCONTINUED | OUTPATIENT
Start: 2019-01-01 | End: 2019-01-01 | Stop reason: HOSPADM

## 2019-01-01 RX ORDER — LOSARTAN POTASSIUM 25 MG/1
25 TABLET ORAL DAILY
Status: DISCONTINUED | OUTPATIENT
Start: 2019-01-01 | End: 2019-01-01 | Stop reason: HOSPADM

## 2019-01-01 RX ORDER — FUROSEMIDE 40 MG/1
80 TABLET ORAL DAILY
Qty: 60 TAB | Refills: 3 | Status: SHIPPED | OUTPATIENT
Start: 2019-01-01 | End: 2019-01-01 | Stop reason: DRUGHIGH

## 2019-01-01 RX ORDER — FUROSEMIDE 40 MG/1
80 TABLET ORAL DAILY
Status: DISCONTINUED | OUTPATIENT
Start: 2019-01-01 | End: 2019-01-01 | Stop reason: HOSPADM

## 2019-01-01 RX ORDER — INSULIN GLARGINE 100 [IU]/ML
6 INJECTION, SOLUTION SUBCUTANEOUS DAILY
Status: DISCONTINUED | OUTPATIENT
Start: 2019-01-01 | End: 2019-01-01

## 2019-01-01 RX ORDER — ACETAMINOPHEN 325 MG/1
TABLET ORAL
Status: COMPLETED
Start: 2019-01-01 | End: 2019-01-01

## 2019-01-01 RX ORDER — IBUPROFEN 200 MG
1 TABLET ORAL EVERY 24 HOURS
Status: DISCONTINUED | OUTPATIENT
Start: 2019-01-01 | End: 2019-01-01 | Stop reason: HOSPADM

## 2019-01-01 RX ORDER — LORAZEPAM 2 MG/ML
1 INJECTION INTRAMUSCULAR
Status: DISCONTINUED | OUTPATIENT
Start: 2019-01-01 | End: 2019-01-01

## 2019-01-01 RX ORDER — POLYETHYLENE GLYCOL 3350 17 G/17G
17 POWDER, FOR SOLUTION ORAL 2 TIMES DAILY
Status: DISCONTINUED | OUTPATIENT
Start: 2019-01-01 | End: 2019-01-01

## 2019-01-01 RX ORDER — AMITRIPTYLINE HYDROCHLORIDE 25 MG/1
100 TABLET, FILM COATED ORAL ONCE
Status: COMPLETED | OUTPATIENT
Start: 2019-01-01 | End: 2019-01-01

## 2019-01-01 RX ORDER — VANCOMYCIN/0.9 % SOD CHLORIDE 1.5G/250ML
1500 PLASTIC BAG, INJECTION (ML) INTRAVENOUS EVERY 24 HOURS
Status: DISCONTINUED | OUTPATIENT
Start: 2019-01-01 | End: 2019-01-01

## 2019-01-01 RX ORDER — INSULIN LISPRO 100 [IU]/ML
6 INJECTION, SOLUTION INTRAVENOUS; SUBCUTANEOUS ONCE
Status: COMPLETED | OUTPATIENT
Start: 2019-01-01 | End: 2019-01-01

## 2019-01-01 RX ORDER — LEVOFLOXACIN 5 MG/ML
INJECTION, SOLUTION INTRAVENOUS
Status: DISPENSED
Start: 2019-01-01 | End: 2019-01-01

## 2019-01-01 RX ORDER — LACTULOSE 10 G/15ML
200 SOLUTION ORAL; RECTAL EVERY 6 HOURS
Status: DISCONTINUED | OUTPATIENT
Start: 2019-01-01 | End: 2019-01-01

## 2019-01-01 RX ORDER — MAGNESIUM SULFATE 100 %
4 CRYSTALS MISCELLANEOUS AS NEEDED
Status: DISCONTINUED | OUTPATIENT
Start: 2019-01-01 | End: 2019-01-01 | Stop reason: HOSPADM

## 2019-01-01 RX ORDER — INSULIN LISPRO 100 [IU]/ML
INJECTION, SOLUTION INTRAVENOUS; SUBCUTANEOUS EVERY 6 HOURS
Status: DISCONTINUED | OUTPATIENT
Start: 2019-01-01 | End: 2019-01-01

## 2019-01-01 RX ORDER — SODIUM CHLORIDE 9 MG/ML
250 INJECTION, SOLUTION INTRAVENOUS AS NEEDED
Status: DISCONTINUED | OUTPATIENT
Start: 2019-01-01 | End: 2019-01-01

## 2019-01-01 RX ORDER — INSULIN LISPRO 100 [IU]/ML
INJECTION, SOLUTION INTRAVENOUS; SUBCUTANEOUS
Status: DISPENSED
Start: 2019-01-01 | End: 2019-01-01

## 2019-01-01 RX ORDER — IPRATROPIUM BROMIDE AND ALBUTEROL SULFATE 2.5; .5 MG/3ML; MG/3ML
3 SOLUTION RESPIRATORY (INHALATION)
Status: DISCONTINUED | OUTPATIENT
Start: 2019-01-01 | End: 2019-01-01

## 2019-01-01 RX ORDER — BACITRACIN 500 UNIT/G
1 PACKET (EA) TOPICAL AS NEEDED
Status: DISCONTINUED | OUTPATIENT
Start: 2019-01-01 | End: 2019-01-01 | Stop reason: HOSPADM

## 2019-01-01 RX ORDER — METRONIDAZOLE 500 MG/100ML
500 INJECTION, SOLUTION INTRAVENOUS EVERY 12 HOURS
Status: DISCONTINUED | OUTPATIENT
Start: 2019-01-01 | End: 2019-01-01 | Stop reason: SDUPTHER

## 2019-01-01 RX ORDER — GABAPENTIN 600 MG/1
600 TABLET ORAL 3 TIMES DAILY
COMMUNITY

## 2019-01-01 RX ORDER — METOCLOPRAMIDE HYDROCHLORIDE 5 MG/ML
5 INJECTION INTRAMUSCULAR; INTRAVENOUS EVERY 6 HOURS
Status: DISCONTINUED | OUTPATIENT
Start: 2019-01-01 | End: 2019-01-01 | Stop reason: HOSPADM

## 2019-01-01 RX ORDER — VANCOMYCIN 2 GRAM/500 ML IN 0.9 % SODIUM CHLORIDE INTRAVENOUS
2
Status: COMPLETED | OUTPATIENT
Start: 2019-01-01 | End: 2019-01-01

## 2019-01-01 RX ORDER — ROPINIROLE 1 MG/1
4 TABLET, FILM COATED ORAL
Status: DISCONTINUED | OUTPATIENT
Start: 2019-01-01 | End: 2019-01-01 | Stop reason: HOSPADM

## 2019-01-01 RX ORDER — ACETAMINOPHEN 650 MG/1
SUPPOSITORY RECTAL
Status: DISPENSED
Start: 2019-01-01 | End: 2019-01-01

## 2019-01-01 RX ORDER — INSULIN LISPRO 100 [IU]/ML
INJECTION, SOLUTION INTRAVENOUS; SUBCUTANEOUS
Status: DISCONTINUED | OUTPATIENT
Start: 2019-01-01 | End: 2019-01-01

## 2019-01-01 RX ORDER — POTASSIUM CHLORIDE 750 MG/1
40 TABLET, FILM COATED, EXTENDED RELEASE ORAL
Status: COMPLETED | OUTPATIENT
Start: 2019-01-01 | End: 2019-01-01

## 2019-01-01 RX ORDER — PHENYLEPHRINE HCL IN 0.9% NACL 0.4MG/10ML
100 SYRINGE (ML) INTRAVENOUS ONCE
Status: COMPLETED | OUTPATIENT
Start: 2019-01-01 | End: 2019-01-01

## 2019-01-01 RX ORDER — INSULIN GLARGINE 100 [IU]/ML
15 INJECTION, SOLUTION SUBCUTANEOUS DAILY
Status: DISCONTINUED | OUTPATIENT
Start: 2019-01-01 | End: 2019-01-01 | Stop reason: HOSPADM

## 2019-01-01 RX ORDER — ALBUMIN HUMAN 50 G/1000ML
50 SOLUTION INTRAVENOUS ONCE
Status: COMPLETED | OUTPATIENT
Start: 2019-01-01 | End: 2019-01-01

## 2019-01-01 RX ORDER — TRAMADOL HYDROCHLORIDE 50 MG/1
50 TABLET ORAL EVERY EVENING
COMMUNITY

## 2019-01-01 RX ORDER — DEXTROSE MONOHYDRATE 25 G/50ML
INJECTION, SOLUTION INTRAVENOUS
Status: DISPENSED
Start: 2019-01-01 | End: 2019-01-01

## 2019-01-01 RX ORDER — SODIUM BICARBONATE 1 MEQ/ML
50 SYRINGE (ML) INTRAVENOUS ONCE
Status: COMPLETED | OUTPATIENT
Start: 2019-01-01 | End: 2019-01-01

## 2019-01-01 RX ORDER — PROPOFOL 10 MG/ML
INJECTION, EMULSION INTRAVENOUS
Status: DISPENSED
Start: 2019-01-01 | End: 2019-01-01

## 2019-01-01 RX ORDER — SODIUM,POTASSIUM PHOSPHATES 280-250MG
1 POWDER IN PACKET (EA) ORAL 2 TIMES DAILY
Status: COMPLETED | OUTPATIENT
Start: 2019-01-01 | End: 2019-01-01

## 2019-01-01 RX ORDER — DEXTROSE MONOHYDRATE 25 G/50ML
12.5-25 INJECTION, SOLUTION INTRAVENOUS AS NEEDED
Status: DISCONTINUED | OUTPATIENT
Start: 2019-01-01 | End: 2019-01-01 | Stop reason: HOSPADM

## 2019-01-01 RX ORDER — FLUTICASONE FUROATE AND VILANTEROL 200; 25 UG/1; UG/1
1 POWDER RESPIRATORY (INHALATION) DAILY
Status: DISCONTINUED | OUTPATIENT
Start: 2019-01-01 | End: 2019-01-01

## 2019-01-01 RX ORDER — METOPROLOL TARTRATE 25 MG/1
12.5 TABLET, FILM COATED ORAL EVERY 12 HOURS
Qty: 30 TAB | Refills: 0 | Status: SHIPPED | OUTPATIENT
Start: 2019-01-01 | End: 2019-01-01 | Stop reason: DRUGHIGH

## 2019-01-01 RX ORDER — HYDROCORTISONE SODIUM SUCCINATE 100 MG/2ML
50 INJECTION, POWDER, FOR SOLUTION INTRAMUSCULAR; INTRAVENOUS EVERY 6 HOURS
Status: DISCONTINUED | OUTPATIENT
Start: 2019-01-01 | End: 2019-01-01

## 2019-01-01 RX ORDER — FLUTICASONE PROPIONATE AND SALMETEROL 500; 50 UG/1; UG/1
1 POWDER RESPIRATORY (INHALATION) EVERY 12 HOURS
COMMUNITY

## 2019-01-01 RX ORDER — FACIAL-BODY WIPES
10 EACH TOPICAL
COMMUNITY

## 2019-01-01 RX ORDER — BALSAM PERU/CASTOR OIL
OINTMENT (GRAM) TOPICAL 2 TIMES DAILY
Status: DISCONTINUED | OUTPATIENT
Start: 2019-01-01 | End: 2019-01-01 | Stop reason: HOSPADM

## 2019-01-01 RX ORDER — ATORVASTATIN CALCIUM 40 MG/1
40 TABLET, FILM COATED ORAL
Status: DISCONTINUED | OUTPATIENT
Start: 2019-01-01 | End: 2019-01-01 | Stop reason: HOSPADM

## 2019-01-01 RX ORDER — SODIUM CHLORIDE 0.9 % (FLUSH) 0.9 %
5-40 SYRINGE (ML) INJECTION EVERY 8 HOURS
Status: DISCONTINUED | OUTPATIENT
Start: 2019-01-01 | End: 2019-01-01

## 2019-01-01 RX ORDER — POTASSIUM CHLORIDE 750 MG/1
40 TABLET, FILM COATED, EXTENDED RELEASE ORAL
Status: DISCONTINUED | OUTPATIENT
Start: 2019-01-01 | End: 2019-01-01

## 2019-01-01 RX ORDER — PROPOFOL 10 MG/ML
100 INJECTION, EMULSION INTRAVENOUS
Status: ACTIVE | OUTPATIENT
Start: 2019-01-01 | End: 2019-01-01

## 2019-01-01 RX ORDER — MORPHINE SULFATE 2 MG/ML
2 INJECTION, SOLUTION INTRAMUSCULAR; INTRAVENOUS
Status: DISCONTINUED | OUTPATIENT
Start: 2019-01-01 | End: 2019-01-01

## 2019-01-01 RX ORDER — VANCOMYCIN HYDROCHLORIDE 250 MG/5ML
125 POWDER, FOR SOLUTION ORAL EVERY 6 HOURS
Status: DISCONTINUED | OUTPATIENT
Start: 2019-01-01 | End: 2019-01-01 | Stop reason: SDUPTHER

## 2019-01-01 RX ORDER — VANCOMYCIN HYDROCHLORIDE
1250 EVERY 12 HOURS
Status: COMPLETED | OUTPATIENT
Start: 2019-01-01 | End: 2019-01-01

## 2019-01-01 RX ORDER — SODIUM CHLORIDE 0.9 % (FLUSH) 0.9 %
5-10 SYRINGE (ML) INJECTION AS NEEDED
Status: DISCONTINUED | OUTPATIENT
Start: 2019-01-01 | End: 2019-01-01 | Stop reason: SDUPTHER

## 2019-01-01 RX ORDER — TRAMADOL HYDROCHLORIDE 50 MG/1
25 TABLET ORAL
Status: DISCONTINUED | OUTPATIENT
Start: 2019-01-01 | End: 2019-01-01 | Stop reason: HOSPADM

## 2019-01-01 RX ORDER — BUMETANIDE 0.25 MG/ML
1 INJECTION INTRAMUSCULAR; INTRAVENOUS ONCE
Status: COMPLETED | OUTPATIENT
Start: 2019-01-01 | End: 2019-01-01

## 2019-01-01 RX ORDER — VANCOMYCIN/0.9 % SOD CHLORIDE 1.5G/250ML
1500 PLASTIC BAG, INJECTION (ML) INTRAVENOUS
Status: COMPLETED | OUTPATIENT
Start: 2019-01-01 | End: 2019-01-01

## 2019-01-01 RX ORDER — MAGNESIUM SULFATE 1 G/100ML
1 INJECTION INTRAVENOUS ONCE
Status: COMPLETED | OUTPATIENT
Start: 2019-01-01 | End: 2019-01-01

## 2019-01-01 RX ORDER — SPIRONOLACTONE 100 MG/1
200 TABLET, FILM COATED ORAL DAILY
Qty: 60 TAB | Refills: 3 | Status: SHIPPED | OUTPATIENT
Start: 2019-01-01

## 2019-01-01 RX ORDER — BENZONATATE 100 MG/1
200 CAPSULE ORAL
Status: DISCONTINUED | OUTPATIENT
Start: 2019-01-01 | End: 2019-01-01 | Stop reason: HOSPADM

## 2019-01-01 RX ORDER — INSULIN GLARGINE 100 [IU]/ML
15 INJECTION, SOLUTION SUBCUTANEOUS
Status: DISCONTINUED | OUTPATIENT
Start: 2019-01-01 | End: 2019-01-01 | Stop reason: HOSPADM

## 2019-01-01 RX ORDER — LACTULOSE 10 G/15ML
30 SOLUTION ORAL; RECTAL EVERY 6 HOURS
Status: DISCONTINUED | OUTPATIENT
Start: 2019-01-01 | End: 2019-01-01

## 2019-01-01 RX ORDER — GABAPENTIN 300 MG/1
1200 CAPSULE ORAL 3 TIMES DAILY
Status: DISCONTINUED | OUTPATIENT
Start: 2019-01-01 | End: 2019-01-01

## 2019-01-01 RX ORDER — SODIUM CHLORIDE 9 MG/ML
30 INJECTION, SOLUTION INTRAVENOUS ONCE
Status: COMPLETED | OUTPATIENT
Start: 2019-01-01 | End: 2019-01-01

## 2019-01-01 RX ORDER — DEXTROSE 50 % IN WATER (D50W) INTRAVENOUS SYRINGE
12.5-25 AS NEEDED
Status: DISCONTINUED | OUTPATIENT
Start: 2019-01-01 | End: 2019-01-01 | Stop reason: HOSPADM

## 2019-01-01 RX ORDER — BALSAM PERU/CASTOR OIL
OINTMENT (GRAM) TOPICAL 2 TIMES DAILY
Status: DISCONTINUED | OUTPATIENT
Start: 2019-01-01 | End: 2019-01-01

## 2019-01-01 RX ORDER — CHLORHEXIDINE GLUCONATE 1.2 MG/ML
15 RINSE ORAL EVERY 12 HOURS
Status: DISCONTINUED | OUTPATIENT
Start: 2019-01-01 | End: 2019-01-01

## 2019-01-01 RX ORDER — TRAMADOL HYDROCHLORIDE 50 MG/1
50 TABLET ORAL
COMMUNITY

## 2019-01-01 RX ORDER — MIDAZOLAM HYDROCHLORIDE 1 MG/ML
1 INJECTION, SOLUTION INTRAMUSCULAR; INTRAVENOUS
Status: DISCONTINUED | OUTPATIENT
Start: 2019-01-01 | End: 2019-01-01

## 2019-01-01 RX ORDER — SODIUM CHLORIDE 0.9 % (FLUSH) 0.9 %
10 SYRINGE (ML) INJECTION
Status: COMPLETED | OUTPATIENT
Start: 2019-01-01 | End: 2019-01-01

## 2019-01-01 RX ORDER — ADHESIVE BANDAGE
30 BANDAGE TOPICAL
COMMUNITY

## 2019-01-01 RX ORDER — PHENYLEPHRINE HYDROCHLORIDE 10 MG/ML
300 INJECTION INTRAVENOUS
Status: COMPLETED | OUTPATIENT
Start: 2019-01-01 | End: 2019-01-01

## 2019-01-01 RX ORDER — VANCOMYCIN HYDROCHLORIDE
1250
Status: DISCONTINUED | OUTPATIENT
Start: 2019-01-01 | End: 2019-01-01

## 2019-01-01 RX ORDER — TRAMADOL HYDROCHLORIDE 50 MG/1
TABLET ORAL
Qty: 120 TAB | Refills: 5 | OUTPATIENT
Start: 2019-01-01

## 2019-01-01 RX ORDER — DEXTROSE MONOHYDRATE 25 G/50ML
12.5-25 INJECTION, SOLUTION INTRAVENOUS AS NEEDED
Status: DISCONTINUED | OUTPATIENT
Start: 2019-01-01 | End: 2019-01-01 | Stop reason: SDUPTHER

## 2019-01-01 RX ORDER — SUCCINYLCHOLINE CHLORIDE 20 MG/ML INJECTION SOLUTION
SOLUTION
Status: DISPENSED
Start: 2019-01-01 | End: 2019-01-01

## 2019-01-01 RX ORDER — GABAPENTIN 600 MG/1
1200 TABLET ORAL 3 TIMES DAILY
Status: ON HOLD | COMMUNITY
End: 2019-01-01 | Stop reason: SDUPTHER

## 2019-01-01 RX ORDER — LORAZEPAM 2 MG/ML
2 INJECTION INTRAMUSCULAR
Status: DISCONTINUED | OUTPATIENT
Start: 2019-01-01 | End: 2019-01-01 | Stop reason: HOSPADM

## 2019-01-01 RX ORDER — PHENYLEPHRINE HCL IN 0.9% NACL 30MG/250ML
10-300 PLASTIC BAG, INJECTION (ML) INTRAVENOUS
Status: DISCONTINUED | OUTPATIENT
Start: 2019-01-01 | End: 2019-01-01

## 2019-01-01 RX ORDER — IBUPROFEN 200 MG
1 TABLET ORAL DAILY
Status: DISCONTINUED | OUTPATIENT
Start: 2019-01-01 | End: 2019-01-01 | Stop reason: HOSPADM

## 2019-01-01 RX ORDER — POTASSIUM CHLORIDE 20 MEQ/1
20 TABLET, EXTENDED RELEASE ORAL
Status: COMPLETED | OUTPATIENT
Start: 2019-01-01 | End: 2019-01-01

## 2019-01-01 RX ORDER — FLUCONAZOLE 2 MG/ML
400 INJECTION, SOLUTION INTRAVENOUS ONCE
Status: COMPLETED | OUTPATIENT
Start: 2019-01-01 | End: 2019-01-01

## 2019-01-01 RX ORDER — INSULIN GLARGINE 100 [IU]/ML
20 INJECTION, SOLUTION SUBCUTANEOUS EVERY 12 HOURS
Status: DISCONTINUED | OUTPATIENT
Start: 2019-01-01 | End: 2019-01-01 | Stop reason: HOSPADM

## 2019-01-01 RX ORDER — PHENYLEPHRINE HCL IN 0.9% NACL 100MG/250
0-300 PLASTIC BAG, INJECTION (ML) INTRAVENOUS
Status: DISCONTINUED | OUTPATIENT
Start: 2019-01-01 | End: 2019-01-01

## 2019-01-01 RX ORDER — SODIUM BICARBONATE IN D5W 150/1000ML
PLASTIC BAG, INJECTION (ML) INTRAVENOUS CONTINUOUS
Status: DISCONTINUED | OUTPATIENT
Start: 2019-01-01 | End: 2019-01-01

## 2019-01-01 RX ORDER — ENOXAPARIN SODIUM 100 MG/ML
40 INJECTION SUBCUTANEOUS EVERY 24 HOURS
Status: DISCONTINUED | OUTPATIENT
Start: 2019-01-01 | End: 2019-01-01 | Stop reason: HOSPADM

## 2019-01-01 RX ORDER — IPRATROPIUM BROMIDE AND ALBUTEROL SULFATE 2.5; .5 MG/3ML; MG/3ML
3 SOLUTION RESPIRATORY (INHALATION)
Qty: 30 NEBULE | Refills: 0 | Status: SHIPPED
Start: 2019-01-01

## 2019-01-01 RX ORDER — FUROSEMIDE 20 MG/1
20 TABLET ORAL DAILY
COMMUNITY

## 2019-01-01 RX ORDER — DEXTROSE MONOHYDRATE 100 MG/ML
50 INJECTION, SOLUTION INTRAVENOUS CONTINUOUS
Status: DISPENSED | OUTPATIENT
Start: 2019-01-01 | End: 2019-01-01

## 2019-01-01 RX ORDER — HEPARIN 100 UNIT/ML
300 SYRINGE INTRAVENOUS AS NEEDED
Status: DISCONTINUED | OUTPATIENT
Start: 2019-01-01 | End: 2019-01-01 | Stop reason: HOSPADM

## 2019-01-01 RX ORDER — SODIUM CHLORIDE 450 MG/100ML
50 INJECTION, SOLUTION INTRAVENOUS CONTINUOUS
Status: DISCONTINUED | OUTPATIENT
Start: 2019-01-01 | End: 2019-01-01

## 2019-01-01 RX ORDER — FACIAL-BODY WIPES
10 EACH TOPICAL DAILY PRN
Status: DISCONTINUED | OUTPATIENT
Start: 2019-01-01 | End: 2019-01-01 | Stop reason: HOSPADM

## 2019-01-01 RX ORDER — SUCCINYLCHOLINE CHLORIDE 20 MG/ML INJECTION SOLUTION
0.6 SOLUTION
Status: ACTIVE | OUTPATIENT
Start: 2019-01-01 | End: 2019-01-01

## 2019-01-01 RX ORDER — METOCLOPRAMIDE HYDROCHLORIDE 5 MG/ML
5 INJECTION INTRAMUSCULAR; INTRAVENOUS EVERY 6 HOURS
Status: DISCONTINUED | OUTPATIENT
Start: 2019-01-01 | End: 2019-01-01 | Stop reason: SDUPTHER

## 2019-01-01 RX ORDER — INSULIN LISPRO 100 [IU]/ML
12 INJECTION, SOLUTION INTRAVENOUS; SUBCUTANEOUS ONCE
Status: COMPLETED | OUTPATIENT
Start: 2019-01-01 | End: 2019-01-01

## 2019-01-01 RX ORDER — SODIUM CHLORIDE, SODIUM LACTATE, POTASSIUM CHLORIDE, CALCIUM CHLORIDE 600; 310; 30; 20 MG/100ML; MG/100ML; MG/100ML; MG/100ML
1000 INJECTION, SOLUTION INTRAVENOUS ONCE
Status: COMPLETED | OUTPATIENT
Start: 2019-01-01 | End: 2019-01-01

## 2019-01-01 RX ORDER — HEPARIN SODIUM 5000 [USP'U]/ML
5000 INJECTION, SOLUTION INTRAVENOUS; SUBCUTANEOUS EVERY 8 HOURS
Status: DISCONTINUED | OUTPATIENT
Start: 2019-01-01 | End: 2019-01-01 | Stop reason: HOSPADM

## 2019-01-01 RX ORDER — NOREPINEPHRINE BITARTRATE/D5W 8 MG/250ML
2-30 PLASTIC BAG, INJECTION (ML) INTRAVENOUS
Status: DISCONTINUED | OUTPATIENT
Start: 2019-01-01 | End: 2019-01-01

## 2019-01-01 RX ORDER — PHENYLEPHRINE HCL IN 0.9% NACL 30MG/250ML
10-300 PLASTIC BAG, INJECTION (ML) INTRAVENOUS
Status: DISCONTINUED | OUTPATIENT
Start: 2019-01-01 | End: 2019-01-01 | Stop reason: DRUGHIGH

## 2019-01-01 RX ORDER — INSULIN GLARGINE 100 [IU]/ML
50 INJECTION, SOLUTION SUBCUTANEOUS EVERY 12 HOURS
Status: DISCONTINUED | OUTPATIENT
Start: 2019-01-01 | End: 2019-01-01

## 2019-01-01 RX ORDER — SODIUM CHLORIDE 9 MG/ML
25 INJECTION, SOLUTION INTRAVENOUS CONTINUOUS
Status: DISPENSED | OUTPATIENT
Start: 2019-01-01 | End: 2019-01-01

## 2019-01-01 RX ORDER — MAGNESIUM SULFATE HEPTAHYDRATE 40 MG/ML
2 INJECTION, SOLUTION INTRAVENOUS ONCE
Status: COMPLETED | OUTPATIENT
Start: 2019-01-01 | End: 2019-01-01

## 2019-01-01 RX ORDER — ONDANSETRON 4 MG/1
4 TABLET, ORALLY DISINTEGRATING ORAL
Status: DISCONTINUED | OUTPATIENT
Start: 2019-01-01 | End: 2019-01-01 | Stop reason: HOSPADM

## 2019-01-01 RX ORDER — INSULIN LISPRO 100 [IU]/ML
INJECTION, SOLUTION INTRAVENOUS; SUBCUTANEOUS
Status: COMPLETED
Start: 2019-01-01 | End: 2019-01-01

## 2019-01-01 RX ORDER — INSULIN GLARGINE 100 [IU]/ML
40 INJECTION, SOLUTION SUBCUTANEOUS DAILY
Status: DISCONTINUED | OUTPATIENT
Start: 2019-01-01 | End: 2019-01-01

## 2019-01-01 RX ORDER — LORAZEPAM 2 MG/ML
1 INJECTION INTRAMUSCULAR
Status: COMPLETED | OUTPATIENT
Start: 2019-01-01 | End: 2019-01-01

## 2019-01-01 RX ORDER — INSULIN GLARGINE 100 [IU]/ML
20 INJECTION, SOLUTION SUBCUTANEOUS DAILY
Qty: 1 PEN | Refills: 0 | Status: SHIPPED | OUTPATIENT
Start: 2019-01-01 | End: 2019-01-01

## 2019-01-01 RX ORDER — ACETAMINOPHEN 650 MG/1
650 SUPPOSITORY RECTAL
Status: DISCONTINUED | OUTPATIENT
Start: 2019-01-01 | End: 2019-01-01

## 2019-01-01 RX ORDER — DEXTROSE, SODIUM CHLORIDE, AND POTASSIUM CHLORIDE 5; .45; .15 G/100ML; G/100ML; G/100ML
125 INJECTION INTRAVENOUS CONTINUOUS
Status: DISCONTINUED | OUTPATIENT
Start: 2019-01-01 | End: 2019-01-01

## 2019-01-01 RX ORDER — LANOLIN ALCOHOL/MO/W.PET/CERES
800 CREAM (GRAM) TOPICAL DAILY
COMMUNITY

## 2019-01-01 RX ORDER — ALBUTEROL SULFATE 0.83 MG/ML
2.5 SOLUTION RESPIRATORY (INHALATION)
Status: DISCONTINUED | OUTPATIENT
Start: 2019-01-01 | End: 2019-01-01 | Stop reason: HOSPADM

## 2019-01-01 RX ORDER — GRANULES FOR ORAL 3 G/1
3 POWDER ORAL ONCE
Status: COMPLETED | OUTPATIENT
Start: 2019-01-01 | End: 2019-01-01

## 2019-01-01 RX ORDER — GABAPENTIN 600 MG/1
900 TABLET ORAL 3 TIMES DAILY
Qty: 45 TAB | Refills: 0 | Status: SHIPPED | OUTPATIENT
Start: 2019-01-01 | End: 2019-01-01 | Stop reason: DRUGHIGH

## 2019-01-01 RX ORDER — VANCOMYCIN HYDROCHLORIDE 250 MG/5ML
125 POWDER, FOR SOLUTION ORAL EVERY 6 HOURS
Status: DISCONTINUED | OUTPATIENT
Start: 2019-01-01 | End: 2019-01-01

## 2019-01-01 RX ORDER — POTASSIUM CHLORIDE 750 MG/1
20 TABLET, FILM COATED, EXTENDED RELEASE ORAL
Status: DISPENSED | OUTPATIENT
Start: 2019-01-01 | End: 2019-01-01

## 2019-01-01 RX ORDER — QUETIAPINE FUMARATE 25 MG/1
25 TABLET, FILM COATED ORAL
Qty: 30 TAB | Refills: 0 | Status: SHIPPED | OUTPATIENT
Start: 2019-01-01

## 2019-01-01 RX ORDER — INSULIN GLARGINE 100 [IU]/ML
25 INJECTION, SOLUTION SUBCUTANEOUS EVERY 12 HOURS
Status: DISCONTINUED | OUTPATIENT
Start: 2019-01-01 | End: 2019-01-01

## 2019-01-01 RX ORDER — FUROSEMIDE 10 MG/ML
40 INJECTION INTRAMUSCULAR; INTRAVENOUS EVERY 12 HOURS
Status: COMPLETED | OUTPATIENT
Start: 2019-01-01 | End: 2019-01-01

## 2019-01-01 RX ORDER — DEXTROSE MONOHYDRATE 100 MG/ML
240 INJECTION, SOLUTION INTRAVENOUS AS NEEDED
Status: DISCONTINUED | OUTPATIENT
Start: 2019-01-01 | End: 2019-01-01

## 2019-01-01 RX ADMIN — INSULIN LISPRO 2 UNITS: 100 INJECTION, SOLUTION INTRAVENOUS; SUBCUTANEOUS at 17:09

## 2019-01-01 RX ADMIN — FUROSEMIDE 80 MG: 40 TABLET ORAL at 08:59

## 2019-01-01 RX ADMIN — HEPARIN SODIUM 5000 UNITS: 5000 INJECTION INTRAVENOUS; SUBCUTANEOUS at 13:48

## 2019-01-01 RX ADMIN — Medication 30 MCG/MIN: at 18:50

## 2019-01-01 RX ADMIN — PROPRANOLOL HYDROCHLORIDE 20 MG: 10 TABLET ORAL at 09:44

## 2019-01-01 RX ADMIN — METOPROLOL TARTRATE 12.5 MG: 25 TABLET ORAL at 10:33

## 2019-01-01 RX ADMIN — INSULIN LISPRO 2 UNITS: 100 INJECTION, SOLUTION INTRAVENOUS; SUBCUTANEOUS at 08:04

## 2019-01-01 RX ADMIN — LORAZEPAM 2 MG: 2 INJECTION INTRAMUSCULAR at 12:14

## 2019-01-01 RX ADMIN — PHENYLEPHRINE HYDROCHLORIDE 210 MCG/MIN: 10 INJECTION INTRAVENOUS at 04:01

## 2019-01-01 RX ADMIN — LACTULOSE 45 ML: 10 SOLUTION ORAL at 22:18

## 2019-01-01 RX ADMIN — IPRATROPIUM BROMIDE AND ALBUTEROL SULFATE 3 ML: .5; 3 SOLUTION RESPIRATORY (INHALATION) at 12:03

## 2019-01-01 RX ADMIN — LACTULOSE 45 ML: 20 SOLUTION ORAL at 22:20

## 2019-01-01 RX ADMIN — LACTULOSE 200 G: 10 SOLUTION ORAL at 13:28

## 2019-01-01 RX ADMIN — FERRIC CARBOXYMALTOSE INJECTION 750 MG: 50 INJECTION, SOLUTION INTRAVENOUS at 12:34

## 2019-01-01 RX ADMIN — IPRATROPIUM BROMIDE AND ALBUTEROL SULFATE 3 ML: .5; 3 SOLUTION RESPIRATORY (INHALATION) at 20:15

## 2019-01-01 RX ADMIN — LACTULOSE 45 ML: 20 SOLUTION ORAL at 10:08

## 2019-01-01 RX ADMIN — ALBUTEROL SULFATE 2.5 MG: 2.5 SOLUTION RESPIRATORY (INHALATION) at 23:25

## 2019-01-01 RX ADMIN — MEROPENEM 1 G: 1 INJECTION, POWDER, FOR SOLUTION INTRAVENOUS at 22:23

## 2019-01-01 RX ADMIN — INSULIN GLARGINE 6 UNITS: 100 INJECTION, SOLUTION SUBCUTANEOUS at 11:21

## 2019-01-01 RX ADMIN — FLUCONAZOLE, SODIUM CHLORIDE 200 MG: 2 INJECTION INTRAVENOUS at 00:01

## 2019-01-01 RX ADMIN — HUMAN INSULIN 30 UNITS: 100 INJECTION, SUSPENSION SUBCUTANEOUS at 09:10

## 2019-01-01 RX ADMIN — CASTOR OIL AND BALSAM, PERU: 788; 87 OINTMENT TOPICAL at 18:28

## 2019-01-01 RX ADMIN — SPIRONOLACTONE 200 MG: 100 TABLET, FILM COATED ORAL at 09:55

## 2019-01-01 RX ADMIN — IOPAMIDOL 1 ML: 755 INJECTION, SOLUTION INTRAVENOUS at 18:30

## 2019-01-01 RX ADMIN — LEVOFLOXACIN 750 MG: 5 INJECTION, SOLUTION INTRAVENOUS at 13:08

## 2019-01-01 RX ADMIN — INSULIN LISPRO 3 UNITS: 100 INJECTION, SOLUTION INTRAVENOUS; SUBCUTANEOUS at 22:49

## 2019-01-01 RX ADMIN — ASPIRIN 81 MG: 81 TABLET, COATED ORAL at 08:31

## 2019-01-01 RX ADMIN — SODIUM CHLORIDE 75 ML/HR: 900 INJECTION, SOLUTION INTRAVENOUS at 09:29

## 2019-01-01 RX ADMIN — DEXMEDETOMIDINE 0.3 MCG/KG/HR: 100 INJECTION, SOLUTION, CONCENTRATE INTRAVENOUS at 16:08

## 2019-01-01 RX ADMIN — ROPINIROLE HYDROCHLORIDE 4 MG: 1 TABLET, FILM COATED ORAL at 21:17

## 2019-01-01 RX ADMIN — INSULIN LISPRO 12 UNITS: 100 INJECTION, SOLUTION INTRAVENOUS; SUBCUTANEOUS at 06:26

## 2019-01-01 RX ADMIN — INSULIN LISPRO 2 UNITS: 100 INJECTION, SOLUTION INTRAVENOUS; SUBCUTANEOUS at 13:08

## 2019-01-01 RX ADMIN — Medication 200 MCG/MIN: at 09:55

## 2019-01-01 RX ADMIN — LOSARTAN POTASSIUM 25 MG: 25 TABLET ORAL at 10:13

## 2019-01-01 RX ADMIN — Medication 40 ML: at 06:19

## 2019-01-01 RX ADMIN — INSULIN LISPRO 4 UNITS: 100 INJECTION, SOLUTION INTRAVENOUS; SUBCUTANEOUS at 17:39

## 2019-01-01 RX ADMIN — ATORVASTATIN CALCIUM 40 MG: 40 TABLET, FILM COATED ORAL at 23:14

## 2019-01-01 RX ADMIN — ASPIRIN 81 MG CHEWABLE TABLET 81 MG: 81 TABLET CHEWABLE at 09:40

## 2019-01-01 RX ADMIN — HUMAN INSULIN 30 UNITS: 100 INJECTION, SUSPENSION SUBCUTANEOUS at 20:17

## 2019-01-01 RX ADMIN — RIFAXIMIN 550 MG: 550 TABLET ORAL at 18:42

## 2019-01-01 RX ADMIN — INSULIN LISPRO 3 UNITS: 100 INJECTION, SOLUTION INTRAVENOUS; SUBCUTANEOUS at 06:20

## 2019-01-01 RX ADMIN — PROPOFOL 30 MCG/KG/MIN: 10 INJECTION, EMULSION INTRAVENOUS at 04:06

## 2019-01-01 RX ADMIN — METHYLPREDNISOLONE SODIUM SUCCINATE 40 MG: 40 INJECTION, POWDER, FOR SOLUTION INTRAMUSCULAR; INTRAVENOUS at 21:41

## 2019-01-01 RX ADMIN — HEPARIN SODIUM 5000 UNITS: 5000 INJECTION INTRAVENOUS; SUBCUTANEOUS at 05:50

## 2019-01-01 RX ADMIN — Medication 10 ML: at 22:00

## 2019-01-01 RX ADMIN — PROPOFOL 10 MCG/KG/MIN: 10 INJECTION, EMULSION INTRAVENOUS at 15:32

## 2019-01-01 RX ADMIN — ENOXAPARIN SODIUM 40 MG: 40 INJECTION SUBCUTANEOUS at 22:03

## 2019-01-01 RX ADMIN — LACTULOSE 45 ML: 20 SOLUTION ORAL at 21:48

## 2019-01-01 RX ADMIN — OCTREOTIDE ACETATE 50 MCG/HR: 500 INJECTION, SOLUTION INTRAVENOUS; SUBCUTANEOUS at 18:29

## 2019-01-01 RX ADMIN — BISACODYL 10 MG: 10 SUPPOSITORY RECTAL at 16:39

## 2019-01-01 RX ADMIN — AMITRIPTYLINE HYDROCHLORIDE 100 MG: 50 TABLET, FILM COATED ORAL at 22:03

## 2019-01-01 RX ADMIN — Medication 10 ML: at 05:24

## 2019-01-01 RX ADMIN — METHYLPREDNISOLONE SODIUM SUCCINATE 40 MG: 40 INJECTION, POWDER, FOR SOLUTION INTRAMUSCULAR; INTRAVENOUS at 08:18

## 2019-01-01 RX ADMIN — NOREPINEPHRINE BITARTRATE 8 MCG/MIN: 1 INJECTION INTRAVENOUS at 08:35

## 2019-01-01 RX ADMIN — VANCOMYCIN HYDROCHLORIDE 500 MG: KIT at 06:20

## 2019-01-01 RX ADMIN — SODIUM CHLORIDE 125 ML/HR: 900 INJECTION, SOLUTION INTRAVENOUS at 15:55

## 2019-01-01 RX ADMIN — CASTOR OIL AND BALSAM, PERU: 788; 87 OINTMENT TOPICAL at 09:31

## 2019-01-01 RX ADMIN — ACETAMINOPHEN 650 MG: 160 SUSPENSION ORAL at 16:46

## 2019-01-01 RX ADMIN — LACTULOSE 45 ML: 10 SOLUTION ORAL at 21:49

## 2019-01-01 RX ADMIN — CHLORHEXIDINE GLUCONATE 15 ML: 1.2 RINSE ORAL at 20:56

## 2019-01-01 RX ADMIN — ALBUTEROL SULFATE 2.5 MG: 2.5 SOLUTION RESPIRATORY (INHALATION) at 08:10

## 2019-01-01 RX ADMIN — MEROPENEM 1 G: 1 INJECTION, POWDER, FOR SOLUTION INTRAVENOUS at 13:41

## 2019-01-01 RX ADMIN — INSULIN GLARGINE 15 UNITS: 100 INJECTION, SOLUTION SUBCUTANEOUS at 22:03

## 2019-01-01 RX ADMIN — PHENYLEPHRINE HYDROCHLORIDE 210 MCG/MIN: 10 INJECTION INTRAVENOUS at 12:12

## 2019-01-01 RX ADMIN — CEFEPIME HYDROCHLORIDE 2 G: 2 INJECTION, POWDER, FOR SOLUTION INTRAVENOUS at 20:35

## 2019-01-01 RX ADMIN — CEFTRIAXONE SODIUM 2 G: 2 INJECTION, POWDER, FOR SOLUTION INTRAMUSCULAR; INTRAVENOUS at 11:06

## 2019-01-01 RX ADMIN — LACTULOSE 45 ML: 20 SOLUTION ORAL at 16:11

## 2019-01-01 RX ADMIN — CASTOR OIL AND BALSAM, PERU: 788; 87 OINTMENT TOPICAL at 08:22

## 2019-01-01 RX ADMIN — SODIUM CHLORIDE 40 MG: 9 INJECTION, SOLUTION INTRAMUSCULAR; INTRAVENOUS; SUBCUTANEOUS at 08:31

## 2019-01-01 RX ADMIN — ACETAMINOPHEN 650 MG: 650 SUPPOSITORY RECTAL at 01:55

## 2019-01-01 RX ADMIN — IPRATROPIUM BROMIDE AND ALBUTEROL SULFATE 3 ML: .5; 3 SOLUTION RESPIRATORY (INHALATION) at 02:04

## 2019-01-01 RX ADMIN — LACTULOSE 45 ML: 20 SOLUTION ORAL at 08:22

## 2019-01-01 RX ADMIN — HEPARIN SODIUM 5000 UNITS: 5000 INJECTION INTRAVENOUS; SUBCUTANEOUS at 14:24

## 2019-01-01 RX ADMIN — LOSARTAN POTASSIUM 25 MG: 25 TABLET ORAL at 09:05

## 2019-01-01 RX ADMIN — ASPIRIN 81 MG 81 MG: 81 TABLET ORAL at 10:08

## 2019-01-01 RX ADMIN — CASTOR OIL AND BALSAM, PERU: 788; 87 OINTMENT TOPICAL at 19:22

## 2019-01-01 RX ADMIN — ENOXAPARIN SODIUM 40 MG: 40 INJECTION SUBCUTANEOUS at 22:32

## 2019-01-01 RX ADMIN — PANTOPRAZOLE SODIUM 40 MG: 40 TABLET, DELAYED RELEASE ORAL at 09:40

## 2019-01-01 RX ADMIN — CEFTRIAXONE SODIUM 2 G: 2 INJECTION, POWDER, FOR SOLUTION INTRAMUSCULAR; INTRAVENOUS at 12:32

## 2019-01-01 RX ADMIN — Medication 10 ML: at 05:21

## 2019-01-01 RX ADMIN — HYDROCORTISONE SODIUM SUCCINATE 50 MG: 100 INJECTION, POWDER, FOR SOLUTION INTRAMUSCULAR; INTRAVENOUS at 17:18

## 2019-01-01 RX ADMIN — LACTULOSE 30 G: 10 SOLUTION ORAL at 19:00

## 2019-01-01 RX ADMIN — SODIUM CHLORIDE 250 ML: 900 INJECTION, SOLUTION INTRAVENOUS at 21:39

## 2019-01-01 RX ADMIN — METHYLPREDNISOLONE SODIUM SUCCINATE 40 MG: 40 INJECTION, POWDER, FOR SOLUTION INTRAMUSCULAR; INTRAVENOUS at 21:10

## 2019-01-01 RX ADMIN — NOREPINEPHRINE BITARTRATE 56 MCG/MIN: 1 INJECTION INTRAVENOUS at 03:30

## 2019-01-01 RX ADMIN — FERROUS SULFATE TAB 325 MG (65 MG ELEMENTAL FE) 325 MG: 325 (65 FE) TAB at 10:36

## 2019-01-01 RX ADMIN — FLUTICASONE FUROATE AND VILANTEROL TRIFENATATE 1 PUFF: 200; 25 POWDER RESPIRATORY (INHALATION) at 09:00

## 2019-01-01 RX ADMIN — INSULIN LISPRO 10 UNITS: 100 INJECTION, SOLUTION INTRAVENOUS; SUBCUTANEOUS at 18:02

## 2019-01-01 RX ADMIN — IPRATROPIUM BROMIDE AND ALBUTEROL SULFATE 3 ML: .5; 3 SOLUTION RESPIRATORY (INHALATION) at 20:54

## 2019-01-01 RX ADMIN — VANCOMYCIN HYDROCHLORIDE 750 MG: 750 INJECTION, POWDER, LYOPHILIZED, FOR SOLUTION INTRAVENOUS at 20:26

## 2019-01-01 RX ADMIN — ASPIRIN 81 MG: 81 TABLET, COATED ORAL at 09:15

## 2019-01-01 RX ADMIN — Medication 30 MCG/MIN: at 06:22

## 2019-01-01 RX ADMIN — MEROPENEM 1 G: 1 INJECTION, POWDER, FOR SOLUTION INTRAVENOUS at 13:55

## 2019-01-01 RX ADMIN — CHLORHEXIDINE GLUCONATE 15 ML: 1.2 RINSE ORAL at 20:45

## 2019-01-01 RX ADMIN — HYDROCORTISONE SODIUM SUCCINATE 50 MG: 100 INJECTION, POWDER, FOR SOLUTION INTRAMUSCULAR; INTRAVENOUS at 05:53

## 2019-01-01 RX ADMIN — Medication 10 ML: at 03:56

## 2019-01-01 RX ADMIN — PANTOPRAZOLE SODIUM 40 MG: 40 TABLET, DELAYED RELEASE ORAL at 10:11

## 2019-01-01 RX ADMIN — ALBUTEROL SULFATE 2.5 MG: 2.5 SOLUTION RESPIRATORY (INHALATION) at 08:12

## 2019-01-01 RX ADMIN — MEROPENEM 1 G: 1 INJECTION, POWDER, FOR SOLUTION INTRAVENOUS at 09:41

## 2019-01-01 RX ADMIN — Medication 10 ML: at 22:10

## 2019-01-01 RX ADMIN — SODIUM CHLORIDE 50 ML/HR: 900 INJECTION, SOLUTION INTRAVENOUS at 06:11

## 2019-01-01 RX ADMIN — ALBUTEROL SULFATE 2.5 MG: 2.5 SOLUTION RESPIRATORY (INHALATION) at 23:49

## 2019-01-01 RX ADMIN — POTASSIUM & SODIUM PHOSPHATES POWDER PACK 280-160-250 MG 1 PACKET: 280-160-250 PACK at 08:08

## 2019-01-01 RX ADMIN — DEXTROSE MONOHYDRATE 25 G: 25 INJECTION, SOLUTION INTRAVENOUS at 14:31

## 2019-01-01 RX ADMIN — WATER: 1000 INJECTION, SOLUTION INTRAVENOUS at 22:48

## 2019-01-01 RX ADMIN — LACTULOSE 45 ML: 10 SOLUTION ORAL at 09:18

## 2019-01-01 RX ADMIN — INSULIN LISPRO 2 UNITS: 100 INJECTION, SOLUTION INTRAVENOUS; SUBCUTANEOUS at 13:30

## 2019-01-01 RX ADMIN — RIFAXIMIN 550 MG: 550 TABLET ORAL at 08:19

## 2019-01-01 RX ADMIN — ALBUTEROL SULFATE 2.5 MG: 2.5 SOLUTION RESPIRATORY (INHALATION) at 12:02

## 2019-01-01 RX ADMIN — ALBUTEROL SULFATE 2.5 MG: 2.5 SOLUTION RESPIRATORY (INHALATION) at 20:40

## 2019-01-01 RX ADMIN — VANCOMYCIN HYDROCHLORIDE 500 MG: KIT at 12:42

## 2019-01-01 RX ADMIN — HYDROCORTISONE ACETATE 25 MG: 25 SUPPOSITORY RECTAL at 10:28

## 2019-01-01 RX ADMIN — ALBUTEROL SULFATE 2.5 MG: 2.5 SOLUTION RESPIRATORY (INHALATION) at 15:27

## 2019-01-01 RX ADMIN — INSULIN LISPRO 3 UNITS: 100 INJECTION, SOLUTION INTRAVENOUS; SUBCUTANEOUS at 17:57

## 2019-01-01 RX ADMIN — LACTULOSE 45 ML: 10 SOLUTION ORAL at 21:25

## 2019-01-01 RX ADMIN — HUMAN INSULIN 30 UNITS: 100 INJECTION, SUSPENSION SUBCUTANEOUS at 21:10

## 2019-01-01 RX ADMIN — LOSARTAN POTASSIUM 25 MG: 25 TABLET ORAL at 08:58

## 2019-01-01 RX ADMIN — LACTULOSE 30 G: 20 SOLUTION ORAL at 08:15

## 2019-01-01 RX ADMIN — HEPARIN SODIUM 5000 UNITS: 5000 INJECTION INTRAVENOUS; SUBCUTANEOUS at 21:41

## 2019-01-01 RX ADMIN — SODIUM CHLORIDE 2850 ML: 900 INJECTION, SOLUTION INTRAVENOUS at 10:30

## 2019-01-01 RX ADMIN — Medication 10 ML: at 22:45

## 2019-01-01 RX ADMIN — ALBUTEROL SULFATE 2.5 MG: 2.5 SOLUTION RESPIRATORY (INHALATION) at 04:14

## 2019-01-01 RX ADMIN — METOPROLOL TARTRATE 12.5 MG: 25 TABLET ORAL at 08:58

## 2019-01-01 RX ADMIN — ATORVASTATIN CALCIUM 40 MG: 40 TABLET, FILM COATED ORAL at 21:26

## 2019-01-01 RX ADMIN — HEPARIN SODIUM 5000 UNITS: 5000 INJECTION INTRAVENOUS; SUBCUTANEOUS at 05:33

## 2019-01-01 RX ADMIN — VANCOMYCIN HYDROCHLORIDE 1250 MG: 10 INJECTION, POWDER, LYOPHILIZED, FOR SOLUTION INTRAVENOUS at 05:53

## 2019-01-01 RX ADMIN — Medication: at 01:54

## 2019-01-01 RX ADMIN — FAMOTIDINE 20 MG: 10 INJECTION, SOLUTION INTRAVENOUS at 09:33

## 2019-01-01 RX ADMIN — LOSARTAN POTASSIUM 25 MG: 25 TABLET ORAL at 08:51

## 2019-01-01 RX ADMIN — Medication 10 ML: at 05:18

## 2019-01-01 RX ADMIN — LACTULOSE 45 ML: 10 SOLUTION ORAL at 08:45

## 2019-01-01 RX ADMIN — ENOXAPARIN SODIUM 40 MG: 40 INJECTION SUBCUTANEOUS at 20:21

## 2019-01-01 RX ADMIN — INSULIN LISPRO 14 UNITS: 100 INJECTION, SOLUTION INTRAVENOUS; SUBCUTANEOUS at 13:12

## 2019-01-01 RX ADMIN — ONDANSETRON 4 MG: 4 TABLET, ORALLY DISINTEGRATING ORAL at 11:36

## 2019-01-01 RX ADMIN — GABAPENTIN 1200 MG: 300 CAPSULE ORAL at 09:03

## 2019-01-01 RX ADMIN — QUETIAPINE FUMARATE 25 MG: 25 TABLET ORAL at 22:13

## 2019-01-01 RX ADMIN — MUPIROCIN: 20 OINTMENT TOPICAL at 17:15

## 2019-01-01 RX ADMIN — ROPINIROLE HYDROCHLORIDE 4 MG: 1 TABLET, FILM COATED ORAL at 22:35

## 2019-01-01 RX ADMIN — GABAPENTIN 1200 MG: 300 CAPSULE ORAL at 18:03

## 2019-01-01 RX ADMIN — SPIRONOLACTONE 200 MG: 100 TABLET, FILM COATED ORAL at 08:59

## 2019-01-01 RX ADMIN — IPRATROPIUM BROMIDE AND ALBUTEROL SULFATE 3 ML: .5; 3 SOLUTION RESPIRATORY (INHALATION) at 15:51

## 2019-01-01 RX ADMIN — PROPOFOL 50 MCG/KG/MIN: 10 INJECTION, EMULSION INTRAVENOUS at 02:08

## 2019-01-01 RX ADMIN — LACTULOSE 45 ML: 10 SOLUTION ORAL at 22:09

## 2019-01-01 RX ADMIN — FLUCONAZOLE, SODIUM CHLORIDE 200 MG: 2 INJECTION INTRAVENOUS at 20:20

## 2019-01-01 RX ADMIN — Medication 10 ML: at 21:28

## 2019-01-01 RX ADMIN — ALBUTEROL SULFATE 2.5 MG: 2.5 SOLUTION RESPIRATORY (INHALATION) at 15:16

## 2019-01-01 RX ADMIN — ATORVASTATIN CALCIUM 40 MG: 40 TABLET, FILM COATED ORAL at 22:33

## 2019-01-01 RX ADMIN — MEROPENEM 1 G: 1 INJECTION, POWDER, FOR SOLUTION INTRAVENOUS at 22:27

## 2019-01-01 RX ADMIN — PROPOFOL 10 MCG/KG/MIN: 10 INJECTION, EMULSION INTRAVENOUS at 13:19

## 2019-01-01 RX ADMIN — ALBUTEROL SULFATE 2.5 MG: 2.5 SOLUTION RESPIRATORY (INHALATION) at 23:12

## 2019-01-01 RX ADMIN — IPRATROPIUM BROMIDE AND ALBUTEROL SULFATE 3 ML: .5; 3 SOLUTION RESPIRATORY (INHALATION) at 03:13

## 2019-01-01 RX ADMIN — GABAPENTIN 1000 MG: 100 CAPSULE ORAL at 21:47

## 2019-01-01 RX ADMIN — FAMOTIDINE 20 MG: 10 INJECTION, SOLUTION INTRAVENOUS at 10:11

## 2019-01-01 RX ADMIN — LACTULOSE 45 ML: 10 SOLUTION ORAL at 22:04

## 2019-01-01 RX ADMIN — DEXMEDETOMIDINE 0.2 MCG/KG/HR: 100 INJECTION, SOLUTION, CONCENTRATE INTRAVENOUS at 09:16

## 2019-01-01 RX ADMIN — AMITRIPTYLINE HYDROCHLORIDE 150 MG: 50 TABLET, FILM COATED ORAL at 23:14

## 2019-01-01 RX ADMIN — PANTOPRAZOLE SODIUM 40 MG: 40 TABLET, DELAYED RELEASE ORAL at 06:25

## 2019-01-01 RX ADMIN — IPRATROPIUM BROMIDE AND ALBUTEROL SULFATE 3 ML: .5; 3 SOLUTION RESPIRATORY (INHALATION) at 14:11

## 2019-01-01 RX ADMIN — ALBUTEROL SULFATE 2.5 MG: 2.5 SOLUTION RESPIRATORY (INHALATION) at 19:51

## 2019-01-01 RX ADMIN — LOSARTAN POTASSIUM 25 MG: 25 TABLET ORAL at 08:53

## 2019-01-01 RX ADMIN — ALBUTEROL SULFATE 2.5 MG: 2.5 SOLUTION RESPIRATORY (INHALATION) at 19:28

## 2019-01-01 RX ADMIN — SPIRONOLACTONE 100 MG: 25 TABLET ORAL at 10:14

## 2019-01-01 RX ADMIN — METHYLPREDNISOLONE SODIUM SUCCINATE 40 MG: 40 INJECTION, POWDER, FOR SOLUTION INTRAMUSCULAR; INTRAVENOUS at 06:10

## 2019-01-01 RX ADMIN — FAMOTIDINE 20 MG: 10 INJECTION, SOLUTION INTRAVENOUS at 20:37

## 2019-01-01 RX ADMIN — PANTOPRAZOLE SODIUM 40 MG: 40 TABLET, DELAYED RELEASE ORAL at 08:14

## 2019-01-01 RX ADMIN — ALBUTEROL SULFATE 2.5 MG: 2.5 SOLUTION RESPIRATORY (INHALATION) at 12:08

## 2019-01-01 RX ADMIN — RIFAXIMIN 550 MG: 550 TABLET ORAL at 10:06

## 2019-01-01 RX ADMIN — METHYLPREDNISOLONE SODIUM SUCCINATE 40 MG: 40 INJECTION, POWDER, FOR SOLUTION INTRAMUSCULAR; INTRAVENOUS at 20:08

## 2019-01-01 RX ADMIN — LACTULOSE 45 ML: 20 SOLUTION ORAL at 21:16

## 2019-01-01 RX ADMIN — PHENYLEPHRINE HYDROCHLORIDE 210 MCG/MIN: 10 INJECTION INTRAVENOUS at 12:32

## 2019-01-01 RX ADMIN — CEFTRIAXONE 1 G: 1 INJECTION, POWDER, FOR SOLUTION INTRAMUSCULAR; INTRAVENOUS at 17:56

## 2019-01-01 RX ADMIN — CEFEPIME HYDROCHLORIDE 2 G: 2 INJECTION, POWDER, FOR SOLUTION INTRAVENOUS at 12:45

## 2019-01-01 RX ADMIN — AMITRIPTYLINE HYDROCHLORIDE 100 MG: 50 TABLET, FILM COATED ORAL at 21:52

## 2019-01-01 RX ADMIN — VANCOMYCIN HYDROCHLORIDE 1500 MG: 10 INJECTION, POWDER, LYOPHILIZED, FOR SOLUTION INTRAVENOUS at 06:01

## 2019-01-01 RX ADMIN — QUETIAPINE FUMARATE 25 MG: 25 TABLET ORAL at 22:02

## 2019-01-01 RX ADMIN — CASTOR OIL AND BALSAM, PERU: 788; 87 OINTMENT TOPICAL at 11:24

## 2019-01-01 RX ADMIN — RIFAXIMIN 550 MG: 550 TABLET ORAL at 08:51

## 2019-01-01 RX ADMIN — FUROSEMIDE 80 MG: 40 TABLET ORAL at 08:45

## 2019-01-01 RX ADMIN — INSULIN GLARGINE 50 UNITS: 100 INJECTION, SOLUTION SUBCUTANEOUS at 00:47

## 2019-01-01 RX ADMIN — CASTOR OIL AND BALSAM, PERU: 788; 87 OINTMENT TOPICAL at 10:35

## 2019-01-01 RX ADMIN — PHENYLEPHRINE HYDROCHLORIDE 210 MCG/MIN: 10 INJECTION INTRAVENOUS at 04:41

## 2019-01-01 RX ADMIN — Medication: at 18:51

## 2019-01-01 RX ADMIN — VANCOMYCIN HYDROCHLORIDE 500 MG: KIT at 05:53

## 2019-01-01 RX ADMIN — INSULIN GLARGINE 40 UNITS: 100 INJECTION, SOLUTION SUBCUTANEOUS at 09:09

## 2019-01-01 RX ADMIN — METOPROLOL TARTRATE 12.5 MG: 25 TABLET ORAL at 09:04

## 2019-01-01 RX ADMIN — METHYLPREDNISOLONE SODIUM SUCCINATE 20 MG: 40 INJECTION, POWDER, FOR SOLUTION INTRAMUSCULAR; INTRAVENOUS at 20:17

## 2019-01-01 RX ADMIN — AMITRIPTYLINE HYDROCHLORIDE 150 MG: 50 TABLET, FILM COATED ORAL at 22:01

## 2019-01-01 RX ADMIN — POTASSIUM PHOSPHATE, MONOBASIC AND POTASSIUM PHOSPHATE, DIBASIC: 224; 236 INJECTION, SOLUTION, CONCENTRATE INTRAVENOUS at 10:44

## 2019-01-01 RX ADMIN — DEXTROSE MONOHYDRATE 25 G: 25 INJECTION, SOLUTION INTRAVENOUS at 10:15

## 2019-01-01 RX ADMIN — RIFAXIMIN 550 MG: 550 TABLET ORAL at 18:51

## 2019-01-01 RX ADMIN — CEFEPIME HYDROCHLORIDE 2 G: 2 INJECTION, POWDER, FOR SOLUTION INTRAVENOUS at 03:55

## 2019-01-01 RX ADMIN — SODIUM CHLORIDE 2313 ML: 900 INJECTION, SOLUTION INTRAVENOUS at 02:36

## 2019-01-01 RX ADMIN — ENOXAPARIN SODIUM 40 MG: 40 INJECTION SUBCUTANEOUS at 20:11

## 2019-01-01 RX ADMIN — Medication 10 ML: at 22:16

## 2019-01-01 RX ADMIN — DEXMEDETOMIDINE 0.6 MCG/KG/HR: 100 INJECTION, SOLUTION, CONCENTRATE INTRAVENOUS at 05:28

## 2019-01-01 RX ADMIN — SODIUM CHLORIDE 50 ML/HR: 450 INJECTION, SOLUTION INTRAVENOUS at 13:05

## 2019-01-01 RX ADMIN — PANTOPRAZOLE SODIUM 40 MG: 40 TABLET, DELAYED RELEASE ORAL at 06:18

## 2019-01-01 RX ADMIN — LACTULOSE 45 ML: 20 SOLUTION ORAL at 17:15

## 2019-01-01 RX ADMIN — FAMOTIDINE 20 MG: 10 INJECTION, SOLUTION INTRAVENOUS at 09:16

## 2019-01-01 RX ADMIN — INSULIN LISPRO 2 UNITS: 100 INJECTION, SOLUTION INTRAVENOUS; SUBCUTANEOUS at 18:03

## 2019-01-01 RX ADMIN — Medication 10 ML: at 15:44

## 2019-01-01 RX ADMIN — INSULIN GLARGINE 15 UNITS: 100 INJECTION, SOLUTION SUBCUTANEOUS at 22:26

## 2019-01-01 RX ADMIN — INSULIN GLARGINE 15 UNITS: 100 INJECTION, SOLUTION SUBCUTANEOUS at 10:34

## 2019-01-01 RX ADMIN — ASPIRIN 81 MG 81 MG: 81 TABLET ORAL at 10:14

## 2019-01-01 RX ADMIN — LACTULOSE 45 ML: 20 SOLUTION ORAL at 22:14

## 2019-01-01 RX ADMIN — IPRATROPIUM BROMIDE AND ALBUTEROL SULFATE 3 ML: .5; 3 SOLUTION RESPIRATORY (INHALATION) at 23:43

## 2019-01-01 RX ADMIN — PANTOPRAZOLE SODIUM 40 MG: 40 TABLET, DELAYED RELEASE ORAL at 08:51

## 2019-01-01 RX ADMIN — VANCOMYCIN HYDROCHLORIDE 1250 MG: 10 INJECTION, POWDER, LYOPHILIZED, FOR SOLUTION INTRAVENOUS at 17:14

## 2019-01-01 RX ADMIN — CEFTRIAXONE 1 G: 1 INJECTION, POWDER, FOR SOLUTION INTRAMUSCULAR; INTRAVENOUS at 17:27

## 2019-01-01 RX ADMIN — LACTULOSE 30 G: 10 SOLUTION ORAL at 00:19

## 2019-01-01 RX ADMIN — ALBUTEROL SULFATE 2.5 MG: 2.5 SOLUTION RESPIRATORY (INHALATION) at 13:30

## 2019-01-01 RX ADMIN — SODIUM CHLORIDE 75 ML/HR: 900 INJECTION, SOLUTION INTRAVENOUS at 01:51

## 2019-01-01 RX ADMIN — METRONIDAZOLE 500 MG: 500 INJECTION, SOLUTION INTRAVENOUS at 16:50

## 2019-01-01 RX ADMIN — HEPARIN SODIUM 5000 UNITS: 5000 INJECTION INTRAVENOUS; SUBCUTANEOUS at 06:35

## 2019-01-01 RX ADMIN — VANCOMYCIN HYDROCHLORIDE 750 MG: 750 INJECTION, POWDER, LYOPHILIZED, FOR SOLUTION INTRAVENOUS at 02:12

## 2019-01-01 RX ADMIN — IPRATROPIUM BROMIDE AND ALBUTEROL SULFATE 3 ML: .5; 3 SOLUTION RESPIRATORY (INHALATION) at 08:00

## 2019-01-01 RX ADMIN — INSULIN LISPRO 3 UNITS: 100 INJECTION, SOLUTION INTRAVENOUS; SUBCUTANEOUS at 17:00

## 2019-01-01 RX ADMIN — VANCOMYCIN HYDROCHLORIDE 500 MG: KIT at 00:22

## 2019-01-01 RX ADMIN — LACTULOSE 45 ML: 20 SOLUTION ORAL at 21:37

## 2019-01-01 RX ADMIN — CHLORHEXIDINE GLUCONATE 15 ML: 1.2 RINSE ORAL at 20:10

## 2019-01-01 RX ADMIN — METOPROLOL TARTRATE 12.5 MG: 25 TABLET ORAL at 16:30

## 2019-01-01 RX ADMIN — DEXMEDETOMIDINE 0.6 MCG/KG/HR: 100 INJECTION, SOLUTION, CONCENTRATE INTRAVENOUS at 15:44

## 2019-01-01 RX ADMIN — Medication: at 11:59

## 2019-01-01 RX ADMIN — METOCLOPRAMIDE 5 MG: 5 INJECTION, SOLUTION INTRAMUSCULAR; INTRAVENOUS at 11:59

## 2019-01-01 RX ADMIN — CASTOR OIL AND BALSAM, PERU: 788; 87 OINTMENT TOPICAL at 17:01

## 2019-01-01 RX ADMIN — FERROUS SULFATE TAB 325 MG (65 MG ELEMENTAL FE) 325 MG: 325 (65 FE) TAB at 09:17

## 2019-01-01 RX ADMIN — ATORVASTATIN CALCIUM 40 MG: 40 TABLET, FILM COATED ORAL at 22:13

## 2019-01-01 RX ADMIN — FLUTICASONE FUROATE AND VILANTEROL TRIFENATATE 1 PUFF: 200; 25 POWDER RESPIRATORY (INHALATION) at 13:45

## 2019-01-01 RX ADMIN — IPRATROPIUM BROMIDE AND ALBUTEROL SULFATE 3 ML: .5; 3 SOLUTION RESPIRATORY (INHALATION) at 07:07

## 2019-01-01 RX ADMIN — WATER: 1000 INJECTION, SOLUTION INTRAVENOUS at 05:00

## 2019-01-01 RX ADMIN — METHYLPREDNISOLONE SODIUM SUCCINATE 40 MG: 40 INJECTION, POWDER, FOR SOLUTION INTRAMUSCULAR; INTRAVENOUS at 14:50

## 2019-01-01 RX ADMIN — ROPINIROLE HYDROCHLORIDE 4 MG: 1 TABLET, FILM COATED ORAL at 22:12

## 2019-01-01 RX ADMIN — MUPIROCIN: 20 OINTMENT TOPICAL at 08:30

## 2019-01-01 RX ADMIN — FERROUS SULFATE TAB 325 MG (65 MG ELEMENTAL FE) 325 MG: 325 (65 FE) TAB at 08:51

## 2019-01-01 RX ADMIN — FLUCONAZOLE, SODIUM CHLORIDE 200 MG: 2 INJECTION INTRAVENOUS at 22:03

## 2019-01-01 RX ADMIN — Medication 10 ML: at 23:25

## 2019-01-01 RX ADMIN — HYDROCORTISONE ACETATE 25 MG: 25 SUPPOSITORY RECTAL at 09:54

## 2019-01-01 RX ADMIN — ALBUTEROL SULFATE 2.5 MG: 2.5 SOLUTION RESPIRATORY (INHALATION) at 04:29

## 2019-01-01 RX ADMIN — RIFAXIMIN 550 MG: 550 TABLET ORAL at 17:19

## 2019-01-01 RX ADMIN — Medication 10 ML: at 18:52

## 2019-01-01 RX ADMIN — WATER: 1000 INJECTION, SOLUTION INTRAVENOUS at 09:07

## 2019-01-01 RX ADMIN — LACTULOSE 200 G: 10 SOLUTION ORAL at 02:04

## 2019-01-01 RX ADMIN — ASPIRIN 81 MG: 81 TABLET, COATED ORAL at 09:31

## 2019-01-01 RX ADMIN — INSULIN LISPRO 3 UNITS: 100 INJECTION, SOLUTION INTRAVENOUS; SUBCUTANEOUS at 11:49

## 2019-01-01 RX ADMIN — INSULIN LISPRO 5 UNITS: 100 INJECTION, SOLUTION INTRAVENOUS; SUBCUTANEOUS at 17:51

## 2019-01-01 RX ADMIN — SPIRONOLACTONE 200 MG: 100 TABLET, FILM COATED ORAL at 10:17

## 2019-01-01 RX ADMIN — ASPIRIN 81 MG CHEWABLE TABLET 81 MG: 81 TABLET CHEWABLE at 09:00

## 2019-01-01 RX ADMIN — PROPOFOL 35 MCG/KG/MIN: 10 INJECTION, EMULSION INTRAVENOUS at 17:20

## 2019-01-01 RX ADMIN — Medication 10 ML: at 14:53

## 2019-01-01 RX ADMIN — INSULIN GLARGINE 40 UNITS: 100 INJECTION, SOLUTION SUBCUTANEOUS at 13:01

## 2019-01-01 RX ADMIN — ROPINIROLE HYDROCHLORIDE 4 MG: 1 TABLET, FILM COATED ORAL at 22:16

## 2019-01-01 RX ADMIN — PHENYLEPHRINE HYDROCHLORIDE 0.3 MG: 10 INJECTION INTRAVENOUS at 02:40

## 2019-01-01 RX ADMIN — CASTOR OIL AND BALSAM, PERU: 788; 87 OINTMENT TOPICAL at 09:15

## 2019-01-01 RX ADMIN — FERROUS SULFATE TAB 325 MG (65 MG ELEMENTAL FE) 325 MG: 325 (65 FE) TAB at 16:30

## 2019-01-01 RX ADMIN — DEXMEDETOMIDINE 0.7 MCG/KG/HR: 100 INJECTION, SOLUTION, CONCENTRATE INTRAVENOUS at 22:33

## 2019-01-01 RX ADMIN — METHYLPREDNISOLONE SODIUM SUCCINATE 40 MG: 40 INJECTION, POWDER, FOR SOLUTION INTRAMUSCULAR; INTRAVENOUS at 08:08

## 2019-01-01 RX ADMIN — Medication: at 10:17

## 2019-01-01 RX ADMIN — INSULIN LISPRO 2 UNITS: 100 INJECTION, SOLUTION INTRAVENOUS; SUBCUTANEOUS at 13:18

## 2019-01-01 RX ADMIN — MUPIROCIN: 20 OINTMENT TOPICAL at 17:11

## 2019-01-01 RX ADMIN — LACTULOSE 200 G: 10 SOLUTION ORAL at 20:02

## 2019-01-01 RX ADMIN — SPIRONOLACTONE 200 MG: 100 TABLET, FILM COATED ORAL at 17:01

## 2019-01-01 RX ADMIN — Medication 10 ML: at 14:00

## 2019-01-01 RX ADMIN — ASPIRIN 81 MG: 81 TABLET, COATED ORAL at 09:16

## 2019-01-01 RX ADMIN — FUROSEMIDE 80 MG: 40 TABLET ORAL at 08:51

## 2019-01-01 RX ADMIN — METHYLPREDNISOLONE SODIUM SUCCINATE 20 MG: 40 INJECTION, POWDER, FOR SOLUTION INTRAMUSCULAR; INTRAVENOUS at 08:16

## 2019-01-01 RX ADMIN — FUROSEMIDE 80 MG: 40 TABLET ORAL at 09:40

## 2019-01-01 RX ADMIN — INSULIN GLARGINE 15 UNITS: 100 INJECTION, SOLUTION SUBCUTANEOUS at 10:14

## 2019-01-01 RX ADMIN — CEFTRIAXONE 1 G: 1 INJECTION, POWDER, FOR SOLUTION INTRAMUSCULAR; INTRAVENOUS at 18:32

## 2019-01-01 RX ADMIN — LOSARTAN POTASSIUM 25 MG: 25 TABLET ORAL at 10:31

## 2019-01-01 RX ADMIN — OCTREOTIDE ACETATE 50 MCG/HR: 500 INJECTION, SOLUTION INTRAVENOUS; SUBCUTANEOUS at 07:55

## 2019-01-01 RX ADMIN — RIFAXIMIN 550 MG: 550 TABLET ORAL at 17:46

## 2019-01-01 RX ADMIN — FAMOTIDINE 20 MG: 10 INJECTION, SOLUTION INTRAVENOUS at 10:14

## 2019-01-01 RX ADMIN — INSULIN LISPRO 2 UNITS: 100 INJECTION, SOLUTION INTRAVENOUS; SUBCUTANEOUS at 08:50

## 2019-01-01 RX ADMIN — INSULIN LISPRO 2 UNITS: 100 INJECTION, SOLUTION INTRAVENOUS; SUBCUTANEOUS at 08:29

## 2019-01-01 RX ADMIN — IPRATROPIUM BROMIDE AND ALBUTEROL SULFATE 3 ML: .5; 3 SOLUTION RESPIRATORY (INHALATION) at 14:53

## 2019-01-01 RX ADMIN — HEPARIN SODIUM 5000 UNITS: 5000 INJECTION INTRAVENOUS; SUBCUTANEOUS at 06:40

## 2019-01-01 RX ADMIN — LACTULOSE 45 ML: 20 SOLUTION ORAL at 23:34

## 2019-01-01 RX ADMIN — FUROSEMIDE 80 MG: 80 TABLET ORAL at 09:44

## 2019-01-01 RX ADMIN — GABAPENTIN 1200 MG: 300 CAPSULE ORAL at 21:17

## 2019-01-01 RX ADMIN — MORPHINE SULFATE 2 MG: 2 INJECTION, SOLUTION INTRAMUSCULAR; INTRAVENOUS at 16:18

## 2019-01-01 RX ADMIN — SODIUM CHLORIDE 75 ML/HR: 900 INJECTION, SOLUTION INTRAVENOUS at 16:57

## 2019-01-01 RX ADMIN — CHLORHEXIDINE GLUCONATE 15 ML: 1.2 RINSE ORAL at 08:32

## 2019-01-01 RX ADMIN — ACETAMINOPHEN 650 MG: 650 SUPPOSITORY RECTAL at 10:36

## 2019-01-01 RX ADMIN — MUPIROCIN: 20 OINTMENT TOPICAL at 17:17

## 2019-01-01 RX ADMIN — ROPINIROLE HYDROCHLORIDE 4 MG: 1 TABLET, FILM COATED ORAL at 21:27

## 2019-01-01 RX ADMIN — HUMAN INSULIN 30 UNITS: 100 INJECTION, SUSPENSION SUBCUTANEOUS at 10:43

## 2019-01-01 RX ADMIN — HEPARIN SODIUM 5000 UNITS: 5000 INJECTION INTRAVENOUS; SUBCUTANEOUS at 22:17

## 2019-01-01 RX ADMIN — Medication 10 ML: at 15:40

## 2019-01-01 RX ADMIN — DEXMEDETOMIDINE 0.7 MCG/KG/HR: 100 INJECTION, SOLUTION, CONCENTRATE INTRAVENOUS at 20:06

## 2019-01-01 RX ADMIN — PROPOFOL 50 MCG/KG/MIN: 10 INJECTION, EMULSION INTRAVENOUS at 04:51

## 2019-01-01 RX ADMIN — OCTREOTIDE ACETATE 50 MCG/HR: 500 INJECTION, SOLUTION INTRAVENOUS; SUBCUTANEOUS at 16:03

## 2019-01-01 RX ADMIN — LEVOFLOXACIN 750 MG: 5 INJECTION, SOLUTION INTRAVENOUS at 18:03

## 2019-01-01 RX ADMIN — VANCOMYCIN HYDROCHLORIDE 1000 MG: 1 INJECTION, POWDER, LYOPHILIZED, FOR SOLUTION INTRAVENOUS at 01:58

## 2019-01-01 RX ADMIN — MORPHINE SULFATE 2 MG: 2 INJECTION, SOLUTION INTRAMUSCULAR; INTRAVENOUS at 05:29

## 2019-01-01 RX ADMIN — SPIRONOLACTONE 200 MG: 100 TABLET, FILM COATED ORAL at 10:38

## 2019-01-01 RX ADMIN — LACTULOSE 45 ML: 10 SOLUTION ORAL at 08:50

## 2019-01-01 RX ADMIN — INSULIN LISPRO 2 UNITS: 100 INJECTION, SOLUTION INTRAVENOUS; SUBCUTANEOUS at 11:57

## 2019-01-01 RX ADMIN — INSULIN LISPRO 2 UNITS: 100 INJECTION, SOLUTION INTRAVENOUS; SUBCUTANEOUS at 12:01

## 2019-01-01 RX ADMIN — HEPARIN SODIUM 5000 UNITS: 5000 INJECTION INTRAVENOUS; SUBCUTANEOUS at 05:58

## 2019-01-01 RX ADMIN — METHYLPREDNISOLONE SODIUM SUCCINATE 40 MG: 40 INJECTION, POWDER, FOR SOLUTION INTRAMUSCULAR; INTRAVENOUS at 20:50

## 2019-01-01 RX ADMIN — RIFAXIMIN 550 MG: 550 TABLET ORAL at 17:52

## 2019-01-01 RX ADMIN — METOPROLOL TARTRATE 12.5 MG: 25 TABLET ORAL at 10:31

## 2019-01-01 RX ADMIN — CEFTRIAXONE SODIUM 2 G: 2 INJECTION, POWDER, FOR SOLUTION INTRAMUSCULAR; INTRAVENOUS at 10:09

## 2019-01-01 RX ADMIN — INSULIN GLARGINE 25 UNITS: 100 INJECTION, SOLUTION SUBCUTANEOUS at 23:42

## 2019-01-01 RX ADMIN — METHYLPREDNISOLONE SODIUM SUCCINATE 40 MG: 40 INJECTION, POWDER, FOR SOLUTION INTRAMUSCULAR; INTRAVENOUS at 08:03

## 2019-01-01 RX ADMIN — HEPARIN SODIUM 5000 UNITS: 5000 INJECTION INTRAVENOUS; SUBCUTANEOUS at 22:05

## 2019-01-01 RX ADMIN — AMITRIPTYLINE HYDROCHLORIDE 100 MG: 50 TABLET, FILM COATED ORAL at 21:28

## 2019-01-01 RX ADMIN — Medication 10 ML: at 21:04

## 2019-01-01 RX ADMIN — METOPROLOL TARTRATE 12.5 MG: 25 TABLET ORAL at 21:00

## 2019-01-01 RX ADMIN — RIFAXIMIN 550 MG: 550 TABLET ORAL at 09:55

## 2019-01-01 RX ADMIN — IPRATROPIUM BROMIDE AND ALBUTEROL SULFATE 3 ML: .5; 3 SOLUTION RESPIRATORY (INHALATION) at 07:39

## 2019-01-01 RX ADMIN — HUMAN INSULIN 30 UNITS: 100 INJECTION, SUSPENSION SUBCUTANEOUS at 20:36

## 2019-01-01 RX ADMIN — RIFAXIMIN 550 MG: 550 TABLET ORAL at 20:10

## 2019-01-01 RX ADMIN — DEXMEDETOMIDINE 0.7 MCG/KG/HR: 100 INJECTION, SOLUTION, CONCENTRATE INTRAVENOUS at 07:39

## 2019-01-01 RX ADMIN — VANCOMYCIN HYDROCHLORIDE 1250 MG: 10 INJECTION, POWDER, LYOPHILIZED, FOR SOLUTION INTRAVENOUS at 19:19

## 2019-01-01 RX ADMIN — HUMAN INSULIN 30 UNITS: 100 INJECTION, SUSPENSION SUBCUTANEOUS at 21:03

## 2019-01-01 RX ADMIN — IPRATROPIUM BROMIDE AND ALBUTEROL SULFATE 3 ML: .5; 3 SOLUTION RESPIRATORY (INHALATION) at 11:30

## 2019-01-01 RX ADMIN — ASPIRIN 81 MG CHEWABLE TABLET 81 MG: 81 TABLET CHEWABLE at 08:50

## 2019-01-01 RX ADMIN — CHLORHEXIDINE GLUCONATE 15 ML: 1.2 RINSE ORAL at 09:16

## 2019-01-01 RX ADMIN — Medication 10 ML: at 14:55

## 2019-01-01 RX ADMIN — HUMAN INSULIN 25 UNITS: 100 INJECTION, SUSPENSION SUBCUTANEOUS at 19:46

## 2019-01-01 RX ADMIN — MEROPENEM 1 G: 1 INJECTION, POWDER, FOR SOLUTION INTRAVENOUS at 23:38

## 2019-01-01 RX ADMIN — Medication 10 ML: at 13:48

## 2019-01-01 RX ADMIN — MUPIROCIN: 20 OINTMENT TOPICAL at 08:32

## 2019-01-01 RX ADMIN — HYDROCORTISONE SODIUM SUCCINATE 50 MG: 100 INJECTION, POWDER, FOR SOLUTION INTRAMUSCULAR; INTRAVENOUS at 00:23

## 2019-01-01 RX ADMIN — INSULIN LISPRO 10 UNITS: 100 INJECTION, SOLUTION INTRAVENOUS; SUBCUTANEOUS at 23:48

## 2019-01-01 RX ADMIN — INSULIN LISPRO 2 UNITS: 100 INJECTION, SOLUTION INTRAVENOUS; SUBCUTANEOUS at 12:35

## 2019-01-01 RX ADMIN — HYDROCORTISONE ACETATE 25 MG: 25 SUPPOSITORY RECTAL at 21:34

## 2019-01-01 RX ADMIN — MUPIROCIN: 20 OINTMENT TOPICAL at 09:28

## 2019-01-01 RX ADMIN — PROPOFOL 5 MCG/KG/MIN: 10 INJECTION, EMULSION INTRAVENOUS at 16:17

## 2019-01-01 RX ADMIN — HEPARIN SODIUM 5000 UNITS: 5000 INJECTION INTRAVENOUS; SUBCUTANEOUS at 21:35

## 2019-01-01 RX ADMIN — MORPHINE SULFATE 2 MG: 2 INJECTION, SOLUTION INTRAMUSCULAR; INTRAVENOUS at 00:24

## 2019-01-01 RX ADMIN — LACTULOSE 45 ML: 10 SOLUTION ORAL at 17:27

## 2019-01-01 RX ADMIN — VANCOMYCIN HYDROCHLORIDE 1250 MG: 10 INJECTION, POWDER, LYOPHILIZED, FOR SOLUTION INTRAVENOUS at 06:41

## 2019-01-01 RX ADMIN — NOREPINEPHRINE BITARTRATE 4 MCG/MIN: 1 INJECTION INTRAVENOUS at 20:54

## 2019-01-01 RX ADMIN — Medication 5 ML: at 06:00

## 2019-01-01 RX ADMIN — MEROPENEM 1 G: 1 INJECTION, POWDER, FOR SOLUTION INTRAVENOUS at 06:41

## 2019-01-01 RX ADMIN — PHENYLEPHRINE HYDROCHLORIDE 210 MCG/MIN: 10 INJECTION INTRAVENOUS at 18:59

## 2019-01-01 RX ADMIN — SODIUM CHLORIDE 125 ML/HR: 900 INJECTION, SOLUTION INTRAVENOUS at 22:03

## 2019-01-01 RX ADMIN — LACTULOSE 45 ML: 20 SOLUTION ORAL at 10:14

## 2019-01-01 RX ADMIN — ALBUTEROL SULFATE 2.5 MG: 2.5 SOLUTION RESPIRATORY (INHALATION) at 19:23

## 2019-01-01 RX ADMIN — LACTULOSE 45 ML: 20 SOLUTION ORAL at 21:49

## 2019-01-01 RX ADMIN — PROPOFOL 15 MCG/KG/MIN: 10 INJECTION, EMULSION INTRAVENOUS at 01:00

## 2019-01-01 RX ADMIN — Medication 32 MCG/MIN: at 10:27

## 2019-01-01 RX ADMIN — PHENYLEPHRINE HYDROCHLORIDE 200 MCG/MIN: 10 INJECTION INTRAVENOUS at 03:43

## 2019-01-01 RX ADMIN — FERROUS SULFATE TAB 325 MG (65 MG ELEMENTAL FE) 325 MG: 325 (65 FE) TAB at 10:07

## 2019-01-01 RX ADMIN — Medication 10 ML: at 05:22

## 2019-01-01 RX ADMIN — HEPARIN SODIUM 5000 UNITS: 5000 INJECTION INTRAVENOUS; SUBCUTANEOUS at 14:41

## 2019-01-01 RX ADMIN — METOPROLOL TARTRATE 12.5 MG: 25 TABLET ORAL at 10:11

## 2019-01-01 RX ADMIN — Medication 22 MCG/MIN: at 17:14

## 2019-01-01 RX ADMIN — PROPOFOL 30 MCG/KG/MIN: 10 INJECTION, EMULSION INTRAVENOUS at 16:19

## 2019-01-01 RX ADMIN — ALBUTEROL SULFATE 2.5 MG: 2.5 SOLUTION RESPIRATORY (INHALATION) at 20:15

## 2019-01-01 RX ADMIN — INSULIN GLARGINE 40 UNITS: 100 INJECTION, SOLUTION SUBCUTANEOUS at 09:31

## 2019-01-01 RX ADMIN — CHLORHEXIDINE GLUCONATE 15 ML: 1.2 RINSE ORAL at 09:40

## 2019-01-01 RX ADMIN — LACTULOSE 45 ML: 20 SOLUTION ORAL at 17:56

## 2019-01-01 RX ADMIN — LORAZEPAM 1 MG: 2 INJECTION, SOLUTION INTRAMUSCULAR; INTRAVENOUS at 16:16

## 2019-01-01 RX ADMIN — LACTULOSE 45 ML: 20 SOLUTION ORAL at 09:04

## 2019-01-01 RX ADMIN — RIFAXIMIN 550 MG: 550 TABLET ORAL at 22:15

## 2019-01-01 RX ADMIN — Medication 10 ML: at 17:39

## 2019-01-01 RX ADMIN — Medication 10 ML: at 13:32

## 2019-01-01 RX ADMIN — DEXMEDETOMIDINE 0.7 MCG/KG/HR: 100 INJECTION, SOLUTION, CONCENTRATE INTRAVENOUS at 13:58

## 2019-01-01 RX ADMIN — SPIRONOLACTONE 200 MG: 100 TABLET, FILM COATED ORAL at 08:53

## 2019-01-01 RX ADMIN — FLUTICASONE FUROATE AND VILANTEROL TRIFENATATE 1 PUFF: 200; 25 POWDER RESPIRATORY (INHALATION) at 08:59

## 2019-01-01 RX ADMIN — LACTULOSE 45 ML: 20 SOLUTION ORAL at 16:21

## 2019-01-01 RX ADMIN — Medication 30 ML: at 21:12

## 2019-01-01 RX ADMIN — LOSARTAN POTASSIUM 25 MG: 25 TABLET ORAL at 09:44

## 2019-01-01 RX ADMIN — RIFAXIMIN 550 MG: 550 TABLET ORAL at 17:38

## 2019-01-01 RX ADMIN — SODIUM CHLORIDE 75 ML/HR: 900 INJECTION, SOLUTION INTRAVENOUS at 05:44

## 2019-01-01 RX ADMIN — FAMOTIDINE 20 MG: 10 INJECTION, SOLUTION INTRAVENOUS at 20:00

## 2019-01-01 RX ADMIN — SODIUM BICARBONATE 50 MEQ: 84 INJECTION, SOLUTION INTRAVENOUS at 05:15

## 2019-01-01 RX ADMIN — HUMAN INSULIN 30 UNITS: 100 INJECTION, SUSPENSION SUBCUTANEOUS at 09:50

## 2019-01-01 RX ADMIN — METRONIDAZOLE 500 MG: 500 INJECTION, SOLUTION INTRAVENOUS at 08:15

## 2019-01-01 RX ADMIN — RIFAXIMIN 550 MG: 550 TABLET ORAL at 10:32

## 2019-01-01 RX ADMIN — QUETIAPINE FUMARATE 25 MG: 25 TABLET ORAL at 21:18

## 2019-01-01 RX ADMIN — LACTULOSE 30 G: 10 SOLUTION ORAL at 17:06

## 2019-01-01 RX ADMIN — PROPRANOLOL HYDROCHLORIDE 20 MG: 10 TABLET ORAL at 18:26

## 2019-01-01 RX ADMIN — LACTULOSE 45 ML: 20 SOLUTION ORAL at 09:16

## 2019-01-01 RX ADMIN — RIFAXIMIN 550 MG: 550 TABLET ORAL at 09:16

## 2019-01-01 RX ADMIN — Medication 10 ML: at 04:39

## 2019-01-01 RX ADMIN — ALBUMIN (HUMAN) 12.5 G: 0.25 INJECTION, SOLUTION INTRAVENOUS at 20:08

## 2019-01-01 RX ADMIN — ROPINIROLE HYDROCHLORIDE 4 MG: 1 TABLET, FILM COATED ORAL at 21:46

## 2019-01-01 RX ADMIN — LACTULOSE 45 ML: 20 SOLUTION ORAL at 21:51

## 2019-01-01 RX ADMIN — ASPIRIN 81 MG 81 MG: 81 TABLET ORAL at 10:44

## 2019-01-01 RX ADMIN — SODIUM CHLORIDE 50 ML/HR: 450 INJECTION, SOLUTION INTRAVENOUS at 12:40

## 2019-01-01 RX ADMIN — GABAPENTIN 1200 MG: 300 CAPSULE ORAL at 10:33

## 2019-01-01 RX ADMIN — ACETAMINOPHEN 650 MG: 160 SUSPENSION ORAL at 19:47

## 2019-01-01 RX ADMIN — Medication: at 18:00

## 2019-01-01 RX ADMIN — INSULIN GLARGINE 6 UNITS: 100 INJECTION, SOLUTION SUBCUTANEOUS at 11:48

## 2019-01-01 RX ADMIN — Medication 10 ML: at 22:06

## 2019-01-01 RX ADMIN — Medication 38 MCG/MIN: at 22:03

## 2019-01-01 RX ADMIN — LACTULOSE 30 G: 10 SOLUTION ORAL at 06:00

## 2019-01-01 RX ADMIN — LOSARTAN POTASSIUM 25 MG: 25 TABLET ORAL at 16:30

## 2019-01-01 RX ADMIN — FERROUS SULFATE TAB 325 MG (65 MG ELEMENTAL FE) 325 MG: 325 (65 FE) TAB at 10:02

## 2019-01-01 RX ADMIN — INSULIN LISPRO 7 UNITS: 100 INJECTION, SOLUTION INTRAVENOUS; SUBCUTANEOUS at 00:03

## 2019-01-01 RX ADMIN — FLUCONAZOLE, SODIUM CHLORIDE 200 MG: 2 INJECTION INTRAVENOUS at 21:16

## 2019-01-01 RX ADMIN — Medication 10 ML: at 14:44

## 2019-01-01 RX ADMIN — LACTULOSE 45 ML: 10 SOLUTION ORAL at 17:22

## 2019-01-01 RX ADMIN — RIFAXIMIN 550 MG: 550 TABLET ORAL at 17:32

## 2019-01-01 RX ADMIN — HUMAN INSULIN 30 UNITS: 100 INJECTION, SUSPENSION SUBCUTANEOUS at 08:08

## 2019-01-01 RX ADMIN — METOPROLOL TARTRATE 12.5 MG: 25 TABLET ORAL at 22:33

## 2019-01-01 RX ADMIN — MEROPENEM 1 G: 1 INJECTION, POWDER, FOR SOLUTION INTRAVENOUS at 19:53

## 2019-01-01 RX ADMIN — PROPRANOLOL HYDROCHLORIDE 20 MG: 10 TABLET ORAL at 08:13

## 2019-01-01 RX ADMIN — GABAPENTIN 1200 MG: 300 CAPSULE ORAL at 18:00

## 2019-01-01 RX ADMIN — HALOPERIDOL LACTATE 4 MG: 5 INJECTION INTRAMUSCULAR at 16:41

## 2019-01-01 RX ADMIN — HEPARIN SODIUM 5000 UNITS: 5000 INJECTION INTRAVENOUS; SUBCUTANEOUS at 06:21

## 2019-01-01 RX ADMIN — Medication 10 ML: at 22:24

## 2019-01-01 RX ADMIN — CEFTRIAXONE SODIUM 2 G: 2 INJECTION, POWDER, FOR SOLUTION INTRAMUSCULAR; INTRAVENOUS at 10:17

## 2019-01-01 RX ADMIN — IPRATROPIUM BROMIDE AND ALBUTEROL SULFATE 3 ML: .5; 3 SOLUTION RESPIRATORY (INHALATION) at 11:33

## 2019-01-01 RX ADMIN — Medication 30 ML: at 21:52

## 2019-01-01 RX ADMIN — LACTULOSE 45 ML: 20 SOLUTION ORAL at 18:12

## 2019-01-01 RX ADMIN — CHLORHEXIDINE GLUCONATE 15 ML: 1.2 RINSE ORAL at 08:04

## 2019-01-01 RX ADMIN — SPIRONOLACTONE 100 MG: 25 TABLET ORAL at 17:43

## 2019-01-01 RX ADMIN — ONDANSETRON 4 MG: 4 TABLET, ORALLY DISINTEGRATING ORAL at 11:59

## 2019-01-01 RX ADMIN — Medication 10 ML: at 11:01

## 2019-01-01 RX ADMIN — METOCLOPRAMIDE 5 MG: 5 INJECTION, SOLUTION INTRAMUSCULAR; INTRAVENOUS at 06:44

## 2019-01-01 RX ADMIN — CHLORHEXIDINE GLUCONATE 15 ML: 1.2 RINSE ORAL at 21:40

## 2019-01-01 RX ADMIN — SODIUM CHLORIDE 40 MG: 9 INJECTION, SOLUTION INTRAMUSCULAR; INTRAVENOUS; SUBCUTANEOUS at 11:02

## 2019-01-01 RX ADMIN — Medication 10 ML: at 06:09

## 2019-01-01 RX ADMIN — METOCLOPRAMIDE 5 MG: 5 INJECTION, SOLUTION INTRAMUSCULAR; INTRAVENOUS at 11:36

## 2019-01-01 RX ADMIN — INSULIN GLARGINE 15 UNITS: 100 INJECTION, SOLUTION SUBCUTANEOUS at 10:08

## 2019-01-01 RX ADMIN — HUMAN INSULIN 30 UNITS: 100 INJECTION, SUSPENSION SUBCUTANEOUS at 08:04

## 2019-01-01 RX ADMIN — Medication 2 MCG/MIN: at 13:20

## 2019-01-01 RX ADMIN — DEXMEDETOMIDINE 0.7 MCG/KG/HR: 100 INJECTION, SOLUTION, CONCENTRATE INTRAVENOUS at 12:46

## 2019-01-01 RX ADMIN — METHYLPREDNISOLONE SODIUM SUCCINATE 40 MG: 40 INJECTION, POWDER, FOR SOLUTION INTRAMUSCULAR; INTRAVENOUS at 09:12

## 2019-01-01 RX ADMIN — METOPROLOL TARTRATE 12.5 MG: 25 TABLET ORAL at 20:21

## 2019-01-01 RX ADMIN — ALBUTEROL SULFATE 2.5 MG: 2.5 SOLUTION RESPIRATORY (INHALATION) at 20:01

## 2019-01-01 RX ADMIN — LACTULOSE 45 ML: 20 SOLUTION ORAL at 17:38

## 2019-01-01 RX ADMIN — Medication 8 MCG/MIN: at 13:46

## 2019-01-01 RX ADMIN — CASTOR OIL AND BALSAM, PERU: 788; 87 OINTMENT TOPICAL at 08:33

## 2019-01-01 RX ADMIN — METOCLOPRAMIDE 5 MG: 5 INJECTION, SOLUTION INTRAMUSCULAR; INTRAVENOUS at 00:00

## 2019-01-01 RX ADMIN — OCTREOTIDE ACETATE 50 MCG/HR: 500 INJECTION, SOLUTION INTRAVENOUS; SUBCUTANEOUS at 03:05

## 2019-01-01 RX ADMIN — ALBUTEROL SULFATE 2.5 MG: 2.5 SOLUTION RESPIRATORY (INHALATION) at 12:10

## 2019-01-01 RX ADMIN — METHYLPREDNISOLONE SODIUM SUCCINATE 40 MG: 40 INJECTION, POWDER, FOR SOLUTION INTRAMUSCULAR; INTRAVENOUS at 09:51

## 2019-01-01 RX ADMIN — FUROSEMIDE 80 MG: 40 TABLET ORAL at 08:18

## 2019-01-01 RX ADMIN — PANTOPRAZOLE SODIUM 40 MG: 40 TABLET, DELAYED RELEASE ORAL at 04:55

## 2019-01-01 RX ADMIN — MUPIROCIN: 20 OINTMENT TOPICAL at 18:02

## 2019-01-01 RX ADMIN — LACTULOSE 45 ML: 20 SOLUTION ORAL at 16:59

## 2019-01-01 RX ADMIN — HEPARIN SODIUM 5000 UNITS: 5000 INJECTION INTRAVENOUS; SUBCUTANEOUS at 06:01

## 2019-01-01 RX ADMIN — METOPROLOL TARTRATE 12.5 MG: 25 TABLET ORAL at 21:34

## 2019-01-01 RX ADMIN — ALBUTEROL SULFATE 2.5 MG: 2.5 SOLUTION RESPIRATORY (INHALATION) at 11:00

## 2019-01-01 RX ADMIN — METHYLPREDNISOLONE SODIUM SUCCINATE 40 MG: 40 INJECTION, POWDER, FOR SOLUTION INTRAMUSCULAR; INTRAVENOUS at 19:46

## 2019-01-01 RX ADMIN — Medication 10 ML: at 23:58

## 2019-01-01 RX ADMIN — ALBUTEROL SULFATE 2.5 MG: 2.5 SOLUTION RESPIRATORY (INHALATION) at 03:19

## 2019-01-01 RX ADMIN — Medication 10 ML: at 21:18

## 2019-01-01 RX ADMIN — FLUTICASONE FUROATE AND VILANTEROL TRIFENATATE 1 PUFF: 200; 25 POWDER RESPIRATORY (INHALATION) at 10:42

## 2019-01-01 RX ADMIN — IPRATROPIUM BROMIDE AND ALBUTEROL SULFATE 3 ML: .5; 3 SOLUTION RESPIRATORY (INHALATION) at 07:41

## 2019-01-01 RX ADMIN — SPIRONOLACTONE 200 MG: 100 TABLET, FILM COATED ORAL at 18:00

## 2019-01-01 RX ADMIN — VANCOMYCIN HYDROCHLORIDE 1250 MG: 10 INJECTION, POWDER, LYOPHILIZED, FOR SOLUTION INTRAVENOUS at 10:52

## 2019-01-01 RX ADMIN — METRONIDAZOLE 500 MG: 500 INJECTION, SOLUTION INTRAVENOUS at 09:26

## 2019-01-01 RX ADMIN — METHYLPREDNISOLONE SODIUM SUCCINATE 40 MG: 40 INJECTION, POWDER, FOR SOLUTION INTRAMUSCULAR; INTRAVENOUS at 13:30

## 2019-01-01 RX ADMIN — MEROPENEM 1 G: 1 INJECTION, POWDER, FOR SOLUTION INTRAVENOUS at 22:46

## 2019-01-01 RX ADMIN — ALBUTEROL SULFATE 2.5 MG: 2.5 SOLUTION RESPIRATORY (INHALATION) at 16:02

## 2019-01-01 RX ADMIN — MEROPENEM 1 G: 1 INJECTION, POWDER, FOR SOLUTION INTRAVENOUS at 22:18

## 2019-01-01 RX ADMIN — LACTULOSE 45 ML: 20 SOLUTION ORAL at 09:43

## 2019-01-01 RX ADMIN — IPRATROPIUM BROMIDE AND ALBUTEROL SULFATE 3 ML: .5; 3 SOLUTION RESPIRATORY (INHALATION) at 14:45

## 2019-01-01 RX ADMIN — INSULIN LISPRO 2 UNITS: 100 INJECTION, SOLUTION INTRAVENOUS; SUBCUTANEOUS at 06:18

## 2019-01-01 RX ADMIN — MUPIROCIN: 20 OINTMENT TOPICAL at 17:23

## 2019-01-01 RX ADMIN — MEROPENEM 1 G: 1 INJECTION, POWDER, FOR SOLUTION INTRAVENOUS at 06:50

## 2019-01-01 RX ADMIN — ASPIRIN 81 MG: 81 TABLET, COATED ORAL at 08:41

## 2019-01-01 RX ADMIN — MEROPENEM 1 G: 1 INJECTION, POWDER, FOR SOLUTION INTRAVENOUS at 07:32

## 2019-01-01 RX ADMIN — LOSARTAN POTASSIUM 25 MG: 25 TABLET ORAL at 09:40

## 2019-01-01 RX ADMIN — ROPINIROLE HYDROCHLORIDE 4 MG: 1 TABLET, FILM COATED ORAL at 22:17

## 2019-01-01 RX ADMIN — HEPARIN SODIUM 5000 UNITS: 5000 INJECTION INTRAVENOUS; SUBCUTANEOUS at 14:38

## 2019-01-01 RX ADMIN — Medication 10 ML: at 14:08

## 2019-01-01 RX ADMIN — ALBUTEROL SULFATE 2.5 MG: 2.5 SOLUTION RESPIRATORY (INHALATION) at 08:27

## 2019-01-01 RX ADMIN — HEPARIN SODIUM 5000 UNITS: 5000 INJECTION INTRAVENOUS; SUBCUTANEOUS at 21:49

## 2019-01-01 RX ADMIN — AMITRIPTYLINE HYDROCHLORIDE 150 MG: 50 TABLET, FILM COATED ORAL at 21:18

## 2019-01-01 RX ADMIN — DEXMEDETOMIDINE 0.5 MCG/KG/HR: 100 INJECTION, SOLUTION, CONCENTRATE INTRAVENOUS at 23:29

## 2019-01-01 RX ADMIN — DEXMEDETOMIDINE 0.7 MCG/KG/HR: 100 INJECTION, SOLUTION, CONCENTRATE INTRAVENOUS at 19:13

## 2019-01-01 RX ADMIN — ROPINIROLE HYDROCHLORIDE 4 MG: 1 TABLET, FILM COATED ORAL at 21:49

## 2019-01-01 RX ADMIN — METOPROLOL TARTRATE 12.5 MG: 25 TABLET ORAL at 10:06

## 2019-01-01 RX ADMIN — METOPROLOL TARTRATE 12.5 MG: 25 TABLET ORAL at 10:02

## 2019-01-01 RX ADMIN — MEROPENEM 1 G: 1 INJECTION, POWDER, FOR SOLUTION INTRAVENOUS at 23:07

## 2019-01-01 RX ADMIN — IPRATROPIUM BROMIDE AND ALBUTEROL SULFATE 3 ML: .5; 3 SOLUTION RESPIRATORY (INHALATION) at 15:34

## 2019-01-01 RX ADMIN — DEXMEDETOMIDINE 0.9 MCG/KG/HR: 100 INJECTION, SOLUTION, CONCENTRATE INTRAVENOUS at 04:05

## 2019-01-01 RX ADMIN — ASPIRIN 81 MG 81 MG: 81 TABLET ORAL at 10:03

## 2019-01-01 RX ADMIN — METHYLPREDNISOLONE SODIUM SUCCINATE 40 MG: 40 INJECTION, POWDER, FOR SOLUTION INTRAMUSCULAR; INTRAVENOUS at 20:37

## 2019-01-01 RX ADMIN — INSULIN LISPRO 2 UNITS: 100 INJECTION, SOLUTION INTRAVENOUS; SUBCUTANEOUS at 13:01

## 2019-01-01 RX ADMIN — FAMOTIDINE 20 MG: 10 INJECTION, SOLUTION INTRAVENOUS at 08:02

## 2019-01-01 RX ADMIN — DEXMEDETOMIDINE 0.6 MCG/KG/HR: 100 INJECTION, SOLUTION, CONCENTRATE INTRAVENOUS at 08:37

## 2019-01-01 RX ADMIN — METOPROLOL TARTRATE 12.5 MG: 25 TABLET ORAL at 20:09

## 2019-01-01 RX ADMIN — LACTULOSE 45 ML: 10 SOLUTION ORAL at 08:51

## 2019-01-01 RX ADMIN — CASTOR OIL AND BALSAM, PERU: 788; 87 OINTMENT TOPICAL at 17:28

## 2019-01-01 RX ADMIN — INSULIN GLARGINE 15 UNITS: 100 INJECTION, SOLUTION SUBCUTANEOUS at 09:43

## 2019-01-01 RX ADMIN — Medication 10 ML: at 05:38

## 2019-01-01 RX ADMIN — DEXMEDETOMIDINE 0.5 MCG/KG/HR: 100 INJECTION, SOLUTION, CONCENTRATE INTRAVENOUS at 13:42

## 2019-01-01 RX ADMIN — AMITRIPTYLINE HYDROCHLORIDE 150 MG: 50 TABLET, FILM COATED ORAL at 22:13

## 2019-01-01 RX ADMIN — NOREPINEPHRINE BITARTRATE 30 MCG/MIN: 1 INJECTION INTRAVENOUS at 00:03

## 2019-01-01 RX ADMIN — METHYLPREDNISOLONE SODIUM SUCCINATE 40 MG: 40 INJECTION, POWDER, FOR SOLUTION INTRAMUSCULAR; INTRAVENOUS at 19:52

## 2019-01-01 RX ADMIN — ATORVASTATIN CALCIUM 40 MG: 40 TABLET, FILM COATED ORAL at 21:39

## 2019-01-01 RX ADMIN — DEXMEDETOMIDINE 0.9 MCG/KG/HR: 100 INJECTION, SOLUTION, CONCENTRATE INTRAVENOUS at 23:48

## 2019-01-01 RX ADMIN — INSULIN LISPRO 2 UNITS: 100 INJECTION, SOLUTION INTRAVENOUS; SUBCUTANEOUS at 09:41

## 2019-01-01 RX ADMIN — HEPARIN SODIUM 5000 UNITS: 5000 INJECTION INTRAVENOUS; SUBCUTANEOUS at 21:46

## 2019-01-01 RX ADMIN — RIFAXIMIN 550 MG: 550 TABLET ORAL at 09:53

## 2019-01-01 RX ADMIN — INSULIN LISPRO 2 UNITS: 100 INJECTION, SOLUTION INTRAVENOUS; SUBCUTANEOUS at 08:46

## 2019-01-01 RX ADMIN — ALBUTEROL SULFATE 2.5 MG: 2.5 SOLUTION RESPIRATORY (INHALATION) at 12:11

## 2019-01-01 RX ADMIN — Medication 10 ML: at 16:40

## 2019-01-01 RX ADMIN — RIFAXIMIN 550 MG: 550 TABLET ORAL at 17:07

## 2019-01-01 RX ADMIN — CHLORHEXIDINE GLUCONATE 15 ML: 1.2 RINSE ORAL at 20:02

## 2019-01-01 RX ADMIN — ROPINIROLE HYDROCHLORIDE 4 MG: 1 TABLET, FILM COATED ORAL at 21:39

## 2019-01-01 RX ADMIN — AMITRIPTYLINE HYDROCHLORIDE 150 MG: 50 TABLET, FILM COATED ORAL at 22:35

## 2019-01-01 RX ADMIN — MEROPENEM 1 G: 1 INJECTION, POWDER, FOR SOLUTION INTRAVENOUS at 13:42

## 2019-01-01 RX ADMIN — MORPHINE SULFATE 2 MG: 2 INJECTION, SOLUTION INTRAMUSCULAR; INTRAVENOUS at 10:34

## 2019-01-01 RX ADMIN — FAMOTIDINE 20 MG: 10 INJECTION, SOLUTION INTRAVENOUS at 21:11

## 2019-01-01 RX ADMIN — HEPARIN SODIUM 5000 UNITS: 5000 INJECTION INTRAVENOUS; SUBCUTANEOUS at 21:25

## 2019-01-01 RX ADMIN — METHYLPREDNISOLONE SODIUM SUCCINATE 40 MG: 40 INJECTION, POWDER, FOR SOLUTION INTRAMUSCULAR; INTRAVENOUS at 10:48

## 2019-01-01 RX ADMIN — IPRATROPIUM BROMIDE AND ALBUTEROL SULFATE 3 ML: .5; 3 SOLUTION RESPIRATORY (INHALATION) at 13:52

## 2019-01-01 RX ADMIN — RIFAXIMIN 550 MG: 550 TABLET ORAL at 18:00

## 2019-01-01 RX ADMIN — GABAPENTIN 1200 MG: 300 CAPSULE ORAL at 10:06

## 2019-01-01 RX ADMIN — Medication 170 MCG/MIN: at 01:41

## 2019-01-01 RX ADMIN — HEPARIN SODIUM 5000 UNITS: 5000 INJECTION INTRAVENOUS; SUBCUTANEOUS at 22:09

## 2019-01-01 RX ADMIN — ONDANSETRON 4 MG: 4 TABLET, ORALLY DISINTEGRATING ORAL at 09:59

## 2019-01-01 RX ADMIN — INSULIN HUMAN 8 UNITS: 100 INJECTION, SUSPENSION SUBCUTANEOUS at 05:15

## 2019-01-01 RX ADMIN — RIFAXIMIN 550 MG: 550 TABLET ORAL at 18:12

## 2019-01-01 RX ADMIN — RIFAXIMIN 550 MG: 550 TABLET ORAL at 10:13

## 2019-01-01 RX ADMIN — RIFAXIMIN 550 MG: 550 TABLET ORAL at 08:30

## 2019-01-01 RX ADMIN — WATER: 1000 INJECTION, SOLUTION INTRAVENOUS at 03:06

## 2019-01-01 RX ADMIN — LACTULOSE 45 ML: 20 SOLUTION ORAL at 18:03

## 2019-01-01 RX ADMIN — PANTOPRAZOLE SODIUM 40 MG: 40 TABLET, DELAYED RELEASE ORAL at 08:58

## 2019-01-01 RX ADMIN — INSULIN LISPRO 5 UNITS: 100 INJECTION, SOLUTION INTRAVENOUS; SUBCUTANEOUS at 06:08

## 2019-01-01 RX ADMIN — LACTULOSE 45 ML: 10 SOLUTION ORAL at 08:22

## 2019-01-01 RX ADMIN — RIFAXIMIN 550 MG: 550 TABLET ORAL at 09:05

## 2019-01-01 RX ADMIN — CASTOR OIL AND BALSAM, PERU: 788; 87 OINTMENT TOPICAL at 09:45

## 2019-01-01 RX ADMIN — Medication 10 ML: at 06:15

## 2019-01-01 RX ADMIN — RIFAXIMIN 550 MG: 550 TABLET ORAL at 10:02

## 2019-01-01 RX ADMIN — HEPARIN SODIUM 5000 UNITS: 5000 INJECTION INTRAVENOUS; SUBCUTANEOUS at 06:15

## 2019-01-01 RX ADMIN — INSULIN LISPRO 3 UNITS: 100 INJECTION, SOLUTION INTRAVENOUS; SUBCUTANEOUS at 00:00

## 2019-01-01 RX ADMIN — SPIRONOLACTONE 200 MG: 100 TABLET, FILM COATED ORAL at 09:17

## 2019-01-01 RX ADMIN — GABAPENTIN 1200 MG: 300 CAPSULE ORAL at 22:34

## 2019-01-01 RX ADMIN — TRAMADOL HYDROCHLORIDE 25 MG: 50 TABLET, FILM COATED ORAL at 21:21

## 2019-01-01 RX ADMIN — RIFAXIMIN 550 MG: 550 TABLET ORAL at 17:14

## 2019-01-01 RX ADMIN — ONDANSETRON 4 MG: 4 TABLET, ORALLY DISINTEGRATING ORAL at 18:03

## 2019-01-01 RX ADMIN — INSULIN GLARGINE 15 UNITS: 100 INJECTION, SOLUTION SUBCUTANEOUS at 22:15

## 2019-01-01 RX ADMIN — DEXMEDETOMIDINE 0.9 MCG/KG/HR: 100 INJECTION, SOLUTION, CONCENTRATE INTRAVENOUS at 03:22

## 2019-01-01 RX ADMIN — INSULIN LISPRO 3 UNITS: 100 INJECTION, SOLUTION INTRAVENOUS; SUBCUTANEOUS at 17:08

## 2019-01-01 RX ADMIN — HUMAN INSULIN 15 UNITS: 100 INJECTION, SUSPENSION SUBCUTANEOUS at 13:32

## 2019-01-01 RX ADMIN — INSULIN GLARGINE 15 UNITS: 100 INJECTION, SOLUTION SUBCUTANEOUS at 22:25

## 2019-01-01 RX ADMIN — PROPOFOL 30 MCG/KG/MIN: 10 INJECTION, EMULSION INTRAVENOUS at 11:04

## 2019-01-01 RX ADMIN — ROPINIROLE HYDROCHLORIDE 4 MG: 1 TABLET, FILM COATED ORAL at 23:13

## 2019-01-01 RX ADMIN — Medication: at 17:44

## 2019-01-01 RX ADMIN — Medication 10 ML: at 21:27

## 2019-01-01 RX ADMIN — INSULIN LISPRO 2 UNITS: 100 INJECTION, SOLUTION INTRAVENOUS; SUBCUTANEOUS at 08:36

## 2019-01-01 RX ADMIN — CHLORHEXIDINE GLUCONATE 15 ML: 1.2 RINSE ORAL at 21:34

## 2019-01-01 RX ADMIN — INSULIN GLARGINE 25 UNITS: 100 INJECTION, SOLUTION SUBCUTANEOUS at 11:58

## 2019-01-01 RX ADMIN — VANCOMYCIN HYDROCHLORIDE 1250 MG: 10 INJECTION, POWDER, LYOPHILIZED, FOR SOLUTION INTRAVENOUS at 17:35

## 2019-01-01 RX ADMIN — AMITRIPTYLINE HYDROCHLORIDE 100 MG: 50 TABLET, FILM COATED ORAL at 21:56

## 2019-01-01 RX ADMIN — MEROPENEM 1 G: 1 INJECTION, POWDER, FOR SOLUTION INTRAVENOUS at 20:20

## 2019-01-01 RX ADMIN — AMITRIPTYLINE HYDROCHLORIDE 100 MG: 25 TABLET, FILM COATED ORAL at 21:40

## 2019-01-01 RX ADMIN — INSULIN LISPRO 3 UNITS: 100 INJECTION, SOLUTION INTRAVENOUS; SUBCUTANEOUS at 08:20

## 2019-01-01 RX ADMIN — GABAPENTIN 1200 MG: 300 CAPSULE ORAL at 09:16

## 2019-01-01 RX ADMIN — LACTULOSE 45 ML: 20 SOLUTION ORAL at 10:02

## 2019-01-01 RX ADMIN — Medication 10 ML: at 13:45

## 2019-01-01 RX ADMIN — IPRATROPIUM BROMIDE AND ALBUTEROL SULFATE 3 ML: .5; 3 SOLUTION RESPIRATORY (INHALATION) at 08:36

## 2019-01-01 RX ADMIN — RIFAXIMIN 550 MG: 550 TABLET ORAL at 17:28

## 2019-01-01 RX ADMIN — METOCLOPRAMIDE 5 MG: 5 INJECTION, SOLUTION INTRAMUSCULAR; INTRAVENOUS at 18:26

## 2019-01-01 RX ADMIN — CASTOR OIL AND BALSAM, PERU: 788; 87 OINTMENT TOPICAL at 08:05

## 2019-01-01 RX ADMIN — NOREPINEPHRINE BITARTRATE 60 MCG/MIN: 1 INJECTION INTRAVENOUS at 02:50

## 2019-01-01 RX ADMIN — OCTREOTIDE ACETATE 50 MCG/HR: 500 INJECTION, SOLUTION INTRAVENOUS; SUBCUTANEOUS at 21:51

## 2019-01-01 RX ADMIN — GABAPENTIN 1200 MG: 300 CAPSULE ORAL at 10:02

## 2019-01-01 RX ADMIN — AMITRIPTYLINE HYDROCHLORIDE 150 MG: 50 TABLET, FILM COATED ORAL at 21:41

## 2019-01-01 RX ADMIN — ALBUMIN (HUMAN) 50 G: 12.5 INJECTION, SOLUTION INTRAVENOUS at 11:27

## 2019-01-01 RX ADMIN — Medication 10 ML: at 21:35

## 2019-01-01 RX ADMIN — INSULIN LISPRO 2 UNITS: 100 INJECTION, SOLUTION INTRAVENOUS; SUBCUTANEOUS at 17:23

## 2019-01-01 RX ADMIN — SPIRONOLACTONE 100 MG: 25 TABLET ORAL at 18:26

## 2019-01-01 RX ADMIN — GABAPENTIN 1200 MG: 300 CAPSULE ORAL at 16:30

## 2019-01-01 RX ADMIN — ASPIRIN 81 MG: 81 TABLET, COATED ORAL at 08:18

## 2019-01-01 RX ADMIN — LACTULOSE 45 ML: 10 SOLUTION ORAL at 16:39

## 2019-01-01 RX ADMIN — NOREPINEPHRINE BITARTRATE 75 MCG/MIN: 1 INJECTION INTRAVENOUS at 18:30

## 2019-01-01 RX ADMIN — Medication 10 ML: at 14:27

## 2019-01-01 RX ADMIN — DEXMEDETOMIDINE 0.5 MCG/KG/HR: 100 INJECTION, SOLUTION, CONCENTRATE INTRAVENOUS at 02:12

## 2019-01-01 RX ADMIN — SODIUM CHLORIDE 125 ML/HR: 900 INJECTION, SOLUTION INTRAVENOUS at 06:51

## 2019-01-01 RX ADMIN — HUMAN INSULIN 15 UNITS: 100 INJECTION, SUSPENSION SUBCUTANEOUS at 08:18

## 2019-01-01 RX ADMIN — FUROSEMIDE 20 MG: 20 TABLET ORAL at 09:05

## 2019-01-01 RX ADMIN — HYDROCORTISONE SODIUM SUCCINATE 50 MG: 100 INJECTION, POWDER, FOR SOLUTION INTRAMUSCULAR; INTRAVENOUS at 05:31

## 2019-01-01 RX ADMIN — FERROUS SULFATE TAB 325 MG (65 MG ELEMENTAL FE) 325 MG: 325 (65 FE) TAB at 10:13

## 2019-01-01 RX ADMIN — Medication 10 ML: at 03:38

## 2019-01-01 RX ADMIN — Medication 14 MCG/MIN: at 15:40

## 2019-01-01 RX ADMIN — DEXMEDETOMIDINE 0.6 MCG/KG/HR: 100 INJECTION, SOLUTION, CONCENTRATE INTRAVENOUS at 23:33

## 2019-01-01 RX ADMIN — PANTOPRAZOLE SODIUM 40 MG: 40 TABLET, DELAYED RELEASE ORAL at 10:33

## 2019-01-01 RX ADMIN — Medication 10 ML: at 13:51

## 2019-01-01 RX ADMIN — MEROPENEM 1 G: 1 INJECTION, POWDER, FOR SOLUTION INTRAVENOUS at 12:46

## 2019-01-01 RX ADMIN — INSULIN LISPRO 2 UNITS: 100 INJECTION, SOLUTION INTRAVENOUS; SUBCUTANEOUS at 17:59

## 2019-01-01 RX ADMIN — Medication 10 ML: at 05:53

## 2019-01-01 RX ADMIN — FAMOTIDINE 20 MG: 10 INJECTION, SOLUTION INTRAVENOUS at 09:31

## 2019-01-01 RX ADMIN — NOREPINEPHRINE BITARTRATE 55 MCG/MIN: 1 INJECTION INTRAVENOUS at 11:14

## 2019-01-01 RX ADMIN — INSULIN GLARGINE 10 UNITS: 100 INJECTION, SOLUTION SUBCUTANEOUS at 10:40

## 2019-01-01 RX ADMIN — Medication 10 ML: at 05:47

## 2019-01-01 RX ADMIN — FLUCONAZOLE, SODIUM CHLORIDE 200 MG: 2 INJECTION INTRAVENOUS at 23:21

## 2019-01-01 RX ADMIN — Medication 10 ML: at 21:48

## 2019-01-01 RX ADMIN — SODIUM CHLORIDE 1000 ML/HR: 900 INJECTION, SOLUTION INTRAVENOUS at 03:06

## 2019-01-01 RX ADMIN — LACTULOSE 30 G: 10 SOLUTION ORAL at 06:19

## 2019-01-01 RX ADMIN — SPIRONOLACTONE 200 MG: 100 TABLET, FILM COATED ORAL at 09:00

## 2019-01-01 RX ADMIN — HEPARIN SODIUM 5000 UNITS: 5000 INJECTION INTRAVENOUS; SUBCUTANEOUS at 22:06

## 2019-01-01 RX ADMIN — CASTOR OIL AND BALSAM, PERU: 788; 87 OINTMENT TOPICAL at 17:56

## 2019-01-01 RX ADMIN — PROPOFOL 45 MCG/KG/MIN: 10 INJECTION, EMULSION INTRAVENOUS at 18:40

## 2019-01-01 RX ADMIN — HYDROCORTISONE SODIUM SUCCINATE 50 MG: 100 INJECTION, POWDER, FOR SOLUTION INTRAMUSCULAR; INTRAVENOUS at 14:41

## 2019-01-01 RX ADMIN — FAMOTIDINE 20 MG: 10 INJECTION, SOLUTION INTRAVENOUS at 11:20

## 2019-01-01 RX ADMIN — INSULIN GLARGINE 15 UNITS: 100 INJECTION, SOLUTION SUBCUTANEOUS at 16:57

## 2019-01-01 RX ADMIN — HEPARIN SODIUM 5000 UNITS: 5000 INJECTION INTRAVENOUS; SUBCUTANEOUS at 14:19

## 2019-01-01 RX ADMIN — SODIUM CHLORIDE 500 ML: 900 INJECTION, SOLUTION INTRAVENOUS at 20:06

## 2019-01-01 RX ADMIN — INSULIN LISPRO 2 UNITS: 100 INJECTION, SOLUTION INTRAVENOUS; SUBCUTANEOUS at 11:22

## 2019-01-01 RX ADMIN — SPIRONOLACTONE 200 MG: 100 TABLET, FILM COATED ORAL at 08:45

## 2019-01-01 RX ADMIN — FERROUS SULFATE TAB 325 MG (65 MG ELEMENTAL FE) 325 MG: 325 (65 FE) TAB at 09:05

## 2019-01-01 RX ADMIN — PROPOFOL 30 MCG/KG/MIN: 10 INJECTION, EMULSION INTRAVENOUS at 16:24

## 2019-01-01 RX ADMIN — SPIRONOLACTONE 100 MG: 25 TABLET ORAL at 17:57

## 2019-01-01 RX ADMIN — MEROPENEM 1 G: 1 INJECTION, POWDER, FOR SOLUTION INTRAVENOUS at 05:43

## 2019-01-01 RX ADMIN — IPRATROPIUM BROMIDE AND ALBUTEROL SULFATE 3 ML: .5; 3 SOLUTION RESPIRATORY (INHALATION) at 03:29

## 2019-01-01 RX ADMIN — ASPIRIN 81 MG: 81 TABLET, COATED ORAL at 09:44

## 2019-01-01 RX ADMIN — BENZONATATE 200 MG: 100 CAPSULE ORAL at 13:02

## 2019-01-01 RX ADMIN — LACTULOSE 45 ML: 20 SOLUTION ORAL at 22:49

## 2019-01-01 RX ADMIN — POTASSIUM & SODIUM PHOSPHATES POWDER PACK 280-160-250 MG 1 PACKET: 280-160-250 PACK at 09:16

## 2019-01-01 RX ADMIN — METHYLPREDNISOLONE SODIUM SUCCINATE 40 MG: 40 INJECTION, POWDER, FOR SOLUTION INTRAMUSCULAR; INTRAVENOUS at 06:19

## 2019-01-01 RX ADMIN — IPRATROPIUM BROMIDE AND ALBUTEROL SULFATE 3 ML: .5; 3 SOLUTION RESPIRATORY (INHALATION) at 16:23

## 2019-01-01 RX ADMIN — VANCOMYCIN HYDROCHLORIDE 1250 MG: 10 INJECTION, POWDER, LYOPHILIZED, FOR SOLUTION INTRAVENOUS at 04:52

## 2019-01-01 RX ADMIN — Medication 19 MCG/MIN: at 06:13

## 2019-01-01 RX ADMIN — LEVOFLOXACIN 750 MG: 5 INJECTION, SOLUTION INTRAVENOUS at 01:52

## 2019-01-01 RX ADMIN — PANTOPRAZOLE SODIUM 40 MG: 40 TABLET, DELAYED RELEASE ORAL at 10:08

## 2019-01-01 RX ADMIN — AMITRIPTYLINE HYDROCHLORIDE 100 MG: 50 TABLET, FILM COATED ORAL at 21:18

## 2019-01-01 RX ADMIN — INSULIN LISPRO 3 UNITS: 100 INJECTION, SOLUTION INTRAVENOUS; SUBCUTANEOUS at 12:41

## 2019-01-01 RX ADMIN — PANTOPRAZOLE SODIUM 40 MG: 40 TABLET, DELAYED RELEASE ORAL at 12:59

## 2019-01-01 RX ADMIN — SODIUM CHLORIDE 40 MG: 9 INJECTION, SOLUTION INTRAMUSCULAR; INTRAVENOUS; SUBCUTANEOUS at 08:30

## 2019-01-01 RX ADMIN — Medication 10 ML: at 21:52

## 2019-01-01 RX ADMIN — VANCOMYCIN HYDROCHLORIDE 500 MG: KIT at 06:01

## 2019-01-01 RX ADMIN — INSULIN LISPRO 2 UNITS: 100 INJECTION, SOLUTION INTRAVENOUS; SUBCUTANEOUS at 13:45

## 2019-01-01 RX ADMIN — HEPARIN SODIUM 5000 UNITS: 5000 INJECTION INTRAVENOUS; SUBCUTANEOUS at 05:53

## 2019-01-01 RX ADMIN — LACTULOSE 45 ML: 10 SOLUTION ORAL at 21:37

## 2019-01-01 RX ADMIN — SODIUM CHLORIDE 125 ML/HR: 900 INJECTION, SOLUTION INTRAVENOUS at 00:51

## 2019-01-01 RX ADMIN — BUMETANIDE 1 MG: 0.25 INJECTION INTRAMUSCULAR; INTRAVENOUS at 12:04

## 2019-01-01 RX ADMIN — IPRATROPIUM BROMIDE AND ALBUTEROL SULFATE 3 ML: .5; 3 SOLUTION RESPIRATORY (INHALATION) at 08:24

## 2019-01-01 RX ADMIN — INSULIN LISPRO 2 UNITS: 100 INJECTION, SOLUTION INTRAVENOUS; SUBCUTANEOUS at 12:23

## 2019-01-01 RX ADMIN — AMITRIPTYLINE HYDROCHLORIDE 100 MG: 50 TABLET, FILM COATED ORAL at 21:04

## 2019-01-01 RX ADMIN — METRONIDAZOLE 500 MG: 500 INJECTION, SOLUTION INTRAVENOUS at 16:26

## 2019-01-01 RX ADMIN — SPIRONOLACTONE 100 MG: 25 TABLET ORAL at 08:14

## 2019-01-01 RX ADMIN — CEFEPIME HYDROCHLORIDE 2 G: 2 INJECTION, POWDER, FOR SOLUTION INTRAVENOUS at 01:55

## 2019-01-01 RX ADMIN — ATORVASTATIN CALCIUM 40 MG: 40 TABLET, FILM COATED ORAL at 22:14

## 2019-01-01 RX ADMIN — LACTULOSE 30 G: 20 SOLUTION ORAL at 09:39

## 2019-01-01 RX ADMIN — INSULIN LISPRO 10 UNITS: 100 INJECTION, SOLUTION INTRAVENOUS; SUBCUTANEOUS at 17:14

## 2019-01-01 RX ADMIN — FUROSEMIDE 20 MG: 20 TABLET ORAL at 09:16

## 2019-01-01 RX ADMIN — IPRATROPIUM BROMIDE AND ALBUTEROL SULFATE 3 ML: .5; 3 SOLUTION RESPIRATORY (INHALATION) at 21:17

## 2019-01-01 RX ADMIN — METRONIDAZOLE 500 MG: 500 INJECTION, SOLUTION INTRAVENOUS at 08:41

## 2019-01-01 RX ADMIN — INSULIN LISPRO 2 UNITS: 100 INJECTION, SOLUTION INTRAVENOUS; SUBCUTANEOUS at 08:27

## 2019-01-01 RX ADMIN — LOSARTAN POTASSIUM 25 MG: 25 TABLET ORAL at 08:19

## 2019-01-01 RX ADMIN — FUROSEMIDE 20 MG: 20 TABLET ORAL at 10:33

## 2019-01-01 RX ADMIN — LEVOFLOXACIN 750 MG: 5 INJECTION, SOLUTION INTRAVENOUS at 14:40

## 2019-01-01 RX ADMIN — GABAPENTIN 1200 MG: 300 CAPSULE ORAL at 22:13

## 2019-01-01 RX ADMIN — LACTULOSE 30 G: 10 SOLUTION ORAL at 17:22

## 2019-01-01 RX ADMIN — CEFEPIME HYDROCHLORIDE 2 G: 2 INJECTION, POWDER, FOR SOLUTION INTRAVENOUS at 04:20

## 2019-01-01 RX ADMIN — ALBUTEROL SULFATE 2.5 MG: 2.5 SOLUTION RESPIRATORY (INHALATION) at 23:41

## 2019-01-01 RX ADMIN — LACTULOSE 30 G: 10 SOLUTION ORAL at 05:15

## 2019-01-01 RX ADMIN — LACTULOSE 30 G: 10 SOLUTION ORAL at 00:23

## 2019-01-01 RX ADMIN — Medication 30 MCG/MIN: at 20:01

## 2019-01-01 RX ADMIN — Medication 34 MCG/MIN: at 01:41

## 2019-01-01 RX ADMIN — MUPIROCIN: 20 OINTMENT TOPICAL at 17:38

## 2019-01-01 RX ADMIN — IPRATROPIUM BROMIDE AND ALBUTEROL SULFATE 3 ML: .5; 3 SOLUTION RESPIRATORY (INHALATION) at 19:56

## 2019-01-01 RX ADMIN — MEROPENEM 1 G: 1 INJECTION, POWDER, FOR SOLUTION INTRAVENOUS at 21:25

## 2019-01-01 RX ADMIN — CHLORHEXIDINE GLUCONATE 15 ML: 1.2 RINSE ORAL at 20:35

## 2019-01-01 RX ADMIN — ALBUTEROL SULFATE 2.5 MG: 2.5 SOLUTION RESPIRATORY (INHALATION) at 08:02

## 2019-01-01 RX ADMIN — CEFEPIME HYDROCHLORIDE 2 G: 2 INJECTION, POWDER, FOR SOLUTION INTRAVENOUS at 12:37

## 2019-01-01 RX ADMIN — METOCLOPRAMIDE 5 MG: 5 INJECTION, SOLUTION INTRAMUSCULAR; INTRAVENOUS at 00:56

## 2019-01-01 RX ADMIN — ALBUTEROL SULFATE 2.5 MG: 2.5 SOLUTION RESPIRATORY (INHALATION) at 15:25

## 2019-01-01 RX ADMIN — ALBUTEROL SULFATE 2.5 MG: 2.5 SOLUTION RESPIRATORY (INHALATION) at 08:36

## 2019-01-01 RX ADMIN — IPRATROPIUM BROMIDE AND ALBUTEROL SULFATE 3 ML: .5; 3 SOLUTION RESPIRATORY (INHALATION) at 07:33

## 2019-01-01 RX ADMIN — ALBUTEROL SULFATE 2.5 MG: 2.5 SOLUTION RESPIRATORY (INHALATION) at 03:57

## 2019-01-01 RX ADMIN — ATORVASTATIN CALCIUM 40 MG: 40 TABLET, FILM COATED ORAL at 21:48

## 2019-01-01 RX ADMIN — MORPHINE SULFATE 2 MG: 2 INJECTION, SOLUTION INTRAMUSCULAR; INTRAVENOUS at 18:20

## 2019-01-01 RX ADMIN — ASPIRIN 81 MG 81 MG: 81 TABLET ORAL at 09:05

## 2019-01-01 RX ADMIN — LORAZEPAM 1 MG: 2 INJECTION INTRAMUSCULAR; INTRAVENOUS at 11:32

## 2019-01-01 RX ADMIN — MUPIROCIN: 20 OINTMENT TOPICAL at 23:28

## 2019-01-01 RX ADMIN — ATORVASTATIN CALCIUM 40 MG: 40 TABLET, FILM COATED ORAL at 22:11

## 2019-01-01 RX ADMIN — METOPROLOL TARTRATE 12.5 MG: 25 TABLET ORAL at 21:17

## 2019-01-01 RX ADMIN — HEPARIN SODIUM 5000 UNITS: 5000 INJECTION INTRAVENOUS; SUBCUTANEOUS at 14:53

## 2019-01-01 RX ADMIN — LACTULOSE 45 ML: 10 SOLUTION ORAL at 17:48

## 2019-01-01 RX ADMIN — FLUTICASONE FUROATE AND VILANTEROL TRIFENATATE 1 PUFF: 200; 25 POWDER RESPIRATORY (INHALATION) at 10:16

## 2019-01-01 RX ADMIN — GABAPENTIN 1200 MG: 300 CAPSULE ORAL at 22:02

## 2019-01-01 RX ADMIN — INSULIN LISPRO 3 UNITS: 100 INJECTION, SOLUTION INTRAVENOUS; SUBCUTANEOUS at 12:33

## 2019-01-01 RX ADMIN — DEXMEDETOMIDINE 0.5 MCG/KG/HR: 100 INJECTION, SOLUTION, CONCENTRATE INTRAVENOUS at 10:20

## 2019-01-01 RX ADMIN — POTASSIUM CHLORIDE 40 MEQ: 750 TABLET, EXTENDED RELEASE ORAL at 08:44

## 2019-01-01 RX ADMIN — LACTULOSE 45 ML: 20 SOLUTION ORAL at 15:07

## 2019-01-01 RX ADMIN — Medication 10 ML: at 06:10

## 2019-01-01 RX ADMIN — Medication 20 ML: at 12:24

## 2019-01-01 RX ADMIN — LOSARTAN POTASSIUM 25 MG: 25 TABLET ORAL at 08:45

## 2019-01-01 RX ADMIN — LACTULOSE 200 G: 10 SOLUTION ORAL at 08:34

## 2019-01-01 RX ADMIN — RIFAXIMIN 550 MG: 550 TABLET ORAL at 16:54

## 2019-01-01 RX ADMIN — HYDROCORTISONE SODIUM SUCCINATE 50 MG: 100 INJECTION, POWDER, FOR SOLUTION INTRAMUSCULAR; INTRAVENOUS at 00:15

## 2019-01-01 RX ADMIN — LOSARTAN POTASSIUM 25 MG: 25 TABLET ORAL at 10:03

## 2019-01-01 RX ADMIN — WATER: 1000 INJECTION, SOLUTION INTRAVENOUS at 06:17

## 2019-01-01 RX ADMIN — NOREPINEPHRINE BITARTRATE 57 MCG/MIN: 1 INJECTION INTRAVENOUS at 21:55

## 2019-01-01 RX ADMIN — Medication: at 17:11

## 2019-01-01 RX ADMIN — IPRATROPIUM BROMIDE AND ALBUTEROL SULFATE 3 ML: .5; 3 SOLUTION RESPIRATORY (INHALATION) at 20:09

## 2019-01-01 RX ADMIN — IPRATROPIUM BROMIDE AND ALBUTEROL SULFATE 3 ML: .5; 3 SOLUTION RESPIRATORY (INHALATION) at 08:37

## 2019-01-01 RX ADMIN — HEPARIN SODIUM 5000 UNITS: 5000 INJECTION INTRAVENOUS; SUBCUTANEOUS at 17:23

## 2019-01-01 RX ADMIN — ALBUTEROL SULFATE 2.5 MG: 2.5 SOLUTION RESPIRATORY (INHALATION) at 19:21

## 2019-01-01 RX ADMIN — MEROPENEM 1 G: 1 INJECTION, POWDER, FOR SOLUTION INTRAVENOUS at 06:15

## 2019-01-01 RX ADMIN — SODIUM CHLORIDE 1000 ML: 900 INJECTION, SOLUTION INTRAVENOUS at 15:22

## 2019-01-01 RX ADMIN — SODIUM CHLORIDE 150 ML/HR: 900 INJECTION, SOLUTION INTRAVENOUS at 01:03

## 2019-01-01 RX ADMIN — RIFAXIMIN 550 MG: 550 TABLET ORAL at 08:45

## 2019-01-01 RX ADMIN — INSULIN GLARGINE 50 UNITS: 100 INJECTION, SOLUTION SUBCUTANEOUS at 11:37

## 2019-01-01 RX ADMIN — Medication 10 ML: at 07:33

## 2019-01-01 RX ADMIN — ALBUTEROL SULFATE 2.5 MG: 2.5 SOLUTION RESPIRATORY (INHALATION) at 07:48

## 2019-01-01 RX ADMIN — ASPIRIN 81 MG 81 MG: 81 TABLET ORAL at 09:17

## 2019-01-01 RX ADMIN — FLUTICASONE FUROATE AND VILANTEROL TRIFENATATE 1 PUFF: 200; 25 POWDER RESPIRATORY (INHALATION) at 09:50

## 2019-01-01 RX ADMIN — SODIUM CHLORIDE 500 ML: 900 INJECTION, SOLUTION INTRAVENOUS at 00:19

## 2019-01-01 RX ADMIN — FUROSEMIDE 80 MG: 80 TABLET ORAL at 10:14

## 2019-01-01 RX ADMIN — INSULIN GLARGINE 50 UNITS: 100 INJECTION, SOLUTION SUBCUTANEOUS at 10:43

## 2019-01-01 RX ADMIN — Medication 17 MCG/MIN: at 13:27

## 2019-01-01 RX ADMIN — HUMAN INSULIN 15 UNITS: 100 INJECTION, SUSPENSION SUBCUTANEOUS at 19:53

## 2019-01-01 RX ADMIN — MORPHINE SULFATE 2 MG: 2 INJECTION, SOLUTION INTRAMUSCULAR; INTRAVENOUS at 00:17

## 2019-01-01 RX ADMIN — METHYLPREDNISOLONE SODIUM SUCCINATE 40 MG: 40 INJECTION, POWDER, FOR SOLUTION INTRAMUSCULAR; INTRAVENOUS at 09:33

## 2019-01-01 RX ADMIN — INSULIN GLARGINE 20 UNITS: 100 INJECTION, SOLUTION SUBCUTANEOUS at 12:23

## 2019-01-01 RX ADMIN — Medication 10 ML: at 03:47

## 2019-01-01 RX ADMIN — LEVOFLOXACIN 750 MG: 5 INJECTION, SOLUTION INTRAVENOUS at 14:52

## 2019-01-01 RX ADMIN — METRONIDAZOLE 500 MG: 500 INJECTION, SOLUTION INTRAVENOUS at 00:32

## 2019-01-01 RX ADMIN — CHLORHEXIDINE GLUCONATE 15 ML: 1.2 RINSE ORAL at 08:22

## 2019-01-01 RX ADMIN — CASTOR OIL AND BALSAM, PERU: 788; 87 OINTMENT TOPICAL at 17:44

## 2019-01-01 RX ADMIN — LACTULOSE 200 G: 10 SOLUTION ORAL at 21:02

## 2019-01-01 RX ADMIN — IPRATROPIUM BROMIDE AND ALBUTEROL SULFATE 3 ML: .5; 3 SOLUTION RESPIRATORY (INHALATION) at 20:31

## 2019-01-01 RX ADMIN — FERROUS SULFATE TAB 325 MG (65 MG ELEMENTAL FE) 325 MG: 325 (65 FE) TAB at 08:44

## 2019-01-01 RX ADMIN — METOPROLOL TARTRATE 12.5 MG: 25 TABLET ORAL at 09:17

## 2019-01-01 RX ADMIN — RIFAXIMIN 550 MG: 550 TABLET ORAL at 17:42

## 2019-01-01 RX ADMIN — ACETAMINOPHEN 650 MG: 160 SUSPENSION ORAL at 20:31

## 2019-01-01 RX ADMIN — Medication 40 MCG/MIN: at 18:59

## 2019-01-01 RX ADMIN — MORPHINE SULFATE 4 MG: 2 INJECTION, SOLUTION INTRAMUSCULAR; INTRAVENOUS at 11:59

## 2019-01-01 RX ADMIN — LACTULOSE 30 G: 20 SOLUTION ORAL at 12:41

## 2019-01-01 RX ADMIN — FLUCONAZOLE, SODIUM CHLORIDE 400 MG: 2 INJECTION INTRAVENOUS at 15:10

## 2019-01-01 RX ADMIN — ALBUTEROL SULFATE 2.5 MG: 2.5 SOLUTION RESPIRATORY (INHALATION) at 15:29

## 2019-01-01 RX ADMIN — GABAPENTIN 1200 MG: 300 CAPSULE ORAL at 22:04

## 2019-01-01 RX ADMIN — CASTOR OIL AND BALSAM, PERU: 788; 87 OINTMENT TOPICAL at 11:22

## 2019-01-01 RX ADMIN — Medication 10 ML: at 05:58

## 2019-01-01 RX ADMIN — CASTOR OIL AND BALSAM, PERU: 788; 87 OINTMENT TOPICAL at 18:12

## 2019-01-01 RX ADMIN — MEROPENEM 1 G: 1 INJECTION, POWDER, FOR SOLUTION INTRAVENOUS at 14:52

## 2019-01-01 RX ADMIN — FUROSEMIDE 20 MG: 20 TABLET ORAL at 10:13

## 2019-01-01 RX ADMIN — FLUCONAZOLE 200 MG: 100 TABLET ORAL at 21:43

## 2019-01-01 RX ADMIN — ALBUTEROL SULFATE 2.5 MG: 2.5 SOLUTION RESPIRATORY (INHALATION) at 23:18

## 2019-01-01 RX ADMIN — METOPROLOL TARTRATE 12.5 MG: 25 TABLET ORAL at 22:03

## 2019-01-01 RX ADMIN — GABAPENTIN 1200 MG: 300 CAPSULE ORAL at 10:13

## 2019-01-01 RX ADMIN — Medication: at 10:30

## 2019-01-01 RX ADMIN — MEROPENEM 1 G: 1 INJECTION, POWDER, FOR SOLUTION INTRAVENOUS at 20:29

## 2019-01-01 RX ADMIN — ASPIRIN 81 MG CHEWABLE TABLET 81 MG: 81 TABLET CHEWABLE at 08:52

## 2019-01-01 RX ADMIN — INSULIN LISPRO 2 UNITS: 100 INJECTION, SOLUTION INTRAVENOUS; SUBCUTANEOUS at 23:45

## 2019-01-01 RX ADMIN — Medication 10 ML: at 04:30

## 2019-01-01 RX ADMIN — FAMOTIDINE 20 MG: 10 INJECTION, SOLUTION INTRAVENOUS at 21:02

## 2019-01-01 RX ADMIN — INSULIN LISPRO 2 UNITS: 100 INJECTION, SOLUTION INTRAVENOUS; SUBCUTANEOUS at 05:47

## 2019-01-01 RX ADMIN — GABAPENTIN 1200 MG: 300 CAPSULE ORAL at 21:48

## 2019-01-01 RX ADMIN — CEFEPIME HYDROCHLORIDE 2 G: 2 INJECTION, POWDER, FOR SOLUTION INTRAVENOUS at 21:24

## 2019-01-01 RX ADMIN — Medication 10 ML: at 05:56

## 2019-01-01 RX ADMIN — LACTULOSE 45 ML: 10 SOLUTION ORAL at 08:58

## 2019-01-01 RX ADMIN — FERROUS SULFATE TAB 325 MG (65 MG ELEMENTAL FE) 325 MG: 325 (65 FE) TAB at 09:18

## 2019-01-01 RX ADMIN — RIFAXIMIN 550 MG: 550 TABLET ORAL at 08:58

## 2019-01-01 RX ADMIN — GABAPENTIN 1200 MG: 300 CAPSULE ORAL at 23:15

## 2019-01-01 RX ADMIN — Medication: at 13:29

## 2019-01-01 RX ADMIN — RIFAXIMIN 550 MG: 550 TABLET ORAL at 10:30

## 2019-01-01 RX ADMIN — PROPOFOL 10 MCG/KG/MIN: 10 INJECTION, EMULSION INTRAVENOUS at 23:03

## 2019-01-01 RX ADMIN — ALBUTEROL SULFATE 2.5 MG: 2.5 SOLUTION RESPIRATORY (INHALATION) at 03:15

## 2019-01-01 RX ADMIN — FUROSEMIDE 20 MG: 20 TABLET ORAL at 10:35

## 2019-01-01 RX ADMIN — Medication 10 ML: at 16:38

## 2019-01-01 RX ADMIN — PANTOPRAZOLE SODIUM 40 MG: 40 TABLET, DELAYED RELEASE ORAL at 17:43

## 2019-01-01 RX ADMIN — GABAPENTIN 1200 MG: 300 CAPSULE ORAL at 16:59

## 2019-01-01 RX ADMIN — QUETIAPINE FUMARATE 25 MG: 25 TABLET ORAL at 21:43

## 2019-01-01 RX ADMIN — ASPIRIN 81 MG: 81 TABLET, COATED ORAL at 11:35

## 2019-01-01 RX ADMIN — SODIUM CHLORIDE, SODIUM LACTATE, POTASSIUM CHLORIDE, AND CALCIUM CHLORIDE 1000 ML/HR: 600; 310; 30; 20 INJECTION, SOLUTION INTRAVENOUS at 03:07

## 2019-01-01 RX ADMIN — ASPIRIN 81 MG: 81 TABLET, COATED ORAL at 10:14

## 2019-01-01 RX ADMIN — INSULIN LISPRO 2 UNITS: 100 INJECTION, SOLUTION INTRAVENOUS; SUBCUTANEOUS at 00:20

## 2019-01-01 RX ADMIN — INSULIN GLARGINE 15 UNITS: 100 INJECTION, SOLUTION SUBCUTANEOUS at 22:16

## 2019-01-01 RX ADMIN — ASPIRIN 81 MG: 81 TABLET, COATED ORAL at 08:02

## 2019-01-01 RX ADMIN — CEFEPIME HYDROCHLORIDE 2 G: 2 INJECTION, POWDER, FOR SOLUTION INTRAVENOUS at 20:31

## 2019-01-01 RX ADMIN — PHENYLEPHRINE HYDROCHLORIDE 210 MCG/MIN: 10 INJECTION INTRAVENOUS at 18:36

## 2019-01-01 RX ADMIN — INSULIN LISPRO 3 UNITS: 100 INJECTION, SOLUTION INTRAVENOUS; SUBCUTANEOUS at 18:31

## 2019-01-01 RX ADMIN — AMITRIPTYLINE HYDROCHLORIDE 100 MG: 50 TABLET, FILM COATED ORAL at 20:50

## 2019-01-01 RX ADMIN — Medication 10 ML: at 08:53

## 2019-01-01 RX ADMIN — METHYLPREDNISOLONE SODIUM SUCCINATE 40 MG: 40 INJECTION, POWDER, FOR SOLUTION INTRAMUSCULAR; INTRAVENOUS at 21:02

## 2019-01-01 RX ADMIN — ACETAMINOPHEN 650 MG: 650 SUPPOSITORY RECTAL at 01:48

## 2019-01-01 RX ADMIN — ATORVASTATIN CALCIUM 40 MG: 40 TABLET, FILM COATED ORAL at 22:17

## 2019-01-01 RX ADMIN — GABAPENTIN 1200 MG: 300 CAPSULE ORAL at 18:13

## 2019-01-01 RX ADMIN — NOREPINEPHRINE BITARTRATE 57 MCG/MIN: 1 INJECTION INTRAVENOUS at 08:22

## 2019-01-01 RX ADMIN — HEPARIN SODIUM 5000 UNITS: 5000 INJECTION INTRAVENOUS; SUBCUTANEOUS at 15:17

## 2019-01-01 RX ADMIN — CHLORHEXIDINE GLUCONATE 15 ML: 1.2 RINSE ORAL at 08:33

## 2019-01-01 RX ADMIN — MORPHINE SULFATE 2 MG: 2 INJECTION, SOLUTION INTRAMUSCULAR; INTRAVENOUS at 11:07

## 2019-01-01 RX ADMIN — METOCLOPRAMIDE 5 MG: 5 INJECTION, SOLUTION INTRAMUSCULAR; INTRAVENOUS at 17:43

## 2019-01-01 RX ADMIN — METOCLOPRAMIDE 5 MG: 5 INJECTION, SOLUTION INTRAMUSCULAR; INTRAVENOUS at 12:58

## 2019-01-01 RX ADMIN — METOPROLOL TARTRATE 12.5 MG: 25 TABLET ORAL at 08:45

## 2019-01-01 RX ADMIN — FLUTICASONE FUROATE AND VILANTEROL TRIFENATATE 1 PUFF: 200; 25 POWDER RESPIRATORY (INHALATION) at 09:11

## 2019-01-01 RX ADMIN — CASTOR OIL AND BALSAM, PERU: 788; 87 OINTMENT TOPICAL at 18:02

## 2019-01-01 RX ADMIN — MEROPENEM 1 G: 1 INJECTION, POWDER, FOR SOLUTION INTRAVENOUS at 07:42

## 2019-01-01 RX ADMIN — Medication 10 ML: at 16:08

## 2019-01-01 RX ADMIN — PHENYLEPHRINE HYDROCHLORIDE 210 MCG/MIN: 10 INJECTION INTRAVENOUS at 11:52

## 2019-01-01 RX ADMIN — INSULIN GLARGINE 15 UNITS: 100 INJECTION, SOLUTION SUBCUTANEOUS at 21:25

## 2019-01-01 RX ADMIN — HEPARIN SODIUM 5000 UNITS: 5000 INJECTION INTRAVENOUS; SUBCUTANEOUS at 21:24

## 2019-01-01 RX ADMIN — PANTOPRAZOLE SODIUM 40 MG: 40 TABLET, DELAYED RELEASE ORAL at 09:17

## 2019-01-01 RX ADMIN — METHYLPREDNISOLONE SODIUM SUCCINATE 40 MG: 40 INJECTION, POWDER, FOR SOLUTION INTRAMUSCULAR; INTRAVENOUS at 21:29

## 2019-01-01 RX ADMIN — FLUTICASONE FUROATE AND VILANTEROL TRIFENATATE 1 PUFF: 200; 25 POWDER RESPIRATORY (INHALATION) at 08:20

## 2019-01-01 RX ADMIN — MUPIROCIN: 20 OINTMENT TOPICAL at 08:31

## 2019-01-01 RX ADMIN — HEPARIN SODIUM 5000 UNITS: 5000 INJECTION INTRAVENOUS; SUBCUTANEOUS at 22:16

## 2019-01-01 RX ADMIN — ALBUTEROL SULFATE 2.5 MG: 2.5 SOLUTION RESPIRATORY (INHALATION) at 04:36

## 2019-01-01 RX ADMIN — RIFAXIMIN 550 MG: 550 TABLET ORAL at 09:43

## 2019-01-01 RX ADMIN — RIFAXIMIN 550 MG: 550 TABLET ORAL at 09:56

## 2019-01-01 RX ADMIN — MUPIROCIN: 20 OINTMENT TOPICAL at 08:04

## 2019-01-01 RX ADMIN — LOSARTAN POTASSIUM 25 MG: 25 TABLET ORAL at 10:33

## 2019-01-01 RX ADMIN — MEROPENEM 1 G: 1 INJECTION, POWDER, FOR SOLUTION INTRAVENOUS at 16:12

## 2019-01-01 RX ADMIN — FLUCONAZOLE, SODIUM CHLORIDE 200 MG: 2 INJECTION INTRAVENOUS at 22:42

## 2019-01-01 RX ADMIN — INSULIN LISPRO 4 UNITS: 100 INJECTION, SOLUTION INTRAVENOUS; SUBCUTANEOUS at 18:26

## 2019-01-01 RX ADMIN — LOSARTAN POTASSIUM 25 MG: 25 TABLET ORAL at 09:17

## 2019-01-01 RX ADMIN — ROPINIROLE HYDROCHLORIDE 4 MG: 1 TABLET, FILM COATED ORAL at 22:02

## 2019-01-01 RX ADMIN — ATORVASTATIN CALCIUM 40 MG: 40 TABLET, FILM COATED ORAL at 21:18

## 2019-01-01 RX ADMIN — POTASSIUM CHLORIDE 40 MEQ: 750 TABLET, EXTENDED RELEASE ORAL at 08:21

## 2019-01-01 RX ADMIN — INSULIN LISPRO 2 UNITS: 100 INJECTION, SOLUTION INTRAVENOUS; SUBCUTANEOUS at 17:29

## 2019-01-01 RX ADMIN — HUMAN INSULIN 30 UNITS: 100 INJECTION, SUSPENSION SUBCUTANEOUS at 09:31

## 2019-01-01 RX ADMIN — INSULIN GLARGINE 15 UNITS: 100 INJECTION, SOLUTION SUBCUTANEOUS at 21:31

## 2019-01-01 RX ADMIN — GABAPENTIN 1200 MG: 300 CAPSULE ORAL at 17:38

## 2019-01-01 RX ADMIN — VANCOMYCIN HYDROCHLORIDE 1250 MG: 10 INJECTION, POWDER, LYOPHILIZED, FOR SOLUTION INTRAVENOUS at 17:52

## 2019-01-01 RX ADMIN — LACTULOSE 30 G: 10 SOLUTION ORAL at 00:00

## 2019-01-01 RX ADMIN — ATORVASTATIN CALCIUM 40 MG: 40 TABLET, FILM COATED ORAL at 21:28

## 2019-01-01 RX ADMIN — Medication 10 ML: at 22:07

## 2019-01-01 RX ADMIN — FAMOTIDINE 20 MG: 10 INJECTION, SOLUTION INTRAVENOUS at 21:03

## 2019-01-01 RX ADMIN — METRONIDAZOLE 500 MG: 500 INJECTION, SOLUTION INTRAVENOUS at 16:19

## 2019-01-01 RX ADMIN — DEXMEDETOMIDINE 0.8 MCG/KG/HR: 100 INJECTION, SOLUTION, CONCENTRATE INTRAVENOUS at 02:09

## 2019-01-01 RX ADMIN — ASPIRIN 81 MG: 81 TABLET, COATED ORAL at 08:13

## 2019-01-01 RX ADMIN — SODIUM CHLORIDE 150 ML/HR: 900 INJECTION, SOLUTION INTRAVENOUS at 08:15

## 2019-01-01 RX ADMIN — MEROPENEM 1 G: 1 INJECTION, POWDER, FOR SOLUTION INTRAVENOUS at 22:03

## 2019-01-01 RX ADMIN — CASTOR OIL AND BALSAM, PERU: 788; 87 OINTMENT TOPICAL at 09:00

## 2019-01-01 RX ADMIN — Medication 10 ML: at 21:50

## 2019-01-01 RX ADMIN — OCTREOTIDE ACETATE 50 MCG/HR: 500 INJECTION, SOLUTION INTRAVENOUS; SUBCUTANEOUS at 09:52

## 2019-01-01 RX ADMIN — Medication 10 ML: at 05:48

## 2019-01-01 RX ADMIN — PROPOFOL 10 MCG/KG/MIN: 10 INJECTION, EMULSION INTRAVENOUS at 18:00

## 2019-01-01 RX ADMIN — HYDROCORTISONE ACETATE 25 MG: 25 SUPPOSITORY RECTAL at 21:49

## 2019-01-01 RX ADMIN — METOCLOPRAMIDE 5 MG: 5 INJECTION, SOLUTION INTRAMUSCULAR; INTRAVENOUS at 06:19

## 2019-01-01 RX ADMIN — Medication: at 09:00

## 2019-01-01 RX ADMIN — INSULIN LISPRO 2 UNITS: 100 INJECTION, SOLUTION INTRAVENOUS; SUBCUTANEOUS at 12:25

## 2019-01-01 RX ADMIN — INSULIN LISPRO 4 UNITS: 100 INJECTION, SOLUTION INTRAVENOUS; SUBCUTANEOUS at 00:11

## 2019-01-01 RX ADMIN — LOSARTAN POTASSIUM 25 MG: 25 TABLET ORAL at 09:00

## 2019-01-01 RX ADMIN — HYDROCORTISONE SODIUM SUCCINATE 50 MG: 100 INJECTION, POWDER, FOR SOLUTION INTRAMUSCULAR; INTRAVENOUS at 17:44

## 2019-01-01 RX ADMIN — MUPIROCIN: 20 OINTMENT TOPICAL at 08:20

## 2019-01-01 RX ADMIN — BUMETANIDE 2 MG: 0.25 INJECTION INTRAMUSCULAR; INTRAVENOUS at 09:12

## 2019-01-01 RX ADMIN — MUPIROCIN: 20 OINTMENT TOPICAL at 18:06

## 2019-01-01 RX ADMIN — ACETAMINOPHEN 650 MG: 160 SOLUTION ORAL at 05:28

## 2019-01-01 RX ADMIN — LACTULOSE 45 ML: 20 SOLUTION ORAL at 22:12

## 2019-01-01 RX ADMIN — INSULIN LISPRO 2 UNITS: 100 INJECTION, SOLUTION INTRAVENOUS; SUBCUTANEOUS at 17:27

## 2019-01-01 RX ADMIN — OCTREOTIDE ACETATE 50 MCG/HR: 500 INJECTION, SOLUTION INTRAVENOUS; SUBCUTANEOUS at 04:42

## 2019-01-01 RX ADMIN — LACTULOSE 30 G: 10 SOLUTION ORAL at 12:34

## 2019-01-01 RX ADMIN — SODIUM CHLORIDE 75 ML/HR: 900 INJECTION, SOLUTION INTRAVENOUS at 05:48

## 2019-01-01 RX ADMIN — ASPIRIN 81 MG 81 MG: 81 TABLET ORAL at 16:30

## 2019-01-01 RX ADMIN — FAMOTIDINE 20 MG: 10 INJECTION, SOLUTION INTRAVENOUS at 20:18

## 2019-01-01 RX ADMIN — PANTOPRAZOLE SODIUM 40 MG: 40 TABLET, DELAYED RELEASE ORAL at 10:03

## 2019-01-01 RX ADMIN — ACETAMINOPHEN 650 MG: 160 SOLUTION ORAL at 04:37

## 2019-01-01 RX ADMIN — CASTOR OIL AND BALSAM, PERU: 788; 87 OINTMENT TOPICAL at 10:13

## 2019-01-01 RX ADMIN — AMITRIPTYLINE HYDROCHLORIDE 150 MG: 50 TABLET, FILM COATED ORAL at 21:47

## 2019-01-01 RX ADMIN — MEROPENEM 1 G: 1 INJECTION, POWDER, FOR SOLUTION INTRAVENOUS at 12:38

## 2019-01-01 RX ADMIN — MEROPENEM 1 G: 1 INJECTION, POWDER, FOR SOLUTION INTRAVENOUS at 21:26

## 2019-01-01 RX ADMIN — BUMETANIDE 2 MG: 0.25 INJECTION INTRAMUSCULAR; INTRAVENOUS at 10:40

## 2019-01-01 RX ADMIN — INSULIN GLARGINE 40 UNITS: 100 INJECTION, SOLUTION SUBCUTANEOUS at 21:51

## 2019-01-01 RX ADMIN — PROPOFOL 50 MCG/KG/MIN: 10 INJECTION, EMULSION INTRAVENOUS at 22:33

## 2019-01-01 RX ADMIN — IPRATROPIUM BROMIDE AND ALBUTEROL SULFATE 3 ML: .5; 3 SOLUTION RESPIRATORY (INHALATION) at 19:34

## 2019-01-01 RX ADMIN — SODIUM CHLORIDE 75 ML/HR: 900 INJECTION, SOLUTION INTRAVENOUS at 08:03

## 2019-01-01 RX ADMIN — VANCOMYCIN HYDROCHLORIDE 750 MG: 750 INJECTION, POWDER, LYOPHILIZED, FOR SOLUTION INTRAVENOUS at 20:07

## 2019-01-01 RX ADMIN — INSULIN LISPRO 2 UNITS: 100 INJECTION, SOLUTION INTRAVENOUS; SUBCUTANEOUS at 12:28

## 2019-01-01 RX ADMIN — ACETAMINOPHEN 650 MG: 325 TABLET ORAL at 17:29

## 2019-01-01 RX ADMIN — HYDROCORTISONE ACETATE 25 MG: 25 SUPPOSITORY RECTAL at 20:37

## 2019-01-01 RX ADMIN — INSULIN LISPRO 4 UNITS: 100 INJECTION, SOLUTION INTRAVENOUS; SUBCUTANEOUS at 12:03

## 2019-01-01 RX ADMIN — INSULIN LISPRO 2 UNITS: 100 INJECTION, SOLUTION INTRAVENOUS; SUBCUTANEOUS at 12:45

## 2019-01-01 RX ADMIN — PROPRANOLOL HYDROCHLORIDE 20 MG: 10 TABLET ORAL at 17:43

## 2019-01-01 RX ADMIN — LACTULOSE 200 G: 10 SOLUTION ORAL at 01:44

## 2019-01-01 RX ADMIN — ASPIRIN 81 MG CHEWABLE TABLET 81 MG: 81 TABLET CHEWABLE at 08:59

## 2019-01-01 RX ADMIN — Medication 40 ML: at 21:40

## 2019-01-01 RX ADMIN — MUPIROCIN: 20 OINTMENT TOPICAL at 08:33

## 2019-01-01 RX ADMIN — ASPIRIN 81 MG 81 MG: 81 TABLET ORAL at 10:32

## 2019-01-01 RX ADMIN — INSULIN GLARGINE 15 UNITS: 100 INJECTION, SOLUTION SUBCUTANEOUS at 21:52

## 2019-01-01 RX ADMIN — ACETAMINOPHEN 650 MG: 160 SOLUTION ORAL at 16:16

## 2019-01-01 RX ADMIN — CASTOR OIL AND BALSAM, PERU: 788; 87 OINTMENT TOPICAL at 17:17

## 2019-01-01 RX ADMIN — FLUTICASONE FUROATE AND VILANTEROL TRIFENATATE 1 PUFF: 200; 25 POWDER RESPIRATORY (INHALATION) at 09:18

## 2019-01-01 RX ADMIN — INSULIN GLARGINE 6 UNITS: 100 INJECTION, SOLUTION SUBCUTANEOUS at 11:26

## 2019-01-01 RX ADMIN — DEXTROSE MONOHYDRATE 25 G: 25 INJECTION, SOLUTION INTRAVENOUS at 09:32

## 2019-01-01 RX ADMIN — PANTOPRAZOLE SODIUM 40 MG: 40 TABLET, DELAYED RELEASE ORAL at 09:18

## 2019-01-01 RX ADMIN — LACTULOSE 45 ML: 20 SOLUTION ORAL at 22:32

## 2019-01-01 RX ADMIN — QUETIAPINE FUMARATE 25 MG: 25 TABLET ORAL at 23:15

## 2019-01-01 RX ADMIN — LACTULOSE 45 ML: 20 SOLUTION ORAL at 13:44

## 2019-01-01 RX ADMIN — METOPROLOL TARTRATE 12.5 MG: 25 TABLET ORAL at 23:10

## 2019-01-01 RX ADMIN — ALBUTEROL SULFATE 2.5 MG: 2.5 SOLUTION RESPIRATORY (INHALATION) at 04:34

## 2019-01-01 RX ADMIN — Medication 10 ML: at 13:02

## 2019-01-01 RX ADMIN — CASTOR OIL AND BALSAM, PERU: 788; 87 OINTMENT TOPICAL at 08:04

## 2019-01-01 RX ADMIN — MEROPENEM 1 G: 1 INJECTION, POWDER, FOR SOLUTION INTRAVENOUS at 07:55

## 2019-01-01 RX ADMIN — ROPINIROLE HYDROCHLORIDE 4 MG: 1 TABLET, FILM COATED ORAL at 22:05

## 2019-01-01 RX ADMIN — ALBUMIN (HUMAN) 12.5 G: 0.25 INJECTION, SOLUTION INTRAVENOUS at 10:05

## 2019-01-01 RX ADMIN — DEXMEDETOMIDINE 0.5 MCG/KG/HR: 100 INJECTION, SOLUTION, CONCENTRATE INTRAVENOUS at 17:09

## 2019-01-01 RX ADMIN — Medication 10 ML: at 13:33

## 2019-01-01 RX ADMIN — METRONIDAZOLE 500 MG: 500 INJECTION, SOLUTION INTRAVENOUS at 08:31

## 2019-01-01 RX ADMIN — LACTULOSE 45 ML: 10 SOLUTION ORAL at 09:41

## 2019-01-01 RX ADMIN — RIFAXIMIN 550 MG: 550 TABLET ORAL at 09:17

## 2019-01-01 RX ADMIN — INSULIN GLARGINE 15 UNITS: 100 INJECTION, SOLUTION SUBCUTANEOUS at 10:29

## 2019-01-01 RX ADMIN — FERROUS SULFATE TAB 325 MG (65 MG ELEMENTAL FE) 325 MG: 325 (65 FE) TAB at 09:40

## 2019-01-01 RX ADMIN — FLUTICASONE FUROATE AND VILANTEROL TRIFENATATE 1 PUFF: 200; 25 POWDER RESPIRATORY (INHALATION) at 10:17

## 2019-01-01 RX ADMIN — VANCOMYCIN HYDROCHLORIDE 1250 MG: 10 INJECTION, POWDER, LYOPHILIZED, FOR SOLUTION INTRAVENOUS at 05:51

## 2019-01-01 RX ADMIN — METRONIDAZOLE 500 MG: 500 INJECTION, SOLUTION INTRAVENOUS at 00:11

## 2019-01-01 RX ADMIN — MEROPENEM 1 G: 1 INJECTION, POWDER, FOR SOLUTION INTRAVENOUS at 15:43

## 2019-01-01 RX ADMIN — MEROPENEM 1 G: 1 INJECTION, POWDER, FOR SOLUTION INTRAVENOUS at 05:48

## 2019-01-01 RX ADMIN — SPIRONOLACTONE 200 MG: 100 TABLET, FILM COATED ORAL at 10:07

## 2019-01-01 RX ADMIN — MUPIROCIN: 20 OINTMENT TOPICAL at 08:22

## 2019-01-01 RX ADMIN — LOSARTAN POTASSIUM 25 MG: 25 TABLET ORAL at 08:50

## 2019-01-01 RX ADMIN — ASPIRIN 81 MG: 81 TABLET, COATED ORAL at 09:41

## 2019-01-01 RX ADMIN — SODIUM CHLORIDE 40 MG: 9 INJECTION, SOLUTION INTRAMUSCULAR; INTRAVENOUS; SUBCUTANEOUS at 08:20

## 2019-01-01 RX ADMIN — CEFTRIAXONE SODIUM 2 G: 2 INJECTION, POWDER, FOR SOLUTION INTRAMUSCULAR; INTRAVENOUS at 13:02

## 2019-01-01 RX ADMIN — LEVOFLOXACIN 750 MG: 5 INJECTION, SOLUTION INTRAVENOUS at 12:45

## 2019-01-01 RX ADMIN — LACTULOSE 45 ML: 10 SOLUTION ORAL at 17:09

## 2019-01-01 RX ADMIN — VANCOMYCIN HYDROCHLORIDE 500 MG: KIT at 17:06

## 2019-01-01 RX ADMIN — Medication 10 ML: at 09:19

## 2019-01-01 RX ADMIN — ATORVASTATIN CALCIUM 40 MG: 40 TABLET, FILM COATED ORAL at 22:05

## 2019-01-01 RX ADMIN — MAGNESIUM SULFATE HEPTAHYDRATE 2 G: 40 INJECTION, SOLUTION INTRAVENOUS at 19:29

## 2019-01-01 RX ADMIN — SPIRONOLACTONE 200 MG: 100 TABLET, FILM COATED ORAL at 12:24

## 2019-01-01 RX ADMIN — PROPOFOL 30 MCG/KG/MIN: 10 INJECTION, EMULSION INTRAVENOUS at 10:45

## 2019-01-01 RX ADMIN — INSULIN LISPRO 2 UNITS: 100 INJECTION, SOLUTION INTRAVENOUS; SUBCUTANEOUS at 21:56

## 2019-01-01 RX ADMIN — HEPARIN SODIUM 5000 UNITS: 5000 INJECTION INTRAVENOUS; SUBCUTANEOUS at 05:40

## 2019-01-01 RX ADMIN — ALBUTEROL SULFATE 2.5 MG: 2.5 SOLUTION RESPIRATORY (INHALATION) at 07:16

## 2019-01-01 RX ADMIN — POTASSIUM CHLORIDE 20 MEQ: 20 TABLET, EXTENDED RELEASE ORAL at 18:00

## 2019-01-01 RX ADMIN — FOSFOMYCIN TROMETHAMINE 3 G: 3 POWDER ORAL at 18:28

## 2019-01-01 RX ADMIN — FUROSEMIDE 80 MG: 80 TABLET ORAL at 12:23

## 2019-01-01 RX ADMIN — NOREPINEPHRINE BITARTRATE 57 MCG/MIN: 1 INJECTION INTRAVENOUS at 17:57

## 2019-01-01 RX ADMIN — Medication 10 ML: at 14:38

## 2019-01-01 RX ADMIN — CEFEPIME HYDROCHLORIDE 2 G: 2 INJECTION, POWDER, FOR SOLUTION INTRAVENOUS at 12:09

## 2019-01-01 RX ADMIN — Medication 10 ML: at 21:05

## 2019-01-01 RX ADMIN — CEFEPIME HYDROCHLORIDE 2 G: 2 INJECTION, POWDER, FOR SOLUTION INTRAVENOUS at 04:03

## 2019-01-01 RX ADMIN — Medication 10 ML: at 21:42

## 2019-01-01 RX ADMIN — DEXTROSE MONOHYDRATE 25 G: 25 INJECTION, SOLUTION INTRAVENOUS at 00:36

## 2019-01-01 RX ADMIN — LACTULOSE 45 ML: 20 SOLUTION ORAL at 22:01

## 2019-01-01 RX ADMIN — CASTOR OIL AND BALSAM, PERU: 788; 87 OINTMENT TOPICAL at 18:29

## 2019-01-01 RX ADMIN — CASTOR OIL AND BALSAM, PERU: 788; 87 OINTMENT TOPICAL at 08:27

## 2019-01-01 RX ADMIN — INSULIN LISPRO 11 UNITS: 100 INJECTION, SOLUTION INTRAVENOUS; SUBCUTANEOUS at 05:54

## 2019-01-01 RX ADMIN — INSULIN GLARGINE 50 UNITS: 100 INJECTION, SOLUTION SUBCUTANEOUS at 23:45

## 2019-01-01 RX ADMIN — INSULIN LISPRO 2 UNITS: 100 INJECTION, SOLUTION INTRAVENOUS; SUBCUTANEOUS at 16:30

## 2019-01-01 RX ADMIN — MUPIROCIN: 20 OINTMENT TOPICAL at 17:29

## 2019-01-01 RX ADMIN — ALBUTEROL SULFATE 2.5 MG: 2.5 SOLUTION RESPIRATORY (INHALATION) at 00:39

## 2019-01-01 RX ADMIN — Medication 10 ML: at 06:25

## 2019-01-01 RX ADMIN — LACTULOSE 45 ML: 20 SOLUTION ORAL at 08:29

## 2019-01-01 RX ADMIN — MEROPENEM 1 G: 1 INJECTION, POWDER, FOR SOLUTION INTRAVENOUS at 15:39

## 2019-01-01 RX ADMIN — LOSARTAN POTASSIUM 25 MG: 25 TABLET ORAL at 10:08

## 2019-01-01 RX ADMIN — DEXMEDETOMIDINE 0.9 MCG/KG/HR: 100 INJECTION, SOLUTION, CONCENTRATE INTRAVENOUS at 20:05

## 2019-01-01 RX ADMIN — Medication 100 MCG: at 02:34

## 2019-01-01 RX ADMIN — CASTOR OIL AND BALSAM, PERU: 788; 87 OINTMENT TOPICAL at 16:34

## 2019-01-01 RX ADMIN — DEXMEDETOMIDINE 0.5 MCG/KG/HR: 100 INJECTION, SOLUTION, CONCENTRATE INTRAVENOUS at 17:39

## 2019-01-01 RX ADMIN — AMITRIPTYLINE HYDROCHLORIDE 100 MG: 50 TABLET, FILM COATED ORAL at 21:03

## 2019-01-01 RX ADMIN — LACTULOSE 30 G: 20 SOLUTION ORAL at 09:31

## 2019-01-01 RX ADMIN — METOCLOPRAMIDE 5 MG: 5 INJECTION, SOLUTION INTRAMUSCULAR; INTRAVENOUS at 12:21

## 2019-01-01 RX ADMIN — HEPARIN SODIUM 5000 UNITS: 5000 INJECTION INTRAVENOUS; SUBCUTANEOUS at 07:27

## 2019-01-01 RX ADMIN — ENOXAPARIN SODIUM 40 MG: 40 INJECTION SUBCUTANEOUS at 21:18

## 2019-01-01 RX ADMIN — DEXMEDETOMIDINE 0.7 MCG/KG/HR: 100 INJECTION, SOLUTION, CONCENTRATE INTRAVENOUS at 15:22

## 2019-01-01 RX ADMIN — FUROSEMIDE 40 MG: 10 INJECTION, SOLUTION INTRAMUSCULAR; INTRAVENOUS at 13:09

## 2019-01-01 RX ADMIN — ALBUTEROL SULFATE 2.5 MG: 2.5 SOLUTION RESPIRATORY (INHALATION) at 23:27

## 2019-01-01 RX ADMIN — PROPOFOL 10 MCG/KG/MIN: 10 INJECTION, EMULSION INTRAVENOUS at 11:12

## 2019-01-01 RX ADMIN — Medication 10 ML: at 21:57

## 2019-01-01 RX ADMIN — NOREPINEPHRINE BITARTRATE 57 MCG/MIN: 1 INJECTION INTRAVENOUS at 17:36

## 2019-01-01 RX ADMIN — MAGNESIUM SULFATE HEPTAHYDRATE 1 G: 1 INJECTION, SOLUTION INTRAVENOUS at 12:14

## 2019-01-01 RX ADMIN — METOCLOPRAMIDE 5 MG: 5 INJECTION, SOLUTION INTRAMUSCULAR; INTRAVENOUS at 05:26

## 2019-01-01 RX ADMIN — ATORVASTATIN CALCIUM 40 MG: 40 TABLET, FILM COATED ORAL at 22:02

## 2019-01-01 RX ADMIN — FUROSEMIDE 80 MG: 40 TABLET ORAL at 09:00

## 2019-01-01 RX ADMIN — Medication 20 ML: at 08:21

## 2019-01-01 RX ADMIN — Medication 10 ML: at 05:26

## 2019-01-01 RX ADMIN — LACTULOSE 45 ML: 10 SOLUTION ORAL at 16:53

## 2019-01-01 RX ADMIN — METOCLOPRAMIDE 5 MG: 5 INJECTION, SOLUTION INTRAMUSCULAR; INTRAVENOUS at 23:41

## 2019-01-01 RX ADMIN — QUETIAPINE FUMARATE 25 MG: 25 TABLET ORAL at 22:33

## 2019-01-01 RX ADMIN — HEPARIN SODIUM 5000 UNITS: 5000 INJECTION INTRAVENOUS; SUBCUTANEOUS at 06:51

## 2019-01-01 RX ADMIN — VANCOMYCIN HYDROCHLORIDE 500 MG: KIT at 12:57

## 2019-01-01 RX ADMIN — CEFEPIME HYDROCHLORIDE 2 G: 2 INJECTION, POWDER, FOR SOLUTION INTRAVENOUS at 12:33

## 2019-01-01 RX ADMIN — HYDROCORTISONE SODIUM SUCCINATE 50 MG: 100 INJECTION, POWDER, FOR SOLUTION INTRAMUSCULAR; INTRAVENOUS at 13:06

## 2019-01-01 RX ADMIN — INSULIN LISPRO 10 UNITS: 100 INJECTION, SOLUTION INTRAVENOUS; SUBCUTANEOUS at 00:39

## 2019-01-01 RX ADMIN — Medication 10 ML: at 06:19

## 2019-01-01 RX ADMIN — SPIRONOLACTONE 100 MG: 25 TABLET ORAL at 09:44

## 2019-01-01 RX ADMIN — SODIUM CHLORIDE 75 ML/HR: 900 INJECTION, SOLUTION INTRAVENOUS at 20:49

## 2019-01-01 RX ADMIN — SODIUM CHLORIDE 25 ML/HR: 900 INJECTION, SOLUTION INTRAVENOUS at 11:37

## 2019-01-01 RX ADMIN — MUPIROCIN: 20 OINTMENT TOPICAL at 10:28

## 2019-01-01 RX ADMIN — IPRATROPIUM BROMIDE AND ALBUTEROL SULFATE 3 ML: .5; 3 SOLUTION RESPIRATORY (INHALATION) at 20:53

## 2019-01-01 RX ADMIN — BUMETANIDE 2 MG: 0.25 INJECTION INTRAMUSCULAR; INTRAVENOUS at 20:08

## 2019-01-01 RX ADMIN — Medication 10 ML: at 04:53

## 2019-01-01 RX ADMIN — CEFEPIME HYDROCHLORIDE 2 G: 2 INJECTION, POWDER, FOR SOLUTION INTRAVENOUS at 19:53

## 2019-01-01 RX ADMIN — ALBUTEROL SULFATE 2.5 MG: 2.5 SOLUTION RESPIRATORY (INHALATION) at 15:36

## 2019-01-01 RX ADMIN — FUROSEMIDE 20 MG: 20 TABLET ORAL at 10:02

## 2019-01-01 RX ADMIN — VANCOMYCIN HYDROCHLORIDE 1500 MG: 10 INJECTION, POWDER, LYOPHILIZED, FOR SOLUTION INTRAVENOUS at 08:36

## 2019-01-01 RX ADMIN — Medication 10 ML: at 16:19

## 2019-01-01 RX ADMIN — ENOXAPARIN SODIUM 40 MG: 40 INJECTION SUBCUTANEOUS at 22:04

## 2019-01-01 RX ADMIN — MEROPENEM 1 G: 1 INJECTION, POWDER, FOR SOLUTION INTRAVENOUS at 05:05

## 2019-01-01 RX ADMIN — SPIRONOLACTONE 100 MG: 25 TABLET ORAL at 12:22

## 2019-01-01 RX ADMIN — WATER: 1000 INJECTION, SOLUTION INTRAVENOUS at 13:53

## 2019-01-01 RX ADMIN — MEROPENEM 1 G: 1 INJECTION, POWDER, FOR SOLUTION INTRAVENOUS at 06:14

## 2019-01-01 RX ADMIN — MEROPENEM 1 G: 1 INJECTION, POWDER, FOR SOLUTION INTRAVENOUS at 16:28

## 2019-01-01 RX ADMIN — Medication 10 ML: at 06:44

## 2019-01-01 RX ADMIN — Medication 10 ML: at 06:21

## 2019-01-01 RX ADMIN — CASTOR OIL AND BALSAM, PERU: 788; 87 OINTMENT TOPICAL at 17:58

## 2019-01-01 RX ADMIN — HEPARIN SODIUM 5000 UNITS: 5000 INJECTION INTRAVENOUS; SUBCUTANEOUS at 15:39

## 2019-01-01 RX ADMIN — FAMOTIDINE 20 MG: 10 INJECTION, SOLUTION INTRAVENOUS at 20:50

## 2019-01-01 RX ADMIN — FUROSEMIDE 20 MG: 20 TABLET ORAL at 10:06

## 2019-01-01 RX ADMIN — ALBUTEROL SULFATE 2.5 MG: 2.5 SOLUTION RESPIRATORY (INHALATION) at 11:27

## 2019-01-01 RX ADMIN — ROPINIROLE HYDROCHLORIDE 4 MG: 1 TABLET, FILM COATED ORAL at 22:11

## 2019-01-01 RX ADMIN — SODIUM CHLORIDE 75 ML/HR: 900 INJECTION, SOLUTION INTRAVENOUS at 03:08

## 2019-01-01 RX ADMIN — Medication 200 MCG/MIN: at 04:35

## 2019-01-01 RX ADMIN — INSULIN LISPRO 4 UNITS: 100 INJECTION, SOLUTION INTRAVENOUS; SUBCUTANEOUS at 05:52

## 2019-01-01 RX ADMIN — ENOXAPARIN SODIUM 40 MG: 40 INJECTION SUBCUTANEOUS at 21:34

## 2019-01-01 RX ADMIN — FUROSEMIDE 80 MG: 80 TABLET ORAL at 08:14

## 2019-01-01 RX ADMIN — RIFAXIMIN 550 MG: 550 TABLET ORAL at 21:39

## 2019-01-01 RX ADMIN — PANTOPRAZOLE SODIUM 40 MG: 40 TABLET, DELAYED RELEASE ORAL at 06:35

## 2019-01-01 RX ADMIN — ALBUTEROL SULFATE 2.5 MG: 2.5 SOLUTION RESPIRATORY (INHALATION) at 15:00

## 2019-01-01 RX ADMIN — CEFTRIAXONE SODIUM 2 G: 2 INJECTION, POWDER, FOR SOLUTION INTRAMUSCULAR; INTRAVENOUS at 11:38

## 2019-01-01 RX ADMIN — PROPOFOL 10 MCG/KG/MIN: 10 INJECTION, EMULSION INTRAVENOUS at 02:13

## 2019-01-01 RX ADMIN — GABAPENTIN 1200 MG: 300 CAPSULE ORAL at 17:52

## 2019-01-01 RX ADMIN — SODIUM CHLORIDE 75 ML/HR: 900 INJECTION, SOLUTION INTRAVENOUS at 18:52

## 2019-01-01 RX ADMIN — IOPAMIDOL 100 ML: 755 INJECTION, SOLUTION INTRAVENOUS at 11:56

## 2019-01-01 RX ADMIN — ASPIRIN 81 MG CHEWABLE TABLET 81 MG: 81 TABLET CHEWABLE at 08:45

## 2019-01-01 RX ADMIN — WATER: 1000 INJECTION, SOLUTION INTRAVENOUS at 19:05

## 2019-01-01 RX ADMIN — ALBUTEROL SULFATE 2.5 MG: 2.5 SOLUTION RESPIRATORY (INHALATION) at 15:48

## 2019-01-01 RX ADMIN — LACTULOSE 200 G: 10 SOLUTION ORAL at 14:09

## 2019-01-01 RX ADMIN — SODIUM CHLORIDE 1000 ML: 900 INJECTION, SOLUTION INTRAVENOUS at 05:25

## 2019-01-01 RX ADMIN — MORPHINE SULFATE 2 MG: 2 INJECTION, SOLUTION INTRAMUSCULAR; INTRAVENOUS at 10:16

## 2019-01-01 RX ADMIN — ACETAMINOPHEN 650 MG: 160 SUSPENSION ORAL at 17:21

## 2019-01-01 RX ADMIN — Medication 16 MCG/MIN: at 23:29

## 2019-01-01 RX ADMIN — SPIRONOLACTONE 200 MG: 100 TABLET, FILM COATED ORAL at 09:11

## 2019-01-01 RX ADMIN — IOPAMIDOL 100 ML: 755 INJECTION, SOLUTION INTRAVENOUS at 16:08

## 2019-01-01 RX ADMIN — LOSARTAN POTASSIUM 25 MG: 25 TABLET ORAL at 08:15

## 2019-01-01 RX ADMIN — MEROPENEM 1 G: 1 INJECTION, POWDER, FOR SOLUTION INTRAVENOUS at 06:18

## 2019-01-01 RX ADMIN — PROPOFOL 30 MCG/KG/MIN: 10 INJECTION, EMULSION INTRAVENOUS at 21:29

## 2019-01-01 RX ADMIN — INSULIN LISPRO 2 UNITS: 100 INJECTION, SOLUTION INTRAVENOUS; SUBCUTANEOUS at 16:54

## 2019-01-01 RX ADMIN — SODIUM CHLORIDE 50 ML/HR: 450 INJECTION, SOLUTION INTRAVENOUS at 16:19

## 2019-01-01 RX ADMIN — VANCOMYCIN HYDROCHLORIDE 500 MG: KIT at 00:11

## 2019-01-01 RX ADMIN — MEROPENEM 1 G: 1 INJECTION, POWDER, FOR SOLUTION INTRAVENOUS at 14:19

## 2019-01-01 RX ADMIN — POTASSIUM & SODIUM PHOSPHATES POWDER PACK 280-160-250 MG 1 PACKET: 280-160-250 PACK at 17:16

## 2019-01-01 RX ADMIN — VANCOMYCIN HYDROCHLORIDE 750 MG: 750 INJECTION, POWDER, LYOPHILIZED, FOR SOLUTION INTRAVENOUS at 08:22

## 2019-01-01 RX ADMIN — INSULIN GLARGINE 15 UNITS: 100 INJECTION, SOLUTION SUBCUTANEOUS at 09:17

## 2019-01-01 RX ADMIN — ACETAMINOPHEN 650 MG: 160 SUSPENSION ORAL at 04:41

## 2019-01-01 RX ADMIN — HUMAN INSULIN 30 UNITS: 100 INJECTION, SUSPENSION SUBCUTANEOUS at 19:55

## 2019-01-01 RX ADMIN — Medication 10 ML: at 18:27

## 2019-01-01 RX ADMIN — ATORVASTATIN CALCIUM 40 MG: 40 TABLET, FILM COATED ORAL at 22:19

## 2019-01-01 RX ADMIN — Medication 10 ML: at 15:45

## 2019-01-01 RX ADMIN — INSULIN LISPRO 2 UNITS: 100 INJECTION, SOLUTION INTRAVENOUS; SUBCUTANEOUS at 08:51

## 2019-01-01 RX ADMIN — VANCOMYCIN HYDROCHLORIDE 500 MG: KIT at 00:28

## 2019-01-01 RX ADMIN — METOPROLOL TARTRATE 12.5 MG: 25 TABLET ORAL at 21:40

## 2019-01-01 RX ADMIN — SODIUM CHLORIDE 125 ML/HR: 900 INJECTION, SOLUTION INTRAVENOUS at 14:56

## 2019-01-01 RX ADMIN — Medication 10 ML: at 13:21

## 2019-01-01 RX ADMIN — SODIUM CHLORIDE: 900 INJECTION, SOLUTION INTRAVENOUS at 09:16

## 2019-01-01 RX ADMIN — Medication 210 MCG/MIN: at 07:24

## 2019-01-01 RX ADMIN — NOREPINEPHRINE BITARTRATE 100 MCG/MIN: 1 INJECTION INTRAVENOUS at 13:01

## 2019-01-01 RX ADMIN — METHYLPREDNISOLONE SODIUM SUCCINATE 40 MG: 40 INJECTION, POWDER, FOR SOLUTION INTRAMUSCULAR; INTRAVENOUS at 19:55

## 2019-01-01 RX ADMIN — FLUTICASONE FUROATE AND VILANTEROL TRIFENATATE 1 PUFF: 200; 25 POWDER RESPIRATORY (INHALATION) at 16:38

## 2019-01-01 RX ADMIN — VANCOMYCIN HYDROCHLORIDE 500 MG: KIT at 11:20

## 2019-01-01 RX ADMIN — ASPIRIN 81 MG CHEWABLE TABLET 81 MG: 81 TABLET CHEWABLE at 08:19

## 2019-01-01 RX ADMIN — IPRATROPIUM BROMIDE AND ALBUTEROL SULFATE 3 ML: .5; 3 SOLUTION RESPIRATORY (INHALATION) at 08:29

## 2019-01-01 RX ADMIN — Medication 10 ML: at 05:36

## 2019-01-01 RX ADMIN — PHENYLEPHRINE HYDROCHLORIDE 200 MCG/MIN: 10 INJECTION INTRAVENOUS at 19:37

## 2019-01-01 RX ADMIN — METOPROLOL TARTRATE 12.5 MG: 25 TABLET ORAL at 22:20

## 2019-01-01 RX ADMIN — VANCOMYCIN HYDROCHLORIDE 500 MG: KIT at 17:23

## 2019-01-01 RX ADMIN — HEPARIN SODIUM 5000 UNITS: 5000 INJECTION INTRAVENOUS; SUBCUTANEOUS at 21:51

## 2019-01-01 RX ADMIN — IPRATROPIUM BROMIDE AND ALBUTEROL SULFATE 3 ML: .5; 3 SOLUTION RESPIRATORY (INHALATION) at 13:34

## 2019-01-01 RX ADMIN — HEPARIN SODIUM 5000 UNITS: 5000 INJECTION INTRAVENOUS; SUBCUTANEOUS at 05:41

## 2019-01-01 RX ADMIN — POTASSIUM CHLORIDE 40 MEQ: 20 TABLET, EXTENDED RELEASE ORAL at 18:35

## 2019-01-01 RX ADMIN — AMITRIPTYLINE HYDROCHLORIDE 100 MG: 50 TABLET, FILM COATED ORAL at 22:06

## 2019-01-01 RX ADMIN — LACTULOSE 45 ML: 10 SOLUTION ORAL at 17:14

## 2019-01-01 RX ADMIN — HYDROCORTISONE ACETATE 25 MG: 25 SUPPOSITORY RECTAL at 10:01

## 2019-01-01 RX ADMIN — VANCOMYCIN HYDROCHLORIDE 2000 MG: 10 INJECTION, POWDER, LYOPHILIZED, FOR SOLUTION INTRAVENOUS at 15:07

## 2019-01-01 RX ADMIN — INSULIN LISPRO 10 UNITS: 100 INJECTION, SOLUTION INTRAVENOUS; SUBCUTANEOUS at 12:36

## 2019-01-01 RX ADMIN — INSULIN LISPRO 10 UNITS: 100 INJECTION, SOLUTION INTRAVENOUS; SUBCUTANEOUS at 06:59

## 2019-01-01 RX ADMIN — LACTULOSE 30 G: 20 SOLUTION ORAL at 10:14

## 2019-01-01 RX ADMIN — Medication 10 ML: at 21:51

## 2019-01-01 RX ADMIN — PROPOFOL 30 MCG/KG/MIN: 10 INJECTION, EMULSION INTRAVENOUS at 22:03

## 2019-01-01 RX ADMIN — LACTULOSE 45 ML: 20 SOLUTION ORAL at 17:58

## 2019-01-01 RX ADMIN — INSULIN LISPRO 6 UNITS: 100 INJECTION, SOLUTION INTRAVENOUS; SUBCUTANEOUS at 12:45

## 2019-01-01 RX ADMIN — Medication 10 ML: at 14:41

## 2019-01-01 RX ADMIN — VANCOMYCIN HYDROCHLORIDE 500 MG: KIT at 17:15

## 2019-01-01 RX ADMIN — Medication 15 MCG/MIN: at 03:41

## 2019-01-01 RX ADMIN — INSULIN LISPRO 2 UNITS: 100 INJECTION, SOLUTION INTRAVENOUS; SUBCUTANEOUS at 11:59

## 2019-01-01 RX ADMIN — ALBUMIN (HUMAN) 12.5 G: 0.25 INJECTION, SOLUTION INTRAVENOUS at 09:26

## 2019-01-01 RX ADMIN — HEPARIN SODIUM 5000 UNITS: 5000 INJECTION INTRAVENOUS; SUBCUTANEOUS at 14:31

## 2019-01-01 RX ADMIN — RIFAXIMIN 550 MG: 550 TABLET ORAL at 17:23

## 2019-01-01 RX ADMIN — FAMOTIDINE 20 MG: 10 INJECTION, SOLUTION INTRAVENOUS at 09:41

## 2019-01-01 RX ADMIN — Medication 10 ML: at 14:23

## 2019-01-01 RX ADMIN — ROPINIROLE HYDROCHLORIDE 4 MG: 1 TABLET, FILM COATED ORAL at 22:13

## 2019-01-01 RX ADMIN — INSULIN LISPRO 3 UNITS: 100 INJECTION, SOLUTION INTRAVENOUS; SUBCUTANEOUS at 12:35

## 2019-01-01 RX ADMIN — DEXMEDETOMIDINE 0.8 MCG/KG/HR: 100 INJECTION, SOLUTION, CONCENTRATE INTRAVENOUS at 21:32

## 2019-01-01 RX ADMIN — NOREPINEPHRINE BITARTRATE 52 MCG/MIN: 1 INJECTION INTRAVENOUS at 14:21

## 2019-01-01 RX ADMIN — Medication 10 ML: at 06:35

## 2019-01-01 RX ADMIN — MORPHINE SULFATE 2 MG: 2 INJECTION, SOLUTION INTRAMUSCULAR; INTRAVENOUS at 06:01

## 2019-01-01 RX ADMIN — Medication 27 MCG/MIN: at 18:11

## 2019-01-01 RX ADMIN — Medication 10 ML: at 21:55

## 2019-01-01 RX ADMIN — METOCLOPRAMIDE 5 MG: 5 INJECTION, SOLUTION INTRAMUSCULAR; INTRAVENOUS at 14:22

## 2019-01-01 RX ADMIN — FERROUS SULFATE TAB 325 MG (65 MG ELEMENTAL FE) 325 MG: 325 (65 FE) TAB at 08:59

## 2019-01-01 RX ADMIN — Medication 10 ML: at 05:59

## 2019-01-01 RX ADMIN — ROPINIROLE HYDROCHLORIDE 4 MG: 1 TABLET, FILM COATED ORAL at 21:38

## 2019-01-01 RX ADMIN — INSULIN LISPRO 2 UNITS: 100 INJECTION, SOLUTION INTRAVENOUS; SUBCUTANEOUS at 12:13

## 2019-01-01 RX ADMIN — PROPOFOL 10 MCG/KG/MIN: 10 INJECTION, EMULSION INTRAVENOUS at 15:20

## 2019-01-01 RX ADMIN — Medication 10 ML: at 22:05

## 2019-01-01 RX ADMIN — Medication 30 MCG/MIN: at 01:20

## 2019-01-01 RX ADMIN — PANTOPRAZOLE SODIUM 40 MG: 40 TABLET, DELAYED RELEASE ORAL at 07:27

## 2019-01-01 RX ADMIN — ATORVASTATIN CALCIUM 40 MG: 40 TABLET, FILM COATED ORAL at 21:46

## 2019-01-01 RX ADMIN — FUROSEMIDE 40 MG: 10 INJECTION, SOLUTION INTRAMUSCULAR; INTRAVENOUS at 22:03

## 2019-01-01 RX ADMIN — INSULIN LISPRO 2 UNITS: 100 INJECTION, SOLUTION INTRAVENOUS; SUBCUTANEOUS at 22:46

## 2019-01-01 RX ADMIN — MEROPENEM 1 G: 1 INJECTION, POWDER, FOR SOLUTION INTRAVENOUS at 05:58

## 2019-01-01 RX ADMIN — LACTULOSE 45 ML: 10 SOLUTION ORAL at 21:19

## 2019-01-01 RX ADMIN — HUMAN INSULIN 30 UNITS: 100 INJECTION, SUSPENSION SUBCUTANEOUS at 20:51

## 2019-01-01 RX ADMIN — CASTOR OIL AND BALSAM, PERU: 788; 87 OINTMENT TOPICAL at 19:14

## 2019-01-01 RX ADMIN — IPRATROPIUM BROMIDE AND ALBUTEROL SULFATE 3 ML: .5; 3 SOLUTION RESPIRATORY (INHALATION) at 15:35

## 2019-01-01 RX ADMIN — METHYLPREDNISOLONE SODIUM SUCCINATE 40 MG: 40 INJECTION, POWDER, FOR SOLUTION INTRAMUSCULAR; INTRAVENOUS at 18:43

## 2019-01-01 RX ADMIN — OCTREOTIDE ACETATE 50 MCG/HR: 500 INJECTION, SOLUTION INTRAVENOUS; SUBCUTANEOUS at 11:37

## 2019-01-01 RX ADMIN — FUROSEMIDE 20 MG: 20 TABLET ORAL at 16:30

## 2019-01-01 RX ADMIN — ENOXAPARIN SODIUM 40 MG: 40 INJECTION SUBCUTANEOUS at 23:08

## 2019-01-01 RX ADMIN — CEFTRIAXONE SODIUM 2 G: 2 INJECTION, POWDER, FOR SOLUTION INTRAMUSCULAR; INTRAVENOUS at 10:07

## 2019-01-01 RX ADMIN — Medication 10 ML: at 14:22

## 2019-01-01 RX ADMIN — METOPROLOL TARTRATE 12.5 MG: 25 TABLET ORAL at 08:19

## 2019-01-01 RX ADMIN — MIDAZOLAM HYDROCHLORIDE 1 MG: 1 INJECTION, SOLUTION INTRAMUSCULAR; INTRAVENOUS at 13:46

## 2019-01-01 RX ADMIN — METRONIDAZOLE 500 MG: 500 INJECTION, SOLUTION INTRAVENOUS at 00:31

## 2019-01-01 RX ADMIN — GABAPENTIN 1000 MG: 100 CAPSULE ORAL at 10:33

## 2019-01-01 RX ADMIN — PROPOFOL 25 MCG/KG/MIN: 10 INJECTION, EMULSION INTRAVENOUS at 00:34

## 2019-01-01 RX ADMIN — SPIRONOLACTONE 200 MG: 100 TABLET, FILM COATED ORAL at 08:19

## 2019-01-01 RX ADMIN — METRONIDAZOLE 500 MG: 500 INJECTION, SOLUTION INTRAVENOUS at 17:57

## 2019-01-01 RX ADMIN — SPIRONOLACTONE 200 MG: 100 TABLET, FILM COATED ORAL at 09:34

## 2019-01-01 RX ADMIN — LACTULOSE 30 G: 20 SOLUTION ORAL at 11:35

## 2019-01-01 RX ADMIN — RIFAXIMIN 550 MG: 550 TABLET ORAL at 17:09

## 2019-01-01 RX ADMIN — CEFEPIME HYDROCHLORIDE 2 G: 2 INJECTION, POWDER, FOR SOLUTION INTRAVENOUS at 05:15

## 2019-01-01 RX ADMIN — Medication 30 ML: at 14:55

## 2019-01-01 RX ADMIN — ROPINIROLE HYDROCHLORIDE 4 MG: 1 TABLET, FILM COATED ORAL at 22:21

## 2019-01-01 RX ADMIN — FERROUS SULFATE TAB 325 MG (65 MG ELEMENTAL FE) 325 MG: 325 (65 FE) TAB at 10:32

## 2019-01-01 RX ADMIN — PANTOPRAZOLE SODIUM 40 MG: 40 TABLET, DELAYED RELEASE ORAL at 09:05

## 2019-01-01 RX ADMIN — DEXTROSE MONOHYDRATE 50 ML/HR: 10 INJECTION, SOLUTION INTRAVENOUS at 15:30

## 2019-01-01 RX ADMIN — Medication 10 ML: at 14:33

## 2019-01-01 RX ADMIN — RIFAXIMIN 550 MG: 550 TABLET ORAL at 17:15

## 2019-01-01 RX ADMIN — LEVOFLOXACIN 750 MG: 5 INJECTION, SOLUTION INTRAVENOUS at 12:33

## 2019-01-01 RX ADMIN — FERROUS SULFATE TAB 325 MG (65 MG ELEMENTAL FE) 325 MG: 325 (65 FE) TAB at 08:50

## 2019-01-01 NOTE — ED PROVIDER NOTES
EMERGENCY DEPARTMENT HISTORY AND PHYSICAL EXAM 
 
 
Date: 1/1/2019 Patient Name: Jolene Andres History of Presenting Illness Chief Complaint Patient presents with  Leg Swelling  
  x 2 weeks to right lower leg. pt had negative US checking for clots. pt currently on antibiotic. PCP is out of town History Provided By: Patient HPI: Jolene Andres, 61 y.o. female with PMHx significant for DM, COPD, cirrhosis, GERD, HTN, fibromyalgia, asthma, presents ambulatory to the ED with cc of worsening RLE redness with associated waxing and waning swelling onset 2 wks. Pt reports completing course of abx as well as US of RLE x4 that were all negative for DVT; however, pt denies relief of RLE redness. Pt reports that her RLE swelling is the least in the AM and the most in the PM. Pt reports being on diuretics, and she has tried taking an extra dose in the AMs, but with no relief. She denies being followed by a cardiologist. She denies fever, chills, or warmth to the skin. There are no other complaints, changes, or physical findings at this time. PCP: Chaparrita Mansfield NP Social Hx:  
Social History Tobacco Use Smoking Status Current Every Day Smoker  Packs/day: 1.00  Years: 40.00  Pack years: 40.00 Smokeless Tobacco Former User  
,  
Social History Substance and Sexual Activity Alcohol Use No  
 Comment: rare  
,  
Social History Substance and Sexual Activity Drug Use No  
 
 
Past History Past Surgical History: 
Past Surgical History:  
Procedure Laterality Date  HX BACK SURGERY    
 HX CARPAL TUNNEL RELEASE    
 on right  HX HYSTERECTOMY plus 1/2 of an ovary removed  [place] COLSC FLX W/RMVL OF TUMOR POLYP LESION SNARE TQ  5/30/2013  UPPER GI ENDOSCOPY,LIGAT VARIX  2/6/2015 Family History: 
Family History Problem Relation Age of Onset  Diabetes Mother  Stroke Sister  Diabetes Paternal Aunt  Diabetes Paternal Uncle  Heart Disease Neg Hx Allergies: Allergies Allergen Reactions  Hydrocodone Nausea and Vomiting  Lisinopril Cough  Lortab [Hydrocodone-Acetaminophen] Nausea and Vomiting  Percocet [Oxycodone-Acetaminophen] Nausea and Vomiting Review of Systems Review of Systems Constitutional: Negative. Negative for activity change, appetite change, chills, fatigue, fever and unexpected weight change. HENT: Negative. Negative for congestion, hearing loss, rhinorrhea, sneezing and voice change. Eyes: Negative. Negative for pain and visual disturbance. Respiratory: Negative. Negative for apnea, cough, choking, chest tightness and shortness of breath. Cardiovascular: Positive for leg swelling (RLE). Negative for chest pain and palpitations. Gastrointestinal: Negative. Negative for abdominal distention, abdominal pain, blood in stool, diarrhea, nausea and vomiting. Genitourinary: Negative. Negative for difficulty urinating, flank pain, frequency and urgency. No discharge Musculoskeletal: Negative. Negative for arthralgias, back pain, myalgias and neck stiffness. Skin: Positive for color change (RLE erythema). Negative for rash. Neurological: Negative. Negative for dizziness, seizures, syncope, speech difficulty, weakness, numbness and headaches. Hematological: Negative for adenopathy. Psychiatric/Behavioral: Negative. Negative for agitation, behavioral problems, dysphoric mood and suicidal ideas. The patient is not nervous/anxious. Physical Exam  
Physical Exam  
Constitutional: She is oriented to person, place, and time. She appears well-developed and well-nourished. No distress. HENT:  
Head: Normocephalic and atraumatic. Mouth/Throat: Oropharynx is clear and moist. No oropharyngeal exudate.   
Eyes: Conjunctivae and EOM are normal. Pupils are equal, round, and reactive to light. Right eye exhibits no discharge. Left eye exhibits no discharge. Neck: Normal range of motion. Neck supple. Cardiovascular: Normal rate, regular rhythm and intact distal pulses. Exam reveals no gallop and no friction rub. No murmur heard. Pulmonary/Chest: Effort normal and breath sounds normal. No respiratory distress. She has no wheezes. She has no rales. She exhibits no tenderness. Abdominal: Soft. Bowel sounds are normal. She exhibits no distension and no mass. There is no tenderness. There is no rebound and no guarding. Musculoskeletal: Normal range of motion. She exhibits no edema. Lymphadenopathy:  
  She has no cervical adenopathy. Neurological: She is alert and oriented to person, place, and time. No cranial nerve deficit. Coordination normal.  
Skin: Skin is warm and dry. Minimal circumferential erythema of R mid-shin to R foot consistent with venous stasis changes Psychiatric: She has a normal mood and affect. Nursing note and vitals reviewed. Diagnostic Study Results Medical Decision Making I am the first provider for this patient. I reviewed the vital signs, available nursing notes, past medical history, past surgical history, family history and social history. Vital Signs-Reviewed the patient's vital signs. Patient Vitals for the past 12 hrs: 
 Temp Pulse Resp BP  
01/01/19 1751 98.2 °F (36.8 °C) 85 17 139/68 Records Reviewed: Nursing Notes and Old Medical Records Provider Notes (Medical Decision Making): DDx: Peripheral Edema ED Course:  
Initial assessment performed. The patients presenting problems have been discussed, and they are in agreement with the care plan formulated and outlined with them. I have encouraged them to ask questions as they arise throughout their visit. Critical Care Time:  
0 minutes. Disposition: 
Discharge Note: 
6:38 PM 
The patient has been re-evaluated and is ready for discharge.  Reviewed available results with patient. Counseled patient/parent/guardian on diagnosis and care plan. Patient has expressed understanding, and all questions have been answered. Patient agrees with plan and agrees to follow up as recommended, or return to the ED if their symptoms worsen. Discharge instructions have been provided and explained to the patient, along with reasons to return to the ED. PLAN: 
1. Follow-up Information Follow up With Specialties Details Why Contact Info Lucia Perez, ALEJANDRO Nurse Practitioner Call in 1 week  [address] [facility] [address] 
[phone] Pamela Deluca MD Cardiology Call in 2 days  [id number] [facility] 
[phone] Return to ED if worse Diagnosis Clinical Impression: 1. Edema, unspecified type Attestations: This note is prepared by Efrain Mares, acting as scribe for [employer] Samantha Brown, [address] aSmantha Brown MD: The scribe's documentation has been prepared under my direction and personally reviewed by me in its entirety. I confirm that the note above accurately reflects all work, treatment, procedures, and medical decision making performed by me.

## 2019-01-01 NOTE — DISCHARGE INSTRUCTIONS
Leg and Ankle Edema: Care Instructions  Your Care Instructions  Swelling in the legs, ankles, and feet is called edema. It is common after you sit or stand for a while. Long plane flights or car rides often cause swelling in the legs and feet. You may also have swelling if you have to stand for long periods of time at your job. Problems with the veins in the legs (varicose veins) and changes in hormones can also cause swelling. Sometimes the swelling in the ankles and feet is caused by a more serious problem, such as heart failure, infection, blood clots, or liver or kidney disease. Follow-up care is a key part of your treatment and safety. Be sure to make and go to all appointments, and call your doctor if you are having problems. It's also a good idea to know your test results and keep a list of the medicines you take. How can you care for yourself at home? · If your doctor gave you medicine, take it as prescribed. Call your doctor if you think you are having a problem with your medicine. · Whenever you are resting, raise your legs up. Try to keep the swollen area higher than the level of your heart. · Take breaks from standing or sitting in one position. ? Walk around to increase the blood flow in your lower legs. ? Move your feet and ankles often while you stand, or tighten and relax your leg muscles. · Wear support stockings. Put them on in the morning, before swelling gets worse. · Eat a balanced diet. Lose weight if you need to. · Limit the amount of salt (sodium) in your diet. Salt holds fluid in the body and may increase swelling. When should you call for help? Call 911 anytime you think you may need emergency care. For example, call if:    · You have symptoms of a blood clot in your lung (called a pulmonary embolism). These may include:  ? Sudden chest pain. ? Trouble breathing. ?  Coughing up blood.    Call your doctor now or seek immediate medical care if:    · You have signs of a blood clot, such as:  ? Pain in your calf, back of the knee, thigh, or groin. ? Redness and swelling in your leg or groin.     · You have symptoms of infection, such as:  ? Increased pain, swelling, warmth, or redness. ? Red streaks or pus. ? A fever.    Watch closely for changes in your health, and be sure to contact your doctor if:    · Your swelling is getting worse.     · You have new or worsening pain in your legs.     · You do not get better as expected. Where can you learn more? Go to http://joanna-linnette.info/. Enter L529 in the search box to learn more about \"Leg and Ankle Edema: Care Instructions. \"  Current as of: November 20, 2017  Content Version: 11.8  © 5548-3176 [employer]. Care instructions adapted under license by [employer] (which disclaims liability or warranty for this information). If you have questions about a medical condition or this instruction, always ask your healthcare professional. [place] [address] any warranty or liability for your use of this information.

## 2019-03-19 NOTE — PROGRESS NOTES
70 Dwayne Arizmendi MD, [address], [place], [place]       April ALEJANDRO Ricardo PA-C Evander Hays, BEATRIZ-BC   ALEJANDRO Easley NP [address] [facility] [address]    at [address], [address] [place] [address], [facility] 22.    [phone]    FAX: [address] [place] [address]    at [address], [address]    [phone]    FAX: [phone]     Patient Care Team:  Deepak Salmon NP as PCP - General (Family Practice)  Viviana Quispe MD (Hematology and Oncology)  Marito Crain MD as Consulting Provider (Endocrinology)  Reid El MD (Hepatology)    Patient Active Problem List   Diagnosis Code    Diabetes mellitus with neurological manifestations, uncontrolled ([facility] E11.49, E11.65    Essential hypertension, benign I10    Hyperlipidemia LDL goal <100 E78.5    Thrombocytopenia ([address] D69.6    Previous back surgery Z98.890    S/P CHACHO (total abdominal hysterectomy) Z90.710    Cirrhosis ([address] K74.60    Anemia D64.9    GI bleed K92.2    BALDERAS (nonalcoholic steatohepatitis) K75.81    Acute deep vein thrombosis (DVT) of right lower extremity (HCC) I82.401    COPD (chronic obstructive pulmonary disease) ([address] J44.9    Sepsis ([address] A41.9    CAP (community acquired pneumonia) J18.9    IBIS (obstructive sleep apnea) G47.33    Tobacco abuse Z72.0    Neuropathy G62.9    Obesity (BMI 30.0-34. 9) E66.9    Severe obesity (HCC) E66.01    Therapeutic drug monitoring Z51.81    TIA (transient ischemic attack) G45.9    GERD (gastroesophageal reflux disease) K21.9    Bilateral carotid artery stenosis I65.23       Heather Desouza is a 61 y.o.   female who returns to the [place] [address] for management of cirrhosis secondary to BALDERAS. The active problem list, all pertinent past medical history, medications, endoscopic studies, radiologic findings and laboratory findings related to the liver disorder were reviewed with the patient. Liver biopsies in the past have confirmed cirrhosis. Patient participated in a long-term clinical trial utilizing an anti-inflammatory agent to improve her liver disease. She has been out lf trial for several years now. Last EGD in May 2018 demonstrated 2 medium-sized esophageal varices that were banded. Unfortunately, she missed the last 2 scheduled appointments for repeat evaluation. This is rescheduled for 5/2019. Last US in October 2018 was negative for liver mass/lesion. The patient has continued to gain weight over time. She has had acute weight gain of ~30# since 1/2019. Patient has been seen in the EMD for persistent LE edema, usually R>L. She has had negative doppler studies in 1/2019 presentation and has not had to have treatment for cellulitis. Today she presents with bilateral 3+ edema over the knees and is very uncomfortable. She has had increased distention of the abdomen. Patient relates that she has been eating high sodium content foods (Spam, pickles, olives, cup a soup) and is currently only on furosemide 20 mg. She has had increased wheezing and shortness of breath and her LE swelling has further limited her mobility. Hepatic encephalopathy is currently being treated with lactulose 45 mL BID, when she remembers. Patient reports regularly forgetting to take this medication and is having one bowel movement every other day. Her daughter endorses that she has had worsening memory and occasional slurring of speech. They have had a hard time telling if this is related to her poor DM control or other cause.      Patient has a history of significant iron deficiency anemia requiring daily iron supplements and iron infusions in the past. Last iron infusion was early November 2018. She has pica for ice at this time. Since last visit, patient went to the ED after falling and having left-sided weakness. She was found to have had a TIA and was admitted, properly treated and discharged. She has some questions today regarding her prescribed medications. ALLERGIES  Allergies   Allergen Reactions    Hydrocodone Nausea and Vomiting    Lisinopril Cough    Lortab [Hydrocodone-Acetaminophen] Nausea and Vomiting    Percocet [Oxycodone-Acetaminophen] Nausea and Vomiting     Current Outpatient Medications   Medication Sig    CONSTULOSE 10 gram/15 mL solution take 2 tablespoonfuls by mouth twice a day    losartan (COZAAR) 25 mg tablet take 1 tablet by mouth once daily , REPLACES LISINOPRIL FOR BLOOD PRESSURE AND KIDNEY PROTECTION    insulin NPH (NOVOLIN N NPH U-100 INSULIN) 100 unit/mL injection Inject 55 units every 12 hours--dispense relion brand--patient will pay cash    traMADol (ULTRAM) 50 mg tablet take 1-2 tablets by mouth twice a day if needed for DIABETIC NEUROPATHY PAIN    aspirin 81 mg chewable tablet Take 1 Tab by mouth daily.  atorvastatin (LIPITOR) 40 mg tablet Take 1 Tab by mouth nightly.  albuterol-ipratropium (DUO-NEB) 2.5 mg-0.5 mg/3 ml nebu 3 mL by Nebulization route daily as needed.  potassium chloride SR (KLOR-CON 10) 10 mEq tablet Take 10 mEq by mouth daily.  dextromethorphan-guaiFENesin (ROBITUSSIN-DM)  mg/5 mL syrup Take 10 mL by mouth nightly as needed for Cough.  omeprazole (PRILOSEC) 20 mg capsule take 1 capsule by mouth once daily    amitriptyline (ELAVIL) 150 mg tablet Take 1 Tab by mouth nightly.  propranolol (INDERAL) 20 mg tablet Take 1 Tab by mouth two (2) times a day.     ferrous sulfate 325 mg (65 mg iron) tablet take 1 tablet by mouth once daily with BREAKFAST    metFORMIN (GLUCOPHAGE) 1,000 mg tablet take 1 tablet by mouth twice a day with meals    ondansetron (ZOFRAN ODT) 4 mg disintegrating tablet dissolve 1 tablet ON TONGUE every 8 hours if needed for nausea    gabapentin (NEURONTIN) 600 mg tablet take 2 tablets by mouth three times a day    furosemide (LASIX) 20 mg tablet Take 1 Tab by mouth daily.  rOPINIRole (REQUIP) 4 mg tab TAB Take 4 mg by mouth nightly.  albuterol (PROAIR HFA) 90 mcg/actuation inhaler Take 2 Puffs by inhalation every four (4) hours as needed for Wheezing.  fluticasone-salmeterol (ADVAIR DISKUS) 500-50 mcg/dose diskus inhaler Take 1 Puff by inhalation two (2) times a day.  insulin syringe-needle U-100 1 mL 31 gauge x 15/64\" syrg Use as directed twice daily    insulin lispro (HUMALOG KWIKPEN INSULIN) 100 unit/mL kwikpen Inject as needed up to 3 times per day for sugars over 150: 4 units for every 50 points. Max 50 units per day     No current facility-administered medications for this visit. SYSTEM REVIEW NOT RELATED TO LIVER DISEASE OR REVIEWED ABOVE:  Constitution systems: Negative for fever, chills. Positive for significant weight gain in past 2 months. Eyes: Negative for visual changes. ENT: Negative for sore throat, painful swallowing. Respiratory: Positive for cough, SOB, and wheezing. Using inhaler regularly. Cardiology: Negative for chest pain, palpitations. GI:  Negative for diarrhea. Positive for constipation on a chronic basis. : Negative for urinary frequency, dysuria, hematuria, nocturia. Skin: Negative for rash. Hematology: Negative for easy bruising, blood clots. Musculo-skeletal: Negative for back pain. Positive for muscle pain, weakness - especially in the LE. Neurologic: Negative for headaches, dizziness, vertigo, memory problems not related to HE. Psychology: Negative for anxiety, depression. FAMILY/SOCIAL HISTORY:  The patient is . The patient has 2 children and 2 grandchildren. The patient currently smokes 1 ppd x 50 yrs.     The patient had consumed heavily in the past. The patient has consumed alcohol only rarely since the mid-1990s. The patient used to work as  and . The patient is currently receiving disability. PHYSICAL EXAMINATION:  Visit Vitals  /71 (BP 1 Location: Left arm, BP Patient Position: Sitting)   Pulse 94   Temp 99.4 °F (37.4 °C) (Tympanic)   Ht 5' 4\" (1.626 m)   Wt 223 lb 12.8 oz (101.5 kg)   SpO2 95%   BMI 38.42 kg/m²     General: Obese. No acute distress. Eyes: Sclera anicteric. ENT: No oral lesions. Thyroid normal.  Nodes: No adenopathy. Skin: Spider angiomata. No jaundice. Positive for palmar erythema. Respiratory: Difffuse inspiratory and expiratory wheezing. +Rhochi. Cardiovascular: Regular heart rate. No murmurs. No JVD. Abdomen: Soft but tender upon palpation. Liver enlarged, hard and tender upon palpation. Spleen not palpable. Possible ascites, difficult to assess due to habitus. Extremities: 3+ isabelle LE edema, tenderness upon palpation. No muscle wasting. No gross arthritic changes. Neurologic: Alert and oriented. Cranial nerves grossly intact. Fine asterixis.     LABORATORY STUDIES:  [facility] of [address] [place] [address] 12/18/2018 11/19/2018   WBC 3.4 - 10.8 x10E3/uL 5.0 7.8   ANC 1.8 - 8.0 K/UL     HGB 11.1 - 15.9 g/dL 10.3 (L) 11.5    - 379 x10E3/uL 67 (LL) 67 (LL)   INR 0.9 - 1.1       AST 0 - 40 IU/L 31 38   ALT 0 - 32 IU/L 29 33 (H)   Alk Phos 39 - 117 IU/L 166 (H) 145 (H)   Bili, Total 0.0 - 1.2 mg/dL 0.9 0.8   Albumin 3.6 - 4.8 g/dL 3.7 3.9   BUN 8 - 27 mg/dL 10 11   Creat 0.57 - 1.00 mg/dL 0.42 (L) 0.40 (L)   Na 134 - 144 mmol/L 138 140   K 3.5 - 5.2 mmol/L 4.2 4.6   Cl 96 - 106 mmol/L 98 104   CO2 20 - 29 mmol/L 27 24   Glucose 65 - 99 mg/dL 321 (H) 171 (H)   Ammonia <32 UMOL/L       [facility] Latest Ref Rng & Units 11/14/2018   WBC 3.4 - 10.8 x10E3/uL 6.4   ANC 1.8 - 8.0 K/UL    HGB 11.1 - 15.9 g/dL 10.9 (L)    - 379 x10E3/uL 85 (L)   INR 0.9 - 1.1      AST 0 - 40 IU/L 35 ALT 0 - 32 IU/L 33   Alk Phos 39 - 117 IU/L 128 (H)   Bili, Total 0.0 - 1.2 mg/dL 0.7   Albumin 3.6 - 4.8 g/dL 3.2 (L)   BUN 8 - 27 mg/dL 8   Creat 0.57 - 1.00 mg/dL 0.47 (L)   Na 134 - 144 mmol/L 138   K 3.5 - 5.2 mmol/L 3.6   Cl 96 - 106 mmol/L 105   CO2 20 - 29 mmol/L 27   Glucose 65 - 99 mg/dL 205 (H)   Ammonia <32 UMOL/L      Cancer Screening Latest Ref Rng & Units 10/11/2018 7/10/2018 11/28/2017   AFP, Serum 0.0 - 8.0 ng/mL 4.4 5.7 5.5   AFP-L3% 0.0 - 9.9 % 8.8 9.7 8.9   Additional lab values drawn at today's office visit are pending at the time of documentation. SEROLOGIES:  Serologies Latest Ref Rng & Units 11/19/2018 10/11/2018 7/10/2018   Ferritin 15 - 150 ng/mL 358 (H) 36 44   Iron % Saturation 15 - 55 % 31 7 (LL) 22     Serologies Latest Ref Rng 8/15/2013   Hep A Ab, Total Negative Positive (A)   Hep B Surface Ag Negative Negative   Hep B Core Ab, Total Negative Negative   Hep B Surface Ab  0.21   Ferritin 15 - 150 ng/mL 34   Iron % Saturation 15 - 55 % 18   JUDITH, IFA  Negative   ASMCA 0 - 19 Units 4   M2 Ab 0.0 - 20.0 Units 4.0   Alpha-1 antitrypsin level 90 - 200 mg/dL 126   8/2013. Anti-HCV negative. LIVER HISTOLOGY:  12/2013. Liver biopsy. Cirrhosis. Macrovesicular steatosis involving 5% of liver parenchyma  1/2015. Liver biopsy. Cirrhosis. Macrovesicular steatosis involving 5-10% of liver parenchyma    ENDOSCOPIC PROCEDURES:  5/2013. Colonoscopy by Dr Karina Pagan. Hyperplastic polyp. 9/2015. EGD by S. A few medium esophageal varices were identified.  Banding of esophageal varices (3) was performed. Moderate portal hypertensive gastropathy of the body of the, stomach. No gastric varices identified. 3/2016. EGD by El Gutierrez. Grade 1/4 distal esophageal varices no stigmata of bleeding - not banded. Multiple gastric varices high in fundus greater curvature - no stigmata, mild erosive antritis looks like NSAIDs.  3/2016. Colonoscopy by El Gutierrez.  Inadequate prep for transverse colon otherwise normal exam.  5/2018. EGD by [facility]. Two medium esophageal varices were identified and banded. 1 with a red spot. Mild portal hypertensive gastropathy of the body of the stomach. Gastric varicies identified. RADIOLOGY:  7/2013. CT scan abdomen with and without IV contrast. Changes consistent with fatty infiltration and cirrhosis. No liver mass lesions. No dilated bile ducts. No bile duct strictures. No ascites. 12/2015. Abdominal ultrasound. Enlarged heterogeneously echogenic liver with a nodular contour compatible with cirrhosis. No hepatic mass lesion is identified. Mild splenomegaly. 5/2018. US of liver. Enlarged cirrhotic liver is demonstrated without focal liver mass. No ascites. 10/2018. Ultrasound of liver. Echogenic consistent with cirrhosis. No liver mass lesions. No dilated bile ducts. No ascites. OTHER TESTING:  Not available or performed    ASSESSMENT AND PLAN:  Cirrhosis secondary to BALDERAS. Her labs have historically shown stable function with low platelets. These will be repeated today and will continue to monitor patient on a regular basis. Edema/Weight gain. She has clear volume overload and I have adjusted diuretics to step 2. Will check labs today and have her return to the clinic in 10 days for reassessment. We have had lengthy discussion regarding her high sodium diet and why it is necessary for her to eliminate these foods. DM II. Encouraged patient to continue regular follow up with her endocrinologist to manage this. Discussed the importance of following a diabetic diet and exercise as tolerated. Iron deficiency anemia. Resolves with iron infusions and daily iron supplements. She will continue oral FE. Will recheck iron panel and determine if iron infusion is indicated at this time. The patient is likely to need a liver transplant in the future. There is no reason to initiate liver transplant evaluation testing at this time with normal liver function.  Will recalculate MELD at this time. She would need to quit smoking in order for candidacy and I am not sure that she would be able to commit to this. Hepatic encephalopathy. Directed patient to increase lactulose (45 mL BID to TID) to achieve 2-3 soft bowel movements daily. We discussed how to titrate this medications to achieve desired output and how to assess for asterixis at home. She clearly has fine symptoms in the office and is slow in her responses. Esophageal varices. EGD was scheduled a few weeks ago and patient missed this appointment. This has been rescheduled fo 5/2019. Encouraged patient to keep this appointment to assess for GI bleeding and chronic anemia source. The patient was directed to continue all current medications at the current dosages. There are no contraindications for the patient to take any medications that are necessary for treatment of other medical issues. The patient was counseled regarding alcohol consumption. [facility] screening has recently been performed and does not suggest [address] The next liver imaging study will be performed in 4/2019. This has been ordered. [address] [place] [address] in 10 days.     Alea Ayon PA-C  [facility] of [address] [place] [address], [address] [address]  22.  [phone]

## 2019-03-19 NOTE — PROGRESS NOTES
1. Have you been to the ER, urgent care clinic since your last visit? Hospitalized since your last visit? Yes Where: Yes, [address] for edema in legs    2. Have you seen or consulted any other health care providers outside of the [address] [place] [address] since your last visit? Include any pap smears or colon screening. No       Chief Complaint   Patient presents with    Follow-up     Visit Vitals  /71 (BP 1 Location: Left arm, BP Patient Position: Sitting)   Pulse 94   Temp 99.4 °F (37.4 °C) (Tympanic)   Ht 5' 4\" (1.626 m)   Wt 223 lb 12.8 oz (101.5 kg)   SpO2 95%   BMI 38.42 kg/m²     3 most recent PHQ Screens 3/19/2019   Little interest or pleasure in doing things Not at all   Feeling down, depressed, irritable, or hopeless Not at all   Total Score PHQ 2 0     Learning Assessment 3/19/2019   PRIMARY LEARNER Patient   HIGHEST LEVEL OF EDUCATION - PRIMARY LEARNER  -   BARRIERS PRIMARY LEARNER NONE   CO-LEARNER CAREGIVER No   PRIMARY LANGUAGE ENGLISH   LEARNER PREFERENCE PRIMARY LISTENING   LEARNING SPECIAL TOPICS -   ANSWERED BY patient   RELATIONSHIP SELF     Abuse Screening Questionnaire 3/19/2019   Do you ever feel afraid of your partner? N   Are you in a relationship with someone who physically or mentally threatens you? N   Is it safe for you to go home?  Min Adame

## 2019-03-20 NOTE — PROGRESS NOTES
Pt notified of values. Increase to step 2 and recheck in one week. Patient on oral iron without GI bleeding signs, will order IV iron infusion.

## 2019-03-27 NOTE — PROGRESS NOTES
1130 Pt admit to [facility] for Day 1 of 2 Injectafer ambulatory in stable condition. Assessment completed. No new concerns voiced. Pt complains of asthma and coughing this time of year. Peripheral IV access established with positive blood return. Normal Saline at [facility]. Visit Vitals /60 (BP 1 Location: Right arm, BP Patient Position: At rest) Pulse 83 Temp 98 °F (36.7 °C) Resp 18 SpO2 99% Breastfeeding? No  
 
 
Medications: 
Normal Saline Injectafer over 20 minutes 1300 Pt tolerated treatment well. Pt refused to be monitored post infusion. . Peripheral IV access removed at discharge. D/c home ambulatory in no distress. Pt next appt is 4/3/19 at 1130

## 2019-03-28 PROBLEM — N39.0 UTI (URINARY TRACT INFECTION): Status: ACTIVE | Noted: 2019-01-01

## 2019-03-28 PROBLEM — A41.9 SEPTIC SHOCK (HCC): Status: ACTIVE | Noted: 2019-01-01

## 2019-03-28 PROBLEM — K52.9 COLITIS: Status: ACTIVE | Noted: 2019-01-01

## 2019-03-28 PROBLEM — K76.82 HEPATIC ENCEPHALOPATHY: Status: ACTIVE | Noted: 2019-01-01

## 2019-03-28 PROBLEM — R65.21 SEPTIC SHOCK (HCC): Status: ACTIVE | Noted: 2019-01-01

## 2019-03-28 NOTE — PROGRESS NOTES
Spiritual Care Assessment/Progress Note [address] 
 
 
NAME: Brady Yen      MRN: [id number] AGE: 61 y.o. SEX: female Latter-day Affiliation: Islam Language: English  
 
3/28/2019     Total Time (in minutes): 10 Spiritual Assessment begun in hospitals EMERGENCY DEPT through conversation with: 
  
    [x]Patient        [x] Family    [] Friend(s) Reason for Consult: Crisis(Code Stroke) Spiritual beliefs: (Please include comment if needed) [x] Identifies with a erika tradition:    Islam 
   [] Supported by a erika community:        
   [] Claims no spiritual orientation:       
   [] Seeking spiritual identity:            
   [] Adheres to an individual form of spirituality:       
   [] Not able to assess:                   
 
    
Identified resources for coping:  
   [] Prayer                           
   [] Music                  [] Guided Imagery [x] Family/friends                 [] Pet visits [] Devotional reading                         [] Unknown 
   [] Other:                                         
 
 
Interventions offered during this visit: (See comments for more details) Patient Interventions: Crisis(Code Stroke) Family/Friend(s): Affirmation of emotions/emotional suffering, Normalization of emotional/spiritual concerns, Initial Assessment Plan of Care: 
 
 [x] Support spiritual and/or cultural needs  
 [] Support AMD and/or advance care planning process    
 [] Support grieving process 
 [] Coordinate Rites and/or Rituals  
 [] Coordination with community clergy 
 [x] No spiritual needs identified at this time 
 [] Detailed Plan of Care below (See Comments)  [] Make referral to Music Therapy 
[] Make referral to Pet Therapy    
[] Make referral to Addiction services 
[] Make referral to [facility] 
[] Make referral to Spiritual Care Partner 
[] No future visits requested       
[x] Follow up visits as needed Comments:  responded to code stroke in the ED. Patient was being evaluated by doctor when  arrived and then went to CT after that.  was informed that  was in the waiting room.  went to waiting room and escorted the  to the room to wait for his wife to return.  expressed he was scared and nervous.  offered words of compassion to the .  provided pastoral care and presence to the patient and her . Spiritual care will follow up as needed and able. Maryellen Peterson MDiv Pager: [phone]

## 2019-03-28 NOTE — ED NOTES
EMT completed another BG, 172 post admin of D50. LOC remains the same. MD notified of lactic acid of 4.5.

## 2019-03-28 NOTE — PROGRESS NOTES
GI consult called for pt. Pt has not been seen by [facility] since 2015 with Dr. Trisha Mooney and apparently has switched care to [facility] as she was seen by  for an outpatient EGD/Colon in 2016 and has not seen Kenneth since. I have left a voicemail to Dr. Selene Naik about the patient. Signed By: Cherry Nunez MD   
 March 28, 2019

## 2019-03-28 NOTE — PROGRESS NOTES
[facility], Critical Care, and Sleep Medicine Name: Juan Sutherland MRN: [id number] : 1955 Hospital: [id number] Date: 3/28/2019 Critical Care Initial Patient Consult IMPRESSION:  
· Severe Sepsis and  Shock on pressors, with levophed. HOld all BP meds. · Cirrhosis, noted in past to have esophageal varices. seems decompensated, Gi eval 
· Hepatic Encephalopathy (ammonia of 110), Not sure how compliant she has been. Denies diarrhea. · Acute UTI. · Anemia · Coags normal.  
· MIld Thrombocytopenia. · Right sided colitis  Noted. ?ischemic or infectious. · Hypoglycemia. Severe has been given D50 twice on D10. · COPD with acute exacerbation, active wheezing when seen in ER. · IBIS on CPAP at home. · Obese. · Right leg weakness. · Smoking · Critically ill, 35 min CC, EOP. Multiple organ failure. High risk of decompensation. Discussed with DR. Wayne Yuan GI. RECOMMENDATIONS:  
· Empiric Cefepime, Levoflox, Flagyl. VAnc. MOnitor Cx. · ON Lactulose, Treatment of increased ammonia. · D10 infusion, monitoring of Hypoglycemia. · Pressors to keep MAP over 65 · Nebs · Steroids for acute COPD exacerbation · CVP to eval volume status · Replete Electrolytes · Watch in ICU. Subjective/History: This patient has been seen and evaluated at the request of Dr. Laura Hutchinson for above. Pt was seen in ER. Patient is a 61 y.o. female with hx of above. Noted to have increased confusion per her family. Has not been compliant with her meds. Noted to have weakness and inability to walk. Was was normal the night before. Noted when she awoke to be confused, had generalized weakness. Has a very dry  Mouth when seen in ER. ROS and HPI limited due to pts confused state. Past Medical History:  
Diagnosis Date  Asthma  Back pain  COPD (chronic obstructive pulmonary disease) (HCC)  Diabetes ([facility] [address]  Esophageal varices in cirrhosis ([facility]   
 6/2014 banding x 2  
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  
Procedure Laterality Date  HX BACK SURGERY    
 HX CARPAL TUNNEL RELEASE    
 on right  HX HYSTERECTOMY plus 1/2 of an ovary removed  AL COLSC FLX W/RMVL OF TUMOR POLYP LESION SNARE TQ  5/30/2013  UPPER GI ENDOSCOPY,LIGAT VARIX  2/6/2015 Prior to Admission medications Medication Sig Start Date End Date Taking? Authorizing Provider  
fluticasone furoate-vilanterol (BREO ELLIPTA) 200-25 mcg/dose inhaler Take 1 Puff by inhalation daily. Provider, Historical  
insulin glargine (LANTUS SOLOSTAR U-100 INSULIN) 100 unit/mL (3 mL) inpn 110 Units by SubCUTAneous route daily. Provider, Historical  
furosemide (LASIX) 40 mg tablet Take 2 Tabs by mouth daily. 3/19/19   HUMZA Romano  
spironolactone (ALDACTONE) 100 mg tablet Take 2 Tabs by mouth daily.  3/19/19   HUMZA Romano  
CONSTULOSE 10 gram/15 mL solution take 2 tablespoonfuls by mouth twice a day 1/7/19   Cathi Barksdale NP  
losartan (COZAAR) 25 mg tablet take 1 tablet by mouth once daily , REPLACES LISINOPRIL FOR BLOOD PRESSURE AND KIDNEY PROTECTION 12/3/18   Eyal Pittman MD  
insulin NPH (NOVOLIN N NPH U-100 INSULIN) 100 unit/mL injection Inject 55 units every 12 hours--dispense relion brand--patient will pay cash 11/28/18   Eyal Pittman MD  
traMADol Tildon Musca) 50 mg tablet take 1-2 tablets by mouth twice a day if needed for DIABETIC NEUROPATHY PAIN 11/27/18   Eyal Pittman MD  
 aspirin 81 mg chewable tablet Take 1 Tab by mouth daily. 11/16/18   Triston Whitaker MD  
atorvastatin (LIPITOR) 40 mg tablet Take 1 Tab by mouth nightly. 11/15/18   Triston Whitaker MD  
potassium chloride SR (KLOR-CON 10) 10 mEq tablet Take 10 mEq by mouth two (2) times a day. Leonarda Osman MD  
omeprazole (PRILOSEC) 20 mg capsule take 1 capsule by mouth once daily 11/8/18   Cathi Barksdale, NP  
amitriptyline (ELAVIL) 150 mg tablet Take 1 Tab by mouth nightly. 10/16/18   Bernardo Leonardo MD  
propranolol (INDERAL) 20 mg tablet Take 1 Tab by mouth two (2) times a day. 10/16/18   Bernardo Leonardo MD  
ferrous sulfate 325 mg (65 mg iron) tablet take 1 tablet by mouth once daily with BREAKFAST 10/2/18   Cathi Barksdale, NP  
insulin lispro (HUMALOG KWIKPEN INSULIN) 100 unit/mL kwikpen Inject as needed up to 3 times per day for sugars over 150: 4 units for every 50 points. Max 50 units per day 10/1/18   Bernardo Leonardo MD  
metFORMIN (GLUCOPHAGE) 1,000 mg tablet take 1 tablet by mouth twice a day with meals 8/23/18   Bernardo Leonardo MD  
ondansetron (ZOFRAN ODT) 4 mg disintegrating tablet dissolve 1 tablet ON TONGUE every 8 hours if needed for nausea 8/1/18   Cathi Barksdale, NP  
gabapentin (NEURONTIN) 600 mg tablet take 2 tablets by mouth three times a day 7/19/18   Bernardo Leonardo MD  
rOPINIRole (REQUIP) 4 mg tab TAB Take 4 mg by mouth nightly. Provider, Historical  
albuterol (PROAIR HFA) 90 mcg/actuation inhaler Take 2 Puffs by inhalation every four (4) hours as needed for Wheezing. Leonarda Osman MD  
 
Current Facility-Administered Medications Medication Dose Route Frequency  sodium chloride (NS) flush 10 mL  10 mL IntraVENous RAD ONCE  
 iopamidol (ISOVUE-370) 76 % injection  NOREPINephrine (LEVOPHED) 8 mg in 5% dextrose 250mL infusion  2-30 mcg/min IntraVENous TITRATE  dextrose 10% infusion  50 mL/hr IntraVENous CONTINUOUS  
  sodium chloride (NS) flush 5-40 mL  5-40 mL IntraVENous Q8H  
 metroNIDAZOLE (FLAGYL) IVPB premix 500 mg  500 mg IntraVENous Q12H  
 [START ON 3/29/2019] levoFLOXacin (LEVAQUIN) 750 mg in D5W IVPB  750 mg IntraVENous Q24H  
 lactulose (CHRONULAC) 10 gram/15 mL solution 30 g  30 g Rectal Q6H  
 0.9% sodium chloride infusion  125 mL/hr IntraVENous CONTINUOUS  
 cefepime (MAXIPIME) 2 g in 0.9% sodium chloride (MBP/ADV) 100 mL  2 g IntraVENous Q8H  
 [START ON 3/29/2019] fluticasone-vilanterol (BREO ELLIPTA) 200mcg-25mcg/puff  1 Puff Inhalation DAILY Allergies Allergen Reactions  Hydrocodone Nausea and Vomiting  Lisinopril Cough  Lortab [Hydrocodone-Acetaminophen] Nausea and Vomiting  Percocet [Oxycodone-Acetaminophen] Nausea and Vomiting Social History Tobacco Use  Smoking status: Current Every Day Smoker Packs/day: 1.00 Years: 40.00 Pack years: 40.00  Smokeless tobacco: Former User Substance Use Topics  Alcohol use: No  
  Comment: rare Family History Problem Relation Age of Onset  Diabetes Mother  Stroke Sister  Diabetes Paternal Aunt  Diabetes Paternal Uncle  Heart Disease Neg Hx Review of Systems: 
Review of systems not obtained due to patient factors. Objective: 
Vital Signs:   
Visit Vitals BP (!) 92/29 Pulse (!) 119 Temp (!) 101.8 °F (38.8 °C) Resp 18 Ht 5' 2\" (1.575 m) Wt 95 kg (209 lb 7 oz) SpO2 91% BMI 38.31 kg/m² O2 Device: Nasal cannula O2 Flow Rate (L/min): 2 l/min Temp (24hrs), Av.5 °F (39.2 °C), Min:101.8 °F (38.8 °C), Max:103.2 °F (39.6 °C) Intake/Output:  
Last shift:       0701 -  1900 In: 3100 [I.V.:3100] Out: - Last 3 shifts: No intake/output data recorded. Intake/Output Summary (Last 24 hours) at 3/28/2019 1749 Last data filed at 3/28/2019 1704 Gross per 24 hour Intake 3100 ml Output  Net 3100 ml Hemodynamics:  
PAP:   CO:    
 Wedge:   CI:    
CVP:    SVR:    
  PVR:    
 
Ventilator Settings: 
Mode Rate Tidal Volume Pressure FiO2 PEEP Peak airway pressure:     
Minute ventilation:     
 
Physical Exam: 
 
General:  Sleepy but cooperative, no distress, appears stated age. Head:  Normocephalic, without obvious abnormality, atraumatic. Eyes:  Conjunctivae/corneas clear. PERRL, EOMs intact. Nose: Nares normal. Septum midline. Mucosa normal. No drainage or sinus tenderness. Throat: Lips, mucosa, and tongue normal. Teeth and gums normal.  
Neck: Supple, symmetrical, trachea midline, no adenopathy, thyroid: no enlargment/tenderness/nodules, no carotid bruit and no JVD. Has right neck IJ. Back:   Symmetric, no curvature. ROM normal.  
Lungs: On  auscultation bilaterally not to have increased wheezing, prolonged expiratory phase. Chest wall:  No tenderness or deformity. Heart:  Regular rate and rhythm, S1, S2 normal, no murmur, click, rub or gallop. Abdomen:   Soft, non-tender. Bowel sounds normal. No masses,  No organomegaly. Obese. Extremities: Extremities normal, atraumatic, no cyanosis or edema. Pulses: 2+ and symmetric all extremities. Skin: Skin color, texture, turgor normal. No rashes or lesions Lymph nodes: Cervical, supraclavicular, and axillary nodes normal.  
Neurologic: Grossly nonfocal, sleepy seems confused. Psych: no overt anxiety or depression. But not able to fully eval.   
 
 
Data:  
 
Recent Results (from the past 24 hour(s)) GLUCOSE, POC Collection Time: 03/28/19 10:10 AM  
Result Value Ref Range Glucose (POC) 68 65 - 100 mg/dL Performed by Priscila Jimenez \"Seth\" CBC WITH AUTOMATED DIFF Collection Time: 03/28/19 10:13 AM  
Result Value Ref Range WBC 8.1 3.6 - 11.0 K/uL  
 RBC 3.38 (L) 3.80 - 5.20 M/uL HGB 9.1 (L) 11.5 - 16.0 g/dL HCT 29.9 (L) 35.0 - 47.0 % MCV 88.5 80.0 - 99.0 FL  
 MCH 26.9 26.0 - 34.0 PG  
 MCHC 30.4 30.0 - 36.5 g/dL RDW 17.3 (H) 11.5 - 14.5 % PLATELET 936 (L) 186 - 400 K/uL MPV 11.5 8.9 - 12.9 FL  
 NRBC 0.5 (H) 0  WBC ABSOLUTE NRBC 0.04 (H) 0.00 - 0.01 K/uL NEUTROPHILS 84 (H) 32 - 75 % BAND NEUTROPHILS 15 % LYMPHOCYTES 1 (L) 12 - 49 % MONOCYTES 0 (L) 5 - 13 % EOSINOPHILS 0 0 - 7 % BASOPHILS 0 0 - 1 % IMMATURE GRANULOCYTES 0 0.0 - 0.5 % ABS. NEUTROPHILS 8.0 1.8 - 8.0 K/UL  
 ABS. LYMPHOCYTES 0.1 (L) 0.8 - 3.5 K/UL  
 ABS. MONOCYTES 0.0 0.0 - 1.0 K/UL  
 ABS. EOSINOPHILS 0.0 0.0 - 0.4 K/UL  
 ABS. BASOPHILS 0.0 0.0 - 0.1 K/UL  
 ABS. IMM. GRANS. 0.0 0.00 - 0.04 K/UL  
 DF MANUAL    
 RBC COMMENTS ANISOCYTOSIS 
1+ METABOLIC PANEL, COMPREHENSIVE Collection Time: 03/28/19 10:13 AM  
Result Value Ref Range Sodium 136 136 - 145 mmol/L Potassium 4.0 3.5 - 5.1 mmol/L Chloride 101 97 - 108 mmol/L  
 CO2 26 21 - 32 mmol/L Anion gap 9 5 - 15 mmol/L Glucose 68 65 - 100 mg/dL BUN 19 6 - 20 MG/DL Creatinine 0.68 0.55 - 1.02 MG/DL  
 BUN/Creatinine ratio 28 (H) 12 - 20 GFR est AA >60 >60 ml/min/1.73m2 GFR est non-AA >60 >60 ml/min/1.73m2 Calcium 8.5 8.5 - 10.1 MG/DL Bilirubin, total 0.6 0.2 - 1.0 MG/DL  
 ALT (SGPT) 22 12 - 78 U/L  
 AST (SGOT) 31 15 - 37 U/L Alk. phosphatase 111 45 - 117 U/L Protein, total 7.7 6.4 - 8.2 g/dL Albumin 3.1 (L) 3.5 - 5.0 g/dL Globulin 4.6 (H) 2.0 - 4.0 g/dL A-G Ratio 0.7 (L) 1.1 - 2.2 PROTHROMBIN TIME + INR Collection Time: 03/28/19 10:13 AM  
Result Value Ref Range INR 1.2 (H) 0.9 - 1.1 Prothrombin time 12.6 (H) 9.0 - 11.1 sec PTT Collection Time: 03/28/19 10:13 AM  
Result Value Ref Range aPTT 24.2 22.1 - 32.0 sec  
 aPTT, therapeutic range     58.0 - 77.0 SECS  
AMMONIA Collection Time: 03/28/19 10:13 AM  
Result Value Ref Range Ammonia 110 (H) <32 UMOL/L  
CULTURE, BLOOD Collection Time: 03/28/19 10:13 AM  
Result Value Ref Range Special Requests: NO SPECIAL REQUESTS Culture result:     
  TWO OF TWO BOTTLES HAVE BEEN FLAGGED POSITIVE BY INSTRUMENT. BOTTLES HAVE BEEN SENT TO [facility] TO ASSESS FOR POSSIBLE GROWTH. CK  
 Collection Time: 03/28/19 10:13 AM  
Result Value Ref Range CK 47 26 - 192 U/L  
MAGNESIUM Collection Time: 03/28/19 10:13 AM  
Result Value Ref Range Magnesium 1.5 (L) 1.6 - 2.4 mg/dL TROPONIN I Collection Time: 03/28/19 10:13 AM  
Result Value Ref Range Troponin-I, Qt. <0.05 <0.05 ng/mL SAMPLES BEING HELD Collection Time: 03/28/19 10:13 AM  
Result Value Ref Range SAMPLES BEING HELD RED COMMENT Add-on orders for these samples will be processed based on acceptable specimen integrity and analyte stability, which may vary by analyte. POC G3 - PUL Collection Time: 03/28/19 10:18 AM  
Result Value Ref Range pH (POC) 7.429 7.35 - 7.45    
 pCO2 (POC) 35.7 35.0 - 45.0 MMHG  
 pO2 (POC) 61 (L) 80 - 100 MMHG  
 HCO3 (POC) 23.6 22 - 26 MMOL/L  
 sO2 (POC) 92 92 - 97 % Base deficit (POC) 1 mmol/L Site RIGHT RADIAL Device: ROOM AIR Allens test (POC) YES Specimen type (POC) ARTERIAL Total resp. rate 29 POC LACTIC ACID Collection Time: 03/28/19 10:24 AM  
Result Value Ref Range Lactic Acid (POC) 4.50 (HH) 0.40 - 2.00 mmol/L  
GLUCOSE, POC Collection Time: 03/28/19 10:31 AM  
Result Value Ref Range Glucose (POC) 179 (H) 65 - 100 mg/dL Performed by Lorenzo Ornelas (SUKHIT) EKG, 12 LEAD, INITIAL Collection Time: 03/28/19 10:32 AM  
Result Value Ref Range Ventricular Rate 126 BPM  
 Atrial Rate 126 BPM  
 P-R Interval 140 ms QRS Duration 74 ms Q-T Interval 304 ms QTC Calculation (Bezet) 440 ms Calculated P Axis 76 degrees Calculated R Axis 69 degrees Calculated T Axis 65 degrees Diagnosis Sinus tachycardia When compared with ECG of 09-APR-2018 10:59, No significant change was found Confirmed by Mary Shields ([id number]) on 3/28/2019 2:07:07 PM 
  
 INFLUENZA A & B AG (RAPID TEST) Collection Time: 03/28/19 10:55 AM  
Result Value Ref Range Influenza A Antigen NEGATIVE  NEG Influenza B Antigen NEGATIVE  NEG    
URINALYSIS W/ REFLEX CULTURE Collection Time: 03/28/19 11:03 AM  
Result Value Ref Range Color DARK YELLOW Appearance TURBID (A) CLEAR Specific gravity 1.021 1.003 - 1.030    
 pH (UA) 6.5 5.0 - 8.0 Protein 30 (A) NEG mg/dL Glucose NEGATIVE  NEG mg/dL Ketone TRACE (A) NEG mg/dL Blood TRACE (A) NEG Urobilinogen 4.0 (H) 0.2 - 1.0 EU/dL Nitrites NEGATIVE  NEG Leukocyte Esterase LARGE (A) NEG    
 WBC 20-50 0 - 4 /hpf  
 RBC 0-5 0 - 5 /hpf Epithelial cells MODERATE (A) FEW /lpf Bacteria 4+ (A) NEG /hpf  
 UA:UC IF INDICATED URINE CULTURE ORDERED (A) CNI    
BILIRUBIN, CONFIRM Collection Time: 03/28/19 11:03 AM  
Result Value Ref Range Bilirubin UA, confirm NEGATIVE  NEG    
CULTURE, BLOOD Collection Time: 03/28/19 11:12 AM  
Result Value Ref Range Special Requests: NO SPECIAL REQUESTS Culture result: NO GROWTH AFTER 2 HOURS    
GLUCOSE, POC Collection Time: 03/28/19  2:20 PM  
Result Value Ref Range Glucose (POC) 66 65 - 100 mg/dL Performed by Cam Ray (CON) POC LACTIC ACID Collection Time: 03/28/19  2:52 PM  
Result Value Ref Range Lactic Acid (POC) 4.45 (HH) 0.40 - 2.00 mmol/L  
GLUCOSE, POC Collection Time: 03/28/19  2:53 PM  
Result Value Ref Range Glucose (POC) 162 (H) 65 - 100 mg/dL Performed by Cam Ray (CON) Telemetry:Sinus Tach. Imaging: 
I have personally reviewed the patients radiographs and have reviewed the reports: 
3-28-19: CT of chest/Abdomen: IMPRESSION:  
1. Right colitis. 2. Cirrhosis with portal venous hypertension. 3. Clear lungs.   
 
 
Mary Randall MD

## 2019-03-28 NOTE — ED NOTES
Pt remains lethargic, respirations are labored, audible crackles heard w/o ausculation. O2 sat continuing to monitor closely, for possible RSI.

## 2019-03-28 NOTE — ED NOTES
Pt continues to move during central line placement. MD orders Norepi to be titrated to 8mcg/min to allow for versed 1mg,to be admin for sedation during procedure due to constant movement and inability to redirect patient. +affect. Continuing to monitor.

## 2019-03-28 NOTE — ED TRIAGE NOTES
Pt transported to CT on arrival by EMS, 2 RN's assisted with transport. Pt Last well known last night. This am, pts  noticed confusion and stated she could not even hold a cup of water without dropping it. Pt is unable to to verbalize needs at this time,needs frequent redirection to follow commands. Skin is warm, dry. Resp are even.

## 2019-03-28 NOTE — PROGRESS NOTES
Speech pathology note Consult received for SLP dysphagia screening. RN to complete the STAND. If formal SLP evaluation is desired, please re-consult with SLP eval and treat order as SLP does not complete \"screenings\" only evaluations or treatments. Thank you. Dillon Ruiz., CCC-SLP

## 2019-03-28 NOTE — ED NOTES
Pt's orientation begins to improve. Can state name,birthdate, and setting. But disoriented to situation. Unable to recollect events leading up to current time.

## 2019-03-28 NOTE — CONSULTS
[address] Gastroenterology Consult Note Mallory Chahal PA-C covering for Dr. Merline Troy Admitting: Dr. Chrystal Serna Consult Date: 3/28/2019 Subjective: Chief Complaint: Elvia Carvalho History of Present Illness: Griffin Harding is a 61 y.o. female who is seen in consultation for encephalopathy. She has PMHx of cirrhosis due to BALDERAS and hx of alcohol abuse, iron deficency anemia, esophageal and gastric varices as well as HE. Pt was suppose to f/u at Dr. Krystina Kirkpatrick office today today, saw his PA last week secondary to fluid retention and was started on fluid pills.  tried waking pt at 8 AM and unable to, called 911. BG was checked by EMS and it was 50.  reports she had started to drop things more this week, unsure about sleep changes. Yesterday she was acting normal, no confusion. She reports she started taking lactulose more recently however not prior to last month. Pt reports she has 3BM a day however not too reliable with hx. According to hepatology's note: \"Hepatic encephalopathy is currently being treated with lactulose 45 mL BID, when she remembers. Patient reports regularly forgetting to take this medication and is having one bowel movement every other day. \" Denies any abdominal pain, she is agitated stating \" I need to get up\". Denies any n/v, tolerating diet well and requesting ice water. She has hx of bx proven cirrhosis secondary to fatty liver as well as alcohol, quit drinking over 6-7 years ago according to family. Last EGD in May 2018 demonstrated 2 medium-sized esophageal varices that were banded. Colonoscopy done for anemia: 3/3/16: no blood in colon, inadequate prep in transverse colon, remainder WNL 
EGD 3/3/16: gastric varices, multiple GOV2 and smaller esophageal varices, mild erosive anteritis, no stigmata of recent bleeding Past Medical History:  
Diagnosis Date  Asthma  Back pain  COPD (chronic obstructive pulmonary disease) (HCC)  Diabetes ([facility]  [facility]   
 6/2014 banding x 2  
 Fibromyalgia  Gastrointestinal disorder  GERD (gastroesophageal reflux disease)  Hypercholesteremia  Liver cirrhosis secondary to BALDERAS ([facility]  Liver disease  Other and unspecified hyperlipidemia  Restless leg syndrome  Type II or unspecified type diabetes mellitus without mention of complication, uncontrolled  Unspecified essential hypertension Past Surgical History:  
Procedure Laterality Date  HX BACK SURGERY    
 HX CARPAL TUNNEL RELEASE    
 on right  HX HYSTERECTOMY plus 1/2 of an ovary removed  IL COLSC FLX W/RMVL OF TUMOR POLYP LESION SNARE TQ  5/30/2013  UPPER GI ENDOSCOPY,LIGAT VARIX  2/6/2015 Family History Problem Relation Age of Onset  Diabetes Mother  Stroke Sister  Diabetes Paternal Aunt  Diabetes Paternal Uncle  Heart Disease Neg Hx Social History Tobacco Use  Smoking status: Current Every Day Smoker Packs/day: 1.00 Years: 40.00 Pack years: 40.00  Smokeless tobacco: Former User Substance Use Topics  Alcohol use: No  
  Comment: rare Allergies Allergen Reactions  Hydrocodone Nausea and Vomiting  Lisinopril Cough  Lortab [Hydrocodone-Acetaminophen] Nausea and Vomiting  Percocet [Oxycodone-Acetaminophen] Nausea and Vomiting Current Facility-Administered Medications Medication Dose Route Frequency  sodium chloride (NS) flush 5-10 mL  5-10 mL IntraVENous PRN  
 vancomycin (VANCOCIN) 2000 mg in  ml infusion  2 g IntraVENous NOW  sodium chloride (NS) flush 10 mL  10 mL IntraVENous RAD ONCE  
 iopamidol (ISOVUE-370) 76 % injection  NOREPINephrine (LEVOPHED) 8 mg in 5% dextrose 250mL infusion  2-30 mcg/min IntraVENous TITRATE  dextrose 10% infusion  50 mL/hr IntraVENous CONTINUOUS  
  sodium chloride (NS) flush 5-40 mL  5-40 mL IntraVENous Q8H  
 sodium chloride (NS) flush 5-40 mL  5-40 mL IntraVENous PRN  
 acetaminophen (TYLENOL) suppository 650 mg  650 mg Rectal Q6H PRN  
 ondansetron (ZOFRAN) injection 4 mg  4 mg IntraVENous Q6H PRN  
 metroNIDAZOLE (FLAGYL) IVPB premix 500 mg  500 mg IntraVENous Q12H  
 [START ON 3/29/2019] levoFLOXacin (LEVAQUIN) 750 mg in D5W IVPB  750 mg IntraVENous Q24H  
 lactulose (CHRONULAC) 10 gram/15 mL solution 30 g  30 g Rectal Q6H  
 0.9% sodium chloride infusion  125 mL/hr IntraVENous CONTINUOUS  
 cefepime (MAXIPIME) 2 g in 0.9% sodium chloride (MBP/ADV) 100 mL  2 g IntraVENous Q8H  
 albuterol-ipratropium (DUO-NEB) 2.5 MG-0.5 MG/3 ML  3 mL Nebulization Q4H PRN  
 [START ON 3/29/2019] fluticasone-vilanterol (BREO ELLIPTA) 200mcg-25mcg/puff  1 Puff Inhalation DAILY Current Outpatient Medications Medication Sig  
 fluticasone furoate-vilanterol (BREO ELLIPTA) 200-25 mcg/dose inhaler Take 1 Puff by inhalation daily.  insulin glargine (LANTUS SOLOSTAR U-100 INSULIN) 100 unit/mL (3 mL) inpn 110 Units by SubCUTAneous route daily.  furosemide (LASIX) 40 mg tablet Take 2 Tabs by mouth daily.  spironolactone (ALDACTONE) 100 mg tablet Take 2 Tabs by mouth daily.  CONSTULOSE 10 gram/15 mL solution take 2 tablespoonfuls by mouth twice a day  losartan (COZAAR) 25 mg tablet take 1 tablet by mouth once daily , REPLACES LISINOPRIL FOR BLOOD PRESSURE AND KIDNEY PROTECTION  insulin NPH (NOVOLIN N NPH U-100 INSULIN) 100 unit/mL injection Inject 55 units every 12 hours--dispense relion brand--patient will pay cash  traMADol (ULTRAM) 50 mg tablet take 1-2 tablets by mouth twice a day if needed for DIABETIC NEUROPATHY PAIN  
 aspirin 81 mg chewable tablet Take 1 Tab by mouth daily.  atorvastatin (LIPITOR) 40 mg tablet Take 1 Tab by mouth nightly.   
 potassium chloride SR (KLOR-CON 10) 10 mEq tablet Take 10 mEq by mouth two (2) times a day.  omeprazole (PRILOSEC) 20 mg capsule take 1 capsule by mouth once daily  amitriptyline (ELAVIL) 150 mg tablet Take 1 Tab by mouth nightly.  propranolol (INDERAL) 20 mg tablet Take 1 Tab by mouth two (2) times a day.  ferrous sulfate 325 mg (65 mg iron) tablet take 1 tablet by mouth once daily with BREAKFAST  insulin lispro (HUMALOG KWIKPEN INSULIN) 100 unit/mL kwikpen Inject as needed up to 3 times per day for sugars over 150: 4 units for every 50 points. Max 50 units per day  metFORMIN (GLUCOPHAGE) 1,000 mg tablet take 1 tablet by mouth twice a day with meals  ondansetron (ZOFRAN ODT) 4 mg disintegrating tablet dissolve 1 tablet ON TONGUE every 8 hours if needed for nausea  gabapentin (NEURONTIN) 600 mg tablet take 2 tablets by mouth three times a day  rOPINIRole (REQUIP) 4 mg tab TAB Take 4 mg by mouth nightly.  albuterol (PROAIR HFA) 90 mcg/actuation inhaler Take 2 Puffs by inhalation every four (4) hours as needed for Wheezing. Review of Systems:  
Unable to obtain secondary to confusion and agitation, see HPI Objective:  
 
Physical Exam: 
Visit Vitals /43 Pulse (!) 116 Temp (!) 101.8 °F (38.8 °C) Resp 27 Ht 5' 2\" (1.575 m) Wt 95 kg (209 lb 7 oz) SpO2 97% BMI 38.31 kg/m² GEN: Agitated WF, on NC O2 Skin:  Extremities and face reveal no rashes. No aquino erythema. + telangiectasias on the chest wall. HEENT: Sclerae anicteric. No oral ulcers. +white discharge on lips Cardiovascular: Regular rate and rhythm. No murmurs, gallops, or rubs. Respiratory:  uncomfortable breathing with, +accessory muscle use. Course breath sounds +wheezes throughout all lung fields GI:  Abdomen +distended,  and nontender. Hypoactive bowel sounds. No enlargement of the liver or spleen. No masses palpable. Rectal:  Deferred Musculoskeletal: + pitting edema of the lower legs. Neurological: Patient alert to person and place, difficulty with word finding and disoriented to time Psychiatric: Agitated, confused Lymphatic:  No cervical or supraclavicular adenopathy. Laboratory:   
Recent Results (from the past 24 hour(s)) GLUCOSE, POC Collection Time: 03/28/19 10:10 AM  
Result Value Ref Range Glucose (POC) 68 65 - 100 mg/dL Performed by Thu Gonzalez \"Seth\" CBC WITH AUTOMATED DIFF Collection Time: 03/28/19 10:13 AM  
Result Value Ref Range WBC 8.1 3.6 - 11.0 K/uL  
 RBC 3.38 (L) 3.80 - 5.20 M/uL HGB 9.1 (L) 11.5 - 16.0 g/dL HCT 29.9 (L) 35.0 - 47.0 % MCV 88.5 80.0 - 99.0 FL  
 MCH 26.9 26.0 - 34.0 PG  
 MCHC 30.4 30.0 - 36.5 g/dL  
 RDW 17.3 (H) 11.5 - 14.5 % PLATELET 222 (L) 746 - 400 K/uL MPV 11.5 8.9 - 12.9 FL  
 NRBC 0.5 (H) 0  WBC ABSOLUTE NRBC 0.04 (H) 0.00 - 0.01 K/uL NEUTROPHILS 84 (H) 32 - 75 % BAND NEUTROPHILS 15 % LYMPHOCYTES 1 (L) 12 - 49 % MONOCYTES 0 (L) 5 - 13 % EOSINOPHILS 0 0 - 7 % BASOPHILS 0 0 - 1 % IMMATURE GRANULOCYTES 0 0.0 - 0.5 % ABS. NEUTROPHILS 8.0 1.8 - 8.0 K/UL  
 ABS. LYMPHOCYTES 0.1 (L) 0.8 - 3.5 K/UL  
 ABS. MONOCYTES 0.0 0.0 - 1.0 K/UL  
 ABS. EOSINOPHILS 0.0 0.0 - 0.4 K/UL  
 ABS. BASOPHILS 0.0 0.0 - 0.1 K/UL  
 ABS. IMM. GRANS. 0.0 0.00 - 0.04 K/UL  
 DF MANUAL    
 RBC COMMENTS ANISOCYTOSIS 
1+ METABOLIC PANEL, COMPREHENSIVE Collection Time: 03/28/19 10:13 AM  
Result Value Ref Range Sodium 136 136 - 145 mmol/L Potassium 4.0 3.5 - 5.1 mmol/L Chloride 101 97 - 108 mmol/L  
 CO2 26 21 - 32 mmol/L Anion gap 9 5 - 15 mmol/L Glucose 68 65 - 100 mg/dL BUN 19 6 - 20 MG/DL Creatinine 0.68 0.55 - 1.02 MG/DL  
 BUN/Creatinine ratio 28 (H) 12 - 20 GFR est AA >60 >60 ml/min/1.73m2 GFR est non-AA >60 >60 ml/min/1.73m2 Calcium 8.5 8.5 - 10.1 MG/DL  Bilirubin, total 0.6 0.2 - 1.0 MG/DL  
 ALT (SGPT) 22 12 - 78 U/L  
 AST (SGOT) 31 15 - 37 U/L  
 Alk. phosphatase 111 45 - 117 U/L Protein, total 7.7 6.4 - 8.2 g/dL Albumin 3.1 (L) 3.5 - 5.0 g/dL Globulin 4.6 (H) 2.0 - 4.0 g/dL A-G Ratio 0.7 (L) 1.1 - 2.2 PROTHROMBIN TIME + INR Collection Time: 03/28/19 10:13 AM  
Result Value Ref Range INR 1.2 (H) 0.9 - 1.1 Prothrombin time 12.6 (H) 9.0 - 11.1 sec PTT Collection Time: 03/28/19 10:13 AM  
Result Value Ref Range aPTT 24.2 22.1 - 32.0 sec  
 aPTT, therapeutic range     58.0 - 77.0 SECS  
AMMONIA Collection Time: 03/28/19 10:13 AM  
Result Value Ref Range Ammonia 110 (H) <32 UMOL/L  
CULTURE, BLOOD Collection Time: 03/28/19 10:13 AM  
Result Value Ref Range Special Requests: NO SPECIAL REQUESTS Culture result: NO GROWTH AFTER 3 HOURS    
CK Collection Time: 03/28/19 10:13 AM  
Result Value Ref Range CK 47 26 - 192 U/L  
MAGNESIUM Collection Time: 03/28/19 10:13 AM  
Result Value Ref Range Magnesium 1.5 (L) 1.6 - 2.4 mg/dL TROPONIN I Collection Time: 03/28/19 10:13 AM  
Result Value Ref Range Troponin-I, Qt. <0.05 <0.05 ng/mL SAMPLES BEING HELD Collection Time: 03/28/19 10:13 AM  
Result Value Ref Range SAMPLES BEING HELD RED COMMENT Add-on orders for these samples will be processed based on acceptable specimen integrity and analyte stability, which may vary by analyte. POC G3 - PUL Collection Time: 03/28/19 10:18 AM  
Result Value Ref Range pH (POC) 7.429 7.35 - 7.45    
 pCO2 (POC) 35.7 35.0 - 45.0 MMHG  
 pO2 (POC) 61 (L) 80 - 100 MMHG  
 HCO3 (POC) 23.6 22 - 26 MMOL/L  
 sO2 (POC) 92 92 - 97 % Base deficit (POC) 1 mmol/L Site RIGHT RADIAL Device: ROOM AIR Allens test (POC) YES Specimen type (POC) ARTERIAL Total resp. rate 29 POC LACTIC ACID Collection Time: 03/28/19 10:24 AM  
Result Value Ref Range Lactic Acid (POC) 4.50 (HH) 0.40 - 2.00 mmol/L  
GLUCOSE, POC Collection Time: 03/28/19 10:31 AM  
Result Value Ref Range Glucose (POC) 179 (H) 65 - 100 mg/dL Performed by Blanco Pacheco (SUKHIT) EKG, 12 LEAD, INITIAL Collection Time: 03/28/19 10:32 AM  
Result Value Ref Range Ventricular Rate 126 BPM  
 Atrial Rate 126 BPM  
 P-R Interval 140 ms QRS Duration 74 ms Q-T Interval 304 ms QTC Calculation (Bezet) 440 ms Calculated P Axis 76 degrees Calculated R Axis 69 degrees Calculated T Axis 65 degrees Diagnosis Sinus tachycardia When compared with ECG of 09-APR-2018 10:59, No significant change was found Confirmed by Jie Fagan ([id number]) on 3/28/2019 2:07:07 PM 
  
INFLUENZA A & B AG (RAPID TEST) Collection Time: 03/28/19 10:55 AM  
Result Value Ref Range Influenza A Antigen NEGATIVE  NEG Influenza B Antigen NEGATIVE  NEG    
URINALYSIS W/ REFLEX CULTURE Collection Time: 03/28/19 11:03 AM  
Result Value Ref Range Color DARK YELLOW Appearance TURBID (A) CLEAR Specific gravity 1.021 1.003 - 1.030    
 pH (UA) 6.5 5.0 - 8.0 Protein 30 (A) NEG mg/dL Glucose NEGATIVE  NEG mg/dL Ketone TRACE (A) NEG mg/dL Blood TRACE (A) NEG Urobilinogen 4.0 (H) 0.2 - 1.0 EU/dL Nitrites NEGATIVE  NEG Leukocyte Esterase LARGE (A) NEG    
 WBC 20-50 0 - 4 /hpf  
 RBC 0-5 0 - 5 /hpf Epithelial cells MODERATE (A) FEW /lpf Bacteria 4+ (A) NEG /hpf  
 UA:UC IF INDICATED URINE CULTURE ORDERED (A) CNI    
BILIRUBIN, CONFIRM Collection Time: 03/28/19 11:03 AM  
Result Value Ref Range Bilirubin UA, confirm NEGATIVE  NEG    
CULTURE, BLOOD Collection Time: 03/28/19 11:12 AM  
Result Value Ref Range Special Requests: NO SPECIAL REQUESTS Culture result: NO GROWTH AFTER 2 HOURS    
GLUCOSE, POC Collection Time: 03/28/19  2:20 PM  
Result Value Ref Range Glucose (POC) 66 65 - 100 mg/dL Performed by Amy Swift (CON) POC LACTIC ACID Collection Time: 03/28/19  2:52 PM  
Result Value Ref Range Lactic Acid (POC) 4.45 (HH) 0.40 - 2.00 mmol/L  
GLUCOSE, POC Collection Time: 03/28/19  2:53 PM  
Result Value Ref Range Glucose (POC) 162 (H) 65 - 100 mg/dL Performed by Delana Siemens (CON) CT Results (most recent): 
[facility] CT CHEST W CONT Narrative INDICATION: Dyspnea chronic, negative or nondiagnostic xray and lab/clinical 
studies EXAM: CT Chest, Abdomen and Pelvis. CT dose reduction was achieved through the 
use of a standardized protocol tailored for this examination and automatic 
exposure control for dose modulation. Contrast: 100 mL Isovue 370 IV. No oral contrast. 
 
CHEST:  
The lungs are clear. There is no significant mediastinal or hilar adenopathy. There is coronary artery calcification. There is aortic calcification without 
aneurysm. Central pulmonary arteries are free of thrombus. There is no pleural 
or pericardial effusion. Visualized thyroid and lower neck soft tissues are 
unremarkable for age. ABDOMEN PELVIS: 
Ascending colon shows diffuse mural thickening and pericolonic inflammatory 
stranding compatible with colitis, etiology uncertain. There is no abscess or 
perforation. Appendix and terminal ileum are normal. There is no megacolon. Small bowel is unremarkable. There is cirrhosis without hepatic mass. There is trace ascites. There is portal 
venous hypertension with varicosities and splenomegaly . There is no free air or significant adenopathy. Liver is uniform in texture. Bile ducts are not enlarged. Pancreas shows no mass or inflammation. Adrenal 
glands are normal in size. Kidneys show no mass or hydronephrosis. Aorta is 
calcified, without aneurysm. The bladder is not distended. The distal ureters are not dilated. There is no 
pelvic mass. Impression IMPRESSION:  
1. Right colitis. 2. Cirrhosis with portal venous hypertension. 3. Clear lungs. Assessment/Plan: Active Problems: 
  Hepatic encephalopathy ([facility] (3/28/2019) Colitis (3/28/2019) UTI (urinary tract infection) (3/28/2019) Septic shock ([facility] (3/28/2019) Patient is presenting with AMS that appears to be secondary to encephalopathy related to sepsis as well as hepatic causes. Recommend starting lactulose enemas, can be changed to PO when cleared to swallow meds, goal of 3-4 soft stools a day. Continue with IVF, agree with IV levo and flagyl for colitis seen on CT. Possible causes include infectious, or ischemic  she does have risk factors including smoking and DM. Appears last colonoscopy was done in 2016 with no abnormalities but inadequate prep. We can start with checking stool for C. Diff, O&P, and culture. Continue IV abx. Has underlying anemia that she is getting iron supplementation for, hgb appears stable here at 9.1 we will continue to monitor, no active bleeding reported at this time. HUM[facility] [place] 
 
03/28/19 
4:24 PM 
 
[address] [place] [address], [address] [id number] Loc: [phone] The patient was seen and examined independently with family at bedside. Please see above note for details. Physical exam: 
[place] Apt but garbled speech. Daughter at bedside HEENT:  EOMI, Chest:  Wheezes Heart: S1, S2, tachy GI: distended, some RLQ pain Extremities: No  cyanosis Data Review:  reviewed Impression:  
Sepsis Hypotension Colitis on CT Lactic acidosis Cirrhosis UTI Plan and discussion: 
Agree with above plan. Pt seen by Dr Constanza Posadas and will need to be under close monitoring. Agree with IV abx(Colitis DDx: infectious, ischemic, inflammatory) Lactulose PO or AR,depdning on MS and ability to take it. Add Xifaxan. KUB in am 
Await stool testing as above. Signed By: Estevan Bettencourt.  Felisha Mcclain MD 
 
3/28/2019  5:17 PM

## 2019-03-28 NOTE — ED PROVIDER NOTES
EMERGENCY DEPARTMENT HISTORY AND PHYSICAL EXAM 
 
 
Date: 3/28/2019 Patient Name: Juan Sutherland History of Presenting Illness Chief Complaint Patient presents with  Altered mental status Pt woke up this morning with confusion, unable to stand, verbalize needs. To CT per Code S on arrival.  
 Low Blood Sugar EMs reports BG 50, gave D10, BG after admin reported to be 130. History Provided By: EMS 
 
HPI: Juan Sutherland, 61 y.o. female with PMHx significant for liver cirrhosis, [place], TIA, presents via EMS to the ED with cc of awoke with confusion, altered mental status, and inability to walk. Patient was reportedly normal last night at 8 PM when she went to bed. Per EMS, patient's  noted her to be abnormal upon awakening this morning. She is unable to talk in coherent sentences, weakness noted in bilateral extremities, and unable to sit up or ambulate. HPI is very limited due to the patient's change in mental status. There are no other complaints, changes, or physical findings at this time. PCP: Audra Rosario NP Current Facility-Administered Medications on File Prior to Encounter Medication Dose Route Frequency Provider Last Rate Last Dose  [] 0.9% sodium chloride infusion  25 mL/hr IntraVENous CONTINUOUS Deepthi [place], [place]   Stopped at [phone] Current Outpatient Medications on File Prior to Encounter Medication Sig Dispense Refill  fluticasone furoate-vilanterol (BREO ELLIPTA) 200-25 mcg/dose inhaler Take 1 Puff by inhalation daily.  insulin glargine (LANTUS SOLOSTAR U-100 INSULIN) 100 unit/mL (3 mL) inpn 110 Units by SubCUTAneous route daily.  furosemide (LASIX) 40 mg tablet Take 2 Tabs by mouth daily. 60 Tab 3  
 spironolactone (ALDACTONE) 100 mg tablet Take 2 Tabs by mouth daily.  60 Tab 3  
 CONSTULOSE 10 gram/15 mL solution take 2 tablespoonfuls by mouth twice a day 1000 mL 5  
  losartan (COZAAR) 25 mg tablet take 1 tablet by mouth once daily , REPLACES LISINOPRIL FOR BLOOD PRESSURE AND KIDNEY PROTECTION 30 Tab 11  
 insulin NPH (NOVOLIN N NPH U-100 INSULIN) 100 unit/mL injection Inject 55 units every 12 hours--dispense relion brand--patient will pay cash 3 Vial 11  
 traMADol (ULTRAM) 50 mg tablet take 1-2 tablets by mouth twice a day if needed for DIABETIC NEUROPATHY PAIN 120 Tab 5  
 aspirin 81 mg chewable tablet Take 1 Tab by mouth daily. 30 Tab 6  
 atorvastatin (LIPITOR) 40 mg tablet Take 1 Tab by mouth nightly. 30 Tab 6  potassium chloride SR (KLOR-CON 10) 10 mEq tablet Take 10 mEq by mouth two (2) times a day.  omeprazole (PRILOSEC) 20 mg capsule take 1 capsule by mouth once daily 30 Cap 5  
 amitriptyline (ELAVIL) 150 mg tablet Take 1 Tab by mouth nightly.  propranolol (INDERAL) 20 mg tablet Take 1 Tab by mouth two (2) times a day. 60 Tab 11  
 ferrous sulfate 325 mg (65 mg iron) tablet take 1 tablet by mouth once daily with BREAKFAST 90 Tab 1  
 insulin lispro (HUMALOG KWIKPEN INSULIN) 100 unit/mL kwikpen Inject as needed up to 3 times per day for sugars over 150: 4 units for every 50 points. Max 50 units per day 15 mL 11  
 metFORMIN (GLUCOPHAGE) 1,000 mg tablet take 1 tablet by mouth twice a day with meals 180 Tab 3  
 ondansetron (ZOFRAN ODT) 4 mg disintegrating tablet dissolve 1 tablet ON TONGUE every 8 hours if needed for nausea 45 Tab 3  
 gabapentin (NEURONTIN) 600 mg tablet take 2 tablets by mouth three times a day 180 Tab 11  
 rOPINIRole (REQUIP) 4 mg tab TAB Take 4 mg by mouth nightly.  albuterol (PROAIR HFA) 90 mcg/actuation inhaler Take 2 Puffs by inhalation every four (4) hours as needed for Wheezing. Past History Past Medical History: 
Past Medical History:  
Diagnosis Date  Asthma  Back pain  COPD (chronic obstructive pulmonary disease) (HCC)  Diabetes ([facility] [address]  Esophageal varices in cirrhosis ([facility] [address] 6/2014 banding x 2  
 Fibromyalgia  Gastrointestinal disorder  GERD (gastroesophageal reflux disease)  Hypercholesteremia  Liver cirrhosis secondary to BALDERAS ([facility] [address]  Liver disease  Other and unspecified hyperlipidemia  Restless leg syndrome  Type II or unspecified type diabetes mellitus without mention of complication, uncontrolled  Unspecified essential hypertension Past Surgical History: 
Past Surgical History:  
Procedure Laterality Date  HX BACK SURGERY    
 HX CARPAL TUNNEL RELEASE    
 on right  HX HYSTERECTOMY plus 1/2 of an ovary removed  VT COLSC FLX W/RMVL OF TUMOR POLYP LESION SNARE TQ  5/30/2013  UPPER GI ENDOSCOPY,LIGAT VARIX  2/6/2015 Family History: 
Family History Problem Relation Age of Onset  Diabetes Mother  Stroke Sister  Diabetes Paternal Aunt  Diabetes Paternal Uncle  Heart Disease Neg Hx Social History: 
Social History Tobacco Use  Smoking status: Current Every Day Smoker Packs/day: 1.00 Years: 40.00 Pack years: 40.00  Smokeless tobacco: Former User Substance Use Topics  Alcohol use: No  
  Comment: rare  Drug use: No  
 
 
Allergies: Allergies Allergen Reactions  Hydrocodone Nausea and Vomiting  Lisinopril Cough  Lortab [Hydrocodone-Acetaminophen] Nausea and Vomiting  Percocet [Oxycodone-Acetaminophen] Nausea and Vomiting Review of Systems Review of Systems Unable to perform ROS: Mental status change Physical Exam  
Physical Exam  
 
Nursing note and vitals reviewed. General appearance: Ill appearing, encephalopathic, unable to meaningfully participate with exam 
Eyes: PERRL, conjunctiva normal, anicteric sclera HEENT: mucous membranes tacky, oropharynx is clear, neck is supple without meningismus Pulmonary: clear to auscultation bilaterally anterior exam 
 Cardiac: Tachycardic and regular rhythm, no murmurs, gallops, or rubs, 2+ peripheral pulses, cap refill < 2-3 seconds Abdomen: soft, nonfocal tenderness to palpation, mild/moderate distention, no rebound, not rigid MSK: no gross deformity Neuro: Somnolent, essentially unable to answer any questions, upper extremities fall to the stretcher bilaterally, withdraws to pain Diagnostic Study Results Labs - Recent Results (from the past 12 hour(s)) GLUCOSE, POC Collection Time: 03/28/19 10:10 AM  
Result Value Ref Range Glucose (POC) 68 65 - 100 mg/dL Performed by Ricodonna Herediaole \"Seth\" CBC WITH AUTOMATED DIFF Collection Time: 03/28/19 10:13 AM  
Result Value Ref Range WBC 8.1 3.6 - 11.0 K/uL  
 RBC 3.38 (L) 3.80 - 5.20 M/uL HGB 9.1 (L) 11.5 - 16.0 g/dL HCT 29.9 (L) 35.0 - 47.0 % MCV 88.5 80.0 - 99.0 FL  
 MCH 26.9 26.0 - 34.0 PG  
 MCHC 30.4 30.0 - 36.5 g/dL  
 RDW 17.3 (H) 11.5 - 14.5 % PLATELET 741 (L) 362 - 400 K/uL MPV 11.5 8.9 - 12.9 FL  
 NRBC 0.5 (H) 0  WBC ABSOLUTE NRBC 0.04 (H) 0.00 - 0.01 K/uL NEUTROPHILS 84 (H) 32 - 75 % BAND NEUTROPHILS 15 % LYMPHOCYTES 1 (L) 12 - 49 % MONOCYTES 0 (L) 5 - 13 % EOSINOPHILS 0 0 - 7 % BASOPHILS 0 0 - 1 % IMMATURE GRANULOCYTES 0 0.0 - 0.5 % ABS. NEUTROPHILS 8.0 1.8 - 8.0 K/UL  
 ABS. LYMPHOCYTES 0.1 (L) 0.8 - 3.5 K/UL  
 ABS. MONOCYTES 0.0 0.0 - 1.0 K/UL  
 ABS. EOSINOPHILS 0.0 0.0 - 0.4 K/UL  
 ABS. BASOPHILS 0.0 0.0 - 0.1 K/UL  
 ABS. IMM. GRANS. 0.0 0.00 - 0.04 K/UL  
 DF MANUAL    
 RBC COMMENTS ANISOCYTOSIS 
1+ METABOLIC PANEL, COMPREHENSIVE Collection Time: 03/28/19 10:13 AM  
Result Value Ref Range Sodium 136 136 - 145 mmol/L Potassium 4.0 3.5 - 5.1 mmol/L Chloride 101 97 - 108 mmol/L  
 CO2 26 21 - 32 mmol/L Anion gap 9 5 - 15 mmol/L Glucose 68 65 - 100 mg/dL BUN 19 6 - 20 MG/DL  Creatinine 0.68 0.55 - 1.02 MG/DL  
 BUN/Creatinine ratio 28 (H) 12 - 20    
 GFR est AA >60 >60 ml/min/1.73m2 GFR est non-AA >60 >60 ml/min/1.73m2 Calcium 8.5 8.5 - 10.1 MG/DL Bilirubin, total 0.6 0.2 - 1.0 MG/DL  
 ALT (SGPT) 22 12 - 78 U/L  
 AST (SGOT) 31 15 - 37 U/L Alk. phosphatase 111 45 - 117 U/L Protein, total 7.7 6.4 - 8.2 g/dL Albumin 3.1 (L) 3.5 - 5.0 g/dL Globulin 4.6 (H) 2.0 - 4.0 g/dL A-G Ratio 0.7 (L) 1.1 - 2.2 PROTHROMBIN TIME + INR Collection Time: 03/28/19 10:13 AM  
Result Value Ref Range INR 1.2 (H) 0.9 - 1.1 Prothrombin time 12.6 (H) 9.0 - 11.1 sec PTT Collection Time: 03/28/19 10:13 AM  
Result Value Ref Range aPTT 24.2 22.1 - 32.0 sec  
 aPTT, therapeutic range     58.0 - 77.0 SECS  
AMMONIA Collection Time: 03/28/19 10:13 AM  
Result Value Ref Range Ammonia 110 (H) <32 UMOL/L  
CULTURE, BLOOD Collection Time: 03/28/19 10:13 AM  
Result Value Ref Range Special Requests: NO SPECIAL REQUESTS Culture result:     
  TWO OF TWO BOTTLES HAVE BEEN FLAGGED POSITIVE BY INSTRUMENT. BOTTLES HAVE BEEN SENT TO [facility] TO ASSESS FOR POSSIBLE GROWTH. CK  
 Collection Time: 03/28/19 10:13 AM  
Result Value Ref Range CK 47 26 - 192 U/L  
MAGNESIUM Collection Time: 03/28/19 10:13 AM  
Result Value Ref Range Magnesium 1.5 (L) 1.6 - 2.4 mg/dL TROPONIN I Collection Time: 03/28/19 10:13 AM  
Result Value Ref Range Troponin-I, Qt. <0.05 <0.05 ng/mL SAMPLES BEING HELD Collection Time: 03/28/19 10:13 AM  
Result Value Ref Range SAMPLES BEING HELD RED COMMENT Add-on orders for these samples will be processed based on acceptable specimen integrity and analyte stability, which may vary by analyte. POC G3 - PUL Collection Time: 03/28/19 10:18 AM  
Result Value Ref Range pH (POC) 7.429 7.35 - 7.45    
 pCO2 (POC) 35.7 35.0 - 45.0 MMHG  
 pO2 (POC) 61 (L) 80 - 100 MMHG  
 HCO3 (POC) 23.6 22 - 26 MMOL/L  
 sO2 (POC) 92 92 - 97 % Base deficit (POC) 1 mmol/L  Site RIGHT RADIAL    
 Device: ROOM AIR Allens test (POC) YES Specimen type (POC) ARTERIAL Total resp. rate 29 POC LACTIC ACID Collection Time: 03/28/19 10:24 AM  
Result Value Ref Range Lactic Acid (POC) 4.50 (HH) 0.40 - 2.00 mmol/L  
GLUCOSE, POC Collection Time: 03/28/19 10:31 AM  
Result Value Ref Range Glucose (POC) 179 (H) 65 - 100 mg/dL Performed by Junaid Johnson (EDT) EKG, 12 LEAD, INITIAL Collection Time: 03/28/19 10:32 AM  
Result Value Ref Range Ventricular Rate 126 BPM  
 Atrial Rate 126 BPM  
 P-R Interval 140 ms QRS Duration 74 ms Q-T Interval 304 ms QTC Calculation (Bezet) 440 ms Calculated P Axis 76 degrees Calculated R Axis 69 degrees Calculated T Axis 65 degrees Diagnosis Sinus tachycardia When compared with ECG of 09-APR-2018 10:59, No significant change was found Confirmed by Max Rivera ([id number]) on 3/28/2019 2:07:07 PM 
  
INFLUENZA A & B AG (RAPID TEST) Collection Time: 03/28/19 10:55 AM  
Result Value Ref Range Influenza A Antigen NEGATIVE  NEG Influenza B Antigen NEGATIVE  NEG    
URINALYSIS W/ REFLEX CULTURE Collection Time: 03/28/19 11:03 AM  
Result Value Ref Range Color DARK YELLOW Appearance TURBID (A) CLEAR Specific gravity 1.021 1.003 - 1.030    
 pH (UA) 6.5 5.0 - 8.0 Protein 30 (A) NEG mg/dL Glucose NEGATIVE  NEG mg/dL Ketone TRACE (A) NEG mg/dL Blood TRACE (A) NEG Urobilinogen 4.0 (H) 0.2 - 1.0 EU/dL Nitrites NEGATIVE  NEG Leukocyte Esterase LARGE (A) NEG    
 WBC 20-50 0 - 4 /hpf  
 RBC 0-5 0 - 5 /hpf Epithelial cells MODERATE (A) FEW /lpf Bacteria 4+ (A) NEG /hpf  
 UA:UC IF INDICATED URINE CULTURE ORDERED (A) CNI    
BILIRUBIN, CONFIRM Collection Time: 03/28/19 11:03 AM  
Result Value Ref Range Bilirubin UA, confirm NEGATIVE  NEG    
CULTURE, BLOOD Collection Time: 03/28/19 11:12 AM  
Result Value Ref Range Special Requests: NO SPECIAL REQUESTS Culture result: NO GROWTH AFTER 2 HOURS    
GLUCOSE, POC Collection Time: 03/28/19  2:20 PM  
Result Value Ref Range Glucose (POC) 66 65 - 100 mg/dL Performed by Lucia Taylor (CON) POC LACTIC ACID Collection Time: 03/28/19  2:52 PM  
Result Value Ref Range Lactic Acid (POC) 4.45 (HH) 0.40 - 2.00 mmol/L  
GLUCOSE, POC Collection Time: 03/28/19  2:53 PM  
Result Value Ref Range Glucose (POC) 162 (H) 65 - 100 mg/dL Performed by Lucia Taylor (CON) Radiologic Studies -  
XR CHEST PORT Final Result IMPRESSION: Satisfactory CVL placement. No pneumothorax. No other change. CT ABD PELV W CONT Final Result IMPRESSION:   
1. Right colitis. 2. Cirrhosis with portal venous hypertension. 3. Clear lungs. CT CHEST W CONT Final Result IMPRESSION:   
1. Right colitis. 2. Cirrhosis with portal venous hypertension. 3. Clear lungs. XR CHEST PORT Final Result IMPRESSION: No acute abnormality identified CT CODE NEURO HEAD WO CONTRAST Final Result IMPRESSION: No acute intracranial process seen CT Results  (Last 48 hours) 03/28/19 1207  CT ABD PELV W CONT Final result Impression:  IMPRESSION:   
1. Right colitis. 2. Cirrhosis with portal venous hypertension. 3. Clear lungs. Narrative:  INDICATION: Dyspnea chronic, negative or nondiagnostic xray and lab/clinical  
studies EXAM: CT Chest, Abdomen and Pelvis. CT dose reduction was achieved through the  
use of a standardized protocol tailored for this examination and automatic  
exposure control for dose modulation. Contrast: 100 mL Isovue 370 IV. No oral contrast.  
   
CHEST:   
The lungs are clear. There is no significant mediastinal or hilar adenopathy. There is coronary artery calcification. There is aortic calcification without  
aneurysm. Central pulmonary arteries are free of thrombus.  There is no pleural  
 or pericardial effusion. Visualized thyroid and lower neck soft tissues are  
unremarkable for age. ABDOMEN PELVIS:  
Ascending colon shows diffuse mural thickening and pericolonic inflammatory  
stranding compatible with colitis, etiology uncertain. There is no abscess or  
perforation. Appendix and terminal ileum are normal. There is no megacolon. Small bowel is unremarkable. There is cirrhosis without hepatic mass. There is trace ascites. There is portal  
venous hypertension with varicosities and splenomegaly . There is no free air or significant adenopathy. Liver is uniform in texture. Bile ducts are not enlarged. Pancreas shows no mass or inflammation. Adrenal  
glands are normal in size. Kidneys show no mass or hydronephrosis. Aorta is  
calcified, without aneurysm. The bladder is not distended. The distal ureters are not dilated. There is no  
pelvic mass. 03/28/19 1207  CT CHEST W CONT Final result Impression:  IMPRESSION:   
1. Right colitis. 2. Cirrhosis with portal venous hypertension. 3. Clear lungs. Narrative:  INDICATION: Dyspnea chronic, negative or nondiagnostic xray and lab/clinical  
studies EXAM: CT Chest, Abdomen and Pelvis. CT dose reduction was achieved through the  
use of a standardized protocol tailored for this examination and automatic  
exposure control for dose modulation. Contrast: 100 mL Isovue 370 IV. No oral contrast.  
   
CHEST:   
The lungs are clear. There is no significant mediastinal or hilar adenopathy. There is coronary artery calcification. There is aortic calcification without  
aneurysm. Central pulmonary arteries are free of thrombus. There is no pleural  
or pericardial effusion. Visualized thyroid and lower neck soft tissues are  
unremarkable for age. ABDOMEN PELVIS:  
Ascending colon shows diffuse mural thickening and pericolonic inflammatory stranding compatible with colitis, etiology uncertain. There is no abscess or  
perforation. Appendix and terminal ileum are normal. There is no megacolon. Small bowel is unremarkable. There is cirrhosis without hepatic mass. There is trace ascites. There is portal  
venous hypertension with varicosities and splenomegaly . There is no free air or significant adenopathy. Liver is uniform in texture. Bile ducts are not enlarged. Pancreas shows no mass or inflammation. Adrenal  
glands are normal in size. Kidneys show no mass or hydronephrosis. Aorta is  
calcified, without aneurysm. The bladder is not distended. The distal ureters are not dilated. There is no  
pelvic mass. 03/28/19 0935  CT CODE NEURO HEAD WO CONTRAST Final result Impression:  IMPRESSION: No acute intracranial process seen Narrative:  EXAM: CT CODE NEURO HEAD WO CONTRAST INDICATION: awoke confused, altered, b/l weakness COMPARISON: 11/13/2018. CONTRAST: None. TECHNIQUE: Unenhanced CT of the head was performed using 5 mm images. Brain and  
bone windows were generated. CT dose reduction was achieved through use of a  
standardized protocol tailored for this examination and automatic exposure  
control for dose modulation. 2 series were necessary because of patient motion. FINDINGS:  
The ventricles and sulci are normal in size, shape and configuration and  
midline. There is no significant white matter disease. There is no intracranial  
hemorrhage, extra-axial collection, mass, mass effect or midline shift. The  
basilar cisterns are open. No acute infarct is identified. The bone windows  
demonstrate no abnormalities. The visualized portions of the paranasal sinuses  
and mastoid air cells are clear. CXR Results  (Last 48 hours) 03/28/19 1459  XR CHEST PORT Final result Impression:  IMPRESSION: Satisfactory CVL placement. No pneumothorax.  No other change. Narrative:  EXAM: Portable CXR. 1447 hours. INDICATION: RIJ placement COMPARISON: 1017 hours. FINDINGS:  
Right IJ CVL has been placed with tip in the SVC without pneumothorax or other  
acute finding or significant change. 03/28/19 1038  XR CHEST PORT Final result Impression:  IMPRESSION: No acute abnormality identified Narrative:  EXAM:  XR CHEST PORT INDICATION:  confusion COMPARISON:  2018 FINDINGS: A portable AP radiograph of the chest was obtained at 1017 hours. The  
patient is on a cardiac monitor. The lungs are clear. The cardiac and  
mediastinal contours and pulmonary vascularity are normal.  The bones and soft  
tissues are grossly within normal limits. Medical Decision Making I am the first provider for this patient. I reviewed the vital signs, available nursing notes, past medical history, past surgical history, family history and social history. Vital Signs-Reviewed the patient's vital signs. Patient Vitals for the past 12 hrs: 
 Temp Pulse Resp BP SpO2  
03/28/19 1730  (!) 119 18 (!) 92/29 91 %  
03/28/19 1658  (!) 122 25 (!) 97/39 98 % 03/28/19 1541  (!) 116 27 100/43 97 % 03/28/19 1531  (!) 116 24 (!) 100/36 95 % 03/28/19 1515  (!) 118 28 98/41 96 % 03/28/19 1500  (!) 118 30 100/52 97 % 03/28/19 1400  (!) 114 28 111/44 100 % 03/28/19 1356  (!) 114 28 110/40 99 % 03/28/19 1345  (!) 113 24 93/40 100 % 03/28/19 1330  (!) 111 25 96/46 100 % 03/28/19 1241 (!) 101.8 °F (38.8 °C) (!) 107 28 (!) 97/39 99 % 03/28/19 1215  (!) 109 27 (!) 89/39   
03/28/19 1213    (!) 91/37   
03/28/19 1045  (!) 127 27 132/44 99 % 03/28/19 1030  (!) 127 (!) 37 116/46 97 % 03/28/19 1026  (!) 127 (!) 33  98 % 03/28/19 1021     97 % 03/28/19 1018     90 % 03/28/19 0950 (!) 103.2 °F (39.6 °C) (!) 123 22 131/67 94 % Pulse Oximetry Analysis - 94% on room air Cardiac Monitor:  
Rate: 123 bpm 
Rhythm: Sinus Tachycardia EKG interpretation: (Preliminary) 1032, sinus tachycardia, ventricular rate 126, , QRS 74, QTc 440, no ST elevation or ST depression. Records Reviewed: Nursing Notes and Old Medical Records Provider Notes (Medical Decision Making):  
Patient presents with altered mental status noted upon awakening this morning first detected by her . Patient was hypoglycemic, however her mental status did not improve upon correction of her glucose. After arrival to the ED patient noted to be febrile, encephalopathic, and altered without focal neurologic deficit. She appears generally weak, unable to follow commands, and does not have lateralizing signs. Abdomen is noted to be somewhat distended and appears tender to palpation on exam.  Patient initially tachycardic, with subsequent hypotension requiring central line placement with vasopressor support. Broad-spectrum antibiotics were given. Labs and urine show urinary tract infection with hyperammonemia, CT shows right-sided colitis. Neck is supple, and mental status improved over her course in the emergency department. Do not suspect CNS infection at this point in time. Of note, the patient did have recurrent hypoglycemia. Resident physician order dextrose infusion to recurrent hypoglycemia. 30 mL's per kilogram of crystalloid was given, along with norepinephrine. Patient admitted to the hospitalist for sepsis with septic shock. ED Course:  
Initial assessment performed. The patients presenting problems have been discussed, and they are in agreement with the care plan formulated and outlined with them. I have encouraged them to ask questions as they arise throughout their visit. 9:38 AM 
Case discussed with telemetry neurology, they will evaluate the patient. ED Course as of Mar 28 1746 Thu Mar 28, 2019 0948 Temperature 103 per nursing. Tele-neurology meeting and now to evaluate patient.  
 [FH] 1026 Patient noted to have recurrent hypoglycemia. Given D50, will recheck blood glucose. Point-of-care lactate greater than 4. Bedside ultrasound does not show significant amount of intra-abdominal fluid. Will initiate broad-spectrum antibiotics and aggressive fluid resuscitation.  
 [FH] ED Course User Index [FH] Sherri Bravo MD  
 
 
CRITICAL CARE NOTE : 
 
1:55 PM 
 
 
IMPENDING DETERIORATION -Airway, Respiratory, Cardiovascular, CNS and Metabolic ASSOCIATED RISK FACTORS - Hypotension, Shock, Hypoxia, Metabolic changes, Dehydration and CNS Decompensation MANAGEMENT- Bedside Assessment and Supervision of Care INTERPRETATION -  Xrays, CT Scan, Blood Gases, ECG, Blood Pressure and Screening Ultrasound INTERVENTIONS - hemodynamic mngmt and Metobolic interventions CASE REVIEW - Hospitalist, Nursing and Family TREATMENT RESPONSE -Improved and Stable PERFORMED BY - Self and Resident NOTES   : 
 
 
I have spent 60 minutes of critical care time involved in lab review, consultations with specialist, family decision- making, bedside attention and documentation. During this entire length of time I was immediately available to the patient . Radhika Heredia. Jose Daniels MD 
 
 
 
 
Critical Care Time:  
60 min Procedure Note - Central Venous Access:  
Obtained informed Consent. Immediately prior to the procedure, the patient was reevaluated and found suitable for the planned procedure and any planned medications. Immediately prior to the procedure a time out was called to verify the correct patient, procedure, equipment, staff, and marking as appropriate. The site was prepped with ChloraPrep.   
Using Seldinger technique a Triple Lumen CVC was placed in the Right, Internal Jugular Vein via direct cannulation with 2 number of attempts for IV Access. Ultrasound Guidance was utilized. There was good blood return. The following complications were encountered: None. A follow-up chest x-ray was ordered post procedure. The procedure was tolerated well. Procedure performed by Dr. Gloria Castleman, MD, supervised by Rivas Mcconnell MD 
 
 
 
Disposition: 
Admit to hospitalist 
 
PLAN: 
1. Admit Diagnosis Clinical Impression: 1. Colitis 2. Acute cystitis with hematuria 3. Hyperammonemia ([address] 4. Encephalopathy Attestations:

## 2019-03-28 NOTE — ED NOTES
Pt rounded on after returning from CT. Pt's BP on monitor was 89/39. Recycled, pt's BP 80/41. Dr. Rickey Fink notified. Pt's last bag of IVF placed on pressure bag.  continues to be @ bedside. Waiting on further orders.

## 2019-03-28 NOTE — ROUTINE PROCESS
TRANSFER - OUT REPORT: 
 
Verbal report given to Irvin Wesley RN  on Thad Denton  being transferred to 2526(unit) for routine progression of care Report consisted of patients Situation, Background, Assessment and  
Recommendations(SBAR). Information from the following report(s) ED Summary was reviewed with the receiving nurse. Lines:  
Triple Lumen 03/28/19 Right Internal jugular (Active) Site Assessment Clean, dry, & intact 3/28/2019  2:24 PM  
Dressing Status Clean, dry, & intact 3/28/2019  2:24 PM  
Proximal Hub Color/Line Status Blue 3/28/2019  2:24 PM  
Positive Blood Return (Medial Site) Yes 3/28/2019  2:24 PM  
Medial Hub Color/Line Status Blue 3/28/2019  2:24 PM  
Positive Blood Return (Lateral Site) Yes 3/28/2019  2:24 PM  
Distal Hub Color/Line Status Blue 3/28/2019  2:24 PM  
Positive Blood Return (Site #3) Yes 3/28/2019  2:24 PM  
   
Peripheral IV Right Hand (Active) Site Assessment Dry 3/28/2019  9:58 AM  
   
Peripheral IV 03/28/19 Right Antecubital (Active) Site Assessment Clean, dry, & intact 3/28/2019 10:09 AM  
Phlebitis Assessment 0 3/28/2019 10:09 AM  
Infiltration Assessment 0 3/28/2019 10:09 AM  
Dressing Status Clean, dry, & intact 3/28/2019 10:09 AM  
  
 
Opportunity for questions and clarification was provided. Patient transported with: 
 Monitor Registered Nurse

## 2019-03-28 NOTE — ED NOTES
Teleneuro consult complete.  @ bedside. Pt febrile, MD aware. Beginning septic workup. Hx of liver failure, DM II.

## 2019-03-28 NOTE — H&P
Hospitalist Admission NoteNAME: Sadi Castellon :  1955 MRN:  [id number] Date/Time:  3/28/2019 3:14 PM 
 
Patient PCP: Tomeka Burr NP Hepatologist:  Dr. Reginald Vargas Endocrinologist:  Dr. Eliezer Dillard 
______________________________________________________________________ Assessment & Plan: 
Septic shock, POA Hypotension, Fever, Lactic acidosis --requiring levophed. Right IJ central line placed in ER 
--hold propranolol, lasix, aldactone. losartan 
--IVF bolus given, then 125ml/hr. Right sided colitis, POA 
--? infectious (but no diarrhea) or ischemia --check stool culture, C. Diff, O&P 
--empiric levaquin and flagyl Possible uti 
--levaquin and cefepime. Urine culture Acute encephalopathy, POA Right leg weakness, POA Mild left lip droop, POA Hx TIA  
--suspect encephalopathy due to hepatic encephalopathy, POA and sepsis. CT head negative. 
--does have right leg weakness, unknown acuity. Reassess once ammonia improves and if persists and is new, get MRI brain 
--slurred speech 
--rectal lactulose since patient not safe to take oral 
--recheck ammonia in AM 
--hold tramadol, amitriptyline 
--continue aspirin DM with recurrent hypoglycemia 
--requiring D10 bolus for EMS and D50 twice in ER. 
--D10 50ml/hr being started. --hold metformin, nph Right leg edema  
--patient reports this is chronic COPD 
IBIS 
--continue cpap 
--duoneb prn q6h. Has b/l rhonchi 
--continue breo Body mass index is 38.31 kg/m². Code:  full DVT prophylaxis:  SCD Surrogate decision maker:   Subjective: CHIEF COMPLAINT:  AMS HISTORY OF PRESENT ILLNESS:    
Sadi Castellon is a 61 y.o.  female with BALDERAS, cirrhosis with hx of esophageal varices, COPD, DM type 2, TIA, IBIS brought in by EMS from home for AMS. No family at bedside. EMS reported patient LKW at 8pm yesterday.    noted patient having difficulty talking and walking upon waking up. BS was 51 for EMS and given D10 with some improvement in mental status. Brought to ER as code stroke, level 2. In ER, fever 103.2, BP was 90/palp for EMS and initially 131/67 in ER but dropped to 89/39. Blood sugar has dropped to 66 and required 2 ampules of D50. CT head, chest, abdomen/pelvis notable for right colitis, clear lungs. Ammonia 110. Patient given levaquin, vancomycin, cefepime and started on levophed. Last admitted 10/18 for TIA. Had esophageal varices banding by Dr. Kendra West in 2/18 and 5/18 We were asked to admit for work up and evaluation of the above problems. Past Medical History:  
Diagnosis Date  Asthma  Back pain  COPD (chronic obstructive pulmonary disease) (HCC)  Diabetes ([facility] [address]  Esophageal varices in cirrhosis ([facility] [address]   
 6/2014 banding x 2  
 Fibromyalgia  Gastrointestinal disorder  GERD (gastroesophageal reflux disease)  Hypercholesteremia  Liver cirrhosis secondary to BALDERAS ([facility] [address]  Liver disease  Other and unspecified hyperlipidemia  Restless leg syndrome  Type II or unspecified type diabetes mellitus without mention of complication, uncontrolled  Unspecified essential hypertension Past Surgical History:  
Procedure Laterality Date  HX BACK SURGERY    
 HX CARPAL TUNNEL RELEASE    
 on right  HX HYSTERECTOMY plus 1/2 of an ovary removed  NE COLSC FLX W/RMVL OF TUMOR POLYP LESION SNARE TQ  5/30/2013  UPPER GI ENDOSCOPY,LIGAT VARIX  2/6/2015 Social History Tobacco Use  Smoking status: Current Every Day Smoker Packs/day: 1.00 Years: 40.00 Pack years: 40.00  Smokeless tobacco: Former User Substance Use Topics  Alcohol use: No  
  Comment: rare Family History Problem Relation Age of Onset  Diabetes Mother  Stroke Sister  Diabetes Paternal Aunt  Diabetes Paternal Uncle  Heart Disease Neg Hx Allergies Allergen Reactions  Hydrocodone Nausea and Vomiting  Lisinopril Cough  Lortab [Hydrocodone-Acetaminophen] Nausea and Vomiting  Percocet [Oxycodone-Acetaminophen] Nausea and Vomiting Prior to Admission medications Medication Sig Start Date End Date Taking? Authorizing Provider  
fluticasone furoate-vilanterol (BREO ELLIPTA) 200-25 mcg/dose inhaler Take 1 Puff by inhalation daily. Provider, Historical  
insulin glargine (LANTUS SOLOSTAR U-100 INSULIN) 100 unit/mL (3 mL) inpn 110 Units by SubCUTAneous route daily. Provider, Historical  
furosemide (LASIX) 40 mg tablet Take 2 Tabs by mouth daily. 3/19/19   HUMZA Martin  
spironolactone (ALDACTONE) 100 mg tablet Take 2 Tabs by mouth daily. 3/19/19   HUMZA Martin  
CONSTULOSE 10 gram/15 mL solution take 2 tablespoonfuls by mouth twice a day 1/7/19   Shamir April ALEJANDRO SUAZO  
losartan (COZAAR) 25 mg tablet take 1 tablet by mouth once daily , REPLACES LISINOPRIL FOR BLOOD PRESSURE AND KIDNEY PROTECTION 12/3/18   Allie Aden MD  
insulin NPH (NOVOLIN N NPH U-100 INSULIN) 100 unit/mL injection Inject 55 units every 12 hours--dispense relion brand--patient will pay cash 11/28/18   Allie Aden MD  
traMADol Lorain Baas) 50 mg tablet take 1-2 tablets by mouth twice a day if needed for DIABETIC NEUROPATHY PAIN 11/27/18   Allie Aden MD  
aspirin 81 mg chewable tablet Take 1 Tab by mouth daily. 11/16/18   Osiel Chowdhury MD  
atorvastatin (LIPITOR) 40 mg tablet Take 1 Tab by mouth nightly. 11/15/18   Osiel Chowdhury MD  
potassium chloride SR (KLOR-CON 10) 10 mEq tablet Take 10 mEq by mouth two (2) times a day. Other, MD Leonarda  
omeprazole (PRILOSEC) 20 mg capsule take 1 capsule by mouth once daily 11/8/18   Cathi Barksdale, NP  
amitriptyline (ELAVIL) 150 mg tablet Take 1 Tab by mouth nightly.  10/16/18   Allie Aden MD  
 propranolol (INDERAL) 20 mg tablet Take 1 Tab by mouth two (2) times a day. 10/16/18   Antonia Ontiveros MD  
ferrous sulfate 325 mg (65 mg iron) tablet take 1 tablet by mouth once daily with BREAKFAST 10/2/18   Cathi Barksdale NP  
insulin lispro (HUMALOG KWIKPEN INSULIN) 100 unit/mL kwikpen Inject as needed up to 3 times per day for sugars over 150: 4 units for every 50 points. Max 50 units per day 10/1/18   Antonia Ontiveros MD  
metFORMIN (GLUCOPHAGE) 1,000 mg tablet take 1 tablet by mouth twice a day with meals 18   Antonia Ontiveros MD  
ondansetron (ZOFRAN ODT) 4 mg disintegrating tablet dissolve 1 tablet ON TONGUE every 8 hours if needed for nausea 18   Cathi Barksdale NP  
gabapentin (NEURONTIN) 600 mg tablet take 2 tablets by mouth three times a day 18   Antonia Ontiveros MD  
rOPINIRole (REQUIP) 4 mg tab TAB Take 4 mg by mouth nightly. Provider, Historical  
albuterol (PROAIR HFA) 90 mcg/actuation inhaler Take 2 Puffs by inhalation every four (4) hours as needed for Wheezing. Other, MD Leonarda  
 
REVIEW OF SYSTEMS:  POSITIVE= Bold. Negative = normal text Unable to obtain due to mental status, slurred speech Objective: VITALS:   
Visit Vitals /44 Pulse (!) 114 Temp (!) 101.8 °F (38.8 °C) Resp 28 Ht 5' 2\" (1.575 m) Wt 95 kg (209 lb 7 oz) SpO2 100% BMI 38.31 kg/m² Temp (24hrs), Av.5 °F (39.2 °C), Min:101.8 °F (38.8 °C), Max:103.2 °F (39.6 °C) Body mass index is 38.31 kg/m². PHYSICAL EXAM: 
 
General:    Chronically ill appearing obese female, awake, slurred speech, cooperative, appears stated age. HEENT: Atraumatic, anicteric sclerae, pink conjunctivae No oral ulcers, mucosa dry, throat clear. Hearing intact. Neck:  Supple, symmetrical,  thyroid: non tender Lungs:   Bilateral rhonchi. No Wheezing . No rales. Chest wall:  No tenderness  No Accessory muscle use. Heart:   Regular  rhythm,  No  murmur   No gallop. 1+ pitting edema right leg, no edema on left Abdomen:   Obese, distended, epigastric and b/l upper quadrant tender, mild tenderness in lower quadrants, soft. Bowel sounds hypoactive. No masses. Mild bruises on abdomen wall Extremities: No cyanosis. No clubbing Skin:     Not pale Not Jaundiced  No rashes Psych:  Poor insight. Not depressed. Not anxious or agitated. Neurologic: EOMs intact. Mild left upper lip droop. +slurred speech. Symmetrical strength 2/5 arms, squeezes hands b/l to command. Left leg 3/5. Right leg 2/5, Oriented X self. Peripheral pulse: Bilateral, DP, 1+ Capillary refill:  normal 
 
IMAGING RESULTS: 
 []       I have personally reviewed the actual   []     CXR  []     CT scan CXR: 
CT : 
EKG: 
 ________________________________________________________________________ Care Plan discussed with: 
  Comments Patient Family RN y   
Care Manager Consultant:  traci Khoury  
________________________________________________________________________ Prophylaxis: 
GI pepcid DVT SCD  
________________________________________________________________________ Recommended Disposition:  
Home with Family y HH/PT/OT/RN y  
SNF/LTC   
CARMINE   
________________________________________________________________________ Code Status: 
Full Code y  
DNR/DNI   
________________________________________________________________________ TOTAL TIME: 40 minutes critical care Comments  
 y Reviewed previous records  
 
 
______________________________________________________________________ Pecashley Dillon MD 
 
 
Procedures: see electronic medical records for all procedures/Xrays and details which were not copied into this note but were reviewed prior to creation of Plan. LAB DATA REVIEWED:   
Recent Results (from the past 24 hour(s)) GLUCOSE, POC Collection Time: 03/28/19 10:10 AM  
Result Value Ref Range Glucose (POC) 68 65 - 100 mg/dL Performed by Gilma Echevarria \"Seth\" CBC WITH AUTOMATED DIFF Collection Time: 03/28/19 10:13 AM  
Result Value Ref Range WBC 8.1 3.6 - 11.0 K/uL  
 RBC 3.38 (L) 3.80 - 5.20 M/uL HGB 9.1 (L) 11.5 - 16.0 g/dL HCT 29.9 (L) 35.0 - 47.0 % MCV 88.5 80.0 - 99.0 FL  
 MCH 26.9 26.0 - 34.0 PG  
 MCHC 30.4 30.0 - 36.5 g/dL  
 RDW 17.3 (H) 11.5 - 14.5 % PLATELET 252 (L) 945 - 400 K/uL MPV 11.5 8.9 - 12.9 FL  
 NRBC 0.5 (H) 0  WBC ABSOLUTE NRBC 0.04 (H) 0.00 - 0.01 K/uL NEUTROPHILS 84 (H) 32 - 75 % BAND NEUTROPHILS 15 % LYMPHOCYTES 1 (L) 12 - 49 % MONOCYTES 0 (L) 5 - 13 % EOSINOPHILS 0 0 - 7 % BASOPHILS 0 0 - 1 % IMMATURE GRANULOCYTES 0 0.0 - 0.5 % ABS. NEUTROPHILS 8.0 1.8 - 8.0 K/UL  
 ABS. LYMPHOCYTES 0.1 (L) 0.8 - 3.5 K/UL  
 ABS. MONOCYTES 0.0 0.0 - 1.0 K/UL  
 ABS. EOSINOPHILS 0.0 0.0 - 0.4 K/UL  
 ABS. BASOPHILS 0.0 0.0 - 0.1 K/UL  
 ABS. IMM. GRANS. 0.0 0.00 - 0.04 K/UL  
 DF MANUAL    
 RBC COMMENTS ANISOCYTOSIS 
1+ METABOLIC PANEL, COMPREHENSIVE Collection Time: 03/28/19 10:13 AM  
Result Value Ref Range Sodium 136 136 - 145 mmol/L Potassium 4.0 3.5 - 5.1 mmol/L Chloride 101 97 - 108 mmol/L  
 CO2 26 21 - 32 mmol/L Anion gap 9 5 - 15 mmol/L Glucose 68 65 - 100 mg/dL BUN 19 6 - 20 MG/DL Creatinine 0.68 0.55 - 1.02 MG/DL  
 BUN/Creatinine ratio 28 (H) 12 - 20 GFR est AA >60 >60 ml/min/1.73m2 GFR est non-AA >60 >60 ml/min/1.73m2 Calcium 8.5 8.5 - 10.1 MG/DL Bilirubin, total 0.6 0.2 - 1.0 MG/DL  
 ALT (SGPT) 22 12 - 78 U/L  
 AST (SGOT) 31 15 - 37 U/L Alk. phosphatase 111 45 - 117 U/L Protein, total 7.7 6.4 - 8.2 g/dL Albumin 3.1 (L) 3.5 - 5.0 g/dL Globulin 4.6 (H) 2.0 - 4.0 g/dL A-G Ratio 0.7 (L) 1.1 - 2.2 PROTHROMBIN TIME + INR Collection Time: 03/28/19 10:13 AM  
Result Value Ref Range INR 1.2 (H) 0.9 - 1.1 Prothrombin time 12.6 (H) 9.0 - 11.1 sec PTT Collection Time: 03/28/19 10:13 AM  
Result Value Ref Range aPTT 24.2 22.1 - 32.0 sec  
 aPTT, therapeutic range     58.0 - 77.0 SECS  
AMMONIA Collection Time: 03/28/19 10:13 AM  
Result Value Ref Range Ammonia 110 (H) <32 UMOL/L  
CULTURE, BLOOD Collection Time: 03/28/19 10:13 AM  
Result Value Ref Range Special Requests: NO SPECIAL REQUESTS Culture result: NO GROWTH AFTER 3 HOURS    
CK Collection Time: 03/28/19 10:13 AM  
Result Value Ref Range CK 47 26 - 192 U/L  
MAGNESIUM Collection Time: 03/28/19 10:13 AM  
Result Value Ref Range Magnesium 1.5 (L) 1.6 - 2.4 mg/dL TROPONIN I Collection Time: 03/28/19 10:13 AM  
Result Value Ref Range Troponin-I, Qt. <0.05 <0.05 ng/mL SAMPLES BEING HELD Collection Time: 03/28/19 10:13 AM  
Result Value Ref Range SAMPLES BEING HELD RED COMMENT Add-on orders for these samples will be processed based on acceptable specimen integrity and analyte stability, which may vary by analyte. POC G3 - PUL Collection Time: 03/28/19 10:18 AM  
Result Value Ref Range pH (POC) 7.429 7.35 - 7.45    
 pCO2 (POC) 35.7 35.0 - 45.0 MMHG  
 pO2 (POC) 61 (L) 80 - 100 MMHG  
 HCO3 (POC) 23.6 22 - 26 MMOL/L  
 sO2 (POC) 92 92 - 97 % Base deficit (POC) 1 mmol/L Site RIGHT RADIAL Device: ROOM AIR Allens test (POC) YES Specimen type (POC) ARTERIAL Total resp. rate 29 POC LACTIC ACID Collection Time: 03/28/19 10:24 AM  
Result Value Ref Range Lactic Acid (POC) 4.50 (HH) 0.40 - 2.00 mmol/L  
GLUCOSE, POC Collection Time: 03/28/19 10:31 AM  
Result Value Ref Range Glucose (POC) 179 (H) 65 - 100 mg/dL Performed by Sulma Rodriguez (EDT) EKG, 12 LEAD, INITIAL Collection Time: 03/28/19 10:32 AM  
Result Value Ref Range Ventricular Rate 126 BPM  
 Atrial Rate 126 BPM  
 P-R Interval 140 ms QRS Duration 74 ms Q-T Interval 304 ms QTC Calculation (Bezet) 440 ms Calculated P Axis 76 degrees Calculated R Axis 69 degrees Calculated T Axis 65 degrees Diagnosis Sinus tachycardia When compared with ECG of 09-APR-2018 10:59, No significant change was found Confirmed by Elida Brown ([id number]) on 3/28/2019 2:07:07 PM 
  
INFLUENZA A & B AG (RAPID TEST) Collection Time: 03/28/19 10:55 AM  
Result Value Ref Range Influenza A Antigen NEGATIVE  NEG Influenza B Antigen NEGATIVE  NEG    
URINALYSIS W/ REFLEX CULTURE Collection Time: 03/28/19 11:03 AM  
Result Value Ref Range Color DARK YELLOW Appearance TURBID (A) CLEAR Specific gravity 1.021 1.003 - 1.030    
 pH (UA) 6.5 5.0 - 8.0 Protein 30 (A) NEG mg/dL Glucose NEGATIVE  NEG mg/dL Ketone TRACE (A) NEG mg/dL Blood TRACE (A) NEG Urobilinogen 4.0 (H) 0.2 - 1.0 EU/dL Nitrites NEGATIVE  NEG Leukocyte Esterase LARGE (A) NEG    
 WBC 20-50 0 - 4 /hpf  
 RBC 0-5 0 - 5 /hpf Epithelial cells MODERATE (A) FEW /lpf Bacteria 4+ (A) NEG /hpf  
 UA:UC IF INDICATED URINE CULTURE ORDERED (A) CNI    
BILIRUBIN, CONFIRM Collection Time: 03/28/19 11:03 AM  
Result Value Ref Range Bilirubin UA, confirm NEGATIVE  NEG    
CULTURE, BLOOD Collection Time: 03/28/19 11:12 AM  
Result Value Ref Range Special Requests: NO SPECIAL REQUESTS Culture result: NO GROWTH AFTER 2 HOURS    
GLUCOSE, POC Collection Time: 03/28/19  2:20 PM  
Result Value Ref Range Glucose (POC) 66 65 - 100 mg/dL Performed by Efren Santoro (CON) GLUCOSE, POC Collection Time: 03/28/19  2:53 PM  
Result Value Ref Range Glucose (POC) 162 (H) 65 - 100 mg/dL Performed by Efren Santoro (CON)

## 2019-03-28 NOTE — ED NOTES
Code S pre-alert . Dr. Michelle Ferrell , RN with patient on arrival to ED, pt remains ems stretcher on monitor to CT scan with RN and paramedic

## 2019-03-28 NOTE — PROGRESS NOTES
1730- Pt arrived to CCU, temp 100.5. Pt CHG bathed. Pt is more alert now per ED RN than pt was a few hours ago. Lungs upon auscultation have crackles in the bases. Pt is febrile and tachypneic, requiring Levo at 12. Receiving D10 at 50ml/hr. Pt has a R IJ. Pt is following commands, opening eyes spontaneously. Moves all extremities, denies pain, abdomen is distended. Will get a stool sample once she stools. Pt swallowed a sip of water well. Dr. Francisco J De La Cruz called, switched tylenol and lactulose to PO 
C diff pending. No stools yet.    
 
1920- Shift report given to SANDRA Jo

## 2019-03-28 NOTE — PROGRESS NOTES
Pharmacy Automatic Renal Dosing Protocol - Antimicrobials Indication for Antimicrobials: bacteremia Current Regimen of Each Antimicrobial: 
Vancomycin pharmacy to dose (ED Provider)  (Start Date 3/28; Day # 1) Previous Antimicrobial Therapy: 
Levofloxacin 750mg IV x 1 in ED on 3/28 Goal Level: VANCOMYCIN TROUGH GOAL RANGE Vancomycin Trough: 15 - 20 mcg/mL Date Dose & Interval Measured (mcg/mL) Extrapolated (mcg/mL) Date & time of next level: to be determined Significant Cultures:  
3/28 blood x 2 - pending 3/28: influenza - negative - final 
3/28: urine - pending Radiology / Imaging results: (X-ray, CT scan or MRI):  
3/28: CT head - IMPRESSION: No acute intracranial process seen 3/28: Ct abdomen/pelvis/chest- IMPRESSION:  
1. Right colitis. 2. Cirrhosis with portal venous hypertension. 3. Clear lungs. Paralysis, amputations, malnutrition: none Labs: 
Recent Labs  
  19 
1013 CREA 0.68  
BUN 19 WBC 8.1 Temp (24hrs), Av.5 °F (39.2 °C), Min:101.8 °F (38.8 °C), Max:103.2 °F (39.6 °C) Creatinine Clearance (mL/min) or Dialysis: 91ml/min Impression/Plan:  
Vancomycin pharmacy to dose: 2000mg IV x 1 loading dose, then 1250mg IV every 12hr to yield a trough of ~17mcg/mL and  Antimicrobial stop date to be determined. Pharmacy will follow daily and adjust medications as appropriate for renal function and/or serum levels. Thank you, MARA QuijanoD 
 
Recommended duration of therapy 
http://[facility]/[place]/[facility]/Pharmacy/Clinical%20Companion/Duration%20of%20ABX%20therapy. docx Renal Dosing 
http://[facility]/[place]/[facility]/Pharmacy/Clinical%20Companion/Renal%[id number]. pdf

## 2019-03-29 NOTE — PROGRESS NOTES
Critical care interdisciplinary rounds held on 04/01/2019. Following members present, Pharmacy, Diabetes Treatment, Case Management, Respiratory Therapy,  Palliative Care and Nutrition. Led by GABBY Wisdom RN and Dr. Tiburcio Cotto. Plan of care discussed. See clinical pathway for plan of care and interventions and desired outcomes.

## 2019-03-29 NOTE — PROGRESS NOTES
Hospitalist Progress Note NAME: Parth Shelton :  1955 MRN:  [id number] Assessment / Plan: 
Septic shock, POA Hypotension, Fever, Lactic acidosis Gram neg bacteremia UTI Colitis 
-KUB showed no significant bowel dilation. -CXR with no acute finding CT chest showed R colitis, cirrhosis with portal venous HTN, clear lungs -Bcx and Ucx growing gram neg rods 
-requiring levophed. Right IJ central line placed in ER 
-cont' empiric abx 
-stool studies pending 
-hold propranolol, lasix, aldactone. losartan 
-cont' maintenance IVF Acute encephalopathy, POA Right leg weakness, POA Mild left lip droop, POA Hx TIA  
-suspect encephalopathy due to hepatic encephalopathy, POA and sepsis. CT head negative. 
-does have right leg weakness, unknown acuity. Reassess once ammonia improves and if persists and is new, get MRI brain 
-cont' lactulose, xifaxin  
-hold tramadol, amitriptyline 
-continue aspirin 
  
DM with recurrent hypoglycemia 
-requiring D10 bolus for EMS and D50 twice in ER. 
-SSI. --hold metformin, nph 
  
Right leg edema  
-patient reports this is chronic 
  
COPD 
IBIS 
-ontinue cpap 
-duoneb prn q6h. Has b/l rhonchi 
-continue breo 
  
Body mass index is 38.31 kg/m². 
  
Code:  full DVT prophylaxis:  SCD Surrogate decision maker:   Subjective: Chief Complaint / Reason for Physician Visit Pt seen,  at bedside. She remains confused. Congested on physical exam.  Discussed with RN events overnight. Review of Systems: 
Symptom Y/N Comments  Symptom Y/N Comments Fever/Chills    Chest Pain Poor Appetite    Edema Cough    Abdominal Pain Sputum    Joint Pain SOB/MARTINEZ    Pruritis/Rash Nausea/vomit    Tolerating PT/OT Diarrhea    Tolerating Diet Constipation    Other Could NOT obtain due to: encephalopathy Objective: VITALS:  
Last 24hrs VS reviewed since prior progress note. Most recent are: Patient Vitals for the past 24 hrs: 
 Temp Pulse Resp BP SpO2  
03/29/19 1342  (!) 129 (!) 37 98/40 96 % 03/29/19 1330  (!) 129 (!) 37 (!) 99/26 96 % 03/29/19 1314  (!) 129 (!) 37 (!) 107/37 97 % 03/29/19 1300  (!) 129 (!) 32 (!) 104/31 97 % 03/29/19 1230  (!) 127 (!) 39 (!) 109/30 98 % 03/29/19 1200 98.4 °F (36.9 °C) (!) 130 (!) 36 (!) 109/30 91 %  
03/29/19 1130  (!) 132 (!) 34 150/55 92 % 03/29/19 1129     94 % 03/29/19 1100  (!) 131 (!) 31 121/42 94 % 03/29/19 1056  (!) 132  (!) 124/34   
03/29/19 1030  (!) 133 28 (!) 124/34 94 % 03/29/19 1000  (!) 133 22 117/40 95 % 03/29/19 0900  (!) 130 25 101/40 95 % 03/29/19 0815 98.5 °F (36.9 °C) (!) 130 27 (!) 122/33 95 % 03/29/19 0800  (!) 129 26 (!) 122/33 95 % 03/29/19 0700  (!) 129 22 116/45 95 % 03/29/19 0600  (!) 129 25 103/41 94 % 03/29/19 0530  (!) 128 24 (!) 98/32 96 % 03/29/19 0500  (!) 132 24 125/57 (!) 83 % 03/29/19 0400 98.4 °F (36.9 °C) (!) 125 23 (!) 88/43 96 % 03/29/19 0300  (!) 126 24 103/51 95 % 03/29/19 0200  (!) 122 25 (!) 102/33 96 % 03/29/19 0130  (!) 121 23 (!) 92/38 95 % 03/29/19 0100  (!) 120 25 (!) 87/38 95 % 03/29/19 0030  (!) 120 25 (!) 99/36 97 % 03/29/19 0000 98 °F (36.7 °C) (!) 123 25 101/42 96 % 03/28/19 2343     96 % 03/28/19 2330  (!) 121 23 (!) 95/35 96 % 03/28/19 2329 98.2 °F (36.8 °C) (!) 122 19 (!) 95/35 96 % 03/28/19 2300  (!) 120 25 99/46 96 % 03/28/19 2200  (!) 120 21 (!) 104/34 95 % 03/28/19 2130  (!) 120 (!) 31 (!) 92/35 95 % 03/28/19 2100  (!) 120 23 91/48 95 % 03/28/19 2030  (!) 118 28 91/60 95 % 03/28/19 2000  (!) 118 (!) 34 (!) 86/29 96 % 03/28/19 1941 99 °F (37.2 °C) (!) 119 21 99/47 93 % 03/28/19 1900  (!) 117 26 (!) 89/69 96 % 03/28/19 1845 99.3 °F (37.4 °C)      
03/28/19 1800 100.3 °F (37.9 °C) (!) 118 21 99/40 93 % 03/28/19 1730  (!) 119 18 (!) 92/29 91 %  
03/28/19 1658  (!) 122 25 (!) 97/39 98 % 03/28/19 1541  (!) 116 27 100/43 97 % 03/28/19 1531  (!) 116 24 (!) 100/36 95 % 03/28/19 1515  (!) 118 28 98/41 96 % 03/28/19 1500  (!) 118 30 100/52 97 % Intake/Output Summary (Last 24 hours) at 3/29/2019 1404 Last data filed at 3/29/2019 1300 Gross per 24 hour Intake 5688.3 ml Output 955 ml Net 4733.3 ml PHYSICAL EXAM: 
General: WD, confused in bed. EENT:  EOMI. Anicteric sclerae. MMM Resp:  Congested++. No wheezing  No accessory muscle use CV:  Regular  rhythm,  No edema GI:  Soft, Non distended, Non tender.  +BS Neurologic:  AAOx0, moving all exts Psych:   No insight. Not anxious nor agitated Skin:  No rashes. No jaundice Reviewed most current lab test results and cultures  YES Reviewed most current radiology test results   YES Review and summation of old records today    NO Reviewed patient's current orders and MAR    YES 
PMH/SH reviewed - no change compared to H&P 
________________________________________________________________________ Care Plan discussed with: 
  Comments Patient x Family  x Pt's  RN x Care Manager Consultant Multidiciplinary team rounds were held today with , nursing, pharmacist and clinical coordinator. Patient's plan of care was discussed; medications were reviewed and discharge planning was addressed. ________________________________________________________________________ Total NON critical care TIME:  35   Minutes Total CRITICAL CARE TIME Spent:   Minutes non procedure based Comments >50% of visit spent in counseling and coordination of care    
________________________________________________________________________ Veronica Garcia MD  
 
Procedures: see electronic medical records for all procedures/Xrays and details which were not copied into this note but were reviewed prior to creation of Plan. LABS: 
I reviewed today's most current labs and imaging studies. Pertinent labs include: 
Recent Labs  
  03/29/19 
0159 03/28/19 
1013 WBC 43.2* 8.1 HGB 9.0* 9.1*  
HCT 30.3* 29.9*  
 128* Recent Labs  
  03/29/19 
0159 03/28/19 
1013 * 136  
K 4.6 4.0  
 101 CO2 17* 26 * 68  
BUN 26* 19  
CREA 1.07* 0.68  
CA 7.0* 8.5 MG 1.2* 1.5* PHOS 4.6  --   
ALB 2.6* 3.1* TBILI 1.3* 0.6 SGOT 35 31 ALT 22 22 INR 1.8* 1.2* Signed: Ellouise Alpers, MD

## 2019-03-29 NOTE — PROCEDURES
[facility], Critical Care, and Sleep Medicine Name: Alanna Brown MRN: [id number] : 1955 Hospital: [place] [facility] Date: 3/29/2019 Intubation Report Procedure: Intubation with 7.5 Et tube. Using MAC 4, Glidescope Indication: Acute Hypercarbic Respiratory Failure, Encephalopathy, Cirrhosis. Inability to protect airway. Consent/Treatment: Pt was emergently intubated. Anesthesia:  
Propofol 5cc IV was used prior to intubation. Procedure Details: Pt was placed in proper position. Using glidescope and MAC 4, and 7.5 ET tube. Suctioned airway. Able to visualize the vocal cords. Had moderate amount of secretions. Tolerated Et tube placement well. Had moderate secretions from ET tube. Et tube +ETCo2 change Had BS-Bilaterally. Secured at 21cm at lips. Complications: none Estimated Blood Loss: Minimal 
 
Called pts  Delphine Block and update him on above. He voiced his understanding. Will be up here later.   
 
Deacon Isaacs MD

## 2019-03-29 NOTE — PROGRESS NOTES
Critical care interdisciplinary rounds held on 03/29/2019. Following members present, Pharmacy, Diabetes Treatment, Case Management, Palliative Care and Nutrition. Led by GABBY Nunes RN and Dr. Zuri Barber. Plan of care discussed. See clinical pathway for plan of care and interventions and desired outcomes.

## 2019-03-29 NOTE — PROGRESS NOTES
Gastroenterology Progress Note 3/29/2019 Admit Date: 3/28/2019 Subjective: Follow up for: high ammonia, colitis, cirrhosis Remains confused and requiring pressors. Abdomen is distended. WBC spike noted Patient was seen in rounds by me today. 2 RNs at bedside. C/o pain in abdomen. Current Facility-Administered Medications Medication Dose Route Frequency  vancomycin (FIRVANQ) 50 mg/mL oral solution 125 mg  125 mg Oral Q6H  
 rifAXIMin (XIFAXAN) tablet 550 mg  550 mg Oral BID  dexmedeTOMidine (PRECEDEX) 400 mcg in 0.9% sodium chloride 100 mL infusion  0.2-1.4 mcg/kg/hr IntraVENous TITRATE  sodium chloride (NS) flush 5-10 mL  5-10 mL IntraVENous PRN  
 NOREPINephrine (LEVOPHED) 8 mg in 5% dextrose 250mL infusion  2-30 mcg/min IntraVENous TITRATE  sodium chloride (NS) flush 5-40 mL  5-40 mL IntraVENous Q8H  
 sodium chloride (NS) flush 5-40 mL  5-40 mL IntraVENous PRN  
 acetaminophen (TYLENOL) suppository 650 mg  650 mg Rectal Q6H PRN  
 ondansetron (ZOFRAN) injection 4 mg  4 mg IntraVENous Q6H PRN  
 metroNIDAZOLE (FLAGYL) IVPB premix 500 mg  500 mg IntraVENous Q12H  levoFLOXacin (LEVAQUIN) 750 mg in D5W IVPB  750 mg IntraVENous Q24H  
 0.9% sodium chloride infusion  150 mL/hr IntraVENous CONTINUOUS  
 cefepime (MAXIPIME) 2 g in 0.9% sodium chloride (MBP/ADV) 100 mL  2 g IntraVENous Q8H  
 albuterol-ipratropium (DUO-NEB) 2.5 MG-0.5 MG/3 ML  3 mL Nebulization Q4H PRN  
 fluticasone-vilanterol (BREO ELLIPTA) 200mcg-25mcg/puff  1 Puff Inhalation DAILY  methylPREDNISolone (PF) (SOLU-MEDROL) injection 40 mg  40 mg IntraVENous Q8H  
 insulin lispro (HUMALOG) injection   SubCUTAneous Q6H  
 glucose chewable tablet 16 g  4 Tab Oral PRN  
 dextrose (D50) infusion 12.5-25 g  12.5-25 g IntraVENous PRN  
 glucagon (GLUCAGEN) injection 1 mg  1 mg IntraMUSCular PRN  
 acetaminophen (TYLENOL) tablet 650 mg  650 mg Oral Q6H PRN  
  lactulose (CHRONULAC) solution 30 g  30 g Oral Q6H  
 sodium chloride 0.9 % bolus infusion 250 mL  250 mL IntraVENous ONCE  
 mupirocin (BACTROBAN) 2 % ointment   Both Nostrils BID  aspirin delayed-release tablet 81 mg  81 mg Oral DAILY Objective:  
 
Blood pressure (!) 122/33, pulse (!) 129, temperature 98.4 °F (36.9 °C), resp. rate 26, height 5' 2\" (1.575 m), weight 95 kg (209 lb 7 oz), SpO2 95 %. No intake/output data recorded. 03/27 1901 - 03/29 0700 In: 7215.4 [P.O.:100; I.V.:7115.4] Out: 470 [Urine:470] Physical Examination:  
 
 
Ary Chick, confused, HEENT:  EOMI, Chest:   rhonchi. Heart: S1, S2, tachy GI:distended, dec bowel sounds, diffuse tenderness. Extremities: No cyanosis CNS: NA 
 
Data Review Recent Results (from the past 24 hour(s)) GLUCOSE, POC Collection Time: 03/28/19 10:10 AM  
Result Value Ref Range Glucose (POC) 68 65 - 100 mg/dL Performed by Lori Arizmendi \"Seth\" CBC WITH AUTOMATED DIFF Collection Time: 03/28/19 10:13 AM  
Result Value Ref Range WBC 8.1 3.6 - 11.0 K/uL  
 RBC 3.38 (L) 3.80 - 5.20 M/uL HGB 9.1 (L) 11.5 - 16.0 g/dL HCT 29.9 (L) 35.0 - 47.0 % MCV 88.5 80.0 - 99.0 FL  
 MCH 26.9 26.0 - 34.0 PG  
 MCHC 30.4 30.0 - 36.5 g/dL  
 RDW 17.3 (H) 11.5 - 14.5 % PLATELET 240 (L) 530 - 400 K/uL MPV 11.5 8.9 - 12.9 FL  
 NRBC 0.5 (H) 0  WBC ABSOLUTE NRBC 0.04 (H) 0.00 - 0.01 K/uL NEUTROPHILS 84 (H) 32 - 75 % BAND NEUTROPHILS 15 % LYMPHOCYTES 1 (L) 12 - 49 % MONOCYTES 0 (L) 5 - 13 % EOSINOPHILS 0 0 - 7 % BASOPHILS 0 0 - 1 % IMMATURE GRANULOCYTES 0 0.0 - 0.5 % ABS. NEUTROPHILS 8.0 1.8 - 8.0 K/UL  
 ABS. LYMPHOCYTES 0.1 (L) 0.8 - 3.5 K/UL  
 ABS. MONOCYTES 0.0 0.0 - 1.0 K/UL  
 ABS. EOSINOPHILS 0.0 0.0 - 0.4 K/UL  
 ABS. BASOPHILS 0.0 0.0 - 0.1 K/UL  
 ABS. IMM. GRANS. 0.0 0.00 - 0.04 K/UL  
 DF MANUAL    
 RBC COMMENTS ANISOCYTOSIS 
1+ METABOLIC PANEL, COMPREHENSIVE  
 Collection Time: 03/28/19 10:13 AM  
Result Value Ref Range Sodium 136 136 - 145 mmol/L Potassium 4.0 3.5 - 5.1 mmol/L Chloride 101 97 - 108 mmol/L  
 CO2 26 21 - 32 mmol/L Anion gap 9 5 - 15 mmol/L Glucose 68 65 - 100 mg/dL BUN 19 6 - 20 MG/DL Creatinine 0.68 0.55 - 1.02 MG/DL  
 BUN/Creatinine ratio 28 (H) 12 - 20 GFR est AA >60 >60 ml/min/1.73m2 GFR est non-AA >60 >60 ml/min/1.73m2 Calcium 8.5 8.5 - 10.1 MG/DL Bilirubin, total 0.6 0.2 - 1.0 MG/DL  
 ALT (SGPT) 22 12 - 78 U/L  
 AST (SGOT) 31 15 - 37 U/L Alk. phosphatase 111 45 - 117 U/L Protein, total 7.7 6.4 - 8.2 g/dL Albumin 3.1 (L) 3.5 - 5.0 g/dL Globulin 4.6 (H) 2.0 - 4.0 g/dL A-G Ratio 0.7 (L) 1.1 - 2.2 PROTHROMBIN TIME + INR Collection Time: 03/28/19 10:13 AM  
Result Value Ref Range INR 1.2 (H) 0.9 - 1.1 Prothrombin time 12.6 (H) 9.0 - 11.1 sec PTT Collection Time: 03/28/19 10:13 AM  
Result Value Ref Range aPTT 24.2 22.1 - 32.0 sec  
 aPTT, therapeutic range     58.0 - 77.0 SECS  
AMMONIA Collection Time: 03/28/19 10:13 AM  
Result Value Ref Range Ammonia 110 (H) <32 UMOL/L  
CULTURE, BLOOD Collection Time: 03/28/19 10:13 AM  
Result Value Ref Range Special Requests: NO SPECIAL REQUESTS Culture result: (A) GRAM NEGATIVE RODS GROWING IN BOTH BOTTLES DRAWN SITE=( NOT INDICATED) Culture result: (A) PRELIMINARY REPORT OF GRAM NEGATIVE RODS GROWING IN BOTH BOTTLES DRAWN CALLED TO AND READ BACK BY WESTON Erwin RN  [facility] AT 2109 ON 3/28/2019. [facility]  
CK Collection Time: 03/28/19 10:13 AM  
Result Value Ref Range CK 47 26 - 192 U/L  
MAGNESIUM Collection Time: 03/28/19 10:13 AM  
Result Value Ref Range Magnesium 1.5 (L) 1.6 - 2.4 mg/dL TROPONIN I Collection Time: 03/28/19 10:13 AM  
Result Value Ref Range Troponin-I, Qt. <0.05 <0.05 ng/mL SAMPLES BEING HELD Collection Time: 03/28/19 10:13 AM  
Result Value Ref Range SAMPLES BEING HELD RED COMMENT Add-on orders for these samples will be processed based on acceptable specimen integrity and analyte stability, which may vary by analyte. POC G3 - PUL Collection Time: 03/28/19 10:18 AM  
Result Value Ref Range pH (POC) 7.429 7.35 - 7.45    
 pCO2 (POC) 35.7 35.0 - 45.0 MMHG  
 pO2 (POC) 61 (L) 80 - 100 MMHG  
 HCO3 (POC) 23.6 22 - 26 MMOL/L  
 sO2 (POC) 92 92 - 97 % Base deficit (POC) 1 mmol/L Site RIGHT RADIAL Device: ROOM AIR Allens test (POC) YES Specimen type (POC) ARTERIAL Total resp. rate 29 POC LACTIC ACID Collection Time: 03/28/19 10:24 AM  
Result Value Ref Range Lactic Acid (POC) 4.50 (HH) 0.40 - 2.00 mmol/L  
GLUCOSE, POC Collection Time: 03/28/19 10:31 AM  
Result Value Ref Range Glucose (POC) 179 (H) 65 - 100 mg/dL Performed by Jame Bustillo (SUKHIT) EKG, 12 LEAD, INITIAL Collection Time: 03/28/19 10:32 AM  
Result Value Ref Range Ventricular Rate 126 BPM  
 Atrial Rate 126 BPM  
 P-R Interval 140 ms QRS Duration 74 ms Q-T Interval 304 ms QTC Calculation (Bezet) 440 ms Calculated P Axis 76 degrees Calculated R Axis 69 degrees Calculated T Axis 65 degrees Diagnosis Sinus tachycardia When compared with ECG of 09-APR-2018 10:59, No significant change was found Confirmed by Allan Cole ([id number]) on 3/28/2019 2:07:07 PM 
  
INFLUENZA A & B AG (RAPID TEST) Collection Time: 03/28/19 10:55 AM  
Result Value Ref Range Influenza A Antigen NEGATIVE  NEG Influenza B Antigen NEGATIVE  NEG    
URINALYSIS W/ REFLEX CULTURE Collection Time: 03/28/19 11:03 AM  
Result Value Ref Range Color DARK YELLOW Appearance TURBID (A) CLEAR Specific gravity 1.021 1.003 - 1.030    
 pH (UA) 6.5 5.0 - 8.0 Protein 30 (A) NEG mg/dL Glucose NEGATIVE  NEG mg/dL Ketone TRACE (A) NEG mg/dL Blood TRACE (A) NEG Urobilinogen 4.0 (H) 0.2 - 1.0 EU/dL Nitrites NEGATIVE  NEG Leukocyte Esterase LARGE (A) NEG    
 WBC 20-50 0 - 4 /hpf  
 RBC 0-5 0 - 5 /hpf Epithelial cells MODERATE (A) FEW /lpf Bacteria 4+ (A) NEG /hpf  
 UA:UC IF INDICATED URINE CULTURE ORDERED (A) CNI    
BILIRUBIN, CONFIRM Collection Time: 03/28/19 11:03 AM  
Result Value Ref Range Bilirubin UA, confirm NEGATIVE  NEG    
CULTURE, BLOOD Collection Time: 03/28/19 11:12 AM  
Result Value Ref Range Special Requests: NO SPECIAL REQUESTS Culture result: (A) GRAM NEGATIVE RODS GROWING IN BOTH BOTTLES DRAWN SITE=(R ARM) Culture result: (A) PRELIMINARY REPORT OF GRAM NEGATIVE RODS GROWING IN BOTH BOTTLES DRAWN CALLED TO AND READ BACK BY [facility] YAYA FORD AT 2107 ON 3/28/2019. [facility]  
GLUCOSE, POC Collection Time: 03/28/19  2:20 PM  
Result Value Ref Range Glucose (POC) 66 65 - 100 mg/dL Performed by Mukesh Brunson (CON) POC LACTIC ACID Collection Time: 03/28/19  2:52 PM  
Result Value Ref Range Lactic Acid (POC) 4.45 (HH) 0.40 - 2.00 mmol/L  
GLUCOSE, POC Collection Time: 03/28/19  2:53 PM  
Result Value Ref Range Glucose (POC) 162 (H) 65 - 100 mg/dL Performed by Mukesh Brunson (CON) LACTIC ACID Collection Time: 03/28/19  6:30 PM  
Result Value Ref Range Lactic acid 5.9 (HH) 0.4 - 2.0 MMOL/L  
GLUCOSE, POC Collection Time: 03/28/19  7:29 PM  
Result Value Ref Range Glucose (POC) 164 (H) 65 - 100 mg/dL Performed by Kristen Tai, POC Collection Time: 03/28/19 11:27 PM  
Result Value Ref Range Glucose (POC) 177 (H) 65 - 100 mg/dL Performed by Shimon Harmon AMMONIA Collection Time: 03/29/19  1:59 AM  
Result Value Ref Range Ammonia 64 (H) <32 UMOL/L  
CBC WITH AUTOMATED DIFF Collection Time: 03/29/19  1:59 AM  
Result Value Ref Range WBC 43.2 (H) 3.6 - 11.0 K/uL  
 RBC 3.33 (L) 3.80 - 5.20 M/uL HGB 9.0 (L) 11.5 - 16.0 g/dL HCT 30.3 (L) 35.0 - 47.0 % MCV 91.0 80.0 - 99.0 FL  
 MCH 27.0 26.0 - 34.0 PG  
 MCHC 29.7 (L) 30.0 - 36.5 g/dL  
 RDW 17.6 (H) 11.5 - 14.5 % PLATELET 293 731 - 104 K/uL MPV 11.8 8.9 - 12.9 FL  
 NRBC 0.1 (H) 0  WBC ABSOLUTE NRBC 0.03 (H) 0.00 - 0.01 K/uL NEUTROPHILS 78 (H) 32 - 75 % BAND NEUTROPHILS 15 % LYMPHOCYTES 4 (L) 12 - 49 % MONOCYTES 3 (L) 5 - 13 % EOSINOPHILS 0 0 - 7 % BASOPHILS 0 0 - 1 % IMMATURE GRANULOCYTES 0 0.0 - 0.5 % ABS. NEUTROPHILS 40.2 (H) 1.8 - 8.0 K/UL  
 ABS. LYMPHOCYTES 1.7 0.8 - 3.5 K/UL  
 ABS. MONOCYTES 1.3 (H) 0.0 - 1.0 K/UL  
 ABS. EOSINOPHILS 0.0 0.0 - 0.4 K/UL  
 ABS. BASOPHILS 0.0 0.0 - 0.1 K/UL  
 ABS. IMM. GRANS. 0.0 0.00 - 0.04 K/UL  
 DF AUTOMATED    
 RBC COMMENTS ANISOCYTOSIS 
1+ METABOLIC PANEL, COMPREHENSIVE Collection Time: 03/29/19  1:59 AM  
Result Value Ref Range Sodium 135 (L) 136 - 145 mmol/L Potassium 4.6 3.5 - 5.1 mmol/L Chloride 105 97 - 108 mmol/L  
 CO2 17 (L) 21 - 32 mmol/L Anion gap 13 5 - 15 mmol/L Glucose 184 (H) 65 - 100 mg/dL BUN 26 (H) 6 - 20 MG/DL Creatinine 1.07 (H) 0.55 - 1.02 MG/DL  
 BUN/Creatinine ratio 24 (H) 12 - 20 GFR est AA >60 >60 ml/min/1.73m2 GFR est non-AA 52 (L) >60 ml/min/1.73m2 Calcium 7.0 (L) 8.5 - 10.1 MG/DL Bilirubin, total 1.3 (H) 0.2 - 1.0 MG/DL  
 ALT (SGPT) 22 12 - 78 U/L  
 AST (SGOT) 35 15 - 37 U/L Alk. phosphatase 62 45 - 117 U/L Protein, total 6.9 6.4 - 8.2 g/dL Albumin 2.6 (L) 3.5 - 5.0 g/dL Globulin 4.3 (H) 2.0 - 4.0 g/dL A-G Ratio 0.6 (L) 1.1 - 2.2 MAGNESIUM Collection Time: 03/29/19  1:59 AM  
Result Value Ref Range Magnesium 1.2 (L) 1.6 - 2.4 mg/dL PHOSPHORUS Collection Time: 03/29/19  1:59 AM  
Result Value Ref Range Phosphorus 4.6 2.6 - 4.7 MG/DL  
LACTIC ACID Collection Time: 03/29/19  1:59 AM  
Result Value Ref Range Lactic acid 5.6 (HH) 0.4 - 2.0 MMOL/L  
PROTHROMBIN TIME + INR  Collection Time: 03/29/19  1:59 AM  
 Result Value Ref Range INR 1.8 (H) 0.9 - 1.1 Prothrombin time 18.2 (H) 9.0 - 11.1 sec GLUCOSE, POC Collection Time: 03/29/19  6:05 AM  
Result Value Ref Range Glucose (POC) 200 (H) 65 - 100 mg/dL Performed by Joan Mahajan Recent Labs  
  03/29/19 
0159 03/28/19 
1013 WBC 43.2* 8.1 HGB 9.0* 9.1*  
HCT 30.3* 29.9*  
 128* Recent Labs  
  03/29/19 
0159 03/28/19 
1013 * 136  
K 4.6 4.0  
 101 CO2 17* 26 BUN 26* 19  
CREA 1.07* 0.68 * 68  
CA 7.0* 8.5 MG 1.2* 1.5* PHOS 4.6  --   
 
Recent Labs  
  03/29/19 0159 03/28/19 
1013 SGOT 35 31 AP 62 111  
TP 6.9 7.7 ALB 2.6* 3.1*  
GLOB 4.3* 4.6* Recent Labs  
  03/29/19 
0159 03/28/19 
1013 INR 1.8* 1.2* PTP 18.2* 12.6* APTT  --  24.2 No results for input(s): FE, TIBC, PSAT, FERR in the last 72 hours. Lab Results Component Value Date/Time Folate 24.6 (H) 03/01/2016 11:20 AM  
  
No results for input(s): PH, PCO2, PO2 in the last 72 hours. Recent Labs  
  03/28/19 
1013 CPK 47 TROIQ <0.05 Lab Results Component Value Date/Time Cholesterol, total 143 11/14/2018 04:52 AM  
 HDL Cholesterol 41 11/14/2018 04:52 AM  
 LDL, calculated 77.6 11/14/2018 04:52 AM  
 Triglyceride 122 11/14/2018 04:52 AM  
 CHOL/HDL Ratio 3.5 11/14/2018 04:52 AM  
 
No components found for: [place] Lab Results Component Value Date/Time  Color DARK YELLOW 03/28/2019 11:03 AM  
 Appearance TURBID (A) 03/28/2019 11:03 AM  
 Specific gravity 1.021 03/28/2019 11:03 AM  
 pH (UA) 6.5 03/28/2019 11:03 AM  
 Protein 30 (A) 03/28/2019 11:03 AM  
 Glucose NEGATIVE  03/28/2019 11:03 AM  
 Ketone TRACE (A) 03/28/2019 11:03 AM  
 Bilirubin NEGATIVE  11/13/2018 12:53 PM  
 Urobilinogen 4.0 (H) 03/28/2019 11:03 AM  
 Nitrites NEGATIVE  03/28/2019 11:03 AM  
 Leukocyte Esterase LARGE (A) 03/28/2019 11:03 AM  
 Epithelial cells MODERATE (A) 03/28/2019 11:03 AM  
 Bacteria 4+ (A) 03/28/2019 11:03 AM  
 WBC 20-50 03/28/2019 11:03 AM  
 RBC 0-5 03/28/2019 11:03 AM  
 
  
ROS: -CP, SOB, Dysuria, palpitations, cough. Assessment: 
 
Active Problems: 
  Hepatic encephalopathy ([address] (3/28/2019) Colitis (3/28/2019) UTI (urinary tract infection) (3/28/2019) Septic shock ([address] (3/28/2019) Plan/Discussion: · Complex issues in a cirrhotic, non-compliant patient. Possible causes foe sepsis: UTI, lung, colitis,? SBP. · Is on IV Vancomycin, Zosyn, Flagyl and Levaquin. · Ammonia is lower but she is still confused. She is on  PO lactulose. Will add Xifaxan for confusion (? HSE related vs other) · Her WBC is over 43k today. She received steroids which may explain this rise but with hx of colitis and abdominal pain c diff colitis is in the differential. Will add PO Vancomycin . D/w Dr Too Crook and her RNs. · Stat KUB to f.u on colitis and inc abdominal girth. · Get US and if there is any ascites will need a paracentesis to r/o SBP. · Pain control · Aggressive ICU care. Signed By: Joan Garrett.  Merline Troy MD 
 
3/29/2019  8:23 AM

## 2019-03-29 NOTE — PROGRESS NOTES
Bedside and Verbal shift change report given to SEVERIANO Ramos RN (oncoming nurse) by SEVERIANO Lincoln RN (offgoing nurse). Report included the following information SBAR, Kardex, Intake/Output, MAR, Recent Results and Cardiac Rhythm Sinus tach. Assessment completed. Patient oriented to self and place only. Speech garbled, nonsensical at times. Pupils equal, 4mm, and reactive to light. Moves all extremities with generalized weakness. Lungs coarse with occasional expiratory wheeze on 4L NC. Abdomen distended, semi-firm, hypoactive bowel sounds. Gimenez patent and draining clear margarita urine. Peripheral pulses palpable. Levophed @ 15mcg/min to maintain SBP>90. NS @ 125ml/hr, D10 @ 50ml/hr. BG currently 164. No coverage given at this time. Placed on bedpan per patient request, but patient immediately states that she no longer needs the bedpan. 1941Nicole Cosmo, lab calls with critical lactic acid 5.9. 
2105: Lyndsey Howard, [facility] lab reports blood cultures drawn at 1013 and 1105 today both with 2/2 bottles (4 bottles total) positive for Gram Negative Rods. 2110: Patient frustrated that she can't \"go to the bathroom by myself. \" However, she is unable to even turn herself in the bed.  
2120: Paged hospitalist re: increasing lactic acid and positive blood cultures. 2127: Dr. Karishma Richards returns call re: lactic acid and positive blood cultures. Advised patient already received 3L fluid resuscitation and is on NS @ 125ml/hr. On Flagyl, Cefepime, Vanc and Levaquin. Per Dr. Sabiha Saenz should cover GNR. NS bolus 250ml ordered. 2138: NS bolus started. 2255: Patient yelling out, \"I want to go home, you can't keep me here. \" Explained to patient that she is very sick and on medications to keep her BP up. Patient states \"I don't care. I want to go home. \" Attempted to reorient patient, but patient insists that she can make the decision to go home. 2305: Paged Dr. Karishma Richards re: agitation, persistent confusion. 2308: Spoke with patient's , Meka Alves ([phone]). Per , she was like this in the ER and he \"just had to walk away. \" He verbalizes understanding that she is very sick and may die if she does not have treatment. He states that his speaking with her on the telephone will not have any impact on her mental states. States that she takes Elavil at home to help her sleep. 2315: Dr. Surjit Pack returns call. Discussed that in patient's confusion, she is unable to make appropriate decisions. Also discussed home Elavil use, but no sedatives ordered in light of ammonia level 110 on admit to hospital.  
2330: Assessment unchanged. Occasional upper expiratory wheezes. Respiratory treatment given by RT. 0350: Reassessment unchanged. 9311: Patient has pulled off nasal cannula, pulse ox and pulled out R hand PIV. Patient lethargic. Replaced nasal cannula and pulse ox. SpO2 77%, but quickly rebounded. 3301: Bedside and Verbal shift change report given to GABBY Basurto RN (oncoming nurse) by SEVERIANO Santroo RN (offgoing nurse). Report included the following information SBAR, Kardex, Intake/Output, MAR, Recent Results and Cardiac Rhythm Sinus tach, rate 120's.

## 2019-03-29 NOTE — PROGRESS NOTES
0715  Bedside and Verbal shift change report given to Suzanne Lauren RN (oncoming nurse) by Omayra Rhodes RN (offgoing nurse). Report included the following information SBAR, Kardex, Intake/Output, MAR, Recent Results and Cardiac Rhythm Sinus Tachycardia. 0323  Shift assessment complete, See flowsheets for details. 0900  Dr. Obdulio Lopez at bedside, see progress notes for details. 1100  Attempted to give patient Breo inhaler, patient was unable to inhale or understand the proper way to use the inhaler at this time. Dr. Obdulio Lopez notified. 1200  Reassessment complete, see flowsheets for details. 1400  Dr. Parry Console at bedside, see progress notes for details. 1510 RR was 43, ABG was done and results given to Dr. Obdulio Lopez. Patient to be intubated at this time. 1513 Patient intubated at this time, patient placed on propofol at 10 mcg/kg/min, reducing rate of precedex and will wean off of that medication. OG tube placed at this time. Will continue to monitor. 1600  Reassessment complete, see flowsheets for details. 1910  Bedside and Verbal shift change report given to SANDRA Jo (oncoming nurse) by Suzanne Lauren RN (offgoing nurse). Report included the following information SBAR, Kardex, Procedure Summary, Intake/Output, MAR, Recent Results and Cardiac Rhythm Sinus Tachycardia.

## 2019-03-29 NOTE — PROGRESS NOTES
Pharmacy Automatic Renal Dosing Protocol - Antimicrobials Indication for Antimicrobials: Septic shock (GNR bacteremia; UTI; Possible CDIFF colitis) Current Regimen of Each Antimicrobial: 
Cefepime 2 gram IV every 8 hours (Started 3/28/19; Day #2 of 7) Levofloxacin 750 mg IV every 24 hours (Started 3/28/19; Day #2) Metronidazole 500 mg IV every 8 hours (Started 3/28/19; Day #2) Vancomycin 500 mg PO every 6 hours (Started 3/29/19; Day #1) Vancomycin 1250 mg IV every 18 hours (Started 3/28/19; Day #2) Previous Antimicrobial Therapy: 
None ordered Goal Vancomycin Trough: 15-20 mcg/mL Vancomycin Level Assessments:  
Date Dose & Interval Measured (mcg/mL) Extrapolated (mcg/mL) Significant Cultures:  
3/29/19 CDIFF = Ordered 3/28/19 Blood culture = GNR in 2/2 (Results pending) 3/28/19 Urine culture = Greater than 100K CFU/mL Ecoli; Greater than 100K CFU/mL Enterococcus (Results pending) 3/28/19 Blood culture = GNR in 2/2 (Results pending) 3/28/19 Influenza = Negative (FINAL) Labs: 
Recent Labs  
  19 
0159 19 
1013 CREA 1.07* 0.68  
BUN 26* 19 WBC 43.2* 8.1 Temp (24hrs), Av.2 °F (37.3 °C), Min:98 °F (36.7 °C), Max:101.8 °F (38.8 °C) Creatinine Clearance (mL/min) or Dialysis: 43 mL/min (IBW) Impression/Plan:  
Cefepime dosed appropriately based on indication and renal function. Continue current regimen. Levofloxacin dosed adjusted to 750 mg IV every 48 hours based on renal function decline over the past 24 hours. Vancomycin IV dosed at 1250 mg IV every 18 hours based on renal function decline over the past 24 hours. Metronidazole and vancomycin PO dosed appropriately for possible CDIFF. Antimicrobial stop date: To be determined for levofloxacin, vancomycin IV, and metronidazole; Vancomycin PO has a 14 day duration entered; Cefepime has a 7 day duration entered (which will likely need adjustment once cultures result). Pharmacy will follow daily and adjust medications as appropriate for renal function and/or serum levels. Thank you, Julia Mena, PHARMD

## 2019-03-29 NOTE — DIABETES MGMT
DTC Progress Note Recommendations/ Comments: Pt discussed with rounding team and Dr. Pio Abdul. PLan to check a current a1c. Pt is followed as an outpatient by Dr. Sukhi Geronimo. D10 off overnight and BG now 200. Monitor closely for hypoglycemia with hepatic insufficiency. If BG consistently remains >180 then may want to consider resuming low dose of lantus and/or adding NPH linked and timed with solu-medrol. Chart reviewed on Marialuisa Reese during Multidisciplinary Rounds. A1c:  
Lab Results Component Value Date/Time Hemoglobin A1c 6.8 (H) 11/14/2018 04:52 AM  
 
 
 
 
Recent Glucose Results:  
Lab Results Component Value Date/Time  (H) 03/29/2019 01:59 AM  
 GLUCPOC 200 (H) 03/29/2019 06:05 AM  
 GLUCPOC 177 (H) 03/28/2019 11:27 PM  
 GLUCPOC 164 (H) 03/28/2019 07:29 PM  
  
 
Lab Results Component Value Date/Time Creatinine 1.07 (H) 03/29/2019 01:59 AM  
 
Estimated Creatinine Clearance: 57.9 mL/min (A) (based on SCr of 1.07 mg/dL (H)). Active Orders Diet DIET NPO With [employer] PO intake: No data found. Will continue to follow as needed. Thank you. Abdoul Neff, SUSIEN, RN, CDE [facility] Time spent: 5 min

## 2019-03-29 NOTE — PROGRESS NOTES
[facility], Critical Care, and Sleep Medicine Name: Carloz Macias MRN: [id number] : 1955 Hospital: [place] [address] Date: 3/29/2019 Critical Care  Patient Consult IMPRESSION:  
· Severe Sepsis, Gram negative bacteremia,  and  Shock on levophed. Hold all BP meds. · Cirrhosis, noted in past to have esophageal varices. seems decompensated, Gi eval 
· Hepatic Encephalopathy (ammonia of 110), Not sure how compliant she has been with her home regimen. Still confused this am.  
· Acute UTI. · Right sided colitis  Noted. ?ischemic or infectious. Possible C Diff. · Anemia · Coags normal.  
· MIld Thrombocytopenia. · Hypoglycemia. Severe has been given D50 twice on D10. Now seems better. · COPD with acute exacerbation, active wheezing is still present. · IBIS on CPAP at home. · Obese. · Right leg weakness. · Ongoing Smoking · Called and update pts  via phone: Adrián Hay: [phone]. · Critically ill, 35 min CC, EOP. Multiple organ failure. High risk of decompensation. Discussed with DR. Selwyn Dowell GI. PT with cirrhosis, decompensation, severe sepsis, Shock, colitis. ? C diff. RECOMMENDATIONS:  
· Added precedex for sedation. Target RASS of zero to minus 1.  
· Empiric Cefepime, Levoflox, Flagyl. VAnc. Monitor Cx. · ON Lactulose, Treatment of increased ammonia. · D10 infusion, monitoring of Hypoglycemia. Has been better. · Pressors to keep MAP over 65 · Nebs scheduled. · Steroids for acute COPD exacerbation · CVP to eval volume status · Replete Electrolytes Subjective/History:  
 
3-29-19: Pt seen this am. She is still very confused. Has ongoing needed for pressors. Has wheezing. Her WBC has increased. She is not able to give hx of HPI or ROS. This patient has been seen and evaluated at the request of Dr. Dayron Lamb for above. Pt was seen in ER. Patient is a 61 y.o. female with hx of above. Noted to have increased confusion per her family. Has not been compliant with her meds. Noted to have weakness and inability to walk. Was was normal the night before. Noted when she awoke to be confused, had generalized weakness. Has a very dry  Mouth when seen in ER. ROS and HPI limited due to pts confused state. Past Medical History:  
Diagnosis Date  Asthma  Back pain  COPD (chronic obstructive pulmonary disease) (HCC)  Diabetes ([facility]  Esophageal varices in cirrhosis ([facility]   
 6/2014 banding x 2  
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  
Procedure Laterality Date  HX BACK SURGERY    
 HX CARPAL TUNNEL RELEASE    
 on right  HX HYSTERECTOMY plus 1/2 of an ovary removed  PA COLSC FLX W/RMVL OF TUMOR POLYP LESION SNARE TQ  5/30/2013  UPPER GI ENDOSCOPY,LIGAT VARIX  2/6/2015 Prior to Admission medications Medication Sig Start Date End Date Taking? Authorizing Provider  
fluticasone furoate-vilanterol (BREO ELLIPTA) 200-25 mcg/dose inhaler Take 1 Puff by inhalation daily. Provider, Historical  
insulin glargine (LANTUS SOLOSTAR U-100 INSULIN) 100 unit/mL (3 mL) inpn 110 Units by SubCUTAneous route daily. Provider, Historical  
furosemide (LASIX) 40 mg tablet Take 2 Tabs by mouth daily. 3/19/19   HUMZA Fernandez  
spironolactone (ALDACTONE) 100 mg tablet Take 2 Tabs by mouth daily.  3/19/19   HUMZA Fernandez  
CONSTULOSE 10 gram/15 mL solution take 2 tablespoonfuls by mouth twice a day 1/7/19   Cathi Sung, NP  
 losartan (COZAAR) 25 mg tablet take 1 tablet by mouth once daily , REPLACES LISINOPRIL FOR BLOOD PRESSURE AND KIDNEY PROTECTION 12/3/18   Jessica Addison MD  
insulin NPH (NOVOLIN N NPH U-100 INSULIN) 100 unit/mL injection Inject 55 units every 12 hours--dispense relion brand--patient will pay cash 11/28/18   Jessica Addison MD  
traMADol Andreia Ly) 50 mg tablet take 1-2 tablets by mouth twice a day if needed for DIABETIC NEUROPATHY PAIN 11/27/18   Jessica Addison MD  
aspirin 81 mg chewable tablet Take 1 Tab by mouth daily. 11/16/18   Fabian Giordano MD  
atorvastatin (LIPITOR) 40 mg tablet Take 1 Tab by mouth nightly. 11/15/18   Fabian Giordano MD  
potassium chloride SR (KLOR-CON 10) 10 mEq tablet Take 10 mEq by mouth two (2) times a day. Other, MD Leonarda  
omeprazole (PRILOSEC) 20 mg capsule take 1 capsule by mouth once daily 11/8/18   Cathi Barksdale, NP  
amitriptyline (ELAVIL) 150 mg tablet Take 1 Tab by mouth nightly. 10/16/18   Jessica Addison MD  
propranolol (INDERAL) 20 mg tablet Take 1 Tab by mouth two (2) times a day. 10/16/18   Jessica Addison MD  
ferrous sulfate 325 mg (65 mg iron) tablet take 1 tablet by mouth once daily with BREAKFAST 10/2/18   Cathi Barksdale, NP  
insulin lispro (HUMALOG KWIKPEN INSULIN) 100 unit/mL kwikpen Inject as needed up to 3 times per day for sugars over 150: 4 units for every 50 points. Max 50 units per day 10/1/18   Jessica Addison MD  
metFORMIN (GLUCOPHAGE) 1,000 mg tablet take 1 tablet by mouth twice a day with meals 8/23/18   Jessica Addison MD  
ondansetron (ZOFRAN ODT) 4 mg disintegrating tablet dissolve 1 tablet ON TONGUE every 8 hours if needed for nausea 8/1/18   Cathi Barksdale, NP  
gabapentin (NEURONTIN) 600 mg tablet take 2 tablets by mouth three times a day 7/19/18   Jessica Addison MD  
rOPINIRole (REQUIP) 4 mg tab TAB Take 4 mg by mouth nightly.     Provider, Historical  
 albuterol (PROAIR HFA) 90 mcg/actuation inhaler Take 2 Puffs by inhalation every four (4) hours as needed for Wheezing. Other, MD Leonarda  
 
Current Facility-Administered Medications Medication Dose Route Frequency  rifAXIMin (XIFAXAN) tablet 550 mg  550 mg Oral BID  metroNIDAZOLE (FLAGYL) IVPB premix 500 mg  500 mg IntraVENous Q8H  
 vancomycin (VANCOCIN) 1250 mg in  ml infusion  1,250 mg IntraVENous Q18H  
 vancomycin (FIRVANQ) 50 mg/mL oral solution 500 mg  500 mg Oral Q6H  
 dexmedeTOMidine (PRECEDEX) 400 mcg in 0.9% sodium chloride 100 mL infusion  0-1.4 mcg/kg/hr IntraVENous TITRATE  albuterol (PROVENTIL VENTOLIN) nebulizer solution 2.5 mg  2.5 mg Nebulization Q4H RT  
 NOREPINephrine (LEVOPHED) 8 mg in 5% dextrose 250mL infusion  2-30 mcg/min IntraVENous TITRATE  sodium chloride (NS) flush 5-40 mL  5-40 mL IntraVENous Q8H  
 levoFLOXacin (LEVAQUIN) 750 mg in D5W IVPB  750 mg IntraVENous Q24H  
 0.9% sodium chloride infusion  100 mL/hr IntraVENous CONTINUOUS  
 cefepime (MAXIPIME) 2 g in 0.9% sodium chloride (MBP/ADV) 100 mL  2 g IntraVENous Q8H  
 methylPREDNISolone (PF) (SOLU-MEDROL) injection 40 mg  40 mg IntraVENous Q8H  
 insulin lispro (HUMALOG) injection   SubCUTAneous Q6H  
 lactulose (CHRONULAC) solution 30 g  30 g Oral Q6H  
 mupirocin (BACTROBAN) 2 % ointment   Both Nostrils BID  aspirin delayed-release tablet 81 mg  81 mg Oral DAILY Allergies Allergen Reactions  Hydrocodone Nausea and Vomiting  Lisinopril Cough  Lortab [Hydrocodone-Acetaminophen] Nausea and Vomiting  Percocet [Oxycodone-Acetaminophen] Nausea and Vomiting Social History Tobacco Use  Smoking status: Current Every Day Smoker Packs/day: 1.00 Years: 40.00 Pack years: 40.00  Smokeless tobacco: Former User Substance Use Topics  Alcohol use: No  
  Comment: rare Family History Problem Relation Age of Onset  Diabetes Mother  Stroke Sister  Diabetes Paternal Aunt  Diabetes Paternal Uncle  Heart Disease Neg Hx Review of Systems: 
Review of systems not obtained due to patient factors. Objective: 
Vital Signs:   
Visit Vitals /42 Pulse (!) 131 Temp 98.5 °F (36.9 °C) Resp (!) 31 Ht 5' 2\" (1.575 m) Wt 95 kg (209 lb 7 oz) SpO2 94% BMI 38.31 kg/m² O2 Device: Nasal cannula O2 Flow Rate (L/min): 4 l/min Temp (24hrs), Av.2 °F (37.3 °C), Min:98 °F (36.7 °C), Max:101.8 °F (38.8 °C) Intake/Output:  
Last shift:       07 -  1900 In: 150 [I.V.:150] Out: 150 [Urine:150] Last 3 shifts:  190 -  0700 In: 7315.4 [P.O.:100; I.V.:7215.4] Out: 520 [Urine:520] Intake/Output Summary (Last 24 hours) at 3/29/2019 1123 Last data filed at 3/29/2019 0800 Gross per 24 hour Intake 7465.37 ml Output 670 ml Net 6795.37 ml Hemodynamics:  
PAP:   CO:    
Wedge:   CI:    
CVP:    SVR:    
  PVR:    
 
Ventilator Settings: 
Mode Rate Tidal Volume Pressure FiO2 PEEP Peak airway pressure:     
Minute ventilation:     
 
Physical Exam: 
 
General:  Alert, Intermittently cooperative, no distress, appears stated age. Head:  Normocephalic, without obvious abnormality, atraumatic. Eyes:  Conjunctivae/corneas clear. PERRL, EOMs intact. Nose: Nares normal. Septum midline. Mucosa normal. No drainage or sinus tenderness. Throat: Lips, mucosa, and tongue normal. Teeth and gums normal.  
Neck: Supple, symmetrical, trachea midline, no adenopathy, thyroid: no enlargment/tenderness/nodules, no carotid bruit and no JVD. Has right neck IJ. Back:   Symmetric, no curvature. ROM normal.  
Lungs: On  auscultation bilaterally has bilateral wheezing, prolonged expiratory phase. Chest wall:  No tenderness or deformity. Heart:  Regular rate and rhythm, S1, S2 normal, no murmur, click, rub or gallop. Abdomen:   Soft, tender diffusely.  Bowel sounds normal. No masses,  No organomegaly. Obese. Extremities: Extremities normal, atraumatic, no cyanosis or edema. Pulses: 2+ and symmetric all extremities. Skin: Skin color, texture, turgor normal. No rashes or lesions Lymph nodes: Cervical, supraclavicular, and axillary nodes normal.  
Neurologic: Grossly nonfocal, sleepy seems confused. Psych:+ anxiety or depression. But not able to fully eval.   
 
 
Data:  
 
Recent Results (from the past 24 hour(s)) GLUCOSE, POC Collection Time: 03/28/19  2:20 PM  
Result Value Ref Range Glucose (POC) 66 65 - 100 mg/dL Performed by John Costello (CON) POC LACTIC ACID Collection Time: 03/28/19  2:52 PM  
Result Value Ref Range Lactic Acid (POC) 4.45 (HH) 0.40 - 2.00 mmol/L  
GLUCOSE, POC Collection Time: 03/28/19  2:53 PM  
Result Value Ref Range Glucose (POC) 162 (H) 65 - 100 mg/dL Performed by John Costello (CON) LACTIC ACID Collection Time: 03/28/19  6:30 PM  
Result Value Ref Range Lactic acid 5.9 (HH) 0.4 - 2.0 MMOL/L  
GLUCOSE, POC Collection Time: 03/28/19  7:29 PM  
Result Value Ref Range Glucose (POC) 164 (H) 65 - 100 mg/dL Performed by Magnolia Lockhart, POC Collection Time: 03/28/19 11:27 PM  
Result Value Ref Range Glucose (POC) 177 (H) 65 - 100 mg/dL Performed by Tay Niño AMMONIA Collection Time: 03/29/19  1:59 AM  
Result Value Ref Range Ammonia 64 (H) <32 UMOL/L  
CBC WITH AUTOMATED DIFF Collection Time: 03/29/19  1:59 AM  
Result Value Ref Range WBC 43.2 (H) 3.6 - 11.0 K/uL  
 RBC 3.33 (L) 3.80 - 5.20 M/uL HGB 9.0 (L) 11.5 - 16.0 g/dL HCT 30.3 (L) 35.0 - 47.0 % MCV 91.0 80.0 - 99.0 FL  
 MCH 27.0 26.0 - 34.0 PG  
 MCHC 29.7 (L) 30.0 - 36.5 g/dL  
 RDW 17.6 (H) 11.5 - 14.5 % PLATELET 036 541 - 942 K/uL MPV 11.8 8.9 - 12.9 FL  
 NRBC 0.1 (H) 0  WBC ABSOLUTE NRBC 0.03 (H) 0.00 - 0.01 K/uL NEUTROPHILS 78 (H) 32 - 75 % BAND NEUTROPHILS 15 % LYMPHOCYTES 4 (L) 12 - 49 % MONOCYTES 3 (L) 5 - 13 % EOSINOPHILS 0 0 - 7 % BASOPHILS 0 0 - 1 % IMMATURE GRANULOCYTES 0 0.0 - 0.5 % ABS. NEUTROPHILS 40.2 (H) 1.8 - 8.0 K/UL  
 ABS. LYMPHOCYTES 1.7 0.8 - 3.5 K/UL  
 ABS. MONOCYTES 1.3 (H) 0.0 - 1.0 K/UL  
 ABS. EOSINOPHILS 0.0 0.0 - 0.4 K/UL  
 ABS. BASOPHILS 0.0 0.0 - 0.1 K/UL  
 ABS. IMM. GRANS. 0.0 0.00 - 0.04 K/UL  
 DF AUTOMATED    
 RBC COMMENTS ANISOCYTOSIS 
1+ METABOLIC PANEL, COMPREHENSIVE Collection Time: 03/29/19  1:59 AM  
Result Value Ref Range Sodium 135 (L) 136 - 145 mmol/L Potassium 4.6 3.5 - 5.1 mmol/L Chloride 105 97 - 108 mmol/L  
 CO2 17 (L) 21 - 32 mmol/L Anion gap 13 5 - 15 mmol/L Glucose 184 (H) 65 - 100 mg/dL BUN 26 (H) 6 - 20 MG/DL Creatinine 1.07 (H) 0.55 - 1.02 MG/DL  
 BUN/Creatinine ratio 24 (H) 12 - 20 GFR est AA >60 >60 ml/min/1.73m2 GFR est non-AA 52 (L) >60 ml/min/1.73m2 Calcium 7.0 (L) 8.5 - 10.1 MG/DL Bilirubin, total 1.3 (H) 0.2 - 1.0 MG/DL  
 ALT (SGPT) 22 12 - 78 U/L  
 AST (SGOT) 35 15 - 37 U/L Alk. phosphatase 62 45 - 117 U/L Protein, total 6.9 6.4 - 8.2 g/dL Albumin 2.6 (L) 3.5 - 5.0 g/dL Globulin 4.3 (H) 2.0 - 4.0 g/dL A-G Ratio 0.6 (L) 1.1 - 2.2 MAGNESIUM Collection Time: 03/29/19  1:59 AM  
Result Value Ref Range Magnesium 1.2 (L) 1.6 - 2.4 mg/dL PHOSPHORUS Collection Time: 03/29/19  1:59 AM  
Result Value Ref Range Phosphorus 4.6 2.6 - 4.7 MG/DL  
LACTIC ACID Collection Time: 03/29/19  1:59 AM  
Result Value Ref Range Lactic acid 5.6 (HH) 0.4 - 2.0 MMOL/L  
PROTHROMBIN TIME + INR Collection Time: 03/29/19  1:59 AM  
Result Value Ref Range INR 1.8 (H) 0.9 - 1.1 Prothrombin time 18.2 (H) 9.0 - 11.1 sec GLUCOSE, POC Collection Time: 03/29/19  6:05 AM  
Result Value Ref Range Glucose (POC) 200 (H) 65 - 100 mg/dL Performed by Cecelia Catalan Telemetry:Sinus Tach. Imaging: I have personally reviewed the patients radiographs and have reviewed the reports: 
3-28-19: CT of chest/Abdomen: IMPRESSION:  
1. Right colitis. 2. Cirrhosis with portal venous hypertension. 3. Clear lungs.   
 
 
Jimmy Childs MD

## 2019-03-29 NOTE — PROGRESS NOTES
Reason for Admission:   Pt is a 62 yo female admitted from home for evaluation/treatment of AMS after family noted that pt was having difficulty walking and talking. In ED, temp was 103.2 with hypotension, lactic acidosis - thought to be in septic shock. Pt has hx of cirrhosis due to BALDERAS and hx of alcohol abuse, iron deficiency anemia, esophageal and gastric varices, HE, COPD, DM and IBIS (uses CPAP). Pt has been seen by NP Cathi Barksdale at [facility]; Dr. Syed Frank at [facility]. Unable to confirm if pt is active w/ listed PCP due to pt's confusion. RRAT Score:     39 Resources/supports as identified by patient/family:   Pt has supportive spouse as well as 2 adult daughters who live locally. Top Challenges facing patient (as identified by patient/family and CM): Finances/Medication cost?     No concerns noted. Transportation? Spouse drives; unknown if pt is currently driving. Support system or lack thereof? See above Living arrangements? 1-story home Self-care/ADLs/Cognition? PTA pt was independent for mobility and ADLS. Current Advanced Directive/Advance Care Plan:  No ACP on file. Full code Plan for utilizing home health:    Pt has not had [facility] set up in past from this facility. Due to reported medication compliance issues, pt would likely benefit from [facility] or [facility] ref. Likelihood of readmission:   Moderate - pt has multiple co-morbidities but these have been managed adequately by outpatient specialists up to this admission. Transition of Care Plan:    Pt in CCU for continued pressors - still very confused. Requiring Precedex for agitation. Lactulose for increased ammonia; WBC increased.  Ventimask at 12 lpm. 
 
 Unable to leave message on spouse Chirag's phone. Will reach out to family as able to confirm demographic and current MD info for f/u. CM will follow pt's progress to assist w/ dc planning. Anticipate H2H, HH or possibly rehab at dc. Care Management Interventions PCP Verified by CM: Yes 
Palliative Care Criteria Met (RRAT>21 & CHF Dx)?: No 
Mode of Transport at Discharge: Other (see comment) Transition of Care Consult (CM Consult): Discharge Planning Discharge Durable Medical Equipment: No 
Physical Therapy Consult: No 
Occupational Therapy Consult: No 
Speech Therapy Consult: Yes Current Support Network: Lives with Spouse Confirm Follow Up Transport: Family Freedom of Choice Offered: Yes Discharge Location Discharge Placement: Home with outpatient services LILI Conrad

## 2019-03-30 NOTE — PROGRESS NOTES
[facility] Name: Florina Billy MRN: [id number] : 1955 Hospital: [facility] Date: 3/30/2019 Critical Care  Patient Consult IMPRESSION:  
· Septic shock- Ecoli bacteremia, remains on levophed. Hold all BP meds. · Acute hypoxic resp failure- intubated 3/29. Oxygenating adequately · Cirrhosis, noted in past to have esophageal varices. seems decompensated, Gi following · Hepatic Encephalopathy (ammonia of 110 on admission), Not sure how compliant she has been with her home regimen. · Acute UTI. · Right sided colitis  Noted. ?ischemic or infectious. Possible C Diff. Although little stooling currently · Anemia · Coagulopathy -INR 2.0 ?liver dz vs DIC 
· MIld Thrombocytopenia-?liver dz vs DIC · Hypoglycemia. Now seems better. · COPD with acute exacerbation, active wheezing is still present. · IBIS on CPAP at home. · Obese. · Right leg weakness. · Ongoing Smoking · D/w   : Fernanda Del Angel: [phone]. · Critically ill, 35 min CC, EOP. Multiple organ failure. High risk of decompensation. Discussed with DR. Amairani Rodriguez GI. PT with cirrhosis, decompensation, severe sepsis, Shock, colitis. ? C diff. RECOMMENDATIONS:  
· Mechanical ventilation- not ready for extubation given hemodynamic instability and AMS · Cont precedex, Prop for sedation. Target RASS of zero to minus 1.  
· Empiric Cefepime, Levoflox, Flagyl. VAn (IV and PO). Monitor Cx. · ON Lactulose, Treatment of increased ammonia. · D10 infusion, monitoring of Hypoglycemia. Has been better. · Norepi gtt to keep MAP over 65 · Nebs scheduled. · Steroids for acute COPD exacerbation · CVP to eval volume status · Replete Electrolytes Subjective/History:  
 
3-30-pt intubated yesterday. No acute events overnight. Remains sedated on Norepi gtt 3-29-19: Pt seen this am. She is still very confused.  Has ongoing needed for pressors. Has wheezing. Her WBC has increased. She is not able to give hx of HPI or ROS. This patient has been seen and evaluated at the request of Dr. Steve Hou for above. Pt was seen in ER. Patient is a 61 y.o. female with hx of above. Noted to have increased confusion per her family. Has not been compliant with her meds. Noted to have weakness and inability to walk. Was was normal the night before. Noted when she awoke to be confused, had generalized weakness. Has a very dry  Mouth when seen in ER. ROS and HPI limited due to pts confused state. Past Medical History:  
Diagnosis Date  Asthma  Back pain  COPD (chronic obstructive pulmonary disease) (HCC)  Diabetes ([facility] [place] [address]  Esophageal varices in cirrhosis ([facility] [place] [address]   
 6/2014 banding x 2  
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  
Procedure Laterality Date  HX BACK SURGERY    
 HX CARPAL TUNNEL RELEASE    
 on right  HX HYSTERECTOMY plus 1/2 of an ovary removed  MD COLSC FLX W/RMVL OF TUMOR POLYP LESION SNARE TQ  5/30/2013  UPPER GI ENDOSCOPY,LIGAT VARIX  2/6/2015 Prior to Admission medications Medication Sig Start Date End Date Taking? Authorizing Provider  
fluticasone furoate-vilanterol (BREO ELLIPTA) 200-25 mcg/dose inhaler Take 1 Puff by inhalation daily. Provider, Historical  
insulin glargine (LANTUS SOLOSTAR U-100 INSULIN) 100 unit/mL (3 mL) inpn 110 Units by SubCUTAneous route daily. Provider, Historical  
furosemide (LASIX) 40 mg tablet Take 2 Tabs by mouth daily.  3/19/19   HUMZA Vegas  
 spironolactone (ALDACTONE) 100 mg tablet Take 2 Tabs by mouth daily. 3/19/19   HUMZA Dow  
CONSTULOSE 10 gram/15 mL solution take 2 tablespoonfuls by mouth twice a day 1/7/19   Cathi Barksdale, NP  
losartan (COZAAR) 25 mg tablet take 1 tablet by mouth once daily , REPLACES LISINOPRIL FOR BLOOD PRESSURE AND KIDNEY PROTECTION 12/3/18   Sola Gtz MD  
insulin NPH (NOVOLIN N NPH U-100 INSULIN) 100 unit/mL injection Inject 55 units every 12 hours--dispense relion brand--patient will pay cash 11/28/18   Sola Gtz MD  
traMADol Geuvara Sierra) 50 mg tablet take 1-2 tablets by mouth twice a day if needed for DIABETIC NEUROPATHY PAIN 11/27/18   Sola Gtz MD  
aspirin 81 mg chewable tablet Take 1 Tab by mouth daily. 11/16/18   Cuate Pham MD  
atorvastatin (LIPITOR) 40 mg tablet Take 1 Tab by mouth nightly. 11/15/18   Cuate Pham MD  
potassium chloride SR (KLOR-CON 10) 10 mEq tablet Take 10 mEq by mouth two (2) times a day. Other, MD Leonarda  
omeprazole (PRILOSEC) 20 mg capsule take 1 capsule by mouth once daily 11/8/18   Cathi Barksdale, NP  
amitriptyline (ELAVIL) 150 mg tablet Take 1 Tab by mouth nightly. 10/16/18   Sola Gtz MD  
propranolol (INDERAL) 20 mg tablet Take 1 Tab by mouth two (2) times a day. 10/16/18   Sola Gtz MD  
ferrous sulfate 325 mg (65 mg iron) tablet take 1 tablet by mouth once daily with BREAKFAST 10/2/18   Cathi Barksdale NP  
insulin lispro (HUMALOG KWIKPEN INSULIN) 100 unit/mL kwikpen Inject as needed up to 3 times per day for sugars over 150: 4 units for every 50 points.   Max 50 units per day 10/1/18   Sola Gtz MD  
metFORMIN (GLUCOPHAGE) 1,000 mg tablet take 1 tablet by mouth twice a day with meals 8/23/18   Sola Gtz MD  
ondansetron (ZOFRAN ODT) 4 mg disintegrating tablet dissolve 1 tablet ON TONGUE every 8 hours if needed for nausea 8/1/18   Cathi Flores, NP  
 gabapentin (NEURONTIN) 600 mg tablet take 2 tablets by mouth three times a day 7/19/18   Eliud Coronel MD  
rOPINIRole (REQUIP) 4 mg tab TAB Take 4 mg by mouth nightly. Provider, Historical  
albuterol (PROAIR HFA) 90 mcg/actuation inhaler Take 2 Puffs by inhalation every four (4) hours as needed for Wheezing. Other, MD Leonarda  
 
Current Facility-Administered Medications Medication Dose Route Frequency  [START ON 3/31/2019] vancomycin (VANCOCIN) 1500 mg in  ml infusion  1,500 mg IntraVENous Q24H  
 rifAXIMin (XIFAXAN) tablet 550 mg  550 mg Oral BID  metroNIDAZOLE (FLAGYL) IVPB premix 500 mg  500 mg IntraVENous Q8H  
 vancomycin (FIRVANQ) 50 mg/mL oral solution 500 mg  500 mg Oral Q6H  
 dexmedeTOMidine (PRECEDEX) 400 mcg in 0.9% sodium chloride 100 mL infusion  0-1.4 mcg/kg/hr IntraVENous TITRATE  albuterol (PROVENTIL VENTOLIN) nebulizer solution 2.5 mg  2.5 mg Nebulization Q4H RT  
 amitriptyline (ELAVIL) tablet 100 mg  100 mg Oral QHS  levoFLOXacin (LEVAQUIN) 750 mg in D5W IVPB  750 mg IntraVENous Q48H  propofol (DIPRIVAN) infusion  0-50 mcg/kg/min IntraVENous TITRATE  chlorhexidine (PERIDEX) 0.12 % mouthwash 15 mL  15 mL Oral Q12H  
 NOREPINephrine (LEVOPHED) 32 mg in 5% dextrose 250 mL infusion  2-30 mcg/min IntraVENous TITRATE  PHENYLephrine (PF)(ROSANA-SYNEPHRINE) 30 mg in 0.9% sodium chloride 250 mL infusion   mcg/min IntraVENous TITRATE  sodium chloride (NS) flush 5-40 mL  5-40 mL IntraVENous Q8H  
 0.9% sodium chloride infusion  50 mL/hr IntraVENous CONTINUOUS  
 cefepime (MAXIPIME) 2 g in 0.9% sodium chloride (MBP/ADV) 100 mL  2 g IntraVENous Q8H  
 methylPREDNISolone (PF) (SOLU-MEDROL) injection 40 mg  40 mg IntraVENous Q8H  
 insulin lispro (HUMALOG) injection   SubCUTAneous Q6H  
 lactulose (CHRONULAC) solution 30 g  30 g Oral Q6H  
 mupirocin (BACTROBAN) 2 % ointment   Both Nostrils BID  
  aspirin delayed-release tablet 81 mg  81 mg Oral DAILY Allergies Allergen Reactions  Hydrocodone Nausea and Vomiting  Lisinopril Cough  Lortab [Hydrocodone-Acetaminophen] Nausea and Vomiting  Percocet [Oxycodone-Acetaminophen] Nausea and Vomiting Social History Tobacco Use  Smoking status: Current Every Day Smoker Packs/day: 1.00 Years: 40.00 Pack years: 40.00  Smokeless tobacco: Former User Substance Use Topics  Alcohol use: No  
  Comment: rare Family History Problem Relation Age of Onset  Diabetes Mother  Stroke Sister  Diabetes Paternal Aunt  Diabetes Paternal Uncle  Heart Disease Neg Hx Review of Systems: 
Review of systems not obtained due to patient factors. Objective: 
Vital Signs:   
Visit Vitals BP (!) 79/30 Pulse (!) 120 Temp 98.4 °F (36.9 °C) Resp 26 Ht 5' 2\" (1.575 m) Wt 103.4 kg (227 lb 15.3 oz) SpO2 96% BMI 41.69 kg/m² O2 Device: Endotracheal tube O2 Flow Rate (L/min): 12 l/min Temp (24hrs), Av.6 °F (38.7 °C), Min:98.4 °F (36.9 °C), Max:104.6 °F (40.3 °C) Intake/Output:  
Last shift:       0701 -  1900 In: 513.6 [I.V.:453.6] Out: 740 [Urine:540] Last 3 shifts:  190 -  0700 In: 8459.4 [I.V.:8069.4] Out: 2188 [YZNJP:4010] Intake/Output Summary (Last 24 hours) at 3/30/2019 1129 Last data filed at 3/30/2019 1100 Gross per 24 hour Intake 4852.72 ml Output 2123 ml Net 2729.72 ml Hemodynamics:  
PAP:   CO:    
Wedge:   CI:    
CVP:  CVP (mmHg): 13 mmHg (19 1100) SVR:    
  PVR:    
 
Ventilator Settings: 
Mode Rate Tidal Volume Pressure FiO2 PEEP Assist control   500 ml    40 % 6 cm H20 Peak airway pressure: 33 cm H2O Minute ventilation: 14.4 l/min Physical Exam: 
 
General:  Obese, sedated Head:  Normocephalic, without obvious abnormality, atraumatic. Eyes:  Conjunctivae/corneas clear. PERRL, EOMs intact. Nose: Nares normal. Septum midline. Mucosa normal. No drainage or sinus tenderness. Throat: Lips, mucosa, and tongue normal. Teeth and gums normal.  
Neck: Supple, symmetrical, trachea midline, no adenopathy, thyroid: no enlargment/tenderness/nodules, no carotid bruit and no JVD. Has right neck IJ. Back:   Symmetric, no curvature. ROM normal.  
Lungs: On  auscultation bilaterally has bilateral wheezing, prolonged expiratory phase. Chest wall:  No tenderness or deformity. Heart:  Regular rate and rhythm, S1, S2 normal, no murmur, click, rub or gallop. Abdomen:   Distended, few BSl. No masses,  No organomegaly. Obese. Extremities: Extremities normal, atraumatic, no cyanosis or edema. Pulses: 2+ and symmetric all extremities. Skin: Skin color, texture, turgor normal. No rashes or lesions Lymph nodes: Cervical, supraclavicular, and axillary nodes normal.  
Neurologic: Grossly nonfocal, sleepy seems confused. Psych:+ anxiety or depression. But not able to fully eval.   
 
 
Data:  
 
Recent Results (from the past 24 hour(s)) GLUCOSE, POC Collection Time: 03/29/19 12:16 PM  
Result Value Ref Range Glucose (POC) 187 (H) 65 - 100 mg/dL Performed by Berry Ortiz POC G3 - PUL Collection Time: 03/29/19  2:53 PM  
Result Value Ref Range FIO2 (POC) 50 % pH (POC) 7.001 (LL) 7.35 - 7.45    
 pCO2 (POC) 81.4 (H) 35.0 - 45.0 MMHG  
 pO2 (POC) 95 80 - 100 MMHG  
 HCO3 (POC) 20.1 (L) 22 - 26 MMOL/L  
 sO2 (POC) 91 (L) 92 - 97 % Base deficit (POC) 11 mmol/L Site RIGHT RADIAL Device: VENTURI MASK Allens test (POC) YES Specimen type (POC) ARTERIAL Total resp. rate 37 HEMOGLOBIN A1C WITH EAG Collection Time: 03/29/19  2:56 PM  
Result Value Ref Range Hemoglobin A1c 4.8 4.2 - 6.3 % Est. average glucose Cannot be calculated mg/dL GLUCOSE, POC Collection Time: 03/29/19  6:01 PM  
Result Value Ref Range Glucose (POC) 196 (H) 65 - 100 mg/dL Performed by Aura Hwang GLUCOSE, POC Collection Time: 03/30/19 12:09 AM  
Result Value Ref Range Glucose (POC) 224 (H) 65 - 100 mg/dL Performed by [address]   
CBC WITH AUTOMATED DIFF Collection Time: 03/30/19  4:19 AM  
Result Value Ref Range WBC 33.1 (H) 3.6 - 11.0 K/uL  
 RBC 3.37 (L) 3.80 - 5.20 M/uL HGB 9.0 (L) 11.5 - 16.0 g/dL HCT 30.2 (L) 35.0 - 47.0 % MCV 89.6 80.0 - 99.0 FL  
 MCH 26.7 26.0 - 34.0 PG  
 MCHC 29.8 (L) 30.0 - 36.5 g/dL  
 RDW 17.9 (H) 11.5 - 14.5 % PLATELET 269 (L) 275 - 400 K/uL MPV 12.0 8.9 - 12.9 FL  
 NRBC 0.2 (H) 0  WBC ABSOLUTE NRBC 0.05 (H) 0.00 - 0.01 K/uL NEUTROPHILS 84 (H) 32 - 75 % BAND NEUTROPHILS 5 % LYMPHOCYTES 4 (L) 12 - 49 % MONOCYTES 7 5 - 13 % EOSINOPHILS 0 0 - 7 % BASOPHILS 0 0 - 1 % IMMATURE GRANULOCYTES 0 0.0 - 0.5 % ABS. NEUTROPHILS 29.5 (H) 1.8 - 8.0 K/UL  
 ABS. LYMPHOCYTES 1.3 0.8 - 3.5 K/UL  
 ABS. MONOCYTES 2.3 (H) 0.0 - 1.0 K/UL  
 ABS. EOSINOPHILS 0.0 0.0 - 0.4 K/UL  
 ABS. BASOPHILS 0.0 0.0 - 0.1 K/UL  
 ABS. IMM. GRANS. 0.0 0.00 - 0.04 K/UL  
 DF MANUAL    
 RBC COMMENTS ANISOCYTOSIS 
1+ WBC COMMENTS TOXIC GRANULATION    
METABOLIC PANEL, COMPREHENSIVE Collection Time: 03/30/19  4:19 AM  
Result Value Ref Range Sodium 137 136 - 145 mmol/L Potassium 4.1 3.5 - 5.1 mmol/L Chloride 108 97 - 108 mmol/L  
 CO2 15 (LL) 21 - 32 mmol/L Anion gap 14 5 - 15 mmol/L Glucose 229 (H) 65 - 100 mg/dL BUN 48 (H) 6 - 20 MG/DL Creatinine 1.37 (H) 0.55 - 1.02 MG/DL  
 BUN/Creatinine ratio 35 (H) 12 - 20 GFR est AA 47 (L) >60 ml/min/1.73m2 GFR est non-AA 39 (L) >60 ml/min/1.73m2 Calcium 6.7 (L) 8.5 - 10.1 MG/DL Bilirubin, total 1.1 (H) 0.2 - 1.0 MG/DL  
 ALT (SGPT) 30 12 - 78 U/L  
 AST (SGOT) 40 (H) 15 - 37 U/L Alk. phosphatase 66 45 - 117 U/L Protein, total 7.1 6.4 - 8.2 g/dL Albumin 2.5 (L) 3.5 - 5.0 g/dL Globulin 4.6 (H) 2.0 - 4.0 g/dL A-G Ratio 0.5 (L) 1.1 - 2.2 MAGNESIUM Collection Time: 03/30/19  4:19 AM  
Result Value Ref Range Magnesium 2.1 1.6 - 2.4 mg/dL PHOSPHORUS Collection Time: 03/30/19  4:19 AM  
Result Value Ref Range Phosphorus 4.1 2.6 - 4.7 MG/DL PROTHROMBIN TIME + INR Collection Time: 03/30/19  4:19 AM  
Result Value Ref Range INR 2.0 (H) 0.9 - 1.1 Prothrombin time 19.9 (H) 9.0 - 11.1 sec AMMONIA Collection Time: 03/30/19  4:19 AM  
Result Value Ref Range Ammonia 60 (H) <32 UMOL/L  
GLUCOSE, POC Collection Time: 03/30/19  6:11 AM  
Result Value Ref Range Glucose (POC) 252 (H) 65 - 100 mg/dL Performed by Angelica Cortes Telemetry:Sinus Tach. Imaging: 
I have personally reviewed the patients radiographs and have reviewed the reports: 
3-28-19: CT of chest/Abdomen: IMPRESSION:  
1. Right colitis. 2. Cirrhosis with portal venous hypertension. 3. Clear lungs.   
 
 
Lieutenant Tyler DO

## 2019-03-30 NOTE — PROGRESS NOTES
Pharmacy Automatic Renal Dosing Protocol - Antimicrobials Indication for Antimicrobials: Septic shock (GNR bacteremia; UTI; Possible CDIFF colitis) Current Regimen of Each Antimicrobial: 
Cefepime 2 gram IV every 8 hours (Started 3/28/19; Day #3 of 7) Levofloxacin 750 mg IV every 24 hours (Started 3/28/19; Day #3) Metronidazole 500 mg IV every 8 hours (Started 3/28/19; Day #3) Vancomycin 500 mg PO every 6 hours (Started 3/29/19; Day #2 of 10) Vancomycin 1250 mg IV every 18 hours (Started 3/28/19; Day #3) Previous Antimicrobial Therapy: 
None ordered Goal Vancomycin Trough: 15-20 mcg/mL Vancomycin Level Assessments:  
Date Dose & Interval Measured (mcg/mL) Extrapolated (mcg/mL) Significant Cultures:  
3/29/19 CDIFF = Ordered 3/28/19 Blood culture = Ecoli in 2/2 (Results pending) 3/28/19 Urine culture = Greater than 100K CFU/mL Ecoli; Greater than 100K CFU/mL Enterococcus (Results pending) 3/28/19 Blood culture = Ecoli in 2/2 (Results pending) 3/28/19 Influenza = Negative (FINAL) Labs: 
Recent Labs  
  19 
0419 19 
0159 19 
1013 CREA 1.37* 1.07* 0.68  
BUN 48* 26* 19 WBC 33.1* 43.2* 8.1 Temp (24hrs), Av.6 °F (38.7 °C), Min:98.4 °F (36.9 °C), Max:104.6 °F (40.3 °C) Creatinine Clearance (mL/min) or Dialysis: 33 mL/min (IBW) Impression/Plan:  
Cefepime dosed appropriately based on indication and renal function. Continue current regimen. Levofloxacin dosed appropriately based on indication and renal function. Continue current regimen. Due to renal function decline, vancomycin IV dose adjusted to 1500 mg IV every 24 hours. Metronidazole and vancomycin PO dosed appropriately for possible CDIFF. Antimicrobial stop date: To be determined for levofloxacin, vancomycin IV, and metronidazole; Vancomycin PO has a 10 day duration entered; Cefepime has a 7 day duration entered (which will likely need adjustment once cultures result). Pharmacy will follow daily and adjust medications as appropriate for renal function and/or serum levels. Thank you, Cinthya Orozco, PHARMD

## 2019-03-30 NOTE — PROGRESS NOTES
1314  Bedside and Verbal shift change report given to Osiris Hoover (oncoming nurse) by Mini Leiva RN (offgoing nurse). Report included the following information SBAR, Kardex, Intake/Output, MAR, Recent Results and Cardiac Rhythm Sinus Tachycardia. 7370  Shift assessment complete, see flowsheets for details. 56  Dr. Hansen at bedside, see progress notes for details. 36  Dr. Naheed Palacios at bedside, see progress notes for details. 1200  Reassessment complete, see flowsheets for details. 65  Dr. Minor Hashimoto at bedside, see progress notes for details. 1600 Reassessment complete, see flowsheets for details. 1920  Bedside and Verbal shift change report given to Grant Ford RN (oncoming nurse) by Maicol Adame RN (offgoing nurse). Report included the following information SBAR, Kardex, Intake/Output, MAR, Recent Results and Cardiac Rhythm Sinus Tachycardia.

## 2019-03-30 NOTE — PROGRESS NOTES
Gastroenterology Daily Progress Note (Dr. Vida Horn for Dr. Gigi Delgadillo) [facility] Admit Date: 3/28/2019 Subjective:   
  
Pasty, nondiarrheal stools. Receiving Lactulose 30 ml q6h. Temperature 104.6 last night. Remains intubated on pressors. Current Facility-Administered Medications Medication Dose Route Frequency  [START ON 3/31/2019] vancomycin (VANCOCIN) 1500 mg in  ml infusion  1,500 mg IntraVENous Q24H  
 rifAXIMin (XIFAXAN) tablet 550 mg  550 mg Oral BID  metroNIDAZOLE (FLAGYL) IVPB premix 500 mg  500 mg IntraVENous Q8H  
 vancomycin (FIRVANQ) 50 mg/mL oral solution 500 mg  500 mg Oral Q6H  
 dexmedeTOMidine (PRECEDEX) 400 mcg in 0.9% sodium chloride 100 mL infusion  0-1.4 mcg/kg/hr IntraVENous TITRATE  albuterol (PROVENTIL VENTOLIN) nebulizer solution 2.5 mg  2.5 mg Nebulization Q4H RT  
 amitriptyline (ELAVIL) tablet 100 mg  100 mg Oral QHS  levoFLOXacin (LEVAQUIN) 750 mg in D5W IVPB  750 mg IntraVENous Q48H  propofol (DIPRIVAN) infusion  0-50 mcg/kg/min IntraVENous TITRATE  acetaminophen (TYLENOL) solution 650 mg  650 mg Per NG tube Q4H PRN  chlorhexidine (PERIDEX) 0.12 % mouthwash 15 mL  15 mL Oral Q12H  
 NOREPINephrine (LEVOPHED) 32 mg in 5% dextrose 250 mL infusion  2-30 mcg/min IntraVENous TITRATE  PHENYLephrine (PF)(ROSANA-SYNEPHRINE) 30 mg in 0.9% sodium chloride 250 mL infusion   mcg/min IntraVENous TITRATE  sodium chloride (NS) flush 5-40 mL  5-40 mL IntraVENous Q8H  
 sodium chloride (NS) flush 5-40 mL  5-40 mL IntraVENous PRN  
 acetaminophen (TYLENOL) suppository 650 mg  650 mg Rectal Q6H PRN  
 ondansetron (ZOFRAN) injection 4 mg  4 mg IntraVENous Q6H PRN  
 0.9% sodium chloride infusion  50 mL/hr IntraVENous CONTINUOUS  
 cefepime (MAXIPIME) 2 g in 0.9% sodium chloride (MBP/ADV) 100 mL  2 g IntraVENous Q8H  
 albuterol-ipratropium (DUO-NEB) 2.5 MG-0.5 MG/3 ML  3 mL Nebulization Q4H PRN  
  methylPREDNISolone (PF) (SOLU-MEDROL) injection 40 mg  40 mg IntraVENous Q8H  
 insulin lispro (HUMALOG) injection   SubCUTAneous Q6H  
 glucose chewable tablet 16 g  4 Tab Oral PRN  
 dextrose (D50) infusion 12.5-25 g  12.5-25 g IntraVENous PRN  
 glucagon (GLUCAGEN) injection 1 mg  1 mg IntraMUSCular PRN  
 lactulose (CHRONULAC) solution 30 g  30 g Oral Q6H  
 mupirocin (BACTROBAN) 2 % ointment   Both Nostrils BID  aspirin delayed-release tablet 81 mg  81 mg Oral DAILY Objective:  
 
Visit Vitals /52 Pulse (!) 120 Temp 98.4 °F (36.9 °C) Resp 21 Ht 5' 2\" (1.575 m) Wt 103.4 kg (227 lb 15.3 oz) SpO2 96% BMI 41.69 kg/m² Blood pressure 129/52, pulse (!) 120, temperature 98.4 °F (36.9 °C), resp. rate 21, height 5' 2\" (1.575 m), weight 103.4 kg (227 lb 15.3 oz), SpO2 96 %. 03/30 0701 - 03/30 1900 In: 336.8 [I.V.:276.8] Out: 490 [Urine:290] 03/28 1901 - 03/30 0700 In: 8459.4 [I.V.:8069.4] Out: 2188 [TPRYP:0019] Intake/Output Summary (Last 24 hours) at 3/30/2019 1027 Last data filed at 3/30/2019 0900 Gross per 24 hour Intake 5109.65 ml Output 1923 ml Net 3186.65 ml Physical Exam:  
 
General: obese, intubated WF in NAD Chest:  CTA, No rhonchi, rales or rubs. Heart: RRR, S1S2 normal 
GI: Distended, soft, nontender, + BS Extremities: trace LE edema Labs:  
 
 
Recent Results (from the past 24 hour(s)) GLUCOSE, POC Collection Time: 03/29/19 12:16 PM  
Result Value Ref Range Glucose (POC) 187 (H) 65 - 100 mg/dL Performed by [facility] G3 - PUL Collection Time: 03/29/19  2:53 PM  
Result Value Ref Range FIO2 (POC) 50 % pH (POC) 7.001 (LL) 7.35 - 7.45    
 pCO2 (POC) 81.4 (H) 35.0 - 45.0 MMHG  
 pO2 (POC) 95 80 - 100 MMHG  
 HCO3 (POC) 20.1 (L) 22 - 26 MMOL/L  
 sO2 (POC) 91 (L) 92 - 97 % Base deficit (POC) 11 mmol/L Site RIGHT RADIAL Device: VENTURI MASK  Allens test (POC) YES    
 Specimen type (POC) ARTERIAL Total resp. rate 37 HEMOGLOBIN A1C WITH EAG Collection Time: 03/29/19  2:56 PM  
Result Value Ref Range Hemoglobin A1c 4.8 4.2 - 6.3 % Est. average glucose Cannot be calculated mg/dL GLUCOSE, POC Collection Time: 03/29/19  6:01 PM  
Result Value Ref Range Glucose (POC) 196 (H) 65 - 100 mg/dL Performed by Sarah Beth Law GLUCOSE, POC Collection Time: 03/30/19 12:09 AM  
Result Value Ref Range Glucose (POC) 224 (H) 65 - 100 mg/dL Performed by Carol Madrigal   
CBC WITH AUTOMATED DIFF Collection Time: 03/30/19  4:19 AM  
Result Value Ref Range WBC 33.1 (H) 3.6 - 11.0 K/uL  
 RBC 3.37 (L) 3.80 - 5.20 M/uL HGB 9.0 (L) 11.5 - 16.0 g/dL HCT 30.2 (L) 35.0 - 47.0 % MCV 89.6 80.0 - 99.0 FL  
 MCH 26.7 26.0 - 34.0 PG  
 MCHC 29.8 (L) 30.0 - 36.5 g/dL  
 RDW 17.9 (H) 11.5 - 14.5 % PLATELET 097 (L) 399 - 400 K/uL MPV 12.0 8.9 - 12.9 FL  
 NRBC 0.2 (H) 0  WBC ABSOLUTE NRBC 0.05 (H) 0.00 - 0.01 K/uL NEUTROPHILS 84 (H) 32 - 75 % BAND NEUTROPHILS 5 % LYMPHOCYTES 4 (L) 12 - 49 % MONOCYTES 7 5 - 13 % EOSINOPHILS 0 0 - 7 % BASOPHILS 0 0 - 1 % IMMATURE GRANULOCYTES 0 0.0 - 0.5 % ABS. NEUTROPHILS 29.5 (H) 1.8 - 8.0 K/UL  
 ABS. LYMPHOCYTES 1.3 0.8 - 3.5 K/UL  
 ABS. MONOCYTES 2.3 (H) 0.0 - 1.0 K/UL  
 ABS. EOSINOPHILS 0.0 0.0 - 0.4 K/UL  
 ABS. BASOPHILS 0.0 0.0 - 0.1 K/UL  
 ABS. IMM. GRANS. 0.0 0.00 - 0.04 K/UL  
 DF MANUAL    
 RBC COMMENTS ANISOCYTOSIS 
1+ WBC COMMENTS TOXIC GRANULATION    
METABOLIC PANEL, COMPREHENSIVE Collection Time: 03/30/19  4:19 AM  
Result Value Ref Range Sodium 137 136 - 145 mmol/L Potassium 4.1 3.5 - 5.1 mmol/L Chloride 108 97 - 108 mmol/L  
 CO2 15 (LL) 21 - 32 mmol/L Anion gap 14 5 - 15 mmol/L Glucose 229 (H) 65 - 100 mg/dL BUN 48 (H) 6 - 20 MG/DL  Creatinine 1.37 (H) 0.55 - 1.02 MG/DL  
 BUN/Creatinine ratio 35 (H) 12 - 20    
 GFR est AA 47 (L) >60 ml/min/1.73m2 GFR est non-AA 39 (L) >60 ml/min/1.73m2 Calcium 6.7 (L) 8.5 - 10.1 MG/DL Bilirubin, total 1.1 (H) 0.2 - 1.0 MG/DL  
 ALT (SGPT) 30 12 - 78 U/L  
 AST (SGOT) 40 (H) 15 - 37 U/L Alk. phosphatase 66 45 - 117 U/L Protein, total 7.1 6.4 - 8.2 g/dL Albumin 2.5 (L) 3.5 - 5.0 g/dL Globulin 4.6 (H) 2.0 - 4.0 g/dL A-G Ratio 0.5 (L) 1.1 - 2.2 MAGNESIUM Collection Time: 03/30/19  4:19 AM  
Result Value Ref Range Magnesium 2.1 1.6 - 2.4 mg/dL PHOSPHORUS Collection Time: 03/30/19  4:19 AM  
Result Value Ref Range Phosphorus 4.1 2.6 - 4.7 MG/DL PROTHROMBIN TIME + INR Collection Time: 03/30/19  4:19 AM  
Result Value Ref Range INR 2.0 (H) 0.9 - 1.1 Prothrombin time 19.9 (H) 9.0 - 11.1 sec AMMONIA Collection Time: 03/30/19  4:19 AM  
Result Value Ref Range Ammonia 60 (H) <32 UMOL/L  
GLUCOSE, POC Collection Time: 03/30/19  6:11 AM  
Result Value Ref Range Glucose (POC) 252 (H) 65 - 100 mg/dL Performed by Tin Todd   
LABRCNT(wbc:2,hgb:2,hct:2,plt:2,) Recent Labs  
  03/30/19 0419 03/29/19 
0159 03/28/19 
1013  135* 136  
K 4.1 4.6 4.0  
 105 101 CO2 15* 17* 26 BUN 48* 26* 19  
CREA 1.37* 1.07* 0.68 * 184* 68  
CA 6.7* 7.0* 8.5 MG 2.1 1.2* 1.5* PHOS 4.1 4.6  --   
 
Recent Labs  
  03/30/19 0419 03/29/19 
0159 03/28/19 
1013 INR 2.0* 1.8* 1.2* PTP 19.9* 18.2* 12.6* APTT  --   --  24.2 Recent Labs  
  03/30/19 
0419 03/29/19 
0159 03/28/19 
1013 SGOT 40* 35 31 AP 66 62 111  
TP 7.1 6.9 7.7 ALB 2.5* 2.6* 3.1*  
GLOB 4.6* 4.3* 4.6* Lab Results Component Value Date/Time Folate 24.6 (H) 03/01/2016 11:20 AM  
 
Impression: 
  Severe sepsis with shock Hepatic encephalopathy Cirrhosis Colitis UTI (urinary tract infection Trace ascites on CT Acute renal failure Coagulopathy Leukocytosis Plan: Patient's picture c/w Sepsis with shock. Her R colon was thickened on admission CT but not having diarrhea to send for C diff. She also is on lactulose for HE and still not having diarrhea. Her lactic acid was elevated and WBC was 43 yesterday down to 33. Started on C diff tx empirically but could also have ischemia from ischemic colitis as well. She is too unstable to undergo any invasive procedure such as a colonoscopy and already being treated for possible C diff vs. Ischemia vs. Other non GI source of sepsis. -INR increased to 2.0 today c/w worsening liver function 
-continue treatment for HE with lactulose and xifaxan 
-agree with empiric PO Vanco in case C diff also present 
-ventilatory and BP support but clinically she appears to be worsening despite aggressive care 
-prognosis guarded Sena Camarillo MD 
 
3/30/2019 [address] [facility], [address] [address] Loc: [phone]

## 2019-03-30 NOTE — PROGRESS NOTES
Hospitalist Progress Note NAME: Rodney Ho :  1955 MRN:  [id number] Assessment / Plan: 
Acute hypercapnic respiratory failure s/p intubation on 3/29 Septic shock, POA Hypotension, Fever, Lactic acidosis Ecoli bacteremia and UTI Colitis, infectious vs ischemic 
-persistent fever but WBC improving today 
-KUB showed no significant bowel dilation. -CXR with no acute finding, CT chest showed R colitis, cirrhosis with portal venous HTN, clear lungs -Bcx and Ucx growing Ecoli 
-cont' IVF, pressor support, sedation, wean as tolerated 
-cont' empiric abx to include IV cefepime, levaquin, IV flagyl, and PO vancomycin. Unable to obtain stool despite lactulose use. RN reports smears only. No diarrhea 
-hold propranolol, lasix, aldactone. losartan 
-ventilatory support, nebs, IV steroids KALI with metabolic acidosis Lactic acidosis Hypomag, repleted 
-will change IVF to bicarb gtt 
-avoid nephrotoxic agent, monitor bmp Acute encephalopathy, POA due to HE Right leg weakness, POA Mild left lip droop, POA Hx TIA  
-suspect encephalopathy due to hepatic encephalopathy, POA and sepsis. CT head negative. 
-cont' lactulose, xifaxin (no stool despite lactulose) 
-hold tramadol, amitriptyline 
-continue aspirin 
  
DM with recurrent hypoglycemia 
-requiring D10 bolus for EMS and D50 twice in ER. 
-SSI. --hold metformin, nph 
  
Right leg edema  
-patient reports this is chronic 
  
COPD 
IBIS 
-nebs Obesity  
Body mass index is 38.31 kg/m². 
  
Code:  full DVT prophylaxis:  SCD Surrogate decision maker:   Subjective: Chief Complaint / Reason for Physician Visit Pt seen with  and daughter at bedside. Intubated on 3/29 for respiratory failure. Fever early this AM.  Discussed with RN events overnight. Review of Systems: 
Symptom Y/N Comments  Symptom Y/N Comments Fever/Chills    Chest Pain Poor Appetite    Edema Cough    Abdominal Pain Sputum    Joint Pain SOB/MARTINEZ    Pruritis/Rash Nausea/vomit    Tolerating PT/OT Diarrhea    Tolerating Diet Constipation    Other Could NOT obtain due to: encephalopathy Objective: VITALS:  
Last 24hrs VS reviewed since prior progress note. Most recent are: 
Patient Vitals for the past 24 hrs: 
 Temp Pulse Resp BP SpO2  
03/30/19 1203  (!) 123 23  97 % 03/30/19 1200 98.1 °F (36.7 °C) (!) 123 29 154/66 97 % 03/30/19 1100  (!) 120 26 (!) 79/30 96 % 03/30/19 1000  (!) 117 27 143/52 97 % 03/30/19 0900  (!) 120 21 129/52 96 % 03/30/19 0800  (!) 119 22 143/60 93 % 03/30/19 0753  (!) 119 (!) 31  97 % 03/30/19 0749  (!) 120 (!) 31  98 % 03/30/19 0745 98.4 °F (36.9 °C) (!) 120 20 156/59 98 % 03/30/19 0700  (!) 120 26 120/44 97 % 03/30/19 0630  (!) 117 24 119/45 100 % 03/30/19 0600 99.3 °F (37.4 °C) (!) 119 25 113/42 97 % 03/30/19 0500  (!) 122 26 (!) 121/38 97 % 03/30/19 0430  (!) 124 26 126/46 97 % 03/30/19 0400 (!) 101.3 °F (38.5 °C) (!) 128 26 122/44 97 % 03/30/19 0315   23  97 % 03/30/19 0300 (!) 102.2 °F (39 °C) (!) 126 29 132/47 99 % 03/30/19 0230  (!) 124 (!) 31 134/49 99 % 03/30/19 0215  (!) 124 (!) 31 133/47 99 % 03/30/19 0200 (!) 102.6 °F (39.2 °C) (!) 123 (!) 31 127/48 99 % 03/30/19 0145  (!) 121 30 119/48 99 % 03/30/19 0130  (!) 117 24 91/41 98 % 03/30/19 0115  (!) 114 27 (!) 80/35 99 % 03/30/19 0100  (!) 112 24 (!) 84/33 98 % 03/30/19 0039   25  99 % 03/30/19 0000 (!) 104 °F (40 °C) (!) 114 26 (!) 104/39 98 % 03/29/19 2300 (!) 104.6 °F (40.3 °C) (!) 128 22 111/42 97 % 03/29/19 2200 (!) 103.3 °F (39.6 °C) (!) 127 30 (!) 97/36 98 % 03/29/19 2100  (!) 130 (!) 31 119/45 97 % 03/29/19 2040     98 % 03/29/19 2006   28    
03/29/19 2000  (!) 130 (!) 31 115/48 99 % 03/29/19 1930 (!) 103.1 °F (39.5 °C) (!) 127 (!) 31 105/47 99 % 03/29/19 1900  (!) 127 (!) 31 110/45 99 % 03/29/19 1800  (!) 129 24 (!) 108/38 99 % 03/29/19 1700  (!) 128 27 108/40 98 % 03/29/19 1600 (!) 100.7 °F (38.2 °C) (!) 130 19 103/41 100 % 03/29/19 1536     99 % 03/29/19 1513  (!) 143 22  97 % 03/29/19 1500  (!) 137 (!) 42 (!) 96/33 95 % 03/29/19 1400  (!) 129 (!) 39 (!) 96/36 95 % 03/29/19 1342  (!) 129 (!) 37 98/40 96 % 03/29/19 1330  (!) 129 (!) 37 (!) 99/26 96 % 03/29/19 1314  (!) 129 (!) 37 (!) 107/37 97 % 03/29/19 1300  (!) 129 (!) 32 (!) 104/31 97 % 03/29/19 1230  (!) 127 (!) 39 (!) 109/30 98 % Intake/Output Summary (Last 24 hours) at 3/30/2019 1223 Last data filed at 3/30/2019 1200 Gross per 24 hour Intake 4811.58 ml Output 2173 ml Net 2638.58 ml PHYSICAL EXAM: 
General: Female intubated, NAD 
EENT:  EOMI. Anicteric sclerae. MMM Resp:  CTA b/l. No wheezing  No accessory muscle use CV:  Regular  rhythm,  No edema GI:  Soft, Non distended, Non tender.  +BS Neurologic:  Intubated and sedated. Neuro exam limited Psych:   Unable to do Skin:  No rashes. No jaundice Reviewed most current lab test results and cultures  YES Reviewed most current radiology test results   YES Review and summation of old records today    NO Reviewed patient's current orders and MAR    YES 
PMH/SH reviewed - no change compared to H&P 
________________________________________________________________________ Care Plan discussed with: 
  Comments Patient x Family  x Pt's /daughter RN x Care Manager Consultant Multidiciplinary team rounds were held today with , nursing, pharmacist and clinical coordinator. Patient's plan of care was discussed; medications were reviewed and discharge planning was addressed. ________________________________________________________________________ Total NON critical care TIME:  35   Minutes Total CRITICAL CARE TIME Spent:   Minutes non procedure based Comments >50% of visit spent in counseling and coordination of care    
________________________________________________________________________ Shasha Stephens MD  
 
Procedures: see electronic medical records for all procedures/Xrays and details which were not copied into this note but were reviewed prior to creation of Plan. LABS: 
I reviewed today's most current labs and imaging studies. Pertinent labs include: 
Recent Labs  
  03/30/19 
0419 03/29/19 
0159 03/28/19 
1013 WBC 33.1* 43.2* 8.1 HGB 9.0* 9.0* 9.1*  
HCT 30.2* 30.3* 29.9*  
* 175 128* Recent Labs  
  03/30/19 
0419 03/29/19 
0159 03/28/19 
1013  135* 136  
K 4.1 4.6 4.0  
 105 101 CO2 15* 17* 26 * 184* 68  
BUN 48* 26* 19  
CREA 1.37* 1.07* 0.68 CA 6.7* 7.0* 8.5 MG 2.1 1.2* 1.5* PHOS 4.1 4.6  --   
ALB 2.5* 2.6* 3.1* TBILI 1.1* 1.3* 0.6 SGOT 40* 35 31 ALT 30 22 22 INR 2.0* 1.8* 1.2* Signed: Shasha Stephens MD

## 2019-03-30 NOTE — PROGRESS NOTES
Bedside and Verbal shift change report given to SEVERIANO Hatfield RN (oncoming nurse) by GABBY Scott RN (offgoing nurse). Report included the following information SBAR, Kardex, Intake/Output, MAR, Recent Results and Cardiac Rhythm Sinus tach. 1930: Assessment completed. Patient on Propofol @ 45mcg/kg/min and Precedex @ 0.7mcg/kg/hr for sedation r/t intubation/ventilation. ETT secured @ 21cm @ lip, AC 14, , FiO2 50%, PEEP +6. RR remains fast, 30-34 breaths/min. Lungs coarse throughout. Small amount thick white sputum from ETT. PIP 34. Abdomen remains distended, firm, ascites. OGT secured at 60cm @ lip, clamped at this time. Bowel sounds hypoactive. Norman patent, draining small amount hazy margarita urine. Peripheral pulses palpable. No edema noted. Patient opens eyes slightly to painful stimulus, but does not focus. No command following and becomes restless with any stimulation. Pupils 4mm bilaterally and sluggishly reactive to light. Propofol increased to 50mcg/kg/min to attempt to slow respirations. Temp 103.1, but received Tylenol only 3 hours ago. Gonsalo wrist restraints in place to prevent pulling at lines/tubes. BP stable on Levophed @ 30mcg/min. 2110: Patient urinating around norman catheter. Repositioned catheter, incont care provided, bedpad changed. Mepilex foam dressing applied to sacrum. Incont of small brown BM, too small to collect sample for labs. 2154: BP marginal. Levophed ordered for 2-30mcg/min. Currently infusing at 30mcg/min. Paged Dr. Pierce Broussard to request increasing max Levophed or adding another pressor. 2158: Notified Dr. Pierce Broussard SBP 92 with goal SBP >90 on max dose Levophed (30mcg/min.) Also advised Temp 103.3 after Tylenol. Orders received to add Cirilo-Synephrine and cooling blanket. 2205: Turned patient to place cooling blanket beneath. Creamy brown emesis from mouth and nose. HOB elevated.  NGT unclamped, placed to low continuous suction (had been clamped for med administration.) Immediate return of 200ml brown fluid. 2300: Patient bathed, linens changed. Urine leaking around norman again. Removed norman catheter. Placed 18F norman catheter using sterile technique. Hazy margarita urine returned. Incontinent of small amount soft dark brown stool; unable to obtain specimen for labs as stool immediately soaked into pad. Cooling blanket turned on with rectal probe inserted. Temp 104.7 rectally. BP stable on Levophed @ 30mcg/min. 0000: Reassessment unchanged except isabelle lower lobes diminished and vomiting noted as above. 0400: Reassessment unchanged. 7519: Propofol off for SAT. Cirilo-Synephrine off. Levophed @ 20mcg/min. Precedex @ 0.6mcg/kg//hr.  
5659: Off Propofol for 30 minutes, Precedex @ 0.5mcg/kg/hr. Patient opening eyes, RR 30, respirations labored, fighting ventilator. No command following. Restarted Propofol @ 30mcg/kg/min. 6186: Bedside and Verbal shift change report given to GABBY Basurto RN (oncoming nurse) by SEVERIANO Freeman RN (offgoing nurse). Report included the following information SBAR, Kardex, Intake/Output, MAR, Recent Results and Cardiac Rhythm Sinus tach.

## 2019-03-30 NOTE — PROGRESS NOTES
03/30/19 1448 ABCDEF Bundle SBT Safety Screen Passed No  
SBT Screen Reason for Failure Vasopressor use

## 2019-03-31 NOTE — PROGRESS NOTES
0730  Bedside and Verbal shift change report given to Pascual Yao (oncoming nurse) by Keira Gupta RN (offgoing nurse). Report included the following information SBAR, Kardex, Intake/Output, MAR, Recent Results and Cardiac Rhythm Sinus Tachycardia. 0800 Shift assessment complete, see flowsheets for details. 0930 Dr. Mehul Cobb at bedside, see progress notes for details. 0930  Dr. Edwar López at bedside, see progress notes for details. 1200  Reassessment complete, see flowsheets for details. 200  Dr. Jennifer Valdivia at bedside, see progress notes for details. 1600  Reassessment complete, see flowsheets for details. 1915  Bedside and Verbal shift change report given to Keira Gupta RN (oncoming nurse) by Merlin Shelter, RN (offgoing nurse). Report included the following information SBAR, Procedure Summary, Intake/Output, MAR, Recent Results and Cardiac Rhythm Sinus Tachycardia.

## 2019-03-31 NOTE — PROGRESS NOTES
0000 . Tele-Hopitalist on call notified.; Dr. Ciara Limon put orders for 10 units of regular insulin once and NPH 8 units link with Solumedrol Q8H. 0515 . Telehospitalist paged. Dr. Cecille Mendenhall the previous order. Dr. Ciara Limon increased NPH to 12 units and give 12 units of regular insulin now. Link order would not give me both medication only one or the other. Dr. Ciara Limon was notified as well. 
 
0600 Sedation is turned off for SAT/SBT which she passed on both. 0645 Her respiration was agonal-like movement. 0700 Bedside report given to Amberly Montiel RN via SBAR format. 
 
2167 Sedation back on.

## 2019-03-31 NOTE — PROGRESS NOTES
Pharmacy Automatic Renal Dosing Protocol - Antimicrobials Indication for Antimicrobials: Septic shock (GNR bacteremia; UTI; Possible CDIFF colitis) Current Regimen of Each Antimicrobial: 
Cefepime 2 gram IV every 8 hours (Started 3/28/19; Day #4 of 7) Levofloxacin 750 mg IV every 48 hours (Started 3/28/19; Day #4) Metronidazole 500 mg IV every 8 hours (Started 3/28/19; Day #4) Vancomycin 500 mg PO every 6 hours (Started 3/29/19; Day #3 of 10) Vancomycin 1500 mg IV every 24 hours (Started 3/28/19; Day #4) Previous Antimicrobial Therapy: 
None ordered Goal Vancomycin Trough: 15-20 mcg/mL Vancomycin Level Assessments:  
Date Dose & Interval Measured (mcg/mL) Extrapolated (mcg/mL) Significant Cultures:  
3/30/19 Blood culture = No growth x 17 hours (Results pending) 3/29/19 CDIFF = Ordered 3/28/19 Blood culture = Ecoli in 2/2 (FINAL) 3/28/19 Urine culture = Greater than 100K CFU/mL Ecoli; Greater than 100K CFU/mL Enterococcus (FINAL) 3/28/19 Blood culture = Ecoli in 2/2 (FINAL) 3/28/19 Influenza = Negative (FINAL) Labs: 
Recent Labs  
  19 
0512 19 
0419 19 
0159 CREA 0.75 1.37* 1.07* BUN 44* 48* 26* WBC 28.0* 33.1* 43.2* Temp (24hrs), Av.2 °F (36.8 °C), Min:96.9 °F (36.1 °C), Max:99.2 °F (37.3 °C) Creatinine Clearance (mL/min) or Dialysis: 60 mL/min (IBW) Impression/Plan:  
Cefepime dosed appropriately based on indication and renal function. Continue current regimen. Levofloxacin dosed adjusted to 750 mg IV every 24 hours based on renal function improvement. Vancomycin IV dosed at 1250 mg IV every 12 hours based on renal function improvement over the past 24 hours. Metronidazole and vancomycin PO dosed appropriately for possible CDIFF. Antimicrobial stop date:  To be determined for levofloxacin, vancomycin IV, and metronidazole; Vancomycin PO has a 10 day duration entered; Cefepime has a 7 day duration entered (which will likely need adjustment once cultures result). Pharmacy will follow daily and adjust medications as appropriate for renal function and/or serum levels. Thank you, Armando Robles, PHARMD

## 2019-03-31 NOTE — ACP (ADVANCE CARE PLANNING)
Request by cole to assist with Advance Medical Directive (AMD) in CCU. Consulted with patients nurse. Patient is currently unable to complete an Advance Medical Directive due to not being alert and aware of surroundings. [address] [facility]  Paging Service [phone])

## 2019-03-31 NOTE — PROGRESS NOTES
Hospitalist Progress Note NAME: Meghann Armando :  1955 MRN:  [id number] Assessment / Plan: 
Acute hypercapnic respiratory failure s/p intubation on 3/29 Septic shock, POA Hypotension, Fever, Lactic acidosis Ecoli bacteremia and UTI Colitis, infectious vs ischemic 
-persistent fever but WBC continuing to improve 
-KUB showed no significant bowel dilation. Will REPEAT KUB today 
-place rectal tube and change to lactulose enema 
-CXR with no acute finding, CT chest showed R colitis, cirrhosis with portal venous HTN, clear lungs -Bcx and Ucx growing Ecoli, repeat Bcx neg so far 
-cont' IVF, pressor support, sedation, wean as tolerated 
-cont' empiric abx to include IV cefepime, levaquin, IV flagyl, and PO vancomycin. Unable to obtain stool despite lactulose use. RN reports smears only. No diarrhea 
-hold propranolol, lasix, aldactone. losartan 
-ventilatory support, nebs, IV steroids KALI with metabolic acidosis, resolved Lactic acidosis Hypomag, repleted Hypophos, repleted 
-stop bicarb gtt, change fluid back to NS 
-avoid nephrotoxic agent, monitor bmp Acute encephalopathy, POA due to HE Right leg weakness, POA Mild left lip droop, POA Hx TIA  
-suspect encephalopathy due to hepatic encephalopathy, POA and sepsis. CT head negative. 
-cont' lactulose, xifaxin (no stool despite lactulose) 
-hold tramadol, amitriptyline 
-continue aspirin 
  
DM initially hypoglycemia, now with hyperglycemia in the setting of steroid use 
-requiring D10 bolus for EMS and D50 twice in ER. 
-titrate NPH for better BG control 
-SSI. --hold metformin, nph 
  
Right leg edema  
-patient reports this is chronic 
  
COPD 
IBIS 
-nebs Obesity  
Body mass index is 38.31 kg/m². 
  
Code:  full DVT prophylaxis:  SCD Surrogate decision maker:   Subjective: Chief Complaint / Reason for Physician Visit Pt seen with  at bedside. Remains intubated and sedated.   Still no BM despite lactulose via OG tube. Discussed with RN events overnight. Review of Systems: 
Symptom Y/N Comments  Symptom Y/N Comments Fever/Chills    Chest Pain Poor Appetite    Edema Cough    Abdominal Pain Sputum    Joint Pain SOB/MARTINEZ    Pruritis/Rash Nausea/vomit    Tolerating PT/OT Diarrhea    Tolerating Diet Constipation    Other Could NOT obtain due to: encephalopathy Objective: VITALS:  
Last 24hrs VS reviewed since prior progress note. Most recent are: 
Patient Vitals for the past 24 hrs: 
 Temp Pulse Resp BP SpO2  
03/31/19 1000  (!) 114 23 125/64 100 % 03/31/19 0900  (!) 117 22 111/52 98 % 03/31/19 0813  (!) 111 18  99 % 03/31/19 0800  (!) 112 20 135/66 99 % 03/31/19 0753  (!) 113 22  99 % 03/31/19 0745 97.5 °F (36.4 °C) (!) 118 23 142/64 99 % 03/31/19 0700  (!) 105 (!) 33 115/58 98 % 03/31/19 0600  (!) 108 18 100/56 98 % 03/31/19 0543  (!) 103 18  97 % 03/31/19 0528  (!) 104 18  97 % 03/31/19 0500  (!) 110 14 132/62 97 % 03/31/19 0437  (!) 109 17  98 % 03/31/19 0435     98 % 03/31/19 0400 96.9 °F (36.1 °C) (!) 109 16 130/64 97 % 03/31/19 0300  (!) 115 20 117/54 98 % 03/31/19 0200  (!) 120 25 129/54 99 % 03/31/19 0100  (!) 109 (!) 31 97/47 97 % 03/31/19 0000 99.2 °F (37.3 °C) (!) 120 25 117/53 95 % 03/30/19 2321  (!) 124 23  96 % 03/30/19 2317     96 % 03/30/19 2300  (!) 123 22 132/56 96 % 03/30/19 2200  (!) 119 26 129/55 98 % 03/30/19 2100  (!) 122 26 119/45 97 % 03/30/19 2057  (!) 122 22  98 % 03/30/19 2000 98.6 °F (37 °C) (!) 124 24 130/44 97 % 03/30/19 1923  (!) 122 23  96 % 03/30/19 1921     96 % 03/30/19 1900  (!) 125 27 152/62 96 % 03/30/19 1800  (!) 128 23 152/57 97 % 03/30/19 1700  (!) 129 23 142/52 97 % 03/30/19 1615  (!) 129 26  97 % 03/30/19 1600 99.1 °F (37.3 °C) (!) 125 (!) 35 141/54 97 % 03/30/19 1527  (!) 125 (!) 32  96 % 03/30/19 1500  (!) 126 28 133/53 96 % 03/30/19 1400  (!) 125 24 147/51 98 % 03/30/19 1300  (!) 126 25 135/54 97 % 03/30/19 1203  (!) 123 23  97 % 03/30/19 1200 98.1 °F (36.7 °C) (!) 123 29 154/66 97 % 03/30/19 1100  (!) 120 26 (!) 79/30 96 % Intake/Output Summary (Last 24 hours) at 3/31/2019 1059 Last data filed at 3/31/2019 1000 Gross per 24 hour Intake 3909.99 ml Output 2690 ml Net 1219.99 ml PHYSICAL EXAM: 
General: Female intubated, NAD 
EENT:  OGT and ETT in place Resp:  CTA b/l. No wheezing  No accessory muscle use CV:  Regular  rhythm,  No edema GI:  Soft, Non distended, Non tender.  +BS Neurologic:  Intubated and sedated. Neuro exam limited Psych:   Unable to do Skin:  No rashes. No jaundice Reviewed most current lab test results and cultures  YES Reviewed most current radiology test results   YES Review and summation of old records today    NO Reviewed patient's current orders and MAR    YES 
PMH/SH reviewed - no change compared to H&P 
________________________________________________________________________ Care Plan discussed with: 
  Comments Patient x Family  x Pt's  RN x Care Manager Consultant  x GI Multidiciplinary team rounds were held today with , nursing, pharmacist and clinical coordinator. Patient's plan of care was discussed; medications were reviewed and discharge planning was addressed. ________________________________________________________________________ Total NON critical care TIME:  35   Minutes Total CRITICAL CARE TIME Spent:   Minutes non procedure based Comments >50% of visit spent in counseling and coordination of care    
________________________________________________________________________ Jarek Wade MD  
 
Procedures: see electronic medical records for all procedures/Xrays and details which were not copied into this note but were reviewed prior to creation of Plan. LABS: 
I reviewed today's most current labs and imaging studies. Pertinent labs include: 
Recent Labs  
  03/31/19 0512 03/30/19 0419 03/29/19 0159 WBC 28.0* 33.1* 43.2* HGB 8.6* 9.0* 9.0*  
HCT 27.6* 30.2* 30.3* PLT 64* 107* 175 Recent Labs  
  03/31/19 0512 03/30/19 0419 03/29/19 0159  137 135* K 3.6 4.1 4.6  108 105 CO2 25 15* 17* * 229* 184* BUN 44* 48* 26* CREA 0.75 1.37* 1.07* CA 7.0* 6.7* 7.0*  
MG  --  2.1 1.2*  
PHOS 1.7* 4.1 4.6 ALB  --  2.5* 2.6* TBILI  --  1.1* 1.3*  
SGOT  --  40* 35 ALT  --  30 22 INR  --  2.0* 1.8* Signed: Davy Lee MD

## 2019-03-31 NOTE — ROUTINE PROCESS
Notified of continued hyperglycemia - I have already linked NPH with her solumedrol 
- additional 12u lispro now

## 2019-03-31 NOTE — PROGRESS NOTES
[facility], Critical Care, and Sleep Medicine Name: Vilma Pereira MRN: [id number] : 1955 Hospital: [facility] Date: 3/31/2019 Critical Care  Patient Consult IMPRESSION:  
· Septic shock- Ecoli bacteremia, remains on levophed. Hold all BP meds. · Acute hypoxic resp failure- intubated 3/29. Oxygenating adequately, 40%,Peep 6 · Cirrhosis, noted in past to have esophageal varices. seems decompensated, Gi following · Hepatic Encephalopathy (ammonia of 110 on admission), Not sure how compliant she has been with her home regimen. · Acute UTI-UCx growing Ecoli. · Right sided colitis  Noted. ?ischemic or infectious. Possible C Diff. Although little stooling currently · Anemia · Coagulopathy -INR 2.0 ?liver dz vs DIC 
· MIld Thrombocytopenia-?liver dz vs DIC · Hyperglycemia-likely combination of sepsis and steroids. · COPD with acute exacerbation · Ileus · IBIS on CPAP at home. · Obese. · Ongoing Smoking · D/w   : Agata Prajapati: [phone]. · Critically ill, 35 min CC, EOP. Multiple organ failure. High risk of decompensation. RECOMMENDATIONS:  
· Mechanical ventilation- not ready for extubation given hemodynamic instability and AMS · ABG now · Cont precedex, Prop for sedation. Target RASS of zero to minus 1.  
· Empiric Cefepime, Levoflox, Flagyl. Vanc (IV and PO). Monitor Cx. · Start rectal lactulose- had been getting it via NG 
· NPH- hoping BG will be better controlled with steroid weaning · Norepi gtt to keep MAP over 65 · Nebs scheduled. · Wean steroids for acute COPD exacerbation · CVP to eval volume status · Replete Electrolytes Subjective/History:  
 
3-31:no acute events overnight. Little to no stooling. Remains on vasopressors and mechanical ventilation 3-30-pt intubated yesterday. No acute events overnight. Remains sedated on Norepi gtt 3-29-19: Pt seen this am. She is still very confused. Has ongoing needed for pressors. Has wheezing. Her WBC has increased. She is not able to give hx of HPI or ROS. This patient has been seen and evaluated at the request of Dr. Gretta Wan for above. Pt was seen in ER. Patient is a 61 y.o. female with hx of above. Noted to have increased confusion per her family. Has not been compliant with her meds. Noted to have weakness and inability to walk. Was was normal the night before. Noted when she awoke to be confused, had generalized weakness. Has a very dry  Mouth when seen in ER. ROS and HPI limited due to pts confused state. Past Medical History:  
Diagnosis Date  Asthma  Back pain  COPD (chronic obstructive pulmonary disease) (HCC)  Diabetes ([facility] [address]  Esophageal varices in cirrhosis ([facility] [address]   
 6/2014 banding x 2  
 Fibromyalgia  Gastrointestinal disorder  GERD (gastroesophageal reflux disease)  Hypercholesteremia  Liver cirrhosis secondary to BALDERAS ([facility] [address]  Liver disease  Other and unspecified hyperlipidemia  Restless leg syndrome  Type II or unspecified type diabetes mellitus without mention of complication, uncontrolled  Unspecified essential hypertension Past Surgical History:  
Procedure Laterality Date  HX BACK SURGERY    
 HX CARPAL TUNNEL RELEASE    
 on right  HX HYSTERECTOMY plus 1/2 of an ovary removed  MI COLSC FLX W/RMVL OF TUMOR POLYP LESION SNARE TQ  5/30/2013  UPPER GI ENDOSCOPY,LIGAT VARIX  2/6/2015 Prior to Admission medications Medication Sig Start Date End Date Taking? Authorizing Provider  
fluticasone furoate-vilanterol (BREO ELLIPTA) 200-25 mcg/dose inhaler Take 1 Puff by inhalation daily. Provider, Historical  
insulin glargine (LANTUS SOLOSTAR U-100 INSULIN) 100 unit/mL (3 mL) inpn 110 Units by SubCUTAneous route daily.     Provider, Historical  
 furosemide (LASIX) 40 mg tablet Take 2 Tabs by mouth daily. 3/19/19   HUMZA Baca  
spironolactone (ALDACTONE) 100 mg tablet Take 2 Tabs by mouth daily. 3/19/19   HUMZA Baca  
CONSTULOSE 10 gram/15 mL solution take 2 tablespoonfuls by mouth twice a day 1/7/19   Cathi Barksdale, NP  
losartan (COZAAR) 25 mg tablet take 1 tablet by mouth once daily , REPLACES LISINOPRIL FOR BLOOD PRESSURE AND KIDNEY PROTECTION 12/3/18   Claudia Leal MD  
insulin NPH (NOVOLIN N NPH U-100 INSULIN) 100 unit/mL injection Inject 55 units every 12 hours--dispense relion brand--patient will pay perez 11/28/18   Claudia Leal MD  
traMADol Shearon Se) 50 mg tablet take 1-2 tablets by mouth twice a day if needed for DIABETIC NEUROPATHY PAIN 11/27/18   Claudia Leal MD  
aspirin 81 mg chewable tablet Take 1 Tab by mouth daily. 11/16/18   Pavel Rivera MD  
atorvastatin (LIPITOR) 40 mg tablet Take 1 Tab by mouth nightly. 11/15/18   Pavel Rivera MD  
potassium chloride SR (KLOR-CON 10) 10 mEq tablet Take 10 mEq by mouth two (2) times a day. Other, MD Leonarda  
omeprazole (PRILOSEC) 20 mg capsule take 1 capsule by mouth once daily 11/8/18   Cathi Barksdale, NP  
amitriptyline (ELAVIL) 150 mg tablet Take 1 Tab by mouth nightly. 10/16/18   Claudia Leal MD  
propranolol (INDERAL) 20 mg tablet Take 1 Tab by mouth two (2) times a day. 10/16/18   Claudia Leal MD  
ferrous sulfate 325 mg (65 mg iron) tablet take 1 tablet by mouth once daily with BREAKFAST 10/2/18   Cathi Barksdale, NP  
insulin lispro (HUMALOG KWIKPEN INSULIN) 100 unit/mL kwikpen Inject as needed up to 3 times per day for sugars over 150: 4 units for every 50 points.   Max 50 units per day 10/1/18   Claudia Leal MD  
metFORMIN (GLUCOPHAGE) 1,000 mg tablet take 1 tablet by mouth twice a day with meals 8/23/18   Claudia Leal MD  
ondansetron (ZOFRAN ODT) 4 mg disintegrating tablet dissolve 1 tablet ON TONGUE every 8 hours if needed for nausea 8/1/18   Cathi Barksdale G, NP  
gabapentin (NEURONTIN) 600 mg tablet take 2 tablets by mouth three times a day 7/19/18   Naima Mack MD  
rOPINIRole (REQUIP) 4 mg tab TAB Take 4 mg by mouth nightly. Provider, Sergio  
albuterol (PROAIR HFA) 90 mcg/actuation inhaler Take 2 Puffs by inhalation every four (4) hours as needed for Wheezing. Other, MD Leonarda  
 
Current Facility-Administered Medications Medication Dose Route Frequency  insulin NPH (NOVOLIN N, HUMULIN N) injection 15 Units  15 Units SubCUTAneous Q8H Or  
 methylPREDNISolone (PF) (SOLU-MEDROL) injection 40 mg  40 mg IntraVENous Q8H  potassium phosphate 20 mmol in 0.9% sodium chloride 250 mL infusion   IntraVENous ONCE  
 0.9% sodium chloride infusion  75 mL/hr IntraVENous CONTINUOUS  
 lactulose (CHRONULAC) 10 gram/15 mL solution 200 g  200 g Rectal Q6H  
 levoFLOXacin (LEVAQUIN) 750 mg in D5W IVPB  750 mg IntraVENous Q24H  
 vancomycin (VANCOCIN) 1250 mg in  ml infusion  1,250 mg IntraVENous Q12H  
 metroNIDAZOLE (FLAGYL) IVPB premix 500 mg  500 mg IntraVENous Q8H  
 vancomycin (FIRVANQ) 50 mg/mL oral solution 500 mg  500 mg Oral Q6H  
 dexmedeTOMidine (PRECEDEX) 400 mcg in 0.9% sodium chloride 100 mL infusion  0-1.4 mcg/kg/hr IntraVENous TITRATE  albuterol (PROVENTIL VENTOLIN) nebulizer solution 2.5 mg  2.5 mg Nebulization Q4H RT  
 amitriptyline (ELAVIL) tablet 100 mg  100 mg Oral QHS  propofol (DIPRIVAN) infusion  0-50 mcg/kg/min IntraVENous TITRATE  chlorhexidine (PERIDEX) 0.12 % mouthwash 15 mL  15 mL Oral Q12H  
 NOREPINephrine (LEVOPHED) 32 mg in 5% dextrose 250 mL infusion  2-30 mcg/min IntraVENous TITRATE  PHENYLephrine (PF)(ROSANA-SYNEPHRINE) 30 mg in 0.9% sodium chloride 250 mL infusion   mcg/min IntraVENous TITRATE  sodium chloride (NS) flush 5-40 mL  5-40 mL IntraVENous Q8H  
  cefepime (MAXIPIME) 2 g in 0.9% sodium chloride (MBP/ADV) 100 mL  2 g IntraVENous Q8H  
 insulin lispro (HUMALOG) injection   SubCUTAneous Q6H  
 mupirocin (BACTROBAN) 2 % ointment   Both Nostrils BID  aspirin delayed-release tablet 81 mg  81 mg Oral DAILY Allergies Allergen Reactions  Hydrocodone Nausea and Vomiting  Lisinopril Cough  Lortab [Hydrocodone-Acetaminophen] Nausea and Vomiting  Percocet [Oxycodone-Acetaminophen] Nausea and Vomiting Social History Tobacco Use  Smoking status: Current Every Day Smoker Packs/day: 1.00 Years: 40.00 Pack years: 40.00  Smokeless tobacco: Former User Substance Use Topics  Alcohol use: No  
  Comment: rare Family History Problem Relation Age of Onset  Diabetes Mother  Stroke Sister  Diabetes Paternal Aunt  Diabetes Paternal Uncle  Heart Disease Neg Hx Review of Systems: 
Review of systems not obtained due to patient factors. Objective: 
Vital Signs:   
Visit Vitals /58 Pulse (!) 117 Temp 97.9 °F (36.6 °C) Resp 20 Ht 5' 2\" (1.575 m) Wt 105 kg (231 lb 7.7 oz) SpO2 98% BMI 42.34 kg/m² O2 Device: Endotracheal tube O2 Flow Rate (L/min): 12 l/min Temp (24hrs), Av.2 °F (36.8 °C), Min:96.9 °F (36.1 °C), Max:99.2 °F (37.3 °C) Intake/Output:  
Last shift:       07 - 1900 In: 425.4 [I.V.:425.4] Out: 865 [Urine:265] Last 3 shifts: 1901 -  0700 In: 6445.1 [I.V.:5635.1] Out: 3368 [LMVYP:9751] Intake/Output Summary (Last 24 hours) at 3/31/2019 1250 Last data filed at 3/31/2019 1100 Gross per 24 hour Intake 3829.09 ml Output 2710 ml Net 1119.09 ml Hemodynamics:  
PAP:   CO:    
Wedge:   CI:    
CVP:  CVP (mmHg): 13 mmHg (19 1100) SVR:    
  PVR:    
 
Ventilator Settings: 
Mode Rate Tidal Volume Pressure FiO2 PEEP Assist control   500 ml  6 cm H2O 40 % 6 cm H20 Peak airway pressure: 28 cm H2O   
 Minute ventilation: 10.4 l/min Physical Exam: 
 
General:  Obese, sedated Head:  Normocephalic, without obvious abnormality, atraumatic. Eyes:  Conjunctivae/corneas clear. PERRL, EOMs intact. Nose: Nares normal. Septum midline. Mucosa normal. No drainage or sinus tenderness. Throat: Lips, mucosa, and tongue normal. Teeth and gums normal.  
Neck: Supple, symmetrical, trachea midline, no adenopathy, thyroid: no enlargment/tenderness/nodules, no carotid bruit and no JVD. Has right neck IJ. Back:   Symmetric, no curvature. ROM normal.  
Lungs: On  auscultation bilaterally has bilateral wheezing, prolonged expiratory phase. Chest wall:  No tenderness or deformity. Heart:  Regular rate and rhythm, S1, S2 normal, no murmur, click, rub or gallop. Abdomen:   Distended, few BSl. No masses,  No organomegaly. Obese. Extremities: Extremities normal, atraumatic, no cyanosis or edema. Pulses: 2+ and symmetric all extremities. Skin: Skin color, texture, turgor normal. No rashes or lesions Lymph nodes: Cervical, supraclavicular, and axillary nodes normal.  
Neurologic: Grossly nonfocal, sleepy seems confused. Psych:+ anxiety or depression. But not able to fully eval.   
 
 
Data:  
 
Recent Results (from the past 24 hour(s)) CULTURE, BLOOD Collection Time: 03/30/19  2:14 PM  
Result Value Ref Range Special Requests: NO SPECIAL REQUESTS Culture result: NO GROWTH AFTER 17 HOURS    
GLUCOSE, POC Collection Time: 03/30/19  6:17 PM  
Result Value Ref Range Glucose (POC) 335 (H) 65 - 100 mg/dL Performed by [employer] GLUCOSE, POC Collection Time: 03/30/19 11:57 PM  
Result Value Ref Range Glucose (POC) 363 (H) 65 - 100 mg/dL Performed by Ursula Castro GLUCOSE, POC Collection Time: 03/31/19  5:11 AM  
Result Value Ref Range Glucose (POC) 355 (H) 65 - 100 mg/dL Performed by Ursula Castro METABOLIC PANEL, BASIC  Collection Time: 03/31/19  5:12 AM  
 Result Value Ref Range Sodium 139 136 - 145 mmol/L Potassium 3.6 3.5 - 5.1 mmol/L Chloride 107 97 - 108 mmol/L  
 CO2 25 21 - 32 mmol/L Anion gap 7 5 - 15 mmol/L Glucose 369 (H) 65 - 100 mg/dL BUN 44 (H) 6 - 20 MG/DL Creatinine 0.75 0.55 - 1.02 MG/DL  
 BUN/Creatinine ratio 59 (H) 12 - 20 GFR est AA >60 >60 ml/min/1.73m2 GFR est non-AA >60 >60 ml/min/1.73m2 Calcium 7.0 (L) 8.5 - 10.1 MG/DL  
PHOSPHORUS Collection Time: 03/31/19  5:12 AM  
Result Value Ref Range Phosphorus 1.7 (L) 2.6 - 4.7 MG/DL  
CBC WITH AUTOMATED DIFF Collection Time: 03/31/19  5:12 AM  
Result Value Ref Range WBC 28.0 (H) 3.6 - 11.0 K/uL  
 RBC 3.20 (L) 3.80 - 5.20 M/uL HGB 8.6 (L) 11.5 - 16.0 g/dL HCT 27.6 (L) 35.0 - 47.0 % MCV 86.3 80.0 - 99.0 FL  
 MCH 26.9 26.0 - 34.0 PG  
 MCHC 31.2 30.0 - 36.5 g/dL  
 RDW 18.0 (H) 11.5 - 14.5 % PLATELET 64 (L) 814 - 400 K/uL MPV 12.5 8.9 - 12.9 FL  
 NRBC 0.0 0  WBC ABSOLUTE NRBC 0.00 0.00 - 0.01 K/uL NEUTROPHILS 88 (H) 32 - 75 % LYMPHOCYTES 5 (L) 12 - 49 % MONOCYTES 6 5 - 13 % EOSINOPHILS 0 0 - 7 % BASOPHILS 0 0 - 1 % IMMATURE GRANULOCYTES 1 (H) 0.0 - 0.5 % ABS. NEUTROPHILS 24.6 (H) 1.8 - 8.0 K/UL  
 ABS. LYMPHOCYTES 1.4 0.8 - 3.5 K/UL  
 ABS. MONOCYTES 1.7 (H) 0.0 - 1.0 K/UL  
 ABS. EOSINOPHILS 0.0 0.0 - 0.4 K/UL  
 ABS. BASOPHILS 0.0 0.0 - 0.1 K/UL  
 ABS. IMM. GRANS. 0.3 (H) 0.00 - 0.04 K/UL  
 DF AUTOMATED    
 RBC COMMENTS ANISOCYTOSIS 1+ GLUCOSE, POC Collection Time: 03/31/19 11:50 AM  
Result Value Ref Range Glucose (POC) 350 (H) 65 - 100 mg/dL Performed by [employer] Telemetry:Sinus Tach. Imaging: 
I have personally reviewed the patients radiographs and have reviewed the reports: 
3-28-19: CT of chest/Abdomen: IMPRESSION:  
1. Right colitis. 2. Cirrhosis with portal venous hypertension. 3. Clear lungs.   
 
 
Kaela Nava DO

## 2019-03-31 NOTE — PROGRESS NOTES
Gastroenterology Daily Progress Note (Dr. Esme Freire for Dr. Kingsley Womack) [facility] Admit Date: 3/28/2019 Subjective:   
  
Still no BM despite having Lactulose via NGT 30 mg q6h. OG tube output 700 ml yesterday. No fever overnight. Current Facility-Administered Medications Medication Dose Route Frequency  insulin NPH (NOVOLIN N, HUMULIN N) injection 15 Units  15 Units SubCUTAneous Q8H Or  
 methylPREDNISolone (PF) (SOLU-MEDROL) injection 40 mg  40 mg IntraVENous Q8H  potassium phosphate 20 mmol in 0.9% sodium chloride 250 mL infusion   IntraVENous ONCE  
 0.9% sodium chloride infusion  75 mL/hr IntraVENous CONTINUOUS  
 lactulose (CHRONULAC) 10 gram/15 mL solution 200 g  200 g Rectal Q6H  
 vancomycin (VANCOCIN) 1500 mg in  ml infusion  1,500 mg IntraVENous Q24H  
 rifAXIMin (XIFAXAN) tablet 550 mg  550 mg Oral BID  metroNIDAZOLE (FLAGYL) IVPB premix 500 mg  500 mg IntraVENous Q8H  
 vancomycin (FIRVANQ) 50 mg/mL oral solution 500 mg  500 mg Oral Q6H  
 dexmedeTOMidine (PRECEDEX) 400 mcg in 0.9% sodium chloride 100 mL infusion  0-1.4 mcg/kg/hr IntraVENous TITRATE  albuterol (PROVENTIL VENTOLIN) nebulizer solution 2.5 mg  2.5 mg Nebulization Q4H RT  
 amitriptyline (ELAVIL) tablet 100 mg  100 mg Oral QHS  levoFLOXacin (LEVAQUIN) 750 mg in D5W IVPB  750 mg IntraVENous Q48H  propofol (DIPRIVAN) infusion  0-50 mcg/kg/min IntraVENous TITRATE  acetaminophen (TYLENOL) solution 650 mg  650 mg Per NG tube Q4H PRN  chlorhexidine (PERIDEX) 0.12 % mouthwash 15 mL  15 mL Oral Q12H  
 NOREPINephrine (LEVOPHED) 32 mg in 5% dextrose 250 mL infusion  2-30 mcg/min IntraVENous TITRATE  PHENYLephrine (PF)(ROSANA-SYNEPHRINE) 30 mg in 0.9% sodium chloride 250 mL infusion   mcg/min IntraVENous TITRATE  sodium chloride (NS) flush 5-40 mL  5-40 mL IntraVENous Q8H  
 sodium chloride (NS) flush 5-40 mL  5-40 mL IntraVENous PRN  
  acetaminophen (TYLENOL) suppository 650 mg  650 mg Rectal Q6H PRN  
 ondansetron (ZOFRAN) injection 4 mg  4 mg IntraVENous Q6H PRN  
 cefepime (MAXIPIME) 2 g in 0.9% sodium chloride (MBP/ADV) 100 mL  2 g IntraVENous Q8H  
 albuterol-ipratropium (DUO-NEB) 2.5 MG-0.5 MG/3 ML  3 mL Nebulization Q4H PRN  
 insulin lispro (HUMALOG) injection   SubCUTAneous Q6H  
 glucose chewable tablet 16 g  4 Tab Oral PRN  
 dextrose (D50) infusion 12.5-25 g  12.5-25 g IntraVENous PRN  
 glucagon (GLUCAGEN) injection 1 mg  1 mg IntraMUSCular PRN  
 mupirocin (BACTROBAN) 2 % ointment   Both Nostrils BID  aspirin delayed-release tablet 81 mg  81 mg Oral DAILY Objective:  
 
Visit Vitals /66 Pulse (!) 111 Temp 97.5 °F (36.4 °C) Resp 18 Ht 5' 2\" (1.575 m) Wt 105 kg (231 lb 7.7 oz) SpO2 99% BMI 42.34 kg/m² Blood pressure 135/66, pulse (!) 111, temperature 97.5 °F (36.4 °C), resp. rate 18, height 5' 2\" (1.575 m), weight 105 kg (231 lb 7.7 oz), SpO2 99 %. 03/31 0701 - 03/31 1900 In: 171.3 [I.V.:171.3] Out: 350  
 
03/29 1901 - 03/31 0700 In: 6345.1 [I.V.:5535.1] Out: 3368 [RYEAT:3221] Intake/Output Summary (Last 24 hours) at 3/31/2019 0930 Last data filed at 3/31/2019 0900 Gross per 24 hour Intake 3740.16 ml Output 2605 ml Net 1135.16 ml Physical Exam:  
 
General: obese, intubated WF in NAD Chest:  CTA, No rhonchi, rales or rubs. Heart: RRR, S1S2 normal 
GI: Distended, soft, nontender, + BS Extremities: trace LE edema Labs:  
 
Recent Results (from the past 24 hour(s)) LACTIC ACID Collection Time: 03/30/19 11:03 AM  
Result Value Ref Range Lactic acid 6.3 (HH) 0.4 - 2.0 MMOL/L  
GLUCOSE, POC Collection Time: 03/30/19 12:24 PM  
Result Value Ref Range Glucose (POC) 286 (H) 65 - 100 mg/dL Performed by Amy Pore CULTURE, BLOOD Collection Time: 03/30/19  2:14 PM  
Result Value Ref Range Special Requests: NO SPECIAL REQUESTS Culture result: NO GROWTH AFTER 17 HOURS    
GLUCOSE, POC Collection Time: 03/30/19  6:17 PM  
Result Value Ref Range Glucose (POC) 335 (H) 65 - 100 mg/dL Performed by Maya Gale GLUCOSE, POC Collection Time: 03/30/19 11:57 PM  
Result Value Ref Range Glucose (POC) 363 (H) 65 - 100 mg/dL Performed by Katie Brady GLUCOSE, POC Collection Time: 03/31/19  5:11 AM  
Result Value Ref Range Glucose (POC) 355 (H) 65 - 100 mg/dL Performed by Katie Brady METABOLIC PANEL, BASIC Collection Time: 03/31/19  5:12 AM  
Result Value Ref Range Sodium 139 136 - 145 mmol/L Potassium 3.6 3.5 - 5.1 mmol/L Chloride 107 97 - 108 mmol/L  
 CO2 25 21 - 32 mmol/L Anion gap 7 5 - 15 mmol/L Glucose 369 (H) 65 - 100 mg/dL BUN 44 (H) 6 - 20 MG/DL Creatinine 0.75 0.55 - 1.02 MG/DL  
 BUN/Creatinine ratio 59 (H) 12 - 20 GFR est AA >60 >60 ml/min/1.73m2 GFR est non-AA >60 >60 ml/min/1.73m2 Calcium 7.0 (L) 8.5 - 10.1 MG/DL  
PHOSPHORUS Collection Time: 03/31/19  5:12 AM  
Result Value Ref Range Phosphorus 1.7 (L) 2.6 - 4.7 MG/DL  
CBC WITH AUTOMATED DIFF Collection Time: 03/31/19  5:12 AM  
Result Value Ref Range WBC 28.0 (H) 3.6 - 11.0 K/uL  
 RBC 3.20 (L) 3.80 - 5.20 M/uL HGB 8.6 (L) 11.5 - 16.0 g/dL HCT 27.6 (L) 35.0 - 47.0 % MCV 86.3 80.0 - 99.0 FL  
 MCH 26.9 26.0 - 34.0 PG  
 MCHC 31.2 30.0 - 36.5 g/dL  
 RDW 18.0 (H) 11.5 - 14.5 % PLATELET 64 (L) 191 - 400 K/uL MPV 12.5 8.9 - 12.9 FL  
 NRBC 0.0 0  WBC ABSOLUTE NRBC 0.00 0.00 - 0.01 K/uL NEUTROPHILS 88 (H) 32 - 75 % LYMPHOCYTES 5 (L) 12 - 49 % MONOCYTES 6 5 - 13 % EOSINOPHILS 0 0 - 7 % BASOPHILS 0 0 - 1 % IMMATURE GRANULOCYTES 1 (H) 0.0 - 0.5 % ABS. NEUTROPHILS 24.6 (H) 1.8 - 8.0 K/UL  
 ABS. LYMPHOCYTES 1.4 0.8 - 3.5 K/UL  
 ABS. MONOCYTES 1.7 (H) 0.0 - 1.0 K/UL  
 ABS. EOSINOPHILS 0.0 0.0 - 0.4 K/UL  
 ABS. BASOPHILS 0.0 0.0 - 0.1 K/UL ABS. IMM. GRANS. 0.3 (H) 0.00 - 0.04 K/UL  
 DF AUTOMATED    
 RBC COMMENTS ANISOCYTOSIS 1+ LABRCNT(wbc:2,hgb:2,hct:2,plt:2,) Recent Labs  
  19 
0512 199 19 
0159 19 
1013  137 135* 136  
K 3.6 4.1 4.6 4.0  
 108 105 101 CO2 25 15* 17* 26 BUN 44* 48* 26* 19  
CREA 0.75 1.37* 1.07* 0.68 * 229* 184* 68  
CA 7.0* 6.7* 7.0* 8.5 MG  --  2.1 1.2* 1.5* PHOS 1.7* 4.1 4.6  --   
 
Recent Labs  
  19 
0159 19 
1013 INR 2.0* 1.8* 1.2* PTP 19.9* 18.2* 12.6* APTT  --   --  24.2 Recent Labs  
  19 
0159 19 
1013 SGOT 40* 35 31 AP 66 62 111  
TP 7.1 6.9 7.7 ALB 2.5* 2.6* 3.1*  
GLOB 4.6* 4.3* 4.6* Lab Results Component Value Date/Time Folate 24.6 (H) 2016 11:20 AM  
 
Impression: 
  Severe sepsis with shock E coli Urosepsis with bacteremia Hepatic encephalopathy Cirrhosis Colitis on CT Trace ascites on CT Acute renal failure Coagulopathy Leukocytosis Plan: 
Her sepsis is likely due to E coli urosepsis with her documented E coli bactermia and has an ileus vs. Possible colitis ? Ischemic. C diff less likely since no diarrhea since admission and she actually seems to have an ileus. -d/w plan with nursing, family at bedside and Dr Erich Apgar: 
-check KUB today portable to assess for ileus 
-stop oral meds via OG tube since this is likely not being absorbed due to suspected ileus 
-place flexiseal/rectal tube and can administer lactulose via enemas 
-stop xifaxan since worsening liver function is a relative contraindication and can't take PO meds Sena Camarillo MD 
 
3/31/2019 [address] [facility], [address] [address] Loc: [phone]

## 2019-03-31 NOTE — PROGRESS NOTES
Spiritual Care Assessment/Progress Note [place] 
 
 
NAME: Rodney Ho      MRN: [id number] AGE: 61 y.o. SEX: female Mandaen Affiliation: Spiritism Language: [place] 3/31/2019     Total Time (in minutes): 8 Spiritual Assessment begun in [facility] 2 CRITICAL CARE 2 through conversation with: 
  
    []Patient        [] Family    [] Friend(s) Reason for Consult: Advance medical directive consult Spiritual beliefs: (Please include comment if needed) 
   [] Identifies with a erika tradition:     
   [] Supported by a erika community:        
   [] Claims no spiritual orientation:       
   [] Seeking spiritual identity:            
   [] Adheres to an individual form of spirituality:       
   [x] Not able to assess:                   
 
    
Identified resources for coping:  
   [] Prayer                           
   [] Music                  [] Guided Imagery 
   [] Family/friends                 [] Pet visits [] Devotional reading                         [x] Unknown 
   [] Other:                                        
 
 
Interventions offered during this visit: (See comments for more details) Patient Interventions: Advance medical directive consult Family/Friend(s): Affirmation of emotions/emotional suffering, Normalization of emotional/spiritual concerns, Initial Assessment Plan of Care: 
 
 [] Support spiritual and/or cultural needs  
 [] Support AMD and/or advance care planning process    
 [] Support grieving process 
 [] Coordinate Rites and/or Rituals  
 [] Coordination with community clergy [] No spiritual needs identified at this time 
 [] Detailed Plan of Care below (See Comments)  [] Make referral to Music Therapy 
[] Make referral to Pet Therapy    
[] Make referral to Addiction services 
[] Make referral to [facility] 
[] Make referral to Spiritual Care Partner 
[] No future visits requested       
[x] Follow up visits as needed Comments: Request by cole to assist with Advance Medical Directive (AMD) in CCU. Consulted with patients nurse. Patient is currently unable to complete an Advance Medical Directive due to not being alert and aware of surroundings. [address]  Paging Service [phone]

## 2019-03-31 NOTE — PROGRESS NOTES
03/31/19 0543 ABCDEF Bundle SBT Safety Screen Passed Yes SBT Trial Passed Yes Weaning Parameters Spontaneous Breathing Trial Complete Yes Resp Rate Observed 18 Ve 10.4   
RSBI 30 SBT passed, Patient back on A/C mode.

## 2019-04-01 NOTE — PROGRESS NOTES
Hospitalist Progress Note NAME: Marciano Wilson :  1955 MRN:  [id number] Assessment / Plan: 
Acute hypercapnic respiratory failure s/p intubation on 3/29 Septic shock, POA Hypotension, Fever, Lactic acidosis Ecoli, enterococcus UTI and bacteremia Colitis, infectious vs ischemic 
-persistent fever but WBC continuing to improve 
-KUB showed no significant bowel dilation. Repeat KUB showed gas and stool in the large bowel 
-cont' rectal tube and lactulose enema 
-CXR with no acute finding, CT chest showed R colitis, cirrhosis with portal venous HTN, clear lungs -Bcx and Ucx growing Ecoli, repeat Bcx neg so far 
-cont' IVF, pressor support, sedation, wean as tolerated 
-cont' empiric abx to include IV cefepime, levaquin, IV flagyl, and PO vancomycin. Unable to obtain stool despite lactulose use. RN reports smears only. No diarrhea 
-hold propranolol, lasix, aldactone. losartan 
-ventilatory support, nebs, IV steroids KALI with metabolic acidosis, resolved Lactic acidosis Hypomag, repleted Hypophos, repleted 
-stop bicarb gtt, change fluid back to NS 
-avoid nephrotoxic agent, monitor bmp Acute encephalopathy, POA due to HE Right leg weakness, POA Mild left lip droop, POA Hx TIA  
-suspect encephalopathy due to hepatic encephalopathy, POA and sepsis. CT head negative. 
-cont' lactulose, xifaxin  
-hold tramadol, amitriptyline 
-continue aspirin 
  
DM initially hypoglycemia, now with hyperglycemia in the setting of steroid use 
-initially requiring D10 bolus for EMS and D50 twice in ER. 
-titrate NPH for better BG control 
-SSI. -hold metformin, nph 
  
Right leg edema  
-patient reports this is chronic 
  
COPD 
IBIS 
-nebs Obesity  
Body mass index is 38.31 kg/m². 
  
Code:  full DVT prophylaxis:  SCD Surrogate decision maker:   Subjective: Chief Complaint / Reason for Physician Visit Pt seen with  at bedside. Remains intubated and sedated. Discussed with RN events overnight. Review of Systems: 
Symptom Y/N Comments  Symptom Y/N Comments Fever/Chills    Chest Pain Poor Appetite    Edema Cough    Abdominal Pain Sputum    Joint Pain SOB/MARTINEZ    Pruritis/Rash Nausea/vomit    Tolerating PT/OT Diarrhea    Tolerating Diet Constipation    Other Could NOT obtain due to: encephalopathy Objective: VITALS:  
Last 24hrs VS reviewed since prior progress note. Most recent are: 
Patient Vitals for the past 24 hrs: 
 Temp Pulse Resp BP SpO2  
04/01/19 1300  (!) 125 27 123/49 100 % 04/01/19 1245  (!) 126 28 133/56 100 % 04/01/19 1230  (!) 123 20 116/52 100 % 04/01/19 1215  (!) 119 23 124/53 100 % 04/01/19 1210  (!) 117 23  100 % 04/01/19 1200 99.2 °F (37.3 °C) (!) 125 26 129/54 100 % 04/01/19 1145  (!) 119 24 115/48 100 % 04/01/19 1130  (!) 116 24 112/50 100 % 04/01/19 1115  (!) 113 28 107/51 100 % 04/01/19 1100  (!) 113 (!) 31 111/50 99 % 04/01/19 1045  (!) 112 29 108/53 99 % 04/01/19 1030  (!) 109 (!) 31 108/49 99 % 04/01/19 1015  (!) 116 26 113/50 100 % 04/01/19 1000  (!) 117 22 113/49 100 % 04/01/19 0945  (!) 117 21 107/46 100 % 04/01/19 0930  (!) 118 22 96/47 99 % 04/01/19 0915  (!) 120 25 109/48 99 % 04/01/19 0900  (!) 121 (!) 33 115/46 100 % 04/01/19 0845  (!) 120 25 105/49 100 % 04/01/19 0830  (!) 117 22 107/49 100 % 04/01/19 0815  (!) 117 23 112/54 99 % 04/01/19 0811  (!) 118 26  99 % 04/01/19 0800 98.2 °F (36.8 °C) (!) 112 19  99 % 04/01/19 0745  (!) 118 28  98 % 04/01/19 0730  (!) 107 29 110/53 99 % 04/01/19 0715  (!) 110 25 107/55 99 % 04/01/19 0700  (!) 115 29 108/50 99 % 04/01/19 0604  (!) 110 21  99 % 04/01/19 0600  (!) 109 21 96/46 98 % 04/01/19 0547  (!) 109 22  98 % 04/01/19 0500  (!) 110 12 110/60 99 % 04/01/19 0434  (!) 104 19  100 % 04/01/19 0432     100 % 04/01/19 0400 97.8 °F (36.6 °C) 99 19 95/49 100 % 04/01/19 0300  (!) 105 24 112/60 100 % 04/01/19 0215  (!) 104 22 102/60 99 % 04/01/19 0200  (!) 104 29 (!) 87/46 100 % 04/01/19 0100  (!) 109 25 115/55 99 % 04/01/19 0000 99 °F (37.2 °C) (!) 109 22 109/53 99 % 03/31/19 2349  (!) 109 23  99 % 03/31/19 2348     99 % 03/31/19 2300  (!) 104 27 107/57 100 % 03/31/19 2200  (!) 114 26 112/53 98 % 03/31/19 2100  (!) 118 28 106/51 97 % 03/31/19 2000 98.8 °F (37.1 °C) (!) 119 25 111/49 99 % 03/31/19 1927     99 % 03/31/19 1921  (!) 119 25  99 % 03/31/19 1900  (!) 118 25 115/54 99 % 03/31/19 1800  (!) 118 25 109/50 99 % 03/31/19 1700  (!) 113 28 128/61 100 % 03/31/19 1602  (!) 117 23  99 % 03/31/19 1600 98.6 °F (37 °C) (!) 117 23 117/54 99 % 03/31/19 1500  (!) 116 23 117/53 99 % 03/31/19 1400  (!) 114 30 122/75 99 % Intake/Output Summary (Last 24 hours) at 4/1/2019 1359 Last data filed at 4/1/2019 1300 Gross per 24 hour Intake 3975.06 ml Output 5910 ml Net -1934.94 ml PHYSICAL EXAM: 
General: Female intubated, NAD 
EENT:  OGT and ETT in place Resp:  CTA b/l. No wheezing  No accessory muscle use CV:  Regular  rhythm,  No edema GI:  Soft, Non distended, Non tender.  +BS Neurologic:  Intubated and sedated. Neuro exam limited Psych:   Unable to do Skin:  No rashes. No jaundice Reviewed most current lab test results and cultures  YES Reviewed most current radiology test results   YES Review and summation of old records today    NO Reviewed patient's current orders and MAR    YES 
PMH/SH reviewed - no change compared to H&P 
________________________________________________________________________ Care Plan discussed with: 
  Comments Patient x Family  x Pt's  RN x Care Manager Consultant  x ROGELIO                   Multidiciplinary team rounds were held today with case manager, nursing, pharmacist and clinical coordinator. Patient's plan of care was discussed; medications were reviewed and discharge planning was addressed. ________________________________________________________________________ Total NON critical care TIME:  35   Minutes Total CRITICAL CARE TIME Spent:   Minutes non procedure based Comments >50% of visit spent in counseling and coordination of care    
________________________________________________________________________ Raphael Woodruff MD  
 
Procedures: see electronic medical records for all procedures/Xrays and details which were not copied into this note but were reviewed prior to creation of Plan. LABS: 
I reviewed today's most current labs and imaging studies. Pertinent labs include: 
Recent Labs 04/01/19 
0411 03/31/19 
4833 03/30/19 
2034 WBC 22.8* 28.0* 33.1* HGB 8.2* 8.6* 9.0*  
HCT 27.0* 27.6* 30.2* PLT 73* 64* 107* Recent Labs 04/01/19 
0411 03/31/19 
8585 03/30/19 
2455  139 137  
K 3.8 3.6 4.1 * 107 108 CO2 22 25 15* * 369* 229* BUN 43* 44* 48* CREA 0.68 0.75 1.37* CA 7.1* 7.0* 6.7* MG 2.5*  --  2.1 PHOS 1.6* 1.7* 4.1 ALB  --   --  2.5* TBILI  --   --  1.1*  
SGOT  --   --  40* ALT  --   --  30 INR  --   --  2.0* Signed: Raphael Woodruff MD

## 2019-04-01 NOTE — PROGRESS NOTES
[facility], Critical Care, and Sleep Medicine Name: Ria Cortes MRN: [id number] : 1955 Hospital: [place] Date: 2019 Critical Care  Patient Consult IMPRESSION:  
· Septic shock- Ecoli bacteremia, remains on levophed. Hold all BP meds. · Acute hypoxic resp failure- intubated 3/29. Oxygenating adequately, 40%,Peep 6 · Cirrhosis, noted in past to have esophageal varices. seems decompensated, Gi following · Hepatic Encephalopathy (ammonia of 110 on admission), Not sure how compliant she has been with her home regimen. · Acute UTI-UCx growing Ecoli. Enterococcus · Right sided colitis  Noted. ?ischemic or infectious. Possible C Diff. Although little stooling currently; not likley · Anemia · Coagulopathy -INR 2.0 ?liver dz vs DIC 
· MIld Thrombocytopenia-?liver dz vs DIC · Hyperglycemia-likely combination of sepsis and steroids. · COPD with acute exacerbation · Ileus · IBIS on CPAP at home. · Obese. · Ongoing Smoking · D/w   : Devon Hind: [phone]. · Critically ill, 35 min CC, EOP. Multiple organ failure. High risk of decompensation. RECOMMENDATIONS:  
· CCU monitoring · Mechanical ventilation- not ready for extubation given hemodynamic instability and AMS · adjsut ABX · Cont precedex, Prop for sedation. Target RASS of zero to minus 1.  
· rectal lactulose- had been getting it via NG 
· Insulin adjusted · Norepi gtt to keep MAP over 65 · Nebs scheduled. · Wean steroids for acute COPD exacerbation · CVP to eval volume status · Replete Electrolytes Subjective/History:  
 
 flexiseal after lactulose started. On IV flagyl, Po vancomycin. IV abx for sepsis. On vent. PO4 1.6; glucose high 
 
3-31:no acute events overnight. Little to no stooling. Remains on vasopressors and mechanical ventilation 3-30-pt intubated yesterday. No acute events overnight. Remains sedated on Norepi gtt 3-29-19: Pt seen this am. She is still very confused. Has ongoing needed for pressors. Has wheezing. Her WBC has increased. She is not able to give hx of HPI or ROS. This patient has been seen and evaluated at the request of Dr. Steve Hou for above. Pt was seen in ER. Patient is a 61 y.o. female with hx of above. Noted to have increased confusion per her family. Has not been compliant with her meds. Noted to have weakness and inability to walk. Was was normal the night before. Noted when she awoke to be confused, had generalized weakness. Has a very dry  Mouth when seen in ER. ROS and HPI limited due to pts confused state. Past Medical History:  
Diagnosis Date  Asthma  Back pain  COPD (chronic obstructive pulmonary disease) (HCC)  Diabetes ([facility]  Esophageal varices in cirrhosis ([facility]   
 6/2014 banding x 2  
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  
Procedure Laterality Date  HX BACK SURGERY    
 HX CARPAL TUNNEL RELEASE    
 on right  HX HYSTERECTOMY plus 1/2 of an ovary removed  ND COLSC FLX W/RMVL OF TUMOR POLYP LESION SNARE TQ  5/30/2013  UPPER GI ENDOSCOPY,LIGAT VARIX  2/6/2015 Prior to Admission medications Medication Sig Start Date End Date Taking? Authorizing Provider  
fluticasone furoate-vilanterol (BREO ELLIPTA) 200-25 mcg/dose inhaler Take 1 Puff by inhalation daily. Provider, Historical  
insulin glargine (LANTUS SOLOSTAR U-100 INSULIN) 100 unit/mL (3 mL) inpn 110 Units by SubCUTAneous route daily.     Provider, Historical  
 furosemide (LASIX) 40 mg tablet Take 2 Tabs by mouth daily. 3/19/19   HUMZA Martin  
spironolactone (ALDACTONE) 100 mg tablet Take 2 Tabs by mouth daily. 3/19/19   HUMZA Martin  
CONSTULOSE 10 gram/15 mL solution take 2 tablespoonfuls by mouth twice a day 1/7/19   Cathi Barksdale, NP  
losartan (COZAAR) 25 mg tablet take 1 tablet by mouth once daily , REPLACES LISINOPRIL FOR BLOOD PRESSURE AND KIDNEY PROTECTION 12/3/18   Allie Aden MD  
insulin NPH (NOVOLIN N NPH U-100 INSULIN) 100 unit/mL injection Inject 55 units every 12 hours--dispense relion brand--patient will pay cash 11/28/18   Allie Aden MD  
traMADol Hope Baas) 50 mg tablet take 1-2 tablets by mouth twice a day if needed for DIABETIC NEUROPATHY PAIN 11/27/18   Allie Aden MD  
aspirin 81 mg chewable tablet Take 1 Tab by mouth daily. 11/16/18   Osiel Chowdhury MD  
atorvastatin (LIPITOR) 40 mg tablet Take 1 Tab by mouth nightly. 11/15/18   Osiel Chowdhury MD  
potassium chloride SR (KLOR-CON 10) 10 mEq tablet Take 10 mEq by mouth two (2) times a day. Other, MD Leonarda  
omeprazole (PRILOSEC) 20 mg capsule take 1 capsule by mouth once daily 11/8/18   Cathi Barksdale, NP  
amitriptyline (ELAVIL) 150 mg tablet Take 1 Tab by mouth nightly. 10/16/18   Allie Aden MD  
propranolol (INDERAL) 20 mg tablet Take 1 Tab by mouth two (2) times a day. 10/16/18   Allie Aden MD  
ferrous sulfate 325 mg (65 mg iron) tablet take 1 tablet by mouth once daily with BREAKFAST 10/2/18   Cathi Barksdale NP  
insulin lispro (HUMALOG KWIKPEN INSULIN) 100 unit/mL kwikpen Inject as needed up to 3 times per day for sugars over 150: 4 units for every 50 points.   Max 50 units per day 10/1/18   Allie Aden MD  
metFORMIN (GLUCOPHAGE) 1,000 mg tablet take 1 tablet by mouth twice a day with meals 8/23/18   Allie Aden MD  
ondansetron (ZOFRAN ODT) 4 mg disintegrating tablet dissolve 1 tablet ON TONGUE every 8 hours if needed for nausea 8/1/18   Cathi Barksdale G, NP  
gabapentin (NEURONTIN) 600 mg tablet take 2 tablets by mouth three times a day 7/19/18   Stacey Lamb MD  
rOPINIRole (REQUIP) 4 mg tab TAB Take 4 mg by mouth nightly. Provider, Historical  
albuterol (PROAIR HFA) 90 mcg/actuation inhaler Take 2 Puffs by inhalation every four (4) hours as needed for Wheezing. Other, MD Leonarda  
 
Current Facility-Administered Medications Medication Dose Route Frequency  balsam peru-castor oil (VENELEX) ointment   Topical BID  potassium phosphate 30 mmol in 0.9% sodium chloride 500 mL infusion   IntraVENous ONCE  
 0.9% sodium chloride infusion  75 mL/hr IntraVENous CONTINUOUS  
 lactulose (CHRONULAC) 10 gram/15 mL solution 200 g  200 g Rectal Q6H  
 levoFLOXacin (LEVAQUIN) 750 mg in D5W IVPB  750 mg IntraVENous Q24H  
 vancomycin (VANCOCIN) 1250 mg in  ml infusion  1,250 mg IntraVENous Q12H  
 insulin NPH (NOVOLIN N, HUMULIN N) injection 15 Units  15 Units SubCUTAneous Q12H And  
 methylPREDNISolone (PF) (SOLU-MEDROL) injection 40 mg  40 mg IntraVENous Q12H  
 metroNIDAZOLE (FLAGYL) IVPB premix 500 mg  500 mg IntraVENous Q8H  
 vancomycin (FIRVANQ) 50 mg/mL oral solution 500 mg  500 mg Oral Q6H  
 dexmedeTOMidine (PRECEDEX) 400 mcg in 0.9% sodium chloride 100 mL infusion  0-1.4 mcg/kg/hr IntraVENous TITRATE  albuterol (PROVENTIL VENTOLIN) nebulizer solution 2.5 mg  2.5 mg Nebulization Q4H RT  
 amitriptyline (ELAVIL) tablet 100 mg  100 mg Oral QHS  propofol (DIPRIVAN) infusion  0-50 mcg/kg/min IntraVENous TITRATE  chlorhexidine (PERIDEX) 0.12 % mouthwash 15 mL  15 mL Oral Q12H  
 NOREPINephrine (LEVOPHED) 32 mg in 5% dextrose 250 mL infusion  2-30 mcg/min IntraVENous TITRATE  sodium chloride (NS) flush 5-40 mL  5-40 mL IntraVENous Q8H  
 cefepime (MAXIPIME) 2 g in 0.9% sodium chloride (MBP/ADV) 100 mL  2 g IntraVENous Q8H  
  insulin lispro (HUMALOG) injection   SubCUTAneous Q6H  
 mupirocin (BACTROBAN) 2 % ointment   Both Nostrils BID  aspirin delayed-release tablet 81 mg  81 mg Oral DAILY Allergies Allergen Reactions  Hydrocodone Nausea and Vomiting  Lisinopril Cough  Lortab [Hydrocodone-Acetaminophen] Nausea and Vomiting  Percocet [Oxycodone-Acetaminophen] Nausea and Vomiting Social History Tobacco Use  Smoking status: Current Every Day Smoker Packs/day: 1.00 Years: 40.00 Pack years: 40.00  Smokeless tobacco: Former User Substance Use Topics  Alcohol use: No  
  Comment: rare Family History Problem Relation Age of Onset  Diabetes Mother  Stroke Sister  Diabetes Paternal Aunt  Diabetes Paternal Uncle  Heart Disease Neg Hx Review of Systems: 
Review of systems not obtained due to patient factors. Objective: 
Vital Signs:   
Visit Vitals /53 Pulse (!) 118 Temp 98.2 °F (36.8 °C) Resp 26 Ht 5' 2\" (1.575 m) Wt 106.6 kg (235 lb 0.2 oz) SpO2 99% BMI 42.98 kg/m² O2 Device: Endotracheal tube, Ventilator O2 Flow Rate (L/min): 12 l/min Temp (24hrs), Av.4 °F (36.9 °C), Min:97.8 °F (36.6 °C), Max:99 °F (37.2 °C) Intake/Output:  
Last shift:      No intake/output data recorded. Last 3 shifts:  1901 -  0700 In: 5614.4 [I.V.:5304.4] Out: 5797 [Urine:1700; ZKJXTB:3237] Intake/Output Summary (Last 24 hours) at 2019 1368 Last data filed at 2019 0403 Gross per 24 hour Intake 3582.89 ml Output 4630 ml Net -1047.11 ml Hemodynamics:  
PAP:   CO:    
Wedge:   CI:    
CVP:  CVP (mmHg): 14 mmHg (19 0800) SVR:    
  PVR:    
 
Ventilator Settings: 
Mode Rate Tidal Volume Pressure FiO2 PEEP Assist control   500 ml  6 cm H2O 40 % 6 cm H20 Peak airway pressure: 30 cm H2O Minute ventilation: 13.2 l/min Physical Exam: 
 
General:  Obese, sedated Head:  Normocephalic, without obvious abnormality, atraumatic. Eyes:  Conjunctivae/corneas clear. PERRL, EOMs intact. Nose: Nares normal. Septum midline. Mucosa normal. No drainage or sinus tenderness. Throat: Lips, mucosa, and tongue normal. Teeth and gums normal.  
Neck: Supple, symmetrical, trachea midline, no adenopathy, thyroid: no enlargment/tenderness/nodules, no carotid bruit and no JVD. Has right neck IJ. Back:   Symmetric, no curvature. ROM normal.  
Lungs: On  auscultation bilaterally has bilateral wheezing, prolonged expiratory phase. Chest wall:  No tenderness or deformity. Heart:  Regular rate and rhythm, S1, S2 normal, no murmur, click, rub or gallop. Abdomen:   Distended, few BSl. No masses,  No organomegaly. Obese. Extremities: Extremities normal, atraumatic, no cyanosis or edema. Pulses: 2+ and symmetric all extremities. Skin: Skin color, texture, turgor normal. No rashes or lesions Lymph nodes: Cervical, supraclavicular, and axillary nodes normal.  
Neurologic: Grossly nonfocal, sleepy seems confused. Psych:+ anxiety or depression. But not able to fully eval.   
 
 
Data:  
 
 
       
Labs: 
 
Recent Labs 04/01/19 
0411 03/31/19 
5622 03/30/19 
0446 WBC 22.8* 28.0* 33.1* HGB 8.2* 8.6* 9.0*  
PLT 73* 64* 107* INR  --   --  2.0* Recent Labs 04/01/19 
0411 03/31/19 
6924 03/30/19 
1103 03/30/19 
8237  139  --  137  
K 3.8 3.6  --  4.1 * 107  --  108 CO2 22 25  --  15* * 369*  --  229* BUN 43* 44*  --  48* CREA 0.68 0.75  --  1.37* CA 7.1* 7.0*  --  6.7* MG 2.5*  --   --  2.1 PHOS 1.6* 1.7*  --  4.1 LAC  --   --  6.3*  --   
ALB  --   --   --  2.5* SGOT  --   --   --  40* ALT  --   --   --  30 No results for input(s): PH, PCO2, PO2, HCO3, FIO2 in the last 72 hours. No results for input(s): CPK, CKNDX, TROIQ in the last 72 hours. No lab exists for component: CPKMB Lab Results Component Value Date/Time  (H) 03/29/2017 02:00 AM  
  
 
       
 
Telemetry:Sinus Tach. Imaging: 
I have personally reviewed the patients radiographs and have reviewed the reports: 
3-28-19: CT of chest/Abdomen: IMPRESSION:  
1. Right colitis. 2. Cirrhosis with portal venous hypertension. 3. Clear lungs.   
 
 
Ursula Ashby MD

## 2019-04-01 NOTE — PROGRESS NOTES
0730: Bedside shift change report given to [facility], RN (oncoming nurse) by Jonatan Thomas RN (offgoing nurse). Report included the following information SBAR, Intake/Output, MAR, Accordion and Recent Results. 1410: Spoke with Dr. Karen Castle regarding elevated heart rate in 120s. Physician stated to just monitor heart rate at this time and no interventions were necessary. 1540: Patient's propofol resumed for testing purposes. Patient being transported to CT for abdominal scan. 1618: Patient's propofol stopped. 1735: Dr. Paredes Just made aware of abdominal CT results. 1759: Dr. Paredes Just paged due to [facility] being 364. Dr. Paredes Just instructed to give 10 units of Humalog. 1902: Bedside shift change report given to SANDRA Sheikh (oncoming nurse) by [facility], RN (offgoing nurse). Report included the following information SBAR, Intake/Output, MAR, Accordion, Recent Results and Med Rec Status.

## 2019-04-01 NOTE — PROGRESS NOTES
Gastroenterology Daily Progress Note (Dr. Philip Crowder for Dr. Wayne Yuan) [facility] Admit Date: 3/28/2019 Subjective:   
  
Pt on precedex. Very low dose levophed with stable BP. No fever. Current Facility-Administered Medications Medication Dose Route Frequency  balsam peru-castor oil (VENELEX) ointment   Topical BID  potassium phosphate 30 mmol in 0.9% sodium chloride 500 mL infusion   IntraVENous ONCE  
 insulin NPH (NOVOLIN N, HUMULIN N) injection 25 Units  25 Units SubCUTAneous Q12H And  
 methylPREDNISolone (PF) (SOLU-MEDROL) injection 40 mg  40 mg IntraVENous Q12H  cefTRIAXone (ROCEPHIN) 2 g in 0.9% sodium chloride (MBP/ADV) 50 mL  2 g IntraVENous Q24H  
 famotidine (PF) (PEPCID) 20 mg in sodium chloride 0.9% 10 mL injection  20 mg IntraVENous Q12H  
 [START ON 4/2/2019] VANCOMYCIN TROUGH  1 Each Other ONCE  
 0.9% sodium chloride infusion  75 mL/hr IntraVENous CONTINUOUS  
 lactulose (CHRONULAC) 10 gram/15 mL solution 200 g  200 g Rectal Q6H  
 vancomycin (VANCOCIN) 1250 mg in  ml infusion  1,250 mg IntraVENous Q12H  
 dexmedeTOMidine (PRECEDEX) 400 mcg in 0.9% sodium chloride 100 mL infusion  0-1.4 mcg/kg/hr IntraVENous TITRATE  albuterol (PROVENTIL VENTOLIN) nebulizer solution 2.5 mg  2.5 mg Nebulization Q4H RT  
 amitriptyline (ELAVIL) tablet 100 mg  100 mg Oral QHS  propofol (DIPRIVAN) infusion  0-50 mcg/kg/min IntraVENous TITRATE  acetaminophen (TYLENOL) solution 650 mg  650 mg Per NG tube Q4H PRN  chlorhexidine (PERIDEX) 0.12 % mouthwash 15 mL  15 mL Oral Q12H  
 NOREPINephrine (LEVOPHED) 32 mg in 5% dextrose 250 mL infusion  2-30 mcg/min IntraVENous TITRATE  sodium chloride (NS) flush 5-40 mL  5-40 mL IntraVENous Q8H  
 sodium chloride (NS) flush 5-40 mL  5-40 mL IntraVENous PRN  
 acetaminophen (TYLENOL) suppository 650 mg  650 mg Rectal Q6H PRN  
 ondansetron (ZOFRAN) injection 4 mg  4 mg IntraVENous Q6H PRN  
  albuterol-ipratropium (DUO-NEB) 2.5 MG-0.5 MG/3 ML  3 mL Nebulization Q4H PRN  
 insulin lispro (HUMALOG) injection   SubCUTAneous Q6H  
 glucose chewable tablet 16 g  4 Tab Oral PRN  
 dextrose (D50) infusion 12.5-25 g  12.5-25 g IntraVENous PRN  
 glucagon (GLUCAGEN) injection 1 mg  1 mg IntraMUSCular PRN  
 mupirocin (BACTROBAN) 2 % ointment   Both Nostrils BID  aspirin delayed-release tablet 81 mg  81 mg Oral DAILY Objective:  
 
Visit Vitals /51 Pulse (!) 122 Temp 99.2 °F (37.3 °C) Resp 26 Ht 5' 2\" (1.575 m) Wt 106.6 kg (235 lb 0.2 oz) SpO2 100% BMI 42.98 kg/m² Blood pressure 120/51, pulse (!) 122, temperature 99.2 °F (37.3 °C), resp. rate 26, height 5' 2\" (1.575 m), weight 106.6 kg (235 lb 0.2 oz), SpO2 100 %. 04/01 0701 - 04/01 1900 In: 1743.9 [I.V.:1483.9] Out: 2135 [Urine:410; Drains:1650] 03/30 1901 - 04/01 0700 In: 5614.4 [I.V.:5304.4] Out: 4697 [Urine:1700; BFGKXZ:5682] Intake/Output Summary (Last 24 hours) at 4/1/2019 1430 Last data filed at 4/1/2019 1400 Gross per 24 hour Intake 3957.96 ml Output 5360 ml Net -1402.04 ml Physical Exam:  
 
General: obese, intubated WF in NAD Chest:  CTA, No rhonchi, rales or rubs. Heart: tachycardic, S1S2 normal 
GI: Less distended soft, nontender, + BS Labs:  
 
Recent Results (from the past 24 hour(s)) GLUCOSE, POC Collection Time: 03/31/19  5:03 PM  
Result Value Ref Range Glucose (POC) 317 (H) 65 - 100 mg/dL Performed by Miladis Che GLUCOSE, POC Collection Time: 03/31/19 11:57 PM  
Result Value Ref Range Glucose (POC) 312 (H) 65 - 100 mg/dL Performed by Elyssa Smallwood METABOLIC PANEL, BASIC Collection Time: 04/01/19  4:11 AM  
Result Value Ref Range Sodium 144 136 - 145 mmol/L Potassium 3.8 3.5 - 5.1 mmol/L Chloride 112 (H) 97 - 108 mmol/L  
 CO2 22 21 - 32 mmol/L Anion gap 10 5 - 15 mmol/L Glucose 338 (H) 65 - 100 mg/dL  BUN 43 (H) 6 - 20 MG/DL  
 Creatinine 0.68 0.55 - 1.02 MG/DL  
 BUN/Creatinine ratio 63 (H) 12 - 20 GFR est AA >60 >60 ml/min/1.73m2 GFR est non-AA >60 >60 ml/min/1.73m2 Calcium 7.1 (L) 8.5 - 10.1 MG/DL  
CBC WITH AUTOMATED DIFF Collection Time: 04/01/19  4:11 AM  
Result Value Ref Range WBC 22.8 (H) 3.6 - 11.0 K/uL  
 RBC 3.07 (L) 3.80 - 5.20 M/uL HGB 8.2 (L) 11.5 - 16.0 g/dL HCT 27.0 (L) 35.0 - 47.0 % MCV 87.9 80.0 - 99.0 FL  
 MCH 26.7 26.0 - 34.0 PG  
 MCHC 30.4 30.0 - 36.5 g/dL  
 RDW 18.3 (H) 11.5 - 14.5 % PLATELET 73 (L) 408 - 400 K/uL MPV 12.4 8.9 - 12.9 FL  
 NRBC 0.0 0  WBC ABSOLUTE NRBC 0.00 0.00 - 0.01 K/uL NEUTROPHILS 87 (H) 32 - 75 % LYMPHOCYTES 6 (L) 12 - 49 % MONOCYTES 6 5 - 13 % EOSINOPHILS 0 0 - 7 % BASOPHILS 0 0 - 1 % IMMATURE GRANULOCYTES 1 (H) 0.0 - 0.5 % ABS. NEUTROPHILS 19.8 (H) 1.8 - 8.0 K/UL  
 ABS. LYMPHOCYTES 1.3 0.8 - 3.5 K/UL  
 ABS. MONOCYTES 1.4 (H) 0.0 - 1.0 K/UL  
 ABS. EOSINOPHILS 0.0 0.0 - 0.4 K/UL  
 ABS. BASOPHILS 0.1 0.0 - 0.1 K/UL  
 ABS. IMM. GRANS. 0.3 (H) 0.00 - 0.04 K/UL  
 DF AUTOMATED MAGNESIUM Collection Time: 04/01/19  4:11 AM  
Result Value Ref Range Magnesium 2.5 (H) 1.6 - 2.4 mg/dL PHOSPHORUS Collection Time: 04/01/19  4:11 AM  
Result Value Ref Range Phosphorus 1.6 (L) 2.6 - 4.7 MG/DL  
AMMONIA Collection Time: 04/01/19  4:11 AM  
Result Value Ref Range Ammonia 39 (H) <32 UMOL/L  
GLUCOSE, POC Collection Time: 04/01/19  5:45 AM  
Result Value Ref Range Glucose (POC) 346 (H) 65 - 100 mg/dL Performed by Stacia Guerrier GLUCOSE, POC Collection Time: 04/01/19 11:59 AM  
Result Value Ref Range Glucose (POC) 321 (H) 65 - 100 mg/dL Performed by Quintin Fowler LABRCNT(wbc:2,hgb:2,hct:2,plt:2,) Recent Labs 04/01/19 
0411 03/31/19 
4626 03/30/19 
0424  139 137  
K 3.8 3.6 4.1 * 107 108 CO2 22 25 15* BUN 43* 44* 48* CREA 0.68 0.75 1.37* * 369* 229* CA 7.1* 7.0* 6.7* MG 2.5*  --  2.1 PHOS 1.6* 1.7* 4.1 Recent Labs  
  03/30/19 
0419 INR 2.0*  
PTP 19.9* Recent Labs  
  03/30/19 0419 SGOT 40* AP 66  
TP 7.1 ALB 2.5*  
GLOB 4.6* Lab Results Component Value Date/Time Folate 24.6 (H) 03/01/2016 11:20 AM  
 
Impression: 
  Severe sepsis with shock E coli Urosepsis with bacteremia Hepatic encephalopathy Cirrhosis Colitis on CT Trace ascites on CT Acute renal failure Coagulopathy Leukocytosis Plan: 
E coli with urosepsis with bacteremia. She remains critically ill requiring ventilatory and BP support. 
 
-ammonia better on lactulose enemas and rectal tube in place 
-U/S 3/29 with trace ascites 
-admission CT with R sided colitis for which consider repeating interval CT scan to see if this has improved or not 
-continue current ICU support Tylor Castellon MD 
 
4/1/2019 [address] [facility], [address] [id number] Loc: [phone]

## 2019-04-01 NOTE — DIABETES MGMT
DTC Progress Note Recommendations/ Comments: Pt discussed with rounding team and Dr. Rodri Bautista - increasing NPH to 25 units linked to Solu-Medrol doses. Current hospital DM medications:  NPH 15 units linked to Solumedrol 40 mg q 12 hours. Lispro correctional inulin q 6 hours - high sensitivity. Chart reviewed on Mili Limon during Multidisciplinary Rounds. Pt is on Lantus 110 units daily at home. A1c:  
Lab Results Component Value Date/Time Hemoglobin A1c 4.8 03/29/2019 02:56 PM  
 
Recent Glucose Results:  
Lab Results Component Value Date/Time  (H) 04/01/2019 04:11 AM  
 GLUCPOC 346 (H) 04/01/2019 05:45 AM  
 GLUCPOC 312 (H) 03/31/2019 11:57 PM  
 GLUCPOC 317 (H) 03/31/2019 05:03 PM  
  
 
Lab Results Component Value Date/Time Creatinine 0.68 04/01/2019 04:11 AM  
 
Estimated Creatinine Clearance: 97.2 mL/min (based on SCr of 0.68 mg/dL). Active Orders Diet DIET NPO With [employer] PO intake: No data found. Will continue to follow as needed. Thank you. Samanta Haddad RD CDE [facility] Time spent: 15 minutes

## 2019-04-01 NOTE — PROGRESS NOTES
04/01/19 9857 ABCDEF Bundle SBT Safety Screen Passed Yes SBT Trial Passed Yes Weaning Parameters Spontaneous Breathing Trial Complete Yes Resp Rate Observed 21 Ve 11.3  RSBI 39 Passed SBTS, Patient still on spontaneous mode.

## 2019-04-01 NOTE — WOUND CARE
Wound care nurse unable to complete consult today. Patient just got an enema and staff nurse requested that I not turn patient for another 15-20 minutes. Will see patient tomorrow. No open wounds.  
 
Lisa Bustillo RN, [employer]

## 2019-04-02 PROBLEM — J96.02 ACUTE RESPIRATORY FAILURE WITH HYPERCAPNIA (HCC): Status: ACTIVE | Noted: 2019-01-01

## 2019-04-02 PROBLEM — Z71.89 COUNSELING REGARDING ADVANCE CARE PLANNING AND GOALS OF CARE: Status: ACTIVE | Noted: 2019-01-01

## 2019-04-02 NOTE — DIABETES MGMT
DTC Progress Note Recommendations/ Comments: Pt discussed with rounding team and Dr. Alissa Brush -starting Lantus 40 units today for basal coverage. Current hospital DM medications:  NPH 30 units linked to Solumedrol 40 mg q 12 hours. Lispro correctional inulin q 6 hours - high sensitivity. Chart reviewed on Carloz Macias during Multidisciplinary Rounds. Pt is on Lantus 110 units daily at home. A1c:  
Lab Results Component Value Date/Time Hemoglobin A1c 4.8 03/29/2019 02:56 PM  
 
Recent Glucose Results:  
Lab Results Component Value Date/Time  (H) 04/02/2019 04:07 AM  
 GLUCPOC 425 (H) 04/02/2019 12:58 PM  
 GLUCPOC 449 (H) 04/02/2019 05:57 AM  
 GLUCPOC 380 (H) 04/01/2019 11:17 PM  
  
 
Lab Results Component Value Date/Time Creatinine 0.84 04/02/2019 04:07 AM  
 
Estimated Creatinine Clearance: 78.2 mL/min (based on SCr of 0.84 mg/dL). Active Orders Diet DIET NPO  
  
 
PO intake: No data found. Will continue to follow as needed. Thank you, Kelsey Bridges RD [facility] Time spent: 4 minutes

## 2019-04-02 NOTE — PROGRESS NOTES
Nutrition Assessment: 
 
RECOMMENDATIONS:  
Consider initiation of TF via OGT (pt has been NPO x 5 days): Recommend starting Osmolite 1.2 @ 15mL/h, advance rate 10mL q 8h as tolerated to Goal Rate of 65mL/h + 75mL H2O flush q 4h (provides 1872kcals/87gPro/1729mL) Monitor K, Phos, Mag daily and replete prn DIETITIANS INTERVENTIONS/PLAN:  
TF recommendations Monitor plan of care ASSESSMENT:  
Pt admitted with hepatic encephalopathy. PMH: COPD, cirrhosis. Chart reviewed, case discussed during CCU rounds. Pt intubated and sedated with propofol @ 8.6mL/h, which provides 227kcals daily. OGT to suction. She had colitis upon admit but per imaging this has resolved. Gi is following, would like them to weigh in before starting TF. Flexiseal in place, as pt has been getting lactulose, made prn today. -449, DTC following and making recommendations. Provided TF recommendations above which in addition to propofol kcals would meet 104% kcal and protein needs. SUBJECTIVE/OBJECTIVE:  
Pt intubated and sedated Diet Order: NPO 
% Eaten:  No data found. Pertinent Medications:rocephin, pepcid, lasix, lantus, insulin NPH, vancomycin, solumedrol, neutraphos; [email or web address]); Drips: propofol, precedex. Chemistries: 
Lab Results Component Value Date/Time Sodium 147 (H) 04/02/2019 04:07 AM  
 Potassium 4.1 04/02/2019 04:07 AM  
 Chloride 117 (H) 04/02/2019 04:07 AM  
 CO2 23 04/02/2019 04:07 AM  
 Anion gap 7 04/02/2019 04:07 AM  
 Glucose 432 (H) 04/02/2019 04:07 AM  
 BUN 46 (H) 04/02/2019 04:07 AM  
 Creatinine 0.84 04/02/2019 04:07 AM  
 BUN/Creatinine ratio 55 (H) 04/02/2019 04:07 AM  
 GFR est AA >60 04/02/2019 04:07 AM  
 GFR est non-AA >60 04/02/2019 04:07 AM  
 Calcium 7.3 (L) 04/02/2019 04:07 AM  
 AST (SGOT) 35 04/02/2019 04:07 AM  
 Alk.  phosphatase 80 04/02/2019 04:07 AM  
 Protein, total 6.8 04/02/2019 04:07 AM  
 Albumin 2.3 (L) 04/02/2019 04:07 AM  
 Globulin 4.5 (H) 04/02/2019 04:07 AM  
 A-G Ratio 0.5 (L) 04/02/2019 04:07 AM  
 ALT (SGPT) 63 04/02/2019 04:07 AM  
  
Anthropometrics: Height: 5' 2\" (157.5 cm) Weight: 105.6 kg (232 lb 12.9 oz)  [x]bed scale (4/2)   []stated   []unknown IBW (%IBW):   ( ) UBW (%UBW):   (  %) BMI: Body mass index is 42.58 kg/m². This BMI is indicative of: 
[]Underweight   []Normal   []Overweight   [] Obesity   [x] Extreme Obesity (BMI>40) Estimated Nutrition Needs (Based on): 2021 Kcals/day(PSU ([facility]40)) , 84 g(0.8gPro/kg) Protein Carbohydrate: At Least 130 g/day  Fluids: 2000 mL/day or per MD 
 
Last BM: flexiseal   []Active     []Hyperactive  [x]Hypoactive       [] Absent   BS Skin:    [x] Intact   [] Incision  [] Breakdown   [] DTI   [x] Tears/Excoriation/Abrasion  []Edema [] Other: Wt Readings from Last 30 Encounters:  
04/02/19 105.6 kg (232 lb 12.9 oz) 03/19/19 101.5 kg (223 lb 12.8 oz) 01/01/19 87.8 kg (193 lb 9 oz)  
11/19/18 84.8 kg (187 lb)  
11/13/18 84.7 kg (186 lb 11.7 oz)  
11/13/18 84.7 kg (186 lb 11.7 oz) 10/16/18 86.7 kg (191 lb 3.2 oz) 10/11/18 89.3 kg (196 lb 12.8 oz) 07/10/18 83.6 kg (184 lb 3.2 oz) 05/31/18 86.6 kg (191 lb) 04/12/18 86.7 kg (191 lb 3.2 oz) 03/14/18 90.4 kg (199 lb 4.7 oz) 10/06/17 88.5 kg (195 lb 3.2 oz)  
10/02/17 95.3 kg (210 lb) 08/06/17 95.3 kg (210 lb) 07/06/17 95.3 kg (210 lb) 05/05/17 93 kg (205 lb) 04/06/17 93 kg (205 lb)  
03/28/17 93.8 kg (206 lb 12.7 oz) 02/13/17 98.1 kg (216 lb 6 oz) 01/24/17 95.7 kg (211 lb)  
11/19/16 97.5 kg (215 lb)  
11/17/16 97.5 kg (215 lb)  
11/17/16 96.6 kg (213 lb)  
11/10/16 96.9 kg (213 lb 10 oz)  
11/10/16 96.1 kg (211 lb 12.8 oz) 09/06/16 94.3 kg (207 lb 12.8 oz) 08/29/16 93 kg (205 lb) 08/15/16 95.2 kg (209 lb 12.8 oz) 07/18/16 93.4 kg (205 lb 12.8 oz) NUTRITION DIAGNOSES:  
Problem:  Inadequate protein-energy intake Etiology: related to pt NPO x 5 days Signs/Symptoms: as evidenced by NPO meets 0% kcal and protein needs. NUTRITION INTERVENTIONS: 
  Enteral/Parenteral Nutrition: Initiate enteral nutrition GOAL:  
Pt will be started on nutrition support in 2-3 days. Cultural, Gnosticism, or Ethnic Dietary Needs: None LEARNING NEEDS (Diet, Food/Nutrient-Drug Interaction):  
 [x] None Identified 
 [] Identified and Education Provided/Documented 
 [] Identified and Pt declined/was not appropriate [x] Interdisciplinary Care Plan Reviewed/Documented  
 [x] Participated in Discharge Planning: Unable to determine 
 [x] Interdisciplinary Rounds NUTRITION RISK:  
 [x] High              [] Moderate           []  Low  []  Minimal/Uncompromised PT SEEN FOR:  
 []  MD Consult: []Calorie Count []Diabetic Diet Education []Diet Education []Electrolyte Management []General Nutrition Management and Supplements []Management of Tube Feeding []TPN Recommendations []  RN Referral:  []MST score >=2 
   []Enteral/Parenteral Nutrition PTA []Pregnant: Gestational DM or Multigestation  
[]  Low BMI []  Re-Screen  
[]  LOS [x]  NPO/clears x 5 days []  New TF/TPN Humberto Chong RD, [facility] Pager [phone] Weekend Pager [phone]

## 2019-04-02 NOTE — PROGRESS NOTES
Hospitalist Progress Note NAME: Chantell Granados :  1955 MRN:  [id number] Assessment / Plan: 
Acute hypercapnic respiratory failure s/p intubation on 3/29 Septic shock, POA Hypotension, Fever, Lactic acidosis Ecoli, enterococcus UTI and bacteremia Colitis, infectious vs ischemic 
-still with fever but WBC continuing to improve 
-KUB showed no significant bowel dilation. Repeat KUB showed gas and stool in the large bowel 
-CT a/p showed resolved mural thickening in the ascending colon. Rectal tube and gastric tube present. Interval development of ascites. Cirrhosis with splenomegaly and esophageal varices. Patchy opacities in the lung bases may represent atelectasis or airspace disease. 
-cont' rectal tube 
-CXR with no acute finding, CT chest showed R colitis, cirrhosis with portal venous HTN, clear lungs -Bcx and Ucx growing Ecoli, repeat Bcx neg so far 
-cont' IVF, pressor support, sedation, wean as tolerated 
-abx changed to IV rocephin and PO vancomycin  
-lactulose changed to PO 
-hold propranolol, lasix, aldactone. losartan 
-ventilatory support, nebs, IV steroids KALI with metabolic acidosis, resolved Lactic acidosis Hypomag, repleted Hypophos, repleted 
-cont' IVF 
-avoid nephrotoxic agent, monitor bmp Acute encephalopathy, POA due to HE Right leg weakness, POA Mild left lip droop, POA Hx TIA  
-suspect encephalopathy due to hepatic encephalopathy, POA and sepsis. CT head negative. 
-cont' lactulose, xifaxin  
-hold tramadol, amitriptyline 
-continue aspirin 
  
DM initially hypoglycemia, now with hyperglycemia in the setting of steroid use 
-initially requiring D10 bolus for EMS and D50 twice in ER. 
-cont' NPH and lantus 
-change SSI to normal sensitivity  
-hold metformin 
  
Right leg edema  
-patient reports this is chronic 
  
COPD 
IBIS 
-nebs Obesity  
Body mass index is 38.31 kg/m². 
  
Code:  full DVT prophylaxis:  SCD 
 Surrogate decision maker:   Subjective: Chief Complaint / Reason for Physician Visit Pt seen, remains intubated and sedated. Discussed with RN events overnight. Review of Systems: 
Symptom Y/N Comments  Symptom Y/N Comments Fever/Chills    Chest Pain Poor Appetite    Edema Cough    Abdominal Pain Sputum    Joint Pain SOB/MARTINEZ    Pruritis/Rash Nausea/vomit    Tolerating PT/OT Diarrhea    Tolerating Diet Constipation    Other Could NOT obtain due to: encephalopathy Objective: VITALS:  
Last 24hrs VS reviewed since prior progress note. Most recent are: 
Patient Vitals for the past 24 hrs: 
 Temp Pulse Resp BP SpO2  
04/02/19 1526  95 17  99 % 04/02/19 1525     95 % 04/02/19 1400  (!) 108 21 109/55 99 % 04/02/19 1300  (!) 106 23 105/49 98 % 04/02/19 1211     99 % 04/02/19 1200 100.1 °F (37.8 °C) 95 21 105/45 99 % 04/02/19 1100  98 21 105/46 99 % 04/02/19 1000  100 21 94/45 99 % 04/02/19 0900  (!) 104 18 101/51 99 % 04/02/19 0840     99 % 04/02/19 0837  97 22  99 % 04/02/19 0800 99.8 °F (37.7 °C) 95 19 106/52 99 % 04/02/19 0700  94 18 106/55 100 % 04/02/19 0600  98 18 112/52 100 % 04/02/19 0500  (!) 103 20 115/51 99 % 04/02/19 0415   20    
04/02/19 0414     99 % 04/02/19 0400 99 °F (37.2 °C) 100 23 117/50 99 % 04/02/19 0300  (!) 107 26 126/61 97 % 04/02/19 0200  (!) 106 22 129/59 99 % 04/02/19 0100  (!) 112 23 129/62 99 % 04/02/19 0000  (!) 110 21 121/50 100 % 04/01/19 2330 99.9 °F (37.7 °C) (!) 115 22 139/62 100 % 04/01/19 2325   23  100 % 04/01/19 2300 99.9 °F (37.7 °C) (!) 114 24 95/70 100 % 04/01/19 2200  (!) 118 26 135/63 100 % 04/01/19 2100  (!) 124 29 139/57 99 % 04/01/19 2000  (!) 119 25 108/51 100 % 04/01/19 1953   25    
04/01/19 1952     100 % 04/01/19 1930 (!) 101.3 °F (38.5 °C) (!) 116 22 114/52 99 % 04/01/19 1900  (!) 111 (!) 31 120/57 100 % 04/01/19 1800  (!) 115 23 118/54 100 % 04/01/19 1730  (!) 119 26 119/58 100 % 04/01/19 1715  (!) 118 24 125/57 100 % 04/01/19 1700  (!) 118 24 125/56 100 % 04/01/19 1645  (!) 121 27 129/57 100 % 04/01/19 1630  (!) 120 26 115/53 99 % 04/01/19 1615    117/56  Intake/Output Summary (Last 24 hours) at 4/2/2019 1613 Last data filed at 4/2/2019 1525 Gross per 24 hour Intake 2275.82 ml Output 2455 ml Net -179.18 ml PHYSICAL EXAM: 
General: Female intubated, NAD 
EENT:  OGT and ETT in place Resp:  CTA b/l. No wheezing  No accessory muscle use CV:  Regular  rhythm,  No edema GI:  Soft, Non distended, Non tender.  +BS Neurologic:  Intubated and sedated. Neuro exam limited Psych:   Unable to do Skin:  No rashes. No jaundice Reviewed most current lab test results and cultures  YES Reviewed most current radiology test results   YES Review and summation of old records today    NO Reviewed patient's current orders and MAR    YES 
PMH/ reviewed - no change compared to H&P 
________________________________________________________________________ Care Plan discussed with: 
  Comments Patient x Family RN x Care Manager Consultant Multidiciplinary team rounds were held today with , nursing, pharmacist and clinical coordinator. Patient's plan of care was discussed; medications were reviewed and discharge planning was addressed. ________________________________________________________________________ Total NON critical care TIME:  35   Minutes Total CRITICAL CARE TIME Spent:   Minutes non procedure based Comments >50% of visit spent in counseling and coordination of care    
________________________________________________________________________ Gilford Lack, MD  
 
Procedures: see electronic medical records for all procedures/Xrays and details which were not copied into this note but were reviewed prior to creation of Plan. LABS: 
I reviewed today's most current labs and imaging studies. Pertinent labs include: 
Recent Labs 04/02/19 0407 04/01/19 0411 03/31/19 
7532 WBC 11.4* 22.8* 28.0* HGB 8.1* 8.2* 8.6* HCT 27.0* 27.0* 27.6* PLT 79* 73* 64* Recent Labs 04/02/19 0407 04/01/19 0411 03/31/19 
7814 * 144 139  
K 4.1 3.8 3.6 * 112* 107 CO2 23 22 25 * 338* 369* BUN 46* 43* 44* CREA 0.84 0.68 0.75 CA 7.3* 7.1* 7.0*  
MG  --  2.5*  --   
PHOS 1.9* 1.6* 1.7* ALB 2.3*  --   --   
TBILI 0.7  --   --   
SGOT 35  --   --   
ALT 63  --   --   
INR 1.4*  --   --   
 
 
Signed: Ale Addison MD

## 2019-04-02 NOTE — PROGRESS NOTES
Gastroenterology Daily Progress Note (Dr. Angi Jones for Dr. Cyndi Mancia) [facility] Admit Date: 3/28/2019 Subjective: Many BMs with rectal tube in place. She remains on precedex. Intubated. Current Facility-Administered Medications Medication Dose Route Frequency  potassium, sodium phosphates (NEUTRA-PHOS) packet 1 Packet  1 Packet Oral BID  insulin NPH (NOVOLIN N, HUMULIN N) injection 30 Units  30 Units SubCUTAneous Q12H And  
 methylPREDNISolone (PF) (SOLU-MEDROL) injection 40 mg  40 mg IntraVENous Q12H  
 balsam peru-castor oil (VENELEX) ointment   Topical BID  cefTRIAXone (ROCEPHIN) 2 g in 0.9% sodium chloride (MBP/ADV) 50 mL  2 g IntraVENous Q24H  
 famotidine (PF) (PEPCID) 20 mg in sodium chloride 0.9% 10 mL injection  20 mg IntraVENous Q12H  
 0.9% sodium chloride infusion  75 mL/hr IntraVENous CONTINUOUS  
 lactulose (CHRONULAC) 10 gram/15 mL solution 200 g  200 g Rectal Q6H  
 vancomycin (VANCOCIN) 1250 mg in  ml infusion  1,250 mg IntraVENous Q12H  
 dexmedeTOMidine (PRECEDEX) 400 mcg in 0.9% sodium chloride 100 mL infusion  0-1.4 mcg/kg/hr IntraVENous TITRATE  albuterol (PROVENTIL VENTOLIN) nebulizer solution 2.5 mg  2.5 mg Nebulization Q4H RT  
 amitriptyline (ELAVIL) tablet 100 mg  100 mg Oral QHS  propofol (DIPRIVAN) infusion  0-50 mcg/kg/min IntraVENous TITRATE  acetaminophen (TYLENOL) solution 650 mg  650 mg Per NG tube Q4H PRN  chlorhexidine (PERIDEX) 0.12 % mouthwash 15 mL  15 mL Oral Q12H  
 NOREPINephrine (LEVOPHED) 32 mg in 5% dextrose 250 mL infusion  2-30 mcg/min IntraVENous TITRATE  sodium chloride (NS) flush 5-40 mL  5-40 mL IntraVENous Q8H  
 sodium chloride (NS) flush 5-40 mL  5-40 mL IntraVENous PRN  
 acetaminophen (TYLENOL) suppository 650 mg  650 mg Rectal Q6H PRN  
 ondansetron (ZOFRAN) injection 4 mg  4 mg IntraVENous Q6H PRN  
  albuterol-ipratropium (DUO-NEB) 2.5 MG-0.5 MG/3 ML  3 mL Nebulization Q4H PRN  
 insulin lispro (HUMALOG) injection   SubCUTAneous Q6H  
 glucose chewable tablet 16 g  4 Tab Oral PRN  
 dextrose (D50) infusion 12.5-25 g  12.5-25 g IntraVENous PRN  
 glucagon (GLUCAGEN) injection 1 mg  1 mg IntraMUSCular PRN  
 mupirocin (BACTROBAN) 2 % ointment   Both Nostrils BID  aspirin delayed-release tablet 81 mg  81 mg Oral DAILY Objective:  
 
Visit Vitals /55 Pulse 94 Temp 99 °F (37.2 °C) Resp 18 Ht 5' 2\" (1.575 m) Wt 105.6 kg (232 lb 12.9 oz) SpO2 100% BMI 42.58 kg/m² Blood pressure 106/55, pulse 94, temperature 99 °F (37.2 °C), resp. rate 18, height 5' 2\" (1.575 m), weight 105.6 kg (232 lb 12.9 oz), SpO2 100 %. No intake/output data recorded. 03/31 1901 - 04/02 0700 In: 5381.1 [I.V.:5021.1] Out: 1396 [Urine:1525; Drains:5150] Intake/Output Summary (Last 24 hours) at 4/2/2019 3114 Last data filed at 4/2/2019 0700 Gross per 24 hour Intake 4116.03 ml Output 3975 ml Net 141.03 ml Physical Exam:  
 
General: obese, intubated WF in NAD Chest:  CTA, No rhonchi, rales or rubs. Heart: tachycardic, S1S2 normal 
GI: Less distended soft, nontender, + BS Labs:  
 
Recent Results (from the past 24 hour(s)) GLUCOSE, POC Collection Time: 04/01/19 11:59 AM  
Result Value Ref Range Glucose (POC) 321 (H) 65 - 100 mg/dL Performed by Daniel Hilario GLUCOSE, POC Collection Time: 04/01/19  5:53 PM  
Result Value Ref Range Glucose (POC) 364 (H) 65 - 100 mg/dL Performed by Helga SOSA)   
GLUCOSE, POC Collection Time: 04/01/19 11:17 PM  
Result Value Ref Range Glucose (POC) 380 (H) 65 - 100 mg/dL Performed by Maile Jordna Collection Time: 04/02/19  4:07 AM  
Result Value Ref Range INR 1.4 (H) 0.9 - 1.1 Prothrombin time 14.1 (H) 9.0 - 11.1 sec  
 aPTT 26.2 22.1 - 32.0 sec aPTT, therapeutic range     58.0 - 77.0 SECS Fibrinogen 356 200 - 475 mg/dL CBC W/O DIFF Collection Time: 04/02/19  4:07 AM  
Result Value Ref Range WBC 11.4 (H) 3.6 - 11.0 K/uL  
 RBC 3.01 (L) 3.80 - 5.20 M/uL HGB 8.1 (L) 11.5 - 16.0 g/dL HCT 27.0 (L) 35.0 - 47.0 % MCV 89.7 80.0 - 99.0 FL  
 MCH 26.9 26.0 - 34.0 PG  
 MCHC 30.0 30.0 - 36.5 g/dL  
 RDW 18.6 (H) 11.5 - 14.5 % PLATELET 79 (L) 208 - 400 K/uL MPV 12.3 8.9 - 12.9 FL  
 NRBC 0.4 (H) 0  WBC ABSOLUTE NRBC 0.04 (H) 0.00 - 0.01 K/uL METABOLIC PANEL, COMPREHENSIVE Collection Time: 04/02/19  4:07 AM  
Result Value Ref Range Sodium 147 (H) 136 - 145 mmol/L Potassium 4.1 3.5 - 5.1 mmol/L Chloride 117 (H) 97 - 108 mmol/L  
 CO2 23 21 - 32 mmol/L Anion gap 7 5 - 15 mmol/L Glucose 432 (H) 65 - 100 mg/dL BUN 46 (H) 6 - 20 MG/DL Creatinine 0.84 0.55 - 1.02 MG/DL  
 BUN/Creatinine ratio 55 (H) 12 - 20 GFR est AA >60 >60 ml/min/1.73m2 GFR est non-AA >60 >60 ml/min/1.73m2 Calcium 7.3 (L) 8.5 - 10.1 MG/DL Bilirubin, total 0.7 0.2 - 1.0 MG/DL  
 ALT (SGPT) 63 12 - 78 U/L  
 AST (SGOT) 35 15 - 37 U/L Alk. phosphatase 80 45 - 117 U/L Protein, total 6.8 6.4 - 8.2 g/dL Albumin 2.3 (L) 3.5 - 5.0 g/dL Globulin 4.5 (H) 2.0 - 4.0 g/dL A-G Ratio 0.5 (L) 1.1 - 2.2 AMMONIA Collection Time: 04/02/19  4:07 AM  
Result Value Ref Range Ammonia 39 (H) <32 UMOL/L  
PHOSPHORUS Collection Time: 04/02/19  4:07 AM  
Result Value Ref Range Phosphorus 1.9 (L) 2.6 - 4.7 MG/DL Dominik Mosley Collection Time: 04/02/19  5:48 AM  
Result Value Ref Range Vancomycin,trough 12.7 (H) 5.0 - 10.0 ug/mL Reported dose date: NOT PROVIDED Reported dose time: NOT PROVIDED Reported dose: NOT PROVIDED UNITS  
GLUCOSE, POC Collection Time: 04/02/19  5:57 AM  
Result Value Ref Range Glucose (POC) 449 (H) 65 - 100 mg/dL  Performed by Anel Sandoval   
 LABRCNT(wbc:2,hgb:2,hct:2,plt:2,) Recent Labs 04/02/19 
0407 04/01/19 
0411 03/31/19 
0386 * 144 139  
K 4.1 3.8 3.6 * 112* 107 CO2 23 22 25 BUN 46* 43* 44* CREA 0.84 0.68 0.75 * 338* 369* CA 7.3* 7.1* 7.0*  
MG  --  2.5*  --   
PHOS 1.9* 1.6* 1.7* Recent Labs 04/02/19 
0407 INR 1.4* PTP 14.1* APTT 26.2 Recent Labs 04/02/19 
0407 SGOT 35 AP 80  
TP 6.8 ALB 2.3*  
GLOB 4.5* Lab Results Component Value Date/Time Folate 24.6 (H) 03/01/2016 11:20 AM  
 
Impression: 
  Severe sepsis with shock E coli Urosepsis with bacteremia Hepatic encephalopathy Cirrhosis Colitis on CT: resolved Increased ascites on CT Acute renal failure Coagulopathy Leukocytosis Plan: 
Still with fever requiring aggressive support. For ongoing fever spikes consider paracentesis to r/o SBP but already on broad spectrum antibiotics that would be treating SBP. -consider PO lactulose via OG now that she has minimal outputs and resumption of bowel function 
-CT with resolved colitis 
-treating her proven sepsis source per primary team 
  
 
 
Maggie Hunt MD 
 
4/2/2019 [address] [facility], [address] [address] Loc: [phone]

## 2019-04-02 NOTE — PROGRESS NOTES
Pharmacy Automatic Renal Dosing Protocol - Antimicrobials Indication for Antimicrobials: Septic shock (GNR bacteremia; UTI; Possible CDIFF colitis) Current Regimen of Each Antimicrobial: 
Ceftriaxone 2 grams IV every 24 hours (Started 19; Day #4 of 10 of appropriate therapy from first negative blood culture) Vancomycin 1250 mg IV every 12 hours (Started 3/28/19; Day #6 of 7) Previous Antimicrobial Therapy: 
Cefepime 2 gram IV every 8 hours (Started 3/28/19; Stopped 19) Levofloxacin 750 mg IV every 48 hours (Started 3/28/19; Stopped 19) Metronidazole 500 mg IV every 8 hours (Started 3/28/19; Stopped 19) Vancomycin 500 mg PO every 6 hours (Started 3/29/19; Stopped 19) Goal Vancomycin Trough: 10-15 mcg/mL Vancomycin Level Assessments:  
Date Dose & Interval Measured (mcg/mL) Extrapolated (mcg/mL)  
19 1250mg q12h 12.7 13.3 Significant Cultures:  
3/30/19 Blood culture = No growth x 3 days (Results pending) 3/28/19 Blood culture = Ecoli in 2/2 (FINAL) 3/28/19 Urine culture = Greater than 100K CFU/mL Ecoli; Greater than 100K CFU/mL Enterococcus (FINAL) 3/28/19 Blood culture = Ecoli in 2/2 (FINAL) 3/28/19 Influenza = Negative (FINAL) Labs: 
Recent Labs 19 
0407 19 
0411 19 
1456 CREA 0.84 0.68 0.75 BUN 46* 43* 44* WBC 11.4* 22.8* 28.0* Temp (24hrs), Av.8 °F (37.7 °C), Min:99 °F (37.2 °C), Max:101.3 °F (38.5 °C) Creatinine Clearance (mL/min) or Dialysis: 54 mL/min (IBW) Impression/Plan: · Vancomycin trough at goal.  Continue 1250 mg every 12 hours. · Continue ceftriaxone 2 gm every 24 hours · Duration: 7 days vancomycin, 10 days ceftriaxone Pharmacy will follow daily and adjust medications as appropriate for renal function and/or serum levels. Thank you, Sandip Castillo, PHARMD

## 2019-04-02 NOTE — PROGRESS NOTES
2000, Bedside and Verbal shift change report given to Shorty Marina RN (oncoming nurse) by Leslie Ramirez RN (offgoing nurse). Report included the following information SBAR, Kardex, Intake/Output, MAR, Accordion, Recent Results, Med Rec Status and Cardiac Rhythm Sinus Tachycardia 116 . Temp;  101.3 > tylenol > A febrile at am  
Neuro; on Precedex at 0.7 dara/kg/hr, spontaneous eye opening, no movement at upper limbs, spontaneous movent at feet and withdraw to pain, no follow command, no eyes contact, pupils reactive,   GCS;  Eye; 3-4, verbal; 1 for ETT, motor; 4. 
Reassessment;  Propofol added for gasp like breathing, turned off at am, Preceded titrated up to 0.9 dara, now at 0.7 dara. Respiratory; Sat 98% on Ventilator A/C mode, , RR 12/ pt's 26, Peep 6, FiO2 40%, ETT 22 cm at lips, secretion; small, thick tan, coarse diminished lung sounds. Reassessment; Passed SBT > still not ready for extubation for AMS Cardiac; Sinus Tachycardia, 116 B/min, NIBP 108/51 mmHg, on Levophed at 4 dara/min. Reassessment;  HR now NRS 90s, Levophed titrated off 
GI; NPO with OG tube ( 60 cm at lips) connected to suction, nil, obese Abd semi soft with hypoactive bowel sound, Ascitis, on NS at 75 ml/hr. Flexiseal present Reassessment; Moderate loose brown BM after Lactulose enema, Renal; norman cath with adequate urine out put sedimentation present. Reassessment; Adequate UOP Skin; blanchable redness at buttock, ecchymosis at arms. Edema +2 in general  
Endo; SSI every 6 hours, Glucose 380-449 > 10 units Lispro insulin SQ given twice over night as per Hospitalist on call Lines; ETT, right IJ CVP,OGT, Norman. Code; Full. Lab;  
DVT; SCD both legs. Plan; titrate sedation for RASS -1, ventilator management, titrate levophed as toleratedpain and agitation management, follow lab tomorrow,   
0700, Bedside and Verbal shift change report given to Abdoul Lennon RN (oncoming nurse) by Oro Ambershire, RN (offgoing nurse).  Report included the following information SBAR, Kardex, Intake/Output, MAR, Accordion, Recent Results, Med Rec Status and Cardiac Rhythm NSR.

## 2019-04-02 NOTE — PROGRESS NOTES
The patient was resting in bed, intubated and sedated. The spouse was at bedside. I introduced myself and confirmed that the visitor was the spouse. I explained the purpose of the visit. The spouse seemed unmoved and he appeared to be saddened by the patient's condition. I mentioned that it appears that chaplains have been to visit with the patient and I informed the spouse that since the patient is Mu-ism, a  is available. I asked if there is any support that I could provide to him today, the patient replied no. It's all in her hands now. The spouse may be receptive to talking at a later time. It appears that the spouse is in need of support. Rev. Alfonso Sánchez EdD MDiv Palliative  Fellow For [facility] Page [phone])

## 2019-04-02 NOTE — PROGRESS NOTES
[facility], Critical Care, and Sleep Medicine Name: Roberth Thomas MRN: [id number] : 1955 Hospital: [address] [facility] Date: 2019 Critical Care  Patient Consult IMPRESSION:  
· Septic with shock-  
· Ecoli septicemia; weaned off levophed. Hold all BP meds. · Acute hypoxic resp failure- intubated 3/29. Oxygenating adequately, 40%,Peep 6 · Cirrhosis, noted in past to have esophageal varices. seems decompensated, Gi following · Hepatic Encephalopathy (ammonia of 110 on admission), not compliant she has been with her home regimen. · Acute UTI-UCx growing Ecoli. Enterococcus · Right sided colitis  Noted. ?ischemic or infectious. Possible C Diff. Although little stooling currently; not likley; CT better · Anemia · Coagulopathy -INR 2.0 ?liver dz vs DIC 
· MIld Thrombocytopenia-?liver dz vs DIC · Hyperglycemia-likely combination of sepsis and steroids. · COPD with acute exacerbation · Ileus · IBIS on CPAP at home. · Obese. · Ongoing Smoking · D/w   : Rick Notice: [phone]. Updated today · Critically ill, 35 min CC, EOP. Multiple organ failure. High risk of decompensation. RECOMMENDATIONS:  
· CCU monitoring · Not ready to liberate but will try extended CPAP trial today · Rest at night or sooner prn · Mechanical ventilation- not ready for extubation given hemodynamic instability and AMS; also concerned about abdominal distension, restrictive process · Hold on paracentesis. Don't think diagnositic tap needed and not enough could be removed to be therapeutic; explained this to  by phone · IV diuretics · Watch renal function · adjsut ABX · Cont precedex, Prop for sedation. Target RASS of zero to minus 1.  
· Lactulose · NH3 in AM 
· Insulin adjusted · Nebs scheduled. · Wean steroids for acute COPD exacerbation · CVP to eval volume status · Replete Electrolytes Subjective/History: 4/2 On SBT, CT abd reviewed from other day. Colitis better. Mild ascites. Distended bowels. Cirtrhosis, varices 4/1 flexiseal after lactulose started. On IV flagyl, Po vancomycin. IV abx for sepsis. On vent. PO4 1.6; glucose high 
 
3-31:no acute events overnight. Little to no stooling. Remains on vasopressors and mechanical ventilation 3-30-pt intubated yesterday. No acute events overnight. Remains sedated on Norepi gtt 3-29-19: Pt seen this am. She is still very confused. Has ongoing needed for pressors. Has wheezing. Her WBC has increased. She is not able to give hx of HPI or ROS. This patient has been seen and evaluated at the request of Dr. Johnathon Arreola for above. Pt was seen in ER. Patient is a 61 y.o. female with hx of above. Noted to have increased confusion per her family. Has not been compliant with her meds. Noted to have weakness and inability to walk. Was was normal the night before. Noted when she awoke to be confused, had generalized weakness. Has a very dry  Mouth when seen in ER. ROS and HPI limited due to pts confused state. Past Medical History:  
Diagnosis Date  Asthma  Back pain  COPD (chronic obstructive pulmonary disease) (HCC)  Diabetes ([address]  Esophageal varices in cirrhosis ([address]   
 6/2014 banding x 2  
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  
Procedure Laterality Date  HX BACK SURGERY    
 HX CARPAL TUNNEL RELEASE    
 on right  HX HYSTERECTOMY plus 1/2 of an ovary removed  [place] COLSC FLX W/RMVL OF TUMOR POLYP LESION SNARE TQ  5/30/2013  UPPER GI ENDOSCOPY,LIGAT VARIX  2/6/2015 Prior to Admission medications Medication Sig Start Date End Date Taking? Authorizing Provider  
fluticasone furoate-vilanterol (BREO ELLIPTA) 200-25 mcg/dose inhaler Take 1 Puff by inhalation daily. Provider, Historical  
insulin glargine (LANTUS SOLOSTAR U-100 INSULIN) 100 unit/mL (3 mL) inpn 110 Units by SubCUTAneous route daily. Provider, Historical  
furosemide (LASIX) 40 mg tablet Take 2 Tabs by mouth daily. 3/19/19   HUMZA Donohue  
spironolactone (ALDACTONE) 100 mg tablet Take 2 Tabs by mouth daily. 3/19/19   HUMZA Donohue  
CONSTULOSE 10 gram/15 mL solution take 2 tablespoonfuls by mouth twice a day 1/7/19   Cathi Barksdale NP  
losartan (COZAAR) 25 mg tablet take 1 tablet by mouth once daily , REPLACES LISINOPRIL FOR BLOOD PRESSURE AND KIDNEY PROTECTION 12/3/18   Ulysses Hausen, MD  
insulin NPH (NOVOLIN N NPH U-100 INSULIN) 100 unit/mL injection Inject 55 units every 12 hours--dispense relion brand--patient will pay cash 11/28/18   Ulysses Hausen, MD  
traMADol [facility]) 50 mg tablet take 1-2 tablets by mouth twice a day if needed for DIABETIC NEUROPATHY PAIN 11/27/18   Ulysses Hausen, MD  
aspirin 81 mg chewable tablet Take 1 Tab by mouth daily. 11/16/18   Alfredito Chirinos MD  
atorvastatin (LIPITOR) 40 mg tablet Take 1 Tab by mouth nightly. 11/15/18   Alfredito Chirinos MD  
potassium chloride SR (KLOR-CON 10) 10 mEq tablet Take 10 mEq by mouth two (2) times a day. Other, MD Leonarda  
omeprazole (PRILOSEC) 20 mg capsule take 1 capsule by mouth once daily 11/8/18   Cathi Barksdale NP  
amitriptyline (ELAVIL) 150 mg tablet Take 1 Tab by mouth nightly. 10/16/18   Ulysses Hausen, MD  
propranolol (INDERAL) 20 mg tablet Take 1 Tab by mouth two (2) times a day.  10/16/18   Ulysses Hausen, MD  
ferrous sulfate 325 mg (65 mg iron) tablet take 1 tablet by mouth once daily with BREAKFAST 10/2/18   Shamir April G, NP  
insulin lispro (HUMALOG [facility] INSULIN) 100 unit/mL kwikpen Inject as needed up to 3 times per day for sugars over 150: 4 units for every 50 points. Max 50 units per day 10/1/18   Rebecca Pepper MD  
metFORMIN (GLUCOPHAGE) 1,000 mg tablet take 1 tablet by mouth twice a day with meals 8/23/18   Rebecca Pepper MD  
ondansetron (ZOFRAN ODT) 4 mg disintegrating tablet dissolve 1 tablet ON TONGUE every 8 hours if needed for nausea 8/1/18   Shamir April GABRIELE, NP  
gabapentin (NEURONTIN) 600 mg tablet take 2 tablets by mouth three times a day 7/19/18   Rebecca Pepper MD  
rOPINIRole (REQUIP) 4 mg tab TAB Take 4 mg by mouth nightly. Provider, Historical  
albuterol (PROAIR HFA) 90 mcg/actuation inhaler Take 2 Puffs by inhalation every four (4) hours as needed for Wheezing. Other, MD Leonarda  
 
Current Facility-Administered Medications Medication Dose Route Frequency  potassium, sodium phosphates (NEUTRA-PHOS) packet 1 Packet  1 Packet Oral BID  insulin NPH (NOVOLIN N, HUMULIN N) injection 30 Units  30 Units SubCUTAneous Q12H And  
 methylPREDNISolone (PF) (SOLU-MEDROL) injection 40 mg  40 mg IntraVENous Q12H  
 insulin glargine (LANTUS) injection 40 Units  40 Units SubCUTAneous DAILY  balsam peru-castor oil (VENELEX) ointment   Topical BID  cefTRIAXone (ROCEPHIN) 2 g in 0.9% sodium chloride (MBP/ADV) 50 mL  2 g IntraVENous Q24H  
 famotidine (PF) (PEPCID) 20 mg in sodium chloride 0.9% 10 mL injection  20 mg IntraVENous Q12H  
 0.9% sodium chloride infusion  75 mL/hr IntraVENous CONTINUOUS  
 vancomycin (VANCOCIN) 1250 mg in  ml infusion  1,250 mg IntraVENous Q12H  
 dexmedeTOMidine (PRECEDEX) 400 mcg in 0.9% sodium chloride 100 mL infusion  0-1.4 mcg/kg/hr IntraVENous TITRATE  albuterol (PROVENTIL VENTOLIN) nebulizer solution 2.5 mg  2.5 mg Nebulization Q4H RT  
 amitriptyline (ELAVIL) tablet 100 mg  100 mg Oral QHS  propofol (DIPRIVAN) infusion  0-50 mcg/kg/min IntraVENous TITRATE  chlorhexidine (PERIDEX) 0.12 % mouthwash 15 mL  15 mL Oral Q12H  
 NOREPINephrine (LEVOPHED) 32 mg in 5% dextrose 250 mL infusion  2-30 mcg/min IntraVENous TITRATE  sodium chloride (NS) flush 5-40 mL  5-40 mL IntraVENous Q8H  
 insulin lispro (HUMALOG) injection   SubCUTAneous Q6H  
 aspirin delayed-release tablet 81 mg  81 mg Oral DAILY Allergies Allergen Reactions  Hydrocodone Nausea and Vomiting  Lisinopril Cough  Lortab [Hydrocodone-Acetaminophen] Nausea and Vomiting  Percocet [Oxycodone-Acetaminophen] Nausea and Vomiting Social History Tobacco Use  Smoking status: Current Every Day Smoker Packs/day: 1.00 Years: 40.00 Pack years: 40.00  Smokeless tobacco: Former User Substance Use Topics  Alcohol use: No  
  Comment: rare Family History Problem Relation Age of Onset  Diabetes Mother  Stroke Sister  Diabetes Paternal Aunt  Diabetes Paternal Uncle  Heart Disease Neg Hx Review of Systems: 
Review of systems not obtained due to patient factors. Objective: 
Vital Signs:   
Visit Vitals /52 (BP 1 Location: Left arm) Pulse 97 Temp 99.8 °F (37.7 °C) Resp 22 Ht 5' 2\" (1.575 m) Wt 105.6 kg (232 lb 12.9 oz) SpO2 99% BMI 42.58 kg/m² O2 Device: Ventilator O2 Flow Rate (L/min): 12 l/min Temp (24hrs), Av.8 °F (37.7 °C), Min:99 °F (37.2 °C), Max:101.3 °F (38.5 °C) Intake/Output:  
Last shift:      701 -  190 In: -  
Out: 75 [Urine:75] Last 3 shifts: 1901 -  0700 In: 5381.1 [I.V.:5021.1] Out: 1539 [Urine:1525; Drains:5150] Intake/Output Summary (Last 24 hours) at 2019 1146 Last data filed at 2019 0800 Gross per 24 hour Intake 2634.71 ml Output 2440 ml Net 194.71 ml Hemodynamics:  
PAP:   CO:    
Wedge:   CI:    
CVP:  CVP (mmHg): 17 mmHg (19 0800) SVR:    
  PVR:    
 
 Ventilator Settings: 
Mode Rate Tidal Volume Pressure FiO2 PEEP  
CPAP   500 ml  6 cm H2O 40 % 6 cm H20 Peak airway pressure: 14 cm H2O Minute ventilation: 10.4 l/min Physical Exam: 
 
General:  Obese, sedated Head:  Normocephalic, without obvious abnormality, atraumatic. Eyes:  Conjunctivae/corneas clear. PERRL, EOMs intact. Nose: Nares normal. Septum midline. Mucosa normal. No drainage or sinus tenderness. Throat: Lips, mucosa, and tongue normal. Teeth and gums normal.  
Neck: Supple, symmetrical, trachea midline, no adenopathy, thyroid: no enlargment/tenderness/nodules, no carotid bruit and no JVD. Has right neck IJ. Back:   Symmetric, no curvature. ROM normal.  
Lungs: On  auscultation bilaterally has bilateral wheezing, prolonged expiratory phase. Chest wall:  No tenderness or deformity. Heart:  Regular rate and rhythm, S1, S2 normal, no murmur, click, rub or gallop. Abdomen:   Distended, few BSl. No masses,  No organomegaly. Obese. Extremities: Extremities normal, atraumatic, no cyanosis or edema. Pulses: 2+ and symmetric all extremities. Skin: Skin color, texture, turgor normal. No rashes or lesions Lymph nodes: Cervical, supraclavicular, and axillary nodes normal.  
Neurologic: Grossly nonfocal, sleepy seems confused. Psych:+ anxiety or depression. But not able to fully eval.   
 
 
Data: 
 
 
       
Labs: 
 
Recent Labs 04/02/19 0407 04/01/19 0411 03/31/19 
8484 WBC 11.4* 22.8* 28.0* HGB 8.1* 8.2* 8.6* PLT 79* 73* 64* INR 1.4*  --   --   
APTT 26.2  --   --   
 
Recent Labs 04/02/19 0407 04/01/19 
0411 03/31/19 
0102 * 144 139  
K 4.1 3.8 3.6 * 112* 107 CO2 23 22 25 * 338* 369* BUN 46* 43* 44* CREA 0.84 0.68 0.75 CA 7.3* 7.1* 7.0*  
MG  --  2.5*  --   
PHOS 1.9* 1.6* 1.7* ALB 2.3*  --   --   
SGOT 35  --   --   
ALT 63  --   -- No results for input(s): PH, PCO2, PO2, HCO3, FIO2 in the last 72 hours. No results for input(s): CPK, CKNDX, TROIQ in the last 72 hours. No lab exists for component: CPKMB Lab Results Component Value Date/Time  (H) 03/29/2017 02:00 AM  
  
 
       
 
Telemetry:Sinus Tach. Imaging: 
I have personally reviewed the patients radiographs and have reviewed the reports: 
3-28-19: CT of chest/Abdomen: IMPRESSION:  
1. Right colitis. 2. Cirrhosis with portal venous hypertension. 3. Clear lungs.   
 
 
Edi Mathew MD

## 2019-04-02 NOTE — WOUND CARE
Wound care nurse consult from staff nurse for dark skin pigmentation to bilateral buttocks/sacrum. Patient is a 60 y/o CF admitted to CCU for hepatic encephalopathy and septic shock. Past Medical History:  
Diagnosis Date  Asthma  Back pain  COPD (chronic obstructive pulmonary disease) (HCC)  Diabetes ([facility]  Esophageal varices in cirrhosis ([facility]   
 6/2014 banding x 2  
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  
Procedure Laterality Date  HX BACK SURGERY    
 HX CARPAL TUNNEL RELEASE    
 on right  HX HYSTERECTOMY plus 1/2 of an ovary removed  [place] COLSC FLX W/RMVL OF TUMOR POLYP LESION SNARE TQ  5/30/2013  UPPER GI ENDOSCOPY,LIGAT VARIX  2/6/2015 Patient vented and sedated in CCU with Flexiseal tube in for diarrhea. Patient has x2 \"coma shaped\" areas of darker pigmentation to the upper parts of bl buttocks. These are not PI's but areas of constant irritation that have caused the skin to darken it's pigment permanently. The \"coma Shape\" and area would match the edges of her toilet seat at home and are most likely from constant, prolonged periods of sitting on the commode. No intervention needed except for radha-anal skin protection from stool seepage around 1340 Jeremias Darleen Royal RN, [employer] Banuelos RN

## 2019-04-02 NOTE — CONSULTS
Palliative Medicine Consult Willy: [phone]) Patient Name: Kendra Kramer YOB: 1955 Date of Initial Consult: 4/2/19 Reason for Consult: care decisions Requesting Provider: Jair Pepper MD  
Primary Care Physician: Georgie Samayoa NP 
 
 SUMMARY:  
Kendra Kramer is a 61 y.o. with a past history of BALDERAS, cirrhosis with hx of esophageal varices, COPD, DM type 2, TIA, IBIS  
 who was admitted on 3/28/2019 from home with a diagnosis of acute hepatic encephalopathy. Current medical issues leading to Palliative Medicine involvement include: pt with cirrhosis, non-compliant with lactulose at home. Psychosocial: lives with  of 36 years, has 2 daughters who live locally. Independent with ADLs and mobility PTA. PALLIATIVE DIAGNOSES:  
1. Acute encephalopathy 2. Hypoglycemia 3. Hypotension 4. Fever 5. Lactic acidosis 6. UTI 7. Cirrhosis 8. Liver failure 9. Septic shock 10. Colitis 11. COPD 12. IBIS 13. Care decisions PLAN:  
1. Prior to visit, I completed an extensive review of patient's medical records, including medical documentation, vital signs, MARs, and results of various labs and other diagnostics. I also spoke with patient's nurse Meka Dejesus and pulmonologist/intensivist Dr. Rahul Barreto. 2. Met with , obtained history,  introduced Palliative Medicine as a support for pt and families dealing with life limiting disease, to help with symptom management, education and understanding disease process and treatment options, and to discuss goals of care, what pt wants in terms of treatment as well as code status. He reports pt has been in poor health since she had back surgery, has been on disability since then, \"she does not take care of herself. \" has been forgetting to take the lactulose.   Provided reassurance to  that no one expects him to sit at her bedside all day; that it is important for him to get his rest and take care of business now, while she is not aware, as his presence will be needed more when she is awake. We discussed care decisions. Based on conversations he's had with he in the past, pt would want current level of medical care, however, if despite the current level of care her heart stops beating, she would not want attempts at resuscitation, rather, she would want to die peacefully. 3. Code status changed to PAR: pre-arrest: pt already intubated, pressors ok. Post arrest: No cardiac resuscitation. 4. Palliative contact information provided to . 5. Initial consult note routed to primary continuity provider and/or primary health care team members 6. Communicated plan of care with: Palliative IDT, [facility] Team 
 
 GOALS OF CARE / TREATMENT PREFERENCES:  
 
GOALS OF CARE:   
 
Continue current level of care If cardiac arrest, do not attempt resuscitation TREATMENT PREFERENCES:  
Code Status: Full Code Advance Care Planning: 
[x] [facility] Interdisciplinary Team has updated the ACP Navigator with [place] and Patient Capacity Primary Decision Maker: Adilia Guzman Spouse - [phone] Advance Care Planning 4/2/2019 Patient's Healthcare Decision Maker is: Legal Next of Kin Confirm Advance Directive None Patient Would Like to Complete Advance Directive Unable Other Instructions: Other: As far as possible, the palliative care team has discussed with patient / health care proxy about goals of care / treatment preferences for patient. HISTORY:  
 
History obtained from:  Chart,  CHIEF COMPLAINT: confusion HPI/SUBJECTIVE: The patient is:  
[] Verbal and participatory [x] Non-participatory due to: intubated 3/28: BIBA after waking up this am with confusion and inability to ambulate.   Pt was last known at baseline at [address] [place] [address] last night when she went to bed,  noted her AMS this am when she was unable to speak in coherent sentences, weak in bilat extremities, unable to sit up or ambulate. Per EMS, glucose was 50, D10 admin. She has hx of bx proven cirrhosis secondary to fatty liver as well as alcohol, quit drinking over 6-7 years ago according to family.    
Last EGD in May 2018 demonstrated 2 medium-sized esophageal varices that were banded.  
Colonoscopy done for anemia: 3/3/16: no blood in colon, inadequate prep in transverse colon, remainder WNL 
EGD 3/3/16: gastric varices, multiple GOV2 and smaller esophageal varices, mild erosive anteritis, no stigmata of recent bleeding ED EXAM: ill appearing, encephalopathic, tachycardic LAB/IMAGING: plt ct 128, lactic acid 4.50, urine consistent with UTI 
 
3/28: multiple low BS, D50 given. Hypotensive, CVL placed, Levophed started. Admitted to CCU for progression of care. 3/29: agitated, ammonia level remains elevated, wbc over 43k. Intubated for hypercarbia and airway protection. Fever 104.7, maxed on Levophed, neosynephrine added. NGT draining brown fluid. Cooling blanket. 3/31: passed SAT/SBT but respirations agonal-like. LFTs worsening. 4/1: remains on vent. E coli with urosepsis, ammonia level better on lactulose. Repeat abd CT shows resolved thickening in the ascending colon, interval development of ascites, cirrhosis with splenomegaly and esophageal varices, and patchy opacities in lung bases may represent atelectasis or airspace dz.  4/2: passed SBT but remains intubated for airway protection. Unresponsive. Clinical Pain Assessment (nonverbal scale for severity on nonverbal patients): Activity (Movement): Seeking attention through movement or slow, cautious movement Duration: for how long has pt been experiencing pain (e.g., 2 days, 1 month, years) Frequency: how often pain is an issue (e.g., several times per day, once every few days, constant)  FUNCTIONAL ASSESSMENT:  
 
 Palliative Performance Scale (PPS): PSYCHOSOCIAL/SPIRITUAL SCREENING:  
 
Palliative IDT has assessed this patient for cultural preferences / practices and a referral made as appropriate to needs (Cultural Services, Patient Advocacy, Ethics, etc.) Any spiritual / Mosque concerns: 
[] Yes /  [x] No 
 
Caregiver Burnout: 
[] Yes /  [x] No /  [] No Caregiver Present Anticipatory grief assessment:  
[x] Normal  / [] Maladaptive ESAS Anxiety:    unable to assess due to pt factors ESAS Depression:   unable to assess due to pt factors REVIEW OF SYSTEMS:  
 
Positive and pertinent negative findings in ROS are noted above in HPI. The following systems were [x] reviewed / [] unable to be reviewed as noted in HPI Other findings are noted below. Systems: constitutional, ears/nose/mouth/throat, respiratory, gastrointestinal, genitourinary, musculoskeletal, integumentary, neurologic, psychiatric, endocrine. Positive findings noted below. Modified ESAS Completed by: provider Stool Occurrence(s): 1 PHYSICAL EXAM:  
 
From RN flowsheet: 
Wt Readings from Last 3 Encounters:  
04/02/19 232 lb 12.9 oz (105.6 kg) 03/19/19 223 lb 12.8 oz (101.5 kg) 01/01/19 193 lb 9 oz (87.8 kg) Blood pressure 106/52, pulse 97, temperature 99.8 °F (37.7 °C), resp. rate 22, height 5' 2\" (1.575 m), weight 232 lb 12.9 oz (105.6 kg), SpO2 99 %. Pain Scale 1: Behavioral Pain Scale (BPS) Pain Intensity 1: 5 Pain Onset 1: Unable to assess Pain Location 1: Other (comment)(VALENTINO) Pain Orientation 1: Other (comment)(VALENTINO) Pain Description 1: Other (comment)(VALENTINO) Last bowel movement, if known:  
 
Constitutional: intubated, sedated Eyes: pupils equal, anicteric ENMT: no nasal discharge, moist mucous membranes Cardiovascular: tachy, regular rhythm, distal pulses intact Respiratory: breathing not labored, symmetric Gastrointestinal: obese, soft non-tender, +bowel sounds Musculoskeletal: no deformity, no tenderness to palpation Skin: warm, dry Neurologic: not following commands, not moving all extremities Psychiatric:  unable to assess due to pt factors HISTORY:  
 
Active Problems: 
  Hepatic encephalopathy ([address] (3/28/2019) Colitis (3/28/2019) UTI (urinary tract infection) (3/28/2019) Septic shock ([address] (3/28/2019) Past Medical History:  
Diagnosis Date  Asthma  Back pain  COPD (chronic obstructive pulmonary disease) (HCC)  Diabetes ([address]  Esophageal varices in cirrhosis ([address]   
 6/2014 banding x 2  
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  
Procedure Laterality Date  HX BACK SURGERY    
 HX CARPAL TUNNEL RELEASE    
 on right  HX HYSTERECTOMY plus 1/2 of an ovary removed  IN COLSC FLX W/RMVL OF TUMOR POLYP LESION SNARE TQ  5/30/2013  UPPER GI ENDOSCOPY,LIGAT VARIX  2/6/2015 Family History Problem Relation Age of Onset  Diabetes Mother  Stroke Sister  Diabetes Paternal Aunt  Diabetes Paternal Uncle  Heart Disease Neg Hx History reviewed, no pertinent family history. Social History Tobacco Use  Smoking status: Current Every Day Smoker Packs/day: 1.00 Years: 40.00 Pack years: 40.00  Smokeless tobacco: Former User Substance Use Topics  Alcohol use: No  
  Comment: rare Allergies Allergen Reactions  Hydrocodone Nausea and Vomiting  Lisinopril Cough  Lortab [Hydrocodone-Acetaminophen] Nausea and Vomiting  Percocet [Oxycodone-Acetaminophen] Nausea and Vomiting Current Facility-Administered Medications Medication Dose Route Frequency  potassium, sodium phosphates (NEUTRA-PHOS) packet 1 Packet  1 Packet Oral BID  insulin NPH (NOVOLIN N, HUMULIN N) injection 30 Units  30 Units SubCUTAneous Q12H And  
 methylPREDNISolone (PF) (SOLU-MEDROL) injection 40 mg  40 mg IntraVENous Q12H  
 balsam peru-castor oil (VENELEX) ointment   Topical BID  cefTRIAXone (ROCEPHIN) 2 g in 0.9% sodium chloride (MBP/ADV) 50 mL  2 g IntraVENous Q24H  
 famotidine (PF) (PEPCID) 20 mg in sodium chloride 0.9% 10 mL injection  20 mg IntraVENous Q12H  
 0.9% sodium chloride infusion  75 mL/hr IntraVENous CONTINUOUS  
 lactulose (CHRONULAC) 10 gram/15 mL solution 200 g  200 g Rectal Q6H  
 vancomycin (VANCOCIN) 1250 mg in  ml infusion  1,250 mg IntraVENous Q12H  
 dexmedeTOMidine (PRECEDEX) 400 mcg in 0.9% sodium chloride 100 mL infusion  0-1.4 mcg/kg/hr IntraVENous TITRATE  albuterol (PROVENTIL VENTOLIN) nebulizer solution 2.5 mg  2.5 mg Nebulization Q4H RT  
 amitriptyline (ELAVIL) tablet 100 mg  100 mg Oral QHS  propofol (DIPRIVAN) infusion  0-50 mcg/kg/min IntraVENous TITRATE  acetaminophen (TYLENOL) solution 650 mg  650 mg Per NG tube Q4H PRN  chlorhexidine (PERIDEX) 0.12 % mouthwash 15 mL  15 mL Oral Q12H  
 NOREPINephrine (LEVOPHED) 32 mg in 5% dextrose 250 mL infusion  2-30 mcg/min IntraVENous TITRATE  sodium chloride (NS) flush 5-40 mL  5-40 mL IntraVENous Q8H  
 sodium chloride (NS) flush 5-40 mL  5-40 mL IntraVENous PRN  
 acetaminophen (TYLENOL) suppository 650 mg  650 mg Rectal Q6H PRN  
 ondansetron (ZOFRAN) injection 4 mg  4 mg IntraVENous Q6H PRN  
 albuterol-ipratropium (DUO-NEB) 2.5 MG-0.5 MG/3 ML  3 mL Nebulization Q4H PRN  
 insulin lispro (HUMALOG) injection   SubCUTAneous Q6H  
 glucose chewable tablet 16 g  4 Tab Oral PRN  
  dextrose (D50) infusion 12.5-25 g  12.5-25 g IntraVENous PRN  
 glucagon (GLUCAGEN) injection 1 mg  1 mg IntraMUSCular PRN  
 aspirin delayed-release tablet 81 mg  81 mg Oral DAILY  
 
 
 
 LAB AND IMAGING FINDINGS:  
 
Lab Results Component Value Date/Time WBC 11.4 (H) 04/02/2019 04:07 AM  
 HGB 8.1 (L) 04/02/2019 04:07 AM  
 PLATELET 79 (L) 33/46/2065 04:07 AM  
 
Lab Results Component Value Date/Time Sodium 147 (H) 04/02/2019 04:07 AM  
 Potassium 4.1 04/02/2019 04:07 AM  
 Chloride 117 (H) 04/02/2019 04:07 AM  
 CO2 23 04/02/2019 04:07 AM  
 BUN 46 (H) 04/02/2019 04:07 AM  
 Creatinine 0.84 04/02/2019 04:07 AM  
 Calcium 7.3 (L) 04/02/2019 04:07 AM  
 Magnesium 2.5 (H) 04/01/2019 04:11 AM  
 Phosphorus 1.9 (L) 04/02/2019 04:07 AM  
  
Lab Results Component Value Date/Time AST (SGOT) 35 04/02/2019 04:07 AM  
 Alk. phosphatase 80 04/02/2019 04:07 AM  
 Protein, total 6.8 04/02/2019 04:07 AM  
 Albumin 2.3 (L) 04/02/2019 04:07 AM  
 Globulin 4.5 (H) 04/02/2019 04:07 AM  
 
Lab Results Component Value Date/Time INR 1.4 (H) 04/02/2019 04:07 AM  
 Prothrombin time 14.1 (H) 04/02/2019 04:07 AM  
 aPTT 26.2 04/02/2019 04:07 AM  
  
Lab Results Component Value Date/Time Iron 22 (L) 03/19/2019 01:48 PM  
 TIBC 379 03/19/2019 01:48 PM  
 Iron % saturation 6 (LL) 03/19/2019 01:48 PM  
 Ferritin 358 (H) 11/19/2018 12:00 AM  
  
No results found for: PH, PCO2, PO2 No components found for: [place] Lab Results Component Value Date/Time CK 47 03/28/2019 10:13 AM  
 CK - MB <1.0 11/13/2018 12:53 PM  
  
 
 
   
 
Total time: 75 
Counseling / coordination time, spent as noted above: 85 
> 50% counseling / coordination?: y 
 
Prolonged service was provided for  []30 min   []75 min in face to face time in the presence of the patient, spent as noted above. Time Start:  
Time End:  
Note: this can only be billed with 91434 (initial) or 26697 (follow up). If multiple start / stop times, list each separately.

## 2019-04-02 NOTE — PROGRESS NOTES
04/02/19 2205 04/02/19 0559 ABCDEF Bundle SBT Safety Screen Passed Yes  --   
SBT Trial Passed  --  Yes Weaning Parameters Spontaneous Breathing Trial Complete  --  Yes Resp Rate Observed 19 18 Ve 10.4 11.5  644 RSBI 31 28 Patient remains on CPAP+6, PS 6, 40%

## 2019-04-02 NOTE — PROGRESS NOTES
0730 Report received from SELAM Leija RN via [employer] and [employer]. Skin warm and dry to touch. Color pale pink in tone. Eyes are open but does not focus or track no blink with direct contact to eyes. Currently on spontaneous breathing trail. Gimenez and mouth care. Minimal urinary output. Dr. Bradford Abreu in 
0800 Dr. Lady Myaa in and updated. Clear tan color oral and ETT secretions. Some spontaneous s non- purposeful flexing of lower extremities r>L 
1000  in. Generalized edema throughout. Guppy-like breathing note. Return to rate on vent. 1200 Reposition and suctioned. 1300 Dr. Lady Maya update  over the phone. Dr. Lady Maya notified of low UOP and FSBS. See MD orders. Palliative nurse in. 
1400 intermittent guppy/grasping like breathing pattern noted Dr. Lady Maya notified Profolol restarted. Dr. Karina Gómez in. 
1600 Intermittent episodes of guppy breathing despite tidal volumes approximate 500's range. Suctioned for small amounts of thin tan color secretions. Turned and repositioned. 1900 Daughter in tearful. Eyes closed at this time note temperature. Medicated. Scope SR-ST in 100 range. Irregular respiratory rate continues despite sedation. Report given to SELAM Leija RN via [employer] and [employer].

## 2019-04-02 NOTE — TELEPHONE ENCOUNTER
----- Message from Sergio Acevedo sent at 4/2/2019  4:48 PM EDT -----  Regarding: Dr Gray Gannon  Pt's daughter Yaniv Reyes  p) [phone], wanted to let Dr Adrian Mon know that she is in [facility]  In very serious condition  , she wanted to make Dr Adrian Mon aware

## 2019-04-03 NOTE — PROGRESS NOTES
Pt is 60 yo female admitted for septic shock, colitis, UTI, hepatic encephalopathy; acute hypoxic respiratory failure - intubated 3/29. Extended CPAP trial again today - not ready for extubation yet but more awake. Palliative consulted for  - code status changed to Partial - pressors ok; No CPR. Pt was indep pta - anticipate dc needs - HH vs rehab? Gadiel Gardner, MSW

## 2019-04-03 NOTE — PROGRESS NOTES
04/03/19 0559 04/03/19 5102 ABCDEF Bundle SBT Safety Screen Passed Yes  --   
SBT Trial Passed  --  Yes Weaning Parameters Spontaneous Breathing Trial Complete  --  Yes Resp Rate Observed 17 17 Ve 8.8 9.9  555 RSBI 32 30 Patient remains on CPAP+6, PS 6, 40%

## 2019-04-03 NOTE — PROGRESS NOTES
Hospitalist Progress Note NAME: Florina Billy :  1955 MRN:  [id number] Assessment / Plan: 
Acute hypercapnic respiratory failure s/p intubation on 3/29 Septic shock, POA Hypotension, Fever, Lactic acidosis Ecoli, enterococcus UTI and bacteremia Colitis, infectious vs ischemic 
-still with fever but WBC continuing to improve 
-KUB showed no significant bowel dilation. Repeat KUB showed gas and stool in the large bowel 
-CT a/p showed resolved mural thickening in the ascending colon. Rectal tube and gastric tube present. Interval development of ascites. Cirrhosis with splenomegaly and esophageal varices. Patchy opacities in the lung bases may represent atelectasis or airspace disease. 
-cont' rectal tube and lactulose 
-CXR with no acute finding, CT chest showed R colitis, cirrhosis with portal venous HTN, clear lungs -Bcx and Ucx growing Ecoli, repeat Bcx neg so far 
-cont' IVF, pressor support, sedation, wean as tolerated 
-abx changed to IV rocephin and PO vancomycin  
-hold propranolol, lasix, aldactone. losartan 
-ventilatory support, nebs, IV steroids KALI with metabolic acidosis, resolved Lactic acidosis Hypomag, repleted Hypophos, repleted 
-cont' IVF 
-avoid nephrotoxic agent, monitor bmp Acute encephalopathy, POA due to HE Right leg weakness, POA Mild left lip droop, POA Hx TIA  
-suspect encephalopathy due to hepatic encephalopathy, POA and sepsis. CT head negative. Pt tracking and waking up some today. Eyes open with name calling. 
-cont' lactulose, xifaxin  
-hold tramadol, amitriptyline 
-continue aspirin 
  
DM initially hypoglycemia, now with hyperglycemia in the setting of steroid use 
-initially requiring D10 bolus for EMS and D50 twice in ER. 
-cont' NPH and lantus, SSI 
-hold metformin 
  
Right leg edema  
-patient reports this is chronic 
  
COPD 
IBIS 
-nebs Obesity  
Body mass index is 38.31 kg/m². 
  
Code:  full DVT prophylaxis:  SCD 
 Surrogate decision maker:   Subjective: Chief Complaint / Reason for Physician Visit Pt awake on the ventilator, not following commands but she opens eyes with voice.  present in room. Discussed with RN events overnight. Review of Systems: 
Symptom Y/N Comments  Symptom Y/N Comments Fever/Chills    Chest Pain Poor Appetite    Edema Cough    Abdominal Pain Sputum    Joint Pain SOB/MARTINEZ    Pruritis/Rash Nausea/vomit    Tolerating PT/OT Diarrhea    Tolerating Diet Constipation    Other Could NOT obtain due to: intubated Objective: VITALS:  
Last 24hrs VS reviewed since prior progress note. Most recent are: 
Patient Vitals for the past 24 hrs: 
 Temp Pulse Resp BP SpO2  
04/03/19 1330     99 % 04/03/19 1300  (!) 103 20 106/51   
04/03/19 1200 100.4 °F (38 °C) (!) 101 16 116/49 100 % 04/03/19 1100  (!) 103 18 116/46 100 % 04/03/19 1000  99 19 110/46 100 % 04/03/19 0900  (!) 103 19 98/51   
04/03/19 0827     98 % 04/03/19 0819  100 17  100 % 04/03/19 0800 99.1 °F (37.3 °C) 93 16 108/48 100 % 04/03/19 0700  93 17 110/47 100 % 04/03/19 0600  95 16 116/50 100 % 04/03/19 0500  96 16 125/51   
04/03/19 0430  93 17 137/60 100 % 04/03/19 0429   17  100 % 04/03/19 0400 97.2 °F (36.2 °C) 94 16 136/60 97 % 04/03/19 0350  98 21 130/56 96 % 04/03/19 0300  91 18 (!) 106/38 100 % 04/03/19 0200  98 17 121/47 100 % 04/03/19 0100 98.5 °F (36.9 °C) 100 17 118/42 100 % 04/03/19 0000 99 °F (37.2 °C) (!) 104 23 115/50 99 % 04/02/19 2330  99 18 112/55 99 % 04/02/19 2313   19    
04/02/19 2312     99 % 04/02/19 2300  (!) 103 20 137/57 100 % 04/02/19 2200 99.7 °F (37.6 °C) 93 19 109/45 99 % 04/02/19 2130  95 19 108/43 99 % 04/02/19 2100 99.7 °F (37.6 °C) 95 19 105/45 99 % 04/02/19 2016   24    
04/02/19 2015     99 % 04/02/19 2000  (!) 105 17 112/48 99 % 04/02/19 1900 (!) 101.7 °F (38.7 °C) (!) 108 23 116/48 98 % 04/02/19 1800  (!) 112 18 124/55 98 % 04/02/19 1700  (!) 106 16 118/51   
04/02/19 1600 (!) 100.7 °F (38.2 °C) (!) 104 24 (!) 100/38 98 % 04/02/19 1526  95 17  99 % 04/02/19 1525     95 % 04/02/19 1500  96 29 (!) 94/22 99 % Intake/Output Summary (Last 24 hours) at 4/3/2019 1400 Last data filed at 4/3/2019 1253 Gross per 24 hour Intake 2212.97 ml Output 2165 ml Net 47.97 ml PHYSICAL EXAM: 
General: Female intubated, awake EENT:  OGT and ETT in place Resp:  CTA b/l. No wheezing  No accessory muscle use CV:  Regular  rhythm,  No edema GI:  Soft, Non distended, Non tender.  +BS Neurologic:  Intubated, tracking but not following commands Psych:   Unable to do Skin:  No rashes. No jaundice Reviewed most current lab test results and cultures  YES Reviewed most current radiology test results   YES Review and summation of old records today    NO Reviewed patient's current orders and MAR    YES 
PMH/SH reviewed - no change compared to H&P 
________________________________________________________________________ Care Plan discussed with: 
  Comments Patient x Family  x Pt's  RN x Care Manager Consultant Multidiciplinary team rounds were held today with , nursing, pharmacist and clinical coordinator. Patient's plan of care was discussed; medications were reviewed and discharge planning was addressed. ________________________________________________________________________ Total NON critical care TIME:  35   Minutes Total CRITICAL CARE TIME Spent:   Minutes non procedure based Comments >50% of visit spent in counseling and coordination of care    
________________________________________________________________________ Mira Lawrence MD  
 
Procedures: see electronic medical records for all procedures/Xrays and details which were not copied into this note but were reviewed prior to creation of Plan. LABS: 
I reviewed today's most current labs and imaging studies. Pertinent labs include: 
Recent Labs 04/03/19 
2984 04/02/19 0407 04/01/19 0411 WBC 12.0* 11.4* 22.8* HGB 8.3* 8.1* 8.2* HCT 28.1* 27.0* 27.0*  
PLT 94* 79* 73* Recent Labs 04/03/19 
2744 04/02/19 0407 04/01/19 0411 * 147* 144  
K 4.5 4.1 3.8 * 117* 112* CO2 24 23 22 * 432* 338* BUN 53* 46* 43* CREA 0.92 0.84 0.68  
CA 7.4* 7.3* 7.1*  
MG  --   --  2.5* PHOS  --  1.9* 1.6* ALB 2.4* 2.3*  --   
TBILI 0.7 0.7  --   
SGOT 21 35  --   
ALT 55 63  --   
INR  --  1.4*  --   
 
 
Signed: Gilford Lack, MD

## 2019-04-03 NOTE — INTERDISCIPLINARY ROUNDS
Interdisciplinary team rounds were held 4/3/2019 with the following team members:Care Management, Diabetes Treatment Specialist, Nursing, Nutrition, Pharmacy, Physician and Clinical Coordinator. Plan of care discussed. See clinical pathway and/or care plan for interventions and desired outcomes.

## 2019-04-03 NOTE — PROGRESS NOTES
Gastroenterology Daily Progress Note (Dr. Vida Horn for Dr. Gigi Delgadillo) [facility] Admit Date: 3/28/2019 Subjective: Much more awake and alert today. Smiling. Remains intubated.  at bedside. Not on pressors. Current Facility-Administered Medications Medication Dose Route Frequency  insulin glargine (LANTUS) injection 40 Units  40 Units SubCUTAneous Q12H  
 insulin NPH (NOVOLIN N, HUMULIN N) injection 30 Units  30 Units SubCUTAneous Q12H And  
 methylPREDNISolone (PF) (SOLU-MEDROL) injection 40 mg  40 mg IntraVENous Q12H  
 lactulose (CHRONULAC) solution 30 g  30 g Oral BID PRN  
 0.45% sodium chloride infusion  50 mL/hr IntraVENous CONTINUOUS  
 insulin lispro (HUMALOG) injection   SubCUTAneous Q6H  
 dextrose (D50) infusion 12.5-25 g  12.5-25 g IntraVENous PRN  
 balsam peru-castor oil (VENELEX) ointment   Topical BID  cefTRIAXone (ROCEPHIN) 2 g in 0.9% sodium chloride (MBP/ADV) 50 mL  2 g IntraVENous Q24H  
 famotidine (PF) (PEPCID) 20 mg in sodium chloride 0.9% 10 mL injection  20 mg IntraVENous Q12H  
 vancomycin (VANCOCIN) 1250 mg in  ml infusion  1,250 mg IntraVENous Q12H  
 dexmedeTOMidine (PRECEDEX) 400 mcg in 0.9% sodium chloride 100 mL infusion  0-1.4 mcg/kg/hr IntraVENous TITRATE  albuterol (PROVENTIL VENTOLIN) nebulizer solution 2.5 mg  2.5 mg Nebulization Q4H RT  
 amitriptyline (ELAVIL) tablet 100 mg  100 mg Oral QHS  propofol (DIPRIVAN) infusion  0-50 mcg/kg/min IntraVENous TITRATE  acetaminophen (TYLENOL) solution 650 mg  650 mg Per NG tube Q4H PRN  chlorhexidine (PERIDEX) 0.12 % mouthwash 15 mL  15 mL Oral Q12H  
 NOREPINephrine (LEVOPHED) 32 mg in 5% dextrose 250 mL infusion  2-30 mcg/min IntraVENous TITRATE  sodium chloride (NS) flush 5-40 mL  5-40 mL IntraVENous Q8H  
 sodium chloride (NS) flush 5-40 mL  5-40 mL IntraVENous PRN  
 acetaminophen (TYLENOL) suppository 650 mg  650 mg Rectal Q6H PRN  
  ondansetron (ZOFRAN) injection 4 mg  4 mg IntraVENous Q6H PRN  
 albuterol-ipratropium (DUO-NEB) 2.5 MG-0.5 MG/3 ML  3 mL Nebulization Q4H PRN  
 glucose chewable tablet 16 g  4 Tab Oral PRN  
 dextrose (D50) infusion 12.5-25 g  12.5-25 g IntraVENous PRN  
 glucagon (GLUCAGEN) injection 1 mg  1 mg IntraMUSCular PRN  
 aspirin delayed-release tablet 81 mg  81 mg Oral DAILY Objective:  
 
Visit Vitals /47 Pulse 100 Temp 97.2 °F (36.2 °C) Resp 17 Ht 5' 2\" (1.575 m) Wt 107 kg (235 lb 14.3 oz) SpO2 98% BMI 43.15 kg/m² Blood pressure 110/47, pulse 100, temperature 97.2 °F (36.2 °C), resp. rate 17, height 5' 2\" (1.575 m), weight 107 kg (235 lb 14.3 oz), SpO2 98 %. 04/03 0701 - 04/03 1900 In: -  
Out: 150 [Urine:150] 04/01 1901 - 04/03 0700 In: 4031.8 [I.V.:3871.8] Out: 7924 [Urine:2085; Drains:1150] Intake/Output Summary (Last 24 hours) at 4/3/2019 1243 Last data filed at 4/3/2019 0900 Gross per 24 hour Intake 1974.44 ml Output 1770 ml Net 204.44 ml Physical Exam:  
 
General: obese, intubated WF in NAD Chest:  CTA, No rhonchi, rales or rubs. Heart: tachycardic, S1S2 normal 
GI: + distended soft, nontender, + BS Labs:  
 
Recent Results (from the past 24 hour(s)) GLUCOSE, POC Collection Time: 04/02/19 12:58 PM  
Result Value Ref Range Glucose (POC) 425 (H) 65 - 100 mg/dL Performed by Cassie Beaver GLUCOSE, POC Collection Time: 04/02/19  5:06 PM  
Result Value Ref Range Glucose (POC) 353 (H) 65 - 100 mg/dL Performed by Cassie Beaver GLUCOSE, POC Collection Time: 04/02/19 11:56 PM  
Result Value Ref Range Glucose (POC) 320 (H) 65 - 100 mg/dL Performed by Caro Boeck Collection Time: 04/03/19  3:58 AM  
Result Value Ref Range Ammonia 19 <82 UMOL/L  
METABOLIC PANEL, COMPREHENSIVE Collection Time: 04/03/19  3:58 AM  
Result Value Ref Range  Sodium 146 (H) 136 - 145 mmol/L  
 Potassium 4.5 3.5 - 5.1 mmol/L Chloride 117 (H) 97 - 108 mmol/L  
 CO2 24 21 - 32 mmol/L Anion gap 5 5 - 15 mmol/L Glucose 378 (H) 65 - 100 mg/dL BUN 53 (H) 6 - 20 MG/DL Creatinine 0.92 0.55 - 1.02 MG/DL  
 BUN/Creatinine ratio 58 (H) 12 - 20 GFR est AA >60 >60 ml/min/1.73m2 GFR est non-AA >60 >60 ml/min/1.73m2 Calcium 7.4 (L) 8.5 - 10.1 MG/DL Bilirubin, total 0.7 0.2 - 1.0 MG/DL  
 ALT (SGPT) 55 12 - 78 U/L  
 AST (SGOT) 21 15 - 37 U/L Alk. phosphatase 72 45 - 117 U/L Protein, total 7.0 6.4 - 8.2 g/dL Albumin 2.4 (L) 3.5 - 5.0 g/dL Globulin 4.6 (H) 2.0 - 4.0 g/dL A-G Ratio 0.5 (L) 1.1 - 2.2    
CBC WITH AUTOMATED DIFF Collection Time: 04/03/19  3:58 AM  
Result Value Ref Range WBC 12.0 (H) 3.6 - 11.0 K/uL  
 RBC 3.11 (L) 3.80 - 5.20 M/uL HGB 8.3 (L) 11.5 - 16.0 g/dL HCT 28.1 (L) 35.0 - 47.0 % MCV 90.4 80.0 - 99.0 FL  
 MCH 26.7 26.0 - 34.0 PG  
 MCHC 29.5 (L) 30.0 - 36.5 g/dL  
 RDW 19.1 (H) 11.5 - 14.5 % PLATELET 94 (L) 141 - 400 K/uL MPV 12.5 8.9 - 12.9 FL  
 NRBC 0.4 (H) 0  WBC ABSOLUTE NRBC 0.05 (H) 0.00 - 0.01 K/uL NEUTROPHILS 92 (H) 32 - 75 % LYMPHOCYTES 3 (L) 12 - 49 % MONOCYTES 3 (L) 5 - 13 % EOSINOPHILS 0 0 - 7 % BASOPHILS 0 0 - 1 % METAMYELOCYTES 2 % IMMATURE GRANULOCYTES 0 0.0 - 0.5 % ABS. NEUTROPHILS 11.0 (H) 1.8 - 8.0 K/UL  
 ABS. LYMPHOCYTES 0.4 (L) 0.8 - 3.5 K/UL  
 ABS. MONOCYTES 0.4 0.0 - 1.0 K/UL  
 ABS. EOSINOPHILS 0.0 0.0 - 0.4 K/UL  
 ABS. BASOPHILS 0.0 0.0 - 0.1 K/UL  
 ABS. IMM. GRANS. 0.0 0.00 - 0.04 K/UL  
 DF MANUAL    
 RBC COMMENTS ANISOCYTOSIS 1+ 
    
 RBC COMMENTS POLYCHROMASIA 1+ 
    
 RBC COMMENTS TEARDROP CELLS 
PRESENT 
    
GLUCOSE, POC Collection Time: 04/03/19  5:49 AM  
Result Value Ref Range Glucose (POC) 426 (H) 65 - 100 mg/dL Performed by Pradeep Muñoz, POC Collection Time: 04/03/19 12:10 PM  
Result Value Ref Range Glucose (POC) 379 (H) 65 - 100 mg/dL Performed by [facility](wbc:2,hgb:2,hct:2,plt:2,) Recent Labs 04/03/19 
3826 04/02/19 
0407 04/01/19 
0411 * 147* 144  
K 4.5 4.1 3.8 * 117* 112* CO2 24 23 22 BUN 53* 46* 43* CREA 0.92 0.84 0.68 * 432* 338* CA 7.4* 7.3* 7.1*  
MG  --   --  2.5* PHOS  --  1.9* 1.6* Recent Labs 04/02/19 
0407 INR 1.4* PTP 14.1* APTT 26.2 Recent Labs 04/03/19 
4327 04/02/19 
0407 SGOT 21 35 AP 72 80  
TP 7.0 6.8 ALB 2.4* 2.3*  
GLOB 4.6* 4.5* Lab Results Component Value Date/Time Folate 24.6 (H) 03/01/2016 11:20 AM  
 
Impression: 
  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 
Clinically better today with improvement in her neurologic function.  
-would continue lactulose 
-watch fluid status with her increased ascites 
-continue current care Jigar Guerra MD 
 
4/3/2019 [address] [facility], [address] [facility] [address] Loc: [phone]

## 2019-04-03 NOTE — DIABETES MGMT
DTC Progress Note Recommendations/ Comments: Pt discussed with rounding team and Dr. Francisco J De La Cruz. PLan to increase lantus to 40 units Q12hrs. Lantus was resumed yesterday as well as pt is receiving NPH with steroids. Pt received a total of 41 units of correctional insulin yesterday in addition to lantus and NPH and BG remains 400s this am. 
 
Chart reviewed on Marc Dominique during Multidisciplinary Rounds. A1c:  
Lab Results Component Value Date/Time Hemoglobin A1c 4.8 03/29/2019 02:56 PM  
 
 
 
 
Recent Glucose Results:  
Lab Results Component Value Date/Time  (H) 04/03/2019 03:58 AM  
 GLUCPOC 426 (H) 04/03/2019 05:49 AM  
 GLUCPOC 320 (H) 04/02/2019 11:56 PM  
 GLUCPOC 353 (H) 04/02/2019 05:06 PM  
  
 
Lab Results Component Value Date/Time Creatinine 0.92 04/03/2019 03:58 AM  
 
Estimated Creatinine Clearance: 72 mL/min (based on SCr of 0.92 mg/dL). Active Orders Diet DIET NPO  
  
 
PO intake: No data found. Will continue to follow as needed. Thank you. SUSIE BooneN, RN, CDE [facility] Time spent: 5 min

## 2019-04-03 NOTE — PROGRESS NOTES
2000, Bedside and Verbal shift change report given to SANDRA Sheikh (oncoming nurse) by Prasanth Zarate RN (offgoing nurse). Report included the following information SBAR, Kardex, Intake/Output, MAR, Accordion, Recent Results, Med Rec Status and Cardiac Rhythm Sinus Tachycardia 105 . Temp;  101.7 > cooling blanket > A febrile after 2 hours Neuro; on Precedex at 0.7 dara/kg/hr, spontaneous eye opening, no movement at upper limbs & right foot for this night, spontaneous movent at left foot and withdraw to pain, no follow command, no eyes contact, pupils reactive,   GCS;  Eye; 3-4, verbal; 1 for ETT, motor; 4. 
Reassessment;  Propofol added for gasp like breathing, turned off at am, Precedex titrated up to 0.8 dara, now at 0.6 dara. She started to respond to voice, Respiratory; Sat 98% on Ventilator A/C mode, , RR 12/ pt's 26, Peep 6, FiO2 40%, ETT 22 cm at lips, secretion; scant, thick tan, clear upper diminished lung sounds. Reassessment; Passed SBT > still not ready for extubation for AMS Cardiac; Sinus Tachycardia, 106 B/min, NIBP 112/48 mmHg, Reassessment;  HR now NRS 90s, Levophed titrated on/ off when MAP about 55 mmHg and UOP about 10 ml/hr GI; NPO with OG tube ( 60 cm at lips) connected to suction, minium, obese Abd semi soft with hypoactive bowel sound, Ascitis, on NS 0.45% at 50 ml/hr. Flexiseal present Reassessment;  no changes Renal; norman cath with adequate urine out put sedimentation present. Reassessment; Adequate UOP Skin; blanchable redness at buttock, ecchymosis at arms. Edema +2 in general  
Endo; SSI every 6 hours, Glucose 320-426 Lines; ETT, right IJ CVP,OGT, Norman. Code; Partial DNR. Lab; Na 147 > 146, Phos 1.9 > not checked today DVT; SCD both legs. Plan; titrate sedation for RASS -1, ventilator management, titrate levophed as tolerated.  pain and agitation management, follow lab tomorrow,   
0700, Bedside and Verbal shift change report given to [facility], RN (oncoming nurse) by Liyah Sierra (offgoing nurse). Report included the following information SBAR, Kardex, Intake/Output, MAR, Accordion, Recent Results, Med Rec Status and Cardiac Rhythm NSR.

## 2019-04-03 NOTE — PROGRESS NOTES
[facility], Critical Care, and Sleep Medicine Name: Roberth Thomas MRN: [id number] : 1955 Hospital: [place] [address] Date: 4/3/2019 Critical Care  Patient Consult IMPRESSION:  
· Septic with shock-  
· Ecoli septicemia; weaned off levophed. Hold all BP meds. · Acute hypoxic resp failure- intubated 3/29. Oxygenating adequately, 40%,Peep 6; CPAP trials ok but AMS, barrrier to safe liberation · Voluem overload- not very responsive to diuretic · Cirrhosis, from BALDERAS- noted in past to have esophageal varices. seems decompensated, Gi following · Hepatic Encephalopathy (ammonia of 110 on admission), not compliant she has been with her home regimen. Now 19; eyes open and slightly more interactive; head Ct on admission normal 
· Acute UTI-UCx growing Ecoli. Enterococcus · Right sided colitis  Noted. ?ischemic or infectious. Possible C Diff. Although little stooling currently; not likley; CT better · Anemia · Coagulopathy -INR 2.0 ?liver dz vs DIC 
· MIld Thrombocytopenia-?liver dz vs DIC · Hyperglycemia-likely combination of sepsis and steroids. · COPD with acute exacerbation · Ileus · IBIS on CPAP at home. · Obese. · Ongoing Smoking · D/w   : Rick Notice: [phone]. Updated today · Critically ill, 35 min CC, EOP. Multiple organ failure. High risk of decompensation. RECOMMENDATIONS:  
· CCU monitoring · Try Diuresis again- watch renal function · Try to orient when awake · Not ready to liberate · will try extended CPAP trial again today · Rest at night or sooner prn · Mechanical ventilation- not ready for extubation given hemodynamic instability and AMS; also concerned about abdominal distension, restrictive process · Hold on paracentesis. Don't think diagnositic tap needed and not enough could be removed to be therapeutic; explained this to  by phone · adjsut ABX · IV sedation prn safety. · Lactulose, NH3 in AM 
· Insulin adjusted · Nebs scheduled. · Wean steroids for acute COPD exacerbation · CVP to eval volume status · Replete Electrolytes Subjective/History:  
 
4/3 waking up some? Minimal response to lasix. eyes open. Left foot withdraws but not following commands Anasarca. flexiseal 
 
4/2 On SBT, CT abd reviewed from other day. Colitis better. Mild ascites. Distended bowels. Cirtrhosis, varices 4/1 flexiseal after lactulose started. On IV flagyl, Po vancomycin. IV abx for sepsis. On vent. PO4 1.6; glucose high 
 
3-31:no acute events overnight. Little to no stooling. Remains on vasopressors and mechanical ventilation 3-30-pt intubated yesterday. No acute events overnight. Remains sedated on Norepi gtt 3-29-19: Pt seen this am. She is still very confused. Has ongoing needed for pressors. Has wheezing. Her WBC has increased. She is not able to give hx of HPI or ROS. This patient has been seen and evaluated at the request of Dr. Jose R Doyle for above. Pt was seen in ER. Patient is a 61 y.o. female with hx of above. Noted to have increased confusion per her family. Has not been compliant with her meds. Noted to have weakness and inability to walk. Was was normal the night before. Noted when she awoke to be confused, had generalized weakness. Has a very dry  Mouth when seen in ER. ROS and HPI limited due to pts confused state. MEDS:  
Current Facility-Administered Medications Medication  insulin glargine (LANTUS) injection 40 Units  insulin NPH (NOVOLIN N, HUMULIN N) injection 30 Units And  
 methylPREDNISolone (PF) (SOLU-MEDROL) injection 40 mg  
 lactulose (CHRONULAC) solution 30 g  
 0.45% sodium chloride infusion  insulin lispro (HUMALOG) injection  dextrose (D50) infusion 12.5-25 g  
 balsam peru-castor oil (VENELEX) ointment  cefTRIAXone (ROCEPHIN) 2 g in 0.9% sodium chloride (MBP/ADV) 50 mL  famotidine (PF) (PEPCID) 20 mg in sodium chloride 0.9% 10 mL injection  vancomycin (VANCOCIN) 1250 mg in  ml infusion  dexmedeTOMidine (PRECEDEX) 400 mcg in 0.9% sodium chloride 100 mL infusion  albuterol (PROVENTIL VENTOLIN) nebulizer solution 2.5 mg  
 amitriptyline (ELAVIL) tablet 100 mg  propofol (DIPRIVAN) infusion  acetaminophen (TYLENOL) solution 650 mg  
 chlorhexidine (PERIDEX) 0.12 % mouthwash 15 mL  NOREPINephrine (LEVOPHED) 32 mg in 5% dextrose 250 mL infusion  sodium chloride (NS) flush 5-40 mL  sodium chloride (NS) flush 5-40 mL  acetaminophen (TYLENOL) suppository 650 mg  
 ondansetron (ZOFRAN) injection 4 mg  albuterol-ipratropium (DUO-NEB) 2.5 MG-0.5 MG/3 ML  
 glucose chewable tablet 16 g  
 dextrose (D50) infusion 12.5-25 g  
 glucagon (GLUCAGEN) injection 1 mg  aspirin delayed-release tablet 81 mg Past Surgical History:  
Procedure Laterality Date  HX BACK SURGERY    
 HX CARPAL TUNNEL RELEASE    
 on right  HX HYSTERECTOMY plus 1/2 of an ovary removed  MT COLSC FLX W/RMVL OF TUMOR POLYP LESION SNARE TQ  5/30/2013  UPPER GI ENDOSCOPY,LIGAT VARIX  2/6/2015 Prior to Admission medications Medication Sig Start Date End Date Taking? Authorizing Provider  
fluticasone furoate-vilanterol (BREO ELLIPTA) 200-25 mcg/dose inhaler Take 1 Puff by inhalation daily. Provider, Historical  
insulin glargine (LANTUS SOLOSTAR U-100 INSULIN) 100 unit/mL (3 mL) inpn 110 Units by SubCUTAneous route daily. Provider, Historical  
furosemide (LASIX) 40 mg tablet Take 2 Tabs by mouth daily. 3/19/19   HUMZA Go  
spironolactone (ALDACTONE) 100 mg tablet Take 2 Tabs by mouth daily.  3/19/19   HUMZA Go  
CONSTULOSE 10 gram/15 mL solution take 2 tablespoonfuls by mouth twice a day 1/7/19   Cathi Barksdale NP  
losartan (COZAAR) 25 mg tablet take 1 tablet by mouth once daily , REPLACES LISINOPRIL FOR BLOOD PRESSURE AND KIDNEY PROTECTION 12/3/18   Charmaine Pittman MD  
insulin NPH (NOVOLIN N NPH U-100 INSULIN) 100 unit/mL injection Inject 55 units every 12 hours--dispense relion brand--patient will pay perez 11/28/18   Charmaine Pittman MD  
traMADol Edward Men) 50 mg tablet take 1-2 tablets by mouth twice a day if needed for DIABETIC NEUROPATHY PAIN 11/27/18   Charmaine Pittman MD  
aspirin 81 mg chewable tablet Take 1 Tab by mouth daily. 11/16/18   Maryann Shahid MD  
atorvastatin (LIPITOR) 40 mg tablet Take 1 Tab by mouth nightly. 11/15/18   Maryann Shahid MD  
potassium chloride SR (KLOR-CON 10) 10 mEq tablet Take 10 mEq by mouth two (2) times a day. Other, MD Leonarda  
omeprazole (PRILOSEC) 20 mg capsule take 1 capsule by mouth once daily 11/8/18   Cathi Barksdale, NP  
amitriptyline (ELAVIL) 150 mg tablet Take 1 Tab by mouth nightly. 10/16/18   Charmaine Pittman MD  
propranolol (INDERAL) 20 mg tablet Take 1 Tab by mouth two (2) times a day. 10/16/18   Charmaine Pittman MD  
ferrous sulfate 325 mg (65 mg iron) tablet take 1 tablet by mouth once daily with BREAKFAST 10/2/18   Cathi Barksdale NP  
insulin lispro (HUMALOG KWIKPEN INSULIN) 100 unit/mL kwikpen Inject as needed up to 3 times per day for sugars over 150: 4 units for every 50 points. Max 50 units per day 10/1/18   Charmaine Pittman MD  
metFORMIN (GLUCOPHAGE) 1,000 mg tablet take 1 tablet by mouth twice a day with meals 8/23/18   Charmaine Pittman MD  
ondansetron (ZOFRAN ODT) 4 mg disintegrating tablet dissolve 1 tablet ON TONGUE every 8 hours if needed for nausea 8/1/18   Cathi Barksdale NP  
gabapentin (NEURONTIN) 600 mg tablet take 2 tablets by mouth three times a day 7/19/18   Charmaine Pittman MD  
rOPINIRole (REQUIP) 4 mg tab TAB Take 4 mg by mouth nightly.     Provider, Historical  
albuterol (PROAIR HFA) 90 mcg/actuation inhaler Take 2 Puffs by inhalation every four (4) hours as needed for Wheezing. Other, MD Leonarda  
 
 
Allergies Allergen Reactions  Hydrocodone Nausea and Vomiting  Lisinopril Cough  Lortab [Hydrocodone-Acetaminophen] Nausea and Vomiting  Percocet [Oxycodone-Acetaminophen] Nausea and Vomiting Social History Tobacco Use  Smoking status: Current Every Day Smoker Packs/day: 1.00 Years: 40.00 Pack years: 40.00  Smokeless tobacco: Former User Substance Use Topics  Alcohol use: No  
  Comment: rare Family History Problem Relation Age of Onset  Diabetes Mother  Stroke Sister  Diabetes Paternal Aunt  Diabetes Paternal Uncle  Heart Disease Neg Hx Review of Systems: 
Review of systems not obtained due to patient factors. Objective: 
Vital Signs:   
Visit Vitals /47 Pulse 100 Temp 97.2 °F (36.2 °C) Resp 17 Ht 5' 2\" (1.575 m) Wt 107 kg (235 lb 14.3 oz) SpO2 100% BMI 43.15 kg/m² O2 Device: Ventilator O2 Flow Rate (L/min): 12 l/min Temp (24hrs), Av.6 °F (37.6 °C), Min:97.2 °F (36.2 °C), Max:101.7 °F (38.7 °C) Intake/Output:  
Last shift:      No intake/output data recorded. Last 3 shifts:  1901 -  0700 In: 4031.8 [I.V.:3871.8] Out: 8966 [Urine:2085; Drains:1150] Intake/Output Summary (Last 24 hours) at 4/3/2019 8695 Last data filed at 4/3/2019 0700 Gross per 24 hour Intake 2040.84 ml Output 1680 ml Net 360.84 ml Hemodynamics:  
PAP:   CO:    
Wedge:   CI:    
CVP:  CVP (mmHg): 17 mmHg (19 0700) SVR:    
  PVR:    
 
Ventilator Settings: 
Mode Rate Tidal Volume Pressure FiO2 PEEP  
CPAP, Spontaneous   500 ml  6 cm H2O 40 % 6 cm H20 Peak airway pressure: 19 cm H2O Minute ventilation: 7.1 l/min Physical Exam: 
 
General:  Obese, on vent Head:  Normocephalic, without obvious abnormality, atraumatic. Eyes:  Opening and tracking? Conjunctivae/corneas clear. PERRL, EOMs intact. Nose: Nares normal. Septum midline. Mucosa normal. No drainage or sinus tenderness. Throat: Lips, mucosa, and tongue normal. Teeth and gums normal.  
Neck: Supple, symmetrical, trachea midline, no adenopathy, thyroid: no enlargment/tenderness/nodules, no carotid bruit and no JVD. Has right neck IJ. Back:   Symmetric, no curvature. ROM normal.  
Lungs: On  auscultation bilaterally has bilateral wheezing, prolonged expiratory phase. Chest wall:  No tenderness or deformity. Heart:  Regular rate and rhythm, S1, S2 normal, no murmur, click, rub or gallop. Abdomen:   Distended, few BSl. No masses,  No organomegaly. Obese. Extremities: Extremities normal, atraumatic, no cyanosis or edema. Pulses: 2+ and symmetric all extremities. Skin: Skin color, texture, turgor normal. No rashes or lesions Lymph nodes: Cervical, supraclavicular, and axillary nodes normal.  
Neurologic: Grossly nonfocal, awake Psych:+ anxiety or depression. But not able to fully eval.   
 
 
Data: 
 
 
       
Labs: 
 
Recent Labs 04/03/19 
4925 04/02/19 
0407 04/01/19 
0411 WBC 12.0* 11.4* 22.8* HGB 8.3* 8.1* 8.2*  
PLT 94* 79* 73* INR  --  1.4*  --   
APTT  --  26.2  --   
 
Recent Labs 04/03/19 
4876 04/02/19 
0407 04/01/19 
0411 * 147* 144  
K 4.5 4.1 3.8 * 117* 112* CO2 24 23 22 * 432* 338* BUN 53* 46* 43* CREA 0.92 0.84 0.68  
CA 7.4* 7.3* 7.1*  
MG  --   --  2.5* PHOS  --  1.9* 1.6* ALB 2.4* 2.3*  --   
SGOT 21 35  --   
ALT 55 63  -- No results for input(s): PH, PCO2, PO2, HCO3, FIO2 in the last 72 hours. No results for input(s): CPK, CKNDX, TROIQ in the last 72 hours. No lab exists for component: CPKMB Lab Results Component Value Date/Time  (H) 03/29/2017 02:00 AM  
  
 
       
 
Telemetry:Sinus Tach.   
 
Imaging: 
I have personally reviewed the patients radiographs and have reviewed the reports: 
3-28-19: CT of chest/Abdomen: IMPRESSION:  
 1. Right colitis. 2. Cirrhosis with portal venous hypertension. 3. Clear lungs.   
 
 
Siva Pete MD

## 2019-04-04 NOTE — CONSULTS
[facility]  Note NAME: Marialuisa Reese :  1955 MRN:  [id number] Date/Time: 2019 Risk of deterioration: high Assessment:    Plan: 
Volume overload Acute Resp failure (intubated 3/29) Free water deficit (R) sided colitis Ecoli sepsis Cirrhosis/BALDERAS Ascites CVP 15 on rounds Has rectal norman- 
UOP > 1.7 l yesterday Albumin 2.3 To facilitate diuresis will give albumin prior to scheduled bumex, she responded poorly to scheduled lasix and prn bumex dosing. IF she doesn't respond to this will change to a bumex gtt Will need to replete her free water defict thru her NG if worsens with diuresis Creatinine stable Bun up-on steroids Asked to see to help with volume management. Pt has ecoli sepsis, s/p resp failure/intubation on 3/29-she had a ct with IV contrast on -kidneys without hydro/mass/stone. Making urine-has rectal norman (per nursing same 600 cc in there over last 2 days). She has received intermittent bumex dosing and didn't respond to schedule lasix 40 bid. Note albumin is 2.3 Subjective: Chief Complaint:  Unable to give Review of Systems: Patient was not able to provide review of systems due to mental status change/acute illness Objective: VITALS:  
Last 24hrs VS reviewed since prior progress note. Most recent are: 
Visit Vitals /55 Pulse 97 Temp 98.5 °F (36.9 °C) Resp 14 Ht 5' 2\" (1.575 m) Wt 107 kg (235 lb 14.3 oz) SpO2 100% BMI 43.15 kg/m² SpO2 Readings from Last 6 Encounters:  
19 100% 19 99% 19 95% 18 96% 11/15/18 93% 10/11/18 95% O2 Flow Rate (L/min): 12 l/min Intake/Output Summary (Last 24 hours) at 2019 2157 Last data filed at 2019 0600 Gross per 24 hour Intake 1639.02 ml Output 1720 ml Net -80.98 ml Telemetry Reviewed   normal sinus rhythm PHYSICAL EXAM: 
 
General   well developed, well nourished, appears stated age, intubated awake on vent EENT  Normocephalic, Atraumatic, PERRLA, EOMI, sclera clear,ETT Respiratory   Clear To Auscultation bilaterally Cardiology  Regular Rate and Rythmn Abdominal  Soft, non-tender, non-distended, positive bowel sounds Extremities  No clubbing, cyanosis, or edema. Pulses intact. Lab Data Reviewed: (see below) Medications Reviewed: (see below) PMH/SH reviewed - no change compared to H&P 
______________________________________________ 
___________________________________________________ Attending Physician: Anny Sheffield MD  
 
____________________________________________________ MEDICATIONS: 
Current Facility-Administered Medications Medication Dose Route Frequency  insulin glargine (LANTUS) injection 40 Units  40 Units SubCUTAneous Q12H  
 insulin NPH (NOVOLIN N, HUMULIN N) injection 30 Units  30 Units SubCUTAneous Q12H And  
 methylPREDNISolone (PF) (SOLU-MEDROL) injection 40 mg  40 mg IntraVENous Q12H  
 lactulose (CHRONULAC) solution 30 g  30 g Oral BID PRN  
 0.45% sodium chloride infusion  50 mL/hr IntraVENous CONTINUOUS  
 insulin lispro (HUMALOG) injection   SubCUTAneous Q6H  
 dextrose (D50) infusion 12.5-25 g  12.5-25 g IntraVENous PRN  
 balsam peru-castor oil (VENELEX) ointment   Topical BID  cefTRIAXone (ROCEPHIN) 2 g in 0.9% sodium chloride (MBP/ADV) 50 mL  2 g IntraVENous Q24H  
 famotidine (PF) (PEPCID) 20 mg in sodium chloride 0.9% 10 mL injection  20 mg IntraVENous Q12H  
 dexmedeTOMidine (PRECEDEX) 400 mcg in 0.9% sodium chloride 100 mL infusion  0-1.4 mcg/kg/hr IntraVENous TITRATE  albuterol (PROVENTIL VENTOLIN) nebulizer solution 2.5 mg  2.5 mg Nebulization Q4H RT  
 amitriptyline (ELAVIL) tablet 100 mg  100 mg Oral QHS  propofol (DIPRIVAN) infusion  0-50 mcg/kg/min IntraVENous TITRATE  acetaminophen (TYLENOL) solution 650 mg  650 mg Per NG tube Q4H PRN  chlorhexidine (PERIDEX) 0.12 % mouthwash 15 mL  15 mL Oral Q12H  NOREPINephrine (LEVOPHED) 32 mg in 5% dextrose 250 mL infusion  2-30 mcg/min IntraVENous TITRATE  sodium chloride (NS) flush 5-40 mL  5-40 mL IntraVENous Q8H  
 sodium chloride (NS) flush 5-40 mL  5-40 mL IntraVENous PRN  
 acetaminophen (TYLENOL) suppository 650 mg  650 mg Rectal Q6H PRN  
 ondansetron (ZOFRAN) injection 4 mg  4 mg IntraVENous Q6H PRN  
 albuterol-ipratropium (DUO-NEB) 2.5 MG-0.5 MG/3 ML  3 mL Nebulization Q4H PRN  
 glucose chewable tablet 16 g  4 Tab Oral PRN  
 glucagon (GLUCAGEN) injection 1 mg  1 mg IntraMUSCular PRN  
 aspirin delayed-release tablet 81 mg  81 mg Oral DAILY  
  
 
LABS: 
Recent Labs 04/03/19 
4845 04/02/19 
0407 WBC 12.0* 11.4* HGB 8.3* 8.1* HCT 28.1* 27.0*  
PLT 94* 79* Recent Labs 04/04/19 
0501 04/03/19 
5051 04/02/19 
0407 * 146* 147* K 4.6 4.5 4.1 * 117* 117* CO2 26 24 23 BUN 55* 53* 46* CREA 0.76 0.92 0.84 * 378* 432* CA 7.8* 7.4* 7.3*  
MG 2.7*  --   --   
PHOS 3.1  --  1.9* Recent Labs 04/03/19 
5633 04/02/19 
0407 SGOT 21 35 ALT 55 63 AP 72 80 TBILI 0.7 0.7 TP 7.0 6.8 ALB 2.4* 2.3*  
GLOB 4.6* 4.5* Recent Labs 04/02/19 
0407 INR 1.4* PTP 14.1* APTT 26.2 No results for input(s): FE, TIBC, PSAT, FERR in the last 72 hours. No results for input(s): PH, PCO2, PO2 in the last 72 hours. No results for input(s): CPK, CKNDX, TROIQ in the last 72 hours. No lab exists for component: CPKMB Lab Results Component Value Date/Time  Glucose (POC) 285 (H) 04/03/2019 11:54 PM  
 Glucose (POC) 291 (H) 04/03/2019 05:38 PM  
 Glucose (POC) 379 (H) 04/03/2019 12:10 PM  
 Glucose (POC) 426 (H) 04/03/2019 05:49 AM  
 Glucose (POC) 320 (H) 04/02/2019 11:56 PM

## 2019-04-04 NOTE — PROGRESS NOTES
Gastroenterology Daily Progress Note (Dr. Philip Crowder for Dr. Wayne Yuan) [facility] Admit Date: 3/28/2019 Subjective:   
  
Patient currently on spontaneous breathing trial. 
She is tracking and awake and seems much more alert. Rectal tube remains in place but not receiving much lactulose. Low grade fever overnight. Current Facility-Administered Medications Medication Dose Route Frequency  insulin glargine (LANTUS) injection 40 Units  40 Units SubCUTAneous Q12H  
 insulin NPH (NOVOLIN N, HUMULIN N) injection 30 Units  30 Units SubCUTAneous Q12H And  
 methylPREDNISolone (PF) (SOLU-MEDROL) injection 40 mg  40 mg IntraVENous Q12H  
 lactulose (CHRONULAC) solution 30 g  30 g Oral BID PRN  
 0.45% sodium chloride infusion  50 mL/hr IntraVENous CONTINUOUS  
 insulin lispro (HUMALOG) injection   SubCUTAneous Q6H  
 dextrose (D50) infusion 12.5-25 g  12.5-25 g IntraVENous PRN  
 balsam peru-castor oil (VENELEX) ointment   Topical BID  cefTRIAXone (ROCEPHIN) 2 g in 0.9% sodium chloride (MBP/ADV) 50 mL  2 g IntraVENous Q24H  
 famotidine (PF) (PEPCID) 20 mg in sodium chloride 0.9% 10 mL injection  20 mg IntraVENous Q12H  
 dexmedeTOMidine (PRECEDEX) 400 mcg in 0.9% sodium chloride 100 mL infusion  0-1.4 mcg/kg/hr IntraVENous TITRATE  albuterol (PROVENTIL VENTOLIN) nebulizer solution 2.5 mg  2.5 mg Nebulization Q4H RT  
 amitriptyline (ELAVIL) tablet 100 mg  100 mg Oral QHS  propofol (DIPRIVAN) infusion  0-50 mcg/kg/min IntraVENous TITRATE  acetaminophen (TYLENOL) solution 650 mg  650 mg Per NG tube Q4H PRN  chlorhexidine (PERIDEX) 0.12 % mouthwash 15 mL  15 mL Oral Q12H  
 NOREPINephrine (LEVOPHED) 32 mg in 5% dextrose 250 mL infusion  2-30 mcg/min IntraVENous TITRATE  sodium chloride (NS) flush 5-40 mL  5-40 mL IntraVENous Q8H  
 sodium chloride (NS) flush 5-40 mL  5-40 mL IntraVENous PRN  
  acetaminophen (TYLENOL) suppository 650 mg  650 mg Rectal Q6H PRN  
 ondansetron (ZOFRAN) injection 4 mg  4 mg IntraVENous Q6H PRN  
 albuterol-ipratropium (DUO-NEB) 2.5 MG-0.5 MG/3 ML  3 mL Nebulization Q4H PRN  
 glucose chewable tablet 16 g  4 Tab Oral PRN  
 glucagon (GLUCAGEN) injection 1 mg  1 mg IntraMUSCular PRN  
 aspirin delayed-release tablet 81 mg  81 mg Oral DAILY Objective:  
 
Visit Vitals /58 Pulse (!) 102 Temp 97.7 °F (36.5 °C) Resp 21 Ht 5' 2\" (1.575 m) Wt 107 kg (235 lb 14.3 oz) SpO2 100% BMI 43.15 kg/m² Blood pressure 130/58, pulse (!) 102, temperature 97.7 °F (36.5 °C), resp. rate 21, height 5' 2\" (1.575 m), weight 107 kg (235 lb 14.3 oz), SpO2 100 %. No intake/output data recorded. 04/02 1901 - 04/04 0700 In: 3111.8 [I.V.:2951.8] Out: 9650 [Urine:2530; Drains:150] Intake/Output Summary (Last 24 hours) at 4/4/2019 0801 Last data filed at 4/4/2019 0600 Gross per 24 hour Intake 1703.32 ml Output 1770 ml Net -66.68 ml Physical Exam:  
 
General: obese, intubated WF in NAD Chest:  CTA, No rhonchi, rales or rubs. Heart: RRR, S1S2 normal 
GI: + less distended soft and depressible, nontender, + BS Labs:  
 
Recent Results (from the past 24 hour(s)) GLUCOSE, POC Collection Time: 04/03/19 12:10 PM  
Result Value Ref Range Glucose (POC) 379 (H) 65 - 100 mg/dL Performed by Tiffany Gallardo GLUCOSE, POC Collection Time: 04/03/19  5:38 PM  
Result Value Ref Range Glucose (POC) 291 (H) 65 - 100 mg/dL Performed by Tiffany Gallardo GLUCOSE, POC Collection Time: 04/03/19 11:54 PM  
Result Value Ref Range Glucose (POC) 285 (H) 65 - 100 mg/dL Performed by Vandana Ricketts PHOSPHORUS Collection Time: 04/04/19  5:01 AM  
Result Value Ref Range Phosphorus 3.1 2.6 - 4.7 MG/DL MAGNESIUM Collection Time: 04/04/19  5:01 AM  
Result Value Ref Range Magnesium 2.7 (H) 1.6 - 2.4 mg/dL METABOLIC PANEL, BASIC  
 Collection Time: 04/04/19  5:01 AM  
Result Value Ref Range Sodium 149 (H) 136 - 145 mmol/L Potassium 4.6 3.5 - 5.1 mmol/L Chloride 117 (H) 97 - 108 mmol/L  
 CO2 26 21 - 32 mmol/L Anion gap 6 5 - 15 mmol/L Glucose 273 (H) 65 - 100 mg/dL BUN 55 (H) 6 - 20 MG/DL Creatinine 0.76 0.55 - 1.02 MG/DL  
 BUN/Creatinine ratio 72 (H) 12 - 20 GFR est AA >60 >60 ml/min/1.73m2 GFR est non-AA >60 >60 ml/min/1.73m2 Calcium 7.8 (L) 8.5 - 10.1 MG/DL  
LABRCNT(wbc:2,hgb:2,hct:2,plt:2,) Recent Labs 04/04/19 
0501 04/03/19 
5897 04/02/19 
0407 * 146* 147* K 4.6 4.5 4.1 * 117* 117* CO2 26 24 23 BUN 55* 53* 46* CREA 0.76 0.92 0.84 * 378* 432* CA 7.8* 7.4* 7.3*  
MG 2.7*  --   --   
PHOS 3.1  --  1.9* Recent Labs 04/02/19 
0407 INR 1.4* PTP 14.1* APTT 26.2 Recent Labs 04/03/19 
4388 04/02/19 
0407 SGOT 21 35 AP 72 80  
TP 7.0 6.8 ALB 2.4* 2.3*  
GLOB 4.6* 4.5* Lab Results Component Value Date/Time Folate 24.6 (H) 03/01/2016 11:20 AM  
 
Impression: 
  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 
Continues to show improvement clincally. Off pressors. Attempting extubation today and then would need speech path assessment. 
-resume xifaxan and PO lactulose when able to take PO 
-continue treatment for sepsis as you're doing 
-when able to resume diuretics would help ascites accumulation in hospital: she was on spironolactone 200 mg daily prescribed by Dr. Tyrone Moreno 
-will see again on request, call with questions. Marina Boas, MD 
 
4/4/2019 [address] [facility], [address] [address] Loc: [phone]

## 2019-04-04 NOTE — PROGRESS NOTES
[facility], Critical Care, and Sleep Medicine Name: Griffin Harding MRN: [id number] : 1955 Hospital: [place] [address] Date: 2019 Critical Care  Patient Consult IMPRESSION:  
· Septic with shock-  
· Ecoli septicemia; weaned off levophed. Hold all BP meds. · Acute hypoxic resp failure- intubated 3/29. Oxygenating adequately, 40%,Peep 6; CPAP trials ok but AMS, barrrier to safe liberation · Volume overload- not very responsive to diuretic · Cirrhosis, from BALDERAS- noted in past to have esophageal varices. seems decompensated, Gi following · Hepatic Encephalopathy (ammonia of 110 on admission), not compliant she has been with her home regimen. Now 19; eyes open and slightly more interactive; head Ct on admission normal 
· Acute UTI-UCx growing Ecoli. Enterococcus · Right sided colitis  Noted. ?ischemic or infectious. Possible C Diff. Although little stooling currently; not likley; CT better · Anemia · Coagulopathy -INR 2.0 ?liver dz vs DIC 
· MIld Thrombocytopenia-?liver dz vs DIC · Hyperglycemia-likely combination of sepsis and steroids. · COPD with acute exacerbation · Ileus · IBIS on CPAP at home. · Obese. · Ongoing Smoking · D/w   : Beulah Paci: [phone]. Updated today · Critically ill, 35 min CC, EOP. Multiple organ failure. High risk of decompensation. RECOMMENDATIONS:  
· CCU monitoring · Will consult Nephrology to help with Diuresis, hesistant with underlying cirrhosis to over tax her kidneys- spoke with dr. Elizabeth Tam, apprecaite her help · watch renal function · Hold on enteral feedings another day to save on volume · Try to orient when awake · Not ready to liberate · will try extended CPAP trial again today and over night · Rest on vent sooner prn · Mechanical ventilation- not ready for extubation given hemodynamic instability and AMS; also concerned about abdominal distension, restrictive process · Hold on paracentesis. Don't think diagnositic tap needed and not enough could be removed to be therapeutic; explained this to  by phone · adjsut ABX · IV sedation prn safety. · Lactulose daily · NH3 in AM 
· Insulin adjusted · Nebs scheduled. · Wean steroids for acute COPD exacerbation · CVP to eval volume status · Replete Electrolytes Subjective/History:  
 
4/4 more alert. Can move left foot, right hand but not right foot or left hand? Passed SBT. Still volume overloaded. No new secretions 4/3 waking up some? Minimal response to lasix. eyes open. Left foot withdraws but not following commands Anasarca. flexiseal 
 
4/2 On SBT, CT abd reviewed from other day. Colitis better. Mild ascites. Distended bowels. Cirtrhosis, varices 4/1 flexiseal after lactulose started. On IV flagyl, Po vancomycin. IV abx for sepsis. On vent. PO4 1.6; glucose high 
 
3-31:no acute events overnight. Little to no stooling. Remains on vasopressors and mechanical ventilation 3-30-pt intubated yesterday. No acute events overnight. Remains sedated on Norepi gtt 3-29-19: Pt seen this am. She is still very confused. Has ongoing needed for pressors. Has wheezing. Her WBC has increased. She is not able to give hx of HPI or ROS. This patient has been seen and evaluated at the request of Dr. Ashly Chairez for above. Pt was seen in ER. Patient is a 61 y.o. female with hx of above. Noted to have increased confusion per her family. Has not been compliant with her meds. Noted to have weakness and inability to walk. Was was normal the night before. Noted when she awoke to be confused, had generalized weakness. Has a very dry  Mouth when seen in ER. ROS and HPI limited due to pts confused state. MEDS:  
Current Facility-Administered Medications Medication  albumin human 25% (BUMINATE) solution 12.5 g  
 bumetanide (BUMEX) injection 2 mg  lactulose (CHRONULAC) solution 30 g  insulin glargine (LANTUS) injection 50 Units  insulin NPH (NOVOLIN N, HUMULIN N) injection 30 Units And  
 methylPREDNISolone (PF) (SOLU-MEDROL) injection 40 mg  
 0.45% sodium chloride infusion  insulin lispro (HUMALOG) injection  dextrose (D50) infusion 12.5-25 g  
 balsam peru-castor oil (VENELEX) ointment  cefTRIAXone (ROCEPHIN) 2 g in 0.9% sodium chloride (MBP/ADV) 50 mL  famotidine (PF) (PEPCID) 20 mg in sodium chloride 0.9% 10 mL injection  dexmedeTOMidine (PRECEDEX) 400 mcg in 0.9% sodium chloride 100 mL infusion  albuterol (PROVENTIL VENTOLIN) nebulizer solution 2.5 mg  
 amitriptyline (ELAVIL) tablet 100 mg  propofol (DIPRIVAN) infusion  acetaminophen (TYLENOL) solution 650 mg  
 chlorhexidine (PERIDEX) 0.12 % mouthwash 15 mL  NOREPINephrine (LEVOPHED) 32 mg in 5% dextrose 250 mL infusion  sodium chloride (NS) flush 5-40 mL  sodium chloride (NS) flush 5-40 mL  acetaminophen (TYLENOL) suppository 650 mg  
 ondansetron (ZOFRAN) injection 4 mg  albuterol-ipratropium (DUO-NEB) 2.5 MG-0.5 MG/3 ML  
 glucose chewable tablet 16 g  
 glucagon (GLUCAGEN) injection 1 mg  aspirin delayed-release tablet 81 mg Past Surgical History:  
Procedure Laterality Date  HX BACK SURGERY    
 HX CARPAL TUNNEL RELEASE    
 on right  HX HYSTERECTOMY plus 1/2 of an ovary removed  CT COLSC FLX W/RMVL OF TUMOR POLYP LESION SNARE TQ  5/30/2013  UPPER GI ENDOSCOPY,LIGAT VARIX  2/6/2015 Prior to Admission medications Medication Sig Start Date End Date Taking? Authorizing Provider  
fluticasone furoate-vilanterol (BREO ELLIPTA) 200-25 mcg/dose inhaler Take 1 Puff by inhalation daily. Provider, Historical  
insulin glargine (LANTUS SOLOSTAR U-100 INSULIN) 100 unit/mL (3 mL) inpn 110 Units by SubCUTAneous route daily. Provider, Historical  
furosemide (LASIX) 40 mg tablet Take 2 Tabs by mouth daily.  3/19/19   HUMZA Go  
 spironolactone (ALDACTONE) 100 mg tablet Take 2 Tabs by mouth daily. 3/19/19   HUMZA Streeter  
CONSTULOSE 10 gram/15 mL solution take 2 tablespoonfuls by mouth twice a day 1/7/19   Cathi Barksdale NP  
losartan (COZAAR) 25 mg tablet take 1 tablet by mouth once daily , REPLACES LISINOPRIL FOR BLOOD PRESSURE AND KIDNEY PROTECTION 12/3/18   Eliud Coronel MD  
insulin NPH (NOVOLIN N NPH U-100 INSULIN) 100 unit/mL injection Inject 55 units every 12 hours--dispense relion brand--patient will pay cash 11/28/18   Elidu Coronel MD  
traMADol Litzy Cushing) 50 mg tablet take 1-2 tablets by mouth twice a day if needed for DIABETIC NEUROPATHY PAIN 11/27/18   Eliud Coronel MD  
aspirin 81 mg chewable tablet Take 1 Tab by mouth daily. 11/16/18   Sven Hollins MD  
atorvastatin (LIPITOR) 40 mg tablet Take 1 Tab by mouth nightly. 11/15/18   Sven Hollins MD  
potassium chloride SR (KLOR-CON 10) 10 mEq tablet Take 10 mEq by mouth two (2) times a day. Other, MD Leonarda  
omeprazole (PRILOSEC) 20 mg capsule take 1 capsule by mouth once daily 11/8/18   Cathi Barksdale NP  
amitriptyline (ELAVIL) 150 mg tablet Take 1 Tab by mouth nightly. 10/16/18   Eliud Coronel MD  
propranolol (INDERAL) 20 mg tablet Take 1 Tab by mouth two (2) times a day. 10/16/18   Eliud Coronel MD  
ferrous sulfate 325 mg (65 mg iron) tablet take 1 tablet by mouth once daily with BREAKFAST 10/2/18   Cathi Barksdale NP  
insulin lispro (HUMALOG KWIKPEN INSULIN) 100 unit/mL kwikpen Inject as needed up to 3 times per day for sugars over 150: 4 units for every 50 points.   Max 50 units per day 10/1/18   Eliud Coronel MD  
metFORMIN (GLUCOPHAGE) 1,000 mg tablet take 1 tablet by mouth twice a day with meals 8/23/18   Eliud Coronel MD  
ondansetron (ZOFRAN ODT) 4 mg disintegrating tablet dissolve 1 tablet ON TONGUE every 8 hours if needed for nausea 8/1/18   Cathi Ridley, NP  
 gabapentin (NEURONTIN) 600 mg tablet take 2 tablets by mouth three times a day 18   Rebecca Pepper MD  
rOPINIRole (REQUIP) 4 mg tab TAB Take 4 mg by mouth nightly. Provider, Sergio  
albuterol (PROAIR HFA) 90 mcg/actuation inhaler Take 2 Puffs by inhalation every four (4) hours as needed for Wheezing. Other, MD Leonarda  
 
 
Allergies Allergen Reactions  Hydrocodone Nausea and Vomiting  Lisinopril Cough  Lortab [Hydrocodone-Acetaminophen] Nausea and Vomiting  Percocet [Oxycodone-Acetaminophen] Nausea and Vomiting Social History Tobacco Use  Smoking status: Current Every Day Smoker Packs/day: 1.00 Years: 40.00 Pack years: 40.00  Smokeless tobacco: Former User Substance Use Topics  Alcohol use: No  
  Comment: rare Family History Problem Relation Age of Onset  Diabetes Mother  Stroke Sister  Diabetes Paternal Aunt  Diabetes Paternal Uncle  Heart Disease Neg Hx Review of Systems: 
Review of systems not obtained due to patient factors. Objective: 
Vital Signs:   
Visit Vitals /49 Pulse 84 Temp 98.5 °F (36.9 °C) Resp 13 Ht 5' 2\" (1.575 m) Wt 107 kg (235 lb 14.3 oz) SpO2 96% BMI 43.15 kg/m² O2 Device: Ventilator O2 Flow Rate (L/min): 12 l/min Temp (24hrs), Av.4 °F (37.4 °C), Min:97.7 °F (36.5 °C), Max:100.6 °F (38.1 °C) Intake/Output:  
Last shift:       07 -  190 In: 523.7 [I.V.:523.7] Out: 295 [Urine:295] Last 3 shifts: 1901 -  0700 In: 3111.8 [I.V.:2951.8] Out: 3579 [Urine:2530; Drains:150] Intake/Output Summary (Last 24 hours) at 2019 1236 Last data filed at 2019 1135 Gross per 24 hour Intake 1919.84 ml Output 1365 ml Net 554.84 ml Hemodynamics:  
PAP:   CO:    
Wedge:   CI:    
CVP:  CVP (mmHg): 18 mmHg (19 1000) SVR:    
  PVR:    
 
Ventilator Settings: 
Mode Rate Tidal Volume Pressure FiO2 PEEP  
 Assist control   500 ml  6 cm H2O 40 % 6 cm H20 Peak airway pressure: 26 cm H2O Minute ventilation: 6.82 l/min Physical Exam: 
 
General:  Obese, on vent Head:  Normocephalic, without obvious abnormality, atraumatic. Eyes:  Opening and tracking? Conjunctivae/corneas clear. PERRL, EOMs intact. Nose: Nares normal. Septum midline. Mucosa normal. No drainage or sinus tenderness. Throat: Lips, mucosa, and tongue normal. Teeth and gums normal.  
Neck: Supple, symmetrical, trachea midline, no adenopathy, thyroid: no enlargment/tenderness/nodules, no carotid bruit and no JVD. Has right neck IJ. Back:   Symmetric, no curvature. ROM normal.  
Lungs: On  auscultation bilaterally has bilateral wheezing, prolonged expiratory phase. Chest wall:  No tenderness or deformity. Heart:  Regular rate and rhythm, S1, S2 normal, no murmur, click, rub or gallop. Abdomen:   Distended, few BSl. No masses,  No organomegaly. Obese. Extremities: Extremities normal, atraumatic, no cyanosis or edema. Pulses: 2+ and symmetric all extremities. Skin: Skin color, texture, turgor normal. No rashes or lesions Lymph nodes: Cervical, supraclavicular, and axillary nodes normal.  
Neurologic: Grossly nonfocal, awake Psych:+ anxiety or depression. But not able to fully eval.   
 
 
Data: 
 
 
       
Labs: 
 
Recent Labs 04/03/19 
5650 04/02/19 
0407 WBC 12.0* 11.4* HGB 8.3* 8.1*  
PLT 94* 79* INR  --  1.4* APTT  --  26.2 Recent Labs 04/04/19 
0501 04/03/19 
9927 04/02/19 
0407 * 146* 147* K 4.6 4.5 4.1 * 117* 117* CO2 26 24 23 * 378* 432* BUN 55* 53* 46* CREA 0.76 0.92 0.84 CA 7.8* 7.4* 7.3*  
MG 2.7*  --   --   
PHOS 3.1  --  1.9* ALB  --  2.4* 2.3*  
SGOT  --  21 35 ALT  --  55 63 No results for input(s): PH, PCO2, PO2, HCO3, FIO2 in the last 72 hours. No results for input(s): CPK, CKNDX, TROIQ in the last 72 hours. No lab exists for component: CPKMB Lab Results Component Value Date/Time  (H) 03/29/2017 02:00 AM  
  
 
       
 
Telemetry:Sinus Tach. Imaging: 
I have personally reviewed the patients radiographs and have reviewed the reports: 
3-28-19: CT of chest/Abdomen: IMPRESSION:  
1. Right colitis. 2. Cirrhosis with portal venous hypertension. 3. Clear lungs.   
 
 
Jenny Ellison MD

## 2019-04-04 NOTE — PROGRESS NOTES
Hospitalist Progress Note NAME: Brady Yen :  1955 MRN:  [id number] Assessment / Plan: 
Acute hypercapnic respiratory failure s/p intubation on 3/29 Septic shock, POA Hypotension, Fever, Lactic acidosis Ecoli, enterococcus UTI and bacteremia Colitis, infectious vs ischemic 
-still with fever but WBC continuing to improve 
-KUB showed no significant bowel dilation. Repeat KUB showed gas and stool in the large bowel 
-CT a/p showed resolved mural thickening in the ascending colon. Rectal tube and gastric tube present. Interval development of ascites. Cirrhosis with splenomegaly and esophageal varices. Patchy opacities in the lung bases may represent atelectasis or airspace disease. 
-cont' rectal tube and lactulose 
-CXR with no acute finding, CT chest showed R colitis, cirrhosis with portal venous HTN, clear lungs -Bcx and Ucx growing Ecoli, repeat Bcx neg so far 
-cont' IVF, sedation, wean as tolerated 
-no longer on pressors 
-abx changed to IV rocephin and PO vancomycin  
-hold propranolol, lasix, aldactone. losartan 
-ventilatory support, nebs, IV steroids KALI with metabolic acidosis, resolved Lactic acidosis Hypomag, repleted Hypophos, repleted 
-cont' IVF 
-avoid nephrotoxic agent, monitor bmp Acute encephalopathy, POA due to HE Right leg weakness, POA Mild left lip droop, POA Hx TIA  
-suspect encephalopathy due to hepatic encephalopathy, POA and sepsis. CT head negative. Pt tracking and waking up some today. Eyes open with name calling. 
-cont' lactulose, xifaxin  
-hold tramadol, amitriptyline 
-continue aspirin 
  
DM initially hypoglycemia, now with hyperglycemia in the setting of steroid use 
-initially requiring D10 bolus for EMS and D50 twice in ER. 
-cont' NPH and lantus, SSI 
-hold metformin 
  
Right leg edema  
-patient reports this is chronic 
  
COPD 
IBIS 
-nebs Obesity  
Body mass index is 38.31 kg/m². 
  
Code:  full DVT prophylaxis:  SCD Surrogate decision maker:   Subjective: Chief Complaint / Reason for Physician Visit Pt awake on the ventilator, not following commands. easily react to touch. Discussed with RN events overnight. Review of Systems: 
Symptom Y/N Comments  Symptom Y/N Comments Fever/Chills    Chest Pain Poor Appetite    Edema Cough    Abdominal Pain Sputum    Joint Pain SOB/MARTINEZ    Pruritis/Rash Nausea/vomit    Tolerating PT/OT Diarrhea    Tolerating Diet Constipation    Other Could NOT obtain due to: intubated Objective: VITALS:  
Last 24hrs VS reviewed since prior progress note. Most recent are: 
Patient Vitals for the past 24 hrs: 
 Temp Pulse Resp BP SpO2  
04/04/19 1656  99 16  97 % 04/04/19 1600 98.1 °F (36.7 °C) 95 17 125/53 97 % 04/04/19 1549     97 % 04/04/19 1500  87 21 132/54 94 % 04/04/19 1400  96 20 120/51 98 % 04/04/19 1300  100 20 119/48 98 % 04/04/19 1240  100 20 126/49 96 % 04/04/19 1208  84 13  96 % 04/04/19 1200 98.2 °F (36.8 °C)      
04/04/19 1100  97 15 125/59 97 % 04/04/19 1000  97 22 116/49 99 % 04/04/19 0900  93 17 102/40 98 % 04/04/19 0803  97 14  100 % 04/04/19 0800 98.5 °F (36.9 °C) 98 17 122/55 99 % 04/04/19 0700  (!) 102 21 130/58 100 % 04/04/19 0600  91 25 134/65 99 % 04/04/19 0500  90 17 111/45 97 % 04/04/19 0400 97.7 °F (36.5 °C) 82 12 116/52 95 % 04/04/19 0357  83 12  94 % 04/04/19 0300  89 21 105/42 97 % 04/04/19 0200  99 18 124/57 97 % 04/04/19 0100  95 18 117/50 96 % 04/04/19 0000 99.6 °F (37.6 °C) 92 18 112/52 96 % 04/03/19 2341  90 18  97 % 04/03/19 2300  90 18 114/52 97 % 04/03/19 2200  94 23 114/49 97 % 04/03/19 2100  94 18 108/42 98 % 04/03/19 2000 100.2 °F (37.9 °C) (!) 105 23 112/45 98 % 04/03/19 1921  (!) 101 17  97 % 04/03/19 1900  90 18 114/41 98 % 04/03/19 1800 (!) 100.6 °F (38.1 °C) (!) 102 21 121/57 97 % 04/03/19 1700  96 18 116/56 96 % Intake/Output Summary (Last 24 hours) at 4/4/2019 1658 Last data filed at 4/4/2019 1600 Gross per 24 hour Intake 2414.64 ml Output 1370 ml Net 1044.64 ml PHYSICAL EXAM: 
General: Female intubated, awake EENT:  OGT and ETT in place Resp:  CTA b/l. No wheezing  No accessory muscle use CV:  Regular  rhythm,  No edema GI:  Soft, Non distended, Non tender.  +BS Neurologic:  Intubated, tracking but not following commands Psych:   Unable to do Skin:  No rashes. No jaundice Reviewed most current lab test results and cultures  YES Reviewed most current radiology test results   YES Review and summation of old records today    NO Reviewed patient's current orders and MAR    YES 
PMH/SH reviewed - no change compared to H&P 
________________________________________________________________________ Care Plan discussed with: 
  Comments Patient x Family  x Pt's  RN x Care Manager Consultant Multidiciplinary team rounds were held today with , nursing, pharmacist and clinical coordinator. Patient's plan of care was discussed; medications were reviewed and discharge planning was addressed. ________________________________________________________________________ Total NON critical care TIME:  35   Minutes Total CRITICAL CARE TIME Spent:   Minutes non procedure based Comments >50% of visit spent in counseling and coordination of care    
________________________________________________________________________ Gisselle Garcia MD  
 
Procedures: see electronic medical records for all procedures/Xrays and details which were not copied into this note but were reviewed prior to creation of Plan. LABS: 
I reviewed today's most current labs and imaging studies. Pertinent labs include: 
Recent Labs 04/03/19 
5202 04/02/19 
0407 WBC 12.0* 11.4* HGB 8.3* 8.1* HCT 28.1* 27.0*  
 PLT 94* 79* Recent Labs 04/04/19 
0501 04/03/19 
0076 04/02/19 
0407 * 146* 147* K 4.6 4.5 4.1 * 117* 117* CO2 26 24 23 * 378* 432* BUN 55* 53* 46* CREA 0.76 0.92 0.84 CA 7.8* 7.4* 7.3*  
MG 2.7*  --   --   
PHOS 3.1  --  1.9* ALB  --  2.4* 2.3* TBILI  --  0.7 0.7 SGOT  --  21 35 ALT  --  55 63 INR  --   --  1.4* Signed: Andreia Szymanski MD

## 2019-04-04 NOTE — PROGRESS NOTES
1930:  Report received from [facility] VSS. Pt seems awake but not responding except nodding yes to everything. Propofol was started and increased from day nurse. Temp 100.2. Pt uncovered mostly and room cooled. 2400:  Temp 99.6 now. Pt resting , awake but drowsy at times. 0500:  Propofol turned off now. Bathing pt and changing linens. 0550:  Pt very awake. Moving left shoulder and arm and left foot. Did move right hand one time. 0700:  Still not sure if pt is answering questions appropriately and correctly but still awake and breathing in the upper teens and low twenty's resp. O2 Sat is 97%. Precedex is still on. Report given to [facility].

## 2019-04-04 NOTE — INTERDISCIPLINARY ROUNDS
Interdisciplinary team rounds were held 4/4/2019 with the following team members:Care Management, Diabetes Treatment Specialist, Nursing, Nutrition, Pharmacy, Physician, Respiratory Therapy and Clinical Coordinator. Plan of care discussed. See clinical pathway and/or care plan for interventions and desired outcomes.

## 2019-04-04 NOTE — PROGRESS NOTES
0730: Bedside shift change report given to Rolf Shukla RN (oncoming nurse) by [facility] [address], RN (offgoing nurse). Report included the following information SBAR, Intake/Output, MAR, Accordion and Recent Results. 1923:Bedside shift change report given to Wayne Yuan (oncoming nurse) by Rolf Shukla RN (offgoing nurse). Report included the following information SBAR, Intake/Output, MAR, Accordion, Recent Results and Med Rec Status.

## 2019-04-04 NOTE — PROGRESS NOTES
04/04/19 3743 04/04/19 1186 Vent Settings FIO2 (%)  --  40 % Pressure Support (cm H2O)  --  6 cm H2O  
PEEP/VENT (cm H2O)  --  6 cm H20 ABCDEF Bundle SBT Safety Screen Passed Yes  --   
SBT Trial Passed Yes  -- Weaning Parameters Spontaneous Breathing Trial Complete Yes  -- Resp Rate Observed 13 15 Ve 5 8.5  551 RSBI 30 33 Vent Method/Mode Ventilation Method  --  Conventional  
Ventilator Mode  --  Spontaneous

## 2019-04-05 NOTE — PROGRESS NOTES
Palliative Medicine Consult Willy: [phone] ([phone]) Patient Name: Marc Dominique YOB: 1955 Date of Initial Consult: 4/2/19 Reason for Consult: care decisions Requesting Provider: Kiara Gibbs MD  
Primary Care Physician: Georgie Mason NP 
 
 SUMMARY:  
Marc Dominique is a 61 y.o. with a past history of BALDERAS, cirrhosis with hx of esophageal varices, COPD, DM type 2, TIA, IBIS  
 who was admitted on 3/28/2019 from home with a diagnosis of acute hepatic encephalopathy. Current medical issues leading to Palliative Medicine involvement include: pt with cirrhosis, non-compliant with lactulose at home. Psychosocial: lives with  of 36 years, has 2 daughters who live locally. Independent with ADLs and mobility PTA. PALLIATIVE DIAGNOSES:  
1. Acute encephalopathy 2. Hypoglycemia 3. Hypotension 4. Fever 5. Lactic acidosis 6. UTI 7. Cirrhosis 8. Liver failure 9. Septic shock 10. Colitis 11. COPD 12. IBIS 13. Care decisions PLAN:  
1. RN requested visit to discuss goals of care as pt was extubated this am, now wants to go home, does not want aggressive medical care. 2. 3:45-4:15pm: met with pt, discussed her current medical problems, goals of care and code status. She understands that because she didn't take the lactulose at home, her ammonia levels got high, causing her to \"get sick and end up here. \"  She understands that she got sick enough to require intubation, and states that she never wants to be intubated again. We reviewed in detail POST form, to which pt was clear that she wants DNR, comfort measures only, no feeding tube. I called her  and talked about pt's wishes, and he reports that they are consistent with what she's been saying for a long time.   
3. 5:00pm-5:30pm: met with pt,  Jumana Moser, and daughter, discussed pt's wishes as stated above, and her desire to [facility] home now\", counseled pt that prior to admission she was independent with ADLs, however, today it took 2 physical therapists to safely move her from bed to chair; her  cannot manage her at home until she is more mobile. Discussed staying in the hospital until Monday am, then we will reassess her mobility and safety to go home. She plans to take the lactulose from here on out, verbalizes understanding that if she stops taking it she will die because she has a DNR in place, therefore, we will not intubate her or aggressively attempt to reverse her condition. 4. Pt states she wants CMO, however, she also wants to get better and go home and continue taking lactulose, f/u with her \"liver doctor\", which is more consistent with [address] [place] [address] Avenue. I will readdress this part of her care with her on Monday. 5. Advance diet to clear liquids. 6. miralax bid. 7. Initial consult note routed to primary continuity provider and/or primary health care team members 8. Communicated plan of care with: Palliative Collin MARTINEZ 192 Team 
 
 GOALS OF CARE / TREATMENT PREFERENCES:  
 
GOALS OF CARE:   
Patient/Health Care Proxy Stated Goals: Cure TREATMENT PREFERENCES:  
Code Status: DNR Advance Care Planning: 
[x] The [facility] Interdisciplinary Team has updated the ACP Navigator with Mariaelena and Patient Capacity Primary Decision Maker: Dimitri Bernstein Spouse - [phone] Advance Care Planning 4/2/2019 Patient's Healthcare Decision Maker is: Legal Next of Kin Confirm Advance Directive None Patient Would Like to Complete Advance Directive Unable Medical Interventions: Limited additional interventions Other Instructions:   
Artificially Administered Nutrition: No feeding tube Other: As far as possible, the palliative care team has discussed with patient / health care proxy about goals of care / treatment preferences for patient.  
 
 HISTORY:  
 
 History obtained from:  Chart,  CHIEF COMPLAINT: confusion HPI/SUBJECTIVE: The patient is:  
[] Verbal and participatory [x] Non-participatory due to: intubated 3/28: BIBA after waking up this am with confusion and inability to ambulate. Pt was last known at baseline at [address] last night when she went to bed,  noted her AMS this am when she was unable to speak in coherent sentences, weak in bilat extremities, unable to sit up or ambulate. Per EMS, glucose was 50, D10 admin. She has hx of bx proven cirrhosis secondary to fatty liver as well as alcohol, quit drinking over 6-7 years ago according to family.    
Last EGD in May 2018 demonstrated 2 medium-sized esophageal varices that were banded.  
Colonoscopy done for anemia: 3/3/16: no blood in colon, inadequate prep in transverse colon, remainder WNL 
EGD 3/3/16: gastric varices, multiple GOV2 and smaller esophageal varices, mild erosive anteritis, no stigmata of recent bleeding ED EXAM: ill appearing, encephalopathic, tachycardic LAB/IMAGING: plt ct 128, lactic acid 4.50, urine consistent with UTI 
 
3/28: multiple low BS, D50 given. Hypotensive, CVL placed, Levophed started. Admitted to CCU for progression of care. 3/29: agitated, ammonia level remains elevated, wbc over 43k. Intubated for hypercarbia and airway protection. Fever 104.7, maxed on Levophed, neosynephrine added. NGT draining brown fluid. Cooling blanket. 3/31: passed SAT/SBT but respirations agonal-like. LFTs worsening. 4/1: remains on vent. E coli with urosepsis, ammonia level better on lactulose. Repeat abd CT shows resolved thickening in the ascending colon, interval development of ascites, cirrhosis with splenomegaly and esophageal varices, and patchy opacities in lung bases may represent atelectasis or airspace dz.  4/2: passed SBT but remains intubated for airway protection. Unresponsive.  
 
4/5: extubated this am.  Wants to go home because she is uncomfortable in the bed, wants more water to drink than RN is giving her. Able to tell me she was admitted because she stopped taking lactulose because it tastes terrible, but now knows what will happen if she stops taking it. Clinical Pain Assessment (nonverbal scale for severity on nonverbal patients):  
Clinical Pain Assessment Severity: 3 Activity (Movement): Lying quietly, normal position Duration: for how long has pt been experiencing pain (e.g., 2 days, 1 month, years) Frequency: how often pain is an issue (e.g., several times per day, once every few days, constant) FUNCTIONAL ASSESSMENT:  
 
Palliative Performance Scale (PPS): PPS: 30 
 
 
 PSYCHOSOCIAL/SPIRITUAL SCREENING:  
 
Palliative IDT has assessed this patient for cultural preferences / practices and a referral made as appropriate to needs (Cultural Services, Patient Advocacy, Ethics, etc.) Any spiritual / Hoahaoism concerns: 
[] Yes /  [x] No 
 
Caregiver Burnout: 
[] Yes /  [x] No /  [] No Caregiver Present Anticipatory grief assessment:  
[x] Normal  / [] Maladaptive ESAS Anxiety: Anxiety: 0  unable to assess due to pt factors ESAS Depression: Depression: 0 unable to assess due to pt factors REVIEW OF SYSTEMS:  
 
Positive and pertinent negative findings in ROS are noted above in HPI. The following systems were [x] reviewed / [] unable to be reviewed as noted in HPI Other findings are noted below. Systems: constitutional, ears/nose/mouth/throat, respiratory, gastrointestinal, genitourinary, musculoskeletal, integumentary, neurologic, psychiatric, endocrine. Positive findings noted below. Modified ESAS Completed by: provider Fatigue: 5 Drowsiness: 0 Depression: 0 Pain: 3 Anxiety: 0 Nausea: 0 Anorexia: 0 Dyspnea: 0 Constipation: No  
  Stool Occurrence(s): 1 PHYSICAL EXAM:  
 
From RN flowsheet: 
Wt Readings from Last 3 Encounters:  
04/05/19 230 lb 6.1 oz (104.5 kg) 03/19/19 223 lb 12.8 oz (101.5 kg) 01/01/19 193 lb 9 oz (87.8 kg) Blood pressure 147/61, pulse (!) 115, temperature 98.2 °F (36.8 °C), resp. rate 19, height 5' 2\" (1.575 m), weight 230 lb 6.1 oz (104.5 kg), SpO2 93 %. Pain Scale 1: Numeric (0 - 10) Pain Intensity 1: 0 Pain Onset 1: Unable to assess Pain Location 1: Other (comment)(VALENTINO) Pain Orientation 1: Other (comment)(VALENTINO) Pain Description 1: Other (comment)(VALENTINO) Pain Intervention(s) 1: Repositioned Last bowel movement, if known:  
 
Constitutional: awake, alert, oriented to self, situation, not day Eyes: pupils equal, anicteric ENMT: no nasal discharge, moist mucous membranes Cardiovascular: tachy, regular rhythm, distal pulses intact Respiratory: breathing not labored, symmetric Gastrointestinal: obese, soft non-tender, +bowel sounds Musculoskeletal: no deformity, no tenderness to palpation Skin: warm, dry Neurologic:  following commands,   moving all extremities Psychiatric:  Full affect, good humor HISTORY:  
 
Active Problems: 
  Hepatic encephalopathy ([address] (3/28/2019) Colitis (3/28/2019) UTI (urinary tract infection) (3/28/2019) Septic shock ([address] (3/28/2019) Acute respiratory failure with hypercapnia ([address] (4/2/2019) Counseling regarding advance care planning and goals of care (4/2/2019) Past Medical History:  
Diagnosis Date  Asthma  Back pain  COPD (chronic obstructive pulmonary disease) (HCC)  Diabetes ([address]  Esophageal varices in cirrhosis ([address]   
 6/2014 banding x 2  
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  
Procedure Laterality Date  HX BACK SURGERY    
 HX CARPAL TUNNEL RELEASE    
 on right  HX HYSTERECTOMY plus 1/2 of an ovary removed  [place] COLSC FLX W/RMVL OF TUMOR POLYP LESION SNARE TQ  5/30/2013  UPPER GI ENDOSCOPY,LIGAT VARIX  2/6/2015 Family History Problem Relation Age of Onset  Diabetes Mother  Stroke Sister  Diabetes Paternal Aunt  Diabetes Paternal Uncle  Heart Disease Neg Hx History reviewed, no pertinent family history. Social History Tobacco Use  Smoking status: Current Every Day Smoker Packs/day: 1.00 Years: 40.00 Pack years: 40.00  Smokeless tobacco: Former User Substance Use Topics  Alcohol use: No  
  Comment: rare Allergies Allergen Reactions  Hydrocodone Nausea and Vomiting  Lisinopril Cough  Lortab [Hydrocodone-Acetaminophen] Nausea and Vomiting  Percocet [Oxycodone-Acetaminophen] Nausea and Vomiting Current Facility-Administered Medications Medication Dose Route Frequency  metoclopramide HCl (REGLAN) injection 5 mg  5 mg IntraVENous Q6H  
 polyethylene glycol (MIRALAX) packet 17 g  17 g Oral BID  lactulose (CHRONULAC) solution 30 g  30 g Per NG tube DAILY  insulin glargine (LANTUS) injection 50 Units  50 Units SubCUTAneous Q12H  
 insulin NPH (NOVOLIN N, HUMULIN N) injection 30 Units  30 Units SubCUTAneous Q12H And  
 methylPREDNISolone (PF) (SOLU-MEDROL) injection 40 mg  40 mg IntraVENous Q12H  
 insulin lispro (HUMALOG) injection   SubCUTAneous Q6H  
 dextrose (D50) infusion 12.5-25 g  12.5-25 g IntraVENous PRN  
 balsam peru-castor oil (VENELEX) ointment   Topical BID  cefTRIAXone (ROCEPHIN) 2 g in 0.9% sodium chloride (MBP/ADV) 50 mL  2 g IntraVENous Q24H  
 famotidine (PF) (PEPCID) 20 mg in sodium chloride 0.9% 10 mL injection  20 mg IntraVENous Q12H  
 albuterol (PROVENTIL VENTOLIN) nebulizer solution 2.5 mg  2.5 mg Nebulization Q4H RT  
 amitriptyline (ELAVIL) tablet 100 mg  100 mg Oral QHS  acetaminophen (TYLENOL) solution 650 mg  650 mg Per NG tube Q4H PRN  
 sodium chloride (NS) flush 5-40 mL  5-40 mL IntraVENous Q8H  
 sodium chloride (NS) flush 5-40 mL  5-40 mL IntraVENous PRN  
 acetaminophen (TYLENOL) suppository 650 mg  650 mg Rectal Q6H PRN  
 ondansetron (ZOFRAN) injection 4 mg  4 mg IntraVENous Q6H PRN  
 albuterol-ipratropium (DUO-NEB) 2.5 MG-0.5 MG/3 ML  3 mL Nebulization Q4H PRN  
 glucose chewable tablet 16 g  4 Tab Oral PRN  
 glucagon (GLUCAGEN) injection 1 mg  1 mg IntraMUSCular PRN  
 aspirin delayed-release tablet 81 mg  81 mg Oral DAILY  
 
 
 
 LAB AND IMAGING FINDINGS:  
 
Lab Results Component Value Date/Time WBC 8.7 04/05/2019 03:54 AM  
 HGB 7.9 (L) 04/05/2019 03:54 AM  
 PLATELET 94 (L) 20/70/0480 03:54 AM  
 
Lab Results Component Value Date/Time Sodium 151 (H) 04/05/2019 03:54 AM  
 Potassium 4.3 04/05/2019 03:54 AM  
 Chloride 117 (H) 04/05/2019 03:54 AM  
 CO2 29 04/05/2019 03:54 AM  
 BUN 46 (H) 04/05/2019 03:54 AM  
 Creatinine 0.80 04/05/2019 03:54 AM  
 Calcium 7.6 (L) 04/05/2019 03:54 AM  
 Magnesium 2.7 (H) 04/04/2019 05:01 AM  
 Phosphorus 3.3 04/05/2019 03:54 AM  
  
Lab Results Component Value Date/Time AST (SGOT) 18 04/05/2019 03:54 AM  
 Alk. phosphatase 62 04/05/2019 03:54 AM  
 Protein, total 6.7 04/05/2019 03:54 AM  
 Albumin 2.6 (L) 04/05/2019 03:54 AM  
 Globulin 4.1 (H) 04/05/2019 03:54 AM  
 
Lab Results Component Value Date/Time INR 1.4 (H) 04/02/2019 04:07 AM  
 Prothrombin time 14.1 (H) 04/02/2019 04:07 AM  
 aPTT 26.2 04/02/2019 04:07 AM  
  
Lab Results Component Value Date/Time Iron 22 (L) 03/19/2019 01:48 PM  
 TIBC 379 03/19/2019 01:48 PM  
 Iron % saturation 6 (LL) 03/19/2019 01:48 PM  
 Ferritin 358 (H) 11/19/2018 12:00 AM  
  
No results found for: PH, PCO2, PO2 No components found for: [place] Lab Results Component Value Date/Time CK 47 03/28/2019 10:13 AM  
 CK - MB <1.0 11/13/2018 12:53 PM  
  
 
 
   
 
Total time: 75 
Counseling / coordination time, spent as noted above: 85 
> 50% counseling / coordination?: y 
 
Prolonged service was provided for  []30 min   []75 min in face to face time in the presence of the patient, spent as noted above. Time Start:  
Time End:  
Note: this can only be billed with 21820 (initial) or 56216 (follow up). If multiple start / stop times, list each separately.

## 2019-04-05 NOTE — PROGRESS NOTES
Nutrition Assessment: 
 
RECOMMENDATIONS:  
Advance diet as medically able per MD (recommend Low Na diet) Consider initiation of TPN if gut not functioning in 24h (she is NPO day 7 today) ASSESSMENT:  
Chart reviewed, case discussed during CCU rounds. Pt extubated this morning, needs re-estimated. RN reports meds not being absorbed when placed down OGT (prior to it being pulled). KUB today shows fecal stasis. Discussed with Dr. Rosie Armstrong as Nephrology wants pt to receive enteral free water. Plan is to see how she tolerates sips of clears today and if she does well with no n/v, advance to full clear liquids tomorrow. No swallowing issues identified per RN. Pt is NPO day 7, hopefully she will tolerate PO diet advancement, otherwise would recommend TPN. Dietitians Intervention(s)/Plan(s): Advance diet as able, monitor tolerance SUBJECTIVE/OBJECTIVE:  
 
Diet Order: NPO, Other (comment)(sips of clears okay ) 
% Eaten:  No data found. Pertinent Medications:rocephin, pepcid, lantus, insulin NPH, humalog, solumedrol, reglan, lactulose. Chemistries: 
Lab Results Component Value Date/Time Sodium 151 (H) 04/05/2019 03:54 AM  
 Potassium 4.3 04/05/2019 03:54 AM  
 Chloride 117 (H) 04/05/2019 03:54 AM  
 CO2 29 04/05/2019 03:54 AM  
 Anion gap 5 04/05/2019 03:54 AM  
 Glucose 176 (H) 04/05/2019 03:54 AM  
 BUN 46 (H) 04/05/2019 03:54 AM  
 Creatinine 0.80 04/05/2019 03:54 AM  
 BUN/Creatinine ratio 58 (H) 04/05/2019 03:54 AM  
 GFR est AA >60 04/05/2019 03:54 AM  
 GFR est non-AA >60 04/05/2019 03:54 AM  
 Calcium 7.6 (L) 04/05/2019 03:54 AM  
 Albumin 2.6 (L) 04/05/2019 03:54 AM  
  
Anthropometrics: Height: 5' 2\" (157.5 cm) Weight: 104.5 kg (230 lb 6.1 oz)   [x]bed scale (4/5)   []stated   []unknown IBW (%IBW):   ( ) UBW (%UBW):   (  %) BMI: Body mass index is 42.14 kg/m². This BMI is indicative of: 
[]Underweight   []Normal   []Overweight   [] Obesity   [x] Extreme Obesity (BMI>40) Estimated Nutrition Needs (Based on): 1878 Kcals/day(MSJ 1564 x 1.2) , 84 g(-105 (0.8-1gPro/kg) ) Protein Carbohydrate: At Least 130 g/day  Fluids: 1800 mL/day Last BM: 4/4   []Active     []Hyperactive  []Hypoactive       [x] Absent   BS Skin:    [] Intact   [] Incision  [] Breakdown   [] DTI   [x] Tears/Excoriation/Abrasion  [x]Edema(+1-generalized; +2-BUE) [] Other: Wt Readings from Last 30 Encounters:  
04/05/19 104.5 kg (230 lb 6.1 oz) 03/19/19 101.5 kg (223 lb 12.8 oz) 01/01/19 87.8 kg (193 lb 9 oz)  
11/19/18 84.8 kg (187 lb)  
11/13/18 84.7 kg (186 lb 11.7 oz)  
11/13/18 84.7 kg (186 lb 11.7 oz) 10/16/18 86.7 kg (191 lb 3.2 oz) 10/11/18 89.3 kg (196 lb 12.8 oz) 07/10/18 83.6 kg (184 lb 3.2 oz) 05/31/18 86.6 kg (191 lb) 04/12/18 86.7 kg (191 lb 3.2 oz) 03/14/18 90.4 kg (199 lb 4.7 oz) 10/06/17 88.5 kg (195 lb 3.2 oz)  
10/02/17 95.3 kg (210 lb) 08/06/17 95.3 kg (210 lb) 07/06/17 95.3 kg (210 lb) 05/05/17 93 kg (205 lb) 04/06/17 93 kg (205 lb)  
03/28/17 93.8 kg (206 lb 12.7 oz) 02/13/17 98.1 kg (216 lb 6 oz) 01/24/17 95.7 kg (211 lb)  
11/19/16 97.5 kg (215 lb)  
11/17/16 97.5 kg (215 lb)  
11/17/16 96.6 kg (213 lb)  
11/10/16 96.9 kg (213 lb 10 oz)  
11/10/16 96.1 kg (211 lb 12.8 oz) 09/06/16 94.3 kg (207 lb 12.8 oz) 08/29/16 93 kg (205 lb) 08/15/16 95.2 kg (209 lb 12.8 oz) 07/18/16 93.4 kg (205 lb 12.8 oz) NUTRITION DIAGNOSES:  
Problem:  Inadequate protein-energy intake Etiology: related to pt NPO x 5 days Signs/Symptoms: as evidenced by NPO meets 0% kcal and protein needs. Previous dx re: inadequate protein energy intake continues. NUTRITION INTERVENTIONS: 
Meals/Snacks: Other(advance diet as medically able ) Enteral/Parenteral Nutrition: Initiate enteral nutrition GOAL:  
Pt will be started on PO diet vs nutrition support in 2-4 days.   
 
NUTRITION MONITORING AND EVALUATION  
 Previous Goal: Pt will be started on nutrition support in 2-3 days Previous Goal Met: No  
Previous Recommendations Implemented: N/A Cultural, Confucianist, or Ethnic Dietary Needs: None LEARNING NEEDS (Diet, Food/Nutrient-Drug Interaction):  
 [x] None Identified 
 [] Identified and Education Provided/Documented 
 [] Identified and Pt declined/was not appropriate [x] Interdisciplinary Care Plan Reviewed/Documented  
 [x] Participated in Discharge Planning: Unable to determine  
 [x] Interdisciplinary Rounds NUTRITION RISK:  
 [x] High              [] Moderate           []  Low  []  Minimal/Uncompromised Raghav Thornton RD, [facility] Pager [phone] Weekend Pager [phone]

## 2019-04-05 NOTE — PROGRESS NOTES
[facility], Critical Care, and Sleep Medicine Name: Kerri Darnell MRN: [id number] : 1955 Hospital: [place] [address] Date: 2019 Critical Care  Patient Consult IMPRESSION:  
· Septic with shock-  
· Ecoli septicemia; weaned off levophed. Hold all BP meds. · Acute hypoxic resp failure- intubated 3/29. Oxygenating adequately, 40%,Peep 6; CPAP trials ok but AMS, barrrier to safe liberation · Volume overload- not very responsive to diuretic · Cirrhosis, from BALDERAS- noted in past to have esophageal varices. seems decompensated, Gi following · Hepatic Encephalopathy (ammonia of 110 on admission), not compliant she has been with her home regimen. Now 19; eyes open and slightly more interactive; head Ct on admission normal 
· Acute UTI-UCx growing Ecoli. Enterococcus · Ileus · Fecal stasis · Right sided colitis  Noted. ?ischemic or infectious. Possible C Diff. Although little stooling currently; not likley; CT better · Anemia · Coagulopathy -INR 2.0 ?liver dz vs DIC 
· MIld Thrombocytopenia-?liver dz vs DIC · Hyperglycemia-likely combination of sepsis and steroids. · COPD with acute exacerbation · Ileus · IBIS on CPAP at home. · Obese. · Ongoing Smoking · D/w   : Otilia Velarde: [phone]. Updated today RECOMMENDATIONS:  
· CCU monitoring · Extubate · NPO excepts sips. Would NOT advance diet too quickly. NGT removed with extubation but with NGT, NO meds were getting beyond gastric outlet. Looked like lactulose not making transit either. KUB reviewed. · Will hold all meds today, and if pt with flatus or BM in AM will restart lactulose with Miralax · Appreciate dr. Joanie Contreras help · NH3 in AM 
· watch renal function · Mobilize · adjsut ABX · IV sedation prn safety. · Insulin adjusted · Nebs scheduled. · Wean steroids for acute COPD exacerbation · CVP to eval volume status · Replete Electrolytes Subjective/History:  
 
4/5 awake. Moving all fours. NGT residual very high. No BM for over 24 hours after flexiseal removed an lactulose enema stopped. 4/4 more alert. Can move left foot, right hand but not right foot or left hand? Passed SBT. Still volume overloaded. No new secretions 4/3 waking up some? Minimal response to lasix. eyes open. Left foot withdraws but not following commands Anasarca. flexiseal 
 
4/2 On SBT, CT abd reviewed from other day. Colitis better. Mild ascites. Distended bowels. Cirtrhosis, varices  flexiseal after lactulose started. On IV flagyl, Po vancomycin. IV abx for sepsis. On vent. PO4 1.6; glucose high 
 
3-31:no acute events overnight. Little to no stooling. Remains on vasopressors and mechanical ventilation 3-30-pt intubated yesterday. No acute events overnight. Remains sedated on Norepi gtt 3-29-19: Pt seen this am. She is still very confused. Has ongoing needed for pressors. Has wheezing. Her WBC has increased. She is not able to give hx of HPI or ROS. This patient has been seen and evaluated at the request of Dr. Erich Apgar for above. Pt was seen in ER. Patient is a 61 y.o. female with hx of above. Noted to have increased confusion per her family. Has not been compliant with her meds. Noted to have weakness and inability to walk. Was was normal the night before. Noted when she awoke to be confused, had generalized weakness. Has a very dry  Mouth when seen in ER. ROS and HPI limited due to pts confused state. MEDS:  
Current Facility-Administered Medications Medication  albumin human 25% (BUMINATE) solution 12.5 g  
 bumetanide (BUMEX) injection 2 mg  lactulose (CHRONULAC) solution 30 g  
 insulin glargine (LANTUS) injection 50 Units  insulin NPH (NOVOLIN N, HUMULIN N) injection 30 Units And  
 methylPREDNISolone (PF) (SOLU-MEDROL) injection 40 mg  
 insulin lispro (HUMALOG) injection  dextrose (D50) infusion 12.5-25 g  balsam peru-castor oil (VENELEX) ointment  cefTRIAXone (ROCEPHIN) 2 g in 0.9% sodium chloride (MBP/ADV) 50 mL  famotidine (PF) (PEPCID) 20 mg in sodium chloride 0.9% 10 mL injection  albuterol (PROVENTIL VENTOLIN) nebulizer solution 2.5 mg  
 amitriptyline (ELAVIL) tablet 100 mg  
 acetaminophen (TYLENOL) solution 650 mg  
 sodium chloride (NS) flush 5-40 mL  sodium chloride (NS) flush 5-40 mL  acetaminophen (TYLENOL) suppository 650 mg  
 ondansetron (ZOFRAN) injection 4 mg  albuterol-ipratropium (DUO-NEB) 2.5 MG-0.5 MG/3 ML  
 glucose chewable tablet 16 g  
 glucagon (GLUCAGEN) injection 1 mg  aspirin delayed-release tablet 81 mg Past Surgical History:  
Procedure Laterality Date  HX BACK SURGERY    
 HX CARPAL TUNNEL RELEASE    
 on right  HX HYSTERECTOMY plus 1/2 of an ovary removed  ND COLSC FLX W/RMVL OF TUMOR POLYP LESION SNARE TQ  5/30/2013  UPPER GI ENDOSCOPY,LIGAT VARIX  2/6/2015 Prior to Admission medications Medication Sig Start Date End Date Taking? Authorizing Provider  
fluticasone furoate-vilanterol (BREO ELLIPTA) 200-25 mcg/dose inhaler Take 1 Puff by inhalation daily. Provider, Historical  
insulin glargine (LANTUS SOLOSTAR U-100 INSULIN) 100 unit/mL (3 mL) inpn 110 Units by SubCUTAneous route daily. Provider, Historical  
furosemide (LASIX) 40 mg tablet Take 2 Tabs by mouth daily. 3/19/19   HUMZA [place]  
spironolactone (ALDACTONE) 100 mg tablet Take 2 Tabs by mouth daily.  3/19/19   HUMZA [place]  
CONSTULOSE 10 gram/15 mL solution take 2 tablespoonfuls by mouth twice a day 1/7/19   Cathi Barksdale NP  
losartan (COZAAR) 25 mg tablet take 1 tablet by mouth once daily , REPLACES LISINOPRIL FOR BLOOD PRESSURE AND KIDNEY PROTECTION 12/3/18   Jessica Addison MD  
insulin NPH (NOVOLIN N NPH U-100 INSULIN) 100 unit/mL injection Inject 55 units every 12 hours--dispense relion brand--patient will pay cash 11/28/18   Nataliya Melgar MD  
traMADol Perfecto Al) 50 mg tablet take 1-2 tablets by mouth twice a day if needed for DIABETIC NEUROPATHY PAIN 11/27/18   Nataliya Melgar MD  
aspirin 81 mg chewable tablet Take 1 Tab by mouth daily. 11/16/18   Melvin Maria MD  
atorvastatin (LIPITOR) 40 mg tablet Take 1 Tab by mouth nightly. 11/15/18   Melvin Maria MD  
potassium chloride SR (KLOR-CON 10) 10 mEq tablet Take 10 mEq by mouth two (2) times a day. Jacqui, MD Leonarda  
omeprazole (PRILOSEC) 20 mg capsule take 1 capsule by mouth once daily 11/8/18   Cathi Barksdale, NP  
amitriptyline (ELAVIL) 150 mg tablet Take 1 Tab by mouth nightly. 10/16/18   Nataliya Melgar MD  
propranolol (INDERAL) 20 mg tablet Take 1 Tab by mouth two (2) times a day. 10/16/18   Nataliya Melgar MD  
ferrous sulfate 325 mg (65 mg iron) tablet take 1 tablet by mouth once daily with BREAKFAST 10/2/18   Cathi Barksdale NP  
insulin lispro (HUMALOG KWIKPEN INSULIN) 100 unit/mL kwikpen Inject as needed up to 3 times per day for sugars over 150: 4 units for every 50 points. Max 50 units per day 10/1/18   Nataliya Melgar MD  
metFORMIN (GLUCOPHAGE) 1,000 mg tablet take 1 tablet by mouth twice a day with meals 8/23/18   Nataliya Melgar MD  
ondansetron (ZOFRAN ODT) 4 mg disintegrating tablet dissolve 1 tablet ON TONGUE every 8 hours if needed for nausea 8/1/18   Cathi Barksdale NP  
gabapentin (NEURONTIN) 600 mg tablet take 2 tablets by mouth three times a day 7/19/18   Nataliya Melgar MD  
rOPINIRole (REQUIP) 4 mg tab TAB Take 4 mg by mouth nightly. Provider, Historical  
albuterol (PROAIR HFA) 90 mcg/actuation inhaler Take 2 Puffs by inhalation every four (4) hours as needed for Wheezing. Leonarda Osman MD  
 
 
Allergies Allergen Reactions  Hydrocodone Nausea and Vomiting  Lisinopril Cough  Lortab [Hydrocodone-Acetaminophen] Nausea and Vomiting  Percocet [Oxycodone-Acetaminophen] Nausea and Vomiting Social History Tobacco Use  Smoking status: Current Every Day Smoker Packs/day: 1.00 Years: 40.00 Pack years: 40.00  Smokeless tobacco: Former User Substance Use Topics  Alcohol use: No  
  Comment: rare Family History Problem Relation Age of Onset  Diabetes Mother  Stroke Sister  Diabetes Paternal Aunt  Diabetes Paternal Uncle  Heart Disease Neg Hx Review of Systems: 
Review of systems not obtained due to patient factors. Objective: 
Vital Signs:   
Visit Vitals /70 Pulse (!) 121 Temp 98.5 °F (36.9 °C) Resp 25 Ht 5' 2\" (1.575 m) Wt 104.5 kg (230 lb 6.1 oz) SpO2 94% BMI 42.14 kg/m² O2 Device: Nasal cannula O2 Flow Rate (L/min): 3 l/min Temp (24hrs), Av.9 °F (31.6 °C), Min:37.4 °F (3 °C), Max:100.3 °F (37.9 °C) Intake/Output:  
Last shift:      701 -  190 In: 150 [I.V.:150] Out: 175 [Urine:75] Last 3 shifts: 1901 -  0700 In: 3196.7 [I.V.:2896.7] Out: 3000 [Urine:2775] Intake/Output Summary (Last 24 hours) at 2019 1315 Last data filed at 2019 1000 Gross per 24 hour Intake 1517.67 ml Output 2235 ml Net -717.33 ml Hemodynamics:  
PAP:   CO:    
Wedge:   CI:    
CVP:  CVP (mmHg): 8 mmHg (19 1000) SVR:    
  PVR:    
 
Ventilator Settings: 
Mode Rate Tidal Volume Pressure FiO2 PEEP Spontaneous   500 ml  6 cm H2O 40 % 6 cm H20 Peak airway pressure: 13 cm H2O Minute ventilation: 11.1 l/min Physical Exam: 
 
General:  Obese, on vent Head:  Normocephalic, without obvious abnormality, atraumatic. Eyes:  Opening and tracking? Conjunctivae/corneas clear. PERRL, EOMs intact. Nose: Nares normal. Septum midline. Mucosa normal. No drainage or sinus tenderness.   
Throat: Lips, mucosa, and tongue normal. Teeth and gums normal.  
 Neck: Supple, symmetrical, trachea midline, no adenopathy, thyroid: no enlargment/tenderness/nodules, no carotid bruit and no JVD. Has right neck IJ. Back:   Symmetric, no curvature. ROM normal.  
Lungs: On  auscultation bilaterally has bilateral wheezing, prolonged expiratory phase. Chest wall:  No tenderness or deformity. Heart:  Regular rate and rhythm, S1, S2 normal, no murmur, click, rub or gallop. Abdomen:   Distended, few BSl. No masses,  No organomegaly. Obese. Extremities: Extremities normal, atraumatic, no cyanosis or edema. Pulses: 2+ and symmetric all extremities. Skin: Skin color, texture, turgor normal. No rashes or lesions Lymph nodes: Cervical, supraclavicular, and axillary nodes normal.  
Neurologic: Grossly nonfocal, awake Psych:+ anxiety or depression. But not able to fully eval.   
 
 
Data: 
 
 
       
Labs: 
 
Recent Labs 04/05/19 
0354 04/03/19 
0652 WBC 8.7 12.0* HGB 7.9* 8.3*  
PLT 94* 94* Recent Labs 04/05/19 
0354 04/04/19 
0501 04/03/19 
4504 * 149* 146*  
K 4.3 4.6 4.5  
* 117* 117* CO2 29 26 24 * 273* 378* BUN 46* 55* 53* CREA 0.80 0.76 0.92  
CA 7.6* 7.8* 7.4* MG  --  2.7*  --   
PHOS 3.3 3.1  --   
ALB 2.6*  --  2.4* SGOT 18  --  21 ALT 38  --  55 No results for input(s): PH, PCO2, PO2, HCO3, FIO2 in the last 72 hours. No results for input(s): CPK, CKNDX, TROIQ in the last 72 hours. No lab exists for component: CPKMB Lab Results Component Value Date/Time  (H) 03/29/2017 02:00 AM  
  
 
       
 
Telemetry:Sinus Tach. Imaging: 
I have personally reviewed the patients radiographs and have reviewed the reports: 
3-28-19: CT of chest/Abdomen: IMPRESSION:  
1. Right colitis. 2. Cirrhosis with portal venous hypertension. 3. Clear lungs.   
 
 
Elian Burgess MD

## 2019-04-05 NOTE — PROGRESS NOTES
04/05/19 0925 04/05/19 2927 Vent Settings FIO2 (%)  --  40 % Pressure Support (cm H2O)  --  6 cm H2O  
PEEP/VENT (cm H2O)  --  6 cm H20 ABCDEF Bundle SBT Safety Screen Passed Yes  --   
SBT Trial Passed Yes  -- Weaning Parameters Spontaneous Breathing Trial Complete Yes  -- Resp Rate Observed 21 23 Ve 8.1 8.9  432 RSBI 45 55 Vent Method/Mode Ventilation Method  --  Conventional  
Ventilator Mode  --  Spontaneous

## 2019-04-05 NOTE — PROGRESS NOTES
Problem: Self Care Deficits Care Plan (Adult) Goal: *Acute Goals and Plan of Care (Insert Text) Description Occupational Therapy Goals Initiated 4/5/2019 1. Patient will perform grooming seated EOB with moderate assistance  within 7 day(s). 2.  Patient will perform upper body dressing with maximal assistance within 7 day(s). 3.  Patient will perform sponge bathing with maximal assistance within 7 day(s). 4.  Patient will perform BSC transfers with moderate assistance  within 7 day(s). 5.  Patient will perform all aspects of toileting with maximal assistance within 7 day(s). 6.  Patient will perform self feeding with moderate assistance  within 7 day(s). Outcome: Progressing Towards Goal 
 
 
 
OCCUPATIONAL THERAPY EVALUATION Patient: Meghann Armando (72 y.o. female) Date: 4/5/2019 Primary Diagnosis: Septic shock ([facility] [address] [A41.9, R65.21] Colitis [K52.9] UTI (urinary tract infection) [N39.0] Hepatic encephalopathy ([facility] [address] [K72.90] Precautions: Fall, Skin(R foot drop; partial code) ASSESSMENT : 
Based on the objective data described below, the patient presents with significant GW, decreased activity tolerance, decreased safety awareness and decreased balance which is impairing her functional independence. Patient is functioning significantly below her independent baseline, now requiring total A for all ADLs and is mod/max A of 2 to total A for functional mobility. Patient will benefit from acute skilled intervention to address the above impairments and will need inpatient rehab after discharge. Patient?s rehabilitation potential is considered to be Fair Factors which may influence rehabilitation potential include: ? None noted ? Mental ability/status ? Medical condition ? Home/family situation and support systems ? Safety awareness ? Pain tolerance/management ? Other: PLAN : 
 Recommendations and Planned Interventions: 
?               Self Care Training                  ? Therapeutic Activities ? Functional Mobility Training    ? Cognitive Retraining 
? Therapeutic Exercises           ? Endurance Activities ? Balance Training                   ? Neuromuscular Re-Education ? Visual/Perceptual Training     ? Home Safety Training 
? Patient Education                 ? Family Training/Education ? Other (comment): Frequency/Duration: Patient will be followed by occupational therapy 4 times a week to address goals. Discharge Recommendations: Inpatient Rehab pending progress Further Equipment Recommendations for Discharge: TBD OBJECTIVE DATA SUMMARY:  
 
Past Medical History:  
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   
 6/2014 banding x 2 Fibromyalgia Gastrointestinal disorder GERD (gastroesophageal reflux disease) Hypercholesteremia Liver cirrhosis secondary to BALDERAS ([facility] [address] Liver disease Other and unspecified hyperlipidemia Restless leg syndrome Type II or unspecified type diabetes mellitus without mention of complication, uncontrolled Unspecified essential hypertension Past Surgical History:  
Procedure Laterality Date HX BACK SURGERY HX CARPAL TUNNEL RELEASE    
 on right HX HYSTERECTOMY plus 1/2 of an ovary removed NE COLSC FLX W/RMVL OF TUMOR POLYP LESION SNARE TQ  5/30/2013 UPPER GI ENDOSCOPY,LIGAT VARIX  2/6/2015 Prior Level of Function/Home Situation: Independent with ADLs and ambulation, but has a h/o falls Expanded or extensive additional review of patient history:  
 
Home Situation Home Environment: Private residence # Steps to Enter: 3 Rails to Enter: Yes Hand Rails : Bilateral 
Wheelchair Ramp: No 
One/Two Story Residence: One story Living Alone: No 
Support Systems: Spouse/Significant Other/Partner, Family member(s) Patient Expects to be Discharged to[de-identified] Unknown Current DME Used/Available at Home: Coriene Maribeth, straight, Walker, rolling, Shower chair, Commode, bedside Tub or Shower Type: Tub/Shower combination ? Right hand dominant   ? Left hand dominant Cognitive/Behavioral Status: 
Neurologic State: Alert Orientation Level: Oriented to person;Oriented to place;Oriented to situation Cognition: Decreased command following;Decreased attention/concentration; Impulsive Perception: Appears intact Perseveration: No perseveration noted Safety/Judgement: Decreased insight into deficits; Decreased awareness of need for safety;Decreased awareness of need for assistance; Fall prevention Vision/Perceptual:   
Acuity: Within Defined Limits Corrective Lenses: Glasses Range of Motion: 
AROM: Grossly decreased, non-functional(R foot drop; bilateral severe shoulder and biceps weakness) BUE with 2/5 strength for B shoulder abduction and flexion, elbow flexion and for wrist extension. 3/5 digit flexion/extension, 4/5 wrist flexion, 3/5 elbow extension, 5/5 scapular elevation and retraction. PROM: Within functional limits Strength: 
Strength: Grossly decreased, non-functional 
Coordination: 
Coordination: Generally decreased, functional 
Fine Motor Skills-Upper: Left Impaired;Right Impaired Gross Motor Skills-Upper: Left Impaired;Right Impaired Tone & Sensation: 
Tone: Abnormal(hypotonic bilateral shoulders) Balance: 
Sitting: Impaired Sitting - Static: Poor (constant support) Sitting - Dynamic: Not tested Standing: Impaired Standing - Static: Not tested Functional Mobility and Transfers for ADLs: 
Bed Mobility: 
Rolling: Moderate assistance;Maximum assistance;Assist x2 Supine to Sit: Maximum assistance; Moderate assistance;Assist x2 
  
 Scooting: Maximum assistance;Assist x1 Transfers: 
Sit to Stand: Maximum assistance; Total assistance;Assist x2 Bed to Chair: Total assistance;Assist x1 ADL Assessment: 
Feeding: Total assistance Oral Facial Hygiene/Grooming: Total assistance Bathing: Total assistance Upper Body Dressing: Total assistance Lower Body Dressing: Total assistance Toileting: Total assistance Functional Measure: 
Barthel Index: 
Bathin Bladder: 0 Bowels: 0 Groomin Dressin Feedin Mobility: 0 Stairs: 0 Toilet Use: 0 Transfer (Bed to Chair and Back): 5 Total: 5 Barthel and G-code impairment scale: 
Percentage of impairment CH 
0% CI 
1-19% CJ 
20-39% CK 
40-59% CL 
60-79% CM 
80-99% CN 
100% Barthel Score 0-100 100 99-80 79-60 59-40 20-39 1-19 
 0 Barthel Score 0-20 20 17-19 13-16 9-12 5-8 1-4 0 The Barthel ADL Index: Guidelines 1. The index should be used as a record of what a patient does, not as a record of what a patient could do. 2. The main aim is to establish degree of independence from any help, physical or verbal, however minor and for whatever reason. 3. The need for supervision renders the patient not independent. 4. A patient's performance should be established using the best available evidence. Asking the patient, friends/relatives and nurses are the usual sources, but direct observation and common sense are also important. However direct testing is not needed. 5. Usually the patient's performance over the preceding 24-48 hours is important, but occasionally longer periods will be relevant. 6. Middle categories imply that the patient supplies over 50 per cent of the effort. 7. Use of aids to be independent is allowed. Guerline Barth., Barthel, D.W. ([id number]). Functional evaluation: the Barthel Index. [facility] (14)2.  
RADHA Jasso, Edgardo Santana., Karan Ramirez, Raffaele Loza. (1999). Measuring the change indisability after inpatient rehabilitation; comparison of the responsiveness of the Barthel Index and Functional [place] Measure. Journal of Neurology, Neurosurgery, and Psychiatry, 66(4), 191-383. GINGER Bonilla, WILBUR Lui, & Jarred Mancini M.A. (2004.) Assessment of post-stroke quality of life in cost-effectiveness studies: The usefulness of the Barthel Index and the EuroQoL-5D. [facility], 13, 193-12 ADL Intervention and task modifications: 
Initiated bed mobility, sitting balance, transfer and safety training. Pain: 
Pain Scale 1: Numeric (0 - 10) Pain Intensity 1: 0 Activity Tolerance:  
VSS, but overall tolerance is poor Please refer to the flowsheet for vital signs taken during this treatment. After treatment:  
? Patient left in no apparent distress sitting up in chair ? Patient left in no apparent distress in bed 
? Call bell left within reach ? Nursing notified ? Caregiver present ? Bed alarm activated COMMUNICATION/EDUCATION:  
The patient?s plan of care was discussed with: Physical Therapist and Registered Nurse. 
? Home safety education was provided and the patient/caregiver indicated understanding. ? Patient/family have participated as able in goal setting and plan of care. ? Patient/family agree to work toward stated goals and plan of care. ? Patient understands intent and goals of therapy, but is neutral about his/her participation. ? Patient is unable to participate in goal setting and plan of care. This patient?s plan of care is appropriate for delegation to [facility]. Thank you for this referral. 
Lorena Pierce OTR/L Time Calculation: 40 mins

## 2019-04-05 NOTE — PROGRESS NOTES
[facility] Progress Note NAME: Florina Billy :  1955 MRN:  [id number] Date/Time: 2019 Risk of deterioration: high Assessment:    Plan: 
Volume overload Acute Resp failure (intubated 3/29-extubated ) Free water deficit (R) sided colitis Ecoli sepsis Cirrhosis/BALDERAS Ascites Now extubated Responded to high dose bumex yesterday with excellent uop and improved resp status Now need to fix free water deficit I ordered free water thru NG-but currently NG is out as she had high residuals-eval in progress Rectal tube pulled out this am 
Would like to avoid IVF if possible Subjective: Chief Complaint:  Unable to give Review of Systems: Patient was not able to provide review of systems due to mental status change/acute illness Objective: VITALS:  
Last 24hrs VS reviewed since prior progress note. Most recent are: 
Visit Vitals /54 Pulse (!) 120 Temp 98.7 °F (37.1 °C) Resp 27 Ht 5' 2\" (1.575 m) Wt 104.5 kg (230 lb 6.1 oz) SpO2 (!) 88% BMI 42.14 kg/m² SpO2 Readings from Last 6 Encounters:  
19 (!) 88% 19 99% 19 95% 18 96% 11/15/18 93% 10/11/18 95% O2 Flow Rate (L/min): 3 l/min Intake/Output Summary (Last 24 hours) at 2019 1416 Last data filed at 2019 1000 Gross per 24 hour Intake 1448.67 ml Output 2060 ml Net -611.33 ml Telemetry Reviewed   normal sinus rhythm PHYSICAL EXAM: 
 
General   well developed, well nourished, appears stated age EENT  Normocephalic, Atraumatic, PERRLA Respiratory   Clear To Auscultation bilaterally Cardiology  Regular Rate and Rythmn Abdominal  Soft, non-tender, non-distended, positive bowel sounds Extremities  No clubbing, cyanosis, or edema. Pulses intact. Lab Data Reviewed: (see below) Medications Reviewed: (see below) PMH/SH reviewed - no change compared to H&P 
______________________________________________ ___________________________________________________ Attending Physician: Teresa Suarez MD  
 
____________________________________________________ MEDICATIONS: 
Current Facility-Administered Medications Medication Dose Route Frequency  metoclopramide HCl (REGLAN) injection 5 mg  5 mg IntraVENous Q6H  
 albumin human 25% (BUMINATE) solution 12.5 g  12.5 g IntraVENous Q12H  
 bumetanide (BUMEX) injection 2 mg  2 mg IntraVENous Q12H  
 lactulose (CHRONULAC) solution 30 g  30 g Per NG tube DAILY  insulin glargine (LANTUS) injection 50 Units  50 Units SubCUTAneous Q12H  
 insulin NPH (NOVOLIN N, HUMULIN N) injection 30 Units  30 Units SubCUTAneous Q12H And  
 methylPREDNISolone (PF) (SOLU-MEDROL) injection 40 mg  40 mg IntraVENous Q12H  
 insulin lispro (HUMALOG) injection   SubCUTAneous Q6H  
 dextrose (D50) infusion 12.5-25 g  12.5-25 g IntraVENous PRN  
 balsam peru-castor oil (VENELEX) ointment   Topical BID  cefTRIAXone (ROCEPHIN) 2 g in 0.9% sodium chloride (MBP/ADV) 50 mL  2 g IntraVENous Q24H  
 famotidine (PF) (PEPCID) 20 mg in sodium chloride 0.9% 10 mL injection  20 mg IntraVENous Q12H  
 albuterol (PROVENTIL VENTOLIN) nebulizer solution 2.5 mg  2.5 mg Nebulization Q4H RT  
 amitriptyline (ELAVIL) tablet 100 mg  100 mg Oral QHS  acetaminophen (TYLENOL) solution 650 mg  650 mg Per NG tube Q4H PRN  
 sodium chloride (NS) flush 5-40 mL  5-40 mL IntraVENous Q8H  
 sodium chloride (NS) flush 5-40 mL  5-40 mL IntraVENous PRN  
 acetaminophen (TYLENOL) suppository 650 mg  650 mg Rectal Q6H PRN  
 ondansetron (ZOFRAN) injection 4 mg  4 mg IntraVENous Q6H PRN  
 albuterol-ipratropium (DUO-NEB) 2.5 MG-0.5 MG/3 ML  3 mL Nebulization Q4H PRN  
 glucose chewable tablet 16 g  4 Tab Oral PRN  
 glucagon (GLUCAGEN) injection 1 mg  1 mg IntraMUSCular PRN  
 aspirin delayed-release tablet 81 mg  81 mg Oral DAILY  
  
 
LABS: 
Recent Labs 04/05/19 
0354 04/03/19 
7471 WBC 8.7 12.0* HGB 7.9* 8.3* HCT 27.6* 28.1*  
PLT 94* 94* Recent Labs 04/05/19 
0354 04/04/19 
0501 04/03/19 
5112 * 149* 146*  
K 4.3 4.6 4.5  
* 117* 117* CO2 29 26 24 BUN 46* 55* 53* CREA 0.80 0.76 0.92  
* 273* 378* CA 7.6* 7.8* 7.4* MG  --  2.7*  --   
PHOS 3.3 3.1  --   
 
Recent Labs 04/05/19 
0354 04/03/19 
0200 SGOT 18 21 ALT 38 55 AP 62 72 TBILI 0.8 0.7 TP 6.7 7.0 ALB 2.6* 2.4*  
GLOB 4.1* 4.6* No results for input(s): INR, PTP, APTT in the last 72 hours. No lab exists for component: INREXT, INREXT No results for input(s): FE, TIBC, PSAT, FERR in the last 72 hours. No results for input(s): PH, PCO2, PO2 in the last 72 hours. No results for input(s): CPK, CKNDX, TROIQ in the last 72 hours. No lab exists for component: CPKMB Lab Results Component Value Date/Time  Glucose (POC) 140 (H) 04/05/2019 12:11 PM  
 Glucose (POC) 173 (H) 04/05/2019 05:29 AM  
 Glucose (POC) 189 (H) 04/04/2019 11:21 PM  
 Glucose (POC) 189 (H) 04/04/2019 07:57 PM  
 Glucose (POC) 233 (H) 04/04/2019 04:59 PM

## 2019-04-05 NOTE — PROGRESS NOTES
Problem: Falls - Risk of 
Goal: *Absence of Falls Description Document Ant Jane Fall Risk and appropriate interventions in the flowsheet. Outcome: Progressing Towards Goal 
  
Problem: Patient Education: Go to Patient Education Activity Goal: Patient/Family Education Outcome: Progressing Towards Goal 
  
Problem: Patient Education:  Go to Education Activity Goal: Patient/Family Education Outcome: Progressing Towards Goal 
  
Problem: Patient Education: Go to Patient Education Activity Goal: Patient/Family Education Outcome: Progressing Towards Goal 
  
Problem: Delirium Treatment Goal: *Level of consciousness restored to baseline Outcome: Progressing Towards Goal 
Goal: *Level of environmental perceptions restored to baseline Outcome: Progressing Towards Goal 
Goal: *Sensory perception restored to baseline Outcome: Progressing Towards Goal 
Goal: *Emotional stability restored to baseline Outcome: Progressing Towards Goal 
Goal: *Functional assessment restored to baseline Outcome: Progressing Towards Goal 
Goal: *Absence of falls Outcome: Progressing Towards Goal 
Goal: *Will remain free of delirium, CAM Score negative Outcome: Progressing Towards Goal 
Goal: *Cognitive status will be restored to baseline Outcome: Progressing Towards Goal 
Goal: Interventions Outcome: Progressing Towards Goal 
  
Problem: Patient Education: Go to Patient Education Activity Goal: Patient/Family Education Outcome: Progressing Towards Goal 
  
Problem: Ventilator Management Goal: *Adequate oxygenation and ventilation Outcome: Progressing Towards Goal 
Goal: *Patient maintains clear airway/free of aspiration Outcome: Progressing Towards Goal 
Goal: *Absence of infection signs and symptoms Outcome: Progressing Towards Goal 
Goal: *Normal spontaneous ventilation Outcome: Progressing Towards Goal 
  
Problem: Patient Education: Go to Patient Education Activity Goal: Patient/Family Education Outcome: Progressing Towards Goal

## 2019-04-05 NOTE — PROGRESS NOTES
Palliative Medicine Social Work Chart reviewed. Patient extubated this morning. Met with patient and  in room. Patient was sitting up in bedside chair, awake, alert, able to answer questions but perseverating on not wanting to \"keep going backward. \"  attempted to reassure her but she was unable to process that she is actually doing better. We discussed family meeting -  needs to contact daughters and he will call palliative with a time for Monday 4.8. Thank you for the opportunity to be involved in the care of Ms. Temi Mcpherson and her family. Vanita Bose, [facility] Palliative Medicine  [phone]

## 2019-04-05 NOTE — PROGRESS NOTES
Critical care interdisciplinary rounds held on 04/05/2019. Following members present, Pharmacy, Diabetes Treatment, Case Management, Respiratory Therapy and Nutrition. Led by GABBY Wisdom RN and Dr. Tiburcio Cotto. Plan of care discussed. See clinical pathway for plan of care and interventions and desired outcomes.

## 2019-04-05 NOTE — PROGRESS NOTES
Care Management: 
 
Patient extubated this am and is talking with family and getting ready to work with PT and OT. Family is at bedside. I spoke with patient and family and they are receptive to rehab versus home with home health . We will wait for therapy to work with patient and see what their recommendations are. Kassie Paget [facility] acm 1051 Addendum: I spoke with patient this afternoon and she says her goal is to go home post hospital with her family and home health. She would like [facility] if [place] is ordered. FOC placed on chart. We will cont to follow and see how she progresses with therapy.

## 2019-04-05 NOTE — PROGRESS NOTES
Hospitalist Progress Note NAME: Parth Shelton :  1955 MRN:  [id number] Assessment / Plan: 
Acute hypercapnic respiratory failure s/p intubation on 3/29 Septic shock, POA Hypotension, Fever, Lactic acidosis Ecoli, enterococcus UTI and bacteremia Colitis, infectious vs ischemic 
-still with fever but WBC continuing to improve 
-KUB showed no significant bowel dilation. Repeat KUB showed gas and stool in the large bowel 
-CT a/p showed resolved mural thickening in the ascending colon. Rectal tube and gastric tube present. Interval development of ascites. Cirrhosis with splenomegaly and esophageal varices. Patchy opacities in the lung bases may represent atelectasis or airspace disease. 
-cont' rectal tube and lactulose 
-CXR with no acute finding, CT chest showed R colitis, cirrhosis with portal venous HTN, clear lungs -Bcx and Ucx growing Ecoli, repeated Bcx on 3/30/19 neg  
-no longer on pressors 
-abx changed to IV rocephin and PO vancomycin  
-hold propranolol, lasix, aldactone. losartan 
-patient extubated today 2019 KALI with metabolic acidosis, resolved Lactic acidosis Hypomag, repleted Hypophos, repleted 
-avoid nephrotoxic agent, monitor bmp Hypernatremia Defer to nephrology. Patient is fluid overloaded,can not order D5 w Acute encephalopathy, POA due to HE Right leg weakness, POA Mild left lip droop, POA Hx TIA  
-suspect encephalopathy due to hepatic encephalopathy, POA and sepsis. CT head negative.  
-cont' lactulose, xifaxin  
-continue aspirin 
-Patient extubated today and mentation improving. Will continue to monitor 
  
DM initially hypoglycemia, now with hyperglycemia in the setting of steroid use 
-initially requiring D10 bolus for EMS and D50 twice in ER. 
-cont' NPH and lantus, SSI 
-hold metformin 
  
Right leg edema  
-patient reports this is chronic 
  
COPD 
IBIS 
-nebs Obesity  
Body mass index is 38.31 kg/m². 
  
Code:  full DVT prophylaxis:  SCD Surrogate decision maker:   Subjective: Chief Complaint / Reason for Physician Visit Pt awake,extubated,feeling fineDiscussed with RN events overnight. Review of Systems: 
Symptom Y/N Comments  Symptom Y/N Comments Fever/Chills n   Chest Pain n   
Poor Appetite n   Edema y Cough n   Abdominal Pain Sputum    Joint Pain n   
SOB/MARTINEZ n   Pruritis/Rash n   
Nausea/vomit n   Tolerating PT/OT Diarrhea    Tolerating Diet Constipation    Other Could NOT obtain due to: intubated Objective: VITALS:  
Last 24hrs VS reviewed since prior progress note. Most recent are: 
Patient Vitals for the past 24 hrs: 
 Temp Pulse Resp BP SpO2  
04/05/19 1400  (!) 114 21 121/56 93 % 04/05/19 1312  (!) 120 27 136/54 (!) 88 % 04/05/19 1200 98.7 °F (37.1 °C) (!) 121 25 168/70 94 % 04/05/19 1100  (!) 114 26 141/49 100 % 04/05/19 1000  (!) 105 21 136/43 97 % 04/05/19 0900  (!) 110 24 146/53 98 % 04/05/19 0855     97 % 04/05/19 0800 98.5 °F (36.9 °C) (!) 105 21 135/48 97 % 04/05/19 0717  (!) 103 20  98 % 04/05/19 0715 (!) 37.4 °F (3 °C)      
04/05/19 0700  100 21 135/47 99 % 04/05/19 0600  98 20 141/52 98 % 04/05/19 0500  95 13 142/47 95 % 04/05/19 0400 99.9 °F (37.7 °C) (!) 101 17 132/45 98 % 04/05/19 0320  98 19  98 % 04/05/19 0300  96 20 138/49 98 % 04/05/19 0200  (!) 101 15 125/56 98 % 04/05/19 0100  96 28 142/55 100 % 04/05/19 0000 100.3 °F (37.9 °C) 99 17 124/44 97 % 04/04/19 2327  (!) 102 21  97 % 04/04/19 2300  93 14 (!) 100/20 93 % 04/04/19 2200  95 24 (!) 105/17 97 % 04/04/19 2100  (!) 104 22 135/63 98 % 04/04/19 2008  (!) 105  146/57   
04/04/19 2000 99.4 °F (37.4 °C) (!) 104 20 146/57 97 % 04/04/19 1951  98 17  97 % 04/04/19 1900  98 19 115/51 97 % 04/04/19 1800  97 18 112/45 96 % 04/04/19 1700  (!) 101 16 119/51 97 % 04/04/19 1656  99 16  97 % 04/04/19 1600 98.1 °F (36.7 °C) 95 17 125/53 97 % 04/04/19 1549     97 % Intake/Output Summary (Last 24 hours) at 4/5/2019 1522 Last data filed at 4/5/2019 1000 Gross per 24 hour Intake 1379.67 ml Output 2010 ml Net -630.33 ml PHYSICAL EXAM: 
General: Female,s/p extubation, awake EENT:  PERRLA,at/nc,anicteric. Resp:  CTA b/l. No wheezing  No accessory muscle use CV:  Regular  rhythm,  No edema GI:  Soft, Non distended, Non tender.  +BS Neurologic:  Awake,alert,oriented to person,s/p extubation. Psych:   Not agitation,interacting. Skin:  No rashes. No jaundice Reviewed most current lab test results and cultures  YES Reviewed most current radiology test results   YES Review and summation of old records today    NO Reviewed patient's current orders and MAR    YES 
PMH/SH reviewed - no change compared to H&P 
________________________________________________________________________ Care Plan discussed with: 
  Comments Patient x Family  x Pt's  RN x Care Manager Consultant Multidiciplinary team rounds were held today with , nursing, pharmacist and clinical coordinator. Patient's plan of care was discussed; medications were reviewed and discharge planning was addressed. ________________________________________________________________________ Total NON critical care TIME:  30   Minutes Total CRITICAL CARE TIME Spent:   Minutes non procedure based Comments >50% of visit spent in counseling and coordination of care x   
________________________________________________________________________ Hitesh Redmond MD  
 
Procedures: see electronic medical records for all procedures/Xrays and details which were not copied into this note but were reviewed prior to creation of Plan. LABS: 
I reviewed today's most current labs and imaging studies. Pertinent labs include: 
Recent Labs 04/05/19 
0354 04/03/19 0358  
WBC 8.7 12.0* HGB 7.9* 8.3* HCT 27.6* 28.1*  
PLT 94* 94* Recent Labs 04/05/19 
0354 04/04/19 
0501 04/03/19 
8250 * 149* 146*  
K 4.3 4.6 4.5  
* 117* 117* CO2 29 26 24 * 273* 378* BUN 46* 55* 53* CREA 0.80 0.76 0.92  
CA 7.6* 7.8* 7.4* MG  --  2.7*  --   
PHOS 3.3 3.1  --   
ALB 2.6*  --  2.4* TBILI 0.8  --  0.7 SGOT 18  --  21 ALT 38  --  55 Signed: Louis Khalil MD

## 2019-04-05 NOTE — PROGRESS NOTES
Problem: Mobility Impaired (Adult and Pediatric) Goal: *Acute Goals and Plan of Care (Insert Text) Description Physical Therapy Goals Initiated 4/5/2019 1. Patient will move from supine to sit and sit to supine , scoot up and down and roll side to side in bed with minimal assistance/contact guard assist within 7 day(s). 2.  Patient will transfer from bed to chair and chair to bed with minimal assistance/contact guard assist using the least restrictive device within 7 day(s). 3.  Patient will perform sit to stand with minimal assistance/contact guard assist within 7 day(s). 4.  Patient will ambulate with minimal assistance/contact guard assist for 90 feet with the least restrictive device within 7 day(s). Outcome: Progressing Towards Goal 
 PHYSICAL THERAPY EVALUATION Patient: Verena Ards (54 y.o. female) Date: 4/5/2019 Primary Diagnosis: Septic shock ([facility] [A41.9, R65.21] Colitis [K52.9] UTI (urinary tract infection) [N39.0] Hepatic encephalopathy ([facility] [K72.90] Precautions:  Fall, Skin(R foot drop; partial code) ASSESSMENT : 
Based on the objective data described below, the patient presents with R foot drop, significant UE proximal muscle weakness, poor sitting balance and impaired mobility skills following admission on 3/23/19 for sepsis with shock requiring to be intubated on vent due to respiratory failure. She was extubated this morning. She was lying in bed with spouse and daughter at bedside. Agreed to therapy and cleared by her nurse. PTA lives with spouse in 1 story home with 3 steps and bilateral rails she uses to enter. Daughter reports patient has had multiple recent falls since TIA 11/2018. Patients cognitive processing seems delayed at this time with difficulty attending to conversation and tasks. She is also impulsive with decreased safety judgement. Currently very weak in bilateral shoulder flexors, abductors and biceps.  Also decreased in all shoulder/elbow extensors. Bilateral hips grossly 3/5, knees 3+/5 and L ankle 3+/5. R ankle dorsiflexor is 0/5 and 2/5 in plantarflexion. Transfers supine to sit needed max/mod a x 2. Sit to standing and bed to chair was max/TA x 2. Gait deferred due to severe weakness and safety concerns at this time. She was left in recliner when the session ended. RN will have Mobility team use Jerrod Lift to get patient back to bed. Her vitals remained stable throughout the session. Recommend IP rehab upon discharge depending on progress in acute care. Patient will benefit from skilled intervention to address the above impairments. Patient?s rehabilitation potential is considered to be Fair Factors which may influence rehabilitation potential include:  
? None noted ? Mental ability/status ? Medical condition ? Home/family situation and support systems ? Safety awareness 
? Pain tolerance/management 
? Other: PLAN : 
Recommendations and Planned Interventions: 
?           Bed Mobility Training             ? Neuromuscular Re-Education ? Transfer Training                   ? Orthotic/Prosthetic Training 
? Gait Training                         ? Modalities ? Therapeutic Exercises           ? Edema Management/Control ? Therapeutic Activities            ? Patient and Family Training/Education ? Other (comment): Frequency/Duration: Patient will be followed by physical therapy  4 times a week to address goals. Discharge Recommendations: Inpatient Rehab Further Equipment Recommendations for Discharge: TBD - custom R AFO for foot drop will likely be needed. SUBJECTIVE:  
Patient stated ? I want out of bed? OBJECTIVE DATA SUMMARY:  
HISTORY:   
Past Medical History:  
Diagnosis Date Asthma Back pain COPD (chronic obstructive pulmonary disease) ([facility] Diabetes ([facility] Esophageal varices in cirrhosis (HCC)   
 6/2014 banding x 2 Fibromyalgia Gastrointestinal disorder GERD (gastroesophageal reflux disease) Hypercholesteremia Liver cirrhosis secondary to BALDERAS ([facility] Liver disease Other and unspecified hyperlipidemia Restless leg syndrome Type II or unspecified type diabetes mellitus without mention of complication, uncontrolled Unspecified essential hypertension Past Surgical History:  
Procedure Laterality Date HX BACK SURGERY HX CARPAL TUNNEL RELEASE    
 on right HX HYSTERECTOMY plus 1/2 of an ovary removed [place] COLSC FLX W/RMVL OF TUMOR POLYP LESION SNARE TQ  5/30/2013 UPPER GI ENDOSCOPY,LIGAT VARIX  2/6/2015 Prior Level of Function/Home Situation: lives with spouse in 1 story home with 3 steps and bilateral rails she uses to enter. Daughter reports patient has had multiple recent falls since TIA 11/2018. Personal factors and/or comorbidities impacting plan of care: complex comorbidities Home Situation Home Environment: Private residence # Steps to Enter: 3 Rails to Enter: Yes Hand Rails : Bilateral 
Wheelchair Ramp: No 
One/Two Story Residence: One story Living Alone: No 
Support Systems: Spouse/Significant Other/Partner, Family member(s) Patient Expects to be Discharged to[de-identified] Unknown Current DME Used/Available at Home: Judene Desanctis, straight, Walker, rolling, Shower chair, Commode, bedside Tub or Shower Type: Tub/Shower combination EXAMINATION/PRESENTATION/DECISION MAKING:  
Critical Behavior: 
Neurologic State: Alert Orientation Level: Oriented to person, Oriented to place, Oriented to situation Cognition: Decreased command following, Decreased attention/concentration, Impulsive Safety/Judgement: Decreased insight into deficits, Decreased awareness of need for safety, Decreased awareness of need for assistance, Fall prevention Hearing: Auditory Auditory Impairment: None Skin:   
Edema: Range Of Motion: 
AROM: Grossly decreased, non-functional(R foot drop; bilateral severe shoulder and biceps weakness) PROM: Within functional limits Strength:   
Strength: Grossly decreased, non-functional 
  
  
  
  
  
  
Tone & Sensation:  
Tone: Abnormal(hypotonic bilateral shoulders) Sensation: Intact Coordination: 
Coordination: Generally decreased, functional 
Vision:  
Acuity: Within Defined Limits Corrective Lenses: Glasses Functional Mobility: 
Bed Mobility: 
Rolling: Moderate assistance;Maximum assistance;Assist x2 Supine to Sit: Maximum assistance; Moderate assistance;Assist x2 Scooting: Maximum assistance;Assist x1 Transfers: 
Sit to Stand: Maximum assistance; Total assistance;Assist x2 Stand to Sit: Maximum assistance;Assist x1 Bed to Chair: Total assistance;Assist x1 Balance:  
Sitting: Impaired Sitting - Static: Poor (constant support) Sitting - Dynamic: Not tested Standing: Impaired Standing - Static: Not tested Ambulation/Gait Training: 
  
  
  
  
Gait Description (WDL): (deferred due to severe weakness) Functional Measure: 
Barthel Index: 
Bathin Bladder: 0 Bowels: 0 Groomin Dressin Feedin Mobility: 0 Stairs: 0 Toilet Use: 0 Transfer (Bed to Chair and Back): 5 Total: 5/100 Percentage of impairment  
0% 1-19% 20-39% 40-59% 60-79% 80-99% 100% Barthel Score 0-100 100 99-80 79-60 59-40 20-39 1-19 
 0 The Barthel ADL Index: Guidelines 1. The index should be used as a record of what a patient does, not as a record of what a patient could do. 2. The main aim is to establish degree of independence from any help, physical or verbal, however minor and for whatever reason. 3. The need for supervision renders the patient not independent.  
4. A patient's performance should be established using the best available evidence. Asking the patient, friends/relatives and nurses are the usual sources, but direct observation and common sense are also important. However direct testing is not needed. 5. Usually the patient's performance over the preceding 24-48 hours is important, but occasionally longer periods will be relevant. 6. Middle categories imply that the patient supplies over 50 per cent of the effort. 7. Use of aids to be independent is allowed. Saravanan Wheeler., Barthel, D.W. (0990). Functional evaluation: the Barthel Index. [address] [facility] (14)2. Madelaine Neal christiano RADHA Stark, Dana Leach., Isiah Sanchez., [place], [address] (1999). Measuring the change indisability after inpatient rehabilitation; comparison of the responsiveness of the Barthel Index and Functional Wetzel Measure. Journal of Neurology, Neurosurgery, and Psychiatry, 66(4), 771-833. Juan Garcia, N.J.A, WILBUR Lui, & Oralia Owens M.A. (2004.) Assessment of post-stroke quality of life in cost-effectiveness studies: The usefulness of the Barthel Index and the EuroQoL-5D. [facility], 13, 056-40 Physical Therapy Evaluation Charge Determination History Examination Presentation Decision-Making HIGH Complexity :3+ comorbidities / personal factors will impact the outcome/ POC  HIGH Complexity : 4+ Standardized tests and measures addressing body structure, function, activity limitation and / or participation in recreation  HIGH Complexity : Unstable and unpredictable characteristics  Other outcome measures Barthel  HIGH Based on the above components, the patient evaluation is determined to be of the following complexity level: HIGH Pain: 
Pain Scale 1: Numeric (0 - 10) Pain Intensity 1: 0 Activity Tolerance:  
Limited. Please refer to the flowsheet for vital signs taken during this treatment. After treatment:  
?         Patient left in no apparent distress sitting up in chair ?         Patient left in no apparent distress in bed 
? Call bell left within reach ? Nursing notified ? Caregiver present ? Bed alarm activated COMMUNICATION/EDUCATION:  
The patient?s plan of care was discussed with: Occupational Therapist and Registered Nurse. ?         Fall prevention education was provided and the patient/caregiver indicated understanding. ? Patient/family have participated as able in goal setting and plan of care. ?         Patient/family agree to work toward stated goals and plan of care. ?         Patient understands intent and goals of therapy, but is neutral about his/her participation. ? Patient is unable to participate in goal setting and plan of care. Thank you for this referral. 
Valeria Rai, PT Time Calculation: 39 mins

## 2019-04-05 NOTE — DIABETES MGMT
DTC Progress Note Recommendations/ Comments: Pt discussed with rounding team and Dr. Elly Butler. Plan to continue current regimen at this time. BG improved last night and this am with increased lantus. If steroids are weaned then would reduce NPH to 15 units linked and timed with steroids. Chart reviewed on Jahaira Marino during Multidisciplinary Rounds. A1c:  
Lab Results Component Value Date/Time Hemoglobin A1c 4.8 03/29/2019 02:56 PM  
 
 
 
 
Recent Glucose Results:  
Lab Results Component Value Date/Time  (H) 04/05/2019 03:54 AM  
 GLUCPOC 173 (H) 04/05/2019 05:29 AM  
 GLUCPOC 189 (H) 04/04/2019 11:21 PM  
 GLUCPOC 189 (H) 04/04/2019 07:57 PM  
  
 
Lab Results Component Value Date/Time Creatinine 0.80 04/05/2019 03:54 AM  
 
Estimated Creatinine Clearance: 81.7 mL/min (based on SCr of 0.8 mg/dL). Active Orders Diet DIET NPO  
  
 
PO intake: No data found. Will continue to follow as needed. Thank you. Roberth Parrish, SUSIEN, RN, CDE [facility] Time spent: 5 min

## 2019-04-06 NOTE — PROGRESS NOTES
Hospitalist Progress Note NAME: Clayton Heath :  1955 MRN:  [id number] Assessment / Plan: 
Acute hypercapnic respiratory failure s/p intubation on 3/29 Septic shock, POA Hypotension, Fever, Lactic acidosis Ecoli, enterococcus UTI and bacteremia Colitis, infectious vs ischemic 
-still with fever but WBC continuing to improve 
-KUB showed no significant bowel dilation. Repeat KUB showed gas and stool in the large bowel 
-CT a/p showed resolved mural thickening in the ascending colon. Rectal tube and gastric tube present. Interval development of ascites. Cirrhosis with splenomegaly and esophageal varices. Patchy opacities in the lung bases may represent atelectasis or airspace disease. 
-cont' rectal tube and lactulose 
-CXR with no acute finding, CT chest showed R colitis, cirrhosis with portal venous HTN, clear lungs -Bcx and Ucx growing Ecoli, repeated Bcx on 3/30/19 neg  
-no longer on pressors 
-abx changed to IV rocephin and PO vancomycin  
-hold propranolol, lasix, aldactone. losartan 
-patient extubated today 2019 KALI with metabolic acidosis, resolved Lactic acidosis Hypomag, repleted Hypophos, repleted 
-avoid nephrotoxic agent, monitor bmp Hypernatremia Defer to nephrology. Patient is fluid overloaded,can not order D5 w Acute encephalopathy, POA due to HE Right leg weakness, POA Mild left lip droop, POA Hx TIA  
-suspect encephalopathy due to hepatic encephalopathy, POA and sepsis. CT head negative.  
-cont' lactulose, xifaxin  
-continue aspirin 
-Patient extubated today and mentation improving. Will continue to monitor 
  
DM initially hypoglycemia, now with hyperglycemia in the setting of steroid use 
-initially requiring D10 bolus for EMS and D50 twice in ER. 
-cont' NPH and lantus, SSI 
-hold metformin 
  
Right leg edema  
-patient reports this is chronic 
  
COPD 
IBIS 
-nebs  
-weaning off steroids Obesity  
Body mass index is 38.31 kg/m². 
  
 Code:  full DVT prophylaxis:  SCD Surrogate decision maker:   Subjective: Chief Complaint / Reason for Physician Visit Pt awake,extubated,feeling fine. Discussed with RN events overnight. Review of Systems: 
Symptom Y/N Comments  Symptom Y/N Comments Fever/Chills n   Chest Pain n   
Poor Appetite n   Edema y Cough n   Abdominal Pain Sputum    Joint Pain n   
SOB/MARTINEZ n   Pruritis/Rash n   
Nausea/vomit n   Tolerating PT/OT Diarrhea    Tolerating Diet Constipation    Other Could NOT obtain due to: intubated Objective: VITALS:  
Last 24hrs VS reviewed since prior progress note. Most recent are: 
Patient Vitals for the past 24 hrs: 
 Temp Pulse Resp BP SpO2  
04/06/19 1600 98.9 °F (37.2 °C) (!) 104 28 145/64 98 % 04/06/19 1500  (!) 108 23 147/64 98 % 04/06/19 1400  (!) 110 25 153/60 98 % 04/06/19 1300  (!) 109 24 149/60 98 % 04/06/19 1200 98.5 °F (36.9 °C) (!) 111 17 145/53 98 % 04/06/19 1100  (!) 111 15 153/62 99 % 04/06/19 1000  (!) 110 27 148/54 99 % 04/06/19 0900  (!) 109 20 138/59 98 % 04/06/19 0800 98.7 °F (37.1 °C) (!) 119 18 146/52 96 % 04/06/19 0700  (!) 109 22 138/49 98 % 04/06/19 0600  (!) 111 22 136/63 97 % 04/06/19 0500  (!) 113 22 134/55 91 % 04/06/19 0400  (!) 115 28 141/56 90 % 04/06/19 0347  (!) 116 23  90 % 04/06/19 0329  (!) 114 25  90 % 04/06/19 0313     (!) 89 % 04/06/19 0300 98.1 °F (36.7 °C) (!) 111 25 144/59 90 % 04/06/19 0200  (!) 114 18 142/59 90 % 04/06/19 0100  (!) 113 20 139/65 90 % 04/06/19 0000 98.1 °F (36.7 °C) (!) 114 19  90 % 04/05/19 2348     (!) 88 % 04/05/19 2300  (!) 118 21 147/60 90 % 04/05/19 2200  (!) 116 24 143/60 91 % 04/05/19 2100  (!) 117 17 141/50 90 % 04/05/19 2000  (!) 114 22 143/51 91 % 04/05/19 1958     92 % 04/05/19 1900 98.2 °F (36.8 °C) (!) 116 24 134/54 92 % 04/05/19 1800  (!) 115 19 147/61 93 % 04/05/19 1700  (!) 120 18 146/58 92 % Intake/Output Summary (Last 24 hours) at 4/6/2019 1641 Last data filed at 4/6/2019 1600 Gross per 24 hour Intake 2620 ml Output 1875 ml Net 745 ml PHYSICAL EXAM: 
General: Female,s/p extubation, awake EENT:  PERRLA,at/nc,anicteric. Resp:  CTA b/l. No wheezing  No accessory muscle use CV:  Regular  Rhythm. GI:  Soft, Non distended, Non tender.  +BS Neurologic:  Awake,alert,oriented to person,s/p extubation. Extr:                 Edema both feet Psych:   Not agitation,interacting. Skin:  No rashes. No jaundice Reviewed most current lab test results and cultures  YES Reviewed most current radiology test results   YES Review and summation of old records today    NO Reviewed patient's current orders and MAR    YES 
PMH/SH reviewed - no change compared to H&P 
________________________________________________________________________ Care Plan discussed with: 
  Comments Patient x Family  x Pt's  RN x Care Manager Consultant Multidiciplinary team rounds were held today with , nursing, pharmacist and clinical coordinator. Patient's plan of care was discussed; medications were reviewed and discharge planning was addressed. ________________________________________________________________________ Total NON critical care TIME:  30   Minutes Total CRITICAL CARE TIME Spent:   Minutes non procedure based Comments >50% of visit spent in counseling and coordination of care x   
________________________________________________________________________ Wingina Side, MD  
 
Procedures: see electronic medical records for all procedures/Xrays and details which were not copied into this note but were reviewed prior to creation of Plan. LABS: 
I reviewed today's most current labs and imaging studies. Pertinent labs include: 
Recent Labs 04/06/19 
9460 04/05/19 
0354 WBC 15.3* 8.7 HGB 8.7* 7.9*  
HCT 30.7* 27.6*  
* 94* Recent Labs 04/06/19 
2753 04/05/19 
0354 04/04/19 
0501  151* 149*  
K 4.3 4.3 4.6 * 117* 117* CO2 29 29 26 * 176* 273* BUN 36* 46* 55* CREA 0.48* 0.80 0.76 CA 8.1* 7.6* 7.8*  
MG  --   --  2.7* PHOS 3.2 3.3 3.1 ALB 2.9* 2.6*  --   
TBILI 1.1* 0.8  --   
SGOT 25 18  --   
ALT 38 38  --   
 
 
Signed: Rolf Hobbs MD

## 2019-04-06 NOTE — PROGRESS NOTES
0730 - Bedside verbal report received from off going shift. 0800 - Assessment complete, see summary for details. Awake and Oriented x4, follows all commands. Denies discomfort/SOB. Up to commode with MAX assistance x3, large loose brown stool passed. Transfer to chair with MAX assistance. Call bell within reach. Taking PO without difficulty. 1010 - States need to urinate, placed Purewick drainage device per patient request, refusing return to bed for bladder scan and possibe straight cath. 1100 - C/O needing to have a BM. Returned to bed with MAX assistance x4. Placed on bedpan, small amount urine noted without BM. Re-positioned for comfort, call bell within reach. 1200 - Assessment complete, no changes noted from previous assessment. 80 - Dr. Tej Hollins page per patient request secondary to cough, orders noted. 1300 - Placed on bedpan at patient request, large loose stool with urine passed. [place] care complete. 1430 - Bladder scan = 595, straight cath complete for 675 ml urine with sediment. States relief. Will continue to closely monitor. 1600 - Assessment complete, no changes noted from previous assessment. Re-positioned for comfort with call bell within reach. Will continue to closely monitor. 80 - Dr. Katina Guerrero at bedside, aware of patient with difficulty passing urine, verbal order given to place norman catheter if straight cath necessary, no new orders noted. TRANSFER - OUT REPORT: 
 
Verbal report given to [facility] Dre [place] Boyd, RN(name) on Trina Smith  being transferred to [facility] 
(unit) for routine progression of care Report consisted of patients Situation, Background, Assessment and  
Recommendations(SBAR). Information from the following report(s) SBAR, Kardex, Intake/Output, MAR, Recent Results, Cardiac Rhythm ST with PVC's and Quality Measures was reviewed with the receiving nurse. Lines:  
Triple Lumen 03/28/19 Right Internal jugular (Active) Central Line Being Utilized Yes 4/6/2019  4:00 PM  
Criteria for Appropriate Use Hemodynamically unstable, requiring monitoring lines, vasopressors, or volume resuscitation 4/6/2019  4:00 PM  
Site Assessment Clean, dry, & intact 4/6/2019  4:00 PM  
Infiltration Assessment 0 4/6/2019  4:00 PM  
Affected Extremity/Extremities Color distal to insertion site pink (or appropriate for race); Pulses palpable;Range of motion performed 4/6/2019  4:00 PM  
Date of Last Dressing Change 04/06/19 4/6/2019 12:00 PM  
Dressing Status Clean, dry, & intact 4/6/2019  4:00 PM  
Dressing Type Disk with Chlorhexadine gluconate (CHG); Transparent 4/6/2019  4:00 PM  
Action Taken Dressing changed 4/6/2019 10:12 AM  
Proximal Hub Color/Line Status White;Capped 4/6/2019  4:00 PM  
Positive Blood Return (Medial Site) Yes 4/6/2019  4:00 PM  
Medial Hub Color/Line Status Blue;Capped 4/6/2019  4:00 PM  
Positive Blood Return (Lateral Site) Yes 4/6/2019  4:00 PM  
Distal Hub Color/Line Status Brown;Capped 4/6/2019  4:00 PM  
Positive Blood Return (Site #3) Yes 4/6/2019  4:00 PM  
External Catheter Length (cm) 1 centimeters 4/6/2019 10:12 AM  
Alcohol Cap Used Yes 4/6/2019  4:00 PM  
  
 
Opportunity for questions and clarification was provided. Patient transported with: 
 Monitor O2 @ 3 liters Patient-specific medications from Pharmacy Registered Nurse Tech 
 
Poor venous access noted, central line remains for venous access.

## 2019-04-06 NOTE — PROGRESS NOTES
[facility] Progress Note NAME: Brady Yen :  1955 MRN:  [id number] Date/Time: 2019 Risk of deterioration: high Assessment:    Plan: 
Volume overload Acute Resp failure (intubated 3/29-extubated ) Free water deficit (R) sided colitis Ecoli sepsis Cirrhosis/BALDERAS Ascites Urine retention - follow bladder scan for pvr and prn strait cath Restart home Lasix/aldactone Subjective: Chief Complaint:  Awake alert. No complaint at present. Urine retention early this am. 
 
Review of Systems: denies specific complaint Objective: VITALS:  
Last 24hrs VS reviewed since prior progress note. Most recent are: 
Visit Vitals /63 Pulse (!) 111 Temp 98.1 °F (36.7 °C) Resp 22 Ht 5' 2\" (1.575 m) Wt 104.5 kg (230 lb 6.1 oz) SpO2 97% BMI 42.14 kg/m² SpO2 Readings from Last 6 Encounters:  
19 97% 19 99% 19 95% 18 96% 11/15/18 93% 10/11/18 95% O2 Flow Rate (L/min): 3 l/min Intake/Output Summary (Last 24 hours) at 2019 9222 Last data filed at 2019 0000 Gross per 24 hour Intake 1293.65 ml Output 1375 ml Net -81.35 ml Telemetry Reviewed   normal sinus rhythm PHYSICAL EXAM: 
 
General   obese, appears stated age EENT  Normocephalic, Atraumatic, PERRLA Respiratory   Clear To Auscultation bilaterally Cardiology  Regular Rate and Rythmn Abdominal  Soft, non-tender, non-distended, positive bowel sounds Extremities  No clubbing, cyanosis, or edema. Pulses intact. Lab Data Reviewed: (see below) Medications Reviewed: (see below) PMH/SH reviewed - no change compared to H&P 
______________________________________________ 
___________________________________________________ Attending Physician: Payal Gottlieb, MD  
 
____________________________________________________ MEDICATIONS: 
Current Facility-Administered Medications Medication Dose Route Frequency  metoclopramide HCl (REGLAN) injection 5 mg  5 mg IntraVENous Q6H  
 polyethylene glycol (MIRALAX) packet 17 g  17 g Oral BID  lactulose (CHRONULAC) solution 30 g  30 g Per NG tube DAILY  insulin glargine (LANTUS) injection 50 Units  50 Units SubCUTAneous Q12H  
 insulin NPH (NOVOLIN N, HUMULIN N) injection 30 Units  30 Units SubCUTAneous Q12H And  
 methylPREDNISolone (PF) (SOLU-MEDROL) injection 40 mg  40 mg IntraVENous Q12H  
 insulin lispro (HUMALOG) injection   SubCUTAneous Q6H  
 dextrose (D50) infusion 12.5-25 g  12.5-25 g IntraVENous PRN  
 balsam peru-castor oil (VENELEX) ointment   Topical BID  cefTRIAXone (ROCEPHIN) 2 g in 0.9% sodium chloride (MBP/ADV) 50 mL  2 g IntraVENous Q24H  
 famotidine (PF) (PEPCID) 20 mg in sodium chloride 0.9% 10 mL injection  20 mg IntraVENous Q12H  
 albuterol (PROVENTIL VENTOLIN) nebulizer solution 2.5 mg  2.5 mg Nebulization Q4H RT  
 amitriptyline (ELAVIL) tablet 100 mg  100 mg Oral QHS  acetaminophen (TYLENOL) solution 650 mg  650 mg Per NG tube Q4H PRN  
 sodium chloride (NS) flush 5-40 mL  5-40 mL IntraVENous Q8H  
 sodium chloride (NS) flush 5-40 mL  5-40 mL IntraVENous PRN  
 acetaminophen (TYLENOL) suppository 650 mg  650 mg Rectal Q6H PRN  
 ondansetron (ZOFRAN) injection 4 mg  4 mg IntraVENous Q6H PRN  
 albuterol-ipratropium (DUO-NEB) 2.5 MG-0.5 MG/3 ML  3 mL Nebulization Q4H PRN  
 glucose chewable tablet 16 g  4 Tab Oral PRN  
 glucagon (GLUCAGEN) injection 1 mg  1 mg IntraMUSCular PRN  
 aspirin delayed-release tablet 81 mg  81 mg Oral DAILY  
  
 
LABS: 
Recent Labs 04/06/19 
0952 04/05/19 
0354 WBC 15.3* 8.7 HGB 8.7* 7.9*  
HCT 30.7* 27.6*  
* 94* Recent Labs 04/06/19 
1331 04/05/19 
0354 04/04/19 
0501  151* 149*  
K 4.3 4.3 4.6 * 117* 117* CO2 29 29 26 BUN 36* 46* 55* CREA 0.48* 0.80 0.76 * 176* 273* CA 8.1* 7.6* 7.8*  
MG  --   --  2.7* PHOS 3.2 3.3 3.1 Recent Labs 04/06/19 
9998 04/05/19 
0354 SGOT 25 18 ALT 38 38 AP 72 62 TBILI 1.1* 0.8 TP 6.8 6.7 ALB 2.9* 2.6*  
GLOB 3.9 4.1* No results for input(s): INR, PTP, APTT in the last 72 hours. No lab exists for component: INREXT, INREXT No results for input(s): FE, TIBC, PSAT, FERR in the last 72 hours. No results for input(s): PH, PCO2, PO2 in the last 72 hours. No results for input(s): CPK, CKNDX, TROIQ in the last 72 hours. No lab exists for component: CPKMB Lab Results Component Value Date/Time  Glucose (POC) 130 (H) 04/06/2019 05:21 AM  
 Glucose (POC) 137 (H) 04/06/2019 12:39 AM  
 Glucose (POC) 133 (H) 04/05/2019 08:40 PM  
 Glucose (POC) 107 (H) 04/05/2019 06:09 PM  
 Glucose (POC) 140 (H) 04/05/2019 12:11 PM

## 2019-04-06 NOTE — PROGRESS NOTES
Bedside shift change report given to Corinne Yuan RN (oncoming nurse) by Elly Martinez RN 
 (offgoing nurse). Report included the following information SBAR, ED Summary, MAR and Recent Results.

## 2019-04-06 NOTE — PROGRESS NOTES
1925 Bedside report. Pt request to be on bedpan 1935 Pt unable to urinate. Asks for her pitcher to be filled. He handles the picture with the celia with no issue. When doing assessment, ask pt to raise left arm. Says she can not at all and does not try. 2005 Pt requesting icy. Tells me she is unable to press the call bell. 2030 Pt pushes the call bell. Pt request icy to crushed up. 2040 Pt requests to be feed her icy. Again no issue with the call bell, pushing the button. I encourage her to attempt to herself. She was able when put the icy in her right hand hold it. 2050 Pt requests again to be feed. She said that her daughter has been feeding her while she has been here. That this is not her baseline and she will just have to at home. 2100 Pt request for her pitcher to be filled again. She is able to use the call bell. 2115 Pt request to use to use the bed pan. She uses call bell. 2136 Pt has loose bowel movement. She seems to be in a lot of pain when moved. She does not assist with turns. 1456 Pt request her pitcher to be filled. 26 Pt is carrying on a full conversation with herself. 18 Pt Request for her pitcher to be filled. 0006 Pt request her pitcher to be filled it is 3/4 full.   
0001 Pt is found to have spilled her pitcher of water. We changed her gown. Now she is claiming she can not  picture and she holding it in her arm pit. I encouraged her to have a half picture of water and I would fill it every hour. She doesn't go through it. She got really upset. So I filled her pitcher. 0016 Bladder Scanned 600 
0030 Straight Cath pt 1200 urine with Judie Perez. 
0120 Pt is sleeping 
0240 Pt is resting bed pan 
0246 Got pt of bed pan, nothing. She requested all ice for pitcher. She is now stating she is having issues with pushing button call bell. She thinks she would do better with flash light.   I advised her that it would be better to keep her door open. If she needed help and couldn't press it, she could call for help.  
0321 When rounding on my pt, she said that someone is coming by and stealing the ice out of her cup. That is sitting under her arm. After telling her it was all still there, she said ok. 0346 Pt desires to be pulled up in bed to help her pour ice in her mouth. She wants the pillow high. 0405 Pulled pt up in bed. Asks for more water in her ice. Request to get her up to commode. Not turning or lifting her head. She is is not lifting with her hands. She had used both hands. Left more than right. 0420 Pt request her face to be washed. States she is looking for the nun she had last time. She is concerned about missing her may appt with her liver doctor. 6554 Pt request bed pan. She would like her mug filled. 6437 Bowel movement. No urine. Pt requesting to be put on commode. She is not lifting head when pulled up and is not helping move. You will see her move all extremities.

## 2019-04-06 NOTE — PROGRESS NOTES
[facility], Critical Care, and Sleep Medicine Name: Juan Sutherland MRN: [id number] : 1955 Hospital: [facility] Date: 2019 Critical Care  Patient Consult IMPRESSION:  
· Septic with shock-  
· Ecoli septicemia; weaned off levophed. Hold all BP meds. · Acute hypoxic resp failure- intubated 3/29. Oxygenating adequately, 40%,Peep 6; CPAP trials ok but AMS, barrrier to safe liberation · Volume overload- not very responsive to diuretic · Cirrhosis, from BALDERAS- noted in past to have esophageal varices. seems decompensated, Gi following · Hepatic Encephalopathy (ammonia of 110 on admission), not compliant she has been with her home regimen. Now 19; eyes open and slightly more interactive; head Ct on admission normal 
· Acute UTI-UCx growing Ecoli. Enterococcus · Ileus · Fecal stasis · Right sided colitis  Noted. ?ischemic or infectious. Possible C Diff. Although little stooling currently; not likley; CT better · Anemia · Coagulopathy -INR 2.0 ?liver dz vs DIC 
· MIld Thrombocytopenia-?liver dz vs DIC · Hyperglycemia-likely combination of sepsis and steroids. · COPD with acute exacerbation · Ileus · IBIS on CPAP at home. · Obese. · Ongoing Smoking · D/w   : Naima Sham: [phone]. Updated today RECOMMENDATIONS:  
· CCU monitoring · Extubated · Advance diet slowly · Appreciate dr. Rachel Tovar help · NH3 in AM 
· watch renal function · Mobilize · adjsut ABX · IV sedation prn safety. · Insulin adjusted · Nebs scheduled. · Wean steroids for acute COPD exacerbation · CVP to eval volume status · Replete Electrolytes Subjective/History:  
 
 Awake and alert Up in chair, no distress Wants to eat 4/5 awake. Moving all fours. NGT residual very high. No BM for over 24 hours after flexiseal removed an lactulose enema stopped. 4/4 more alert. Can move left foot, right hand but not right foot or left hand? Passed SBT. Still volume overloaded. No new secretions 4/3 waking up some? Minimal response to lasix. eyes open. Left foot withdraws but not following commands Anasarca. flexiseal 
 
4/2 On SBT, CT abd reviewed from other day. Colitis better. Mild ascites. Distended bowels. Cirtrhosis, varices 4/1 flexiseal after lactulose started. On IV flagyl, Po vancomycin. IV abx for sepsis. On vent. PO4 1.6; glucose high 
 
3-31:no acute events overnight. Little to no stooling. Remains on vasopressors and mechanical ventilation 3-30-pt intubated yesterday. No acute events overnight. Remains sedated on Norepi gtt 3-29-19: Pt seen this am. She is still very confused. Has ongoing needed for pressors. Has wheezing. Her WBC has increased. She is not able to give hx of HPI or ROS. This patient has been seen and evaluated at the request of Dr. Carol Ann Gómez for above. Pt was seen in ER. Patient is a 61 y.o. female with hx of above. Noted to have increased confusion per her family. Has not been compliant with her meds. Noted to have weakness and inability to walk. Was was normal the night before. Noted when she awoke to be confused, had generalized weakness. Has a very dry  Mouth when seen in ER. ROS and HPI limited due to pts confused state. MEDS:  
Current Facility-Administered Medications Medication  albuterol (PROVENTIL VENTOLIN) nebulizer solution 2.5 mg  
 metoclopramide HCl (REGLAN) injection 5 mg  polyethylene glycol (MIRALAX) packet 17 g  
 lactulose (CHRONULAC) solution 30 g  
 insulin glargine (LANTUS) injection 50 Units  insulin NPH (NOVOLIN N, HUMULIN N) injection 30 Units And  
 methylPREDNISolone (PF) (SOLU-MEDROL) injection 40 mg  
 insulin lispro (HUMALOG) injection  dextrose (D50) infusion 12.5-25 g  
 balsam peru-castor oil (VENELEX) ointment  cefTRIAXone (ROCEPHIN) 2 g in 0.9% sodium chloride (MBP/ADV) 50 mL  famotidine (PF) (PEPCID) 20 mg in sodium chloride 0.9% 10 mL injection  amitriptyline (ELAVIL) tablet 100 mg  
 acetaminophen (TYLENOL) solution 650 mg  
 sodium chloride (NS) flush 5-40 mL  sodium chloride (NS) flush 5-40 mL  acetaminophen (TYLENOL) suppository 650 mg  
 ondansetron (ZOFRAN) injection 4 mg  albuterol-ipratropium (DUO-NEB) 2.5 MG-0.5 MG/3 ML  
 glucose chewable tablet 16 g  
 glucagon (GLUCAGEN) injection 1 mg  aspirin delayed-release tablet 81 mg Past Surgical History:  
Procedure Laterality Date  HX BACK SURGERY    
 HX CARPAL TUNNEL RELEASE    
 on right  HX HYSTERECTOMY plus 1/2 of an ovary removed  [place] COLSC FLX W/RMVL OF TUMOR POLYP LESION SNARE TQ  5/30/2013  UPPER GI ENDOSCOPY,LIGAT VARIX  2/6/2015 Prior to Admission medications Medication Sig Start Date End Date Taking? Authorizing Provider  
fluticasone furoate-vilanterol (BREO ELLIPTA) 200-25 mcg/dose inhaler Take 1 Puff by inhalation daily. Provider, Historical  
insulin glargine (LANTUS SOLOSTAR U-100 INSULIN) 100 unit/mL (3 mL) inpn 110 Units by SubCUTAneous route daily. Provider, Historical  
furosemide (LASIX) 40 mg tablet Take 2 Tabs by mouth daily. 3/19/19   HUMZA Lenz  
spironolactone (ALDACTONE) 100 mg tablet Take 2 Tabs by mouth daily.  3/19/19   HUMZA Lenz  
CONSTULOSE 10 gram/15 mL solution take 2 tablespoonfuls by mouth twice a day 1/7/19   Shamir April GABRIELE NP  
losartan (COZAAR) 25 mg tablet take 1 tablet by mouth once daily , REPLACES LISINOPRIL FOR BLOOD PRESSURE AND KIDNEY PROTECTION 12/3/18   Antonia Ontiveros MD  
insulin NPH (NOVOLIN N NPH U-100 INSULIN) 100 unit/mL injection Inject 55 units every 12 hours--dispense relion brand--patient will pay cash 11/28/18   Antonia Ontiveros MD  
traMADol Lesle Chary) 50 mg tablet take 1-2 tablets by mouth twice a day if needed for DIABETIC NEUROPATHY PAIN 11/27/18   Shana Collazo MD  
aspirin 81 mg chewable tablet Take 1 Tab by mouth daily. 11/16/18   Marialuisa Laird MD  
atorvastatin (LIPITOR) 40 mg tablet Take 1 Tab by mouth nightly. 11/15/18   Marialuisa Laird MD  
potassium chloride SR (KLOR-CON 10) 10 mEq tablet Take 10 mEq by mouth two (2) times a day. Leonarda Osman MD  
omeprazole (PRILOSEC) 20 mg capsule take 1 capsule by mouth once daily 11/8/18   Cathi Barksdale, NP  
amitriptyline (ELAVIL) 150 mg tablet Take 1 Tab by mouth nightly. 10/16/18   Shana Collazo MD  
propranolol (INDERAL) 20 mg tablet Take 1 Tab by mouth two (2) times a day. 10/16/18   Shana Collazo MD  
ferrous sulfate 325 mg (65 mg iron) tablet take 1 tablet by mouth once daily with BREAKFAST 10/2/18   Cathi Barksdale NP  
insulin lispro (HUMALOG KWIKPEN INSULIN) 100 unit/mL kwikpen Inject as needed up to 3 times per day for sugars over 150: 4 units for every 50 points. Max 50 units per day 10/1/18   Shana Collazo MD  
metFORMIN (GLUCOPHAGE) 1,000 mg tablet take 1 tablet by mouth twice a day with meals 8/23/18   Shana Collazo MD  
ondansetron (ZOFRAN ODT) 4 mg disintegrating tablet dissolve 1 tablet ON TONGUE every 8 hours if needed for nausea 8/1/18   Cathi Barksdale, NP  
gabapentin (NEURONTIN) 600 mg tablet take 2 tablets by mouth three times a day 7/19/18   Shana Collazo MD  
rOPINIRole (REQUIP) 4 mg tab TAB Take 4 mg by mouth nightly. Provider, Historical  
albuterol (PROAIR HFA) 90 mcg/actuation inhaler Take 2 Puffs by inhalation every four (4) hours as needed for Wheezing. Leonarda Osman MD  
 
 
Allergies Allergen Reactions  Hydrocodone Nausea and Vomiting  Lisinopril Cough  Lortab [Hydrocodone-Acetaminophen] Nausea and Vomiting  Percocet [Oxycodone-Acetaminophen] Nausea and Vomiting Social History Tobacco Use  Smoking status: Current Every Day Smoker Packs/day: 1.00 Years: 40.00 Pack years: 40.00  Smokeless tobacco: Former User Substance Use Topics  Alcohol use: No  
  Comment: rare Family History Problem Relation Age of Onset  Diabetes Mother  Stroke Sister  Diabetes Paternal Aunt  Diabetes Paternal Uncle  Heart Disease Neg Hx Review of Systems: 
Review of systems not obtained due to patient factors. Objective: 
Vital Signs:   
Visit Vitals /54 Pulse (!) 110 Temp 98.7 °F (37.1 °C) Resp 27 Ht 5' 2\" (1.575 m) Wt 104.5 kg (230 lb 6.1 oz) SpO2 99% BMI 42.14 kg/m² O2 Device: Nasal cannula O2 Flow Rate (L/min): 3 l/min Temp (24hrs), Av.3 °F (36.8 °C), Min:98.1 °F (36.7 °C), Max:98.7 °F (37.1 °C) Intake/Output:  
Last shift:       07 -  1900 In: 440 [P.O.:440] Out: - Last 3 shifts:  1901 -  0700 In: 2800.1 [P.O.:1640; I.V.:1130.1] Out: 3180 [Urine:2855] Intake/Output Summary (Last 24 hours) at 2019 1018 Last data filed at 2019 3075 Gross per 24 hour Intake 2130 ml Output 1200 ml Net 930 ml Hemodynamics:  
PAP:   CO:    
Wedge:   CI:    
CVP:  CVP (mmHg): 8 mmHg (19 1000) SVR:    
  PVR:    
 
Ventilator Settings: 
Mode Rate Tidal Volume Pressure FiO2 PEEP Spontaneous   500 ml  6 cm H2O 40 % 6 cm H20 Peak airway pressure: 13 cm H2O Minute ventilation: 11.1 l/min Physical Exam: 
 
General:  Obese, on vent Head:  Normocephalic, without obvious abnormality, atraumatic. Eyes:  Opening and tracking? Conjunctivae/corneas clear. PERRL, EOMs intact. Nose: Nares normal. Septum midline. Mucosa normal. No drainage or sinus tenderness. Throat: Lips, mucosa, and tongue normal. Teeth and gums normal.  
Neck: Supple, symmetrical, trachea midline, no adenopathy, thyroid: no enlargment/tenderness/nodules, no carotid bruit and no JVD. Has right neck IJ. Back:   Symmetric, no curvature.  ROM normal.  
 Lungs:   On  auscultation bilaterally has bilateral wheezing, prolonged expiratory phase. Chest wall:  No tenderness or deformity. Heart:  Regular rate and rhythm, S1, S2 normal, no murmur, click, rub or gallop. Abdomen:   Distended, few BSl. No masses,  No organomegaly. Obese. Extremities: Extremities normal, atraumatic, no cyanosis or edema. Pulses: 2+ and symmetric all extremities. Skin: Skin color, texture, turgor normal. No rashes or lesions Lymph nodes: Cervical, supraclavicular, and axillary nodes normal.  
Neurologic: Grossly nonfocal, awake Psych:+ anxiety or depression. But not able to fully eval.   
 
 
Data: 
 
 
       
Labs: 
 
Recent Labs 04/06/19 
3988 04/05/19 
0354 WBC 15.3* 8.7 HGB 8.7* 7.9*  
* 94* Recent Labs 04/06/19 
4910 04/05/19 
0354 04/04/19 
0501  151* 149*  
K 4.3 4.3 4.6 * 117* 117* CO2 29 29 26 * 176* 273* BUN 36* 46* 55* CREA 0.48* 0.80 0.76 CA 8.1* 7.6* 7.8*  
MG  --   --  2.7* PHOS 3.2 3.3 3.1 ALB 2.9* 2.6*  --   
SGOT 25 18  --   
ALT 38 38  -- No results for input(s): PH, PCO2, PO2, HCO3, FIO2 in the last 72 hours. No results for input(s): CPK, CKNDX, TROIQ in the last 72 hours. No lab exists for component: CPKMB Lab Results Component Value Date/Time  (H) 03/29/2017 02:00 AM  
  
 
       
 
Telemetry:Sinus Tach. Imaging: 
I have personally reviewed the patients radiographs and have reviewed the reports: 
3-28-19: CT of chest/Abdomen: IMPRESSION:  
1. Right colitis. 2. Cirrhosis with portal venous hypertension. 3. Clear lungs.   
 
 
Jose Robison MD

## 2019-04-07 NOTE — PROGRESS NOTES
Bedside and Verbal shift change report given to [address] [place] [address] (oncoming nurse) by Deana Juarez RN (offgoing nurse). Report included the following information SBAR, Kardex, Intake/Output, MAR and Recent Results.

## 2019-04-07 NOTE — PROGRESS NOTES
[facility] Progress Note NAME: Carla Porter :  1955 MRN:  [id number] Date/Time: 2019 Risk of deterioration: high Assessment:    Plan: 
Volume overload Acute Resp failure (intubated 3/29-extubated ) Free water deficit (R) sided colitis Ecoli sepsis Cirrhosis/BALDERAS Ascites Urine retention - follow bladder scan for pvr and prn strait cath; 172 ml last night 
 
continue home Lasix 80/aldactone 100 bid I will sign off, but please call if questions or changes. Subjective: Chief Complaint:  Awake alert. No complaint at present. Review of Systems: denies specific complaint Objective: VITALS:  
Last 24hrs VS reviewed since prior progress note. Most recent are: 
Visit Vitals /61 (BP 1 Location: Right arm, BP Patient Position: At rest) Pulse 99 Temp 97.6 °F (36.4 °C) Resp 20 Ht 5' 2\" (1.575 m) Wt 104.4 kg (230 lb 2.6 oz) SpO2 99% BMI 42.10 kg/m² SpO2 Readings from Last 6 Encounters:  
19 99% 19 99% 19 95% 18 96% 11/15/18 93% 10/11/18 95% O2 Flow Rate (L/min): 3 l/min Intake/Output Summary (Last 24 hours) at 2019 3685 Last data filed at 2019 1700 Gross per 24 hour Intake 1410 ml Output 675 ml Net 735 ml Telemetry Reviewed   normal sinus rhythm PHYSICAL EXAM: 
 
General   obese, appears stated age EENT  Normocephalic, Atraumatic, PERRLA Respiratory   Clear To Auscultation bilaterally Cardiology  Regular Rate and Rythmn Abdominal  Soft, non-tender, non-distended, positive bowel sounds Extremities  No clubbing, cyanosis, or edema. Pulses intact. Lab Data Reviewed: (see below) Medications Reviewed: (see below) PMH/SH reviewed - no change compared to H&P 
______________________________________________ 
___________________________________________________ Attending Physician:  Karen Greenberg MD  
 
 ____________________________________________________ MEDICATIONS: 
Current Facility-Administered Medications Medication Dose Route Frequency  albuterol (PROVENTIL VENTOLIN) nebulizer solution 2.5 mg  2.5 mg Nebulization Q4H PRN  
 furosemide (LASIX) tablet 80 mg  80 mg Oral DAILY  spironolactone (ALDACTONE) tablet 100 mg  100 mg Oral BID  
 benzonatate (TESSALON) capsule 200 mg  200 mg Oral BID PRN  
 metoclopramide HCl (REGLAN) injection 5 mg  5 mg IntraVENous Q6H  
 polyethylene glycol (MIRALAX) packet 17 g  17 g Oral BID  lactulose (CHRONULAC) solution 30 g  30 g Per NG tube DAILY  insulin glargine (LANTUS) injection 50 Units  50 Units SubCUTAneous Q12H  
 insulin NPH (NOVOLIN N, HUMULIN N) injection 30 Units  30 Units SubCUTAneous Q12H And  
 methylPREDNISolone (PF) (SOLU-MEDROL) injection 40 mg  40 mg IntraVENous Q12H  
 insulin lispro (HUMALOG) injection   SubCUTAneous Q6H  
 dextrose (D50) infusion 12.5-25 g  12.5-25 g IntraVENous PRN  
 balsam peru-castor oil (VENELEX) ointment   Topical BID  cefTRIAXone (ROCEPHIN) 2 g in 0.9% sodium chloride (MBP/ADV) 50 mL  2 g IntraVENous Q24H  
 famotidine (PF) (PEPCID) 20 mg in sodium chloride 0.9% 10 mL injection  20 mg IntraVENous Q12H  
 amitriptyline (ELAVIL) tablet 100 mg  100 mg Oral QHS  acetaminophen (TYLENOL) solution 650 mg  650 mg Per NG tube Q4H PRN  
 sodium chloride (NS) flush 5-40 mL  5-40 mL IntraVENous Q8H  
 sodium chloride (NS) flush 5-40 mL  5-40 mL IntraVENous PRN  
 acetaminophen (TYLENOL) suppository 650 mg  650 mg Rectal Q6H PRN  
 ondansetron (ZOFRAN) injection 4 mg  4 mg IntraVENous Q6H PRN  
 albuterol-ipratropium (DUO-NEB) 2.5 MG-0.5 MG/3 ML  3 mL Nebulization Q4H PRN  
 glucose chewable tablet 16 g  4 Tab Oral PRN  
 glucagon (GLUCAGEN) injection 1 mg  1 mg IntraMUSCular PRN  
 aspirin delayed-release tablet 81 mg  81 mg Oral DAILY  
  
 
LABS: 
Recent Labs 04/06/19 
6369 04/05/19 
0354 WBC 15.3* 8.7 HGB 8.7* 7.9*  
HCT 30.7* 27.6*  
* 94* Recent Labs 04/07/19 
6435 04/06/19 
4575 04/05/19 
0354  145 151*  
K 4.3 4.3 4.3 * 113* 117* CO2 31 29 29 BUN 25* 36* 46* CREA 0.39* 0.48* 0.80 GLU 97 127* 176* CA 8.0* 8.1* 7.6* PHOS  --  3.2 3.3 Recent Labs 04/07/19 
5162 04/06/19 
4513 04/05/19 
0354 SGOT 34 25 18 ALT 40 38 38 AP 82 72 62 TBILI 0.8 1.1* 0.8 TP 6.5 6.8 6.7 ALB 2.6* 2.9* 2.6*  
GLOB 3.9 3.9 4.1* No results for input(s): INR, PTP, APTT in the last 72 hours. No lab exists for component: INREXT, INREXT No results for input(s): FE, TIBC, PSAT, FERR in the last 72 hours. No results for input(s): PH, PCO2, PO2 in the last 72 hours. No results for input(s): CPK, CKNDX, TROIQ in the last 72 hours. No lab exists for component: CPKMB Lab Results Component Value Date/Time  Glucose (POC) 82 04/07/2019 07:42 AM  
 Glucose (POC) 86 04/07/2019 05:55 AM  
 Glucose (POC) 150 (H) 04/07/2019 01:01 AM  
 Glucose (POC) 76 04/07/2019 12:27 AM  
 Glucose (POC) 64 (L) 04/07/2019 12:09 AM

## 2019-04-07 NOTE — PROGRESS NOTES
TELE-HOSPITALIST CROSS COVER NOTE: 
 
Visit Vitals /49 Pulse (!) 104 Temp 97.9 °F (36.6 °C) Resp 20 Ht 5' 2\" (1.575 m) Wt 104.5 kg (230 lb 6.1 oz) SpO2 98% BMI 42.14 kg/m² D/W RN; medical records reviewed; Insulin dose held in setting of hypoglycemia. Beatriz Bennett

## 2019-04-07 NOTE — PROGRESS NOTES
Jodee and informed Dr. Rosales Peeling of patient hypoglycemia last night; telephone orders received to change Lantus to 25 Units Q12 hours.

## 2019-04-07 NOTE — PROGRESS NOTES
Pt's  Blood sugar of 67, managed per protocol with dextrose 50. MD on call notified. Lantus held. Bedside and Verbal shift change report given to [facility] (oncoming nurse) by Anh Savage RN (offgoing nurse). Report included the following information SBAR, Kardex, Intake/Output, MAR, Accordion, Recent Results and Med Rec Status.

## 2019-04-07 NOTE — PROGRESS NOTES
Hospitalist Progress Note NAME: Reilly Mercer :  1955 MRN:  [id number] Assessment / Plan: 
Acute hypercapnic respiratory failure s/p intubation on 3/29 Septic shock, POA Hypotension, Fever, Lactic acidosis Ecoli, enterococcus UTI and bacteremia Colitis, infectious vs ischemic 
-still with fever but WBC continuing to improve 
-KUB showed no significant bowel dilation. Repeat KUB showed gas and stool in the large bowel 
-CT a/p showed resolved mural thickening in the ascending colon. Rectal tube and gastric tube present. Interval development of ascites. Cirrhosis with splenomegaly and esophageal varices. Patchy opacities in the lung bases may represent atelectasis or airspace disease. 
-cont' rectal tube and lactulose 
-CXR with no acute finding, CT chest showed R colitis, cirrhosis with portal venous HTN, clear lungs -Bcx and Ucx grew Ecoli, repeated Bcx on 3/30/19 neg  
-no longer on pressors 
-abx changed to IV rocephin and PO vancomycin  
-resume propranolol 20 mg bid,cozaar 25 mg po daily, lasix, aldactone.  
-patient extubated today 2019 
-pt continues to improve 
-Will order PT/OT for deconditioning anticipating that patient will require Rehab or SNF 
-Out off icu on  KALI with metabolic acidosis, resolved Lactic acidosis Hypomag, repleted Hypophos, repleted 
-avoid nephrotoxic agent, monitor bmp 
-Nephrology has signed off - Hypernatremia 
-Corrected Volume overload 
-Resume lasix and aldactone. Acute encephalopathy, POA due to HE Right leg weakness, POA Mild left lip droop, POA Hx TIA  
-suspected encephalopathy due to hepatic encephalopathy, POA and sepsis. CT head negative.  
-cont' lactulose, xifaxin  
-continue aspirin 
-S/p extubation  and mentation improved. 
  
DM initially hypoglycemia, now with hyperglycemia in the setting of steroid use 
-initially requiring D10 bolus for EMS and D50 twice in ER. 
-cont' NPH and lantus, SSI -hold metformin 
  
Right leg edema  
-patient reports this is chronic 
  
COPD 
IBIS 
-nebs  
-weaning off steroids 
-O2sat 98% on 3 liters - might need Home oxygen Obesity  
Body mass index is 38.31 kg/m². 
  
Code:  full DVT prophylaxis:  SCD Surrogate decision maker:   Disposition:Rehab vs SNF Subjective: Chief Complaint / Reason for Physician Visit Pt awake,extubated,feeling fine. Discussed with RN events overnight. Review of Systems: 
Symptom Y/N Comments  Symptom Y/N Comments Fever/Chills n   Chest Pain n   
Poor Appetite n   Edema y Cough n   Abdominal Pain Sputum    Joint Pain n   
SOB/MARTINEZ n   Pruritis/Rash n   
Nausea/vomit n   Tolerating PT/OT Diarrhea    Tolerating Diet Constipation    Other Could NOT obtain due to: intubated Objective: VITALS:  
Last 24hrs VS reviewed since prior progress note. Most recent are: 
Patient Vitals for the past 24 hrs: 
 Temp Pulse Resp BP SpO2  
04/07/19 1140 98.5 °F (36.9 °C) 95 18 179/70 98 % 04/07/19 0738 97.6 °F (36.4 °C) 99 20 156/61 99 % 04/07/19 0340 98 °F (36.7 °C) 100 18 164/74 98 % 04/06/19 2300 97.9 °F (36.6 °C) (!) 104 20 124/49 98 % 04/06/19 1821 98.1 °F (36.7 °C) (!) 117 24 120/68 97 % 04/06/19 1600 98.9 °F (37.2 °C) (!) 104 28 145/64 98 % Intake/Output Summary (Last 24 hours) at 4/7/2019 1522 Last data filed at 4/6/2019 1700 Gross per 24 hour Intake 480 ml Output  Net 480 ml PHYSICAL EXAM: 
General: Female,s/p extubation, awake EENT:  PERRLA,at/nc,anicteric. Resp:  CTA b/l. No wheezing  No accessory muscle use CV:  Regular  Rhythm. GI:  Soft, Non distended, Non tender.  +BS Neurologic:  Awake,alert,oriented to person,s/p extubation. Extr:                 Edema both feet Psych:   Not agitation,interacting. Skin:  No rashes. No jaundice Reviewed most current lab test results and cultures  YES Reviewed most current radiology test results   YES 
 Review and summation of old records today    NO Reviewed patient's current orders and MAR    YES 
PMH/SH reviewed - no change compared to H&P 
________________________________________________________________________ Care Plan discussed with: 
  Comments Patient x Family  x Pt's  RN x Care Manager Consultant Multidiciplinary team rounds were held today with , nursing, pharmacist and clinical coordinator. Patient's plan of care was discussed; medications were reviewed and discharge planning was addressed. ________________________________________________________________________ Total NON critical care TIME:  30   Minutes Total CRITICAL CARE TIME Spent:   Minutes non procedure based Comments >50% of visit spent in counseling and coordination of care x   
________________________________________________________________________ Krista Petersen MD  
 
Procedures: see electronic medical records for all procedures/Xrays and details which were not copied into this note but were reviewed prior to creation of Plan. LABS: 
I reviewed today's most current labs and imaging studies. Pertinent labs include: 
Recent Labs 04/06/19 
4236 04/05/19 
0354 WBC 15.3* 8.7 HGB 8.7* 7.9*  
HCT 30.7* 27.6*  
* 94* Recent Labs 04/07/19 
2120 04/06/19 
7584 04/05/19 
0354  145 151*  
K 4.3 4.3 4.3 * 113* 117* CO2 31 29 29 GLU 97 127* 176* BUN 25* 36* 46* CREA 0.39* 0.48* 0.80 CA 8.0* 8.1* 7.6* PHOS  --  3.2 3.3 ALB 2.6* 2.9* 2.6* TBILI 0.8 1.1* 0.8 SGOT 34 25 18 ALT 40 38 38 Signed: Krista Petersen MD

## 2019-04-08 NOTE — PROGRESS NOTES
Duplicate PT order received, chart reviewed. Will continue with current plan of care. Thank you Palma Valle, PT, DPT

## 2019-04-08 NOTE — WOUND CARE
Wound Care consult: Chart reviewed and patient assessed for a discoloration on her buttocks. Pt. Was admitted for acute encephalopathy and she had a high ammonia level at 110. This is now below 19 and stable. Assessment: Pt. Is alert and oriented, trying to calm her  who wants her moved to [facility] as soon as possible for ReHab. He is discussing with the Nurse and the  / CM as well as [facility] Rep who is doing an assessment. Patient was assessed for her buttocks and found to have two areas of brown / bruised skin but NO DTI pressure injury. The skin is intact. Recommend: Keep the skin clean and as dry as possible and use zinc oxide ([employer]),  turn every two hours for baseline pressure injury prevention. Float the heels (already being done).   
Kandace Juan RN, BSN, [employer]

## 2019-04-08 NOTE — PROGRESS NOTES
The patient was resting in bed when I knocked and entered the patient's room. I introduced myself and explained that I had visited her before when she was located in the CCU of the hospital. The patient stated that she was not happy with how slowly things are moving. The patient stated that she expected to be in rehabilitation by now and soon on her way home. The patient discussed the plans that she and her  had to make their residence more accommodating. The patient said that she was squeezing her sponge blocks like she has been told. The patient appears to very impatient regarding her recovery rate. The patient might not have a clear understanding of the seriousness of her illness. Rev. Damine Alamo EdD MDiv Palliative  Fellow For [employer] Page [phone] ([phone])

## 2019-04-08 NOTE — PROGRESS NOTES
Bedside and Verbal shift change report given to Louise FLORES (oncoming nurse) by Alessandroraúl Aguilera (offgoing nurse). Report included the following information SBAR, Kardex, Intake/Output, MAR, Accordion, Recent Results and Med Rec Status.

## 2019-04-08 NOTE — PROGRESS NOTES
Bedside shift change report given to Paola Cobb (oncoming nurse) by Colt Woody (offgoing nurse). Report included the following information SBAR, Kardex, Intake/Output, MAR and Recent Results.

## 2019-04-08 NOTE — PROGRESS NOTES
Music Therapy Assessment [address] Parthshayna Shelton [id number]    
1955  61 y.o.  female Patient Telephone Number: [phone] (home) [place] Affiliation: Sabianism Language: [place] Patient Active Problem List  
 Diagnosis Date Noted  Acute respiratory failure with hypercapnia ([facility] [address] 04/02/2019  Counseling regarding advance care planning and goals of care 04/02/2019  Hepatic encephalopathy ([facility] [address] 03/28/2019  Colitis 03/28/2019  UTI (urinary tract infection) 03/28/2019  Septic shock ([address] 03/28/2019  Bilateral carotid artery stenosis 11/14/2018  TIA (transient ischemic attack) 11/13/2018  GERD (gastroesophageal reflux disease) 11/13/2018  Therapeutic drug monitoring 10/16/2018  Severe obesity ([address] 10/11/2018  Obesity (BMI 30.0-34.9) 07/10/2018  Sepsis ([address] 03/30/2017  CAP (community acquired pneumonia) 03/30/2017  IBIS (obstructive sleep apnea) 03/30/2017  Tobacco abuse 03/30/2017  Neuropathy 03/30/2017  COPD (chronic obstructive pulmonary disease) ([address] 03/28/2017  Acute deep vein thrombosis (DVT) of right lower extremity ([address] 11/10/2016  
 BALDERAS (nonalcoholic steatohepatitis) 03/10/2016  GI bleed 03/03/2016  Anemia 03/01/2016  Thrombocytopenia ([address] 08/15/2013  Previous back surgery 08/15/2013  S/P CHACHO (total abdominal hysterectomy) 08/15/2013  Cirrhosis ([address] 08/15/2013  Diabetes mellitus with neurological manifestations, uncontrolled ([address]  Essential hypertension, benign  Hyperlipidemia LDL goal <100 Date: 4/8/2019            Total Time (in minutes): 5          MRM 3 MED TELE Mental Status:   [x] Alert [  ] Patria Call [  ]  Confused  [  ] Minimally responsive Communication Status: [  ] Impaired Speech [  ] Nonverbal -N/A Physical Status:   [x] Oxygen in use  [  ] Hard of Hearing [  ] Vision Impaired [  ] Ambulatory  [  ] Ambulatory with assistance [  ] Non-ambulatory Music Preferences, Background: Country, especially Janee Cail. 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 Clinical Problem addressed: Support self-expression, improve mood. Goal(s) met in session: 
Physical/Pain management (Scale of 1-10): Pre-session rating: Pt denied pain. Post-session rating: Pt denied pain. [  ] Increased relaxation   [  ] Regulated breathing patterns [  ] Decreased muscle tension   [  ] Minimized physical distress Emotional/Psychological: 
[x] Increased self-expression   [  ] Decreased aggressive behavior [  ] Decreased sadness   [  ] Discussed healthy coping skills [  ] Improved mood    [  ] Decreased withdrawn behavior Social: 
[  ] Decreased feelings of isolation/loneliness [x] Positive social interaction [  ] Provided support and/or comfort for family/friends Spiritual: 
[  ] Spiritual support    [  ] Expressed peace [  ] Expressed erika    [  ] Discussed beliefs Techniques Utilized (Check all that apply):  
[  ] Procedural support MT [  ] Music for relaxation [  ] Patient preferred music 
[  ] Pippa analysis  [  ] Song choice  [  ] Music for validation [  ] Entrainment  [  ] Progressive muscle relax. [  ] Guided visualization [  ] Dipika Gonsalez  [  ] Patient instrument playing [  ] Txt4 writing [  ] Vesta Bolden along   [  ] Namon Going  [  ] Sensory stimulation 
[x] Active Listening  [  ] Music for spiritual support [  ] Making of CDs as gifts Session Observations:  Referral from Dr. Jun Lei, Palliative and Robert Bhat, Palliative NP. Patient (pt) was alert sitting up in bed. When this music therapist (MT) asked pt how she was feeling, pt said she did not want to be told by hospital staff that she needed to stay in the hospital to continue being cared for because she is eager to go home. MT provided active listening and words of validation.  Pt declined having a music therapy session at this time because she was expecting her daughter to visit her shortly and she wanted to have time with just her. MT expressed understanding and offered to follow up another day depending on when pt is discharged. Pt agreed to this and shared about her music preferences and music background when MT asked. Will follow as able to support self-expression and decrease feelings of stress. Dmitry Hyde MT-BC (Music Therapist-Board Certified) Spiritual Care Department Referral-based service

## 2019-04-08 NOTE — PROGRESS NOTES
During shift report this nurse and the nurse reporting off spoke to pt regarding her low blood sugar and tried to educate what could happen if she continued to refuse treatment to increase the blood sugar. Pt's  got pt and donut and coffee. Pt allowed for extra packets of sugar. Pt was adamant that she had papers signed (DNR) stating that we could not treat her if she didn't want it. 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 Will continue to monitor

## 2019-04-08 NOTE — PROGRESS NOTES
Problem: Self Care Deficits Care Plan (Adult) Goal: *Acute Goals and Plan of Care (Insert Text) Description Occupational Therapy Goals Initiated 4/5/2019 1. Patient will perform grooming seated EOB with moderate assistance  within 7 day(s). 2.  Patient will perform upper body dressing with maximal assistance within 7 day(s). 3.  Patient will perform sponge bathing with maximal assistance within 7 day(s). 4.  Patient will perform BSC transfers with moderate assistance  within 7 day(s). 5.  Patient will perform all aspects of toileting with maximal assistance within 7 day(s). 6.  Patient will perform self feeding with moderate assistance  within 7 day(s). Outcome: Progressing Towards Goal 
 OCCUPATIONAL THERAPY TREATMENT Patient: Robert Rahman (29 y.o. female) Date: 4/8/2019 Diagnosis: Septic shock ([facility] [address] [A41.9, R65.21] Colitis [K52.9] UTI (urinary tract infection) [N39.0] Hepatic encephalopathy ([facility] [address] [K72.90] <principal problem not specified> Precautions: Fall, Skin(R foot drop; partial code) Chart, occupational therapy assessment, plan of care, and goals were reviewed. ASSESSMENT: 
Pt was agreeable to OT treatment; her  was present and encouraging pt to perform exercises. Pt was seen at bed level. Focus of treatment session was on therapeutic exercises BUEs and RLE as well as therapeutic activity to increase strength for adls. Pt was educated in basic hand exercises and was provided yellow and pink resistive foam blocks. Encouraged elevation of BUEs. Her RUE (dominant at baseline) is significantly more edematous than her left. Noted decreased edema R hand post exercises and mobilization and retrograde massage. Pt left with BUEs elevated on pillows and rolled blankets and encouraged pt to request assistance with appropriate positioning from staff.  educated as well and eager to carry out exercises performed this date.    Pt had good response to activities this date and would benefit from intensive rehab at discharge. Pt would be able to tolerate the intensity of rehab, with appropriate rest breaks. Progression toward goals: 
?       Improving appropriately and progressing toward goals ? Improving slowly and progressing toward goals ? Not making progress toward goals and plan of care will be adjusted PLAN: 
Patient continues to benefit from skilled intervention to address the above impairments. Continue treatment per established plan of care. Discharge Recommendations:  Inpatient Rehab Further Equipment Recommendations for Discharge:  tbd SUBJECTIVE:  
Patient stated ? My thoat's sore. ? OBJECTIVE DATA SUMMARY:  
Cognitive/Behavioral Status: 
Neurologic State: Alert Orientation Level: Oriented X4 Cognition: Follows commands; Functional Mobility and Transfers for ADLs: 
Bed Mobility: 
See PT note Transfers: 
 Will need lissett Balance: 
Sitting: Impaired Sitting - Static: Occassional 
Sitting - Dynamic: Poor (constant support) ADL Intervention: 
 Pt is unable to bring hand to mouth. She is significantly weak, requiring moderate support assistance to facilitate RUE self care tasks. Pt will need built up handle utensils and positioning along with support for feeding herself. (A mobile arm support would be ideal at this time)  Pt's  reports that pt has limited appetite and he has been feeding pt. Educated pt on the importance of participating in her self care activities as well as bed level care--rolling, etc.  Encouraged pt to try to move when nursing performing care. Neuro Re-Education: Pt was able to perform R shoulder protraction and retraction with moderate support at elbow (very weak proximally at shoulders) and was able to tolerate resistance in anteriorly and posteriorly in this position. Therapeutic Exercises: B hand exercise program (performed  grasp release, finger abd/adduction and opposition). Pt needs assistance for performing finger abd/adduction and opposition. Encouragement provided to view her hands during exercises to increase input of neuro system. Placed the exercise handout on board to cue pt to perform. Provided yellow (R) and pink (L) resistance sponges to build strength for grasping self care items. Educated on elevated positioning at bed level and encouraged upright posture. (significant cough/recent breathing tx) Pt verbalized decreased tolerance for upright posture, requesting reclined positioning. AAROM with B clasped hands (total A for positioning) in B shoulder elevation and elbow flex/ext. - 5 reps Pain: 
Pain Scale 1: Numeric (0 - 10) Pain Intensity 1: 0 Activity Tolerance:  
Good no complaints After treatment:  
? Patient left in no apparent distress sitting up in chair ? Patient left in no apparent distress in bed 
? Call bell left within reach ? Nursing notified ? Caregiver present ? Bed alarm activated COMMUNICATION/COLLABORATION:  
The patient?s plan of care was discussed with: Physical Therapy Assistant, Registered Nurse and  Allie Hopper OTR/L Time Calculation: 37 mins

## 2019-04-08 NOTE — PROGRESS NOTES
3rd order for PT services received, chart reviewed. Pt is already being followed by physical therapy 4x/wk with recommendation for inpatient rehab at discharge. Will continue with current plan of care. Thank you [place] Arreola, PT, DPT

## 2019-04-08 NOTE — PROGRESS NOTES
[facility] Name: Chris Blandon MRN: [id number] : 1955 Hospital: [facility] Date: 2019 Pulmonary/Critical Care  Patient Consult IMPRESSION:  
· Sepsis, improving. E coli sepsis. · Acute hypoxic resp failure- intubated 3/29. Oxygenating adequately, 40%,Peep 6; CPAP trials ok but AMS, barrrier to safe liberation, Was extubated on NC oxygen. Weaning. · Volume overload- not very responsive to diuretic, seems to be improving. · Cirrhosis, from BALDERAS- noted in past to have esophageal varices. seems decompensated, Gi following · Hepatic Encephalopathy (ammonia of 110 on admission), not compliant she has been with her home regimen. Now 19. Mental status at baseline · Acute UTI-UCx growing Ecoli. Enterococcus · Ileus, Fecal stasis, resolved. · Right sided colitis  Noted. ?ischemic or infectious. Possible C Diff. Although little stooling currently; not likley; CT better · Anemia · COPD with acute exacerbation, Still wheezing. · IBIS on CPAP at home. · Obese. · Ongoing Smoking prior to admission. Was smoking about 1ppd. · D/w   : Tavon Bee: [phone]. Updated today RECOMMENDATIONS:  
· Wean oxygen as tolerated. · Advance diet as tolerated. · Appreciate dr. Paula Brown help · adjust ABX · Nebs scheduled. · Wean steroids for acute COPD exacerbation · Add nicotine patch. Subjective/History:  
19: NO chest pain, no back pain, She is not really hungry. She reports she has wheezing even at baseline. Has some nasal congestion. Not much GERD. Has been having BMs. Smoked about 1ppd. 4/6 Awake and alert Up in chair, no distress Wants to eat 4/5 awake. Moving all fours. NGT residual very high. No BM for over 24 hours after flexiseal removed an lactulose enema stopped. 4/4 more alert.  Can move left foot, right hand but not right foot or left hand? Passed SBT. Still volume overloaded. No new secretions 4/3 waking up some? Minimal response to lasix. eyes open. Left foot withdraws but not following commands Anasarca. flexiseal 
 
4/2 On SBT, CT abd reviewed from other day. Colitis better. Mild ascites. Distended bowels. Cirtrhosis, varices 4/1 flexiseal after lactulose started. On IV flagyl, Po vancomycin. IV abx for sepsis. On vent. PO4 1.6; glucose high 
 
3-31:no acute events overnight. Little to no stooling. Remains on vasopressors and mechanical ventilation 3-30-pt intubated yesterday. No acute events overnight. Remains sedated on Norepi gtt 3-29-19: Pt seen this am. She is still very confused. Has ongoing needed for pressors. Has wheezing. Her WBC has increased. She is not able to give hx of HPI or ROS. This patient has been seen and evaluated at the request of Dr. Brinda Lam for above. Pt was seen in ER. Patient is a 61 y.o. female with hx of above. Noted to have increased confusion per her family. Has not been compliant with her meds. Noted to have weakness and inability to walk. Was was normal the night before. Noted when she awoke to be confused, had generalized weakness. Has a very dry  Mouth when seen in ER. ROS and HPI limited due to pts confused state. MEDS:  
Current Facility-Administered Medications Medication  insulin glargine (LANTUS) injection 25 Units  propranolol (INDERAL) tablet 20 mg  
 losartan (COZAAR) tablet 25 mg  
 insulin lispro (HUMALOG) injection  albuterol (PROVENTIL VENTOLIN) nebulizer solution 2.5 mg  
 furosemide (LASIX) tablet 80 mg  
 spironolactone (ALDACTONE) tablet 100 mg  
 benzonatate (TESSALON) capsule 200 mg  
 metoclopramide HCl (REGLAN) injection 5 mg  polyethylene glycol (MIRALAX) packet 17 g  
 lactulose (CHRONULAC) solution 30 g  
 insulin NPH (NOVOLIN N, HUMULIN N) injection 30 Units  And  
 methylPREDNISolone (PF) (SOLU-MEDROL) injection 40 mg  
  dextrose (D50) infusion 12.5-25 g  
 balsam peru-castor oil (VENELEX) ointment  cefTRIAXone (ROCEPHIN) 2 g in 0.9% sodium chloride (MBP/ADV) 50 mL  famotidine (PF) (PEPCID) 20 mg in sodium chloride 0.9% 10 mL injection  amitriptyline (ELAVIL) tablet 100 mg  
 acetaminophen (TYLENOL) solution 650 mg  
 sodium chloride (NS) flush 5-40 mL  sodium chloride (NS) flush 5-40 mL  acetaminophen (TYLENOL) suppository 650 mg  
 ondansetron (ZOFRAN) injection 4 mg  albuterol-ipratropium (DUO-NEB) 2.5 MG-0.5 MG/3 ML  
 glucose chewable tablet 16 g  
 glucagon (GLUCAGEN) injection 1 mg  aspirin delayed-release tablet 81 mg Past Surgical History:  
Procedure Laterality Date  HX BACK SURGERY    
 HX CARPAL TUNNEL RELEASE    
 on right  HX HYSTERECTOMY plus 1/2 of an ovary removed  WI COLSC FLX W/RMVL OF TUMOR POLYP LESION SNARE TQ  5/30/2013  UPPER GI ENDOSCOPY,LIGAT VARIX  2/6/2015 Prior to Admission medications Medication Sig Start Date End Date Taking? Authorizing Provider  
fluticasone furoate-vilanterol (BREO ELLIPTA) 200-25 mcg/dose inhaler Take 1 Puff by inhalation daily. Provider, Historical  
insulin glargine (LANTUS SOLOSTAR U-100 INSULIN) 100 unit/mL (3 mL) inpn 110 Units by SubCUTAneous route daily. Provider, Historical  
furosemide (LASIX) 40 mg tablet Take 2 Tabs by mouth daily. 3/19/19   HUMZA Baca  
spironolactone (ALDACTONE) 100 mg tablet Take 2 Tabs by mouth daily.  3/19/19   HUMZA Baca  
CONSTULOSE 10 gram/15 mL solution take 2 tablespoonfuls by mouth twice a day 1/7/19   Cathi Barksdale NP  
losartan (COZAAR) 25 mg tablet take 1 tablet by mouth once daily , REPLACES LISINOPRIL FOR BLOOD PRESSURE AND KIDNEY PROTECTION 12/3/18   Claudia Leal MD  
insulin NPH (NOVOLIN N NPH U-100 INSULIN) 100 unit/mL injection Inject 55 units every 12 hours--dispense relion brand--patient will pay cash 11/28/18   Yolande Jimenez MD  
traMADol Shelley Poag) 50 mg tablet take 1-2 tablets by mouth twice a day if needed for DIABETIC NEUROPATHY PAIN 11/27/18   Yolande Jimenez MD  
aspirin 81 mg chewable tablet Take 1 Tab by mouth daily. 11/16/18   Merlyn Butler MD  
atorvastatin (LIPITOR) 40 mg tablet Take 1 Tab by mouth nightly. 11/15/18   Merlyn Butler MD  
potassium chloride SR (KLOR-CON 10) 10 mEq tablet Take 10 mEq by mouth two (2) times a day. Jacqui, MD Leonarda  
omeprazole (PRILOSEC) 20 mg capsule take 1 capsule by mouth once daily 11/8/18   Cathi Barksdale, NP  
amitriptyline (ELAVIL) 150 mg tablet Take 1 Tab by mouth nightly. 10/16/18   Yolande Jimenez MD  
propranolol (INDERAL) 20 mg tablet Take 1 Tab by mouth two (2) times a day. 10/16/18   Yolande Jimenez MD  
ferrous sulfate 325 mg (65 mg iron) tablet take 1 tablet by mouth once daily with BREAKFAST 10/2/18   Cathi Barksdale, NP  
insulin lispro (HUMALOG KWIKPEN INSULIN) 100 unit/mL kwikpen Inject as needed up to 3 times per day for sugars over 150: 4 units for every 50 points. Max 50 units per day 10/1/18   Yolande Jimenez MD  
metFORMIN (GLUCOPHAGE) 1,000 mg tablet take 1 tablet by mouth twice a day with meals 8/23/18   Yolande Jimenez MD  
ondansetron (ZOFRAN ODT) 4 mg disintegrating tablet dissolve 1 tablet ON TONGUE every 8 hours if needed for nausea 8/1/18   Cathi Barksdale, NP  
gabapentin (NEURONTIN) 600 mg tablet take 2 tablets by mouth three times a day 7/19/18   Yolande Jimenez MD  
rOPINIRole (REQUIP) 4 mg tab TAB Take 4 mg by mouth nightly. Provider, Historical  
albuterol (PROAIR HFA) 90 mcg/actuation inhaler Take 2 Puffs by inhalation every four (4) hours as needed for Wheezing. Leonarda Osman MD  
 
 
Allergies Allergen Reactions  Hydrocodone Nausea and Vomiting  Lisinopril Cough  Lortab [Hydrocodone-Acetaminophen] Nausea and Vomiting  Percocet [Oxycodone-Acetaminophen] Nausea and Vomiting Social History Tobacco Use  Smoking status: Current Every Day Smoker Packs/day: 1.00 Years: 40.00 Pack years: 40.00  Smokeless tobacco: Former User Substance Use Topics  Alcohol use: No  
  Comment: rare Family History Problem Relation Age of Onset  Diabetes Mother  Stroke Sister  Diabetes Paternal Aunt  Diabetes Paternal Uncle  Heart Disease Neg Hx Review of Systems: 
Review of systems not obtained due to patient factors. Objective: 
Vital Signs:   
Visit Vitals /62 Pulse 83 Temp 98 °F (36.7 °C) Resp 17 Ht 5' 2\" (1.575 m) Wt 105 kg (231 lb 6.4 oz) SpO2 94% BMI 42.32 kg/m² O2 Device: Nasal cannula O2 Flow Rate (L/min): 3 l/min Temp (24hrs), Av.6 °F (37 °C), Min:98 °F (36.7 °C), Max:99.5 °F (37.5 °C) Intake/Output:  
Last shift:      No intake/output data recorded. Last 3 shifts:  1901 -  0700 In: 909 [P.O.:888; I.V.:50] Out: 1000 [Urine:1000] Intake/Output Summary (Last 24 hours) at 2019 7379 Last data filed at 2019 0340 Gross per 24 hour Intake 938 ml Output 1000 ml Net -62 ml Hemodynamics:  
PAP:   CO:    
Wedge:   CI:    
CVP:  CVP (mmHg): 8 mmHg (19 1000) SVR:    
  PVR:    
 
Ventilator Settings: 
Mode Rate Tidal Volume Pressure FiO2 PEEP Spontaneous   500 ml  6 cm H2O 40 % 6 cm H20 Peak airway pressure: 13 cm H2O Minute ventilation: 11.1 l/min Physical Exam: 
 
General:  Obese, ON NC oxygen. Conversant. NO distress. Head:  Normocephalic, without obvious abnormality, atraumatic. Eyes:  Conjunctivae/corneas clear. PERRL, EOMs intact. Nose: Nares normal. Septum midline. Mucosa normal. No drainage or sinus tenderness.   
Throat: Lips, mucosa, and tongue normal. Teeth and gums normal.  
Neck: Supple, symmetrical, trachea midline, no adenopathy, thyroid: no enlargment/tenderness/nodules, no carotid bruit and no JVD. Back:   Symmetric, no curvature. ROM normal.  
Lungs: On  auscultation bilaterally has bilateral end  Wheezing, Pretty good air movement. Chest wall:  No tenderness or deformity. Heart:  Regular rate and rhythm, S1, S2 normal, no murmur, click, rub or gallop. Abdomen:   Distended, few BSl. No masses,  No organomegaly. Obese. Extremities: Extremities normal, atraumatic, no cyanosis or edema. Pulses: 2+ and symmetric all extremities. Skin: Skin color, texture, turgor normal. No rashes or lesions Lymph nodes: Cervical, supraclavicular, and axillary nodes normal.  
Neurologic: Grossly nonfocal, awake, Motor intact. She is feeding herself when seen. Psych:No  anxiety or depression. Data: 
 
 
       
Labs: 
 
Recent Labs 04/08/19 
6884 04/06/19 
1867 WBC 11.7* 15.3* HGB 9.2* 8.7* * 118* Recent Labs 04/08/19 
0118 04/07/19 
4706 04/06/19 
0266  143 145  
K 3.9 4.3 4.3  109* 113* CO2 34* 31 29 GLU 72 97 127* BUN 20 25* 36* CREA 0.35* 0.39* 0.48* CA 7.8* 8.0* 8.1*  
PHOS  --   --  3.2 ALB 2.4* 2.6* 2.9*  
SGOT 39* 34 25 ALT 42 40 38 No results for input(s): PH, PCO2, PO2, HCO3, FIO2 in the last 72 hours. No results for input(s): CPK, CKNDX, TROIQ in the last 72 hours. No lab exists for component: CPKMB Lab Results Component Value Date/Time  (H) 03/29/2017 02:00 AM  
  
  
 
 
Imaging: 
I have personally reviewed the patients radiographs and have reviewed the reports: 
3-28-19: CT of chest/Abdomen: IMPRESSION:  
1. Right colitis. 2. Cirrhosis with portal venous hypertension. 3. Clear lungs.   
 
 
Lina Lee MD

## 2019-04-08 NOTE — PROGRESS NOTES
Hospitalist Progress Note NAME: Roberth Thomas :  1955 MRN:  [id number] Assessment / Plan: 
Acute hypercapnic respiratory failure s/p intubation on 3/29 Septic shock, POA Ecoli, enterococcus UTI and bacteremia Colitis, infectious vs ischemic 
-CT a/p showed resolved mural thickening in the ascending colon. Rectal tube and gastric tube present. Interval development of ascites. Cirrhosis with splenomegaly and esophageal varices. Patchy opacities in the lung bases may represent atelectasis or airspace disease. -CXR clear 
-Bcx and Ucx grew Ecoli, repeated Bcx on 3/30/19 neg  
-patient extubated today 2019; out of ICU  
-rectal tube out.   
-vasopressors weaned off 
-completing course of rocephin on  
-PT OT eval and treat. DC planning to [facility] once bed available KALI with metabolic acidosis, resolved Lactic acidosis Hypomag, repleted Hypophos, repleted -Nephrology has signed off - Hypernatremia 
-Corrected Volume overload 
-Resume lasix and aldactone. Acute hepatic encephalopathy, POA Liver cirrhosis Right leg weakness, POA Mild left lip droop, POA Hx TIA  
-CT head negative. NH4 >100 
-cont' lactulose. She was not compliant at home 
-continue aspirin 
  
DM initially hypoglycemia, now with hyperglycemia in the setting of steroid use 
-blood sugar lower this AM so holding lantus this AM and stopping NPH as we taper steroids 
-holding metformin 
  
Right leg edema  
-patient reports this is chronic. No pain or tenderness 
  
COPD 
IBIS 
-nebs  
-weaning off steroids 
-O2sat 98% on 3 liters - might need Home oxygen. Pulmonary following Obesity  
Body mass index is 38.31 kg/m². 
  
Code:  full DVT prophylaxis:  SCD Surrogate decision maker:   Disposition:Rehab vs SNF Subjective: Chief Complaint / Reason for Physician Visit Follow up resp failure, hepatic enceph, DM, COPD Breathing okay. Review of Systems: Symptom Y/N Comments  Symptom Y/N Comments Fever/Chills n   Chest Pain n   
Poor Appetite n   Edema y Cough n   Abdominal Pain Sputum    Joint Pain n   
SOB/MARTINEZ n   Pruritis/Rash n   
Nausea/vomit n   Tolerating PT/OT Diarrhea    Tolerating Diet Constipation    Other Could NOT obtain due to: intubated Objective: VITALS:  
Last 24hrs VS reviewed since prior progress note. Most recent are: 
Patient Vitals for the past 24 hrs: 
 Temp Pulse Resp BP SpO2  
04/08/19 0744 98 °F (36.7 °C) 83 17 154/62 94 % 04/08/19 0340 98.7 °F (37.1 °C) 82 18 150/75   
04/07/19 2320 98.2 °F (36.8 °C) 95 18 135/50   
04/07/19 1856 98.7 °F (37.1 °C) 99 18 155/67 94 % 04/07/19 1536 99.5 °F (37.5 °C) 100 16 168/74 92 % Intake/Output Summary (Last 24 hours) at 4/8/2019 1221 Last data filed at 4/8/2019 0340 Gross per 24 hour Intake 938 ml Output 1000 ml Net -62 ml PHYSICAL EXAM: 
General: Female,s/p extubation, awake EENT:  PERRLA,at/nc,anicteric. Resp:  CTA b/l. No wheezing  No accessory muscle use CV:  Regular  Rhythm. GI:  Soft, Non distended, Non tender.  +BS Neurologic:  Awake,alert,oriented to person,s/p extubation. Extr:                 Edema both feet Psych:   Not agitation,interacting. Skin:  No rashes. No jaundice Reviewed most current lab test results and cultures  YES Reviewed most current radiology test results   YES Review and summation of old records today    NO Reviewed patient's current orders and MAR    YES 
PMH/SH reviewed - no change compared to H&P 
________________________________________________________________________ Care Plan discussed with: 
  Comments Patient x Family  x Pt's  RN x Care Manager Consultant Multidiciplinary team rounds were held today with , nursing, pharmacist and clinical coordinator.   Patient's plan of care was discussed; medications were reviewed and discharge planning was addressed. ________________________________________________________________________ Total NON critical care TIME:  30   Minutes Total CRITICAL CARE TIME Spent:   Minutes non procedure based Comments >50% of visit spent in counseling and coordination of care x   
________________________________________________________________________ Arnie Terry MD  
 
Procedures: see electronic medical records for all procedures/Xrays and details which were not copied into this note but were reviewed prior to creation of Plan. LABS: 
I reviewed today's most current labs and imaging studies. Pertinent labs include: 
Recent Labs 04/08/19 
6795 04/06/19 
1018 WBC 11.7* 15.3* HGB 9.2* 8.7* HCT 30.5* 30.7* * 118* Recent Labs 04/08/19 
4652 04/07/19 
0513 04/06/19 
8678  143 145  
K 3.9 4.3 4.3  109* 113* CO2 34* 31 29 GLU 72 97 127* BUN 20 25* 36* CREA 0.35* 0.39* 0.48* CA 7.8* 8.0* 8.1*  
PHOS  --   --  3.2 ALB 2.4* 2.6* 2.9* TBILI 0.9 0.8 1.1*  
SGOT 39* 34 25 ALT 42 40 38 Signed: Arnie Terry MD

## 2019-04-08 NOTE — PROGRESS NOTES
Palliative Medicine Social Work Chart reviewed. Acknowledge CM Trena Felty note re: [facility]/rehab.  called this morning wanting patient moved to [facility] as soon as possible. He was angry, lots of of frustration with patient care. Thank you for the opportunity to be involved in the care of Ms. Wojciech Saha and her family. Guzman Kwong, [facility] Palliative Medicine  [phone]

## 2019-04-08 NOTE — PROGRESS NOTES
Occupational Therapy Received new OT orders and acknowledged. Pt is already on caseload. Will continue current plan of care.

## 2019-04-08 NOTE — PROGRESS NOTES
Occupational Therapy New Orders received and acknowledged. Pt was seen in OT this afternoon ( OT Evaluation completed on 4/5)  and is on caseload for 4X per week. Inpatient rehab is recommended at discharge.

## 2019-04-08 NOTE — PROGRESS NOTES
RAPID RESPONSE TEAM 
 
Rounded on patient due to recent transfer out of CCU on 4/6/19. Discussed with primary RN Haydee FISHER  No acute concerns. No patient complaints. Vitals stable. MEWS 1. No RRT interventions indicated at this time. RN encouraged to call with any questions or concerns. Lidya Godfrey Rapid Response SANDRA Michaud

## 2019-04-08 NOTE — PROGRESS NOTES
Patient's Blood sugar at 1552 is 67. Offered juice, glucose tab, Iv dextrose but patient is adamantly refusing all treatment. Her  tried convincing along with the primary nurse. Patient still refusing to treat her low bloodsugars. Dr. Lei Cee paged to notify.

## 2019-04-08 NOTE — PROGRESS NOTES
Patient's blood sugar at 0748 was 68. Patient repeatedly persistent about not taking anything to get blood sugars up. Finally convinced her to get IV d 50%. Dr. Nathan Edwards notified.

## 2019-04-08 NOTE — PROGRESS NOTES
Problem: Mobility Impaired (Adult and Pediatric) Goal: *Acute Goals and Plan of Care (Insert Text) Description Physical Therapy Goals Initiated 4/5/2019 1. Patient will move from supine to sit and sit to supine , scoot up and down and roll side to side in bed with minimal assistance/contact guard assist within 7 day(s). 2.  Patient will transfer from bed to chair and chair to bed with minimal assistance/contact guard assist using the least restrictive device within 7 day(s). 3.  Patient will perform sit to stand with minimal assistance/contact guard assist within 7 day(s). 4.  Patient will ambulate with minimal assistance/contact guard assist for 90 feet with the least restrictive device within 7 day(s). Outcome: Progressing Towards Goal 
 PHYSICAL THERAPY TREATMENT Patient: Kalen Escobar (89 y.o. female) Date: 4/8/2019 Diagnosis: Septic shock ([facility] [A41.9, R65.21] Colitis [K52.9] UTI (urinary tract infection) [N39.0] Hepatic encephalopathy ([facility] [K72.90] <principal problem not specified> Precautions: Fall, Skin(R foot drop; partial code) Chart, physical therapy assessment, plan of care and goals were reviewed. ASSESSMENT: 
Pt cleared by nurse to mobilize. Pt received in bed supine. Pt agreeable to therapy. Pt performed supine exercise with min A. Pt with improved ability to move LEs. Pt able to performed dorsiflexion and plantarflexion on both LEs on supine. Drop foot appears to be resolved. Pt performed supine to sit at mod A x2 with additional time due to UE weakness. Pt with poor sitting balance but able to improve to fair with cueing for hands on knees and feet on the ground. Once sitting up pt with complaints of heart burn making sitting upright very uncomfortable. Pt wanting to get back in bed. Pt performed sit to supine at min A with ability to get LEs in bed at min A. Pt with improved sitting balance with time and improve tranfers.  Pt will benefit form IP Rehab to improve strength and endurance for safe mobility. Progression toward goals: 
?    Improving appropriately and progressing toward goals ? Improving slowly and progressing toward goals ? Not making progress toward goals and plan of care will be adjusted PLAN: 
Patient continues to benefit from skilled intervention to address the above impairments. Continue treatment per established plan of care. Discharge Recommendations:  Inpatient Rehab Further Equipment Recommendations for Discharge:  TBD by rehab SUBJECTIVE:  
Patient stated ? I'll see what we can do.? OBJECTIVE DATA SUMMARY:  
Critical Behavior: 
Neurologic State: Alert Orientation Level: Oriented X4 Cognition: Follows commands, Impaired decision making Safety/Judgement: Decreased insight into deficits, Decreased awareness of need for safety, Decreased awareness of need for assistance, Fall prevention Functional Mobility Training: 
Bed Mobility: 
Rolling: Moderate assistance;Assist x2 Supine to Sit: Moderate assistance;Assist x2 Sit to Supine: Minimum assistance;Assist x2 Scooting: Maximum assistance Balance: 
Sitting: Impaired Sitting - Static: Occassional 
Sitting - Dynamic: Poor (constant support) Therapeutic Exercises:  
Supine Heel slides x10 Glute sets x10 Quad sets x10 Ankle pumps x10 Pain: 
Pain Scale 1: Numeric (0 - 10) Pain Intensity 1: 0 Activity Tolerance: Pt with improved tranfers but heartburn limited mobility. After treatment:  
?    Patient left in no apparent distress sitting up in chair ? Patient left in no apparent distress in bed 
? Call bell left within reach ? Nursing notified ? Caregiver present ? Bed alarm activated COMMUNICATION/COLLABORATION:  
The patient?s plan of care was discussed with: Registered Nurse Chacha Riggs PTA Time Calculation: 23 mins

## 2019-04-08 NOTE — PROGRESS NOTES
Chart reviewed. Will send chart to [facility] and other acute rehabs. Pt hesitant to go to rehab. Will inquire about SNFs as well. Pt will need a completed DDNR prior to d/c if all in agreement. 3p  [facility] can accept pt when stable. Spouse told liaison he'd let him know when he has made a decision. The other rehabs are still assessing pt for admission.

## 2019-04-09 NOTE — PROGRESS NOTES
Will inquire about SNFs as well. Pt will need a completed DDNR prior to d/c if all in agreement. [facility] and [facility] acute rehab can accept pt when stable. Family also visited [facility] and they can accept when stable. [facility] is still assessing pt for admission. Dr Nadya Carreno was notified. 1415  All in agreement with 4pm [facility] ambulance EMTALA d/c to [facility] under the services of Dr. Shruti Lopez. Please send emar, d/c instructions, completed DDNR, facesheet, completed EMTALA, and ambulance form. Please fax d/c instructions//todays emar to [phone] and call report to [phone]. Thanks

## 2019-04-09 NOTE — DIABETES MGMT
DTC Progress Note Recommendations/ Comments: If appropriate, please consider discontinuing the NPH with steroids and decrease Lantus to 20 units daily am only, then reassess her insulin needs in next 24-48 hours. Attending messaged. Current hospital DM medication: NPH 10 units linked to Solu-medrol 20 mg dose q 12 hours. Lantus 20 units bid and Lispro correctional insulin - normal sensitivity ac and hs. Chart reviewed on [facility] due to hypoglycemia event yesterday along w/ pt's refusal for treatment of hypoglycemia. Pt has very poor appetite per RD note - nutrition supplements offered and ordered for pt today. She has chronic liver disease which may be contributing to her hypoglycemia events during admission and PTA. Patient is a 61 y.o. female with known Type 2 DM at home. A1c:  
Lab Results Component Value Date/Time Hemoglobin A1c 4.8 03/29/2019 02:56 PM  
 Hemoglobin A1c 6.8 (H) 11/14/2018 04:52 AM  
 
 
Recent Glucose Results:  
Lab Results Component Value Date/Time GLUCPOC 161 (H) 04/09/2019 11:17 AM  
 GLUCPOC 139 (H) 04/09/2019 07:12 AM  
 GLUCPOC 90 04/09/2019 12:12 AM  
  
 
Lab Results Component Value Date/Time Creatinine 0.35 (L) 04/08/2019 04:12 AM  
 
Estimated Creatinine Clearance: 186.7 mL/min (A) (based on SCr of 0.35 mg/dL (L)). Active Orders Diet DIET DIABETIC CONSISTENT CARB Mechanical Soft PO intake:  
Patient Vitals for the past 72 hrs: 
 % Diet Eaten 04/09/19 1208 15 % 04/08/19 1342 0 % 04/08/19 0939 10 % 04/07/19 1646 100 % 04/06/19 1700 75 % Will continue to follow as needed. Thank you Sherren Roch RD CDE [facility] Time spent: 10

## 2019-04-09 NOTE — PROGRESS NOTES
PHYSICAL THERAPY TREATMENT Patient: Roberth Thomas (37 y.o. female) Date: 4/9/2019 Diagnosis: [facility] [A41.9, R65.21] Colitis [K52.9] UTI (urinary tract infection) [N39.0] Hepatic encephalopathy ([facility] [K72.90] <principal problem not specified> Precautions: DNR, Fall, Skin Chart, physical therapy assessment, plan of care and goals were reviewed. ASSESSMENT: 
Patient received resting in bed, agreeable and cleared for therapy. Patient required overall mod-max A x 2 for all functional mobility. Patient with good static sitting balance on EOB, requiring supervision and no external support. Patient able to stand once with mod A x 2 although immediately falling back onto bed with uncontrolled decent due to increased fear of falling. Reassured patient and attempted again although patient resisting. Patient became unsafe and with poor safety awareness, so aborted sitting as patient falling back on bed. Patient required total A x 2 for supine to sit and then refusing to roll to remove soiled linens under her with nursing present. Patient will need rehab at discharge. Progression toward goals: 
?    Improving appropriately and progressing toward goals ? Improving slowly and progressing toward goals ? Not making progress toward goals and plan of care will be adjusted PLAN: 
Patient continues to benefit from skilled intervention to address the above impairments. Continue treatment per established plan of care. Discharge Recommendations:  Rehab/Inpatient Rehab Further Equipment Recommendations for Discharge:  TBD SUBJECTIVE:  
Patient stated ? I don't care. ? OBJECTIVE DATA SUMMARY:  
Critical Behavior: 
Neurologic State: Alert Orientation Level: Oriented X4 Cognition: Appropriate safety awareness, Follows commands Safety/Judgement: Decreased insight into deficits, Decreased awareness of need for safety, Decreased awareness of need for assistance, Fall prevention Functional Mobility Training: 
Bed Mobility: 
Rolling: Maximum assistance;Assist x2 Supine to Sit: Maximum assistance;Assist x2 Sit to Supine: Total assistance;Assist x2 Scooting: Minimum assistance; Additional time Transfers: 
Sit to Stand: Moderate assistance;Assist x2 Stand to Sit: Maximum assistance;Assist x2(uncontrolled decent onto bed) Bed to Chair: (unable due to poor safety awareness and compliance/refusal) Balance: 
Sitting: Impaired; Without support Sitting - Static: Fair (occasional) Sitting - Dynamic: Fair (occasional) Standing: Impaired; With support Standing - Static: Constant support;Poor Standing - Dynamic : Poor Ambulation/Gait Training: 
  
  
  
  
  
  
  
  
  
  
  
  
  
  
  
  
  
  
 
Pain: 
Pain Scale 1: Numeric (0 - 10) Pain Intensity 1: 0 Activity Tolerance:  
Poor. O2 sats at 84% on supplemental O2. Please refer to the flowsheet for vital signs taken during this treatment. After treatment:  
?    Patient left in no apparent distress sitting up in chair ? Patient left in no apparent distress in bed 
? Call bell left within reach ? Nursing notified ? Caregiver present ? Bed alarm activated COMMUNICATION/COLLABORATION:  
The patient?s plan of care was discussed with: Registered Nurse and  Adele Santiago, PT, DPT Time Calculation: 23 mins

## 2019-04-09 NOTE — PROGRESS NOTES
0300 - PCT taking pt's vital signs. Oxygen saturation at 88% on 3L. Pt stating that she is going to remove nasal cannula. Pt educated on importance of oxygen. Pt adamant about oxygen cannula being removed as she needed a 'break'. Pt's oxygen level dropped to 79% w/o oxygen. Pt further educated on importance of oxygen by this RN and respiratory therapist. Pt still adamant about taking a break from oxygen. Stated she would put it back on later. RT administered breathing treatment and oxygen level increased to 87%. Approximately forty five minutes later, pt agreed to replace nasal cannula. Oxygen level increased to 94%. Bedside shift change report given to Louise (oncoming nurse) by Obdulia Salter (offgoing nurse). Report included the following information SBAR, Kardex, Intake/Output, MAR and Recent Results.

## 2019-04-09 NOTE — PROGRESS NOTES
Hospital to [facility] SBAR Handoff - Meghann Armando 
                                                                      61 y.o.   female [address] [facility]   Room: [address] [place] [address] 3 MED TELE  Unit Phone# :  [address] 
[facility] [address] [address] Dept: [phone] Loc: [id number] SITUATION Admitted:  3/28/2019         Attending Provider:  Hazel Barakat MD    
 
Consultations:  IP CONSULT TO HOSPITALIST 
IP CONSULT TO GASTROENTEROLOGY 
IP CONSULT TO PALLIATIVE CARE - PROVIDER 
IP CONSULT TO NEPHROLOGY 
IP CONSULT TO NEPHROLOGY 
IP CONSULT TO [facility] PHYSICIAN 
 
PCP:  Lev Beach NP   [phone] Treatment Team: Attending Provider: Hazel Barakat MD; Consulting Provider: Samara Fields MD; Consulting Provider: Chloe Soto NP; Care Manager: Andreia Blue RN; Care Manager: Tasia Pradhan RN; Consulting Provider: Stefano Morales MD 
 
Admitting Dx:  Septic shock ([facility] [A41.9, R65.21] Colitis [K52.9] UTI (urinary tract infection) [N39.0] Hepatic encephalopathy ([facility] [K72.90] Principal Problem: <principal problem not specified> * No surgery found * of  
  
BY: * Surgery not found *             ON: * No surgery found * Code Status: DNR Advance Directives:  
Advance Care Planning 4/2/2019 Patient's Healthcare Decision Maker is: Legal Next of Kin Confirm Advance Directive None Patient Would Like to Complete Advance Directive Unable (Send w/patient) No Doesnt Have Isolation:  There are currently no Active Isolations       MDRO: No current active infections Pain Medications given:  none    Last dose:  
 
Special Equipment needed: no  Type of equipment: 
 
 
(Not currently on dialysis) (Not currently on dialysis) (Not currently on dialysis) BACKGROUND Allergies: Allergies Allergen Reactions  Hydrocodone Nausea and Vomiting  Lisinopril Cough  Lortab [Hydrocodone-Acetaminophen] Nausea and Vomiting  Percocet [Oxycodone-Acetaminophen] Nausea and Vomiting Past Medical History:  
Diagnosis Date  Asthma  Back pain  COPD (chronic obstructive pulmonary disease) (HCC)  Diabetes ([facility]  [facility]   
 6/2014 banding x 2  
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  
Procedure Laterality Date  HX BACK SURGERY    
 HX CARPAL TUNNEL RELEASE    
 on right  HX HYSTERECTOMY plus 1/2 of an ovary removed  AL COLSC FLX W/RMVL OF TUMOR POLYP LESION SNARE TQ  5/30/2013  UPPER GI ENDOSCOPY,LIGAT VARIX  2/6/2015 Medications Prior to Admission Medication Sig  
 fluticasone furoate-vilanterol (BREO ELLIPTA) 200-25 mcg/dose inhaler Take 1 Puff by inhalation daily.  [DISCONTINUED] insulin glargine (LANTUS SOLOSTAR U-100 INSULIN) 100 unit/mL (3 mL) inpn 110 Units by SubCUTAneous route daily.  furosemide (LASIX) 40 mg tablet Take 2 Tabs by mouth daily.  spironolactone (ALDACTONE) 100 mg tablet Take 2 Tabs by mouth daily.  CONSTULOSE 10 gram/15 mL solution take 2 tablespoonfuls by mouth twice a day  losartan (COZAAR) 25 mg tablet take 1 tablet by mouth once daily , REPLACES LISINOPRIL FOR BLOOD PRESSURE AND KIDNEY PROTECTION  insulin NPH (NOVOLIN N NPH U-100 INSULIN) 100 unit/mL injection Inject 55 units every 12 hours--dispense relion brand--patient will pay cash  traMADol (ULTRAM) 50 mg tablet take 1-2 tablets by mouth twice a day if needed for DIABETIC NEUROPATHY PAIN  
 aspirin 81 mg chewable tablet Take 1 Tab by mouth daily.  atorvastatin (LIPITOR) 40 mg tablet Take 1 Tab by mouth nightly.  potassium chloride SR (KLOR-CON 10) 10 mEq tablet Take 10 mEq by mouth two (2) times a day.  omeprazole (PRILOSEC) 20 mg capsule take 1 capsule by mouth once daily  amitriptyline (ELAVIL) 150 mg tablet Take 1 Tab by mouth nightly.  propranolol (INDERAL) 20 mg tablet Take 1 Tab by mouth two (2) times a day.  ferrous sulfate 325 mg (65 mg iron) tablet take 1 tablet by mouth once daily with BREAKFAST  insulin lispro (HUMALOG KWIKPEN INSULIN) 100 unit/mL kwikpen Inject as needed up to 3 times per day for sugars over 150: 4 units for every 50 points. Max 50 units per day  metFORMIN (GLUCOPHAGE) 1,000 mg tablet take 1 tablet by mouth twice a day with meals  ondansetron (ZOFRAN ODT) 4 mg disintegrating tablet dissolve 1 tablet ON TONGUE every 8 hours if needed for nausea  gabapentin (NEURONTIN) 600 mg tablet take 2 tablets by mouth three times a day  rOPINIRole (REQUIP) 4 mg tab TAB Take 4 mg by mouth nightly.  albuterol (PROAIR HFA) 90 mcg/actuation inhaler Take 2 Puffs by inhalation every four (4) hours as needed for Wheezing. Hard scripts included in transfer packet no Vaccinations:   
Immunization History Administered Date(s) Administered  Influenza Vaccine 10/03/2013, 10/06/2014, 10/01/2018  Influenza Vaccine (Quad) PF 03/30/2017  Pneumococcal Vaccine (Unspecified Type) 10/03/2013  Tdap 11/19/2016 Readmission Risks:    Known Risks: falls The Charl[employer] Co[employer] Index tool is an evidenced based tool that has more automatic generated information.  The tool looks at many different items such as the age of the patient, how many times they were admitted in the last calendar year, current length of stay in the hospital and their diagnosis. All of these items are pulled automatically from information documented in the chart from various places and will generate a score that predicts whether a patient is at low (less than 13), medium (13-20) or high (21 or greater) risk of being readmitted. ASSESSMENT Temp: 98.1 °F (36.7 °C) (04/09/19 1159) Pulse (Heart Rate): 73 (04/09/19 1159) Resp Rate: 18 (04/09/19 1159)           BP: 135/75 (04/09/19 1159) O2 Sat (%): 91 % (04/09/19 1453) Weight: 104.6 kg (230 lb 9.6 oz)    Height: 5' 2\" (157.5 cm) (03/28/19 0950) If above not within 1 hour of discharge: 
 
BP:_____  P:____  R:____ T:_____ O2 Sat: ___%  O2: ______ Active Orders Diet DIET DIABETIC CONSISTENT CARB Mechanical Soft Orientation: oriented to time, place, person and situation Active Behaviors: None Active Lines/Drains:  (Peg Tube / Gimenez / CL or S/L?): no 
 
Urinary Status: Voiding, External catheter     Last BM: Last Bowel Movement Date: 04/08/19 Skin Integrity: Excoriation, Other (comment)(sores on lips; excoriation to the groin) Mobility: Very limited Weight Bearing Status: WBAT (Weight Bearing as Tolerated) Lab Results Component Value Date/Time Glucose 72 04/08/2019 04:12 AM  
 Hemoglobin A1c 4.8 03/29/2019 02:56 PM  
 INR 1.4 (H) 04/02/2019 04:07 AM  
 INR 2.0 (H) 03/30/2019 04:19 AM  
 HGB 9.2 (L) 04/08/2019 04:12 AM  
 HGB 8.7 (L) 04/06/2019 04:17 AM  
  
  RECOMMENDATION See After Visit Summary (AVS) for: · Discharge instructions · After [address] · Special equipment needed (entered pre-discharge by Care Management) · Medication Reconciliation · Follow up Appointment(s) Report given/sent by:  Hernan Dominguez RN Verbal report given to: [facility]- [address] [place] [address]  FAXED to:  [facility] Estimated discharge time:  4/9/2019 at 1600

## 2019-04-09 NOTE — PROGRESS NOTES
Patient discharged to [facility] via ambulance with Sandor. Sandor paperwork completed.

## 2019-04-09 NOTE — PROGRESS NOTES
ADULT PROTOCOL: JET AEROSOL ASSESSMENT Patient  Cande Capellan     61 y.o.   female     4/9/2019  9:28 AM 
 
Breath Sounds Pre Procedure: Right Breath Sounds: Lower, Coarse, Expiratory wheezing Left Breath Sounds: Clear, Lower, Diminished Breath Sounds Post Procedure: Right Breath Sounds: Expiratory wheezing Left Breath Sounds: Expiratory wheezing Breathing pattern: Pre procedure Breathing Pattern: Regular Post procedure Breathing Pattern: Regular Heart Rate: Pre procedure Pulse: 80 
         Post procedure Pulse: 75 Resp Rate: Pre procedure Respirations: 18 Post procedure Respirations: 20 
 
      
 
Cough: Pre procedure Cough: Non-productive Post procedure Cough: Non-productive Oxygen: O2 Device: Nasal cannula   Flow rate (L/min) 2 Changed: No  
 
 
SpO2: Pre procedure SpO2: 90 %    2LPM NC Post procedure SpO2: 93 %  2LPM NC Nebulizer Therapy: Current medications Aerosolized Medications: DuoNeb Q6 Resp Changed: NO Smoking History:  
Smoking status: Current Every Day Smoker   
    Packs/day: 1.00  
    Years: 40.00  
    Pack years: 40.00 Problem List:  
Patient Active Problem List  
Diagnosis Code  Diabetes mellitus with neurological manifestations, uncontrolled ([facility]) E11.49, E11.65  
 Essential hypertension, benign I10  
 Hyperlipidemia LDL goal <100 E78.5  Thrombocytopenia ([facility] D69.6  Previous back surgery Z98.890  
 S/P CHACHO (total abdominal hysterectomy) Z90.710  Cirrhosis ([address] K74.60  Anemia D64.9  
 GI bleed K92.2  
 BALDERAS (nonalcoholic steatohepatitis) K75.81  
 Acute deep vein thrombosis (DVT) of right lower extremity (HCC) I82.401  COPD (chronic obstructive pulmonary disease) (HCC) J44.9  Sepsis ([address] A41.9  
 CAP (community acquired pneumonia) J18.9  IBIS (obstructive sleep apnea) G47.33  
  Tobacco abuse Z72.0  Neuropathy G62.9  Obesity (BMI 30.0-34. 9) E66.9  Severe obesity (HCC) E66.01  
 Therapeutic drug monitoring Z51.81  
 TIA (transient ischemic attack) G45.9  GERD (gastroesophageal reflux disease) K21.9  Bilateral carotid artery stenosis I65.23  
 Hepatic encephalopathy (HCC) K72.90  Colitis K52.9  
 UTI (urinary tract infection) N39.0  Septic shock (HCC) A41.9, R65.21  
 Acute respiratory failure with hypercapnia ([facility] J96.02  
 Counseling regarding advance care planning and goals of care Z71.89 Respiratory Therapist: Jolene Michelle, RT

## 2019-04-09 NOTE — DISCHARGE SUMMARY
Hospitalist Discharge Summary Patient ID: 
Roberth Thomas [id number] 
61 y.o. 
1955 PCP on record: Cedrick Braxton NP Admit date: 3/28/2019 Discharge date and time: 4/9/2019 DISCHARGE DIAGNOSIS: 
Acute hypercapnic respiratory failure s/p intubation on 3/29 COPD with exacerbation Septic shock Ecoli, enterococcus UTI and bacteremia Colitis, infectious vs ischemic KALI with metabolic acidosis Lactic acidosis Hypomag Hypophos Hypernatremia Volume overload Acute hepatic encephalopathy Liver cirrhosis Right leg weakness Mild left lip droop Hx TIA 11/18 DM Right leg edema  
COPD 
IBIS Obesity  
 
CONSULTATIONS: 
IP CONSULT TO HOSPITALIST 
IP CONSULT TO GASTROENTEROLOGY 
IP CONSULT TO PALLIATIVE CARE - PROVIDER 
IP CONSULT TO NEPHROLOGY 
IP CONSULT TO NEPHROLOGY 
IP CONSULT TO [address] Excerpted HPI from H&P of Mayela Frias MD: 
Roberth Thomas is a 61 y.o.  female with BALDERAS, cirrhosis with hx of esophageal varices, COPD, DM type 2, TIA, IBIS brought in by EMS from home for AMS. No family at bedside. 
  
EMS reported patient LKW at 8pm yesterday.  noted patient having difficulty talking and walking upon waking up. BS was 51 for EMS and given D10 with some improvement in mental status. Brought to ER as code stroke, level 2. 
  
In ER, fever 103.2, BP was 90/palp for EMS and initially 131/67 in ER but dropped to 89/39. Blood sugar has dropped to 66 and required 2 ampules of D50. CT head, chest, abdomen/pelvis notable for right colitis, clear lungs. Ammonia 110. 
  
Patient given levaquin, vancomycin, cefepime and started on levophed. Last admitted 10/18 for TIA. Had esophageal varices banding by Dr. Leonidas Mendoza in 2/18 and 5/18 
  
We were asked to admit for work up and evaluation of the above problems. ______________________________________________________________________ DISCHARGE SUMMARY/HOSPITAL COURSE:  for full details see H&P, daily progress notes, labs, consult notes. Acute hypercapnic respiratory failure s/p intubation on 3/29 COPD Exacerbation (per pulmonary note) Completed coarse of abx Taper steroids Nebs Unable to wean off O2, Wean as tolerated at rehab Septic shock, POA Ecoli, enterococcus UTI and bacteremia Colitis, infectious vs ischemic CT a/p showed resolved mural thickening in the ascending colon. Rectal tube and gastric tube present. Interval development of ascites. Cirrhosis with splenomegaly and esophageal varices. Patchy opacities in the lung bases may represent atelectasis or airspace disease. CXR clear Bcx and Ucx grew Ecoli, repeated Bcx on 3/30/19 neg Patient extubated today 4/5/2019; out of ICU 4/06 Rectal tube out. Vasopressors weaned off Completed course of rocephin on 4/08 PT OT  
DC to inpatient rehab 
  
KALI with metabolic acidosis, resolved Lactic acidosis Hypomag, repleted Hypophos, repleted Nephrology has signed off -4/7 
  
Hypernatremia Corrected 
  
Volume overload Resumed lasix and aldactone. 
  
Acute hepatic encephalopathy, POA Liver cirrhosis Right leg weakness, POA Mild left lip droop, POA Hx TIA 11/18 CT head negative. NH4 >100 Cont' lactulose. She was not compliant at home Continue aspirin 
  
DM Non compliant at home She has had episodes of hypoglycemia while in patient, reduce lantus to 20 units daily 
  
Right leg edema  
Patient reports this is chronic. No pain or tenderness Continue diuretics 
  
Obesity  
Body mass index is 38.31 kg/m². 
_______________________________________________________________________ Patient seen and examined by me on discharge day. Pertinent Findings: 
Gen:    Not in distress Chest: Clear lungs CVS:   Regular rhythm. No edema Abd:  Soft, not distended, not tender Neuro:  Alert, non focal 
 _______________________________________________________________________ DISCHARGE MEDICATIONS:  
Current Discharge Medication List  
  
START taking these medications Details  
predniSONE (DELTASONE) 10 mg tablet 3 tabs daily for 3 days 2 tabs daily for 3 days 1 tab   daily for 3 days Qty: 18 Tab, Refills: 0  
  
albuterol-ipratropium (DUO-NEB) 2.5 mg-0.5 mg/3 ml nebu 3 mL by Nebulization route every four (4) hours as needed for Other (Wheezing or Shortness of breath). Qty: 30 Nebule, Refills: 0 CONTINUE these medications which have CHANGED Details  
insulin glargine (LANTUS SOLOSTAR U-100 INSULIN) 100 unit/mL (3 mL) inpn 20 Units by SubCUTAneous route daily. Qty: 1 Pen, Refills: 0 CONTINUE these medications which have NOT CHANGED Details  
fluticasone furoate-vilanterol (BREO ELLIPTA) 200-25 mcg/dose inhaler Take 1 Puff by inhalation daily. furosemide (LASIX) 40 mg tablet Take 2 Tabs by mouth daily. Qty: 60 Tab, Refills: 3  
  
spironolactone (ALDACTONE) 100 mg tablet Take 2 Tabs by mouth daily. Qty: 60 Tab, Refills: 3 CONSTULOSE 10 gram/15 mL solution take 2 tablespoonfuls by mouth twice a day 
Qty: 1000 mL, Refills: 5  
  
losartan (COZAAR) 25 mg tablet take 1 tablet by mouth once daily , REPLACES LISINOPRIL FOR BLOOD PRESSURE AND KIDNEY PROTECTION Qty: 30 Tab, Refills: 11  
  
traMADol (ULTRAM) 50 mg tablet take 1-2 tablets by mouth twice a day if needed for DIABETIC NEUROPATHY PAIN Qty: 120 Tab, Refills: 5 Comments: Fax to [facility]: [phone] Associated Diagnoses: Diabetes mellitus with neurological manifestations, uncontrolled ([address] aspirin 81 mg chewable tablet Take 1 Tab by mouth daily. Qty: 30 Tab, Refills: 6  
  
atorvastatin (LIPITOR) 40 mg tablet Take 1 Tab by mouth nightly. Qty: 30 Tab, Refills: 6  
  
omeprazole (PRILOSEC) 20 mg capsule take 1 capsule by mouth once daily 
Qty: 30 Cap, Refills: 5 amitriptyline (ELAVIL) 150 mg tablet Take 1 Tab by mouth nightly. propranolol (INDERAL) 20 mg tablet Take 1 Tab by mouth two (2) times a day. Qty: 60 Tab, Refills: 11  
  
ferrous sulfate 325 mg (65 mg iron) tablet take 1 tablet by mouth once daily with BREAKFAST Qty: 90 Tab, Refills: 1  
  
insulin lispro (HUMALOG KWIKPEN INSULIN) 100 unit/mL kwikpen Inject as needed up to 3 times per day for sugars over 150: 4 units for every 50 points. Max 50 units per day 
Qty: 15 mL, Refills: 11  
  
ondansetron (ZOFRAN ODT) 4 mg disintegrating tablet dissolve 1 tablet ON TONGUE every 8 hours if needed for nausea Qty: 45 Tab, Refills: 3  
  
rOPINIRole (REQUIP) 4 mg tab TAB Take 4 mg by mouth nightly. albuterol (PROAIR HFA) 90 mcg/actuation inhaler Take 2 Puffs by inhalation every four (4) hours as needed for Wheezing. STOP taking these medications  
  
 insulin NPH (NOVOLIN N NPH U-100 INSULIN) 100 unit/mL injection Comments:  
Reason for Stopping:   
   
 potassium chloride SR (KLOR-CON 10) 10 mEq tablet Comments:  
Reason for Stopping:   
   
 metFORMIN (GLUCOPHAGE) 1,000 mg tablet Comments:  
Reason for Stopping: My Recommended Diet, Activity, Wound Care, and follow-up labs are listed in the patient's Discharge Insturctions which I have personally completed and reviewed. ______________________________________________________________________ Risk of deterioration: Moderate Condition at Discharge:  Stable 
______________________________________________________________________ Disposition IP Rehab 
______________________________________________________________________ Care Plan discussed with:  
Patient, Family, RN, Care Manager, Consultant 
 
______________________________________________________________________ Code Status: DNR/DNI 
______________________________________________________________________ Total time in minutes spent coordinating this discharge (includes going over instructions, follow-up, prescriptions, and preparing report for sign off to her PCP) :  35 minutes Signed: Cristofer Camargo MD

## 2019-04-09 NOTE — PROGRESS NOTES
Nutrition Assessment: 
 
INTERVENTIONS/RECOMMENDATIONS:  
Meals/Snacks: General/healthful diet: Continue current diet Supplements: Commercial supplement: Try Glucerna, mighty shake, and magic cup daily ASSESSMENT:  
Chart reviewed; diet advanced to CCD/mechanical soft. Patient reports a poor appetite at this time and dislikes the food. Didn't like much of her breakfast so ordered some oatmeal and about to try that at time of visit. Noted patient refusing treatment for episodes of hypoglycemia. She reports trying protein shakes \"years and years\" ago. Agreeable to trying a variety of supplements at this time. Encouraged intake of meals and use of room service to receive her preferences. Diet Order: Mechanical soft, Consistent carb 
% Eaten:   
Patient Vitals for the past 72 hrs: 
 % Diet Eaten 04/08/19 1342 0 % 04/08/19 0939 10 % 04/07/19 1646 100 % 04/06/19 1700 75 % Pertinent Medications: [x] Reviewed []Other: Lasix, Lantus, Humalog, lactulose, Solu-medrol, Reglan, Protonix, Aldactone Pertinent Labs: [x]Reviewed  []Other:  [id number]- Food Allergies: [x]None []Other:   Last BM: 4/7   [x]Active     []Hyperactive  []Hypoactive       [] Absent  BS Skin:    [x] Intact   [] Incision  [] Breakdown   [x]Edema   []Other: Anthropometrics: Height: 5' 2\" (157.5 cm) Weight: 104.6 kg (230 lb 9.6 oz) IBW (%IBW):   ( ) UBW (%UBW):   (  %) BMI: Body mass index is 42.18 kg/m². This BMI is indicative of: 
[]Underweight   []Normal   []Overweight   [] Obesity   [x] Extreme Obesity (BMI>40) Last Weight Metrics: 
Weight Loss Metrics 4/9/2019 3/28/2019 3/19/2019 1/1/2019 11/19/2018 11/13/2018 10/16/2018 Today's Wt 230 lb 9.6 oz - 223 lb 12.8 oz 193 lb 9 oz 187 lb 186 lb 11.7 oz 191 lb 3.2 oz  
BMI - 42.18 kg/m2 38.42 kg/m2 33.23 kg/m2 34.2 kg/m2 34.15 kg/m2 36.13 kg/m2 Estimated Nutrition Needs (Based on): 7696 Kcals/day(BMR (1558) x 1. 3AF -300kcal) , 84 g(0.8 g/kg bw) Protein Carbohydrate: At Least 130 g/day  Fluids: 1700 mL/day Pt expected to meet estimated nutrient needs: [x]Yes []No 
 
NUTRITION DIAGNOSES:  
Problem:  Inadequate protein-energy intake Etiology: related to decreased appetite/dislike for food Signs/Symptoms: as evidenced by PO intake <25% of meals x 1-2 days NUTRITION INTERVENTIONS: 
Meals/Snacks: General/healthful diet  Supplements: Commercial supplement GOAL:  
PO intake >50% of meals/supplement next 3-5 days NUTRITION MONITORING AND EVALUATION Behavioral-Environmental Outcomes: Behavior Food/Nutrient Intake Outcomes: Total energy intake Physical Signs/Symptoms Outcomes: Weight/weight change, Electrolyte and renal profile, Glucose profile Previous Goal Met: 
 [x] Met              [] Progressing Towards Goal              [] Not Progressing Towards Goal  
Previous Recommendations: 
 [x] Implemented          [] Not Implemented          [] Not Applicable LEARNING NEEDS (Diet, Food/Nutrient-Drug Interaction):  
 [x] None Identified 
 [] Identified and Education Provided/Documented 
 [] Identified and Pt declined/was not appropriate Cultural, Tenriism, OR Ethnic Dietary Needs:  
 [x] None Identified 
 [] Identified and Addressed 
 
 [x] Interdisciplinary Care Plan Reviewed/Documented  
 [x] Discharge Planning: Consistent carb/2g Na diet  
 [] Participated in Interdisciplinary Rounds NUTRITION RISK:  
 [] High              [x] Moderate           []  Low  []  Minimal/Uncompromised Caesar Yadav Pager [phone] Weekend Pager [phone]

## 2019-04-09 NOTE — DISCHARGE INSTRUCTIONS
HOSPITALIST DISCHARGE INSTRUCTIONS    NAME: Kerri Darnell   :  1955   MRN:  [id number]     Date/Time:  2019 2:32 PM    ADMIT DATE: 3/28/2019   DISCHARGE DATE: 2019     Attending Physician: Ophelia Atkinson MD    DISCHARGE DIAGNOSIS:  Acute hypercapnic respiratory failure s/p intubation on 3/29  COPD with exacerbation  Septic shock  Ecoli, enterococcus UTI and bacteremia  Colitis, infectious vs ischemic  KLAI with metabolic acidosis  Lactic acidosis  Hypomag  Hypophos  Hypernatremia  Volume overload  Acute hepatic encephalopathy  Liver cirrhosis  Right leg weakness  Mild left lip droop  Hx TIA   DM   Right leg edema   COPD  IBIS  Obesity     Medications: Per above medication reconciliation. Pain Management: per above medications    Recommended diet: Diabetic Diet and mechanical soft, glucerna with breakfast, Magic Cup with Dinner and [employer] with Lunch    Recommended activity: PT/OT Eval and Treat    Wound care: None    Indwelling devices:  None    Supplemental Oxygen: 2 LNC,  wean as tolerated    Required Lab work: Per SNF routine    Glucose management:  Accucheck ACHS with sliding scale per SNF protocol    Code status: DNR/DNI            Inpatient Rehab MD responsible for above on discharge. Information obtained by :  I understand that if any problems occur once I am at home I am to contact my physician. I understand and acknowledge receipt of the instructions indicated above.                                                                                                                                            Physician's or R.N.'s Signature                                                                  Date/Time                                                                                                                                              Patient or Repres

## 2019-04-24 PROBLEM — G93.40 ENCEPHALOPATHY: Status: ACTIVE | Noted: 2019-01-01

## 2019-04-24 NOTE — ED NOTES
Pt IV not drawing back blood. Will  Obtain another IV. Pt continues to move around but is easily redirected.

## 2019-04-24 NOTE — ED NOTES
Pt family reports pt was recently admitted to ICU and intubated for these same reasons which is why she is at rehab. Family seems disgruntled about the rehab center and asked for urine to be obtained again.

## 2019-04-24 NOTE — ED NOTES
Coral Reddish in place. New brief applied due to previous brief soiled in urine. Daughter assisted to pull pt upright in bed. Pt continues to ramble and reach for things, daughter remains at bedside. Bed in lowest position, call light within reach, side rails up x 2.
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
## 2019-04-24 NOTE — PROGRESS NOTES
Pharmacy Clarification of Prior to Admission Medication Regimen The patient was not interviewed regarding clarification of the prior to admission medication regimen. Patient was transferred from [address] [place] [address] to [facility], with a current med list.   
 
Information Obtained From: Transfer Papers Pertinent Pharmacy Findings: 
Updated patient?s preferred outpatient pharmacy to: [facility] did not update the outpatient pharmacy due to the patient living at a SNF  
 
PTA medication list was corrected to the following:  
 
Prior to Admission Medications Prescriptions Last Dose Informant Patient Reported? Taking? CONSTULOSE 10 gram/15 mL solution 4/24/2019 at 0900 Transfer Papers No Yes Sig: take 2 tablespoonfuls by mouth twice a day  
albuterol (PROAIR HFA) 90 mcg/actuation inhaler 4/24/2019 at 0900 Transfer Papers Yes Yes Sig: Take 2 Puffs by inhalation every four (4) hours as needed for Wheezing. albuterol-ipratropium (DUO-NEB) 2.5 mg-0.5 mg/3 ml nebu 4/16/2019 at 0700 Transfer Papers No Yes Sig: 3 mL by Nebulization route every four (4) hours as needed for Other (Wheezing or Shortness of breath). amitriptyline (ELAVIL) 150 mg tablet 4/23/2019 at 2100 Transfer Papers Yes Yes Sig: Take 1 Tab by mouth nightly. aspirin 81 mg chewable tablet 4/24/2019 at 0900 Transfer Papers No Yes Sig: Take 1 Tab by mouth daily. atorvastatin (LIPITOR) 40 mg tablet 4/23/2019 at 2100 Transfer Papers No Yes Sig: Take 1 Tab by mouth nightly. ferrous sulfate 325 mg (65 mg iron) tablet 4/24/2019 at 0900 Transfer Papers No Yes Sig: take 1 tablet by mouth once daily with BREAKFAST  
fluticasone furoate-vilanterol (BREO ELLIPTA) 200-25 mcg/dose inhaler 4/24/2019 at 0900 Transfer Papers Yes Yes Sig: Take 1 Puff by inhalation daily. furosemide (LASIX) 40 mg tablet 4/24/2019 at 0900 Transfer Papers No Yes Sig: Take 2 Tabs by mouth daily. gabapentin (NEURONTIN) 600 mg tablet 2019 at 0900 Transfer Papers Yes Yes Sig: Take 1,200 mg by mouth two (2) times a day. insulin glargine (LANTUS SOLOSTAR U-100 INSULIN) 100 unit/mL (3 mL) inpn 2019 at 0900 Transfer Papers No Yes Si Units by SubCUTAneous route daily. insulin lispro (HUMALOG KWIKPEN INSULIN) 100 unit/mL kwikpen 2019 at 0900 Transfer Papers No Yes Sig: Inject as needed up to 3 times per day for sugars over 150: 4 units for every 50 points. Max 50 units per day  
losartan (COZAAR) 25 mg tablet 2019 at 0900 Transfer Papers No Yes Sig: take 1 tablet by mouth once daily , REPLACES LISINOPRIL FOR BLOOD PRESSURE AND KIDNEY PROTECTION  
nicotine (NICODERM CQ) 21 mg/24 hr 2019 at 0900 Transfer Papers Yes Yes Si Patch by TransDERmal route every twenty-four (24) hours. omeprazole (PRILOSEC) 20 mg capsule 2019 at 0900 Transfer Papers No Yes Sig: take 1 capsule by mouth once daily  
ondansetron (ZOFRAN ODT) 4 mg disintegrating tablet 2019 at 2000 Transfer Papers No Yes Sig: dissolve 1 tablet ON TONGUE every 8 hours if needed for nausea  
rOPINIRole (REQUIP) 4 mg tab TAB 2019 at 2100 Transfer Papers Yes Yes Sig: Take 4 mg by mouth nightly. rifAXIMin (XIFAXAN) 550 mg tablet 2019 at 0900 Transfer Papers Yes Yes Sig: Take 550 mg by mouth two (2) times a day. spironolactone (ALDACTONE) 100 mg tablet 2019 at 0900 Transfer Papers No Yes Sig: Take 2 Tabs by mouth daily. traMADol (ULTRAM) 50 mg tablet 2019 at 2000 Transfer Papers Yes Yes Sig: Take 50 mg by mouth every twelve (12) hours as needed (DIABETIC NEUROPATHY PAIN). Facility-Administered Medications: None Thank you, 
Urbano Plata Medication History Pharmacy Technician

## 2019-04-24 NOTE — H&P
Hospitalist Admission NoteNAME: Rodney Ho :  1955 MRN:  [id number] Date/Time:  2019 7:40 PM 
 
Patient PCP: Chris Mcclure, ALEJANDRO 
______________________________________________________________________ Given the patient's current clinical presentation, I have a high level of concern for decompensation if discharged from the emergency department. Complex decision making was performed, which includes reviewing the patient's available past medical records, laboratory results, and x-ray films. My assessment of this patient's clinical condition and my plan of care is as follows. Assessment / Plan: 
Acute metabolic encephalopathy Complicated UTI (recent norman cath) 
-UA suggestive of UTI 
-f/u with Ucx and Bcx 
-empiric abx with rocephin 
-hold sedating medications BALDERAS/Liver cirrhosis Hx of recurrent hepatic encephalopathy HTN 
-NH3 34, will resume xifaxin and lactulose, lasix, losartan COPD without exacerbation 
-CXR showed mild bibasilar discoid atelectasis 
-resume home neds T2DM 
-restart low dose lantus 
-SSI Hx of TIA  HLD 
-resume statin/ASA Obesity Body mass index is 28.96 kg/m². Code Status: DNR, discussed with pt's  at bedside Surrogate Decision Maker: pt's  Janece Bamberger DVT Prophylaxis: heparin GI Prophylaxis: not indicated Baseline: from [facility], family does not wish to return there. Subjective: CHIEF COMPLAINT: altered mental status HISTORY OF PRESENT ILLNESS:    
Osbaldo Chavez is a 61 y.o.  female with PMHx significant for liver cirrhosis/BALDERAS, recurrent hepatic encephalopathy due to noncompliance with meds, COPD, IBIS, present to the ER from [facility] for evaluation of AMS started last Thursday per patient's . Pt is conversant but is confused, unable to give meaning hx.   Per pt's , when pt became confused, he thought it was due to hyperammonemia again, however level was checked at facility which was normal. c onfuion progressively worsened today which lead the patient to the ER for evaluation. No known fever, chills, cp, sob, palpitations, n/v/d. In the ER, vitals: TT07.4, P109, /68, Spo2 96% on [facility] We were asked to admit for work up and evaluation of the above problems. Past Medical History:  
Diagnosis Date  Asthma  Back pain  COPD (chronic obstructive pulmonary disease) (HCC)  Diabetes ([facility]  Esophageal varices in cirrhosis ([facility]   
 6/2014 banding x 2  
 Fibromyalgia  Gastrointestinal disorder  GERD (gastroesophageal reflux disease)  Hypercholesteremia  Liver cirrhosis secondary to BALDERAS ([facility]  Liver disease  Other and unspecified hyperlipidemia  Restless leg syndrome  Type II or unspecified type diabetes mellitus without mention of complication, uncontrolled  Unspecified essential hypertension Past Surgical History:  
Procedure Laterality Date  HX BACK SURGERY    
 HX CARPAL TUNNEL RELEASE    
 on right  HX HYSTERECTOMY plus 1/2 of an ovary removed  OH COLSC FLX W/RMVL OF TUMOR POLYP LESION SNARE TQ  5/30/2013  UPPER GI ENDOSCOPY,LIGAT VARIX  2/6/2015 Social History Tobacco Use  Smoking status: Current Every Day Smoker Packs/day: 1.00 Years: 40.00 Pack years: 40.00  Smokeless tobacco: Former User Substance Use Topics  Alcohol use: No  
  Comment: rare Family History Problem Relation Age of Onset  Diabetes Mother  Stroke Sister  Diabetes Paternal Aunt  Diabetes Paternal Uncle  Heart Disease Neg Hx Allergies Allergen Reactions  Hydrocodone Nausea and Vomiting  Lisinopril Cough  Lortab [Hydrocodone-Acetaminophen] Nausea and Vomiting  Percocet [Oxycodone-Acetaminophen] Nausea and Vomiting Prior to Admission medications Medication Sig Start Date End Date Taking? Authorizing Provider  
gabapentin (NEURONTIN) 600 mg tablet Take 1,200 mg by mouth two (2) times a day. Yes Provider, Historical  
rifAXIMin (XIFAXAN) 550 mg tablet Take 550 mg by mouth two (2) times a day. Yes Provider, Historical  
nicotine (NICODERM CQ) 21 mg/24 hr 1 Patch by TransDERmal route every twenty-four (24) hours. Yes Provider, Historical  
albuterol-ipratropium (DUO-NEB) 2.5 mg-0.5 mg/3 ml nebu 3 mL by Nebulization route every four (4) hours as needed for Other (Wheezing or Shortness of breath). 4/9/19  Yes Mirella Ashby MD  
insulin glargine (LANTUS SOLOSTAR U-100 INSULIN) 100 unit/mL (3 mL) inpn 20 Units by SubCUTAneous route daily. 4/9/19  Yes Mirella Ashby MD  
fluticasone furoate-vilanterol (BREO ELLIPTA) 200-25 mcg/dose inhaler Take 1 Puff by inhalation daily. Yes Provider, Historical  
furosemide (LASIX) 40 mg tablet Take 2 Tabs by mouth daily. 3/19/19  Yes HUMZA Chauhan  
spironolactone (ALDACTONE) 100 mg tablet Take 2 Tabs by mouth daily. 3/19/19  Yes HUMZA Chauhan  
CONSTULOSE 10 gram/15 mL solution take 2 tablespoonfuls by mouth twice a day 1/7/19  Yes Cathi Barksdale NP  
losartan (COZAAR) 25 mg tablet take 1 tablet by mouth once daily , REPLACES LISINOPRIL FOR BLOOD PRESSURE AND KIDNEY PROTECTION 12/3/18  Yes Joselyn Montanez MD  
traMADol (ULTRAM) 50 mg tablet Take 50 mg by mouth every twelve (12) hours as needed (DIABETIC NEUROPATHY PAIN). 11/27/18  Yes Joselyn Montanez MD  
aspirin 81 mg chewable tablet Take 1 Tab by mouth daily. 11/16/18  Yes Jahaira James MD  
atorvastatin (LIPITOR) 40 mg tablet Take 1 Tab by mouth nightly. 11/15/18  Yes Jahaira James MD  
omeprazole (PRILOSEC) 20 mg capsule take 1 capsule by mouth once daily 11/8/18  Yes [place] Jayla, April G, NP  
amitriptyline (ELAVIL) 150 mg tablet Take 1 Tab by mouth nightly.  10/16/18  Yes Joselyn Montanez MD  
 ferrous sulfate 325 mg (65 mg iron) tablet take 1 tablet by mouth once daily with BREAKFAST 10/2/18  Yes Cathi Worley, NP  
insulin lispro (HUMALOG KWIKPEN INSULIN) 100 unit/mL kwikpen Inject as needed up to 3 times per day for sugars over 150: 4 units for every 50 points. Max 50 units per day 10/1/18  Yes Charmaine Pittman MD  
ondansetron (ZOFRAN ODT) 4 mg disintegrating tablet dissolve 1 tablet ON TONGUE every 8 hours if needed for nausea 8/1/18  Yes Cathi Worley NP  
rOPINIRole (REQUIP) 4 mg tab TAB Take 4 mg by mouth nightly. Yes Provider, Historical  
albuterol (PROAIR HFA) 90 mcg/actuation inhaler Take 2 Puffs by inhalation every four (4) hours as needed for Wheezing. Yes Other, MD Leonarda  
 
 
REVIEW OF SYSTEMS:    
I am not able to complete the review of systems because: The patient is intubated and sedated The patient has altered mental status due to his acute medical problems The patient has baseline aphasia from prior stroke(s) The patient has baseline dementia and is not reliable historian  
xx The patient is in acute medical distress and unable to provide information Total of 12 systems reviewed as follows:   
   POSITIVE= BOLD text  Negative = text not BOLD General:  fever, chills, sweats, generalized weakness, weight loss/gain,  
   loss of appetite Eyes:    blurred vision, eye pain, loss of vision, double vision ENT:    rhinorrhea, pharyngitis Respiratory:   cough, sputum production, SOB, MARTINEZ, wheezing, pleuritic pain  
Cardiology:   chest pain, palpitations, orthopnea, PND, edema, syncope Gastrointestinal:  abdominal pain , N/V, diarrhea, dysphagia, constipation, bleeding Genitourinary:  frequency, urgency, dysuria, hematuria, incontinence Muskuloskeletal :  arthralgia, myalgia, back pain Hematology:  easy bruising, nose or gum bleeding, lymphadenopathy Dermatological: rash, ulceration, pruritis, color change / jaundice Endocrine:   hot flashes or polydipsia Neurological:  headache, dizziness, confusion, focal weakness, paresthesia, Speech difficulties, memory loss, gait difficulty Psychological: Feelings of anxiety, depression, agitation Objective: VITALS:   
Visit Vitals /70 Pulse (!) 110 Temp 97.4 °F (36.3 °C) Resp 23 Ht 5' 8\" (1.727 m) Wt 86.4 kg (190 lb 7.6 oz) SpO2 95% BMI 28.96 kg/m² PHYSICAL EXAM: 
 
General:    Awake, NAD HEENT: Atraumatic, anicteric sclerae, pink conjunctivae No oral ulcers, mucosa moist, throat clear Neck:  Supple, symmetrical,  thyroid: non tender Lungs:   CTA b/l. No wheezing or Rhonchi. No rales. Chest wall:  No tenderness. No accessory muscle use. Heart:   tachycardic,  No  Murmur. No edema Abdomen:   Soft, NT. ND  BS+ Extremities: No cyanosis. No clubbing,   
  Skin turgor normal, Radial dial pulse 2+. Capillary refill normal 
Skin:     Not pale. Not Jaundiced  No rashes Psych:  Not depressed. Not anxious or agitated. Neurologic: Pt is awake, conversant but is confused. _______________________________________________________________________ Care Plan discussed with: 
  Comments Patient x Family  x  RN x Care Manager Consultant:  [facility] ED physician  
_______________________________________________________________________ Expected  Disposition:  
Home with Family HH/PT/OT/RN   
SNF/LTC x  
CARMINE   
________________________________________________________________________ TOTAL TIME:  65 Minutes Critical Care Provided     Minutes non procedure based Comments  
 x Reviewed previous records  
>50% of visit spent in counseling and coordination of care x Discussion with patient and/or family and questions answered 
  
 
________________________________________________________________________ Signed: Davy Lee MD 
 
Procedures: see electronic medical records for all procedures/Xrays and details which were not copied into this note but were reviewed prior to creation of Plan. LAB DATA REVIEWED:   
Recent Results (from the past 24 hour(s)) EKG, 12 LEAD, INITIAL Collection Time: 04/24/19  2:20 PM  
Result Value Ref Range Ventricular Rate 109 BPM  
 Atrial Rate 109 BPM  
 P-R Interval 162 ms QRS Duration 82 ms Q-T Interval 340 ms QTC Calculation (Bezet) 457 ms Calculated P Axis 72 degrees Calculated R Axis 53 degrees Calculated T Axis 66 degrees Diagnosis Sinus tachycardia Possible Left atrial enlargement When compared with ECG of 28-MAR-2019 10:32, No significant change was found AMMONIA Collection Time: 04/24/19  2:44 PM  
Result Value Ref Range Ammonia 34 (H) <32 UMOL/L  
CBC WITH AUTOMATED DIFF Collection Time: 04/24/19  2:49 PM  
Result Value Ref Range WBC 6.3 3.6 - 11.0 K/uL  
 RBC 4.23 3.80 - 5.20 M/uL  
 HGB 12.2 11.5 - 16.0 g/dL HCT 39.8 35.0 - 47.0 % MCV 94.1 80.0 - 99.0 FL  
 MCH 28.8 26.0 - 34.0 PG  
 MCHC 30.7 30.0 - 36.5 g/dL RDW 23.1 (H) 11.5 - 14.5 % PLATELET 71 (L) 404 - 400 K/uL MPV 12.5 8.9 - 12.9 FL  
 NRBC 0.0 0  WBC ABSOLUTE NRBC 0.00 0.00 - 0.01 K/uL NEUTROPHILS 68 32 - 75 % LYMPHOCYTES 22 12 - 49 % MONOCYTES 8 5 - 13 % EOSINOPHILS 2 0 - 7 % BASOPHILS 0 0 - 1 % IMMATURE GRANULOCYTES 0 0.0 - 0.5 % ABS. NEUTROPHILS 4.3 1.8 - 8.0 K/UL  
 ABS. LYMPHOCYTES 1.4 0.8 - 3.5 K/UL  
 ABS. MONOCYTES 0.5 0.0 - 1.0 K/UL  
 ABS. EOSINOPHILS 0.1 0.0 - 0.4 K/UL  
 ABS. BASOPHILS 0.0 0.0 - 0.1 K/UL  
 ABS. IMM. GRANS. 0.0 0.00 - 0.04 K/UL  
 DF AUTOMATED METABOLIC PANEL, COMPREHENSIVE Collection Time: 04/24/19  2:49 PM  
Result Value Ref Range Sodium 138 136 - 145 mmol/L Potassium 3.6 3.5 - 5.1 mmol/L Chloride 96 (L) 97 - 108 mmol/L  
 CO2 39 (H) 21 - 32 mmol/L Anion gap 3 (L) 5 - 15 mmol/L Glucose 174 (H) 65 - 100 mg/dL  BUN 13 6 - 20 MG/DL  
 Creatinine 0.68 0.55 - 1.02 MG/DL  
 BUN/Creatinine ratio 19 12 - 20 GFR est AA >60 >60 ml/min/1.73m2 GFR est non-AA >60 >60 ml/min/1.73m2 Calcium 8.8 8.5 - 10.1 MG/DL Bilirubin, total 1.6 (H) 0.2 - 1.0 MG/DL  
 ALT (SGPT) 60 12 - 78 U/L  
 AST (SGOT) 33 15 - 37 U/L Alk. phosphatase 235 (H) 45 - 117 U/L Protein, total 7.8 6.4 - 8.2 g/dL Albumin 3.3 (L) 3.5 - 5.0 g/dL Globulin 4.5 (H) 2.0 - 4.0 g/dL A-G Ratio 0.7 (L) 1.1 - 2.2 SAMPLES BEING HELD Collection Time: 04/24/19  2:49 PM  
Result Value Ref Range SAMPLES BEING HELD 1RED COMMENT Add-on orders for these samples will be processed based on acceptable specimen integrity and analyte stability, which may vary by analyte. POC LACTIC ACID Collection Time: 04/24/19  3:03 PM  
Result Value Ref Range Lactic Acid (POC) 1.96 0.40 - 2.00 mmol/L  
URINALYSIS W/ REFLEX CULTURE Collection Time: 04/24/19  3:55 PM  
Result Value Ref Range Color YELLOW/STRAW Appearance CLOUDY (A) CLEAR Specific gravity 1.010 1.003 - 1.030    
 pH (UA) 7.5 5.0 - 8.0 Protein NEGATIVE  NEG mg/dL Glucose NEGATIVE  NEG mg/dL Ketone NEGATIVE  NEG mg/dL Bilirubin NEGATIVE  NEG Blood NEGATIVE  NEG Urobilinogen 1.0 0.2 - 1.0 EU/dL Nitrites NEGATIVE  NEG Leukocyte Esterase MODERATE (A) NEG    
 WBC 5-10 0 - 4 /hpf  
 RBC 0-5 0 - 5 /hpf Epithelial cells FEW FEW /lpf Bacteria 3+ (A) NEG /hpf  
 UA:UC IF INDICATED CULTURE NOT INDICATED BY UA RESULT CNI Mucus TRACE (A) NEG /lpf  Budding yeast PRESENT (A) NEG

## 2019-04-24 NOTE — ED PROVIDER NOTES
EMERGENCY DEPARTMENT HISTORY AND PHYSICAL EXAM 
     
 
Date: 4/24/2019 Patient Name: Clayton Heath History of Presenting Illness Chief Complaint Patient presents with  Altered mental status  
  pt here via EMS from [address] [place] [address], pt was found unresponsive by nursing staff, pt was still unresponsive upon EMS arrival and pt became alert and eyes open, FSBS 200, DM (+), cicannitti (-) hx of TIA,,per family last few days she has been more lethargic than usual and states with hx of AMS it is usually related to high ammonia levels, on 2L N/C for COPD, tachycardic History Provided By: Patient, Patient's Daughter and EMS 
 
HPI: Clayton Heath is a 61 y.o. female, pmhx COPD, diabetes, cirrhosis with esophageal varices, [place], diabetes, hypertension, who presents via EMS to the ED c/o altered mental status. Patient's daughter is at bedside and history was obtained from her given patient's level of confusion and incomprehensible speech. Her daughter notes that she has not been getting great care at the rehab facility and they do not feel that they are giving her medications correctly. She notes that her sister went to bring patient lunch and found the patient unconscious around noon today. The staff from the facility attempted to wake her up but when she was not able to respond they called EMS. Daughter notes that she woke up in the ambulance in route to the hospital. 
Patient specifically denies any recent fevers, chills, nausea, vomiting, diarrhea, abd pain, CP, SOB, urinary sxs, changes in BM, or headache. PCP: Anny Dc NP Allergies: Lisinopril, hydrocodone, Lortab, Percocet Social Hx: former tobacco, -EtOH, -Illicit Drugs There are no other complaints, changes, or physical findings at this time. Current Facility-Administered Medications Medication Dose Route Frequency Provider Last Rate Last Dose  sodium chloride (NS) flush 5-40 mL  5-40 mL IntraVENous Q8H Joe Hankins MD   10 mL at 04/25/19 2206  sodium chloride (NS) flush 5-40 mL  5-40 mL IntraVENous PRN Joe Hankins MD   10 mL at 04/25/19 1041  ondansetron (ZOFRAN ODT) tablet 4 mg  4 mg Oral Q4H PRN Joe Hankins MD      
 bisacodyl (DULCOLAX) suppository 10 mg  10 mg Rectal DAILY PRN Joe Hankins MD   10 mg at 04/25/19 1639  
 heparin (porcine) injection 5,000 Units  5,000 Units SubCUTAneous Q8H Joe Hankins MD   5,000 Units at 04/25/19 2206  albuterol-ipratropium (DUO-NEB) 2.5 MG-0.5 MG/3 ML  3 mL Nebulization Q4H PRN Joe Hankins MD      
 aspirin chewable tablet 81 mg  81 mg Oral DAILY Joe Hankins MD   81 mg at 04/25/19 0940  
 atorvastatin (LIPITOR) tablet 40 mg  40 mg Oral QHS Joe Hankins MD   40 mg at 04/25/19 2205  ferrous sulfate tablet 325 mg  1 Tab Oral DAILY WITH BREAKFAST Joe Hankins MD   325 mg at 04/25/19 0940  fluticasone-vilanterol (BREO ELLIPTA) 200mcg-25mcg/puff  1 Puff Inhalation DAILY Joe Hankins MD   1 Puff at 04/25/19 9595  furosemide (LASIX) tablet 80 mg  80 mg Oral DAILY Joe Hankins MD   80 mg at 04/25/19 0940  
 insulin glargine (LANTUS) injection 15 Units  15 Units SubCUTAneous QHS Joe Hankins MD   15 Units at 04/25/19 2203  losartan (COZAAR) tablet 25 mg  25 mg Oral DAILY Joe Hankins MD   25 mg at 04/25/19 0940  pantoprazole (PROTONIX) tablet 40 mg  40 mg Oral ACB Joe Hankins MD   40 mg at 04/25/19 0940  rifAXIMin (XIFAXAN) tablet 550 mg  550 mg Oral BID Joe Hankins MD   550 mg at 04/25/19 1842  
 rOPINIRole (REQUIP) tablet 4 mg  4 mg Oral QHS Joe Hankins MD   4 mg at 04/25/19 2205  spironolactone (ALDACTONE) tablet 200 mg  200 mg Oral DAILY Joe Hankins MD   200 mg at 04/25/19 5006  cefTRIAXone (ROCEPHIN) 1 g in 0.9% sodium chloride (MBP/ADV) 50 mL  1 g IntraVENous Q24H Joe Hankins  mL/hr at 04/25/19 1832 1 g at 04/25/19 1832  lactulose (CHRONULAC) 10 gram/15 mL solution 45 mL  45 mL Oral TID Aurelia Guerrero MD   45 mL at 04/25/19 2204  insulin lispro (HUMALOG) injection   SubCUTAneous AC&HS Aurelia Guerrero MD   3 Units at 04/25/19 1831  
 glucose chewable tablet 16 g  4 Tab Oral PRN Aurelia Guerrero MD      
 dextrose (D50) infusion 12.5-25 g  12.5-25 g IntraVENous PRN Aurelia Guerrero MD      
 glucagon (GLUCAGEN) injection 1 mg  1 mg IntraMUSCular PRN Aurelia Guerrero MD      
 nitroglycerin (NITROBID) 2 % ointment 1 Inch  1 Inch Topical TID PRN Aurelia Guerrero MD      
 
 
Past History Past Medical History: 
Past Medical History:  
Diagnosis Date  Asthma  Back pain  COPD (chronic obstructive pulmonary disease) (HCC)  Diabetes ([facility]  Esophageal varices in cirrhosis ([facility]   
 6/2014 banding x 2  
 Fibromyalgia  Gastrointestinal disorder  GERD (gastroesophageal reflux disease)  Hypercholesteremia  Liver cirrhosis secondary to BALDERAS ([facility]  Liver disease  Other and unspecified hyperlipidemia  Restless leg syndrome  Type II or unspecified type diabetes mellitus without mention of complication, uncontrolled  Unspecified essential hypertension Past Surgical History: 
Past Surgical History:  
Procedure Laterality Date  HX BACK SURGERY    
 HX CARPAL TUNNEL RELEASE    
 on right  HX HYSTERECTOMY plus 1/2 of an ovary removed  PA COLSC FLX W/RMVL OF TUMOR POLYP LESION SNARE TQ  5/30/2013  UPPER GI ENDOSCOPY,LIGAT VARIX  2/6/2015 Family History: 
Family History Problem Relation Age of Onset  Diabetes Mother  Stroke Sister  Diabetes Paternal Aunt  Diabetes Paternal Uncle  Heart Disease Neg Hx Social History: 
Social History Tobacco Use  Smoking status: Current Every Day Smoker Packs/day: 1.00 Years: 40.00 Pack years: 40.00  Smokeless tobacco: Former User Substance Use Topics  Alcohol use: No  
  Comment: rare  Drug use: No  
 
 
Allergies: Allergies Allergen Reactions  Hydrocodone Nausea and Vomiting  Lisinopril Cough  Lortab [Hydrocodone-Acetaminophen] Nausea and Vomiting  Percocet [Oxycodone-Acetaminophen] Nausea and Vomiting Review of Systems Review of Systems Constitutional: Negative for activity change, appetite change, chills, fever and unexpected weight change. HENT: Negative for congestion. Eyes: Negative for pain and visual disturbance. Respiratory: Negative for cough and shortness of breath. Cardiovascular: Negative for chest pain. Gastrointestinal: Positive for constipation. Negative for abdominal pain, diarrhea, nausea and vomiting. Genitourinary: Negative for dysuria. Musculoskeletal: Negative for back pain. Skin: Negative for rash. Neurological: Negative for headaches. Psychiatric/Behavioral: Positive for confusion and decreased concentration. Physical Exam  
Physical Exam  
Constitutional: She is oriented to person, place, and time. She appears well-developed and well-nourished. Obese middle-aged female awake and alert in mild to moderate distress HENT:  
Head: Normocephalic and atraumatic. Dry mucous membranes Eyes: Pupils are equal, round, and reactive to light. Conjunctivae and EOM are normal. Right eye exhibits no discharge. Left eye exhibits no discharge. Neck: Normal range of motion. Neck supple. No JVD present. Cardiovascular: Regular rhythm, normal heart sounds and intact distal pulses. No murmur heard. Tachycardia approximately 110 Pulmonary/Chest: Effort normal and breath sounds normal. No respiratory distress. She has no wheezes. She has no rales. Abdominal: Soft. Bowel sounds are normal. She exhibits no distension and no mass. There is no tenderness. There is no rebound and no guarding. Musculoskeletal: Normal range of motion. She exhibits no edema, tenderness or deformity. Neurological: She is alert and oriented to person, place, and time. No cranial nerve deficit. She exhibits normal muscle tone. Skin: Skin is warm and dry. No rash noted. She is not diaphoretic. Nursing note and vitals reviewed. Diagnostic Study Results Labs - Recent Results (from the past 12 hour(s)) GLUCOSE, POC Collection Time: 04/25/19 12:18 PM  
Result Value Ref Range Glucose (POC) 212 (H) 65 - 100 mg/dL Performed by Kisha Brothers, POC Collection Time: 04/25/19  4:11 PM  
Result Value Ref Range Glucose (POC) 208 (H) 65 - 100 mg/dL Performed by Ana Ross GLUCOSE, POC Collection Time: 04/25/19  9:19 PM  
Result Value Ref Range Glucose (POC) 173 (H) 65 - 100 mg/dL Performed by Espinoza Jack (PCT) Radiologic Studies -  
XR CHEST PORT Final Result IMPRESSION:   
  
Mild bibasilar discoid atelectasis. CT Results  (Last 48 hours) None CXR Results  (Last 48 hours) 04/24/19 1846  XR CHEST PORT Final result Impression:  IMPRESSION:   
   
Mild bibasilar discoid atelectasis. Narrative:  EXAM:  XR CHEST PORT INDICATION:  ? pna  
   
COMPARISON:  4/4/2019 FINDINGS:  
   
A portable AP radiograph of the chest was obtained at 18:31 hours. The patient  
is on a cardiac monitor. There is mild bibasilar discoid atelectasis. The lungs  
are otherwise clear. The cardiac and mediastinal contours and pulmonary  
vascularity are normal.  The bones and soft tissues are unremarkable. Medical Decision Making I am the first provider for this patient. I reviewed the vital signs, available nursing notes, past medical history, past surgical history, family history and social history. Vital Signs-Reviewed the patient's vital signs. Patient Vitals for the past 12 hrs: 
 Temp Pulse Resp BP SpO2  
04/25/19 2331 98.3 °F (36.8 °C) (!) 111 20 143/60 92 % 04/25/19 1949 98.6 °F (37 °C) (!) 112 20 129/55 97 % 04/25/19 1544 99.1 °F (37.3 °C) (!) 110 20 129/55 97 % 04/25/19 1247 98.4 °F (36.9 °C) (!) 110 16 142/62 94 % Pulse Oximetry Analysis - 97% on RA Cardiac Monitor:  
Rate: 112bpm 
Rhythm: Sinus Tachycardia Records Reviewed: Nursing Notes, Old Medical Records, Ambulance Run Sheet, Previous Radiology Studies and Previous Laboratory Studies Provider Notes (Medical Decision Making): MDM: Middle-aged female with known cirrhosis presenting with altered mental status and disorientation. Daughter notes prior symptoms similar to hepatic encephalopathy episodes which led the patient to admission and intubation in the ICU. Currently patient is awake and alert and oriented to date and time but disoriented to today's events. Her exam is notable for tachycardia but without evidence or source of infection on exam; low suspicion for sepsis thus will hold IV antibiotics and large fluid bolus at this time. ED Course:  
Initial assessment performed. The patients presenting problems have been discussed, and they are in agreement with the care plan formulated and outlined with them. I have encouraged them to ask questions as they arise throughout their visit. EKG interpretation: (Preliminary) Rhythm: sinus tachycardia; and regular . Rate (approx.): 109; Axis: normal; WI interval: normal; QRS interval: normal ; ST/T wave: Nonspecific changes PROGRESS NOTE: 
5:45 PM 
Pt appears slightly improved with improved responses but then continues to appear altered and speaks in sentences about things that are not present and do not exist and are not real.  Discussed results with family with recommendations for IV antibiotics of current UTI. Given continued altered mental status recommend admission to the hospital for further observation. CONSULT NOTE:  
6 PM 
Garret Pitts MD spoke with Dr Maritza Thao, Specialty: Internal medicine hospitalist 
 Discussed pt's hx, disposition, and available diagnostic and imaging results. Reviewed care plans. Consultant agrees with plans as outlined. Critical Care Time:  
0 Diagnosis Clinical Impression: 1. Acute cystitis without hematuria 2. Disorientation 3. Goals of care, counseling/discussion 4. Advanced care planning/counseling discussion 5. Debility 6. Neuropathic pain 7. Chronic bilateral low back pain with bilateral sciatica PLAN: 
1. Admission to internal medicine for further evaluation. Please note, this dictation was completed with FOUNDD, the computer voice recognition software. Quite often unanticipated grammatical, syntax, homophones, and other interpretive errors are inadvertently transcribed by the computer software. Please disregard these errors. Please excuse any errors that have escaped final proof reading.

## 2019-04-24 NOTE — ED NOTES
Bedside shift change report given to [address] [place] [address] (oncoming nurse) by Edwar Kang RN (offgoing nurse). Report included the following information SBAR, Kardex, ED Summary and MAR.

## 2019-04-24 NOTE — ED NOTES
Pt rambling on about quitting smoking, her sister, her being dry, and so forth. Pt denies pain. States her name but did not answer correctly to Year or where she is.  \"yes I am in the hospital\" when this nurse said you are in the hospital.

## 2019-04-24 NOTE — ACP (ADVANCE CARE PLANNING)
Advance Care Planning Note Name: Alanna Brown YOB: 1955 MRN: [id number] Admission Date: 4/24/2019  2:00 PM 
 
Date of discussion: 4/24/2019 Active Diagnoses: 
 
Hospital Problems  Date Reviewed: 3/19/2019 Codes Class Noted POA Encephalopathy ICD-10-CM: G93.40 ICD-9-CM: 348.30  4/24/2019 Unknown Liver cirrhosis Hx of recurrent hepatic encephalopathy UTI 
  
  These active diagnoses are of sufficient risk that focused discussion on advance care planning is indicated in order to allow the patient to thoughtfully consider personal goals of care, and if situations arise that prevent the ability to personally give input, to ensure appropriate representation of their personal desires for different levels and aggressiveness of care.   
  
Persons present and participating in discussion: patient Xu Garcia and her [address] (), pt's RN Keren Hook. 
  
Discussion: Code status addressed due to above mentioned medical problems and also her chronic comorbidity /advance age and her  MPOA wants her to be a DNR. 
  
Time Spent:  
Total time spent face-to-face in education and discussion:16 minutes.  
  
Presley Srinivasan MD 
4/24/2019 
7:00 PM

## 2019-04-24 NOTE — PROGRESS NOTES
Discussed vancomycin consult with Dr. Collette Ramirez. Consult was ordered in error. Based on conversation will d/c vancomycin consult.

## 2019-04-24 NOTE — ED NOTES
Placed pt on bedpan due to c/o needing to poop per family. Pt on bedpan but did not have a BM, urinated only. New brief applied and purewik. Family feels pt is getting more agitated. Will continue to monitor. Side rails up x 2, bed in lowest position, call light within reach.

## 2019-04-25 NOTE — PROGRESS NOTES
Bedside and Verbal shift change report given to Troy Ambrocio RN (oncoming nurse) by Tomeka Lisa RN (offgoing nurse).  Report included the following information SBAR, Kardex, Intake/Output, MAR and Recent Results.

## 2019-04-25 NOTE — PROGRESS NOTES
Problem: Mobility Impaired (Adult and Pediatric) Goal: *Acute Goals and Plan of Care (Insert Text) Description Physical Therapy Goals Initiated 4/25/2019 1. Patient will move from supine to sit and sit to supine  in bed with modified independence within 7 day(s). 2.  Patient will transfer from bed to chair and chair to bed with minimal assistance/contact guard assist using the least restrictive device within 7 day(s). 3.  Patient will perform sit to stand with minimal assistance/contact guard assist within 7 day(s). 4.  Patient will ambulate with minimal assistance/contact guard assist for 50 feet with the least restrictive device within 7 day(s). Outcome: Progressing Towards Goal 
 PHYSICAL THERAPY EVALUATION Patient: Tayo Hdez (84 y.o. female) Date: 4/25/2019 Primary Diagnosis: Encephalopathy [G93.40] Precautions: Fall, DNR 
 
ASSESSMENT : 
Based on the objective data described below, the patient presents with decreased mobility, decreased strength, decreased independence with functional mobility and increased pain that interferes with function. Patient is received from inpatient rehab secondary to altered mental status. Patient PMHx is consistent with recurrent hepatic encephalopathy, cirrohis,  and HTN. Per her daughter she was making very little progress in rehab. Patient lives in a one story home with her  with wheelchair ramp entrance. Patient PLOF independent without assistive device. On evaluation patient is received supine in bed. She is agreeable to mobility but lethargic. Patient is received on 2.5 L O2 via nasal cannula. Patient requires Min A for supine to sit at EOB. She reports increased LE pain, especially bilateral feet. Patient is Max Ax2 for sit<>stand. Patient is unable to maintain standing as she does not bear weight through her LE secondary to pain.  Patient is Max A to scoot backwards on the bed secondary to increased pain weight bearing through LE in order to push herself backwards. Patient is Mod A for sit to supine and total assistance to scoot up in bed. Patient would benefit from D/C to IPR secondary to mobility far below baseline. Patient will benefit from skilled intervention to address the above impairments. Patient?s rehabilitation potential is considered to be Good Factors which may influence rehabilitation potential include:  
? None noted ? Mental ability/status ? Medical condition ? Home/family situation and support systems ? Safety awareness 
? Pain tolerance/management 
? Other: PLAN : 
Recommendations and Planned Interventions: 
?           Bed Mobility Training             ? Neuromuscular Re-Education ? Transfer Training                   ? Orthotic/Prosthetic Training 
? Gait Training                         ? Modalities ? Therapeutic Exercises           ? Edema Management/Control ? Therapeutic Activities            ? Patient and Family Training/Education ? Other (comment): Frequency/Duration: Patient will be followed by physical therapy  5 times a week to address goals. Discharge Recommendations: Inpatient Rehab Further Equipment Recommendations for Discharge: TBD SUBJECTIVE:  
Patient stated ? I just can't do it. ? OBJECTIVE DATA SUMMARY:  
HISTORY:   
Past Medical History:  
Diagnosis Date Asthma Back pain COPD (chronic obstructive pulmonary disease) ([facility] [address] Diabetes ([facility] [address] [facility] Esophageal varices in cirrhosis (HCC)   
 6/2014 banding x 2 Fibromyalgia Gastrointestinal disorder GERD (gastroesophageal reflux disease) Hypercholesteremia Liver cirrhosis secondary to BALDERAS ([address] Liver disease Other and unspecified hyperlipidemia Restless leg syndrome Type II or unspecified type diabetes mellitus without mention of complication, uncontrolled Unspecified essential hypertension Past Surgical History:  
Procedure Laterality Date HX BACK SURGERY HX CARPAL TUNNEL RELEASE    
 on right HX HYSTERECTOMY plus 1/2 of an ovary removed CT COLSC FLX W/RMVL OF TUMOR POLYP LESION SNARE TQ  5/30/2013 UPPER GI ENDOSCOPY,LIGAT VARIX  2/6/2015 Prior Level of Function/Home Situation: Independent prior to inpatient rehab Personal factors and/or comorbidities impacting plan of care: mental status, increase need for assistance, pain tolerance, and support system Home Situation Home Environment: Rehabilitation facility Wheelchair Ramp: Yes One/Two Story Residence: One story Living Alone: No 
Support Systems: Spouse/Significant Other/Partner Current DME Used/Available at Home: Shower chair, Walker, rolling, Commode, bedside EXAMINATION/PRESENTATION/DECISION MAKING:  
Critical Behavior: 
Neurologic State: Alert, Confused Orientation Level: Oriented to person, Oriented to place, Disoriented to time, Disoriented to situation Cognition: Follows commands Safety/Judgement: Fall prevention, Decreased insight into deficits Hearing: 
  
Skin:   
Edema:  
Range Of Motion: 
AROM: Generally decreased, functional 
  
  
  
PROM: Generally decreased, functional 
  
  
  
Strength:   
Strength: Generally decreased, functional 
  
  
  
  
  
  
Tone & Sensation:  
Tone: Normal 
  
  
  
  
Sensation: Impaired(hypersensitive feet) Coordination: 
Coordination: Generally decreased, functional 
Vision:  
Acuity: Within Defined Limits Functional Mobility: 
Bed Mobility: 
  
Supine to Sit: Minimum assistance; Additional time Sit to Supine: Moderate assistance Scooting: Total assistance Transfers: 
Sit to Stand: Maximum assistance;Assist x2 Stand to Sit: Maximum assistance;Assist x2 Balance: Sitting: Intact; High guard Standing: Impaired Ambulation/Gait Training: 
  
  
  
  
  
  
  
  
  
  
  
  
  
  
  
  
  
  
 Stairs: Therapeutic Exercises:  
 
 
Functional Measure: 
Tinetti test: 
 
Sitting Balance: 0 Arises: 0 Attempts to Rise: 0 Immediate Standing Balance: 0 Standing Balance: 0 Nudged: 0 Eyes Closed: 0 Turn 360 Degrees - Continuous/Discontinuous: 0 Turn 360 Degrees - Steady/Unsteady: 0 Sitting Down: 0 Balance Score: 0 Indication of Gait: 0 
R Step Length/Height: 0 
L Step Length/Height: 0 
R Foot Clearance: 0 
L Foot Clearance: 0 Step Symmetry: 0 Step Continuity: 0 Path: 0 Trunk: 0 Walking Time: 0 Gait Score: 0 Total Score: 0 Tinetti Tool Score Risk of Falls 
<19 = High Fall Risk 19-24 = Moderate Fall Risk 25-28 = Low Fall Risk Tinetti ME. Performance-Oriented Assessment of Mobility Problems in Elderly Patients. [address]; [id number]. (Scoring Description: PT Bulletin Feb. 10, 1993) Older adults: Brianne Schlatter et al, 2009; n = [address] elderly evaluated with ABC, ALESIA, ADL, and IADL) · Mean ALESIA score for males aged 69-68 years = 26.21(3.40) · Mean ALESIA score for females age 69-68 years = 25.16(4.30) · Mean ALESIA score for males over 80 years = 23.29(6.02) · Mean ALESIA score for females over 80 years = 17.20(8.32) Physical Therapy Evaluation Charge Determination History Examination Presentation Decision-Making MEDIUM  Complexity : 1-2 comorbidities / personal factors will impact the outcome/ POC  MEDIUM Complexity : 3 Standardized tests and measures addressing body structure, function, activity limitation and / or participation in recreation  MEDIUM Complexity : Evolving with changing characteristics  Other outcome measures Tinetti  HIGH Based on the above components, the patient evaluation is determined to be of the following complexity level: MEDIUM Pain: 
Pain Scale 1: Numeric (0 - 10) Pain Intensity 1: 0 
  
  
  
  
 Activity Tolerance:  
Patient demonstrates poor endurance and activity limitations 2/2 pain Please refer to the flowsheet for vital signs taken during this treatment. After treatment:  
?         Patient left in no apparent distress sitting up in chair ? Patient left in no apparent distress in bed 
? Call bell left within reach ? Nursing notified ? Caregiver present ? Bed alarm activated COMMUNICATION/EDUCATION:  
The patient?s plan of care was discussed with: Occupational Therapist and Registered Nurse. ?         Fall prevention education was provided and the patient/caregiver indicated understanding. ? Patient/family have participated as able in goal setting and plan of care. ?         Patient/family agree to work toward stated goals and plan of care. ?         Patient understands intent and goals of therapy, but is neutral about his/her participation. ? Patient is unable to participate in goal setting and plan of care. Thank you for this referral. 
Kristen Altamirano, PT Time Calculation: 46 mins

## 2019-04-25 NOTE — ROUTINE PROCESS
Bedside and Verbal shift change report given to [address] [place] [address] (oncoming nurse) by Chetna Muñoz RN (offgoing nurse). Report included the following information SBAR, Kardex, ED Summary, [facility] and Recent Results. Zone Phone:   [phone] Significant changes during shift:  Admit to [facility]. Called RR nurse because Pt had a MEWS of 4. Pt had a change in LOC and would only open eyes briefly to sternal rub. HR was 110-112 and BP was 130/93. Pt was more awake and responsive when RR nurse Jerome Catalan arrived. Pt has a hx of COPD, Mariella ordered ABG to check CO2 level. Level was within normal range. Pt still confused but more responsive, /60. Pt sleeping. Patient Information Kendra Kramer 
61 y.o. 
4/24/2019  2:00 PM by Gill Smart MD. Kendra Kramer was admitted from [facility] Rehab Problem List 
 
Patient Active Problem List  
 Diagnosis Date Noted  Encephalopathy 04/24/2019  Acute respiratory failure with hypercapnia ([address] 04/02/2019  Counseling regarding advance care planning and goals of care 04/02/2019  Hepatic encephalopathy ([address] 03/28/2019  Colitis 03/28/2019  UTI (urinary tract infection) 03/28/2019  Septic shock ([address] 03/28/2019  Bilateral carotid artery stenosis 11/14/2018  TIA (transient ischemic attack) 11/13/2018  GERD (gastroesophageal reflux disease) 11/13/2018  Therapeutic drug monitoring 10/16/2018  Severe obesity ([address] 10/11/2018  Obesity (BMI 30.0-34.9) 07/10/2018  Sepsis ([address] 03/30/2017  CAP (community acquired pneumonia) 03/30/2017  IBIS (obstructive sleep apnea) 03/30/2017  Tobacco abuse 03/30/2017  Neuropathy 03/30/2017  COPD (chronic obstructive pulmonary disease) ([address] 03/28/2017  Acute deep vein thrombosis (DVT) of right lower extremity ([address] 11/10/2016  
 BALDERAS (nonalcoholic steatohepatitis) 03/10/2016  GI bleed 03/03/2016  Anemia 03/01/2016  Thrombocytopenia ([address] 08/15/2013  Previous back surgery 08/15/2013  S/P CHACHO (total abdominal hysterectomy) 08/15/2013  Cirrhosis ([facility] 08/15/2013  Diabetes mellitus with neurological manifestations, uncontrolled ([facility]  Essential hypertension, benign  Hyperlipidemia LDL goal <100 Past Medical History:  
Diagnosis Date  Asthma  Back pain  COPD (chronic obstructive pulmonary disease) (HCC)  Diabetes ([facility]  Esophageal varices in cirrhosis ([facility]   
 6/2014 banding x 2  
 Fibromyalgia  Gastrointestinal disorder  GERD (gastroesophageal reflux disease)  Hypercholesteremia  Liver cirrhosis secondary to BALDERAS ([facility]  Liver disease  Other and unspecified hyperlipidemia  Restless leg syndrome  Type II or unspecified type diabetes mellitus without mention of complication, uncontrolled  Unspecified essential hypertension Core Measures: CVA: No No 
CHF:No No 
PNA:No No 
 
 
Activity Status: OOB to Chair No 
Ambulated this shift No  
Bed Rest Yes Supplemental O2: (If Applicable) NC Yes NRB No 
Venti-mask No 
On 2 Liters/min LINES AND DRAINS: 
 
DVT prophylaxis: DVT prophylaxis Med- Yes, Heparin DVT prophylaxis SCD or CRISTOPHER- No  
 
Wounds: (If Applicable) Patient Safety: 
 
Falls Score Total Score: 3 Safety Level_______ Bed Alarm On? Yes Sitter? No 
 
Plan for upcoming shift: Consults, safety and rest 
 
 
 
Discharge Plan: Yes, Rehab Active Consults: 
IP CONSULT TO PALLIATIVE CARE - PROVIDER

## 2019-04-25 NOTE — PROGRESS NOTES
Patient c/c pain on bilat legs to touch and ambulation, about 6/10 and relieved at rest. Patient takes Tramadol at home.

## 2019-04-25 NOTE — PROGRESS NOTES
Palliative Medicine Social Work Spoke with  on phone. Family meeting set for 3pm Friday 4.26 to redo POST. Thank you for the opportunity to be involved in the care of Ms. Bryson Carvalho and her family. Michi Frank, [facility] Palliative Medicine  [phone]

## 2019-04-25 NOTE — PROGRESS NOTES
Problem: Self Care Deficits Care Plan (Adult) Goal: *Acute Goals and Plan of Care (Insert Text) Description Occupational Therapy Goals Initiated 4/25/2019 1. Patient will perform grooming with supervision/set-up in unsupported sit within 7 day(s). 2.  Patient will perform upper body dressing with moderate assistance  within 7 day(s). 3.  Patient will perform toilet transfers with moderate assistance to [facility]  within 7 day(s). 4.  Patient will perform all aspects of toileting with moderate assistance  within 7 day(s). 5.  Patient will participate in upper extremity therapeutic exercise/activities with supervision/set-up for 5 minutes within 7 day(s). OCCUPATIONAL THERAPY EVALUATION Patient: Terri Rodríguez (75 y.o. female) Date: 4/25/2019 Primary Diagnosis: Encephalopathy [G93.40] Precautions: Fall, DNR 
 
ASSESSMENT : 
Cleared by RN to see pt for therapy session. Based on the objective data described below, the patient presents with impaired functional mobility, generalized weakness (particularly LEs), decreased AROM, decreased balance and endurance, pain in LEs and hypersensitivity to feet, and confusion. At baseline pt is independent with ADLs and mobility and lives with her , but has recently been in rehab following admission for similar symptoms. Received supine in bed, agreeable to participate, alert and oriented to self and place only. Came to sitting EOB with min A and cues for body mechanics. Good-fair dynamic sitting balance during reaching task and grooming ADL. Pt reported increased back pain while seated EOB, and declined attempting to stand due to LE pain. Attempted scooting laterally however unable 2/2 LE weakness and feet slipping on floor. Mod A to return to bed, where pt performed grooming and simple feeding ADLs in supporting long sit.  At this time pt requires assist for bed mobility, setup-max A for UB ADLs and total A for LB ADLs, and will benefit from skilled intervention to address the above impairments. Recommend rehab at discharge to increase independence and safety. Patient?s rehabilitation potential is considered to be Good Factors which may influence rehabilitation potential include: ? None noted ? Mental ability/status ? Medical condition ? Home/family situation and support systems ? Safety awareness ? Pain tolerance/management ? Other: PLAN : 
Recommendations and Planned Interventions: 
?               Self Care Training                  ? Therapeutic Activities ? Functional Mobility Training    ? Cognitive Retraining 
? Therapeutic Exercises           ? Endurance Activities ? Balance Training                   ? Neuromuscular Re-Education ? Visual/Perceptual Training     ? Home Safety Training 
? Patient Education                 ? Family Training/Education ? Other (comment): Frequency/Duration: Patient will be followed by occupational therapy 4 times a week to address goals. Discharge Recommendations: Rehab Further Equipment Recommendations for Discharge: TBD SUBJECTIVE:  
Patient stated ? I can't have anything touching my feet. ? OBJECTIVE DATA SUMMARY:  
HISTORY:  
Past Medical History:  
Diagnosis Date Asthma Back pain COPD (chronic obstructive pulmonary disease) ([facility] Diabetes ([facility] Esophageal varices in cirrhosis (HCC)   
 6/2014 banding x 2 Fibromyalgia Gastrointestinal disorder GERD (gastroesophageal reflux disease) Hypercholesteremia Liver cirrhosis secondary to BALDERAS ([facility] Liver disease Other and unspecified hyperlipidemia Restless leg syndrome Type II or unspecified type diabetes mellitus without mention of complication, uncontrolled Unspecified essential hypertension Past Surgical History:  
Procedure Laterality Date HX BACK SURGERY HX CARPAL TUNNEL RELEASE    
 on right HX HYSTERECTOMY plus 1/2 of an ovary removed [place] COLSC FLX W/RMVL OF TUMOR POLYP LESION SNARE TQ  5/30/2013 UPPER GI ENDOSCOPY,LIGAT VARIX  2/6/2015 At baseline pt is independent with ADLs and mobility and lives with her , but has recently been in rehab following admission for similar symptoms. Pt required assistance for all transfers and ADLs at SNF prior to this admission. Prior Level of Function/Environment/Context:  
Home Situation Home Environment: Rehabilitation facility Wheelchair Ramp: Yes One/Two Story Residence: One story Living Alone: No 
Support Systems: Spouse/Significant Other/Partner Current DME Used/Available at Home: Shower chair, Walker, rolling, Commode, bedside Hand dominance: Right EXAMINATION OF PERFORMANCE DEFICITS: 
Cognitive/Behavioral Status: 
Neurologic State: Alert;Confused Orientation Level: Oriented to person;Oriented to place; Disoriented to time;Disoriented to situation Cognition: Follows commands Perception: Appears intact Perseveration: No perseveration noted Safety/Judgement: Fall prevention;Decreased insight into deficits Vision/Perceptual:   
    
Acuity: Within Defined Limits Range of Motion: 
AROM: Generally decreased, functional 
PROM: Generally decreased, functional 
  
 
Strength: 
Strength: Generally decreased, functional 
  
 
Coordination: 
Coordination: Generally decreased, functional 
Fine Motor Skills-Upper: Left Intact; Right Intact Gross Motor Skills-Upper: Left Intact; Right Intact(generally weak) Tone & Sensation: 
Tone: Normal 
Sensation: Impaired(hypersensitive feet) Balance: 
Sitting: Intact; High guard Standing: Impaired Functional Mobility and Transfers for ADLs: 
Bed Mobility: 
Supine to Sit: Minimum assistance; Additional time Sit to Supine: Moderate assistance Scooting: Total assistance Transfers: ADL Assessment: 
Feeding: Minimum assistance Oral Facial Hygiene/Grooming: Minimum assistance Bathing: Maximum assistance Upper Body Dressing: Total assistance Lower Body Dressing: Total assistance Toileting: Total assistance ADL Intervention and task modifications: 
Feeding Drink to Mouth: Supervision/set-up Grooming Washing Hands: Supervision/set-up(semisupine) Brushing/Combing Hair: Minimum assistance(seated EOB, A for back) Cognitive Retraining Safety/Judgement: Fall prevention;Decreased insight into deficits Functional Measure: 
Barthel Index: 
Bathin Bladder: 5 Bowels: 5 Groomin Dressin Feedin Mobility: 0 Stairs: 0 Toilet Use: 0 Transfer (Bed to Chair and Back): 5 Total: 20/100 Percentage of impairment  
0% 1-19% 20-39% 40-59% 60-79% 80-99% 100% Barthel Score 0-100 100 99-80 79-60 59-40 20-39 1-19 
 0 The Barthel ADL Index: Guidelines 1. The index should be used as a record of what a patient does, not as a record of what a patient could do. 2. The main aim is to establish degree of independence from any help, physical or verbal, however minor and for whatever reason. 3. The need for supervision renders the patient not independent. 4. A patient's performance should be established using the best available evidence. Asking the patient, friends/relatives and nurses are the usual sources, but direct observation and common sense are also important. However direct testing is not needed. 5. Usually the patient's performance over the preceding 24-48 hours is important, but occasionally longer periods will be relevant. 6. Middle categories imply that the patient supplies over 50 per cent of the effort. 7. Use of aids to be independent is allowed. Coit Hilario., Barthel DKATHY. (1886).  Functional evaluation: the Barthel Index. [address] [facility] (14)2. ADITYA WayneF, Dana Leach., Isiah Sanchez., [place], [address] (1999). Measuring the change indisability after inpatient rehabilitation; comparison of the responsiveness of the Barthel Index and Functional Robbins Measure. Journal of Neurology, Neurosurgery, and Psychiatry, 66(4), 980-127. GINGER Da Silva, WILBUR Lui, & Oralia Owens M.A. (2004.) Assessment of post-stroke quality of life in cost-effectiveness studies: The usefulness of the Barthel Index and the EuroQoL-5D. [facility], 13, [phone] Occupational Therapy Evaluation Charge Determination History Examination Decision-Making LOW Complexity : Brief history review  MEDIUM Complexity : 3-5 performance deficits relating to physical, cognitive , or psychosocial skils that result in activity limitations and / or participation restrictions MEDIUM Complexity : Patient may present with comorbidities that affect occupational performnce. Miniml to moderate modification of tasks or assistance (eg, physical or verbal ) with assesment(s) is necessary to enable patient to complete evaluation Based on the above components, the patient evaluation is determined to be of the following complexity level: LOW Pain: 
Pain Scale 1: Numeric (0 - 10) Pain Intensity 1: 0 Activity Tolerance:  
Good Please refer to the flowsheet for vital signs taken during this treatment. After treatment:  
? Patient left in no apparent distress sitting up in chair ? Patient left in no apparent distress in bed 
? Call bell left within reach ? Nursing notified ? Caregiver present ? Bed alarm activated COMMUNICATION/EDUCATION:  
The patient?s plan of care was discussed with: Physical Therapist and Registered Nurse. 
? Home safety education was provided and the patient/caregiver indicated understanding. ? Patient/family have participated as able in goal setting and plan of care. ? Patient/family agree to work toward stated goals and plan of care. ? Patient understands intent and goals of therapy, but is neutral about his/her participation. ? Patient is unable to participate in goal setting and plan of care. This patient?s plan of care is appropriate for delegation to [facility]. Thank you for this referral. 
Lakisha Olvera, OT Time Calculation: 25 mins

## 2019-04-25 NOTE — CONSULTS
Palliative Medicine Consult Willy: [phone] Patient Name: Bernadette Moss YOB: 1955 Date of Initial Consult: 4/24/2019 Reason for Consult: Care Decisions Requesting Provider: Wojciech Gómez MD 
Primary Care Physician: Dennis Hess NP 
 
 SUMMARY:  
Bernadette Moss is a 61 y.o. with a past history of chronic back pain, COPD, DM, Esophageal varices in cirrhosis s/p banding x 2 in 2014, Fibromyalgia, GERD, and Liver cirrhosis secondary to BALDERAS, who was admitted on 4/24/2019 from [facility] with a diagnosis of Acute metabolic encephalopathy likely from a complicated UTI. Per pt's , when pt became confused, he thought it was due to hyperammonemia again, however level was checked at facility which was normal. c onfuion progressively worsened today which lead the patient to the ER for evaluation Current medical issues leading to Palliative Medicine involvement include: goals of care discussion in setting of recent readmission PALLIATIVE DIAGNOSES:  
1. Goals of care discussion 2. Advanced Care Planning 3. Debility 4. Neuropathic pain of feet 5. Chronic back pain PLAN:  
1. Mrs Lourdes Sadler was alert and somewhat oriented in bed with her daughter at the bedside during my visit 2. She was very focused on her neuropathic pain, and chronic back pain, as was her daughter, but after listening to them for a while, I was able to redirect the conversation to her goals 3. She would like to stay out of the hospital if possible- in order to spend time with her family, but does want treatment for acute issues that CAN be treated 4. She tells me that she did not think she would live long enough to see her grandchildren, but now she has 3, and she is hopeful she will live long enough to see the youngest grow up 5.  We talked about Hospice level care, as her last POST has \"comfort measures\" selected, but she is not there yet as she does want treatment for things that can be treated 6. Recommended that we complete a new POST form reflecting her wishes for limited interventions, when her  can be present for the discussion 7. Will follow up tomorrow when  can be present to continue discussions 8. Will defer pain management to attending, as it all seems like chronic pain from neuropathy and back pain 9. Initial consult note routed to primary continuity provider and/or primary health care team members 10. Communicated plan of care with: Palliative IDTCollin 192 Team 
 
 GOALS OF CARE / TREATMENT PREFERENCES:  
 
GOALS OF CARE: 
Patient/Health Care Proxy Stated Goals: Other (comment)(treatment for acute illness, avoiding hospitalizations so she can spend time with her family) TREATMENT PREFERENCES:  
Code Status: DNR Advance Care Planning: 
[] The [facility] Interdisciplinary Team has updated the ACP Navigator with Madisonn and Patient Capacity Primary Decision Maker: Don Mccray Spouse - [phone] Medical Interventions: Limited additional interventions Other Instructions: Other: As far as possible, the palliative care team has discussed with patient / health care proxy about goals of care / treatment preferences for patient. HISTORY:  
 
History obtained from: patient and daughter CHIEF COMPLAINT: pain in her feet HPI/SUBJECTIVE: The patient is:  
[x] Verbal and participatory [] Non-participatory due to:  
 
Alert, conversational 
In no acute distress Seems more alert and oriented than she was in the ED Clinical Pain Assessment (nonverbal scale for severity on nonverbal patients):  
Clinical Pain Assessment Severity: 4 Location: feet and legs Character: sharp, needles Duration: chronic Effect: limits ability to walk Frequency: constant Duration: for how long has pt been experiencing pain (e.g., 2 days, 1 month, years) Frequency: how often pain is an issue (e.g., several times per day, once every few days, constant) FUNCTIONAL ASSESSMENT:  
 
Palliative Performance Scale (PPS): PSYCHOSOCIAL/SPIRITUAL SCREENING:  
 
Palliative IDT has assessed this patient for cultural preferences / practices and a referral made as appropriate to needs (Cultural Services, Patient Advocacy, Ethics, etc.) Any spiritual / Judaism concerns: 
[] Yes /  [x] No 
 
Caregiver Burnout: 
[] Yes /  [] No /  [x] No Caregiver Present Anticipatory grief assessment:  
[x] Normal  / [] Maladaptive ESAS Anxiety: Anxiety: 0 
 
ESAS Depression: Depression: 0 REVIEW OF SYSTEMS:  
 
Positive and pertinent negative findings in ROS are noted above in HPI. The following systems were [x] reviewed / [] unable to be reviewed as noted in HPI Other findings are noted below. Systems: constitutional, ears/nose/mouth/throat, respiratory, gastrointestinal, genitourinary, musculoskeletal, integumentary, neurologic, psychiatric, endocrine. Positive findings noted below. Modified ESAS Completed by: provider Fatigue: 3 Depression: 0 Pain: 4 Anxiety: 0 Nausea: 0 Anorexia: 0 Dyspnea: 0 Constipation: No  
     
 
 
 PHYSICAL EXAM:  
 
From RN flowsheet: 
Wt Readings from Last 3 Encounters:  
04/24/19 190 lb 7.6 oz (86.4 kg) 04/09/19 230 lb 9.6 oz (104.6 kg) 03/19/19 223 lb 12.8 oz (101.5 kg) Blood pressure 142/62, pulse (!) 110, temperature 98.4 °F (36.9 °C), resp. rate 16, height 5' 8\" (1.727 m), weight 190 lb 7.6 oz (86.4 kg), SpO2 94 %. Pain Scale 1: Numeric (0 - 10) Pain Intensity 1: 0 Last bowel movement, if known:  
 
Constitutional: alert, in no acute distress Eyes: pupils equal, anicteric ENMT: no nasal discharge, moist mucous membranes Cardiovascular: regular rhythm Respiratory: breathing not labored, symmetric Gastrointestinal: soft non-tender,  
 Musculoskeletal: no deformity, no tenderness to palpation Skin: warm, dry Neurologic: following commands, moving all extremities Psychiatric: full affect, no hallucinations Other: 
 
 
 HISTORY:  
 
Active Problems: 
  Encephalopathy (4/24/2019) Past Medical History:  
Diagnosis Date  Asthma  Back pain  COPD (chronic obstructive pulmonary disease) (HCC)  Diabetes [facility]  Es[facility] varices in [facility] ([facility]   
 6/2014 banding x 2  
 Fibromyalgia  Gastrointestinal disorder  GERD (gastroesophageal reflux disease)  Hypercholesteremia  Liver cirrhosis secondary to BALDERAS ([facility]  Liver disease  Other and unspecified hyperlipidemia  Restless leg syndrome  Type II or unspecified type diabetes mellitus without mention of complication, uncontrolled  Unspecified essential hypertension Past Surgical History:  
Procedure Laterality Date  HX BACK SURGERY    
 HX CARPAL TUNNEL RELEASE    
 on right  HX HYSTERECTOMY plus 1/2 of an ovary removed  NE COLSC FLX W/RMVL OF TUMOR POLYP LESION SNARE TQ  5/30/2013  UPPER GI ENDOSCOPY,LIGAT VARIX  2/6/2015 Family History Problem Relation Age of Onset  Diabetes Mother  Stroke Sister  Diabetes Paternal Aunt  Diabetes Paternal Uncle  Heart Disease Neg Hx History reviewed, no pertinent family history. Social History Tobacco Use  Smoking status: Current Every Day Smoker Packs/day: 1.00 Years: 40.00 Pack years: 40.00  Smokeless tobacco: Former User Substance Use Topics  Alcohol use: No  
  Comment: rare Allergies Allergen Reactions  Hydrocodone Nausea and Vomiting  Lisinopril Cough  Lortab [Hydrocodone-Acetaminophen] Nausea and Vomiting  Percocet [Oxycodone-Acetaminophen] Nausea and Vomiting Current Facility-Administered Medications Medication Dose Route Frequency  sodium chloride (NS) flush 5-40 mL  5-40 mL IntraVENous Q8H  
 sodium chloride (NS) flush 5-40 mL  5-40 mL IntraVENous PRN  
 ondansetron (ZOFRAN ODT) tablet 4 mg  4 mg Oral Q4H PRN  
 bisacodyl (DULCOLAX) suppository 10 mg  10 mg Rectal DAILY PRN  
 heparin (porcine) injection 5,000 Units  5,000 Units SubCUTAneous Q8H  
 albuterol-ipratropium (DUO-NEB) 2.5 MG-0.5 MG/3 ML  3 mL Nebulization Q4H PRN  
 aspirin chewable tablet 81 mg  81 mg Oral DAILY  atorvastatin (LIPITOR) tablet 40 mg  40 mg Oral QHS  ferrous sulfate tablet 325 mg  1 Tab Oral DAILY WITH BREAKFAST  fluticasone-vilanterol (BREO ELLIPTA) 200mcg-25mcg/puff  1 Puff Inhalation DAILY  furosemide (LASIX) tablet 80 mg  80 mg Oral DAILY  insulin glargine (LANTUS) injection 15 Units  15 Units SubCUTAneous QHS  losartan (COZAAR) tablet 25 mg  25 mg Oral DAILY  pantoprazole (PROTONIX) tablet 40 mg  40 mg Oral ACB  rifAXIMin (XIFAXAN) tablet 550 mg  550 mg Oral BID  rOPINIRole (REQUIP) tablet 4 mg  4 mg Oral QHS  spironolactone (ALDACTONE) tablet 200 mg  200 mg Oral DAILY  cefTRIAXone (ROCEPHIN) 1 g in 0.9% sodium chloride (MBP/ADV) 50 mL  1 g IntraVENous Q24H  
 lactulose (CHRONULAC) 10 gram/15 mL solution 45 mL  45 mL Oral TID  insulin lispro (HUMALOG) injection   SubCUTAneous AC&HS  
 glucose chewable tablet 16 g  4 Tab Oral PRN  
 dextrose (D50) infusion 12.5-25 g  12.5-25 g IntraVENous PRN  
 glucagon (GLUCAGEN) injection 1 mg  1 mg IntraMUSCular PRN  
 nitroglycerin (NITROBID) 2 % ointment 1 Inch  1 Inch Topical TID PRN  
 
 
 
 LAB AND IMAGING FINDINGS:  
 
Lab Results Component Value Date/Time WBC 5.4 04/25/2019 03:28 AM  
 HGB 10.4 (L) 04/25/2019 03:28 AM  
 PLATELET 74 (L) 41/83/8783 03:28 AM  
 
Lab Results Component Value Date/Time  Sodium 136 04/25/2019 03:28 AM  
 Potassium 3.6 04/25/2019 03:28 AM  
 Chloride 98 04/25/2019 03:28 AM  
 CO2 35 (H) 04/25/2019 03:28 AM  
 BUN 11 04/25/2019 03:28 AM  
 Creatinine 0.58 04/25/2019 03:28 AM  
 Calcium 8.3 (L) 04/25/2019 03:28 AM  
 Magnesium 2.7 (H) 04/04/2019 05:01 AM  
 Phosphorus 3.2 04/06/2019 04:17 AM  
  
Lab Results Component Value Date/Time AST (SGOT) 33 04/24/2019 02:49 PM  
 Alk. phosphatase 235 (H) 04/24/2019 02:49 PM  
 Protein, total 7.8 04/24/2019 02:49 PM  
 Albumin 3.3 (L) 04/24/2019 02:49 PM  
 Globulin 4.5 (H) 04/24/2019 02:49 PM  
 
Lab Results Component Value Date/Time INR 1.4 (H) 04/02/2019 04:07 AM  
 Prothrombin time 14.1 (H) 04/02/2019 04:07 AM  
 aPTT 26.2 04/02/2019 04:07 AM  
  
Lab Results Component Value Date/Time Iron 22 (L) 03/19/2019 01:48 PM  
 TIBC 379 03/19/2019 01:48 PM  
 Iron % saturation 6 (LL) 03/19/2019 01:48 PM  
 Ferritin 358 (H) 11/19/2018 12:00 AM  
  
No results found for: PH, PCO2, PO2 No components found for: [place] Lab Results Component Value Date/Time CK 47 03/28/2019 10:13 AM  
 CK - MB <1.0 11/13/2018 12:53 PM  
  
 
 
   
 
Total time:  
Counseling / coordination time, spent as noted above:  
> 50% counseling / coordination?:  
 
Prolonged service was provided for  []30 min   []75 min in face to face time in the presence of the patient, spent as noted above. Time Start:  
Time End:  
Note: this can only be billed with 38564 (initial) or 30708 (follow up). If multiple start / stop times, list each separately.

## 2019-04-25 NOTE — PROGRESS NOTES
RAPID RESPONSE TEAM 
 
Rounded on patient due to MEWS 4, , /93, LOC responds to voice. Discussed with primary RN, Chris Wills. Per RN, patient is less responsive, only opens eyes briefly to sternal rub. Upon assessment, patient opens eyes, mumbles, and then drifts back to sleep. History of COPD, ABGs ordered. Patient is more awake post ABG. Will continue to round. Please call with any questions or concerns. [facility] Delatorre Rapid Response SANDRA De Leon

## 2019-04-25 NOTE — ED NOTES
[facility] to be brought upstairs by transport with chart and belongings

## 2019-04-25 NOTE — PROGRESS NOTES
Hospitalist Progress NoteNAME: Trina Smith :  1955 MRN:  [id number] Admit date: 2019 Today's date: 19 PCP: ALEJANDRO Lovell M.D. Cell [phone] Assessment / Plan: 
 
Acute encephalopathy suspect hepatic POA Complicated UTI (recent norman cath) POA Known cirrhosis and portal HTN Prior hepatic encephalopathy, uses lactulose and rifaximin Normal ammonia but this is not required for diagnosis Definitely has asterixis ? SBP, US several weeks ago with trace ascites UA suggestive of UTI, but not enough that the MDs yesterday felt a culture was needed Ordered sat urine culture Blood culture negative Day 2 rocephin 
Hold sedating medications, but having increased pain, asking to resume tramadol Start at low dose Check head CT Continue rifaximin and increased lactulose Recheck ammonia in AM 
  
BALDERAS POA Liver cirrhosis POA History of recurrent hepatic encephalopathy History of esophageal varices NH3 34 but with confusion and asterixis Rifaxin and lactulose, lasix, losartan 
  
COPD without exacerbation Recent acute respiratory failure with hypercarbia and hypoxia Intubated last admit CXR showed mild bibasilar discoid atelectasis Resume home Meds ABG on 2 L NC 7.459/50/71 CXR with mild bibasilar discoid atelectasis. 
  
DM type 2 on insulin Restart low dose lantus SSI 
  
History of TIA  Hyperlipidemia POA Resume statin/ASA Overweight POA Body mass index is 28.96 kg/m². 
  
Code Status: DNR, discussed with pt's  at bedside Surrogate Decision Maker: pt's  Kiesha Kirkland DVT Prophylaxis: heparin GI Prophylaxis: not indicated Baseline: from [facility], family does not wish to return there. Subjective: Chief Complaint / Reason for Physician Visit f/u AMS \"I am not confused anymore\". Discussed with RN events overnight. Sent from rehab with increasing confusion x days Remains confused, oriented x 2 Pain in feet, asking to resume tramadol(neuropathic pain) No HA, CP, SOB, N/V or diarrhea Review of Systems: 
Symptom Y/N Comments  Symptom Y/N Comments Fever/Chills n   Chest Pain n   
Poor Appetite    Edema Cough n   Abdominal Pain n   
Sputum    Joint Pain SOB/MARTINEZ n   Headache n   
Nausea/vomit n   Tolerating PT/OT Diarrhea n   Tolerating Diet y Constipation    Other Could NOT obtain due to:   
 
Objective: VITALS:  
Last 24hrs VS reviewed since prior progress note. Most recent are: 
Patient Vitals for the past 24 hrs: 
 Temp Pulse Resp BP SpO2  
04/25/19 1247 98.4 °F (36.9 °C) (!) 110 16 142/62 94 % 04/25/19 0903  (!) 113  131/52   
04/25/19 0752 98.8 °F (37.1 °C) (!) 111 20 131/58 95 % 04/25/19 0434 98.2 °F (36.8 °C) (!) 112 22 124/60 92 % 04/25/19 0010 97.9 °F (36.6 °C) (!) 107 22 (!) 130/93 95 % 04/24/19 2113 97.7 °F (36.5 °C) (!) 109 22 132/84 100 % 04/24/19 2006 98.5 °F (36.9 °C) (!) 112 27 140/70 93 % 04/24/19 2006 98.2 °F (36.8 °C)      
04/24/19 1901  (!) 110 23 140/70 95 % 04/24/19 1855  (!) 111 20  93 % 04/24/19 1838  (!) 109 20  96 % 04/24/19 1800  (!) 107 20 109/88 96 % 04/24/19 1715  (!) 106 18 122/63 96 % 04/24/19 1700  (!) 104 18 104/82 98 % 04/24/19 1655  (!) 105 21 132/58 96 % 04/24/19 1641  (!) 104 21  99 % 04/24/19 1545  (!) 108 14 116/67 96 % 04/24/19 1530  (!) 107 21 105/64 97 % 04/24/19 1515  (!) 108 19 116/62 96 % Intake/Output Summary (Last 24 hours) at 4/25/2019 1502 Last data filed at 4/25/2019 1119 Gross per 24 hour Intake 240 ml Output 800 ml Net -560 ml Wt Readings from Last 12 Encounters:  
04/24/19 86.4 kg (190 lb 7.6 oz) 04/09/19 104.6 kg (230 lb 9.6 oz) 03/19/19 101.5 kg (223 lb 12.8 oz) 01/01/19 87.8 kg (193 lb 9 oz)  
11/19/18 84.8 kg (187 lb)  
11/13/18 84.7 kg (186 lb 11.7 oz) 11/13/18 84.7 kg (186 lb 11.7 oz) 10/16/18 86.7 kg (191 lb 3.2 oz) 10/11/18 89.3 kg (196 lb 12.8 oz) 07/10/18 83.6 kg (184 lb 3.2 oz) 05/31/18 86.6 kg (191 lb) 04/12/18 86.7 kg (191 lb 3.2 oz) PHYSICAL EXAM: 
General: WD, WN. Alert, confused, cooperative, no acute distress   
EENT:  PERRL. Anicteric sclerae. MMM Neck:  No meningismus, no thyromegaly Resp:  CTA bilaterally, no wheezing or rales. No accessory muscle use CV:  Regular  rhythm,  No edema GI:  Soft, mildly distended, Non tender.  +Bowel sounds, no rebound LN:  No cervical or inguinal KIKI Neurologic:  Alert and oriented to person, place, not current year or president Talking and following commands 
              non focal motor exam 
  Positive asterixis Psych:   Not anxious nor agitated Skin:  No rashes. No jaundice Reviewed most current lab test results and cultures  YES Reviewed most current radiology test results   YES Review and summation of old records today    NO Reviewed patient's current orders and MAR    YES 
PMH/SH reviewed - no change compared to H&P 
________________________________________________________________________ Care Plan discussed with: 
  Comments Patient x Family RN x Care Manager Consultant Multidiciplinary team rounds were held today with , nursing, pharmacist and clinical coordinator. Patient's plan of care was discussed; medications were reviewed and discharge planning was addressed. ________________________________________________________________________ Total NON critical care TIME:  35  Minutes Total CRITICAL CARE TIME Spent:   Minutes non procedure based Comments >50% of visit spent in counseling and coordination of care    
________________________________________________________________________ Keenan Beltrán MD  
 
Procedures: see electronic medical records for all procedures/Xrays and details which were not copied into this note but were reviewed prior to creation of Plan. LABS: 
I reviewed today's most current labs and imaging studies. Pertinent labs include: 
Recent Labs  
  04/25/19 0328 04/24/19 
1449 WBC 5.4 6.3 HGB 10.4* 12.2 HCT 33.9* 39.8 PLT 74* 71* Recent Labs  
  04/25/19 0328 04/24/19 
1449  138  
K 3.6 3.6 CL 98 96* CO2 35* 39* * 174* BUN 11 13 CREA 0.58 0.68  
CA 8.3* 8.8 ALB  --  3.3* TBILI  --  1.6* SGOT  --  33 ALT  --  60

## 2019-04-25 NOTE — PROGRESS NOTES
Spiritual Care Assessment/Progress Note [address] 
 
 
NAME: Clayton Heath      MRN: [id number] AGE: 61 y.o. SEX: female Baptist Affiliation: Latter-day Language: [place] 4/25/2019     Total Time (in minutes): 20 Spiritual Assessment begun in [facility] NEUROSCIENCE TELEMETRY through conversation with: 
  
    []Patient        [] Family    [] Friend(s) Reason for Consult: Palliative Care, Initial/Spiritual Assessment Spiritual beliefs: (Please include comment if needed) 
   [] Identifies with a erika tradition:     
   [] Supported by a erika community:        
   [] Claims no spiritual orientation:       
   [] Seeking spiritual identity:            
   [] Adheres to an individual form of spirituality:       
   [x] Not able to assess:                   
 
    
Identified resources for coping:  
   [] Prayer                           
   [] Music                  [] Guided Imagery 
   [] Family/friends                 [] Pet visits [] Devotional reading                         [x] Unknown 
   [] Other:                           
 
 
Interventions offered during this visit: (See comments for more details) Plan of Care: 
 
 [] Support spiritual and/or cultural needs  
 [] Support AMD and/or advance care planning process    
 [] Support grieving process 
 [] Coordinate Rites and/or Rituals  
 [] Coordination with community clergy 
 [x] No spiritual needs identified at this time 
 [] Detailed Plan of Care below (See Comments)  [] Make referral to Music Therapy 
[] Make referral to Pet Therapy    
[] Make referral to Addiction services 
[] Make referral to [facility] 
[] Make referral to Spiritual Care Partner 
[] No future visits requested       
[x] Follow up visits as needed Comments: Patient unable to respond. Rev. Mauri Mejias EdD MDiv Palliative  Fellow For [facility] Page [phone])

## 2019-04-25 NOTE — ROUTINE PROCESS
TRANSFER - IN REPORT: 
 
Verbal report received from Aster (name) on Thelda Ramp  being received from ED(unit) for routine progression of care Report consisted of patients Situation, Background, Assessment and  
Recommendations(SBAR). Information from the following report(s) SBAR, Kardex, ED Summary and Recent Results was reviewed with the receiving nurse. Opportunity for questions and clarification was provided. Assessment completed upon patients arrival to unit and care assumed.

## 2019-04-26 NOTE — PROGRESS NOTES
Still no response from Dr. Pollack Crooked; repaged MD regarding the gram negative rods in urine culture. Awaiting response.

## 2019-04-26 NOTE — ROUTINE PROCESS
Bedside and Verbal shift change report given to Lanette RN (oncoming nurse) by Maria Luz Melgoza RN (offgoing nurse). Report included the following information SBAR, Kardex, ED Summary, [facility] and Recent Results. Zone Phone:   [phone] Significant changes during shift:  None Patient Information Kalen Escobar 
61 y.o. 
4/24/2019  2:00 PM by Beverley Lopez MD. Kalen Escobar was admitted from [facility] Problem List 
 
Patient Active Problem List  
 Diagnosis Date Noted  Encephalopathy 04/24/2019  Acute respiratory failure with hypercapnia ([address] 04/02/2019  Counseling regarding advance care planning and goals of care 04/02/2019  Hepatic encephalopathy ([address] 03/28/2019  Colitis 03/28/2019  UTI (urinary tract infection) 03/28/2019  Septic shock ([address] 03/28/2019  Bilateral carotid artery stenosis 11/14/2018  TIA (transient ischemic attack) 11/13/2018  GERD (gastroesophageal reflux disease) 11/13/2018  Therapeutic drug monitoring 10/16/2018  Severe obesity ([address] 10/11/2018  Obesity (BMI 30.0-34.9) 07/10/2018  Sepsis ([address] 03/30/2017  CAP (community acquired pneumonia) 03/30/2017  IBIS (obstructive sleep apnea) 03/30/2017  Tobacco abuse 03/30/2017  Neuropathy 03/30/2017  COPD (chronic obstructive pulmonary disease) ([address] 03/28/2017  Acute deep vein thrombosis (DVT) of right lower extremity ([address] 11/10/2016  
 BALDERAS (nonalcoholic steatohepatitis) 03/10/2016  GI bleed 03/03/2016  Anemia 03/01/2016  Thrombocytopenia ([address] 08/15/2013  Previous back surgery 08/15/2013  S/P CHACHO (total abdominal hysterectomy) 08/15/2013  Cirrhosis ([address] 08/15/2013  Diabetes mellitus with neurological manifestations, uncontrolled ([address]  Essential hypertension, benign  Hyperlipidemia LDL goal <100 Past Medical History:  
Diagnosis Date  Asthma  Back pain  COPD (chronic obstructive pulmonary disease) (HCC)  Diabetes ([address]  Esophageal varices in cirrhosis ([facility]   
 6/2014 banding x 2  
 Fibromyalgia  Gastrointestinal disorder  GERD (gastroesophageal reflux disease)  Hypercholesteremia  Liver cirrhosis secondary to BALDERAS ([facility]  Liver disease  Other and unspecified hyperlipidemia  Restless leg syndrome  Type II or unspecified type diabetes mellitus without mention of complication, uncontrolled  Unspecified essential hypertension Core Measures: CVA: No No 
CHF:No No 
PNA:No No 
 
 
Activity Status: OOB to Chair No 
Ambulated this shift No  
Bed Rest Yes Supplemental O2: (If Applicable) NC Yes NRB No 
Venti-mask No 
On 2 Liters/min LINES AND DRAINS: 
 
DVT prophylaxis: DVT prophylaxis Med- Yes, Heparin DVT prophylaxis SCD or CRISTOPHER- No  
 
Wounds: (If Applicable) Patient Safety: 
 
Falls Score Total Score: 3 Safety Level_______ Bed Alarm On? Yes Sitter? No 
 
Plan for upcoming shift: Consults, safety and rest 
 
 
Discharge Plan: Yes, Rehab Active Consults: 
IP CONSULT TO PALLIATIVE CARE - PROVIDER

## 2019-04-26 NOTE — PROGRESS NOTES
Problem: Self Care Deficits Care Plan (Adult) Goal: *Acute Goals and Plan of Care (Insert Text) Description Occupational Therapy Goals Initiated 4/25/2019 1. Patient will perform grooming with supervision/set-up in unsupported sit within 7 day(s). 2.  Patient will perform upper body dressing with moderate assistance  within 7 day(s). 3.  Patient will perform toilet transfers with moderate assistance to [facility]  within 7 day(s). 4.  Patient will perform all aspects of toileting with moderate assistance  within 7 day(s). 5.  Patient will participate in upper extremity therapeutic exercise/activities with supervision/set-up for 5 minutes within 7 day(s). Outcome: Progressing Towards Goal 
 OCCUPATIONAL THERAPY TREATMENT: NEURO Patient: Jahaira Marino (65 y.o. female) Date: 4/26/2019 Diagnosis: Encephalopathy [G93.40] <principal problem not specified> 
  @RRD Rajesh Prey Precautions: Fall, DNR Chart, occupational therapy assessment, plan of care, and goals were reviewed. ASSESSMENT: 
Pt was cleared to be seen for therapy and all VSS and pt had refused tx earlier today 2 times and now was willing to work with OT. Pt still needed encouragement to sit on EOb and was afraid that her feet were going to hurt or that she was going to fall. She is only able to follow one step simple commands and demonstration helps pt with understanding what is being asked of her. She is very confused and stated a few times when she could not understand what was being asked of her, that she was frustrated. Pt was min assist of 1 for supine<>sit and she need verbal and tactile cues for task. She was able to scoot to EOB and attempted to stand and pt needed her feet blocked. She was mod assist to attempt stand and then pt sat back down and stated that her feet hurt and she could not stand.   Pt attempted to scoot to [facility] and she was not able to do so and became frustrated. Once in bed noted that her sheets and pads were wet and were changed. Pt was min for rolling in bed and left with HOB elevated and call bell with in reach. Pt needed instruction with use of call bell also at end of session. Pt will benefit from further OT tx at discharge at rehab at [facility]. Progression toward goals: 
?          Improving appropriately and progressing toward goals ? Improving slowly and progressing toward goals ? Not making progress toward goals and plan of care will be adjusted PLAN: 
Patient continues to benefit from skilled intervention to address the above impairments. Continue treatment per established plan of care. Discharge Recommendations:  [facility] Further Equipment Recommendations for Discharge:  tbd SUBJECTIVE:  
Patient stated ? I cant stand. ? OBJECTIVE DATA SUMMARY:  
Cognitive/Behavioral Status: 
Neurologic State: Alert;Confused Orientation Level: Oriented to person;Oriented to place Cognition: Impulsive; Impaired decision making;Memory loss Perception: Appears intact Perseveration: Perseverates during conversation Safety/Judgement: Fall prevention Functional Mobility and Transfers for ADLs: 
Bed Mobility: 
Rolling: Moderate assistance;Minimum assistance Supine to Sit: Moderate assistance;Minimum assistance Sit to Supine: Moderate assistance;Minimum assistance Scooting: Maximum assistance Transfers: 
  
 Not assessed Balance: 
Sitting: Intact; High guard ADL Intervention: 
Feeding Feeding Assistance: Set-up Pt is max for ADLs Toileting Toileting Assistance: Total assistance(dependent) Bladder Hygiene: Total assistance (dependent) Bowel Hygiene: Total assistance (dependent) Cognitive Retraining Safety/Judgement: Fall prevention Pain: 
Pain Scale 1: Numeric (0 - 10) Pain Intensity 1: 0 Activity Tolerance:  
fair Please refer to the flowsheet for vital signs taken during this treatment. After treatment:  
? Patient left in no apparent distress sitting up in chair ? Patient left in no apparent distress in bed 
? Call bell left within reach ? Nursing notified ? Caregiver present ? Bed alarm activated COMMUNICATION/EDUCATION:  
The patient?s plan of care was discussed with: Physical Therapist and Registered Nurse. 
 
? Home safety education was provided and the patient/caregiver indicated understanding. ? Patient/family have participated as able in goal setting and plan of care. ?    Patient/family agree to work toward stated goals and plan of care. ?    Patient understands intent and goals of therapy, but is neutral about his/her participation. ? Patient is unable to participate in goal setting and plan of care. This patient?s plan of care is appropriate for delegation to [facility] [place]. Thank you for this referral. 
Heydi Naylor OT Time Calculation: 33 mins

## 2019-04-26 NOTE — ACP (ADVANCE CARE PLANNING)
ADDENDUM 4.30.19 10 AM 
Kirstin ALLEN and I met with patient in her room to revisit POST form. Patient was alert and orientedx3 and able to make decisions about her care. She recalled previous conversation last week and her POST form and also decisions about when comfort/hospice would be appropriate (when she's not wanting to keep coming back to hospital for \"fixes. \" and would rather only focus on comfort. We discussed that this did not seem to be aligned with what she has verbally requested for her care and she agreed. She said when she completed POST during last admission she just didn't want to end up intubated in ICU again. Patient completed new POST: 
 
--Patient is DNAR 
--Patient selected \"limited additional interventions\" --Patient does not want feeding tube Patient revoked previous POST. Patient has copies of her POST. Original on chart to go with patient at discharge. Copies of new and revoked POST on chart to be scanned 4.26.19, 4:41 Fela Ordoñez NP and I met with patient and her family (/SACHIN [facility] [phone] and daughter Magali Vides) to discuss her POST form on file. In it she indicates she would like to be DNR, comfort measures, no feeding tube. However, it seems patient does not fully understand what it means to only want comfort measures(essentially hospice, which she may not qualify for at this time) as her stated goals from previous and this admission are more in line with \"limited interventions. \" We discussed this but patient has very little insight and was unable to make definitive decisions around what kind of care she wants to receive, or even a plan going forward. However, her  and daughter do understand, and this conversation can be ongoing for them. Patient is very clear she would like to be DNR. Primary Decision Maker: Mirella St Spouse - [phone]

## 2019-04-26 NOTE — PROGRESS NOTES
Per PT; patient refusing to do PT; wants to see MD, wants to go home; refuses to cooperate in any manner. Patient is agitated and unhappy about being in the hospital.  However, she is still not appropriately oriented to situation, and understanding why she is here, and has extremely labile emotions.

## 2019-04-26 NOTE — PROGRESS NOTES
Dr Brenton Byrd notified of patient's urine culture results of gram negative rods; no new orders at this time.

## 2019-04-26 NOTE — PROGRESS NOTES
Problem: Mobility Impaired (Adult and Pediatric) Goal: *Acute Goals and Plan of Care (Insert Text) Description Physical Therapy Goals Initiated 4/25/2019 1. Patient will move from supine to sit and sit to supine  in bed with modified independence within 7 day(s). 2.  Patient will transfer from bed to chair and chair to bed with minimal assistance/contact guard assist using the least restrictive device within 7 day(s). 3.  Patient will perform sit to stand with minimal assistance/contact guard assist within 7 day(s). 4.  Patient will ambulate with minimal assistance/contact guard assist for 50 feet with the least restrictive device within 7 day(s). Outcome: Progressing Towards Goal 
 PHYSICAL THERAPY TREATMENT Patient: Rodney Ho (34 y.o. female) Date: 4/26/2019 Diagnosis: Encephalopathy [G93.40] <principal problem not specified> Precautions: Fall, DNR Chart, physical therapy assessment, plan of care and goals were reviewed. ASSESSMENT: 
Patient is received supine in bed. She requires Max encouragement in order to participate. Patient continues to demonstrate confusion and difficulty finding words and following 2 step commands. Patient is Min A for supine to sit. She demonstrates the ability to scoot towards the EOB with increased time. Patient continues to report pain in bilateral foot pain. Patient is Mod A x2 for sit to stand, with bilateral feet blocked by therapist, and is unable to come to full stand before coming to sit on the bed. Patient reports  bilateral foot pain and becomes tearful. She is Mod I for sit to supine. Patient requires demonstration with verbal cues for \"bend you knee\" in order to perform bed mobility. She is total assistance to scoot up in bed. Patient would benefit from bed in chair position to help maintain stable BP as she prefers to lay flat and has been unable to get out of bed. Patient continues to be a good candidate for rehab on D/C. Progression toward goals: 
?    Improving appropriately and progressing toward goals ? Improving slowly and progressing toward goals ? Not making progress toward goals and plan of care will be adjusted PLAN: 
Patient continues to benefit from skilled intervention to address the above impairments. Continue treatment per established plan of care. Discharge Recommendations:  Rehab Further Equipment Recommendations for Discharge:  TBD SUBJECTIVE:  
Patient stated ? I can't I can't Ooooo my feet. ? OBJECTIVE DATA SUMMARY:  
Critical Behavior: 
Neurologic State: Alert, Confused Orientation Level: Oriented to person, Oriented to place Cognition: Impulsive, Impaired decision making, Memory loss Safety/Judgement: Fall prevention Functional Mobility Training: 
Bed Mobility: 
Rolling: Moderate assistance;Minimum assistance Supine to Sit: Moderate assistance;Minimum assistance Sit to Supine: Moderate assistance;Minimum assistance Scooting: Maximum assistance Transfers: 
Sit to Stand: Moderate assistance;Assist x2 Stand to Sit: Minimum assistance Balance: 
Sitting: Intact; High guard Ambulation/Gait Training: 
  
  
  
  
  
  
  
  
  
  
  
  
  
  
  
  
  
  
Stairs: 
  
  
   
 
Neuro Re-Education: 
Therapeutic Exercises:  
 
Pain: 
Pain Scale 1: Numeric (0 - 10) Pain Intensity 1: 0 Activity Tolerance:  
Patient demonstrates fair activity tolerance with pain limiting function Please refer to the flowsheet for vital signs taken during this treatment. After treatment:  
?    Patient left in no apparent distress sitting up in chair ? Patient left in no apparent distress in bed 
? Call bell left within reach ? Nursing notified ? Caregiver present ? Bed alarm activated COMMUNICATION/COLLABORATION:  
The patient?s plan of care was discussed with: Occupational Therapist and Registered Nurse Angelica Cole PT 
 Time Calculation: 27 mins

## 2019-04-26 NOTE — PROGRESS NOTES
Patient refused xifaxan, stating \"I don't take any pills like that at home. I am not taking that. \" Patient also refused iron, aspirin, Breo, spironolactone, and protonix; However patient did take her lactulose, furosemide, and losartan, and allowed RN to administer her 2 units of insulin.

## 2019-04-26 NOTE — PROGRESS NOTES
Hospitalist Progress NoteNAME: Kalen Escobar :  1955 MRN:  [id number] Admit date: 2019 Today's date: 19 PCP: ALEJANDRO Orlando M.D. Cell [phone] Assessment / Plan: 
 
Acute encephalopathy suspect hepatic POA Complicated UTI (recent norman cath) POA urine culture with GNR Known cirrhosis and portal HTN Prior hepatic encephalopathy, uses lactulose and rifaximin Normal ammonia but this is not required for diagnosis Definitely had asterixis, improved today 
? SBP, US several weeks ago with trace ascites Blood culture negative Continue rocephin Low dose ultram as needed Hold head CT, had several recently, neuro exam is non focal 
Continue rifaximin and increased lactulose Overall improved, not back to baseline per sister 
  
BALDERAS POA Liver cirrhosis POA History of recurrent hepatic encephalopathy History of esophageal varices NH3 increased, confusion and asterixis Rifaxin and lactulose, lasix, losartan 
  
COPD without exacerbation Recent acute respiratory failure with hypercarbia and hypoxia Intubated last admit CXR showed mild bibasilar discoid atelectasis Resume home Meds ABG on 2 L NC 7.459/50/71 CXR with mild bibasilar discoid atelectasis. Respiratory status stable 
  
DM type 2 on insulin Restart low dose lantus SSI 
, 173, 157, 174, 154 
  
History of TIA  Hyperlipidemia POA Resume statin/ASA Overweight POA Body mass index is 28.96 kg/m². 
  
Code Status: DNR, discussed with pt's  at bedside Surrogate Decision Maker: pt's  Mendel Dine DVT Prophylaxis: heparin GI Prophylaxis: not indicated Baseline: from [address], family does not wish to return there. Subjective: Chief Complaint / Reason for Physician Visit f/u AMS Discussed with RN events overnight. Seen with sister in room MS better from admit, not back to normal per sister Remains confused, oriented x 2 No HA, CP, SOB, N/V Positive diarrhea Urine culture with GNR Review of Systems: 
Symptom Y/N Comments  Symptom Y/N Comments Fever/Chills n   Chest Pain n   
Poor Appetite    Edema Cough n   Abdominal Pain n   
Sputum    Joint Pain SOB/MARTINEZ n   Headache n   
Nausea/vomit n   Tolerating PT/OT Diarrhea n   Tolerating Diet y Constipation    Other Could NOT obtain due to:   
 
Objective: VITALS:  
Last 24hrs VS reviewed since prior progress note. Most recent are: 
Patient Vitals for the past 24 hrs: 
 Temp Pulse Resp BP SpO2  
04/26/19 1508 98 °F (36.7 °C) (!) 104 16 128/55 100 % 04/26/19 1339  (!) 108   94 % 04/26/19 1108 98.1 °F (36.7 °C) (!) 112 16 138/58 94 % 04/26/19 0718 98.1 °F (36.7 °C) (!) 108 18 131/57 96 % 04/26/19 0344 98.1 °F (36.7 °C) (!) 111 18 126/57 97 % 04/25/19 2331 98.3 °F (36.8 °C) (!) 111 20 143/60 92 % 04/25/19 1949 98.6 °F (37 °C) (!) 112 20 129/55 97 % Intake/Output Summary (Last 24 hours) at 4/26/2019 1740 Last data filed at 4/26/2019 1957 Gross per 24 hour Intake  Output 700 ml Net -700 ml Wt Readings from Last 12 Encounters:  
04/24/19 86.4 kg (190 lb 7.6 oz) 04/09/19 104.6 kg (230 lb 9.6 oz) 03/19/19 101.5 kg (223 lb 12.8 oz) 01/01/19 87.8 kg (193 lb 9 oz)  
11/19/18 84.8 kg (187 lb)  
11/13/18 84.7 kg (186 lb 11.7 oz)  
11/13/18 84.7 kg (186 lb 11.7 oz) 10/16/18 86.7 kg (191 lb 3.2 oz) 10/11/18 89.3 kg (196 lb 12.8 oz) 07/10/18 83.6 kg (184 lb 3.2 oz) 05/31/18 86.6 kg (191 lb) 04/12/18 86.7 kg (191 lb 3.2 oz) PHYSICAL EXAM: 
General: WD, WN. Alert, confused, cooperative, no acute distress   
EENT:  PERRL. Anicteric sclerae. MMM Neck:  No meningismus, no thyromegaly Resp:  CTA bilaterally, no wheezing or rales. No accessory muscle use CV:  Regular  rhythm,  No edema GI:  Soft, mildly distended, Non tender.  +Bowel sounds, no rebound LN:  No cervical or inguinal KIKI Neurologic:  Alert and oriented to person, place, not current year or president Trouble focusing Talking and following commands 
              non focal motor exam 
  Less asterixis Psych:   Not anxious nor agitated Skin:  No rashes. No jaundice Reviewed most current lab test results and cultures  YES Reviewed most current radiology test results   YES Review and summation of old records today    NO Reviewed patient's current orders and MAR    YES 
PMH/SH reviewed - no change compared to H&P 
________________________________________________________________________ Care Plan discussed with: 
  Comments Patient x Family RN x Care Manager Consultant Multidiciplinary team rounds were held today with , nursing, pharmacist and clinical coordinator. Patient's plan of care was discussed; medications were reviewed and discharge planning was addressed. ________________________________________________________________________ Total NON critical care TIME:  35  Minutes Total CRITICAL CARE TIME Spent:   Minutes non procedure based Comments >50% of visit spent in counseling and coordination of care    
________________________________________________________________________ Cesar Zuluaga MD  
 
Procedures: see electronic medical records for all procedures/Xrays and details which were not copied into this note but were reviewed prior to creation of Plan. LABS: 
I reviewed today's most current labs and imaging studies. Pertinent labs include: 
Recent Labs  
  04/26/19 
0341 04/25/19 
0328 04/24/19 
1449 WBC 6.7 5.4 6.3 HGB 11.2* 10.4* 12.2 HCT 36.1 33.9* 39.8 PLT 81* 74* 71* Recent Labs  
  04/26/19 
0341 04/25/19 
0328 04/24/19 
1449 * 136 138  
K 3.6 3.6 3.6 CL 98 98 96* CO2 32 35* 39* * 160* 174* BUN 9 11 13 CREA 0.58 0.58 0.68  
CA 8.5 8.3* 8.8 ALB 2.9*  --  3.3* TBILI 1.6*  --  1.6* SGOT 32  --  33 ALT 49  --  60

## 2019-04-26 NOTE — PROGRESS NOTES
Physical Therapy Note 10:50 Patient is calm with therapist but declining to attempt bed mobility or sit EOB at this time. Thank you, 
Salma GARCIAT

## 2019-04-26 NOTE — PROGRESS NOTES
Palliative Medicine Consult Willy: [phone] ([phone]) Patient Name: Verena Machado YOB: 1955 Date of Initial Consult: 4/24/2019 Reason for Consult: Care Decisions Requesting Provider: Beryle Sails, MD 
Primary Care Physician: Cathy Morales NP 
 
 SUMMARY:  
Verena Machado is a 61 y.o. with a past history of chronic back pain, COPD, DM, Esophageal varices in cirrhosis s/p banding x 2 in 2014, Fibromyalgia, GERD, and Liver cirrhosis secondary to BALDERAS, who was admitted on 4/24/2019 from [facility] with a diagnosis of Acute metabolic encephalopathy likely from a complicated UTI. Per pt's , when pt became confused, he thought it was due to hyperammonemia again, however level was checked at facility which was normal. c onfuion progressively worsened today which lead the patient to the ER for evaluation Current medical issues leading to Palliative Medicine involvement include: goals of care discussion in setting of recent readmission PALLIATIVE DIAGNOSES:  
1. Goals of care discussion 2. Advanced Care Planning 3. Debility 4. Neuropathic pain of feet 5. Chronic back pain PLAN:  
1. Met with patient, her  and oldest daughter 2. Mrs Carol Coats was hard to keep focused, and has significant difficult with comprehending complicated conversations. 3. We had a good discussion about her goals of care, but did not come to a decision- even so I think good seeds were planted, and her daughter had a good comprehension of the difference between Comfort Oriented care/Hospice, and what we call \"limited interventions\" which means that she is still OK with repeat hospitalizations as she feels she is getting \"better\" at the end of each one.   Although I do not think she qualifies for Hospice, given her recurrent metabolic encephalopathy which is causing her to inhibit her own care, there may be a role for comfort oriented care in the community even without Hospice level support 4. Mrs Nathaly Zaldivar really wants to go home, and could not focus on anything other than that during our conversation- and became very tearful and angry at her , saying that he was preventing it from happening because he did not want her there. She is unable to recognize that without the ability for her to help some in her care (I.e. Transfers with help, etc) he is not physically able to take her home. 5. Mr Nathaly Zaldivar was very focused on care for her outside of the hospital, he is fearful that she will not get accepted into rehab, or will be discharged before she is physically ready because of her extreme agitation that she tends to exhibit. 6. We did not re-do her POST form, as it is still unclear what her/her families goals are for her medical care, but I think that they have some things to think about. Will follow up Monday if she is still here, but otherwise I gave them our outpatient clinic information if they felt that they wanted that extra support in the community to keep her out of the hospital after she left rehab. 
7. She is VERY clear that she wants to remain a DNR, and her current POST does reflect that, so will be valid at discharge for transport and at the facility. 8. Initial consult note routed to primary continuity provider and/or primary health care team members 9. Communicated plan of care with: Collin Marcelino 192 Team 
 
 GOALS OF CARE / TREATMENT PREFERENCES:  
 
GOALS OF CARE: 
Patient/Health Care Proxy Stated Goals: Other (comment)(undecided at this point- family considering based on her complicated medical issues) TREATMENT PREFERENCES:  
Code Status: DNR Advance Care Planning: 
[x] The [facility] Interdisciplinary Team has updated the ACP Navigator with Mariaelena and Patient Capacity Primary Decision Maker: Isaura Chapa Spouse - [phone] Medical Interventions: Limited additional interventions Other Instructions: Other: As far as possible, the palliative care team has discussed with patient / health care proxy about goals of care / treatment preferences for patient. HISTORY:  
 
History obtained from: patient RN, chart,  and daughter CHIEF COMPLAINT: pain in her feet HPI/SUBJECTIVE: The patient is:  
[x] Verbal and participatory [] Non-participatory due to:  
 
Frustrated, tearful, fixated on going home, unable to comprehend conversation Clinical Pain Assessment (nonverbal scale for severity on nonverbal patients):  
Clinical Pain Assessment Severity: 4 Location: feet and legs Character: sharp, needles Duration: chronic Effect: limits ability to walk Frequency: constant Duration: for how long has pt been experiencing pain (e.g., 2 days, 1 month, years) Frequency: how often pain is an issue (e.g., several times per day, once every few days, constant) FUNCTIONAL ASSESSMENT:  
 
Palliative Performance Scale (PPS): PPS: 30 
 
 
 PSYCHOSOCIAL/SPIRITUAL SCREENING:  
 
Palliative IDT has assessed this patient for cultural preferences / practices and a referral made as appropriate to needs (Cultural Services, Patient Advocacy, Ethics, etc.) Any spiritual / Scientologist concerns: 
[] Yes /  [x] No 
 
Caregiver Burnout: 
[] Yes /  [] No /  [x] No Caregiver Present Anticipatory grief assessment:  
[x] Normal  / [] Maladaptive ESAS Anxiety: Anxiety: 6 ESAS Depression: Depression: 4 REVIEW OF SYSTEMS:  
 
Positive and pertinent negative findings in ROS are noted above in HPI. The following systems were [x] reviewed / [] unable to be reviewed as noted in HPI Other findings are noted below. Systems: constitutional, ears/nose/mouth/throat, respiratory, gastrointestinal, genitourinary, musculoskeletal, integumentary, neurologic, psychiatric, endocrine. Positive findings noted below. Modified ESAS Completed by: provider Fatigue: 0 Depression: 4 Pain: 4 Anxiety: 6 Nausea: 0 Anorexia: 0 Dyspnea: 0 Constipation: No  
     
 
 
 PHYSICAL EXAM:  
 
From RN flowsheet: 
Wt Readings from Last 3 Encounters:  
04/24/19 190 lb 7.6 oz (86.4 kg) 04/09/19 230 lb 9.6 oz (104.6 kg) 03/19/19 223 lb 12.8 oz (101.5 kg) Blood pressure 128/55, pulse (!) 104, temperature 98 °F (36.7 °C), resp. rate 16, height 5' 8\" (1.727 m), weight 190 lb 7.6 oz (86.4 kg), SpO2 100 %. Pain Scale 1: Numeric (0 - 10) Pain Intensity 1: 0 Last bowel movement, if known:  
 
Constitutional: alert, tearful, anxious Eyes: pupils equal, anicteric ENMT: no nasal discharge, moist mucous membranes Cardiovascular: regular rhythm Respiratory: breathing not labored, symmetric Gastrointestinal: soft non-tender, Musculoskeletal: no deformity, no tenderness to palpation Skin: warm, dry Neurologic: following commands, moving all extremities Psychiatric: full affect, no hallucinations Other: 
 
 
 HISTORY:  
 
Active Problems: 
  Encephalopathy (4/24/2019) Past Medical History:  
Diagnosis Date  Asthma  Back pain  COPD (chronic obstructive pulmonary disease) (HCC)  [facility]  Esophageal varices in cirrhosis ([facility]   
 6/2014 banding x 2  
 Fibromyalgia  Gastrointestinal disorder  GERD (gastroesophageal reflux disease)  Hypercholesteremia  Liver cirrhosis secondary to BALDERAS ([facility]  Liver disease  Other and unspecified hyperlipidemia  Restless leg syndrome  Type II or unspecified type diabetes mellitus without mention of complication, uncontrolled  Unspecified essential hypertension Past Surgical History:  
Procedure Laterality Date  HX BACK SURGERY    
 HX CARPAL TUNNEL RELEASE    
 on right  HX HYSTERECTOMY plus 1/2 of an ovary removed  [place] COLSC FLX W/RMVL OF TUMOR POLYP LESION SNARE TQ  5/30/2013  UPPER GI ENDOSCOPY,GLEN VARIX  2/6/2015 Family History Problem Relation Age of Onset  Diabetes Mother  Stroke Sister  Diabetes Paternal Aunt  Diabetes Paternal Uncle  Heart Disease Neg Hx History reviewed, no pertinent family history. Social History Tobacco Use  Smoking status: Current Every Day Smoker Packs/day: 1.00 Years: 40.00 Pack years: 40.00  Smokeless tobacco: Former User Substance Use Topics  Alcohol use: No  
  Comment: rare Allergies Allergen Reactions  Hydrocodone Nausea and Vomiting  Lisinopril Cough  Lortab [Hydrocodone-Acetaminophen] Nausea and Vomiting  Percocet [Oxycodone-Acetaminophen] Nausea and Vomiting Current Facility-Administered Medications Medication Dose Route Frequency  sodium chloride (NS) flush 5-40 mL  5-40 mL IntraVENous Q8H  
 sodium chloride (NS) flush 5-40 mL  5-40 mL IntraVENous PRN  
 ondansetron (ZOFRAN ODT) tablet 4 mg  4 mg Oral Q4H PRN  
 bisacodyl (DULCOLAX) suppository 10 mg  10 mg Rectal DAILY PRN  
 heparin (porcine) injection 5,000 Units  5,000 Units SubCUTAneous Q8H  
 albuterol-ipratropium (DUO-NEB) 2.5 MG-0.5 MG/3 ML  3 mL Nebulization Q4H PRN  
 aspirin chewable tablet 81 mg  81 mg Oral DAILY  atorvastatin (LIPITOR) tablet 40 mg  40 mg Oral QHS  ferrous sulfate tablet 325 mg  1 Tab Oral DAILY WITH BREAKFAST  fluticasone-vilanterol (BREO ELLIPTA) 200mcg-25mcg/puff  1 Puff Inhalation DAILY  furosemide (LASIX) tablet 80 mg  80 mg Oral DAILY  insulin glargine (LANTUS) injection 15 Units  15 Units SubCUTAneous QHS  losartan (COZAAR) tablet 25 mg  25 mg Oral DAILY  pantoprazole (PROTONIX) tablet 40 mg  40 mg Oral ACB  rifAXIMin (XIFAXAN) tablet 550 mg  550 mg Oral BID  rOPINIRole (REQUIP) tablet 4 mg  4 mg Oral QHS  spironolactone (ALDACTONE) tablet 200 mg  200 mg Oral DAILY  cefTRIAXone (ROCEPHIN) 1 g in 0.9% sodium chloride (MBP/ADV) 50 mL  1 g IntraVENous Q24H  
 lactulose (CHRONULAC) 10 gram/15 mL solution 45 mL  45 mL Oral TID  insulin lispro (HUMALOG) injection   SubCUTAneous AC&HS  
 glucose chewable tablet 16 g  4 Tab Oral PRN  
 dextrose (D50) infusion 12.5-25 g  12.5-25 g IntraVENous PRN  
 glucagon (GLUCAGEN) injection 1 mg  1 mg IntraMUSCular PRN  
 nitroglycerin (NITROBID) 2 % ointment 1 Inch  1 Inch Topical TID PRN  
 
 
 
 LAB AND IMAGING FINDINGS:  
 
Lab Results Component Value Date/Time WBC 6.7 04/26/2019 03:41 AM  
 HGB 11.2 (L) 04/26/2019 03:41 AM  
 PLATELET 81 (L) 76/06/5408 03:41 AM  
 
Lab Results Component Value Date/Time Sodium 135 (L) 04/26/2019 03:41 AM  
 Potassium 3.6 04/26/2019 03:41 AM  
 Chloride 98 04/26/2019 03:41 AM  
 CO2 32 04/26/2019 03:41 AM  
 BUN 9 04/26/2019 03:41 AM  
 Creatinine 0.58 04/26/2019 03:41 AM  
 Calcium 8.5 04/26/2019 03:41 AM  
 Magnesium 2.7 (H) 04/04/2019 05:01 AM  
 Phosphorus 3.2 04/06/2019 04:17 AM  
  
Lab Results Component Value Date/Time AST (SGOT) 32 04/26/2019 03:41 AM  
 Alk. phosphatase 200 (H) 04/26/2019 03:41 AM  
 Protein, total 7.2 04/26/2019 03:41 AM  
 Albumin 2.9 (L) 04/26/2019 03:41 AM  
 Globulin 4.3 (H) 04/26/2019 03:41 AM  
 
Lab Results Component Value Date/Time INR 1.4 (H) 04/02/2019 04:07 AM  
 Prothrombin time 14.1 (H) 04/02/2019 04:07 AM  
 aPTT 26.2 04/02/2019 04:07 AM  
  
Lab Results Component Value Date/Time Iron 22 (L) 03/19/2019 01:48 PM  
 TIBC 379 03/19/2019 01:48 PM  
 Iron % saturation 6 (LL) 03/19/2019 01:48 PM  
 Ferritin 358 (H) 11/19/2018 12:00 AM  
  
No results found for: PH, PCO2, PO2 No components found for: [place] Lab Results Component Value Date/Time  CK 47 03/28/2019 10:13 AM  
 CK - MB <1.0 11/13/2018 12:53 PM  
  
 
 
   
 
Total time:  
Counseling / coordination time, spent as noted above:  
> 50% counseling / coordination?:  
 
 Prolonged service was provided for  []30 min   []75 min in face to face time in the presence of the patient, spent as noted above. Time Start:  
Time End:  
Note: this can only be billed with 33685 (initial) or 45266 (follow up). If multiple start / stop times, list each separately.

## 2019-04-26 NOTE — PROGRESS NOTES
Bedside shift change report given to Stacia RN (oncoming nurse) by Pedro Justice RN (offgoing nurse). Report included the following information SBAR, Kardex, Intake/Output, MAR, Recent Results and Cardiac Rhythm sinus tachycardia.

## 2019-04-26 NOTE — DIABETES MGMT
DTC Progress Note Recommendations/ Comments: Please consider beginning humalog 4 units ac tid for prandial insulin needs now that diet has been resumed. Current hospital DM medication: lantus 15 units nightly and humalog correction Chart reviewed on Carloz Macias. Patient is a 61 y.o. female with known Type 2 DM on lantus 20 units daily and humalog 4 units: 50mg/dl >150mg/dl at home. A1c:  
Lab Results Component Value Date/Time Hemoglobin A1c 4.8 03/29/2019 02:56 PM  
 Hemoglobin A1c 6.8 (H) 11/14/2018 04:52 AM  
 
 
Recent Glucose Results:  
Lab Results Component Value Date/Time  (H) 04/26/2019 03:41 AM  
 GLUCPOC 174 (H) 04/26/2019 11:42 AM  
 GLUCPOC 157 (H) 04/26/2019 06:25 AM  
 GLUCPOC 173 (H) 04/25/2019 09:19 PM  
  
 
Lab Results Component Value Date/Time Creatinine 0.58 04/26/2019 03:41 AM  
 
Estimated Creatinine Clearance: 114.3 mL/min (based on SCr of 0.58 mg/dL). Active Orders Diet DIET CARDIAC Regular; Consistent Carb 1800kcal  
  
 
PO intake:  
Patient Vitals for the past 72 hrs: 
 % Diet Eaten 04/25/19 0957 75 % Will continue to follow as needed. Thank you SUSIE VargasN, RN, CDE [facility] Time spent: 5

## 2019-04-26 NOTE — PROGRESS NOTES
Palliative Medicine Family Meeting Participants:  Patient,  Jumana Moser, [facility]; Palliative Medicine (Himanshu Mike NP) Discussion:   
 
Per chart: 
Marc Dominique is a 61 y.o. with a past history of chronic back pain, COPD, DM, Esophageal varices in cirrhosis s/p banding x 2 in 2014, Fibromyalgia, GERD, and Liver cirrhosis secondary to BALDERAS, who was admitted on 4/24/2019 from [facility] with a diagnosis of Acute metabolic encephalopathy likely from a complicated UTI. Per pt's , when pt became confused, he thought it was due to hyperammonemia again, however level was checked at facility which was normal. c onfuion progressively worsened today which lead the patient to the ER for evaluation Current medical issues leading to Palliative Medicine involvement include: goals of care discussion in setting of recent readmission Viraj Cuauhtemoc NP and I met with patient and her family in room. Patient was awake and lying in bed. She was oriented to current situation of being in hospital but has limited insight if any regarding her illness or steps to take to improve or to establish a plan of care based on her healthcare wishes. There is also complicated communication dynamic between patient and  most likely due to waxing and waning confusion. We initiated discussion around the POST form Mrs. Marian Desouza completed during last admission. She had chosen comfort measures (versus limited interventions) but it was unclear then and now if she truly understands what that means. At this time she was unable to grasp what comfort measures means and how that relates to her care. She was also unable to understand how her actions (taking meds and participating in PT) lead to better outcomes. She tends to believe that she is doing everything she is supposed and will not accept any other version of truth.  
 
Patient  was very focused on having enough help in home after d/c and also getting patient strong enough to be able walk in home and transfer to toilet etc. This led to patient feeling as though  had given up on her and didn't want to care for her any more. Attempted to facilitate clear conversation between  and patient but again, patient is confused unable to see another point of view other than her own. Daughter was able to grasp exactly the difference between comfort/hospice and \"limited interventions,\" and understands there may be repeated hospitalizations and, if so it would be important to gauge over time whether coming to the hospital is helping the patient get better or becoming too much of a burden. We discussed outpatient palliative clinic as an option to continue supportive care after d/c. Patient family has the number to call. Patient was tearful and did not want to continue conversation. We didn't update her POST, and the family understands this conversation about patient goals and caring for her going forward will be ongoing. See Jessica Dan NPs note. Outcome/Plan:   
Patient and family will continue to talk about and clarify [address] Overall patient wants to be DNR and would like to go home Will continue to follow if patient has not d/c before Monday Shaun Schafer HUNG Palliative Medicine:  [phone] ([phone])

## 2019-04-27 NOTE — ROUTINE PROCESS
* No surgery found * 
* No surgery found * Bedside and Verbal shift change report given to *** RN(oncoming nurse) by Nunu Burgos RN (offgoing nurse). Report included the following information SBAR, Kardex, MAR, Recent Results and Cardiac Rhythm sinus tach. Zone Phone:   [phone] Significant changes during shift:  *** 
 
1) BM x 1 Patient Information Bernadette Moss 
61 y.o. 
4/24/2019  2:00 PM by Ale Addison MD. Bernadette Moss was admitted from [facility] Problem List 
 
Patient Active Problem List  
 Diagnosis Date Noted  Goals of care, counseling/discussion  Advanced care planning/counseling discussion  Debility  Neuropathic pain  Chronic bilateral low back pain with bilateral sciatica  Encephalopathy 04/24/2019  Acute respiratory failure with hypercapnia ([address] 04/02/2019  Counseling regarding advance care planning and goals of care 04/02/2019  Hepatic encephalopathy ([address] 03/28/2019  Colitis 03/28/2019  UTI (urinary tract infection) 03/28/2019  Septic shock ([address] 03/28/2019  Bilateral carotid artery stenosis 11/14/2018  TIA (transient ischemic attack) 11/13/2018  GERD (gastroesophageal reflux disease) 11/13/2018  Therapeutic drug monitoring 10/16/2018  Severe obesity ([address] 10/11/2018  Obesity (BMI 30.0-34.9) 07/10/2018  Sepsis ([address] 03/30/2017  CAP (community acquired pneumonia) 03/30/2017  IBIS (obstructive sleep apnea) 03/30/2017  Tobacco abuse 03/30/2017  Neuropathy 03/30/2017  COPD (chronic obstructive pulmonary disease) ([address] 03/28/2017  Acute deep vein thrombosis (DVT) of right lower extremity ([address] 11/10/2016  
 BALDERAS (nonalcoholic steatohepatitis) 03/10/2016  GI bleed 03/03/2016  Anemia 03/01/2016  Thrombocytopenia ([address] 08/15/2013  Previous back surgery 08/15/2013  S/P CHACHO (total abdominal hysterectomy) 08/15/2013  Cirrhosis ([address] 08/15/2013  Diabetes mellitus with neurological manifestations, uncontrolled ([facility]  Essential hypertension, benign  Hyperlipidemia LDL goal <100 Past Medical History:  
Diagnosis Date  Asthma  Back pain  COPD (chronic obstructive pulmonary disease) (HCC)  Diabetes ([address]  Esophageal varices in cirrhosis ([facility]   
 6/2014 banding x 2  
 Fibromyalgia  Gastrointestinal disorder  GERD (gastroesophageal reflux disease)  Hypercholesteremia  Liver cirrhosis secondary to BALDERAS ([facility]  Liver disease  Other and unspecified hyperlipidemia  Restless leg syndrome  Type II or unspecified type diabetes mellitus without mention of complication, uncontrolled  Unspecified essential hypertension Core Measures: CVA: No Not applicable CHF:No Not applicable PNA:No Not applicable Activity Status: OOB to Chair No 
Ambulated this shift No  
Bed Rest No 
 
Supplemental O2: (If Applicable) NC No 
NRB No 
Venti-mask No 
On room air Liters/min LINES AND DRAINS: 
PIV, external female catheter DVT prophylaxis: DVT prophylaxis Med- Yes heparin DVT prophylaxis SCD or CRISTOPHER- No  
 
Wounds: (If Applicable) Wounds- No 
 
Location none Patient Safety: 
 
Falls Score Total Score: 3 Safety Level_______ Bed Alarm On? Yes Sitter? No 
 
Plan for upcoming shift: lactulose Discharge Plan: No CM following, working placement. PT/OT recommending IP rehab Active Consults: 
IP CONSULT TO PALLIATIVE CARE - PROVIDER

## 2019-04-27 NOTE — ROUTINE PROCESS
Received call from [facility] lab regarding urine culture. Urine came back positive for Klubsiella pneumoniae and ESBL. MD paged and notified of result. Contact isolation ordered.

## 2019-04-27 NOTE — PROGRESS NOTES
Bedside and Verbal shift change report given to [facility] (oncoming nurse) by [place] (offgoing nurse). Report included the following information SBAR, Kardex, Intake/Output, MAR, Accordion and Recent Results.

## 2019-04-27 NOTE — PROGRESS NOTES
Hospitalist Progress NoteNAME: Lindsey Wilson :  1955 MRN:  [id number] Admit date: 2019 Today's date: 19 PCP: ALEJANDRO Salazar M.D. Cell [phone] Assessment / Plan: 
 
Acute encephalopathy suspect hepatic POA Complicated UTI (recent norman cath) POA urine culture with ESBL klebsiella Known cirrhosis and portal HTN Prior hepatic encephalopathy, uses lactulose and rifaximin Normal ammonia but this is not required for diagnosis Definitely had asterixis when I first saw, now resolved MS improved, oriented x 3 
? SBP, US several weeks ago with trace ascites Blood culture negative Urine culture with ESBL klebsiella, change to meropenem Low dose ultram as needed Continue rifaximin and increased lactulose Overall improved, angry with staff this AM 
Continue current care, was at [facility], family asking about [facility] 
  
BALDERAS POA Liver cirrhosis POA History of recurrent hepatic encephalopathy History of esophageal varices NH3 increased, confusion and asterixis Rifaxin and lactulose, lasix, losartan 
  
COPD without exacerbation Recent acute respiratory failure with hypercarbia and hypoxia Intubated last admit CXR showed mild bibasilar discoid atelectasis Resume home Meds ABG on 2 L NC 7.459/50/71 CXR with mild bibasilar discoid atelectasis. Respiratory status stable 
  
DM type 2 on insulin Restart low dose lantus SSI 
, 154, 170, 171, 166 
  
History of TIA  Hyperlipidemia POA Resume statin/ASA Overweight POA Body mass index is 28.96 kg/m². 
  
Code Status: DNR, discussed with pt's  at bedside Surrogate Decision Maker: pt's  Iva Walker DVT Prophylaxis: heparin GI Prophylaxis: not indicated Baseline: from [facility], family does not wish to return there. Subjective: Chief Complaint / Reason for Physician Visit f/u AMS Discussed with RN events overnight. Angry towards staff this AM 
Calm and appropriate for me, oriented x 4 No complaints Urine culture with ESBL Klebsiella No HA, CP, SOB, N/V Positive diarrhea Review of Systems: 
Symptom Y/N Comments  Symptom Y/N Comments Fever/Chills n   Chest Pain n   
Poor Appetite    Edema Cough n   Abdominal Pain n   
Sputum    Joint Pain SOB/MARTINEZ n   Headache n   
Nausea/vomit n   Tolerating PT/OT Diarrhea n   Tolerating Diet y Constipation    Other Could NOT obtain due to:   
 
Objective: VITALS:  
Last 24hrs VS reviewed since prior progress note. Most recent are: 
Patient Vitals for the past 24 hrs: 
 Temp Pulse Resp BP SpO2  
04/27/19 1540 98.3 °F (36.8 °C) (!) 114 16 135/47 94 % 04/27/19 1216 98.8 °F (37.1 °C) (!) 117 16 138/78 (!) 89 % 04/27/19 0808 98.4 °F (36.9 °C) (!) 116 16 140/67 95 % 04/27/19 0359     98 % 04/27/19 0342 98.3 °F (36.8 °C) (!) 101 18 120/54 98 % 04/26/19 2340 98.4 °F (36.9 °C) (!) 109 20 125/52 94 % 04/26/19 1949 99.1 °F (37.3 °C) (!) 112 18 109/56 93 % Intake/Output Summary (Last 24 hours) at 4/27/2019 1543 Last data filed at 4/26/2019 1757 Gross per 24 hour Intake 100 ml Output 400 ml Net -300 ml Wt Readings from Last 12 Encounters:  
04/24/19 86.4 kg (190 lb 7.6 oz) 04/09/19 104.6 kg (230 lb 9.6 oz) 03/19/19 101.5 kg (223 lb 12.8 oz) 01/01/19 87.8 kg (193 lb 9 oz)  
11/19/18 84.8 kg (187 lb)  
11/13/18 84.7 kg (186 lb 11.7 oz)  
11/13/18 84.7 kg (186 lb 11.7 oz) 10/16/18 86.7 kg (191 lb 3.2 oz) 10/11/18 89.3 kg (196 lb 12.8 oz) 07/10/18 83.6 kg (184 lb 3.2 oz) 05/31/18 86.6 kg (191 lb) 04/12/18 86.7 kg (191 lb 3.2 oz) PHYSICAL EXAM: 
General: WD, WN. Alert, cooperative, no acute distress   
EENT:  PERRL. Anicteric sclerae. MMM Neck:  No meningismus, no thyromegaly Resp:  CTA bilaterally, no wheezing or rales. No accessory muscle use CV:  Regular  rhythm,  No edema GI:  Soft, mildly distended, Non tender.  +Bowel sounds, no rebound LN:  No cervical or inguinal KIKI Neurologic:  Alert and oriented to person, place, year and president Talking and following commands 
              non focal motor exam, no asterixis Psych:   Not anxious nor agitated Skin:  No rashes. No jaundice Reviewed most current lab test results and cultures  YES Reviewed most current radiology test results   YES Review and summation of old records today    NO Reviewed patient's current orders and MAR    YES 
PMH/SH reviewed - no change compared to H&P 
________________________________________________________________________ Care Plan discussed with: 
  Comments Patient x Family RN x Care Manager Consultant Multidiciplinary team rounds were held today with , nursing, pharmacist and clinical coordinator. Patient's plan of care was discussed; medications were reviewed and discharge planning was addressed. ________________________________________________________________________ Total NON critical care TIME:  25  Minutes Total CRITICAL CARE TIME Spent:   Minutes non procedure based Comments >50% of visit spent in counseling and coordination of care    
________________________________________________________________________ Sarah Edward MD  
 
Procedures: see electronic medical records for all procedures/Xrays and details which were not copied into this note but were reviewed prior to creation of Plan. LABS: 
I reviewed today's most current labs and imaging studies. Pertinent labs include: 
Recent Labs  
  04/27/19 
0347 04/26/19 
0341 04/25/19 
0328 WBC 5.1 6.7 5.4 HGB 11.4* 11.2* 10.4* HCT 36.5 36.1 33.9*  
PLT 86* 81* 74* Recent Labs  
  04/27/19 
0347 04/26/19 
0341 04/25/19 
2399 * 135* 136  
K 3.6 3.6 3.6 CL 99 98 98 CO2 29 32 35* * 166* 160* BUN 9 9 11 CREA 0.60 0.58 0.58  
 CA 8.2* 8.5 8.3* ALB  --  2.9*  --   
TBILI  --  1.6*  --   
SGOT  --  32  --   
ALT  --  49  --

## 2019-04-27 NOTE — ROUTINE PROCESS
Patient refusing all medications this morning until she sees the doctor. Patient is only requesting lactulose at this time. Patient very agitated at the moment and said \"just leave me alone. \" Patient also saying \"you are trying to get yourself out of this,\" when nurse asked her if she would like to take her medications later. Patient shook head and said \"give me the lactulose or I will call someone above you. \" 
 
Lactulose administered. Patient then asked Luisalester Lauo are my other pills. \" Nurse reminded patient that she didn't want to take them until she sees the doctor. Patient still very agitated and said \"let me tell you how it works in the hospital on the weekend, people like to play around. \" While techs were changing the patient's gown and diaper, patient stated \"you all quit beating me up. \" 
 
2141- Patient asked for some water with her medication. Tech gave her some water and patient got upset saying that it wasn't water and she couldn't take her medication with that. Offered a cup with water and patient still refused. Patient stated \"you try taking medications without water. \" Administered lactulose and patient took it and said she will \"[facility].\" She said \"you guys aren't clean people and you dont give medications on time. \" 
 
Patient took all medications but 2. Stated she was feeling nauseous. Asked if she wanted something for the nausea. Family member walked in and the family member had asked the patient the same thing. She looked at him and stated no. Nurse asked again if she would like something for nausea. She stated no. Asked patient if she would like anything else and she stated \"no. Just leave me alone. \"

## 2019-04-27 NOTE — ROUTINE PROCESS
Bedside and Verbal shift change report given to Troy Ambrocio RN (oncoming nurse) by Denise Larios RN (offgoing nurse). Report included the following information SBAR, Kardex, ED Summary, MAR and Recent Results. 
  
Zone Phone:   [phone] 
  
  
Significant changes during shift:  pt very agitated in AM, now has seemed to calm down and is very pleasant.  
  
  
Patient Information 
  
Parth Shelton 
61 y.o. 
4/24/2019  2:00 PM by Fabienne Otoole MD. Parth Shelton was admitted from [facility] 
  
Problem List 
  
    
Patient Active Problem List  
  Diagnosis Date Noted  Encephalopathy 04/24/2019  Acute respiratory failure with hypercapnia ([address] 04/02/2019  Counseling regarding advance care planning and goals of care 04/02/2019  Hepatic encephalopathy ([address] 03/28/2019  Colitis 03/28/2019  UTI (urinary tract infection) 03/28/2019  Septic shock ([address] 03/28/2019  Bilateral carotid artery stenosis 11/14/2018  TIA (transient ischemic attack) 11/13/2018  GERD (gastroesophageal reflux disease) 11/13/2018  Therapeutic drug monitoring 10/16/2018  Severe obesity ([address] 10/11/2018  Obesity (BMI 30.0-34.9) 07/10/2018  Sepsis ([address] 03/30/2017  CAP (community acquired pneumonia) 03/30/2017  IBIS (obstructive sleep apnea) 03/30/2017  Tobacco abuse 03/30/2017  Neuropathy 03/30/2017  COPD (chronic obstructive pulmonary disease) ([address] 03/28/2017  Acute deep vein thrombosis (DVT) of right lower extremity ([address] 11/10/2016  
 BALDERAS (nonalcoholic steatohepatitis) 03/10/2016  GI bleed 03/03/2016  Anemia 03/01/2016  Thrombocytopenia ([address] 08/15/2013  Previous back surgery 08/15/2013  S/P CHACHO (total abdominal hysterectomy) 08/15/2013  Cirrhosis ([address] 08/15/2013  Diabetes mellitus with neurological manifestations, uncontrolled ([address]    
 Essential hypertension, benign    
 Hyperlipidemia LDL goal <100    
  
    
Past Medical History:  
Diagnosis Date  Asthma    
 Back pain    
  COPD (chronic obstructive pulmonary disease) (HCC)    
 [facility]    
 Esophageal varices in cirrhosis (HCC)    
  6/2014 banding x 2  
 Fibromyalgia    
 Gastrointestinal disorder    
 GERD (gastroesophageal reflux disease)    
 Hypercholesteremia    
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    
 Liver disease    
 Other and unspecified hyperlipidemia    
 Restless leg syndrome    
 Type II or unspecified type diabetes mellitus without mention of complication, uncontrolled    
 Unspecified essential hypertension    
  
  
  
Core Measures: 
  
CVA: No No 
CHF:No No 
PNA:No No 
  
  
Activity Status: 
  
OOB to Chair No 
Ambulated this shift No  
Bed Rest Yes 
  
Supplemental O2: (If Applicable) 
  
NC Yes NRB No 
Venti-mask No 
On 2 Liters/min 
  
  
LINES AND DRAINS: 
  
  
DVT prophylaxis: 
  
DVT prophylaxis Med- Yes, Heparin DVT prophylaxis SCD or CRISTOPHER- No  
  
Wounds: (If Applicable) 
  
  
Patient Safety: 
  
Falls Score Total Score: 3 Safety Level_______ Bed Alarm On? Yes Sitter?  No 
  
Plan for upcoming shift:safety and rest 
  
  
  
Discharge Plan: Yes, Rehab 
  
Active Consults: 
IP CONSULT TO PALLIATIVE CARE - PROVIDER

## 2019-04-28 NOTE — PROGRESS NOTES
Pharmacy Automatic Renal Dosing Protocol - Antimicrobials Indication for Antimicrobials: UTI, ESBL (recent norman cath); in setting of BALDERAS/Cirrhosis Current Regimen of Each Antimicrobial: 
Meropenem 1gm IV q8h x7 days (Start Date ; Last dose ) Previous Antimicrobial Therapy: 
Ceftriaxone Significant Cultures:  
: Urine = >100k Klebsiella pneumoniae ESBL+ (FINAL) Radiology / Imaging results: (X-ray, CT scan or MRI):  
 
Paralysis, amputations, malnutrition:  
 
Labs: 
Recent Labs  
  19 
0610 19 
0347 19 
0341 CREA 0.79 0.60 0.58  
BUN 9 9 9 WBC 6.8 5.1 6.7 Temp (24hrs), Av.4 °F (36.9 °C), Min:98.2 °F (36.8 °C), Max:98.6 °F (37 °C) Creatinine Clearance (mL/min) or Dialysis: 74 ml/min Impression/Plan:  
Continue Meropenem 1gm IV q8h x7 days Pharmacy will follow daily and adjust medications as appropriate for renal function and/or serum levels. Thank you, 
Jn Taylor, [facility] Recommended duration of therapy 
http://[facility]/[place]/[facility]/Pharmacy/Clinical%20Companion/Duration%20of%20ABX%20therapy. docx Renal Dosing 
http://[facility]/[place]/[facility]/Pharmacy/Clinical%20Companion/Renal%[email or web address]. pdf

## 2019-04-28 NOTE — PROGRESS NOTES
Hospitalist Progress NoteNAME: Ria Cortes :  1955 MRN:  [id number] Admit date: 2019 Today's date: 19 PCP: ALEJANDRO Stern M.D. Cell [phone] Assessment / Plan: 
 
Acute encephalopathy suspect hepatic POA Complicated UTI (recent norman cath) POA urine culture with ESBL klebsiella Known cirrhosis and portal HTN Prior hepatic encephalopathy, uses lactulose and rifaximin Normal ammonia but definitely had asterixis at admit MS improved, oriented x 2, waxing and waning 
? SBP, US several weeks ago with trace ascites, non distended abdomen Blood culture negative Urine culture with ESBL klebsiella, change to meropenem Sensitive to cipro, option at discharge Low dose ultram as needed Continue rifaximin and lactulose, 2 to 3 documented BMs past 2 days IMPROVED, spoke with family at bedside, still not quite to baseline Start d/C planning 
  
BALDERAS POA Liver cirrhosis POA History of recurrent hepatic encephalopathy History of esophageal varices NH3 increased, confusion and asterixis Rifaxin and lactulose, lasix 
  
COPD without exacerbation Recent acute respiratory failure with hypercarbia and hypoxia Intubated last admit CXR showed mild bibasilar discoid atelectasis Resume home Meds ABG on 2 L NC 7.459/50/71 CXR with mild bibasilar discoid atelectasis. Respiratory status stable 
  
DM type 2 on insulin Restart low dose lantus , 185, 185, 160 
  
Essential HTN POA History of TIA  Hyperlipidemia POA Resume statin/ASA Overweight POA Body mass index is 28.96 kg/m². 
  
Code Status: DNR, discussed with pt's  at bedside Surrogate Decision Maker: pt's  Devon Abebe DVT Prophylaxis: heparin GI Prophylaxis: not indicated Baseline: from [facility], family does not wish to return there. Subjective: Chief Complaint / Reason for Physician Visit f/u AMS Discussed with RN events overnight. Alert, seen with daughter and  at bedside Calm, still a bit forgetful, family says definitely improved from admit Not quite at baseline Urine culture with ESBL Klebsiella No HA, CP, SOB, N/V Review of Systems: 
Symptom Y/N Comments  Symptom Y/N Comments Fever/Chills n   Chest Pain n   
Poor Appetite    Edema Cough n   Abdominal Pain n   
Sputum    Joint Pain SOB/AMRTINEZ n   Headache n   
Nausea/vomit n   Tolerating PT/OT Diarrhea Y   Tolerating Diet y Constipation    Other Could NOT obtain due to:   
 
Objective: VITALS:  
Last 24hrs VS reviewed since prior progress note. Most recent are: 
Patient Vitals for the past 24 hrs: 
 Temp Pulse Resp BP SpO2  
04/28/19 1624 98.6 °F (37 °C) (!) 114 20 127/65 94 % 04/28/19 1123 98.4 °F (36.9 °C) (!) 114 22 144/65 93 % 04/28/19 0719 98.4 °F (36.9 °C) (!) 114 20 140/74 95 % 04/28/19 0532 98.6 °F (37 °C) (!) 114 20 142/70 96 % 04/28/19 0027 98.2 °F (36.8 °C) (!) 113 20 131/60 90 % 04/27/19 1843 98.6 °F (37 °C) (!) 115 20 121/62 94 % Intake/Output Summary (Last 24 hours) at 4/28/2019 1642 Last data filed at 4/28/2019 8337 Gross per 24 hour Intake  Output 250 ml Net -250 ml Wt Readings from Last 12 Encounters:  
04/24/19 86.4 kg (190 lb 7.6 oz) 04/09/19 104.6 kg (230 lb 9.6 oz) 03/19/19 101.5 kg (223 lb 12.8 oz) 01/01/19 87.8 kg (193 lb 9 oz)  
11/19/18 84.8 kg (187 lb)  
11/13/18 84.7 kg (186 lb 11.7 oz)  
11/13/18 84.7 kg (186 lb 11.7 oz) 10/16/18 86.7 kg (191 lb 3.2 oz) 10/11/18 89.3 kg (196 lb 12.8 oz) 07/10/18 83.6 kg (184 lb 3.2 oz) 05/31/18 86.6 kg (191 lb) 04/12/18 86.7 kg (191 lb 3.2 oz) PHYSICAL EXAM: 
General: WD, WN. Alert, cooperative, no acute distress   
EENT:  PERRL. Anicteric sclerae. MMM Neck:  No meningismus, no thyromegaly Resp:  CTA bilaterally, no wheezing or rales. No accessory muscle use CV:  Regular  rhythm,  No edema GI:  Soft, mildly distended, Non tender.  +Bowel sounds, no rebound LN:  No cervical or inguinal KIKI Neurologic:  Alert Knew her birthday, trouble remembering anniversary, knew month, not year Knew where she was, knew she had 2 children Talking and following commands Slight weakness right leg, otherwise non focal, no pronator drift No asterixis Psych:   Not anxious nor agitated Skin:  No rashes. No jaundice Reviewed most current lab test results and cultures  YES Reviewed most current radiology test results   YES Review and summation of old records today    NO Reviewed patient's current orders and MAR    YES 
PMH/SH reviewed - no change compared to H&P 
________________________________________________________________________ Care Plan discussed with: 
  Comments Patient x Family  x  and daughter RN x Care Manager Consultant Multidiciplinary team rounds were held today with , nursing, pharmacist and clinical coordinator. Patient's plan of care was discussed; medications were reviewed and discharge planning was addressed. ________________________________________________________________________ Total NON critical care TIME:  25  Minutes Total CRITICAL CARE TIME Spent:   Minutes non procedure based Comments >50% of visit spent in counseling and coordination of care    
________________________________________________________________________ Audra Cash MD  
 
Procedures: see electronic medical records for all procedures/Xrays and details which were not copied into this note but were reviewed prior to creation of Plan. LABS: 
I reviewed today's most current labs and imaging studies. Pertinent labs include: 
Recent Labs  
  04/28/19 
0610 04/27/19 
0347 04/26/19 
0341 WBC 6.8 5.1 6.7 HGB 11.7 11.4* 11.2* HCT 37.1 36.5 36.1 * 86* 81*  
 
 Recent Labs  
  04/28/19 
0610 04/27/19 
0347 04/26/19 
0341 * 135* 135* K 3.9 3.6 3.6 CL 97 99 98  
CO2 29 29 32 * 165* 166* BUN 9 9 9 CREA 0.79 0.60 0.58  
CA 8.8 8.2* 8.5 ALB  --   --  2.9* TBILI  --   --  1.6* SGOT  --   --  32 ALT  --   --  49

## 2019-04-28 NOTE — ROUTINE PROCESS
Bedside and Verbal shift change report given to  Chayo Singh (oncoming nurse) by Ruslan Geronimo RN (offgoing nurse). Report included the following information SBAR, Kardex, ED Summary, MAR and Recent Results. 
  
Zone Phone:   [phone] 
  
  
Significant changes during shift:  Pt pulled IV out, new one placed. 
 
  
Patient Information 
  
Jahaira Marino 
61 y.o. 
4/24/2019  2:00 PM by Prachi Orta MD. Jahaira Marino was admitted from [facility] 
  
Problem List 
  
    
Patient Active Problem List  
  Diagnosis Date Noted  Encephalopathy 04/24/2019  Acute respiratory failure with hypercapnia ([address] 04/02/2019  Counseling regarding advance care planning and goals of care 04/02/2019  Hepatic encephalopathy ([address] 03/28/2019  Colitis 03/28/2019  UTI (urinary tract infection) 03/28/2019  Septic shock ([address] 03/28/2019  Bilateral carotid artery stenosis 11/14/2018  TIA (transient ischemic attack) 11/13/2018  GERD (gastroesophageal reflux disease) 11/13/2018  Therapeutic drug monitoring 10/16/2018  Severe obesity ([address] 10/11/2018  Obesity (BMI 30.0-34.9) 07/10/2018  Sepsis ([address] 03/30/2017  CAP (community acquired pneumonia) 03/30/2017  IBIS (obstructive sleep apnea) 03/30/2017  Tobacco abuse 03/30/2017  Neuropathy 03/30/2017  COPD (chronic obstructive pulmonary disease) ([address] 03/28/2017  Acute deep vein thrombosis (DVT) of right lower extremity ([address] 11/10/2016  
 BALDERAS (nonalcoholic steatohepatitis) 03/10/2016  GI bleed 03/03/2016  Anemia 03/01/2016  Thrombocytopenia ([address] 08/15/2013  Previous back surgery 08/15/2013  S/P CHACHO (total abdominal hysterectomy) 08/15/2013  Cirrhosis ([address] 08/15/2013  Diabetes mellitus with neurological manifestations, uncontrolled ([address]    
 Essential hypertension, benign    
 Hyperlipidemia LDL goal <100    
  
    
Past Medical History:  
Diagnosis Date  Asthma    
 Back pain    
  COPD (chronic obstructive pulmonary disease) (HCC)    
 [facility]    
 Esophageal varices in cirrhosis (HCC)    
  6/2014 banding x 2  
 Fibromyalgia    
 Gastrointestinal disorder    
 GERD (gastroesophageal reflux disease)    
 Hypercholesteremia    
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    
 Liver disease    
 Other and unspecified hyperlipidemia    
 Restless leg syndrome    
 Type II or unspecified type diabetes mellitus without mention of complication, uncontrolled    
 Unspecified essential hypertension    
  
  
  
Core Measures: 
  
CVA: No No 
CHF:No No 
PNA:No No 
  
  
Activity Status: 
  
OOB to Chair No 
Ambulated this shift No  
Bed Rest Yes 
  
Supplemental O2: (If Applicable) 
  
NC Yes NRB No 
Venti-mask No 
On 2 Liters/min 
  
  
LINES AND DRAINS: 
  
  
DVT prophylaxis: 
  
DVT prophylaxis Med- Yes, Heparin DVT prophylaxis SCD or CRISTOPHER- No  
  
Wounds: (If Applicable) 
  
  
Patient Safety: 
  
Falls Score Total Score: 3 Safety Level_______ Bed Alarm On? Yes Sitter?  No 
  
Plan for upcoming shift:safety and rest 
  
  
  
Discharge Plan: Yes, Rehab 
  
Active Consults: 
IP CONSULT TO PALLIATIVE CARE - PROVIDER

## 2019-04-28 NOTE — ROUTINE PROCESS
Bedside and Verbal shift change report given to  Doctor Jewell Uriostegui (oncoming nurse) by Augusto Deutsch (offgoing nurse). Report included the following information SBAR, Kardex, ED Summary, MAR and Recent Results. 
  
Zone Phone:   [phone] 
  
  
Significant changes during shift:  pt having frequent loose stool  
Patient Information 
  
Robert Rahman 
61 y.o. 
4/24/2019  2:00 PM by Beverly France MD. Robert Rahman was admitted from [facility] 
  
Problem List 
  
    
Patient Active Problem List  
  Diagnosis Date Noted  Encephalopathy 04/24/2019  Acute respiratory failure with hypercapnia ([address] 04/02/2019  Counseling regarding advance care planning and goals of care 04/02/2019  Hepatic encephalopathy ([address] 03/28/2019  Colitis 03/28/2019  UTI (urinary tract infection) 03/28/2019  Septic shock ([address] 03/28/2019  Bilateral carotid artery stenosis 11/14/2018  TIA (transient ischemic attack) 11/13/2018  GERD (gastroesophageal reflux disease) 11/13/2018  Therapeutic drug monitoring 10/16/2018  Severe obesity ([address] 10/11/2018  Obesity (BMI 30.0-34.9) 07/10/2018  Sepsis ([address] 03/30/2017  CAP (community acquired pneumonia) 03/30/2017  IBIS (obstructive sleep apnea) 03/30/2017  Tobacco abuse 03/30/2017  Neuropathy 03/30/2017  COPD (chronic obstructive pulmonary disease) ([address] 03/28/2017  Acute deep vein thrombosis (DVT) of right lower extremity ([address] 11/10/2016  
 BALDERAS (nonalcoholic steatohepatitis) 03/10/2016  GI bleed 03/03/2016  Anemia 03/01/2016  Thrombocytopenia ([address] 08/15/2013  Previous back surgery 08/15/2013  S/P CHACHO (total abdominal hysterectomy) 08/15/2013  Cirrhosis ([address] 08/15/2013  Diabetes mellitus with neurological manifestations, uncontrolled ([address]    
 Essential hypertension, benign    
 Hyperlipidemia LDL goal <100    
  
    
Past Medical History:  
Diagnosis Date  Asthma    
 Back pain    
  COPD (chronic obstructive pulmonary disease) (HCC)    
 [facility]    
 Esophageal varices in cirrhosis (HCC)    
  6/2014 banding x 2  
 Fibromyalgia    
 Gastrointestinal disorder    
 GERD (gastroesophageal reflux disease)    
 Hypercholesteremia    
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    
 Liver disease    
 Other and unspecified hyperlipidemia    
 Restless leg syndrome    
 Type II or unspecified type diabetes mellitus without mention of complication, uncontrolled    
 Unspecified essential hypertension    
  
  
  
Core Measures: 
  
CVA: No No 
CHF:No No 
PNA:No No 
  
  
Activity Status: 
  
OOB to Chair No 
Ambulated this shift No  
Bed Rest Yes 
  
Supplemental O2: (If Applicable) 
  
NC Yes NRB No 
Venti-mask No 
On 2 Liters/min 
  
  
LINES AND DRAINS: 
  
  
DVT prophylaxis: 
  
DVT prophylaxis Med- Yes, Heparin DVT prophylaxis SCD or CRISTOPHER- No  
  
Wounds: (If Applicable) 
  
  
Patient Safety: 
  
Falls Score Total Score: 3 Safety Level_______ Bed Alarm On? Yes Sitter?  No 
  
Plan for upcoming shift:safety and rest 
  
  
  
Discharge Plan: Yes, Rehab 
  
Active Consults: 
IP CONSULT TO PALLIATIVE CARE - PROVIDER

## 2019-04-28 NOTE — PROGRESS NOTES
Patient resting in bed comfortably head to toes assessment are as charted. Patient denies pain at this time. Pain assessment on going. Falls prevention maintained. Care plain reviewed and patient expressed understanding. Contact isolation maintained. Call light and belongings within reach will continue to monitor.

## 2019-04-29 NOTE — PROGRESS NOTES
Problem: Falls - Risk of 
Goal: *Absence of Falls Description No injury during hospitalization. Outcome: Progressing Towards Goal 
  
Problem: Patient Education: Go to Patient Education Activity Goal: Patient/Family Education Outcome: Progressing Towards Goal 
  
Problem: Pressure Injury - Risk of 
Goal: *Prevention of pressure injury Description Document Nithin Scale and appropriate interventions in the flowsheet. Outcome: Progressing Towards Goal 
  
Problem: Patient Education: Go to Patient Education Activity Goal: Patient/Family Education Outcome: Progressing Towards Goal 
  
Problem: Patient Education: Go to Patient Education Activity Goal: Patient/Family Education Outcome: Progressing Towards Goal 
  
Problem: Patient Education: Go to Patient Education Activity Goal: Patient/Family Education Outcome: Progressing Towards Goal 
  
Problem: Delirium Treatment Goal: *Level of consciousness restored to baseline Outcome: Progressing Towards Goal 
Goal: *Level of environmental perceptions restored to baseline Outcome: Progressing Towards Goal 
Goal: *Sensory perception restored to baseline Outcome: Progressing Towards Goal 
Goal: *Emotional stability restored to baseline Outcome: Progressing Towards Goal 
Goal: *Functional assessment restored to baseline Outcome: Progressing Towards Goal 
Goal: *Absence of falls Outcome: Progressing Towards Goal 
Goal: *Will remain free of delirium, CAM Score negative Outcome: Progressing Towards Goal 
Goal: *Cognitive status will be restored to baseline Outcome: Progressing Towards Goal 
Goal: Interventions Outcome: Progressing Towards Goal 
  
Problem: Patient Education: Go to Patient Education Activity Goal: Patient/Family Education Outcome: Progressing Towards Goal 
  
Problem: Risk for Spread of Infection Goal: Prevent transmission of infectious organism to others Description Prevent the transmission of infectious organisms to other patients, staff members, and visitors. Outcome: Progressing Towards Goal 
  
Problem: Patient Education:  Go to Education Activity Goal: Patient/Family Education Outcome: Progressing Towards Goal

## 2019-04-29 NOTE — PROGRESS NOTES
Bedside and Verbal shift change report given to [facility] RN (oncoming nurse) by Joaquín Wild RN (offgoing nurse). Report included the following information SBAR, Kardex, ED Summary, [facility] and Recent Results. 
  
Zone Phone:   [phone] 
  
  
Significant changes during shift: None 
  
  
  
Patient Information 
  
Chris Blandon 
61 y.o. 
4/24/2019  2:00 PM by Stephen Elias MD. Dhara Mahoney [facility] from [facility] 
  
Problem List 
  
       
Patient Active Problem List  
  Diagnosis Date Noted  Encephalopathy 04/24/2019  Acute respiratory failure with hypercapnia ([address] 04/02/2019  Counseling regarding advance care planning and goals of care 04/02/2019  Hepatic encephalopathy ([address] 03/28/2019  Colitis 03/28/2019  UTI (urinary tract infection) 03/28/2019  Septic shock ([address] 03/28/2019  Bilateral carotid artery stenosis 11/14/2018  TIA (transient ischemic attack) 11/13/2018  GERD (gastroesophageal reflux disease) 11/13/2018  Therapeutic drug monitoring 10/16/2018  Severe obesity ([address] 10/11/2018  Obesity (BMI 30.0-34.9) 07/10/2018  Sepsis ([address] 03/30/2017  CAP (community acquired pneumonia) 03/30/2017  IBIS (obstructive sleep apnea) 03/30/2017  Tobacco abuse 03/30/2017  Neuropathy 03/30/2017  COPD (chronic obstructive pulmonary disease) ([address] 03/28/2017  Acute deep vein thrombosis (DVT) of right lower extremity ([address] 11/10/2016  
 BALDERAS (nonalcoholic steatohepatitis) 03/10/2016  GI bleed 03/03/2016  Anemia 03/01/2016  Thrombocytopenia ([address] 08/15/2013  Previous back surgery 08/15/2013  S/P CHACHO (total abdominal hysterectomy) 08/15/2013  Cirrhosis ([address] 08/15/2013  Diabetes mellitus with neurological manifestations, uncontrolled ([address]    
 Essential hypertension, benign    
 Hyperlipidemia LDL goal <100    
  
       
Past Medical History:  
Diagnosis Date  Asthma    
 Back pain    
 COPD (chronic obstructive pulmonary disease) (HCC)    
  Diabetes ([facility]    
 Esophageal varices in cirrhosis (HCC)    
  6/2014 banding x 2  
 Fibromyalgia    
 Gastrointestinal disorder    
 GERD (gastroesophageal reflux disease)    
 Hypercholesteremia    
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    
 Liver disease    
 Other and unspecified hyperlipidemia    
 Restless leg syndrome    
 Type II or unspecified type diabetes mellitus without mention of complication, uncontrolled    
 Unspecified essential hypertension    
  
  
  
  
  
  
Activity Status: 
  
OOB to Chair No 
Ambulated this shift No  
Bed Rest Yes 
  
Supplemental E4: (DR Applicable) PRN On 2 Liters/min 
  
  
LINES AND DRAINS: 
  
  
DVT prophylaxis: 
  
DVT prophylaxis Med- Yes, Heparin DVT prophylaxis SCD or CRISTOPHER- No  
  
Wounds: (If Applicable) 
  
  
Patient Safety: 
  
Falls Score Total Score: 3 Safety Level_______ Bed Alarm On? Yes Sitter?  No 
  
Plan for upcoming shift:safety and rest 
  
  
  
Discharge Plan: Yes, Rehab 
  
Active Consults: 
IP CONSULT TO PALLIATIVE CARE - PROVIDER

## 2019-04-29 NOTE — PROGRESS NOTES
Received call from  and he would like the referral sent to [facility]. Referral sent and will await their response.

## 2019-04-29 NOTE — PROGRESS NOTES
Music Therapy Assessment [address] Terri Rodríguez [id number]    
1955  61 y.o.  female Patient Telephone Number: [phone] (home) Baptism Affiliation: Druze Language: [place] Patient Active Problem List  
 Diagnosis Date Noted  Goals of care, counseling/discussion  Advanced care planning/counseling discussion  Debility  Neuropathic pain  Chronic bilateral low back pain with bilateral sciatica  Encephalopathy 04/24/2019  Acute respiratory failure with hypercapnia ([address] 04/02/2019  Counseling regarding advance care planning and goals of care 04/02/2019  Hepatic encephalopathy ([address] 03/28/2019  Colitis 03/28/2019  UTI (urinary tract infection) 03/28/2019  Septic shock ([address] 03/28/2019  Bilateral carotid artery stenosis 11/14/2018  TIA (transient ischemic attack) 11/13/2018  GERD (gastroesophageal reflux disease) 11/13/2018  Therapeutic drug monitoring 10/16/2018  Severe obesity ([address] 10/11/2018  Obesity (BMI 30.0-34.9) 07/10/2018  Sepsis ([address] 03/30/2017  CAP (community acquired pneumonia) 03/30/2017  IBIS (obstructive sleep apnea) 03/30/2017  Tobacco abuse 03/30/2017  Neuropathy 03/30/2017  COPD (chronic obstructive pulmonary disease) ([address] 03/28/2017  Acute deep vein thrombosis (DVT) of right lower extremity ([address] 11/10/2016  
 BALDERAS (nonalcoholic steatohepatitis) 03/10/2016  GI bleed 03/03/2016  Anemia 03/01/2016  Thrombocytopenia ([address] 08/15/2013  Previous back surgery 08/15/2013  S/P CHACHO (total abdominal hysterectomy) 08/15/2013  Cirrhosis ([address] 08/15/2013  Diabetes mellitus with neurological manifestations, uncontrolled ([address]  Essential hypertension, benign  Hyperlipidemia LDL goal <100 Date: 4/29/2019            Total Time (in minutes): 25          MRM 3 NEUROSCIENCE TELEMETRY Mental Status:   [x] Alert [  ] Ronnald Setters [  ]  Confused  [  ] Minimally responsive Communication Status: [  ] Impaired Speech [  ] Nonverbal -Pt has difficulty with word finding at times. Physical Status:   [  ] Oxygen in use  [  ] Hard of Hearing [  ] Vision Impaired [  ] Ambulatory  [  ] Ambulatory with assistance [  ] Non-ambulatory -N/A Music Preferences, Background: Country, especially Lucina Marie. 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 Clinical Problem addressed: Improve mood, positive social interaction. Goal(s) met in session: 
Physical/Pain management (Scale of 1-10): Pre-session rating: Pt denied pain. Post-session rating: Pt denied pain. [  ] Increased relaxation   [  ] Regulated breathing patterns [  ] Decreased muscle tension   [  ] Minimized physical distress Emotional/Psychological: 
[x] Increased self-expression   [  ] Decreased aggressive behavior [  ] Decreased sadness   [  ] Discussed healthy coping skills [x] Improved mood    [  ] Decreased withdrawn behavior Social: 
[  ] Decreased feelings of isolation/loneliness [x] Positive social interaction [  ] Provided support and/or comfort for family/friends Spiritual: 
[  ] Spiritual support    [  ] Expressed peace [  ] Expressed erika    [  ] Discussed beliefs Techniques Utilized (Check all that apply):  
[  ] Procedural support MT [  ] Music for relaxation [x] Patient preferred music 
[  ] Pippa analysis  [  ] Song choice  [  ] Music for validation [  ] Entrainment  [  ] Progressive muscle relax. [  ] Guided visualization [  ] Stefany Gomez  [  ] Patient instrument playing [  ] Cachorro Khanna writing [  ] Kemper Schirmer along   [  ] Improvisation  [x] Sensory stimulation 
[x] Active Listening  [x] Music for spiritual support [  ] Making of CDs as gifts Session Observations:  Referral from Amanda Tavera Palliative NP and Glenn Beltran Palliative MSW.  Patient (pt) is known to this music therapist from pt's previous hospitalization. Pt was alert lying in bed. This music therapist and music therapy intern (MT team) sat at bedside while asking pt how she was feeling. Pt reported on this and chose to hear a song. MT team sang the [employer]. Pt's affect brightened in response to the music as evidenced by (AEB) pt's facial expression brightening and moving her feet to the beat. Pt expressed enjoyment in the music, saying she knew that song by heart. Pt spoke about enjoying Country music, especially songs with a Buddhism undertone. MT team provided active listening and words of support. MT team then sang the [employer]. Pt's affect was bright and she expressed gratitude for the session, saying it brightened her whole day. Will follow as able. MAUREEN JohnsBC (Music Therapist-Board Certified) 
and 
Steven \"Jennifer\" Farheen, Music Therapy Intern Spiritual Care Department Referral-based service

## 2019-04-29 NOTE — ROUTINE PROCESS
Bedside and Verbal shift change report given to  Madison  RN (oncoming nurse) by Jada Yee RN (offgoing nurse). Report included the following information SBAR, Kardex, ED Summary, MAR and Recent Results. 
  
Zone Phone:   [phone] 
  
  
Significant changes during shift: None Patient Information 
  
Lindsey Wilson 
61 y.o. 
4/24/2019  2:00 PM by Librado Damon MD. Lindsey Wilson was admitted from [facility] 
  
Problem List 
  
    
Patient Active Problem List  
  Diagnosis Date Noted  Encephalopathy 04/24/2019  Acute respiratory failure with hypercapnia ([address] 04/02/2019  Counseling regarding advance care planning and goals of care 04/02/2019  Hepatic encephalopathy ([address] 03/28/2019  Colitis 03/28/2019  UTI (urinary tract infection) 03/28/2019  Septic shock ([address] 03/28/2019  Bilateral carotid artery stenosis 11/14/2018  TIA (transient ischemic attack) 11/13/2018  GERD (gastroesophageal reflux disease) 11/13/2018  Therapeutic drug monitoring 10/16/2018  Severe obesity ([address] 10/11/2018  Obesity (BMI 30.0-34.9) 07/10/2018  Sepsis ([address] 03/30/2017  CAP (community acquired pneumonia) 03/30/2017  IBIS (obstructive sleep apnea) 03/30/2017  Tobacco abuse 03/30/2017  Neuropathy 03/30/2017  COPD (chronic obstructive pulmonary disease) ([address] 03/28/2017  Acute deep vein thrombosis (DVT) of right lower extremity ([address] 11/10/2016  
 BALDERAS (nonalcoholic steatohepatitis) 03/10/2016  GI bleed 03/03/2016  Anemia 03/01/2016  Thrombocytopenia ([address] 08/15/2013  Previous back surgery 08/15/2013  S/P CHACHO (total abdominal hysterectomy) 08/15/2013  Cirrhosis ([address] 08/15/2013  Diabetes mellitus with neurological manifestations, uncontrolled ([address]    
 Essential hypertension, benign    
 Hyperlipidemia LDL goal <100    
  
    
Past Medical History:  
Diagnosis Date  Asthma    
 Back pain    
 COPD (chronic obstructive pulmonary disease) (HCC)    
  Diabetes ([facility]    
 Esophageal varices in cirrhosis (HCC)    
  6/2014 banding x 2  
 Fibromyalgia    
 Gastrointestinal disorder    
 GERD (gastroesophageal reflux disease)    
 Hypercholesteremia    
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    
 Liver disease    
 Other and unspecified hyperlipidemia    
 Restless leg syndrome    
 Type II or unspecified type diabetes mellitus without mention of complication, uncontrolled    
 Unspecified essential hypertension    
  
  
  
 
  
  
Activity Status: 
  
OOB to Chair No 
Ambulated this shift No  
Bed Rest Yes 
  
Supplemental O2: (If Applicable) PRN On 2 Liters/min 
  
  
LINES AND DRAINS: 
  
  
DVT prophylaxis: 
  
DVT prophylaxis Med- Yes, Heparin DVT prophylaxis SCD or CRISTOPHER- No  
  
Wounds: (If Applicable) 
  
  
Patient Safety: 
  
Falls Score Total Score: 3 Safety Level_______ Bed Alarm On? Yes Sitter?  No 
  
Plan for upcoming shift:safety and rest 
  
  
  
Discharge Plan: Yes, Rehab 
  
Active Consults: 
IP CONSULT TO PALLIATIVE CARE - PROVIDER

## 2019-04-29 NOTE — PROGRESS NOTES
Problem: Mobility Impaired (Adult and Pediatric) Goal: *Acute Goals and Plan of Care (Insert Text) Description Physical Therapy Goals Initiated 4/25/2019 1. Patient will move from supine to sit and sit to supine  in bed with modified independence within 7 day(s). 2.  Patient will transfer from bed to chair and chair to bed with minimal assistance/contact guard assist using the least restrictive device within 7 day(s). 3.  Patient will perform sit to stand with minimal assistance/contact guard assist within 7 day(s). 4.  Patient will ambulate with minimal assistance/contact guard assist for 50 feet with the least restrictive device within 7 day(s). Outcome: Progressing Towards GoAL PHYSICAL THERAPY TREATMENT Patient: Cande Capellan (62 y.o. female) Date: 4/29/2019 Diagnosis: Encephalopathy [G93.40] <principal problem not specified> Precautions: Fall, DNR Chart, physical therapy assessment, plan of care and goals were reviewed. ASSESSMENT: 
Patient is received supine in bed and agreeable to mobility. Patient demonstrates the need for Min A for supine to sit. She has some increased difficulty scooting to EOB secondary to continued reports of bilateral foot pain. Patient reports her feet hurt her and she has difficulty feeling them. Patient bilateral LE are blocked and she is Min A for sit to stand at Rw. Patient is able to stand for 2 minutes this session with walker and therapist assist while OT completes hygiene. Patient requires sitting rest break and comes to sit with Min A for safety and controlled lowering. Patient performs sit to stand at [facility] for the second time and turns to transfer. Patient demonstrates decreased weight bearing during gait allowing the R foot to slip from underneath her and requiring assistance to safely make it to the chair. Patient will continue to benefit from acute PT services and benefit from IPR on D/C. Progression toward goals: ?    Improving appropriately and progressing toward goals ? Improving slowly and progressing toward goals ? Not making progress toward goals and plan of care will be adjusted PLAN: 
Patient continues to benefit from skilled intervention to address the above impairments. Continue treatment per established plan of care. Discharge Recommendations:  Inpatient Rehab Further Equipment Recommendations for Discharge:  TBD SUBJECTIVE:  
Patient stated ? I just don't know if I can do it . ? OBJECTIVE DATA SUMMARY:  
Critical Behavior: 
Neurologic State: Alert Orientation Level: Oriented X4 Cognition: Follows commands Safety/Judgement: Fall prevention Functional Mobility Training: 
Bed Mobility: 
  
Supine to Sit: Minimum assistance Transfers: 
Sit to Stand: Moderate assistance;Assist x2 Stand to Sit: Moderate assistance;Assist x2(Plops down unsafely) Bed to Chair: Moderate assistance;Assist x2 Balance: 
  
Ambulation/Gait Training: 
Distance (ft): 3 Feet (ft) Assistive Device: Walker, rolling;Gait belt Ambulation - Level of Assistance: Moderate assistance;Assist x2 Gait Abnormalities: Decreased step clearance; Path deviations;Trunk sway increased; Step to gait Base of Support: Widened Stance: Right decreased Speed/Mey: Slow Step Length: Right shortened;Left shortened Swing Pattern: Right symmetrical 
  
  
  
  
  
Stairs: 
  
  
   
 
Neuro Re-Education: 
Therapeutic Exercises:  
 
Pain: 
Pain Scale 1: Numeric (0 - 10) Pain Intensity 1: 0 Activity Tolerance:  
Patient demonstrates good activity tolerance this session and is able to perform more today than previous sessions. Please refer to the flowsheet for vital signs taken during this treatment. After treatment:  
?    Patient left in no apparent distress sitting up in chair ? Patient left in no apparent distress in bed 
? Call bell left within reach ?    Nursing notified ? Caregiver present ? Chair alarm activated COMMUNICATION/COLLABORATION:  
The patient?s plan of care was discussed with: Occupational Therapist and Registered Nurse Deepa Beaver, PT Time Calculation: 25 mins

## 2019-04-29 NOTE — PROGRESS NOTES
Problem: Self Care Deficits Care Plan (Adult) Goal: *Acute Goals and Plan of Care (Insert Text) Description Occupational Therapy Goals Initiated 4/25/2019 1. Patient will perform grooming with supervision/set-up in unsupported sit within 7 day(s). 2.  Patient will perform upper body dressing with moderate assistance  within 7 day(s). 3.  Patient will perform toilet transfers with moderate assistance to [facility]  within 7 day(s). 4.  Patient will perform all aspects of toileting with moderate assistance  within 7 day(s). 5.  Patient will participate in upper extremity therapeutic exercise/activities with supervision/set-up for 5 minutes within 7 day(s). Outcome: Progressing Towards Goal 
 OCCUPATIONAL THERAPY TREATMENT Patient: Carloz Macias (89 y.o. female) Date: 4/29/2019 Diagnosis: Encephalopathy [G93.40] <principal problem not specified> Precautions: Fall, DNR Chart, occupational therapy assessment, plan of care, and goals were reviewed. ASSESSMENT: Patient seen for treatment with PT to attempt sit to stands and getting to chair. Feet need to be blocked and she is anxious and unsafe with stand to sit as leg pushes out in front of her uncontrolled on R side preventing controlled descent. Showed slight improvement with sit to stand, very self limiting behavior affecting independence also. Unsafe to discharge home. Recommend subacute rehab at discharge. Progression toward goals: 
?       Improving appropriately and progressing toward goals ? Improving slowly and progressing toward goals ? Not making progress toward goals and plan of care will be adjusted PLAN: 
Patient continues to benefit from skilled intervention to address the above impairments. Continue treatment per established plan of care. Discharge Recommendations:  [facility] Further Equipment Recommendations for Discharge:  TBD SUBJECTIVE:  
Patient stated ? I can't because my foot. ? OBJECTIVE DATA SUMMARY:  
Cognitive/Behavioral Status: 
Neurologic State: Alert Orientation Level: Oriented X4 Cognition: Follows commands Functional Mobility and Transfers for ADLs: 
Bed Mobility: 
Supine to Sit: Minimum assistance Transfers: 
Sit to Stand: Moderate assistance;Assist x2 Bed to Chair: Moderate assistance;Assist x2 Performed two stands to allow for hygiene. Able to maintain about 2 minutes. Balance: 
Impaired standing with RW 
ADL Intervention: Lower Body Dressing Assistance Socks: Total assistance (dependent)(would not attempt) Pain: 
Pain Scale 1: Numeric (0 - 10) Pain Intensity 1: 0 Activity Tolerance:  
fair Please refer to the flowsheet for vital signs taken during this treatment. After treatment:  
? Patient left in no apparent distress sitting up in chair ? Patient left in no apparent distress in bed 
? Call bell left within reach ? Nursing notified ? Caregiver present ? Bed alarm activated COMMUNICATION/COLLABORATION:  
The patient?s plan of care was discussed with: Physical Therapist 
 
Terrance Weiner Time Calculation: 23 mins

## 2019-04-29 NOTE — ROUTINE PROCESS
Pt was in a bad mood earlier but when I returned to her room a little later she was very happy. She did not have any complaints but she voiced that she would love a nap. Pt is resting in her room

## 2019-04-29 NOTE — PROGRESS NOTES
Patient has been denied for inpatient rehab at [address] [place] [address]. Patient and  aware and  is touring another facility and will let me know his decision today.

## 2019-04-29 NOTE — DIABETES MGMT
DTC Progress Note Recommendations/ Comments: Please consider beginning humalog 3 units ac tid for prandial insulin needs. Current hospital DM medication: lantus 15 units nightly and humalog correction - normal sensitivity Chart reviewed on Marc Dominique. Patient is a 61 y.o. female with known Type 2 DM on lantus 20 units daily and humalog 4 units: 50mg/dl >150mg/dl at home. A1c:  
Lab Results Component Value Date/Time Hemoglobin A1c 4.8 03/29/2019 02:56 PM  
 Hemoglobin A1c 6.8 (H) 11/14/2018 04:52 AM  
 
 
Recent Glucose Results:  
Lab Results Component Value Date/Time GLUCPOC 199 (H) 04/29/2019 12:04 PM  
 GLUCPOC 164 (H) 04/29/2019 06:28 AM  
 GLUCPOC 183 (H) 04/28/2019 10:25 PM  
  
 
Lab Results Component Value Date/Time Creatinine 0.79 04/28/2019 06:10 AM  
 
Estimated Creatinine Clearance: 83.9 mL/min (based on SCr of 0.79 mg/dL). Active Orders Diet DIET CARDIAC Regular; Consistent Carb 1800kcal  
  
 
PO intake:  
No data found. Will continue to follow as needed. Thank you Jesusita Méndez RD [facility] Time spent: 3 minutes

## 2019-04-29 NOTE — PROGRESS NOTES
BASILIO Plan:  Patient has been accepted at [facility] when medically stable. Patient and  aware and in agreement. FOC signed and placed on chart.

## 2019-04-30 NOTE — PROGRESS NOTES
Problem: Mobility Impaired (Adult and Pediatric) Goal: *Acute Goals and Plan of Care (Insert Text) Description Physical Therapy Goals Initiated 4/25/2019 1. Patient will move from supine to sit and sit to supine  in bed with modified independence within 7 day(s). 2.  Patient will transfer from bed to chair and chair to bed with minimal assistance/contact guard assist using the least restrictive device within 7 day(s). 3.  Patient will perform sit to stand with minimal assistance/contact guard assist within 7 day(s). 4.  Patient will ambulate with minimal assistance/contact guard assist for 50 feet with the least restrictive device within 7 day(s). Outcome: Progressing Towards Goal 
 PHYSICAL THERAPY TREATMENT Patient: Ria Cortes (34 y.o. female) Date: 4/30/2019 Diagnosis: Encephalopathy [G93.40] <principal problem not specified> Precautions: Fall, DNR Chart, physical therapy assessment, plan of care and goals were reviewed. ASSESSMENT: 
Patient is received supine in bed. She reports she is nauseas. Patient is agreeable to attempting to sit at EOB. Patient is Min A for supine to sit. She is able to scoot to EOB in order to put her feet flat. Patient reports continued nausea sitting at EOB and burps repeatedly. She reports she has GERD. Patient education is provided on position change including increasing upright time to relieve some GERD symptoms. Patient sits for several minutes then reports increased nausea and requests to go back to bed. Patient would benefit from D/C to inpatient rehab in order to improve her independence and strength prior to returning home. Progression toward goals: 
?    Improving appropriately and progressing toward goals ? Improving slowly and progressing toward goals ? Not making progress toward goals and plan of care will be adjusted PLAN: 
Patient continues to benefit from skilled intervention to address the above impairments. Continue treatment per established plan of care. Discharge Recommendations:  Inpatient Rehab Further Equipment Recommendations for Discharge:  None SUBJECTIVE:  
Patient stated ?i'm just not feeling well today, im nauseas. ? OBJECTIVE DATA SUMMARY:  
Critical Behavior: 
Neurologic State: Alert, Confused Orientation Level: Oriented to person, Oriented to place, Oriented to time Cognition: Follows commands, Impaired decision making Safety/Judgement: Fall prevention Functional Mobility Training: 
Bed Mobility: 
Rolling: Contact guard assistance Supine to Sit: Contact guard assistance Sit to Supine: Contact guard assistance Transfers: 
  
  
     
  
     
  
  
  
  
Balance: 
Sitting: Intact Standing: Impaired; Without support Ambulation/Gait Training: 
  
  
  
  
  
  
  
  
  
  
  
  
  
  
  
  
  
  
Stairs: 
  
  
   
 
Neuro Re-Education: 
Therapeutic Exercises:  
 
Pain: 
Pain Scale 1: Numeric (0 - 10) Pain Intensity 1: 0 Activity Tolerance:  
Patient demonstrates good activity tolerance limited by nausea symptoms today Please refer to the flowsheet for vital signs taken during this treatment. After treatment:  
?    Patient left in no apparent distress sitting up in chair ? Patient left in no apparent distress in bed 
? Call bell left within reach ? Nursing notified ? Caregiver present ? Bed alarm activated COMMUNICATION/COLLABORATION:  
The patient?s plan of care was discussed with: Occupational Therapist and Registered Nurse Humberto Rodriguez, PT Time Calculation: 22 mins

## 2019-04-30 NOTE — PROGRESS NOTES
Bedside and Verbal shift change report given to [facility] RN (oncoming nurse) by Emily Lind nurseSeun. Report included the following information SBAR, Kardex, ED Summary, [facility] and Recent Results. 
  
Zone Phone:   [phone] 
  
  
Significant changes during shift: None 
  
  
  
Patient Information 
  
Rodney Ho 
61 y.o. 
4/24/2019  2:00 PM by Adiel Almazan MD. Dhara Mahoney was [facility] from [facility] 
  
Problem List 
  
       
Patient Active Problem List  
  Diagnosis Date Noted  Encephalopathy 04/24/2019  Acute respiratory failure with hypercapnia ([address] 04/02/2019  Counseling regarding advance care planning and goals of care 04/02/2019  Hepatic encephalopathy ([address] 03/28/2019  Colitis 03/28/2019  UTI (urinary tract infection) 03/28/2019  Septic shock ([address] 03/28/2019  Bilateral carotid artery stenosis 11/14/2018  TIA (transient ischemic attack) 11/13/2018  GERD (gastroesophageal reflux disease) 11/13/2018  Therapeutic drug monitoring 10/16/2018  Severe obesity ([address] 10/11/2018  Obesity (BMI 30.0-34.9) 07/10/2018  Sepsis ([address] 03/30/2017  CAP (community acquired pneumonia) 03/30/2017  IBIS (obstructive sleep apnea) 03/30/2017  Tobacco abuse 03/30/2017  Neuropathy 03/30/2017  COPD (chronic obstructive pulmonary disease) ([address] 03/28/2017  Acute deep vein thrombosis (DVT) of right lower extremity ([address] 11/10/2016  
 BALDERAS (nonalcoholic steatohepatitis) 03/10/2016  GI bleed 03/03/2016  Anemia 03/01/2016  Thrombocytopenia ([address] 08/15/2013  Previous back surgery 08/15/2013  S/P CHACHO (total abdominal hysterectomy) 08/15/2013  Cirrhosis ([address] 08/15/2013  Diabetes mellitus with neurological manifestations, uncontrolled ([address]    
 Essential hypertension, benign    
 Hyperlipidemia LDL goal <100    
  
       
Past Medical History:  
Diagnosis Date  Asthma    
 Back pain    
 COPD (chronic obstructive pulmonary disease) (HCC)    
  Diabetes [facility]    
 Esophageal varices in cirrhosis (HCC)    
  6/2014 banding x 2  
 Fibromyalgia    
 Gastrointestinal disorder    
 GERD (gastroesophageal reflux disease)    
 Hypercholesteremia    
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    
 Liver disease    
 Other and unspecified hyperlipidemia    
 Restless leg syndrome    
 Type II or unspecified type diabetes mellitus without mention of complication, uncontrolled    
 Unspecified essential hypertension    
  
  
  
  
  
  
Activity Status: 
  
OOB to Chair No 
Ambulated this shift No  
Bed Rest Yes 
  
Supplemental O5: ([place] Applicable) PRN On 2 Liters/min 
  
  
LINES AND DRAINS: 
  
  
DVT prophylaxis: 
  
DVT prophylaxis Med- Yes, Heparin DVT prophylaxis SCD or CRISTOPHER- No  
  
Wounds: (If Applicable) 
  
  
Patient Safety: 
  
Falls Score Total Score: 3 Safety Level_______ Bed Alarm On? Yes Sitter?  No 
  
Plan for upcoming shift:safety  
  
  
  
Discharge Plan: Yes, Rehab 
  
Active Consults: 
IP CONSULT TO PALLIATIVE CARE - PROVIDER

## 2019-04-30 NOTE — PROGRESS NOTES
Gretel Posada from [facility] returned call and she states they are able to accept the ivab Meropenem. Dr David Thorne called to inform him of this.

## 2019-04-30 NOTE — PALLIATIVE CARE DISCHARGE
Goals of Care/Treatment Preferences The Palliative Medicine team was consulted as part of your/your loved one's care in the hospital. Our team is a supportive service; we strive to relieve suffering and improve quality of life. We reviewed advance care planning information, which includes the following: 
  
 
Patient/Health Care Proxy Stated Goals: Rehabilitation We reviewed / discussed your code status as: DNR 
   Full Code means perform CPR in the event of cardiac arrest. 
    DNR means do NOT perform CPR in the event of cardiac arrest. 
    Partial Code means you have specific preferences, please discuss with your healthcare team. 
    Shahriar Philipkman means this issue was not addressed / resolved during your stay Medical Interventions: Limited additional interventions Other Instructions:  
 
Dear MsMyesha Krysten Glez, It was a pleasure to support you during your recent hospitalizations. During your stay we took the time to update your POST form (completed during your last admission). You agreed that [employer]" did not align with your current health care goals, and you told us you still want to continue to receive treatment you just don't want to end up in ICU intubated again. On your updated POST, you selected the following: 
  
--You do NOT want CPR if you don't have a pulse or are not breathing 
--You selected \"limited additional interventions\" which means you want relief from pain and suffering and are accepting of support like a bi-pap machine for breathing support, antibiotics and other less invasive medical treatments if needed. However you do not want to go to ICU for intubation or other invasive medical interventions. --You do not want feeding tube 
   
You were supplied copies of your new POST and the original should go with you at discharge. It is recommended that this form be placed in a visible location such as on the refrigerator or bedroom door. As we mentioned, this form is yours to review and update whenever your health goals change. Please call Palliative Medicine at [phone] ([phone]) with any questions or concerns. Sincerely, 
 
Tiny Fernandez Palliative Medicine  
 
  
Because of the importance of this information, we are providing you with a printed copy to share with other healthcare providers after this hospitalization is complete.

## 2019-04-30 NOTE — PROGRESS NOTES
Hospitalist Progress NoteNAME: Trina Smith :  1955 MRN:  [id number] Admit date: 2019 Assessment / Plan: 
 
Acute encephalopathy suspect hepatic POA Complicated UTI (recent norman cath) POA urine culture with ESBL klebsiella Known cirrhosis and portal HTN Prior hepatic encephalopathy, uses lactulose and rifaximin Normal ammonia but definitely had asterixis at admit MS improved, oriented x 2, waxing and waning 
? SBP, US several weeks ago with trace ascites, non distended abdomen Blood culture negative Urine culture with ESBL klebsiella, continue meropenem while in house Sensitive to cipro, option at discharge Low dose ultram as needed Continue rifaximin and lactulose, 2 to 3 documented BMs past 2 days IMPROVED, spoke with family at bedside, still not quite to baseline Start d/C planning, [facility] declined Case management working on SNF 
  
BALDERAS POA Liver cirrhosis POA History of recurrent hepatic encephalopathy History of esophageal varices NH3 increased, confusion and asterixis Rifaxin and lactulose, lasix 
  
COPD without exacerbation Recent acute respiratory failure with hypercarbia and hypoxia Intubated last admit CXR showed mild bibasilar discoid atelectasis Resume home Meds ABG on 2 L NC 7.459/50/71 CXR with mild bibasilar discoid atelectasis. Respiratory status stable Breo ellipta, PRN nebs 
  
DM type 2 on insulin Low dose lantus, SSI 
, 164, 199, 163, 174 
  
Essential HTN POA History of TIA  Hyperlipidemia POA Resume statin/ASA Low dose metoprolol Overweight POA Body mass index is 28.96 kg/m². 
  
Code Status: DNR, discussed with pt's  at bedside Surrogate Decision Maker: pt's  Kiesha Kirkland DVT Prophylaxis: heparin GI Prophylaxis: not indicated Baseline: from [facility], family does not wish to return there. Subjective: Chief Complaint / Reason for Physician Visit f/u AMS Discussed with RN events overnight. Alert, oriented, no new complaints No HA, CP, SOB, N/V Review of Systems: 
Symptom Y/N Comments  Symptom Y/N Comments Fever/Chills n   Chest Pain n   
Poor Appetite    Edema Cough n   Abdominal Pain n   
Sputum    Joint Pain SOB/MARTINEZ n   Headache n   
Nausea/vomit n   Tolerating PT/OT Diarrhea Y   Tolerating Diet y Constipation    Other Could NOT obtain due to:   
 
Objective: VITALS:  
Last 24hrs VS reviewed since prior progress note. Most recent are: 
Patient Vitals for the past 24 hrs: 
 Temp Pulse Resp BP SpO2  
04/30/19 0000 97.8 °F (36.6 °C) (!) 107 18 115/55 (!) 85 % 04/29/19 1841 98.2 °F (36.8 °C) (!) 117 18 123/58 95 % 04/29/19 1605 98.2 °F (36.8 °C) (!) 115  123/53 95 % 04/29/19 1228 97.9 °F (36.6 °C) (!) 118 19 104/67 93 % 04/29/19 0853  (!) 110  111/57   
04/29/19 0717 98.5 °F (36.9 °C) (!) 110 16 111/57 94 % 04/29/19 0500 97.6 °F (36.4 °C) (!) 114 18 138/65 92 % Intake/Output Summary (Last 24 hours) at 4/30/2019 3988 Last data filed at 4/29/2019 6534 Gross per 24 hour Intake 110 ml Output  Net 110 ml Wt Readings from Last 12 Encounters:  
04/24/19 86.4 kg (190 lb 7.6 oz) 04/09/19 104.6 kg (230 lb 9.6 oz) 03/19/19 101.5 kg (223 lb 12.8 oz) 01/01/19 87.8 kg (193 lb 9 oz)  
11/19/18 84.8 kg (187 lb)  
11/13/18 84.7 kg (186 lb 11.7 oz)  
11/13/18 84.7 kg (186 lb 11.7 oz) 10/16/18 86.7 kg (191 lb 3.2 oz) 10/11/18 89.3 kg (196 lb 12.8 oz) 07/10/18 83.6 kg (184 lb 3.2 oz) 05/31/18 86.6 kg (191 lb) 04/12/18 86.7 kg (191 lb 3.2 oz) PHYSICAL EXAM: 
General: WD, WN. Alert, cooperative, no acute distress   
EENT:  PERRL. Anicteric sclerae. MMM Resp:  CTA bilaterally, no wheezing or rales. No accessory muscle use CV:  Regular  rhythm,  No edema GI:  Soft, mildly distended, Non tender.  +Bowel sounds, no rebound LN:  No cervical or inguinal KIKI 
 Neurologic:  Alert , oriented x 3 when I saw Talking and following commands Slight weakness right leg, otherwise non focal, no pronator drift No asterixis Psych:   Not anxious nor agitated Skin:  No rashes. No jaundice Reviewed most current lab test results and cultures  YES Reviewed most current radiology test results   YES Review and summation of old records today    NO Reviewed patient's current orders and MAR    YES 
PMH/SH reviewed - no change compared to H&P 
________________________________________________________________________ Care Plan discussed with: 
  Comments Patient x Family RN x Care Manager Consultant Multidiciplinary team rounds were held today with , nursing, pharmacist and clinical coordinator. Patient's plan of care was discussed; medications were reviewed and discharge planning was addressed. ________________________________________________________________________ Total NON critical care TIME:  25  Minutes Total CRITICAL CARE TIME Spent:   Minutes non procedure based Comments >50% of visit spent in counseling and coordination of care    
________________________________________________________________________ Brock Addison MD  
 
Procedures: see electronic medical records for all procedures/Xrays and details which were not copied into this note but were reviewed prior to creation of Plan. LABS: 
I reviewed today's most current labs and imaging studies. Pertinent labs include: 
Recent Labs  
  04/28/19 
0610 04/27/19 
0347 WBC 6.8 5.1 HGB 11.7 11.4* HCT 37.1 36.5 * 86* Recent Labs  
  04/28/19 
0610 04/27/19 
0347 * 135* K 3.9 3.6 CL 97 99 CO2 29 29 * 165* BUN 9 9  
CREA 0.79 0.60  
CA 8.8 8.2*

## 2019-04-30 NOTE — PROGRESS NOTES
Problem: Self Care Deficits Care Plan (Adult) Goal: *Acute Goals and Plan of Care (Insert Text) Description Occupational Therapy Goals Initiated 4/25/2019 1. Patient will perform grooming with supervision/set-up in unsupported sit within 7 day(s). 2.  Patient will perform upper body dressing with moderate assistance  within 7 day(s). 3.  Patient will perform toilet transfers with moderate assistance to [facility]  within 7 day(s). 4.  Patient will perform all aspects of toileting with moderate assistance  within 7 day(s). 5.  Patient will participate in upper extremity therapeutic exercise/activities with supervision/set-up for 5 minutes within 7 day(s). Outcome: Not Progressing Towards Goal 
 OCCUPATIONAL THERAPY TREATMENT Patient: Brady Yen (89 y.o. female) Date: 4/30/2019 Diagnosis: Encephalopathy [G93.40] <principal problem not specified> Precautions: Fall, DNR Chart, occupational therapy assessment, plan of care, and goals were reviewed. ASSESSMENT: 
Pt was cleared to be seen for therapy and all VSS and pt was stating that she was feeling nauseous. Pt was willing to sit on EOb with a great deal of encouragement. She was CGA for sit <> supine. She was able to sit on EOb with no assist and able to scoot to EOb. OT tried to encourage pt to comb her hair while sitting up and pt did not care to do so. Pt still feeling nauseous and laid back down with CGA. She was positioned in bed and left with HOB elevated and nursing notified that pt was nauseous. Progression toward goals: 
?       Improving appropriately and progressing toward goals ? Improving slowly and progressing toward goals ? Not making progress toward goals and plan of care will be adjusted PLAN: 
Patient continues to benefit from skilled intervention to address the above impairments. Continue treatment per established plan of care. Discharge Recommendations:  [facility] Further Equipment Recommendations for Discharge:  tbd SUBJECTIVE:  
Patient stated ? I feel nauseous. I dont know if I can try to stand or not. ? OBJECTIVE DATA SUMMARY:  
Cognitive/Behavioral Status: 
Neurologic State: Alert;Confused Orientation Level: Oriented to person;Oriented to place;Oriented to time Cognition: Follows commands; Impaired decision making Perception: Appears intact Perseveration: Perseverates during conversation Safety/Judgement: Fall prevention Functional Mobility and Transfers for ADLs: 
Bed Mobility: 
Rolling: Contact guard assistance Supine to Sit: Contact guard assistance Sit to Supine: Contact guard assistance Balance: 
Sitting: Intact Standing: Impaired; Without support ADL Intervention: 
Feeding Feeding Assistance: Set-up Grooming Grooming Assistance: Set-up Washing Face: Supervision/set-up Washing Hands: Supervision/set-up Pt is setup for UB at bed level and max for LB Toileting Toileting Assistance: Total assistance(dependent) Bladder Hygiene: Total assistance (dependent) Bowel Hygiene: Total assistance (dependent) Cognitive Retraining Safety/Judgement: Fall prevention Pain: 
Pain Scale 1: Numeric (0 - 10) Pain Intensity 1: 0 Activity Tolerance:  
Fair-poor Please refer to the flowsheet for vital signs taken during this treatment. After treatment:  
? Patient left in no apparent distress sitting up in chair ? Patient left in no apparent distress in bed 
? Call bell left within reach ? Nursing notified ? Caregiver present ? Bed alarm activated COMMUNICATION/COLLABORATION:  
The patient?s plan of care was discussed with: Physical Therapist and Registered Nurse SAMANTHA Lombardo Time Calculation: 21 mins

## 2019-04-30 NOTE — PROGRESS NOTES
Bedside and Verbal shift change report given to  anitha RN (oncoming nurse) by Mary  RN (offgoing nurse). Report included the following information SBAR, Kardex, ED Summary, MAR and Recent Results. 
  
Zone Phone:   [phone] 
  
  
Significant changes during shift: None 
  
  
  
Patient Information 
  
Kerri Darnell 
61 y.o. 
4/24/2019  2:00 PM by Vernon Damon MD. Dhara Mahoney was admitted from [facility] 
  
Problem List 
  
       
Patient Active Problem List  
  Diagnosis Date Noted  Encephalopathy 04/24/2019  Acute respiratory failure with hypercapnia ([address] 04/02/2019  Counseling regarding advance care planning and goals of care 04/02/2019  Hepatic encephalopathy ([address] 03/28/2019  Colitis 03/28/2019  UTI (urinary tract infection) 03/28/2019  Septic shock ([address] 03/28/2019  Bilateral carotid artery stenosis 11/14/2018  TIA (transient ischemic attack) 11/13/2018  GERD (gastroesophageal reflux disease) 11/13/2018  Therapeutic drug monitoring 10/16/2018  Severe obesity ([address] 10/11/2018  Obesity (BMI 30.0-34.9) 07/10/2018  Sepsis ([address] 03/30/2017  CAP (community acquired pneumonia) 03/30/2017  IBIS (obstructive sleep apnea) 03/30/2017  Tobacco abuse 03/30/2017  Neuropathy 03/30/2017  COPD (chronic obstructive pulmonary disease) ([address] 03/28/2017  Acute deep vein thrombosis (DVT) of right lower extremity ([address] 11/10/2016  
 BALDERAS (nonalcoholic steatohepatitis) 03/10/2016  GI bleed 03/03/2016  Anemia 03/01/2016  Thrombocytopenia ([address] 08/15/2013  Previous back surgery 08/15/2013  S/P CHACHO (total abdominal hysterectomy) 08/15/2013  Cirrhosis ([address] 08/15/2013  Diabetes mellitus with neurological manifestations, uncontrolled ([address]    
 Essential hypertension, benign    
 Hyperlipidemia LDL goal <100    
  
       
Past Medical History:  
Diagnosis Date  Asthma    
 Back pain    
 COPD (chronic obstructive pulmonary disease) (HCC)    
  Diabetes [address]    
 Esophageal varices in cirrhosis (HCC)    
  6/2014 banding x 2  
 Fibromyalgia    
 Gastrointestinal disorder    
 GERD (gastroesophageal reflux disease)    
 Hypercholesteremia    
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    
 Liver disease    
 Other and unspecified hyperlipidemia    
 Restless leg syndrome    
 Type II or unspecified type diabetes mellitus without mention of complication, uncontrolled    
 Unspecified essential hypertension    
  
  
  
  
  
  
Activity Status: 
  
OOB to Chair No 
Ambulated this shift No  
Bed Rest Yes 
  
Supplemental E3: ([place] Applicable) PRN On 2 Liters/min 
  
  
LINES AND DRAINS: 
  
  
DVT prophylaxis: 
  
DVT prophylaxis Med- Yes, Heparin DVT prophylaxis SCD or CRISTOPHER- No  
  
Wounds: (If Applicable) 
  
  
Patient Safety: 
  
Falls Score Total Score: 3 Safety Level_______ Bed Alarm On? Yes Sitter?  No 
  
Plan for upcoming shift:safety and rest 
  
  
  
Discharge Plan: Yes, Rehab 
  
Active Consults: 
IP CONSULT TO PALLIATIVE CARE - PROVIDER

## 2019-04-30 NOTE — PROGRESS NOTES
Palliative Medicine Social Work Kayli Munguia NP and I met with patient in her room. She was awake lying in bed, alert and orientedx3 and able to make decisions about her care. Patient appeared brighter and less agitated than previous visit though admitted her mood was was still up and down. 1. Patient updated us on events of weekend and plan to go to SNF when discharged. She is understanding that it is not safe to go home right away without building up strength (she struggled to understand this during last visit). 2. We discussed previous conversation last week about her POST form and also decisions about when comfort/hospice would be appropriate (when she's not wanting to keep coming back to hospital for \"fixes\" and would rather only focus on comfort.) Patient recalled this conversation and understands that her current POST form is not aligned with what she has verbally requested for her care. She said when she completed POST during last admission she just didn't want to end up intubated in ICU again. Patient completed new POST: 
 
--Patient is DNAR 
--Patient selected \"limited additional interventions\" --Patient does not want feeding tube Patient revoked previous POST Outcome/goals: 
 
Patient has copies of POST Original POST on chart to go with patient at discharge Copies of new POST and revoked POST on chart to be scanned. Thank you for the opportunity to be involved in the care of Ms. Vickey Sampson and her family. Wellington Dalton, [facility] Palliative Medicine  [phone]

## 2019-04-30 NOTE — PROGRESS NOTES
Palliative Medicine Consult Willy: [phone] ([phone]) Patient Name: Rich Duke YOB: 1955 Date of Initial Consult: 4/24/2019 Reason for Consult: Care Decisions Requesting Provider: Kavon Merchant MD 
Primary Care Physician: John Ward NP 
 
 SUMMARY:  
Rich Duke is a 61 y.o. with a past history of chronic back pain, COPD, DM, Esophageal varices in cirrhosis s/p banding x 2 in 2014, Fibromyalgia, GERD, and Liver cirrhosis secondary to BALDERAS, who was admitted on 4/24/2019 from [facility] with a diagnosis of Acute metabolic encephalopathy likely from a complicated UTI. Per pt's , when pt became confused, he thought it was due to hyperammonemia again, however level was checked at facility which was normal. c onfuion progressively worsened today which lead the patient to the ER for evaluation Current medical issues leading to Palliative Medicine involvement include: goals of care discussion in setting of recent readmission PALLIATIVE DIAGNOSES:  
1. Goals of care discussion 2. Advanced Care Planning 3. Debility 4. Neuropathic pain of feet 5. Chronic back pain PLAN:  
1. Met with patient, along with LILI Negrete 
2. Mrs Rosalie Lopez was in much better spirits today, although does seem to have waxing and waining emotions based on staff reports 3. She remembered some of our conversation from last week, but does have some memory deficits 4. She shared that she is in better spirits about going to rehab- she recognizes that she cannot go home in her current state as she does not have someone to stay with her all the time and she is afraid to fall again 5. We talked about her POST form again, and she is quite clear she does NOT want CPR, Intubation, or ICU level care, but she does want limited interventions in the context of treating acute issues as they arise and medical management of her chronic disease 6. We completed a new POST with her and revoked her old one 7. I have am confident that her older daughter will advocate for hospice level care when she is appropriate and hospitalizations are no longer benefiting her 8. Will sign off on this patient as her goals are clear for now 9. Initial consult note routed to primary continuity provider and/or primary health care team members 10. Communicated plan of care with: Palliative IDTCollin [facility] Team 
 
 GOALS OF CARE / TREATMENT PREFERENCES:  
 
GOALS OF CARE: 
Patient/Health Care Proxy Stated Goals: Rehabilitation TREATMENT PREFERENCES:  
Code Status: DNR Advance Care Planning: 
[x] [facility] Interdisciplinary Team has updated the ACP Navigator with [place] and Patient Capacity Primary Decision Maker: Annalisa Campos Spouse - [phone] Medical Interventions: Limited additional interventions Other Instructions:  
Artificially Administered Nutrition: No feeding tube Other: As far as possible, the palliative care team has discussed with patient / health care proxy about goals of care / treatment preferences for patient. HISTORY:  
 
History obtained from: patient RN, chart,  and daughter CHIEF COMPLAINT: pain in her feet HPI/SUBJECTIVE: The patient is:  
[x] Verbal and participatory [] Non-participatory due to:  
 
Frustrated, tearful, fixated on going home, unable to comprehend conversation Clinical Pain Assessment (nonverbal scale for severity on nonverbal patients):  
Clinical Pain Assessment Severity: 0 Location: feet and legs Character: sharp, needles Duration: chronic Effect: limits ability to walk Frequency: constant Duration: for how long has pt been experiencing pain (e.g., 2 days, 1 month, years) Frequency: how often pain is an issue (e.g., several times per day, once every few days, constant)  FUNCTIONAL ASSESSMENT:  
 
 Palliative Performance Scale (PPS): PPS: 40 PSYCHOSOCIAL/SPIRITUAL SCREENING:  
 
Palliative IDT has assessed this patient for cultural preferences / practices and a referral made as appropriate to needs (Cultural Services, Patient Advocacy, Ethics, etc.) Any spiritual / Hinduism concerns: 
[] Yes /  [x] No 
 
Caregiver Burnout: 
[] Yes /  [] No /  [x] No Caregiver Present Anticipatory grief assessment:  
[x] Normal  / [] Maladaptive ESAS Anxiety: Anxiety: 3 ESAS Depression: Depression: 0 REVIEW OF SYSTEMS:  
 
Positive and pertinent negative findings in ROS are noted above in HPI. The following systems were [x] reviewed / [] unable to be reviewed as noted in HPI Other findings are noted below. Systems: constitutional, ears/nose/mouth/throat, respiratory, gastrointestinal, genitourinary, musculoskeletal, integumentary, neurologic, psychiatric, endocrine. Positive findings noted below. Modified ESAS Completed by: provider Fatigue: 0 Depression: 0 Pain: 0 Anxiety: 3 Nausea: 0 Anorexia: 0 Dyspnea: 0 Constipation: No  
  Stool Occurrence(s): 1 PHYSICAL EXAM:  
 
From RN flowsheet: 
Wt Readings from Last 3 Encounters:  
04/24/19 190 lb 7.6 oz (86.4 kg) 04/09/19 230 lb 9.6 oz (104.6 kg) 03/19/19 223 lb 12.8 oz (101.5 kg) Blood pressure 119/62, pulse (!) 103, temperature 98.1 °F (36.7 °C), resp. rate 16, height 5' 8\" (1.727 m), weight 190 lb 7.6 oz (86.4 kg), SpO2 96 %. Pain Scale 1: Numeric (0 - 10) Pain Intensity 1: 0 Pain Onset 1: chronic Pain Location 1: Back Pain Orientation 1: Posterior Pain Description 1: Aching Pain Intervention(s) 1: Medication (see MAR) Last bowel movement, if known:  
 
Constitutional: alert, brighter, in much better spirits today Eyes: pupils equal, anicteric ENMT: no nasal discharge, moist mucous membranes Cardiovascular: regular rhythm Respiratory: breathing not labored, symmetric Gastrointestinal: soft non-tender, Musculoskeletal: no deformity, no tenderness to palpation Skin: warm, dry Neurologic: following commands, moving all extremities Psychiatric: full affect, no hallucinations Other: 
 
 
 HISTORY:  
 
Active Problems: 
  Encephalopathy (4/24/2019) Goals of care, counseling/discussion () Advanced care planning/counseling discussion () Debility () Neuropathic pain () Chronic bilateral low back pain with bilateral sciatica () Past Medical History:  
Diagnosis Date  Asthma  Back pain  COPD (chronic obstructive pulmonary disease) (HCC)  Diabetes [address]  Esophageal varices in cirrhosis ([facility]   
 6/2014 banding x 2  
 Fibromyalgia  Gastrointestinal disorder  GERD (gastroesophageal reflux disease)  Hypercholesteremia  Liver cirrhosis secondary to BALDERAS ([facility]  Liver disease  Other and unspecified hyperlipidemia  Restless leg syndrome  Type II or unspecified type diabetes mellitus without mention of complication, uncontrolled  Unspecified essential hypertension Past Surgical History:  
Procedure Laterality Date  HX BACK SURGERY    
 HX CARPAL TUNNEL RELEASE    
 on right  HX HYSTERECTOMY plus 1/2 of an ovary removed  [place] COLSC FLX W/RMVL OF TUMOR POLYP LESION SNARE TQ  5/30/2013  UPPER GI ENDOSCOPY,LIGAT VARIX  2/6/2015 Family History Problem Relation Age of Onset  Diabetes Mother  Stroke Sister  Diabetes Paternal Aunt  Diabetes Paternal Uncle  Heart Disease Neg Hx History reviewed, no pertinent family history. Social History Tobacco Use  Smoking status: Current Every Day Smoker Packs/day: 1.00 Years: 40.00 Pack years: 40.00  Smokeless tobacco: Former User Substance Use Topics  Alcohol use: No  
  Comment: rare Allergies Allergen Reactions  Hydrocodone Nausea and Vomiting  Lisinopril Cough  Lortab [Hydrocodone-Acetaminophen] Nausea and Vomiting  Percocet [Oxycodone-Acetaminophen] Nausea and Vomiting Current Facility-Administered Medications Medication Dose Route Frequency  metoprolol tartrate (LOPRESSOR) tablet 12.5 mg  12.5 mg Oral Q12H  
 meropenem (MERREM) 1 g in 0.9% sodium chloride (MBP/ADV) 50 mL  1 g IntraVENous Q8H  
 nicotine (NICODERM CQ) 14 mg/24 hr patch 1 Patch  1 Patch TransDERmal DAILY  traMADol (ULTRAM) tablet 25 mg  25 mg Oral Q6H PRN  
 sodium chloride (NS) flush 5-40 mL  5-40 mL IntraVENous Q8H  
 sodium chloride (NS) flush 5-40 mL  5-40 mL IntraVENous PRN  
 ondansetron (ZOFRAN ODT) tablet 4 mg  4 mg Oral Q4H PRN  
 bisacodyl (DULCOLAX) suppository 10 mg  10 mg Rectal DAILY PRN  
 heparin (porcine) injection 5,000 Units  5,000 Units SubCUTAneous Q8H  
 albuterol-ipratropium (DUO-NEB) 2.5 MG-0.5 MG/3 ML  3 mL Nebulization Q4H PRN  
 aspirin chewable tablet 81 mg  81 mg Oral DAILY  atorvastatin (LIPITOR) tablet 40 mg  40 mg Oral QHS  ferrous sulfate tablet 325 mg  1 Tab Oral DAILY WITH BREAKFAST  fluticasone-vilanterol (BREO ELLIPTA) 200mcg-25mcg/puff  1 Puff Inhalation DAILY  furosemide (LASIX) tablet 80 mg  80 mg Oral DAILY  insulin glargine (LANTUS) injection 15 Units  15 Units SubCUTAneous QHS  losartan (COZAAR) tablet 25 mg  25 mg Oral DAILY  pantoprazole (PROTONIX) tablet 40 mg  40 mg Oral ACB  rifAXIMin (XIFAXAN) tablet 550 mg  550 mg Oral BID  rOPINIRole (REQUIP) tablet 4 mg  4 mg Oral QHS  spironolactone (ALDACTONE) tablet 200 mg  200 mg Oral DAILY  lactulose (CHRONULAC) 10 gram/15 mL solution 45 mL  45 mL Oral TID  insulin lispro (HUMALOG) injection   SubCUTAneous AC&HS  
 glucose chewable tablet 16 g  4 Tab Oral PRN  
 dextrose (D50) infusion 12.5-25 g  12.5-25 g IntraVENous PRN  
 glucagon (GLUCAGEN) injection 1 mg  1 mg IntraMUSCular PRN  
  nitroglycerin (NITROBID) 2 % ointment 1 Inch  1 Inch Topical TID PRN  
 
 
 
 LAB AND IMAGING FINDINGS:  
 
Lab Results Component Value Date/Time WBC 7.3 04/30/2019 03:53 AM  
 HGB 11.7 04/30/2019 03:53 AM  
 PLATELET 96 (L) 16/95/4315 03:53 AM  
 
Lab Results Component Value Date/Time Sodium 133 (L) 04/30/2019 03:53 AM  
 Potassium 3.1 (L) 04/30/2019 03:53 AM  
 Chloride 99 04/30/2019 03:53 AM  
 CO2 25 04/30/2019 03:53 AM  
 BUN 11 04/30/2019 03:53 AM  
 Creatinine 0.93 04/30/2019 03:53 AM  
 Calcium 8.7 04/30/2019 03:53 AM  
 Magnesium 2.7 (H) 04/04/2019 05:01 AM  
 Phosphorus 3.2 04/06/2019 04:17 AM  
  
Lab Results Component Value Date/Time AST (SGOT) 58 (H) 04/30/2019 03:53 AM  
 Alk. phosphatase 204 (H) 04/30/2019 03:53 AM  
 Protein, total 6.9 04/30/2019 03:53 AM  
 Albumin 2.8 (L) 04/30/2019 03:53 AM  
 Globulin 4.1 (H) 04/30/2019 03:53 AM  
 
Lab Results Component Value Date/Time INR 1.4 (H) 04/02/2019 04:07 AM  
 Prothrombin time 14.1 (H) 04/02/2019 04:07 AM  
 aPTT 26.2 04/02/2019 04:07 AM  
  
Lab Results Component Value Date/Time Iron 22 (L) 03/19/2019 01:48 PM  
 TIBC 379 03/19/2019 01:48 PM  
 Iron % saturation 6 (LL) 03/19/2019 01:48 PM  
 Ferritin 358 (H) 11/19/2018 12:00 AM  
  
No results found for: PH, PCO2, PO2 No components found for: [place] Lab Results Component Value Date/Time CK 47 03/28/2019 10:13 AM  
 CK - MB <1.0 11/13/2018 12:53 PM  
  
 
 
   
 
Total time:  
Counseling / coordination time, spent as noted above:  
> 50% counseling / coordination?:  
 
Prolonged service was provided for  []30 min   []75 min in face to face time in the presence of the patient, spent as noted above. Time Start:  
Time End:  
Note: this can only be billed with 67236 (initial) or 88662 (follow up). If multiple start / stop times, list each separately.

## 2019-04-30 NOTE — PROGRESS NOTES
PICC Education: Explained reason and rationale for PICC placement along with providing education in order to make an informed consent including nature, risks, benefits, potential complications, care and maintenance of PICC line. The opportunity for questions or concerns was given. A 'Patient PICC Handbook was also provided. Patient Lanette Hernández gave consent for PICC procedure to be done at the bedside. Patient  verbalizes understanding at this time. Procedure: 
Time out completed. Pre procedure assessment done. Maximum sterile barrier precautions observed throughout procedure. Lidocaine 1% 4 ml sc given prior to cannulation Cannulated Basilic vein using ultrasound guidance and modified seldinger technique. Inserted Single Lumenlumen 4 fr PICC in  Right arm using, [employer] Tip Location System and 3CG Patient has sinus rhythm. Tip Positioning System indicating tall P wave and no negative deflection before P wave which would indicate the PICC tip is properly placed in the distal SVC or the CAJ. PICC tip was confirmed by 2 PICC nurses and 3CG printout was placed on patients chart. Blood return verified and flushed with 20ml NS in each port. Sterile dressing applied with Biopatch, Stat loc, occlusive dressing per protocol. Curios caps applied to each port. Reason for access :  Long Term antibiotic. 10 days. Complications related to insertion;  none. Patient tolerated procedure well with minimal blood loss. PICC procedure performed by: Beulah Rodriguez RN, BSN Assisted by: Kain Gaxiola RN, [facility] Right  arm circumference: 32 cm. Catheter internal length:  40 Catheter external length: 1cm lyndsey out PICC catheter occupies 11% of vein Brand of catheter BARD SOLO POWER PICC, 
REF: # [id number] LOT: # [id number] EXP: 2020-07-31. Primary nurse Kam FLORES, notified PICC line may be used and to hang new infusion tubing prior to use.    
 
Beulah Rodriguez RN, BSN, [facility]  
 Vascular Access Nurse

## 2019-04-30 NOTE — PROGRESS NOTES
Spoke with Jennyfer with [facility] and gave her the dosage, frequency and name of the ivab patient will need at the snf (Meropenem one gram iv three times daily for total 10 days). She will check and give me a call back whether they can take this medication or not.

## 2019-04-30 NOTE — PROGRESS NOTES
Hospitalist Progress NoteNAME: Meghann Armando :  1955 MRN:  [id number] Admit date: 2019 Assessment / Plan: 
Acute encephalopathy suspect hepatic POA Complicated UTI (recent norman cath) POA urine culture with ESBL klebsiella Known cirrhosis and portal HTN Prior hepatic encephalopathy, uses lactulose and rifaximin Normal ammonia but definitely had asterixis at admit ? SBP, US several weeks ago with trace ascites, non distended abdomen Blood culture negative Urine culture with ESBL klebsiella and will need meropenem for a total of 10 days until . Per case mgmt can get abx at [facility] Continue rifaximin and lactulose Mental status is now improved and back to base line Urinary retention new Bladder scan qid and straight cath if PVR is more than 300 ml Cont PT/OT Hypokalemia Replaced and recheck in am  
 
 
BALDERAS POA Liver cirrhosis POA History of recurrent hepatic encephalopathy History of esophageal varices Rifaxin and lactulose, lasix Check ammonia level in am  
  
COPD without exacerbation Recent acute respiratory failure with hypercarbia and hypoxia Intubated last admit CXR showed mild bibasilar discoid atelectasis Resume home Meds ABG on 2 L NC 7.459/50/71 CXR with mild bibasilar discoid atelectasis. Respiratory status stable Breo ellipta, PRN nebs 
  
DM type 2 on insulin Low dose lantus, SSI 
BS are under 180 
  
Essential HTN POA History of TIA  Hyperlipidemia POA Cont home  statin/ASA Low dose metoprolol Overweight POA Body mass index is 28.96 kg/m². 
  
Code Status: DNR, discussed with pt's  at bedside Surrogate Decision Maker: pt's  Edwar Ponce DVT Prophylaxis: heparin GI Prophylaxis: not indicated Baseline: from [facility], family does not wish to return there. Subjective: Chief Complaint / Reason for Physician Visit f/u AMS She is alert,awake and oriented x 3 today She is difficulty with urination and had some lower abdominal pain today Review of Systems: 
Symptom Y/N Comments  Symptom Y/N Comments Fever/Chills n   Chest Pain Poor Appetite    Edema Cough n   Abdominal Pain n   
Sputum    Joint Pain SOB/MARTINEZ n   Headache n   
Nausea/vomit n   Tolerating PT/OT Diarrhea    Tolerating Diet y Constipation    Other Could NOT obtain due to:   
 
Objective: VITALS:  
Last 24hrs VS reviewed since prior progress note. Most recent are: 
Patient Vitals for the past 24 hrs: 
 Temp Pulse Resp BP SpO2  
04/30/19 1143 98.1 °F (36.7 °C) (!) 103 16 119/62 96 % 04/30/19 0750 97.7 °F (36.5 °C) (!) 109 16 119/62 94 % 04/30/19 0356 97.9 °F (36.6 °C) (!) 109 18 129/66 93 % 04/30/19 0000 97.8 °F (36.6 °C) (!) 107 18 115/55 (!) 85 % 04/29/19 1841 98.2 °F (36.8 °C) (!) 117 18 123/58 95 % 04/29/19 1605 98.2 °F (36.8 °C) (!) 115  123/53 95 % Intake/Output Summary (Last 24 hours) at 4/30/2019 1307 Last data filed at 4/30/2019 1045 Gross per 24 hour Intake 250 ml Output 1075 ml Net -825 ml Wt Readings from Last 12 Encounters:  
04/24/19 86.4 kg (190 lb 7.6 oz) 04/09/19 104.6 kg (230 lb 9.6 oz) 03/19/19 101.5 kg (223 lb 12.8 oz) 01/01/19 87.8 kg (193 lb 9 oz)  
11/19/18 84.8 kg (187 lb)  
11/13/18 84.7 kg (186 lb 11.7 oz)  
11/13/18 84.7 kg (186 lb 11.7 oz) 10/16/18 86.7 kg (191 lb 3.2 oz) 10/11/18 89.3 kg (196 lb 12.8 oz) 07/10/18 83.6 kg (184 lb 3.2 oz) 05/31/18 86.6 kg (191 lb) 04/12/18 86.7 kg (191 lb 3.2 oz) PHYSICAL EXAM: 
General: Alert, cooperative, no acute distress   
EENT:  PERRL. Anicteric sclerae. MMM Resp:  CTA bilaterally, no wheezing or rales. No accessory muscle use CV:  Regular  rhythm,  No edema GI:  Soft, mildly distended, Non tender.  +Bowel sounds, no rebound LN:  No cervical or inguinal KIKI Neurologic:  Alert , oriented x 3 Talking and following commands Slight weakness right leg, otherwise non focal, no pronator drift No asterixis Psych:   Not anxious nor agitated Skin:  No rashes. No jaundice Reviewed most current lab test results and cultures  YES Reviewed most current radiology test results   YES Review and summation of old records today    NO Reviewed patient's current orders and MAR    YES 
PMH/SH reviewed - no change compared to H&P 
________________________________________________________________________ Care Plan discussed with: 
  Comments Patient x Family RN x Care Manager Consultant Multidiciplinary team rounds were held today with , nursing, pharmacist and clinical coordinator. Patient's plan of care was discussed; medications were reviewed and discharge planning was addressed. ________________________________________________________________________ Total NON critical care TIME:  25  Minutes Total CRITICAL CARE TIME Spent:   Minutes non procedure based Comments >50% of visit spent in counseling and coordination of care    
________________________________________________________________________ Antonio Ulrich MD  
 
Procedures: see electronic medical records for all procedures/Xrays and details which were not copied into this note but were reviewed prior to creation of Plan. LABS: 
I reviewed today's most current labs and imaging studies. Pertinent labs include: 
Recent Labs 04/30/19 
2947 04/28/19 
1450 WBC 7.3 6.8 HGB 11.7 11.7 HCT 36.9 37.1 PLT 96* 122* Recent Labs 04/30/19 
6745 04/28/19 
7310 * 132* K 3.1* 3.9 CL 99 97 CO2 25 29 * 201* BUN 11 9 CREA 0.93 0.79 CA 8.7 8.8 ALB 2.8*  --   
TBILI 1.1*  --   
SGOT 58*  --   
ALT 63  --

## 2019-04-30 NOTE — PROGRESS NOTES
BASILIO Plan--Patient will discharge to [facility] when medically stable.  Spoke with md and patient will get picc today per md.

## 2019-05-01 NOTE — PROGRESS NOTES
Patient resting comfortably in bed. Head to toes assessment are as charted. Patient denies pain. Pain assessment on going. Falls prevention maintained. Care plan reviewed and patient expressed understanding. Call light and belongings within reach. Contact isolation maintained. Will continue to monitor.

## 2019-05-01 NOTE — PROGRESS NOTES
Spoke with Cynthia Henriquez at [facility] and they will not have a bed for the patient today. She states they are working on having a bed for her tomorrow. Patient and  aware. [facility] set up for 1000am tomorrow if stable. Nursing aware. PCS on patient's chart.

## 2019-05-01 NOTE — PROGRESS NOTES
Physical Therapy Note Patient requesting nausea medication prior to mobilizing. RN is notified. Will re-attempt as able. Thank you, 
Chang GARCIAT

## 2019-05-01 NOTE — PROGRESS NOTES
Clarified with Dr. Jacki Berumen ok to place norman on discharge. Pt w/ 180 in bladder at this time. Will continue to bladder scan and straight cath if needed and nursing will also assist pt with getting up to [facility] to facilitate urination if able. Ammonia level being drawn and cardio consult has been called.

## 2019-05-01 NOTE — DISCHARGE SUMMARY
Hospitalist Discharge Summary Patient ID: 
Oscar Grant [id number] 
61 y.o. 
1955 PCP on record: Presley Gonzalez NP Admit date: 4/24/2019 Discharge date and time: 5/1/2019 DISCHARGE DIAGNOSIS: 
 
Acute encephalopathy suspect hepatic POA Complicated UTI (recent norman cath) POA urine culture with ESBL klebsiella Urinary retention new Hypokalemia BALDERAS POA Liver cirrhosis POA History of recurrent hepatic encephalopathy History of esophageal varices COPD without exacerbation Recent acute respiratory failure with hypercarbia and hypoxia DM type 2 on insulin Essential HTN POA History of TIA 11/18 Hyperlipidemia POA Overweight POA Body mass index is 28.96 kg/m². Code Status: DNR 
 
 
CONSULTATIONS: 
IP CONSULT TO CARDIOLOGY Excerpted HPI from H&P of Jolly Call MD: 
Ernesto Colón is a 61 y.o.  female with PMHx significant for liver cirrhosis/BALDERAS, recurrent hepatic encephalopathy due to noncompliance with meds, COPD, IBIS, present to the ER from [facility] for evaluation of AMS started last Thursday per patient's . Pt is conversant but is confused, unable to give meaning hx. Per pt's , when pt became confused, he thought it was due to hyperammonemia again, however level was checked at facility which was normal. c onfuion progressively worsened today which lead the patient to the ER for evaluation. No known fever, chills, cp, sob, palpitations, n/v/d. In the ER, vitals: TT07.4, P109, /68, Spo2 96% on [facility] We were asked to admit for work up and evaluation of the above problems. ______________________________________________________________________ DISCHARGE SUMMARY/HOSPITAL COURSE:  for full details see H&P, daily progress notes, labs, consult notes. Hospital course problem wise Acute encephalopathy suspect hepatic POA Complicated UTI (recent norman cath) POA urine culture with ESBL klebsiella Known cirrhosis and portal HTN Prior hepatic encephalopathy, uses lactulose and rifaximin Normal ammonia but definitely had asterixis at admit ? SBP, US several weeks ago with trace ascites, non distended abdomen Blood culture negative Urine culture with ESBL klebsiella and will need meropenem for a total of 10 days until 5/4. Per case mgmt can get abx at [facility] Continue rifaximin and lactulose Mental status is now improved and back to base line 
  
Urinary retention new Bladder scan qid and straight cath if PVR is more than 300 ml for 2 more days at [facility]. Cont aggressive rehab at [facility] and attempt voiding trial in 2 days and consider norman if not better Cont PT/OT 
  
Hypokalemia Replaced  
  
  
BALDERAS POA Liver cirrhosis POA History of recurrent hepatic encephalopathy History of esophageal varices Rifaxin and lactulose, lasix 
 
  
COPD without exacerbation Recent acute respiratory failure with hypercarbia and hypoxia Intubated last admit CXR showed mild bibasilar discoid atelectasis Resume home Meds ABG on 2 L NC 7.459/50/71 CXR with mild bibasilar discoid atelectasis. Respiratory status stable Breo ellipta, PRN nebs 
  
DM type 2 on insulin Cont Low dose lantus, SSI 
BS are under 180 
  
Essential HTN POA History of TIA 11/18 Hyperlipidemia POA Cont home  statin/ASA Low dose metoprolol NSVT Replace K and Mg. Cards consulted and recommended to cont Metoprolol. Out pt follow up with cards. Cards cleared for discharge. 
  
Overweight POA Body mass index is 28.96 kg/m². 
  
Code Status: DNR, discussed with pt's  at bedside She is seen and examined today. She is doing much better and symptoms improved. Vitals are stable, lab work is at [facility] and she was cleared by all consulted parties including cardiology to be discharged for out pt follow up. 
 
_______________________________________________________________________ Patient seen and examined by me on discharge day. Pertinent Findings: 
Gen:    Not in distress Chest: Clear lungs CVS:   Regular rhythm. No edema Abd:  Soft, not distended, not tender Neuro:  Alert, awake 
_______________________________________________________________________ DISCHARGE MEDICATIONS:  
Current Discharge Medication List  
  
START taking these medications Details  
lactulose (CHRONULAC) 10 gram/15 mL solution Take 45 mL by mouth three (3) times daily for 30 days. Qty: 4050 mL, Refills: 0  
  
insulin glargine (LANTUS) 100 unit/mL injection 15 Units by SubCUTAneous route daily for 30 days. Qty: 1 Vial, Refills: 1  
  
insulin lispro (HUMALOG) 100 unit/mL injection Sliding scale as above. Qty: 1 Vial, Refills: 1  
  
meropenem 1 g, ADDaptor 1 Device IVPB 1 g by IntraVENous route every eight (8) hours for 3 days. Qty: 3 Dose, Refills: 0  
  
metoprolol tartrate (LOPRESSOR) 25 mg tablet Take 0.5 Tabs by mouth every twelve (12) hours for 30 days. Qty: 30 Tab, Refills: 0 CONTINUE these medications which have NOT CHANGED Details  
gabapentin (NEURONTIN) 600 mg tablet Take 1,200 mg by mouth two (2) times a day. rifAXIMin (XIFAXAN) 550 mg tablet Take 550 mg by mouth two (2) times a day. nicotine (NICODERM CQ) 21 mg/24 hr 1 Patch by TransDERmal route every twenty-four (24) hours. albuterol-ipratropium (DUO-NEB) 2.5 mg-0.5 mg/3 ml nebu 3 mL by Nebulization route every four (4) hours as needed for Other (Wheezing or Shortness of breath). Qty: 30 Nebule, Refills: 0  
  
fluticasone furoate-vilanterol (BREO ELLIPTA) 200-25 mcg/dose inhaler Take 1 Puff by inhalation daily. furosemide (LASIX) 40 mg tablet Take 2 Tabs by mouth daily. Qty: 60 Tab, Refills: 3  
  
spironolactone (ALDACTONE) 100 mg tablet Take 2 Tabs by mouth daily. Qty: 60 Tab, Refills: 3  
  
losartan (COZAAR) 25 mg tablet take 1 tablet by mouth once daily , REPLACES LISINOPRIL FOR BLOOD PRESSURE AND KIDNEY PROTECTION Qty: 30 Tab, Refills: 11  
  
 aspirin 81 mg chewable tablet Take 1 Tab by mouth daily. Qty: 30 Tab, Refills: 6  
  
atorvastatin (LIPITOR) 40 mg tablet Take 1 Tab by mouth nightly. Qty: 30 Tab, Refills: 6  
  
omeprazole (PRILOSEC) 20 mg capsule take 1 capsule by mouth once daily 
Qty: 30 Cap, Refills: 5  
  
amitriptyline (ELAVIL) 150 mg tablet Take 1 Tab by mouth nightly. ferrous sulfate 325 mg (65 mg iron) tablet take 1 tablet by mouth once daily with BREAKFAST Qty: 90 Tab, Refills: 1  
  
ondansetron (ZOFRAN ODT) 4 mg disintegrating tablet dissolve 1 tablet ON TONGUE every 8 hours if needed for nausea Qty: 45 Tab, Refills: 3  
  
rOPINIRole (REQUIP) 4 mg tab TAB Take 4 mg by mouth nightly. albuterol (PROAIR HFA) 90 mcg/actuation inhaler Take 2 Puffs by inhalation every four (4) hours as needed for Wheezing. STOP taking these medications  
  
 insulin glargine (LANTUS SOLOSTAR U-100 INSULIN) 100 unit/mL (3 mL) inpn Comments:  
Reason for Stopping:   
   
 CONSTULOSE 10 gram/15 mL solution Comments:  
Reason for Stopping:   
   
 traMADol (ULTRAM) 50 mg tablet Comments:  
Reason for Stopping:   
   
 insulin lispro (HUMALOG KWIKPEN INSULIN) 100 unit/mL kwikpen Comments:  
Reason for Stopping: My Recommended Diet, Activity, Wound Care, and follow-up labs are listed in the patient's Discharge Insturctions which I have personally completed and reviewed. _______________________________________________________________________ DISPOSITION:   
Home with Family:   
Home with HH/PT/OT/RN:   
SNF/LTC: y  
CARMINE:   
OTHER:   
 
 
Condition at Discharge:  Stable 
_______________________________________________________________________ Follow up with: PCP : Julien Juan NP Follow-up Information Follow up With Specialties Details Why Contact Info [address] [place] [address] 
[phone] Adi Sherman NP Nurse Practitioner  Please call to schedule hospital follow up appointment [address] [address] [address] 
[phone] Adi Sherman NP Nurse Practitioner Schedule an appointment as soon as possible for a visit in 1 week  [address] 
[phone] Aneudy Naylor MD Cardiology Schedule an appointment as soon as possible for a visit in 2 weeks  [address] [place] [address] 
[phone] Total time in minutes spent coordinating this discharge (includes going over instructions, follow-up, prescriptions, and preparing report for sign off to her PCP) :  35 minutes Signed: 
Sancho Devine MD

## 2019-05-01 NOTE — PROGRESS NOTES
Patient is being discharged to [facility] today. They will have a bed for patient today per rodo Valladares. Patient and  aware and in agreement. PCS completed with medials and given to nursing. Referral sent to [facility] and they can transport patient at 1500pm today. Family and nursing aware of transport time. Facility aware of transport time. Nursing to call report to [phone].

## 2019-05-01 NOTE — PROGRESS NOTES
Bedside and Verbal shift change report given to Patience RN (oncoming nurse) by Mirtha  RN (offgoing nurse). Report included the following information SBAR, Kardex, ED Summary, MAR and Recent Results. 
  
Zone Phone:   [phone] 
  
  
Significant changes during shift:   
  
  
  
Patient Information 
  
Vilma Pereira 
61 y.o. 
4/24/2019  2:00 PM by Raphael Woodruff MD. Dhara Mahoney [facility] from [facility] 
  
Problem List 
  
       
Patient Active Problem List  
  Diagnosis Date Noted  Encephalopathy 04/24/2019  Acute respiratory failure with hypercapnia ([address] 04/02/2019  Counseling regarding advance care planning and goals of care 04/02/2019  Hepatic encephalopathy ([address] 03/28/2019  Colitis 03/28/2019  UTI (urinary tract infection) 03/28/2019  Septic shock ([address] 03/28/2019  Bilateral carotid artery stenosis 11/14/2018  TIA (transient ischemic attack) 11/13/2018  GERD (gastroesophageal reflux disease) 11/13/2018  Therapeutic drug monitoring 10/16/2018  Severe obesity ([address] 10/11/2018  Obesity (BMI 30.0-34.9) 07/10/2018  Sepsis ([address] 03/30/2017  CAP (community acquired pneumonia) 03/30/2017  IBIS (obstructive sleep apnea) 03/30/2017  Tobacco abuse 03/30/2017  Neuropathy 03/30/2017  COPD (chronic obstructive pulmonary disease) ([address] 03/28/2017  Acute deep vein thrombosis (DVT) of right lower extremity ([address] 11/10/2016  
 BALDERAS (nonalcoholic steatohepatitis) 03/10/2016  GI bleed 03/03/2016  Anemia 03/01/2016  Thrombocytopenia ([address] 08/15/2013  Previous back surgery 08/15/2013  S/P CHACHO (total abdominal hysterectomy) 08/15/2013  Cirrhosis ([address] 08/15/2013  Diabetes mellitus with neurological manifestations, uncontrolled ([address]    
 Essential hypertension, benign    
 Hyperlipidemia LDL goal <100    
  
       
Past Medical History:  
Diagnosis Date  Asthma    
 Back pain    
 COPD (chronic obstructive pulmonary disease) (HCC)    
  Diabetes ([facility]    
 Esophageal varices in cirrhosis (HCC)    
  6/2014 banding x 2  
 Fibromyalgia    
 Gastrointestinal disorder    
 GERD (gastroesophageal reflux disease)    
 Hypercholesteremia    
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    
 Liver disease    
 Other and unspecified hyperlipidemia    
 Restless leg syndrome    
 Type II or unspecified type diabetes mellitus without mention of complication, uncontrolled    
 Unspecified essential hypertension    
  
  
  
  
  
  
Activity Status: 
  
OOB to Chair No 
Ambulated this shift No  
Bed Rest Yes 
  
Supplemental R1: (QS Applicable) PRN On 2 Liters/min 
  
  
LINES AND DRAINS: 
  
  
DVT prophylaxis: 
  
DVT prophylaxis Med- Yes, Heparin DVT prophylaxis SCD or CRISTOPHER- No  
  
Wounds: (If Applicable) 
  
  
Patient Safety: 
  
Falls Score Total Score: 3 Safety Level_______ Bed Alarm On? Yes Sitter?  No 
  
Plan for upcoming shift:safety and rest 
  
  
  
Discharge Plan: Yes, Rehab 
  
Active Consults: 
IP CONSULT TO PALLIATIVE CARE - PROVIDER

## 2019-05-01 NOTE — PROGRESS NOTES
Bedside and Verbal shift change report given to Mirtha RN (oncoming nurse) by Patience  RN (offgoing nurse). Report included the following information SBAR, Kardex, ED Summary, MAR and Recent Results. 
  
Zone Phone:   [phone] 
  
  
Significant changes during shift:  Bladder Scan at 2300, 247 ml. Bladder scan at 0243, 475ml, straight cathed for 450ml. 
  
  
  
Patient Information 
  
Carloz Macias 
61 y.o. 
4/24/2019  2:00 PM by Davy Lee MD. Dhara Mahoney [facility] from [facility] 
  
Problem List 
  
       
Patient Active Problem List  
  Diagnosis Date Noted  Encephalopathy 04/24/2019  Acute respiratory failure with hypercapnia ([address] 04/02/2019  Counseling regarding advance care planning and goals of care 04/02/2019  Hepatic encephalopathy ([address] 03/28/2019  Colitis 03/28/2019  UTI (urinary tract infection) 03/28/2019  Septic shock ([address] 03/28/2019  Bilateral carotid artery stenosis 11/14/2018  TIA (transient ischemic attack) 11/13/2018  GERD (gastroesophageal reflux disease) 11/13/2018  Therapeutic drug monitoring 10/16/2018  Severe obesity ([address] 10/11/2018  Obesity (BMI 30.0-34.9) 07/10/2018  Sepsis ([address] 03/30/2017  CAP (community acquired pneumonia) 03/30/2017  IBIS (obstructive sleep apnea) 03/30/2017  Tobacco abuse 03/30/2017  Neuropathy 03/30/2017  COPD (chronic obstructive pulmonary disease) ([address] 03/28/2017  Acute deep vein thrombosis (DVT) of right lower extremity ([address] 11/10/2016  
 BALDERAS (nonalcoholic steatohepatitis) 03/10/2016  GI bleed 03/03/2016  Anemia 03/01/2016  Thrombocytopenia ([address] 08/15/2013  Previous back surgery 08/15/2013  S/P CHACHO (total abdominal hysterectomy) 08/15/2013  Cirrhosis ([address] 08/15/2013  Diabetes mellitus with neurological manifestations, uncontrolled ([address]    
 Essential hypertension, benign    
 Hyperlipidemia LDL goal <100    
  
       
Past Medical History:  
Diagnosis Date  Asthma    
 Back pain    
 COPD (chronic obstructive pulmonary disease) (HCC)    
 Diabetes (HCC)    
 Esophageal varices in cirrhosis ([facility]    
  6/2014 banding x 2  
 Fibromyalgia    
 Gastrointestinal disorder    
 GERD (gastroesophageal reflux disease)    
 Hypercholesteremia    
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    
 Liver disease    
 Other and unspecified hyperlipidemia    
 Restless leg syndrome    
 Type II or unspecified type diabetes mellitus without mention of complication, uncontrolled    
 Unspecified essential hypertension    
  
  
  
  
  
  
Activity Status: 
  
OOB to Chair No 
Ambulated this shift No  
Bed Rest Yes 
  
Supplemental H0: (UI Applicable) PRN On 2 Liters/min 
  
  
LINES AND DRAINS: 
  
  
DVT prophylaxis: 
  
DVT prophylaxis Med- Yes, Heparin DVT prophylaxis SCD or CRISTOPHER- No  
  
Wounds: (If Applicable) 
  
  
Patient Safety: 
  
Falls Score Total Score: 3 Safety Level_______ Bed Alarm On? Yes Sitter?  No 
  
Plan for upcoming shift:safety and rest 
  
  
  
Discharge Plan: Yes, Rehab 
  
Active Consults: 
IP CONSULT TO PALLIATIVE CARE - PROVIDER

## 2019-05-01 NOTE — CONSULTS
[address] [place] [address], [address] [facility]  [phone] [address] [facility] Date of  Admission: 4/24/2019  2:00 PM  
 
Admission type:Emergency Consult for: NSVT Consult by: hospitalist  
 
 Subjective:  
 
Reilly Mercer is a 61 y.o. female admitted for Encephalopathy [G93.40]. Per hospitalist note, Ms. Wojciech Saha is undergoing treatment for encephalopathy, BALDERAS, COPD and had a bursts of NSVT today prompting cardiology consult. Patient asymptomatic.  K 3.3. Short burst of probable NSVT on tele with no other significant ectopy. On assessment, Ms. Wojciech Saha is alert and interactive. She denies pain, SOB, palpitations, dizziness.  and RN at bedside. Ms. Wojciech Saha states she had a recent out patient stress test ~1.5 months ago (she thinks possibly at [facility]) and it was negative. ECHO 11/18 with EF 55-60% and NWMA. No significant cardiac history. Patient Active Problem List  
 Diagnosis Date Noted  Goals of care, counseling/discussion  Advanced care planning/counseling discussion  Debility  Neuropathic pain  Chronic bilateral low back pain with bilateral sciatica  Encephalopathy 04/24/2019  Acute respiratory failure with hypercapnia ([address] 04/02/2019  Counseling regarding advance care planning and goals of care 04/02/2019  Hepatic encephalopathy ([address] 03/28/2019  Colitis 03/28/2019  UTI (urinary tract infection) 03/28/2019  Septic shock ([address] 03/28/2019  Bilateral carotid artery stenosis 11/14/2018  TIA (transient ischemic attack) 11/13/2018  GERD (gastroesophageal reflux disease) 11/13/2018  Therapeutic drug monitoring 10/16/2018  Severe obesity ([address] 10/11/2018  Obesity (BMI 30.0-34.9) 07/10/2018  Sepsis ([address] 03/30/2017  CAP (community acquired pneumonia) 03/30/2017  IBIS (obstructive sleep apnea) 03/30/2017  Tobacco abuse 03/30/2017  Neuropathy 03/30/2017  COPD (chronic obstructive pulmonary disease) ([facility] 03/28/2017  Acute deep vein thrombosis (DVT) of right lower extremity ([facility] 11/10/2016  
 BALDERAS (nonalcoholic steatohepatitis) 03/10/2016  GI bleed 03/03/2016  Anemia 03/01/2016  Thrombocytopenia ([facility] 08/15/2013  Previous back surgery 08/15/2013  S/P CHACHO (total abdominal hysterectomy) 08/15/2013  Cirrhosis ([facility] 08/15/2013  Diabetes mellitus with neurological manifestations, uncontrolled ([address]  Essential hypertension, benign  Hyperlipidemia LDL goal <100 Alivia Terrell NP Past Medical History:  
Diagnosis Date  Asthma  Back pain  COPD (chronic obstructive pulmonary disease) (HCC)  Diabetes ([facility]  Esophageal varices in cirrhosis ([facility]   
 6/2014 banding x 2  
 Fibromyalgia  Gastrointestinal disorder  GERD (gastroesophageal reflux disease)  Hypercholesteremia  Liver cirrhosis secondary to BALDERAS ([facility]  Liver disease  Other and unspecified hyperlipidemia  Restless leg syndrome  Type II or unspecified type diabetes mellitus without mention of complication, uncontrolled  Unspecified essential hypertension Social History Socioeconomic History  Marital status:  Spouse name: Not on file  Number of children: Not on file  Years of education: Not on file  Highest education level: Not on file Tobacco Use  Smoking status: Current Every Day Smoker Packs/day: 1.00 Years: 40.00 Pack years: 40.00  Smokeless tobacco: Former User Substance and Sexual Activity  Alcohol use: No  
  Comment: rare  Drug use: No  
Social History Narrative Lives in [facility] with . Has 2 daughters and 1 granddaughter who she cares for. On disability for her back. Used to work as a  and . Used to bowl and fish. Allergies Allergen Reactions  Hydrocodone Nausea and Vomiting  Lisinopril Cough  Lortab [Hydrocodone-Acetaminophen] Nausea and Vomiting  Percocet [Oxycodone-Acetaminophen] Nausea and Vomiting Family History Problem Relation Age of Onset  Diabetes Mother  Stroke Sister  Diabetes Paternal Aunt  Diabetes Paternal Uncle  Heart Disease Neg Hx Current Facility-Administered Medications Medication Dose Route Frequency  potassium chloride SR (KLOR-CON 10) tablet 20 mEq  20 mEq Oral NOW  metoprolol tartrate (LOPRESSOR) tablet 12.5 mg  12.5 mg Oral Q12H  
 bacitracin 500 unit/gram packet 1 Packet  1 Packet Topical PRN  
 heparin (porcine) pf 300 Units  300 Units InterCATHeter PRN  
 meropenem (MERREM) 1 g in 0.9% sodium chloride (MBP/ADV) 50 mL  1 g IntraVENous Q8H  
 nicotine (NICODERM CQ) 14 mg/24 hr patch 1 Patch  1 Patch TransDERmal DAILY  traMADol (ULTRAM) tablet 25 mg  25 mg Oral Q6H PRN  
 sodium chloride (NS) flush 5-40 mL  5-40 mL IntraVENous Q8H  
 sodium chloride (NS) flush 5-40 mL  5-40 mL IntraVENous PRN  
 ondansetron (ZOFRAN ODT) tablet 4 mg  4 mg Oral Q4H PRN  
 bisacodyl (DULCOLAX) suppository 10 mg  10 mg Rectal DAILY PRN  
 heparin (porcine) injection 5,000 Units  5,000 Units SubCUTAneous Q8H  
 albuterol-ipratropium (DUO-NEB) 2.5 MG-0.5 MG/3 ML  3 mL Nebulization Q4H PRN  
 aspirin chewable tablet 81 mg  81 mg Oral DAILY  atorvastatin (LIPITOR) tablet 40 mg  40 mg Oral QHS  ferrous sulfate tablet 325 mg  1 Tab Oral DAILY WITH BREAKFAST  fluticasone-vilanterol (BREO ELLIPTA) 200mcg-25mcg/puff  1 Puff Inhalation DAILY  furosemide (LASIX) tablet 80 mg  80 mg Oral DAILY  insulin glargine (LANTUS) injection 15 Units  15 Units SubCUTAneous QHS  losartan (COZAAR) tablet 25 mg  25 mg Oral DAILY  pantoprazole (PROTONIX) tablet 40 mg  40 mg Oral ACB  rifAXIMin (XIFAXAN) tablet 550 mg  550 mg Oral BID  rOPINIRole (REQUIP) tablet 4 mg  4 mg Oral QHS  spironolactone (ALDACTONE) tablet 200 mg  200 mg Oral DAILY  lactulose (CHRONULAC) 10 gram/15 mL solution 45 mL  45 mL Oral TID  insulin lispro (HUMALOG) injection   SubCUTAneous AC&HS  
 glucose chewable tablet 16 g  4 Tab Oral PRN  
 dextrose (D50) infusion 12.5-25 g  12.5-25 g IntraVENous PRN  
 glucagon (GLUCAGEN) injection 1 mg  1 mg IntraMUSCular PRN  
 nitroglycerin (NITROBID) 2 % ointment 1 Inch  1 Inch Topical TID PRN Review of Symptoms:  
11 systems reviewed, negative other than as stated in the HPI Objective:  
  
Visit Vitals BP 98/56 (BP 1 Location: Left arm, BP Patient Position: At rest) Pulse 90 Temp 98.2 °F (36.8 °C) Resp 18 Ht 5' 8\" (1.727 m) Wt 86.4 kg (190 lb 7.6 oz) SpO2 91% BMI 28.96 kg/m² Physical:  
General: pleasant adult  female resting in bed in NAD Heart: RRR, no m/S3/JVD Lungs: clear Abdomen: Soft, +BS, NTND Extremities: LE isabelle +DP/PT, no edema. Scattered ecchymosis. Neurologic: Grossly normal/slightly confused Data Review:  
Recent Labs 04/30/19 
4155 WBC 7.3 HGB 11.7 HCT 36.9 PLT 96* Recent Labs 05/01/19 
0800 05/01/19 
0240 04/30/19 
8594 NA  --  135* 133* K  --  3.3* 3.1*  
CL  --  101 99 CO2  --  26 25 GLU  --  122* 149* BUN  --  13 11 CREA  --  0.85 0.93 CA  --  8.5 8.7 MG 1.6  --   --   
ALB  --  2.7* 2.8* TBILI  --  1.1* 1.1*  
SGOT  --  73* 58* ALT  --  72 63 No results for input(s): TROIQ, CPK, CKMB in the last 72 hours. Intake/Output Summary (Last 24 hours) at 5/1/2019 9109 Last data filed at 5/1/2019 2655 Gross per 24 hour Intake  Output 1400 ml Net -1400 ml Cardiographics Telemetry: SR with 1 burst of probable NSVT 
ECG: ST 
Echocardiogram: last as above CXRAY: \"Mild bibasilar discoid atelectasis. \" 
 
 
 Assessment:  
  
 Active Problems: 
  Encephalopathy (4/24/2019) Goals of care, counseling/discussion () Advanced care planning/counseling discussion () Debility () Neuropathic pain () Chronic bilateral low back pain with bilateral sciatica () Plan:  
 
Cande Capellan is a 61 y.o. female admitted with Encephalopathy [G93.40] and is pending discharge. Cardiology consulted for burst of NSVT on tele. Patient asymptomatic. K 3.1/3.3;  Elevated LFTs. Ammonia elevated this admission. · Short burst of NSVT in setting of hypokalemia. Patient asymptomatic · Patient states recent normal stress test.  Obtain those records. If patient has not had a recent stress test (she has some confusion), obtain one out patient. · Please request/obtain records prior to discharge, but they do not need to arrive/be reviewed before patient can discharge · Continue patient's BB. If issues with hypotension, would decrease/hold patient's ARB before her BB. Also continue ASA and statin for CAD risk. No additional cardiology recommendations. Patient may continue with discharge. Thank you for consulting [facility] Sehrry Lopez NP 
DNP, RN, AGACNP-BC Patient seen and examined by me with nurse practitioner. Eddie Herzog personally performed all components of the history, physical, and medical decision making and agree with the assessment and plan with minor modifications as noted. D/w .   
 
Sergey Altamirano MD

## 2019-05-01 NOTE — PROGRESS NOTES
Medicare pt has received, reviewed, and signed 2nd IM letter informing them of their right to appeal the discharge. Signed copy has been placed on pt bedside chart. Ap Aj [phone]

## 2019-05-01 NOTE — PROGRESS NOTES
Spoke with rodo Valladares at [facility] to update her on transport time. She states the patient that was being discharged has not left and they are not sure if he is leaving today. She states she will check with their discharge planner and give me a call back. Dr Alejandro Lopez aware.

## 2019-05-01 NOTE — PROGRESS NOTES
Problem: Mobility Impaired (Adult and Pediatric) Goal: *Acute Goals and Plan of Care (Insert Text) Description Physical Therapy Goals Initiated 4/25/2019 1. Patient will move from supine to sit and sit to supine  in bed with modified independence within 7 day(s). 2.  Patient will transfer from bed to chair and chair to bed with minimal assistance/contact guard assist using the least restrictive device within 7 day(s). 3.  Patient will perform sit to stand with minimal assistance/contact guard assist within 7 day(s). 4.  Patient will ambulate with minimal assistance/contact guard assist for 50 feet with the least restrictive device within 7 day(s). Outcome: Progressing Towards Goal 
 PHYSICAL THERAPY TREATMENT Patient: Ria Cortes (13 y.o. female) Date: 5/1/2019 Diagnosis: Encephalopathy [G93.40] <principal problem not specified> Precautions: Fall, DNR Chart, physical therapy assessment, plan of care and goals were reviewed. ASSESSMENT: 
Patient is agreeable to mobility this session. She declines transferring to bed side commode and is agreeable to transferring to a chair. She continues to be Min A for supine to sit but looks as though she may be able to complete transfer without assistance. Patient is Catina Fall for sit to stand. She demonstrates good supported standing balance. Patient side steps towards chair with Min Ax2 and bilateral feet blocked to prevent feet from coming out from underneath her. Patient continues to be appropriate for inpatient rehab on D/C. Progression toward goals: 
?    Improving appropriately and progressing toward goals ? Improving slowly and progressing toward goals ? Not making progress toward goals and plan of care will be adjusted PLAN: 
Patient continues to benefit from skilled intervention to address the above impairments. Continue treatment per established plan of care. Discharge Recommendations:  Inpatient Rehab Further Equipment Recommendations for Discharge:  None SUBJECTIVE:  
Patient stated ? O I don't know but I can try.? OBJECTIVE DATA SUMMARY:  
Critical Behavior: 
Neurologic State: Alert Orientation Level: Oriented X4 Cognition: Follows commands Safety/Judgement: Fall prevention Functional Mobility Training: 
Bed Mobility: 
  
Supine to Sit: Minimum assistance Transfers: 
Sit to Stand: Minimum assistance Stand to Sit: Minimum assistance Balance: 
Sitting: Intact Standing: Impaired Standing - Static: Poor Standing - Dynamic : Poor Ambulation/Gait Training: 
Distance (ft): 3 Feet (ft) Assistive Device: Gait belt Ambulation - Level of Assistance: Minimal assistance;Assist x2 Gait Abnormalities: Decreased step clearance; Antalgic; Step to gait Base of Support: Widened Speed/Mey: Slow Step Length: Right shortened;Left shortened Stairs: 
  
  
   
 
Neuro Re-Education: 
Therapeutic Exercises:  
 
Pain: 
Pain Scale 1: Numeric (0 - 10) Pain Intensity 1: 0 Activity Tolerance:  
Patient demonstrates good activity tolerance this session Please refer to the flowsheet for vital signs taken during this treatment. After treatment:  
?    Patient left in no apparent distress sitting up in chair ? Patient left in no apparent distress in bed 
? Call bell left within reach ? Nursing notified ? Caregiver present ? Bed alarm activated COMMUNICATION/COLLABORATION:  
The patient?s plan of care was discussed with: Registered Nurse Heath Ohs, PT Time Calculation: 15 mins

## 2019-05-02 NOTE — PROGRESS NOTES
Patient called on call light and requested that her doctor be paged. Paged Dr. Moira Arthur, notified him regarding patient's refusal of her insulin, bladder scan, and her request to talk to him.

## 2019-05-02 NOTE — PROGRESS NOTES
Attempting to assist patient to bedside commode with Ty PCT; patient refuses my assistance, after I asked her if I could help her put some socks on her feet, before getting up out of bed, for safety. Patient stated \"You know, I don't want to do this anymore. You are just arguing with me and telling me what to do. You don't know what we did last night when we did this three times, unless you're clairvoyant or something. You just keep arguing with me ever since I have been here. \"  I asked patient if she would be willing to get up to the bedside commode if a different staff member assisted her, and she agreed. Requesting for Halle Garner RN to assist patient, since she is refusing my assistance at this time.

## 2019-05-02 NOTE — PROGRESS NOTES
[facility] has accepted patient. Informed  and he is in the process now of touring [address] [facility] and would like for the patient to go there. Referral sent to [address] [facility] and spoke with Arslan  and she states they do have a private room and she will look at the medicals and get back with me.

## 2019-05-02 NOTE — PROGRESS NOTES
Received call from rodo at [facility] and they still do not have a bed for the patient today. She states their [facility] hospital has a bed--[facility] and she will talk with the family to see if they are ok with this and if necessary they can transfer her back to [facility] if the family is ok with that. Spoke with  and patient and they are ok with this. Referral sent to [facility] and will await their response.

## 2019-05-02 NOTE — PROGRESS NOTES
Spoke with Andreia Moss at [facility] and they are having a meeting at 0900am and will get back with me regarding patient coming today. Called [facility] and spoke with KRISTIE and placed on will call. Nursing called and made aware of above. Eunice Medel

## 2019-05-02 NOTE — DISCHARGE INSTRUCTIONS
Patient Education        Urinary Tract Infection in Women: Care Instructions  Your Care Instructions    A urinary tract infection, or UTI, is a general term for an infection anywhere between the kidneys and the urethra (where urine comes out). Most UTIs are bladder infections. They often cause pain or burning when you urinate. UTIs are caused by bacteria and can be cured with antibiotics. Be sure to complete your treatment so that the infection goes away. Follow-up care is a key part of your treatment and safety. Be sure to make and go to all appointments, and call your doctor if you are having problems. It's also a good idea to know your test results and keep a list of the medicines you take. How can you care for yourself at home? · Take your antibiotics as directed. Do not stop taking them just because you feel better. You need to take the full course of antibiotics. · Drink extra water and other fluids for the next day or two. This may help wash out the bacteria that are causing the infection. (If you have kidney, heart, or liver disease and have to limit fluids, talk with your doctor before you increase your fluid intake.)  · Avoid drinks that are carbonated or have caffeine. They can irritate the bladder. · Urinate often. Try to empty your bladder each time. · To relieve pain, take a hot bath or lay a heating pad set on low over your lower belly or genital area. Never go to sleep with a heating pad in place. To prevent UTIs  · Drink plenty of water each day. This helps you urinate often, which clears bacteria from your system. (If you have kidney, heart, or liver disease and have to limit fluids, talk with your doctor before you increase your fluid intake.)  · Urinate when you need to. · Urinate right after you have sex. · Change sanitary pads often. · Avoid douches, bubble baths, feminine hygiene sprays, and other feminine hygiene products that have deodorants.   · After going to the bathroom, wipe from front to back. When should you call for help? Call your doctor now or seek immediate medical care if:    · Symptoms such as fever, chills, nausea, or vomiting get worse or appear for the first time.     · You have new pain in your back just below your rib cage. This is called flank pain.     · There is new blood or pus in your urine.     · You have any problems with your antibiotic medicine.    Watch closely for changes in your health, and be sure to contact your doctor if:    · You are not getting better after taking an antibiotic for 2 days.     · Your symptoms go away but then come back. Where can you learn more? Go to http://joanna-linnette.info/. Enter C675 in the search box to learn more about \"Urinary Tract Infection in Women: Care Instructions. \"  Current as of: 2018  Content Version: 11.9  © 7124-3268 [employer]. Care instructions adapted under license by [employer] (which disclaims liability or warranty for this information). If you have questions about a medical condition or this instruction, always ask your healthcare professional. [place] [address] any warranty or liability for your use of this information.          Patient Discharge Instructions    [facility] / [id number] : 1955    Admitted 2019 Discharged: 2019         DISCHARGE DIAGNOSIS:   Active Problems:    Encephalopathy (2019)      Goals of care, counseling/discussion ()      Advanced care planning/counseling discussion ()      Debility ()      Neuropathic pain ()      Chronic bilateral low back pain with bilateral sciatica ()       Acute encephalopathy suspect hepatic POA  Complicated UTI (recent norman cath) POA urine culture with ESBL klebsiella  Urinary retention new  Hypokalemia  BALDERAS POA  Liver cirrhosis POA  History of recurrent hepatic encephalopathy  History of esophageal varices  COPD without exacerbation  Recent acute respiratory failure with hypercarbia and hypoxia  DM type 2 on insulin  Essential HTN POA  History of TIA 11/18  Hyperlipidemia POA          {Medication reconciliation information is now added to the patient's AVS automatically when it is printed. There is no need to use this SmartLink in discharge instructions. Highlight this text and delete it to clear this message}      General drug facts     If you have a very bad allergy, wear an allergy ID at all times. It is important that you take the medication exactly as they are prescribed. Keep your medication in the bottles provided by the pharmacist.  Keep a list of all your drugs (prescription, natural products, vitamins, OTC) with you. Give this list to your doctor. Do not take other medications without consulting your doctor. Do not share your drugs with others and do not take anyone else's drugs. Keep all drugs out of the reach of children and pets. Most drugs may be thrown away in household trash after mixing with coffee grounds or dc litter and sealing in a plastic bag. Keep a list Call your doctor for help with any side effects. If in the [place] you may also call the FDA at 0-033-RZG-1449    Talk with the doctor before starting any new drug, including OTC, natural products, or vitamins. Discharge instructions    1. Recommended diet: Cardiac     2. Recommended activity: Activity as tolerated    3. If you experience any of the following symptoms then please call your primary care physician or return to the emergency room if you cannot get hold of your doctor:    4. Wound Care: None    5. Lab work: Cbc and Cmp in 1 week. 6.Bring these papers with you to your follow up appointments. The papers will help your doctors be sure to continue the care plan from the hospital.    7. She will need to be on Meropenem (antibiotic for urine infection) until 5/4/19 at [facility]    8. Please bladder scan 4-5 times daily for next 2 days and straight cath if PVR is more than 300 ml, If still retaining after 3 days, consider Gimenez placement. Follow-up with:   PCP: Kike Chinchilla NP  Follow-up Information     Follow up With Specialties Details Why Contact Info    [address] [place] [address] [phone]      Kike Chinchilla NP Nurse Practitioner  Please call to schedule hospital follow up appointment [address] [facility] [phone]      Kike Chinchilla NP Nurse Practitioner Schedule an appointment as soon as possible for a visit in 1 week  [address] [facility] [phone]      Maude Chaney MD Cardiology Schedule an appointment as soon as possible for a visit in 2 weeks  [address] [place] [address]  [phone]             Please call for your own appointment        Information obtained by :  I understand that if any problems occur once I am at home I am to contact my physician. I understand and acknowledge receipt of the instructions indicated above.                                                                                                                                            Physician's or R.N.'s Signature                                                                  Date/Time                                                                                                                                              Patient or Representative Signature                                                          Date/Time

## 2019-05-02 NOTE — PROGRESS NOTES
Patient refusing bladder scan at this time. Attempted to educate patient regarding the reason for bladder scans, being to evaluate whether she is retaining urine, and that if she is not emptying her bladder she is increasing her risk for UTIs. Patient plugged her ears, closed her eyes, and refused to acknowledge information. Patient stated \"I wouldn't let you educate me about anything. \"

## 2019-05-02 NOTE — PROGRESS NOTES
Report called to MICAH SAUCEDO [facility]; copy of discharge paperwork being sent with patient to [facility]; copy also printed and given to patient. Patient to get her 2pm meropenem prior to discharging to [facility] at 3pm via [facility].

## 2019-05-02 NOTE — PROGRESS NOTES
Bedside and Verbal shift change report given to Lanette RN (oncoming nurse) by Patience  RN (offgoing nurse). Report included the following information SBAR, Kardex, ED Summary, MAR and Recent Results. 
  
Zone Phone:   [phone] 
  
  
Significant changes during shift:  Pt used bedpan and bedside commode for elimination. Bladder Scan at 2230 47ml of urine. Bladder scan at 0500, 141 ml of urine.   
  
  
  
Patient Information 
  
Yevonne Ards 
61 y.o. 
4/24/2019  2:00 PM by Linda Zepeda MD. Dhara Mahoney was admitted from [facility] 
  
Problem List 
  
       
Patient Active Problem List  
  Diagnosis Date Noted  Encephalopathy 04/24/2019  Acute respiratory failure with hypercapnia ([address] 04/02/2019  Counseling regarding advance care planning and goals of care 04/02/2019  Hepatic encephalopathy ([address] 03/28/2019  Colitis 03/28/2019  UTI (urinary tract infection) 03/28/2019  Septic shock ([address] 03/28/2019  Bilateral carotid artery stenosis 11/14/2018  TIA (transient ischemic attack) 11/13/2018  GERD (gastroesophageal reflux disease) 11/13/2018  Therapeutic drug monitoring 10/16/2018  Severe obesity ([address] 10/11/2018  Obesity (BMI 30.0-34.9) 07/10/2018  Sepsis ([address] 03/30/2017  CAP (community acquired pneumonia) 03/30/2017  IBIS (obstructive sleep apnea) 03/30/2017  Tobacco abuse 03/30/2017  Neuropathy 03/30/2017  COPD (chronic obstructive pulmonary disease) ([address] 03/28/2017  Acute deep vein thrombosis (DVT) of right lower extremity ([address] 11/10/2016  
 BALDERAS (nonalcoholic steatohepatitis) 03/10/2016  GI bleed 03/03/2016  Anemia 03/01/2016  Thrombocytopenia ([address] 08/15/2013  Previous back surgery 08/15/2013  S/P CHACHO (total abdominal hysterectomy) 08/15/2013  Cirrhosis ([address] 08/15/2013  Diabetes mellitus with neurological manifestations, uncontrolled ([address]    
 Essential hypertension, benign    
 Hyperlipidemia LDL goal <100    
  
       
 Past Medical History:  
Diagnosis Date  Asthma    
 Back pain    
 COPD (chronic obstructive pulmonary disease) (HCC)    
 Diabetes (HCC)    
 Esophageal varices in [facility]    
  6/2014 banding x 2  
 Fibromyalgia    
 Gastrointestinal disorder    
 GERD (gastroesophageal reflux disease)    
 Hypercholesteremia    
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    
 Liver disease    
 Other and unspecified hyperlipidemia    
 Restless leg syndrome    
 Type II or unspecified type diabetes mellitus without mention of complication, uncontrolled    
 Unspecified essential hypertension    
  
  
  
  
  
  
Activity Status: 
  
OOB to Chair No 
Ambulated this shift No  
Bed Rest Yes 
  
Supplemental M4: (JK Applicable) PRN On 2 Liters/min 
  
  
LINES AND DRAINS: 
  
  
DVT prophylaxis: 
  
DVT prophylaxis Med- Yes, Heparin DVT prophylaxis SCD or CRISTOPHER- No  
  
Wounds: (If Applicable) 
  
  
Patient Safety: 
  
Falls Score Total Score: 3 Safety Level_______ Bed Alarm On? Yes Sitter?  No 
  
Plan for upcoming shift:safety and rest 
  
  
  
Discharge Plan: Yes, Rehab 
  
Active Consults: 
IP CONSULT TO PALLIATIVE CARE - PROVIDER

## 2019-05-02 NOTE — PROGRESS NOTES
Patient had last dose of meropenem hung; will be discharging to [facility] when it is complete. Patient's daughter very aggressive towards RN; stating \"You're really horribly rude. You need to calm it down, you deal with sick people! \", following RN's explanation regarding patient's antibiotic regimen, and the fact that these antibiotics will last for 30 minutes, and that patient will be getting these antibiotics every eight hours for the next three days.

## 2019-05-09 NOTE — PROGRESS NOTES
Community Care Team documentation for patient in [facility] Initial Follow Up Patient was discharged to [facility]. Information included in this progress note has been provided to SNF. Hospital Admission and Diagnosis: MRM 4/24-5/2 Acute encephalopathy suspect hepatic RRAT Score: 42 Advance Care Planning: POST dated 4/30/19 on file (previous POST was revoked) PCP : Kimberli Colvin NP 
 
SNF Attending:  Yovany Allan MD 
 
Spoke with SNF team. Ensured patient arrived to SNF safely with admission packet in order. Provided needed hospital follow up appointments: Betty Antonio MD Cardiology on 5/13 at 11:00 AM; [facility]16 at 12:30 PM. PT/OT providing skilled therapy. Currently ambulating 50 ft min A with walker, transfers mod A, ADLs min A. Pt requested discharge 5/10/19 with [facility]. Plan to discharge home with spouse. Pt's daughter is very involved. Community Care Team will follow up weekly with [facility] until discharge. Medications were not reconciled and general patient assessment was not completed during this [facility] outreach.

## 2019-05-14 NOTE — ED NOTES
Sterile technique used to perform straight cath for urine collection. Allergies verified prior to procedure. Patient tolerated well. Urine sample labeled and sent to lab.

## 2019-05-14 NOTE — PROGRESS NOTES
1 x IV antibiotic administration in ED Agent: fluconazole 400mg IV x 1 + levofloxacin 750mg IV x 1 Indication: UTI Tmax: 98.4 WBC: 9.8 Culture Data:  
 
5/14: urine culture - pending 5/14: paired blood - pending 
--Patient has history of ESBL Klebsiella, E.coli, and enterococcus faecium group D. All susceptible to levofloxacin. This I-vent was left open to ensure that IV antibiotics are con't upon admission. If not, and warranted, please contact provider. Please close this I-vent if/when appropriate.

## 2019-05-14 NOTE — PROGRESS NOTES
Pharmacy Automatic Renal Dosing Protocol - Antimicrobials Indication for Antimicrobials: uti Current Regimen of Each Antimicrobial: 
Levaquin 750 mg IV ever 24 hr (Start Date ; Day # 1) Previous Antimicrobial Therapy: (Start Date ; Day Goal Level:  
 
Date Dose & Interval Measured (mcg/mL) Extrapolated (mcg/mL) Date & time of next level:  
 
Significant Cultures:  
Urine  - pending Blood  - pending Urine  - KLEBSIELLA PNEUMONIAE ** (EXTENDED SPECTRUM BETA LACTAMASE ) **  
 
Radiology / Imaging results: (X-ray, CT scan or MRI):  
 
Paralysis, amputations, malnutrition:  
 
Labs: 
Recent Labs 19 
1209 CREA 0.62 BUN 17 WBC 9.8 Temp (24hrs), Av.4 °F (36.9 °C), Min:98.4 °F (36.9 °C), Max:98.4 °F (36.9 °C) Creatinine Clearance (mL/min) or Dialysis: pending wt Impression/Plan:  
Levaquin appropriate, will verify for 750 mg every 24 hr 
Antimicrobial stop date 5 days Bmp daily Pharmacy will follow daily and adjust medications as appropriate for renal function and/or serum levels. Thank you, Himanshu Sandoval, PHARMD 
 
Recommended duration of therapy 
http://[facility]/[place]/[facility]/Pharmacy/Clinical%20Companion/Duration%20of%20ABX%20therapy. docx Renal Dosing 
http://[facility]/[place]/[facility]/Pharmacy/Clinical%20Companion/Renal%[id number]. pdf

## 2019-05-14 NOTE — ED NOTES
Family reports pt has hx of cirrhosis and wear O2 PRN. O2 was 87 on room air. Placed on 4L NC and O2 came up to 92

## 2019-05-14 NOTE — ED NOTES
TRANSFER - OUT REPORT: 
 
Verbal report given to Ismael Alejandro RN on Alanna Brown  being transferred to Neuro for routine progression of care Report consisted of patients Situation, Background, Assessment and  
Recommendations(SBAR). Information from the following report(s) SBAR, ED Summary, Intake/Output and Recent Results was reviewed with the receiving nurse. Opportunity for questions and clarification was provided. Patient transported with: 
 [employer]

## 2019-05-14 NOTE — ED TRIAGE NOTES
Pt arrives via EMS. Family reports pt has been lethargic and confused since she got home from the nursing home last week. Pt believes she may have a UTI or her ammonia level is elevated. BG

## 2019-05-14 NOTE — ROUTINE PROCESS
Bedside and Verbal shift change report given to Mariano (oncoming nurse) by Moe Jeffrey (offgoing nurse). Report included the following information SBAR, Kardex, Intake/Output and MAR. Zone Phone:   [phone] Significant changes during shift:  New admit Patient Information Meghann Armando 
61 y.o. 
5/14/2019 11:30 AM by Shruti Dumont MD. Meghann Armando was admitted from Home 
 
Problem List 
 
Patient Active Problem List  
 Diagnosis Date Noted  Goals of care, counseling/discussion  Advanced care planning/counseling discussion  Debility  Neuropathic pain  Chronic bilateral low back pain with bilateral sciatica  Encephalopathy 04/24/2019  Acute respiratory failure with hypercapnia ([address] 04/02/2019  Counseling regarding advance care planning and goals of care 04/02/2019  Hepatic encephalopathy ([address] 03/28/2019  Colitis 03/28/2019  UTI (urinary tract infection) 03/28/2019  Septic shock ([address] 03/28/2019  Bilateral carotid artery stenosis 11/14/2018  TIA (transient ischemic attack) 11/13/2018  GERD (gastroesophageal reflux disease) 11/13/2018  Therapeutic drug monitoring 10/16/2018  Severe obesity ([address] 10/11/2018  Obesity (BMI 30.0-34.9) 07/10/2018  Sepsis ([address] 03/30/2017  CAP (community acquired pneumonia) 03/30/2017  IBIS (obstructive sleep apnea) 03/30/2017  Tobacco abuse 03/30/2017  Neuropathy 03/30/2017  COPD (chronic obstructive pulmonary disease) ([address] 03/28/2017  Acute deep vein thrombosis (DVT) of right lower extremity ([address] 11/10/2016  
 BALDERAS (nonalcoholic steatohepatitis) 03/10/2016  GI bleed 03/03/2016  Anemia 03/01/2016  Thrombocytopenia ([address] 08/15/2013  Previous back surgery 08/15/2013  S/P CHACHO (total abdominal hysterectomy) 08/15/2013  Cirrhosis ([address] 08/15/2013  Diabetes mellitus with neurological manifestations, uncontrolled ([address]  Essential hypertension, benign  Hyperlipidemia LDL goal <100 Past Medical History:  
Diagnosis Date  Asthma  Back pain  COPD (chronic obstructive pulmonary disease) (HCC)  Diabetes ([address]  Esophageal varices in cirrhosis ([address]   
 6/2014 banding x 2  
 Fibromyalgia  Gastrointestinal disorder  GERD (gastroesophageal reflux disease)  Hypercholesteremia  Liver cirrhosis secondary to BALDERAS ([address]  Liver disease  Other and unspecified hyperlipidemia  Restless leg syndrome  Type II or unspecified type diabetes mellitus without mention of complication, uncontrolled  Unspecified essential hypertension Activity Status: OOB to Chair No 
Ambulated this shift No  
Bed Rest Yes Supplemental O2: (If Applicable) NC Yes On 2 Liters/min DVT prophylaxis: DVT prophylaxis Med- Yes DVT prophylaxis SCD or CRISTOPHER- No  
 
Wounds: (If Applicable) Wounds- No 
 
Patient Safety: 
 
Falls Score Safety Level_______ Bed Alarm On? Yes Sitter? No 
 
Plan for upcoming shift: safety Discharge Plan: No  
 
Active Consults: 
None

## 2019-05-14 NOTE — PROGRESS NOTES
Deleted propranolol from PTA list, both propranolol and metoprolol on home med list and patient's  agrees to this but propranolo was stopped last discharge in April/May prior to SNF placement. Pharmacy Clarification of the Prior to Admission Medication Regimen Retrospective to the Admission Medication Reconciliation The patient was not interviewed regarding clarification of the prior to admission medication regimen. The patients  was present in room and obtained permission from patient to discuss drug regimen with visitor(s) present. MHT interviewed patients , Catalina Miguel for PTA Meds. Patients  was questioned regarding use of any other inhalers, topical products, over the counter medications, herbal medications, vitamin products or ophthalmic/nasal/otic medication use. Patients  was uncertain of the patients sliding scale. MHT called the outpatient pharmacy, [facility], [phone]  , and spoke with Jacquelyn, Pharmacy Intern, who was able to verify the patient's Humalog sliding scale. Information Obtained From: Med List, RX Query, Patients , Outpatient Pharmacy Recommendations/Findings: The following amendments were made to the patient's active medication list on file at [facility]:  
 
1) Additions:  
? ferrous sulfate 325 mg (65 mg iron) tablet 
? traMADol (ULTRAM) 50 mg tablet ? propranolol (INDERAL) 20 mg tablet 2) Removals:  
? Breo 3) Changes: 
? furosemide (LASIX) (Old regimen: (strength 40mg) 80mg daily /New regimen: (strength 20mg) 20mg daily) ? gabapentin (NEURONTIN) 600 mg tablet (Old regimen: 1200mg BID /New regimen: 1200mg TID) 4) Pertinent Pharmacy Findings: 
? Patient was AMS and RN instructed MHT to get information from Catalina Rivera, the  ? Chirag provided MHT with a Med List and last doses administered ? General compliance is unknown at this time PTA medication list was corrected to the following: Prior to Admission Medications Prescriptions Last Dose Informant Patient Reported? Taking? albuterol (PROAIR HFA) 90 mcg/actuation inhaler 2019 at Unknown time Significant Other Yes Yes Sig: Take 2 Puffs by inhalation every four (4) hours as needed for Wheezing. albuterol-ipratropium (DUO-NEB) 2.5 mg-0.5 mg/3 ml nebu Not Taking at Unknown time Significant Other No No  
Sig: 3 mL by Nebulization route every four (4) hours as needed for Other (Wheezing or Shortness of breath). amitriptyline (ELAVIL) 150 mg tablet 2019 at Unknown time Significant Other Yes Yes Sig: Take 1 Tab by mouth nightly. aspirin 81 mg chewable tablet 2019 at Unknown time Significant Other No Yes Sig: Take 1 Tab by mouth daily. atorvastatin (LIPITOR) 40 mg tablet 2019 at Unknown time Significant Other No Yes Sig: Take 1 Tab by mouth nightly. ferrous sulfate 325 mg (65 mg iron) tablet 2019 at Unknown time Significant Other No Yes Sig: take 1 tablet by mouth once daily with BREAKFAST  
fluticasone propion-salmeterol (ADVAIR DISKUS) 500-50 mcg/dose diskus inhaler 2019 at Unknown time Significant Other Yes Yes Sig: Take 1 Puff by inhalation every twelve (12) hours. furosemide (LASIX) 20 mg tablet 2019 at Unknown time Significant Other Yes Yes Sig: Take 20 mg by mouth daily. gabapentin (NEURONTIN) 600 mg tablet 2019 at Unknown time Significant Other Yes Yes Sig: Take 1,200 mg by mouth three (3) times daily. insulin glargine (LANTUS) 100 unit/mL injection 2019 at Unknown time Significant Other No Yes Sig: 15 Units by SubCUTAneous route daily for 30 days. insulin lispro (HUMALOG U-100 INSULIN) 100 unit/mL injection 2019 at Unknown time Significant Other Yes Yes Si-15 Units by SubCUTAneous route Before breakfast, lunch, dinner and at bedtime. Blood Glucose (mg/dL)       Insulin Dose (units)             0-149                                   0  
 150-200                               2  
            201-250                               4  
            251-300                               6  
            301-350                               8  
            351-400                               10  
            401-450                               12  
             451-500                               15  
lactulose (CHRONULAC) 10 gram/15 mL solution 2019 at Unknown time Significant Other No Yes Sig: Take 45 mL by mouth three (3) times daily for 30 days. losartan (COZAAR) 25 mg tablet 2019 at Unknown time Significant Other No Yes Sig: take 1 tablet by mouth once daily , REPLACES LISINOPRIL FOR BLOOD PRESSURE AND KIDNEY PROTECTION  
magnesium oxide (MAG-OX) 400 mg tablet 2019 at Unknown time Significant Other Yes Yes Sig: Take 400 mg by mouth daily. metoprolol tartrate (LOPRESSOR) 25 mg tablet 2019 at Unknown time Significant Other No Yes Sig: Take 0.5 Tabs by mouth every twelve (12) hours for 30 days. nicotine (NICODERM CQ) 21 mg/24 hr 2019 at Unknown time Significant Other Yes Yes Si Patch by TransDERmal route every twenty-four (24) hours. omeprazole (PRILOSEC) 20 mg capsule 2019 at Unknown time Significant Other No Yes Sig: take 1 capsule by mouth once daily  
ondansetron (ZOFRAN ODT) 4 mg disintegrating tablet 2019 at Unknown time Significant Other No Yes Sig: dissolve 1 tablet ON TONGUE every 8 hours if needed for nausea  
propranolol (INDERAL) 20 mg tablet 2019 at Unknown time Significant Other Yes Yes Sig: Take 20 mg by mouth two (2) times a day. rOPINIRole (REQUIP) 4 mg tab TAB 2019 at Unknown time Significant Other Yes Yes Sig: Take 4 mg by mouth nightly. rifAXIMin (XIFAXAN) 550 mg tablet 2019 at Unknown time Significant Other Yes Yes Sig: Take 550 mg by mouth two (2) times a day.   
spironolactone (ALDACTONE) 100 mg tablet 2019 at Unknown time Significant Other No Yes Sig: Take 2 Tabs by mouth daily. traMADol (ULTRAM) 50 mg tablet 5/14/2019 at Unknown time Significant Other Yes Yes Sig: Take 50 mg by mouth every six (6) hours as needed for Pain. Facility-Administered Medications: None Thank you, [facility] Mary Nunez CPhT Medication History Pharmacy Technician

## 2019-05-14 NOTE — H&P
Hospitalist Admission NoteNAME: Vilma Pereira :  1955 MRN:  [id number] Date/Time:  2019 4:46 PM 
 
Patient PCP: Raymundo Tejeda NP 
________________________________________________________________________ My assessment of this patient's clinical condition and my plan of care is as follows. Assessment / Plan: 
 
1) encephalopathy: Likely related to Liver, continue home meds, lactulose, IV fluids follow ammonia levels, am labs 2) UTI: Continue levaquin/fluconazol, F/U CX, initiaL LACTIC ACID 2.3, will repeat in 3 hrs 3) DM: Continue home insulin will add RISS Code Status: full Surrogate Decision Maker: DVT Prophylaxis: yes GI Prophylaxis: not indicated Baseline: lives at home Subjective: CHIEF COMPLAINT: concern by  of UTI or elevated ammonia levels HISTORY OF PRESENT ILLNESS:    
Jcarlos Garcia is a 61 y.o. Patient is a poor historian with confusion so HPI taken from ED note \"  female, with past medical history significant for cirrhosis, diabetes on insulin presenting the emergency department brought in by  with concern for recurrent urinary tract infection and elevated ammonia level. Patient is unable to provide much history. He reports that patient had a recent hospitalization, will for UTI, has grown ESBL in the past.  He states that since she has been home from rehab she has had a progressive decline with now inability to ambulate and increasing confusion. He reports she is compliant with her lactulose and having bowel movements. Denies any fever, but admits to shaking. He states that her breathing seems to have worsened as well, she was on oxygen as needed, now requiring it all the time. She has complaining of some hematuria at home.  denies any falls or loss conscious numbness. \" We were asked to admit for work up and evaluation of the above problems. Past Medical History:  
Diagnosis Date  Asthma  Back pain  COPD (chronic obstructive pulmonary disease) (HCC)  Diabetes ([facility] [address]  Esophageal varices in cirrhosis ([facility] [address]   
 6/2014 banding x 2  
 Fibromyalgia  Gastrointestinal disorder  GERD (gastroesophageal reflux disease)  Hypercholesteremia  Liver cirrhosis secondary to BALDERAS ([facility] [address]  Liver disease  Other and unspecified hyperlipidemia  Restless leg syndrome  Type II or unspecified type diabetes mellitus without mention of complication, uncontrolled  Unspecified essential hypertension Past Surgical History:  
Procedure Laterality Date  HX BACK SURGERY    
 HX CARPAL TUNNEL RELEASE    
 on right  HX HYSTERECTOMY plus 1/2 of an ovary removed  NV COLSC FLX W/RMVL OF TUMOR POLYP LESION SNARE TQ  5/30/2013  UPPER GI ENDOSCOPY,LIGAT VARIX  2/6/2015 Social History Tobacco Use  Smoking status: Current Every Day Smoker Packs/day: 1.00 Years: 40.00 Pack years: 40.00  Smokeless tobacco: Former User Substance Use Topics  Alcohol use: No  
  Comment: rare Family History Problem Relation Age of Onset  Diabetes Mother  Stroke Sister  Diabetes Paternal Aunt  Diabetes Paternal Uncle  Heart Disease Neg Hx Allergies Allergen Reactions  Hydrocodone Nausea and Vomiting  Lisinopril Cough  Lortab [Hydrocodone-Acetaminophen] Nausea and Vomiting  Percocet [Oxycodone-Acetaminophen] Nausea and Vomiting Prior to Admission medications Medication Sig Start Date End Date Taking? Authorizing Provider  
lactulose (CHRONULAC) 10 gram/15 mL solution Take 45 mL by mouth three (3) times daily for 30 days. 5/1/19 5/31/19  Arianna Catalan MD  
insulin glargine (LANTUS) 100 unit/mL injection 15 Units by SubCUTAneous route daily for 30 days. 5/1/19 5/31/19  Arianna Catalan MD  
insulin lispro (HUMALOG) 100 unit/mL injection Sliding scale as above. 5/1/19   Robert Edmondson MD  
metoprolol tartrate (LOPRESSOR) 25 mg tablet Take 0.5 Tabs by mouth every twelve (12) hours for 30 days. 5/1/19 5/31/19  Robert Edmondson MD  
gabapentin (NEURONTIN) 600 mg tablet Take 1,200 mg by mouth two (2) times a day. Provider, Historical  
rifAXIMin (XIFAXAN) 550 mg tablet Take 550 mg by mouth two (2) times a day. Provider, Historical  
nicotine (NICODERM CQ) 21 mg/24 hr 1 Patch by TransDERmal route every twenty-four (24) hours. Provider, Historical  
albuterol-ipratropium (DUO-NEB) 2.5 mg-0.5 mg/3 ml nebu 3 mL by Nebulization route every four (4) hours as needed for Other (Wheezing or Shortness of breath). 4/9/19   Harleen Aldana MD  
fluticasone furoate-vilanterol (BREO ELLIPTA) 200-25 mcg/dose inhaler Take 1 Puff by inhalation daily. Provider, Historical  
furosemide (LASIX) 40 mg tablet Take 2 Tabs by mouth daily. 3/19/19   HUMZA Moore  
spironolactone (ALDACTONE) 100 mg tablet Take 2 Tabs by mouth daily. 3/19/19   HUMZA Moore  
losartan (COZAAR) 25 mg tablet take 1 tablet by mouth once daily , REPLACES LISINOPRIL FOR BLOOD PRESSURE AND KIDNEY PROTECTION 12/3/18   Gib Kehr, MD  
aspirin 81 mg chewable tablet Take 1 Tab by mouth daily. 11/16/18   Sonja Vargas MD  
atorvastatin (LIPITOR) 40 mg tablet Take 1 Tab by mouth nightly. 11/15/18   Sonja Vargas MD  
omeprazole (PRILOSEC) 20 mg capsule take 1 capsule by mouth once daily 11/8/18   Cathi Barksdale NP  
amitriptyline (ELAVIL) 150 mg tablet Take 1 Tab by mouth nightly. 10/16/18   Gib Kehr, MD  
ferrous sulfate 325 mg (65 mg iron) tablet take 1 tablet by mouth once daily with BREAKFAST 10/2/18   Cathi Barksdale NP  
ondansetron (ZOFRAN ODT) 4 mg disintegrating tablet dissolve 1 tablet ON TONGUE every 8 hours if needed for nausea 8/1/18   Cathi Barksdale G, NP  
rOPINIRole (REQUIP) 4 mg tab TAB Take 4 mg by mouth nightly.     Provider, Historical  
 albuterol (PROAIR HFA) 90 mcg/actuation inhaler Take 2 Puffs by inhalation every four (4) hours as needed for Wheezing. Other, MD Leonarda  
 
 
REVIEW OF SYSTEMS:    
I am not able to complete the review of systems because: The patient is intubated and sedated The patient has altered mental status due to his acute medical problems The patient has baseline aphasia from prior stroke(s) The patient has baseline dementia and is not reliable historian  
x The patient is in acute medical distress and unable to provide information Confussion Total of 12 systems reviewed as follows:   
   POSITIVE= underlined text  Negative = text not underlined General:  fever, chills, sweats, generalized weakness, weight loss/gain,  
   loss of appetite Eyes:    blurred vision, eye pain, loss of vision, double vision ENT:    rhinorrhea, pharyngitis Respiratory:   cough, sputum production, SOB, MARTINEZ, wheezing, pleuritic pain  
Cardiology:   chest pain, palpitations, orthopnea, PND, edema, syncope Gastrointestinal:  abdominal pain , N/V, diarrhea, dysphagia, constipation, bleeding Genitourinary:  frequency, urgency, dysuria, hematuria, incontinence Muskuloskeletal :  arthralgia, myalgia, back pain Hematology:  easy bruising, nose or gum bleeding, lymphadenopathy Dermatological: rash, ulceration, pruritis, color change / jaundice Endocrine:   hot flashes or polydipsia Neurological:  headache, dizziness, confusion, focal weakness, paresthesia, Speech difficulties, memory loss, gait difficulty Psychological: Feelings of anxiety, depression, agitation Objective: VITALS:   
Visit Vitals /55 Temp 98.4 °F (36.9 °C) Resp 18 SpO2 92% PHYSICAL EXAM: 
 
 
_______________________________________________________________________ Care Plan discussed with: 
  Comments Patient x Family RN Care Manager Consultant:     
_______________________________________________________________________ Expected  Disposition:  
Home with Family x HH/PT/OT/RN   
SNF/LTC   
CARMINE   
________________________________________________________________________ TOTAL TIME:  35 Minutes Critical Care Provided     Minutes non procedure based Comments Reviewed previous records  
>50% of visit spent in counseling and coordination of care  Discussion with patient and/or family and questions answered 
  
 
________________________________________________________________________ Signed: Sigmund Leitz, MD 
 
Procedures: see electronic medical records for all procedures/Xrays and details which were not copied into this note but were reviewed prior to creation of Plan. LAB DATA REVIEWED:   
Recent Results (from the past 24 hour(s)) EKG, 12 LEAD, INITIAL Collection Time: 05/14/19 12:06 PM  
Result Value Ref Range Ventricular Rate 90 BPM  
 Atrial Rate 90 BPM  
 P-R Interval 182 ms QRS Duration 78 ms Q-T Interval 364 ms QTC Calculation (Bezet) 445 ms Calculated P Axis 69 degrees Calculated R Axis 53 degrees Calculated T Axis 72 degrees Diagnosis Normal sinus rhythm Possible Left atrial enlargement Low voltage QRS When compared with ECG of 24-APR-2019 14:20, No significant change was found CBC WITH AUTOMATED DIFF Collection Time: 05/14/19 12:09 PM  
Result Value Ref Range WBC 9.8 3.6 - 11.0 K/uL  
 RBC 3.82 3.80 - 5.20 M/uL  
 HGB 11.3 (L) 11.5 - 16.0 g/dL HCT 35.0 35.0 - 47.0 % MCV 91.6 80.0 - 99.0 FL  
 MCH 29.6 26.0 - 34.0 PG  
 MCHC 32.3 30.0 - 36.5 g/dL RDW 20.1 (H) 11.5 - 14.5 % PLATELET 75 (L) 808 - 400 K/uL MPV 12.0 8.9 - 12.9 FL  
 NRBC 0.0 0  WBC ABSOLUTE NRBC 0.00 0.00 - 0.01 K/uL NEUTROPHILS 75 32 - 75 % LYMPHOCYTES 15 12 - 49 % MONOCYTES 8 5 - 13 % EOSINOPHILS 1 0 - 7 % BASOPHILS 1 0 - 1 % IMMATURE GRANULOCYTES 0 0.0 - 0.5 % ABS. NEUTROPHILS 7.3 1.8 - 8.0 K/UL  
 ABS. LYMPHOCYTES 1.5 0.8 - 3.5 K/UL  
 ABS. MONOCYTES 0.8 0.0 - 1.0 K/UL  
 ABS. EOSINOPHILS 0.1 0.0 - 0.4 K/UL  
 ABS. BASOPHILS 0.1 0.0 - 0.1 K/UL  
 ABS. IMM. GRANS. 0.0 0.00 - 0.04 K/UL  
 DF AUTOMATED    
 RBC COMMENTS ANISOCYTOSIS 
2+ METABOLIC PANEL, COMPREHENSIVE Collection Time: 05/14/19 12:09 PM  
Result Value Ref Range Sodium 136 136 - 145 mmol/L Potassium 4.1 3.5 - 5.1 mmol/L Chloride 100 97 - 108 mmol/L  
 CO2 30 21 - 32 mmol/L Anion gap 6 5 - 15 mmol/L Glucose 165 (H) 65 - 100 mg/dL BUN 17 6 - 20 MG/DL Creatinine 0.62 0.55 - 1.02 MG/DL  
 BUN/Creatinine ratio 27 (H) 12 - 20 GFR est AA >60 >60 ml/min/1.73m2 GFR est non-AA >60 >60 ml/min/1.73m2 Calcium 8.5 8.5 - 10.1 MG/DL Bilirubin, total 1.4 (H) 0.2 - 1.0 MG/DL  
 ALT (SGPT) 41 12 - 78 U/L  
 AST (SGOT) 41 (H) 15 - 37 U/L Alk. phosphatase 205 (H) 45 - 117 U/L Protein, total 7.9 6.4 - 8.2 g/dL Albumin 2.6 (L) 3.5 - 5.0 g/dL Globulin 5.3 (H) 2.0 - 4.0 g/dL A-G Ratio 0.5 (L) 1.1 - 2.2 MAGNESIUM Collection Time: 05/14/19 12:09 PM  
Result Value Ref Range Magnesium 1.9 1.6 - 2.4 mg/dL PROTHROMBIN TIME + INR Collection Time: 05/14/19 12:09 PM  
Result Value Ref Range INR 1.3 (H) 0.9 - 1.1 Prothrombin time 13.2 (H) 9.0 - 11.1 sec TROPONIN I Collection Time: 05/14/19 12:09 PM  
Result Value Ref Range Troponin-I, Qt. <0.05 <0.05 ng/mL LACTIC ACID Collection Time: 05/14/19 12:09 PM  
Result Value Ref Range Lactic acid 2.3 (HH) 0.4 - 2.0 MMOL/L  
NT-PRO BNP Collection Time: 05/14/19 12:09 PM  
Result Value Ref Range NT pro-BNP 35 <125 PG/ML  
POC G3 - PUL Collection Time: 05/14/19  1:09 PM  
Result Value Ref Range pH (POC) 7.429 7.35 - 7.45    
 pCO2 (POC) 40.9 35.0 - 45.0 MMHG  
 pO2 (POC) 86 80 - 100 MMHG  
 HCO3 (POC) 27.1 (H) 22 - 26 MMOL/L  
 sO2 (POC) 97 92 - 97 % Base excess (POC) 3 mmol/L Site RIGHT BRACHIAL Device: NASAL CANNULA Flow rate (POC) 3.5 L/M Allens test (POC) N/A Specimen type (POC) ARTERIAL Total resp. rate 16 URINALYSIS W/ REFLEX CULTURE Collection Time: 05/14/19  1:20 PM  
Result Value Ref Range Color YELLOW/STRAW Appearance CLOUDY (A) CLEAR Specific gravity 1.012 1.003 - 1.030    
 pH (UA) 5.5 5.0 - 8.0 Protein NEGATIVE  NEG mg/dL Glucose NEGATIVE  NEG mg/dL Ketone NEGATIVE  NEG mg/dL Bilirubin NEGATIVE  NEG Blood LARGE (A) NEG Urobilinogen 1.0 0.2 - 1.0 EU/dL Nitrites NEGATIVE  NEG Leukocyte Esterase SMALL (A) NEG    
 WBC 10-20 0 - 4 /hpf  
 RBC 10-20 0 - 5 /hpf Epithelial cells FEW FEW /lpf Bacteria 4+ (A) NEG /hpf  
 UA:UC IF INDICATED URINE CULTURE ORDERED (A) CNI Yeast PRESENT (A) NEG    
AMMONIA Collection Time: 05/14/19  1:20 PM  
Result Value Ref Range  Ammonia 50 (H) <32 UMOL/L

## 2019-05-14 NOTE — ED PROVIDER NOTES
EMERGENCY DEPARTMENT HISTORY AND PHYSICAL EXAM 
 
 
Date: 5/14/2019 Patient Name: Kerri Darnell Please note that this dictation was completed with [employer], the computer voice recognition software. Quite often unanticipated grammatical, syntax, homophones, and other interpretive errors are inadvertently transcribed by the computer software. Please disregard these errors. Please excuse any errors that have escaped final proofreading. History of Presenting Illness Chief Complaint Patient presents with  Lethargy History Provided By: Patient and Patient's  HPI: Kerri Darnell, 61 y.o. female, with past medical history significant for cirrhosis, diabetes on insulin presenting the emergency department brought in by  with concern for recurrent urinary tract infection and elevated ammonia level. Patient is unable to provide much history. He reports that patient had a recent hospitalization, will for UTI, has grown ESBL in the past.  He states that since she has been home from rehab she has had a progressive decline with now inability to ambulate and increasing confusion. He reports she is compliant with her lactulose and having bowel movements. Denies any fever, but admits to shaking. He states that her breathing seems to have worsened as well, she was on oxygen as needed, now requiring it all the time. She has complaining of some hematuria at home.  denies any falls or loss conscious numbness. PCP: Shanna Kwong NP No current facility-administered medications on file prior to encounter. Current Outpatient Medications on File Prior to Encounter Medication Sig Dispense Refill  lactulose (CHRONULAC) 10 gram/15 mL solution Take 45 mL by mouth three (3) times daily for 30 days. 4050 mL 0  
 insulin glargine (LANTUS) 100 unit/mL injection 15 Units by SubCUTAneous route daily for 30 days.  1 Vial 1  
  insulin lispro (HUMALOG) 100 unit/mL injection Sliding scale as above. 1 Vial 1  
 metoprolol tartrate (LOPRESSOR) 25 mg tablet Take 0.5 Tabs by mouth every twelve (12) hours for 30 days. 30 Tab 0  
 gabapentin (NEURONTIN) 600 mg tablet Take 1,200 mg by mouth two (2) times a day.  rifAXIMin (XIFAXAN) 550 mg tablet Take 550 mg by mouth two (2) times a day.  nicotine (NICODERM CQ) 21 mg/24 hr 1 Patch by TransDERmal route every twenty-four (24) hours.  albuterol-ipratropium (DUO-NEB) 2.5 mg-0.5 mg/3 ml nebu 3 mL by Nebulization route every four (4) hours as needed for Other (Wheezing or Shortness of breath). 30 Nebule 0  
 fluticasone furoate-vilanterol (BREO ELLIPTA) 200-25 mcg/dose inhaler Take 1 Puff by inhalation daily.  furosemide (LASIX) 40 mg tablet Take 2 Tabs by mouth daily. 60 Tab 3  
 spironolactone (ALDACTONE) 100 mg tablet Take 2 Tabs by mouth daily. 60 Tab 3  
 losartan (COZAAR) 25 mg tablet take 1 tablet by mouth once daily , REPLACES LISINOPRIL FOR BLOOD PRESSURE AND KIDNEY PROTECTION 30 Tab 11  
 aspirin 81 mg chewable tablet Take 1 Tab by mouth daily. 30 Tab 6  
 atorvastatin (LIPITOR) 40 mg tablet Take 1 Tab by mouth nightly. 30 Tab 6  
 omeprazole (PRILOSEC) 20 mg capsule take 1 capsule by mouth once daily 30 Cap 5  
 amitriptyline (ELAVIL) 150 mg tablet Take 1 Tab by mouth nightly.  ferrous sulfate 325 mg (65 mg iron) tablet take 1 tablet by mouth once daily with BREAKFAST 90 Tab 1  
 ondansetron (ZOFRAN ODT) 4 mg disintegrating tablet dissolve 1 tablet ON TONGUE every 8 hours if needed for nausea 45 Tab 3  
 rOPINIRole (REQUIP) 4 mg tab TAB Take 4 mg by mouth nightly.  albuterol (PROAIR HFA) 90 mcg/actuation inhaler Take 2 Puffs by inhalation every four (4) hours as needed for Wheezing. Past History Past Medical History: 
Past Medical History:  
Diagnosis Date  Asthma  Back pain  COPD (chronic obstructive pulmonary disease) ([facility])  Diabetes ([facility] [address]  Esophageal varices in cirrhosis ([facility] [address]   
 6/2014 banding x 2  
 Fibromyalgia  Gastrointestinal disorder  GERD (gastroesophageal reflux disease)  Hypercholesteremia  Liver cirrhosis secondary to BALDERAS ([facility] [address]  Liver disease  Other and unspecified hyperlipidemia  Restless leg syndrome  Type II or unspecified type diabetes mellitus without mention of complication, uncontrolled  Unspecified essential hypertension Past Surgical History: 
Past Surgical History:  
Procedure Laterality Date  HX BACK SURGERY    
 HX CARPAL TUNNEL RELEASE    
 on right  HX HYSTERECTOMY plus 1/2 of an ovary removed  MD COLSC FLX W/RMVL OF TUMOR POLYP LESION SNARE TQ  5/30/2013  UPPER GI ENDOSCOPY,LIGAT VARIX  2/6/2015 Family History: 
Family History Problem Relation Age of Onset  Diabetes Mother  Stroke Sister  Diabetes Paternal Aunt  Diabetes Paternal Uncle  Heart Disease Neg Hx Social History: 
Social History Tobacco Use  Smoking status: Current Every Day Smoker Packs/day: 1.00 Years: 40.00 Pack years: 40.00  Smokeless tobacco: Former User Substance Use Topics  Alcohol use: No  
  Comment: rare  Drug use: No  
 
 
Allergies: Allergies Allergen Reactions  Hydrocodone Nausea and Vomiting  Lisinopril Cough  Lortab [Hydrocodone-Acetaminophen] Nausea and Vomiting  Percocet [Oxycodone-Acetaminophen] Nausea and Vomiting Review of Systems Review of Systems Unable to perform ROS: Mental status change Physical Exam  
Physical Exam  
Constitutional: Tired appearing female falling asleep during my evaluation oriented to self and place, but not time. HENT:  
Head: Normocephalic and atraumatic. Moist mucous membranes Eyes: Pupils are equal, round, and reactive to light. Conjunctivae are normal. Right eye exhibits no discharge. Left eye exhibits no discharge. Neck: Normal range of motion. Neck supple. No tracheal deviation present. Cardiovascular: Normal rate, regular rhythm and normal heart sounds. No murmur heard. Pulmonary/Chest: Effort normal and breath sounds normal. No respiratory distress. no wheezes. no rales. Abdominal: Soft. Bowel sounds are normal. There is no tenderness. There is no rebound and no guarding. Musculoskeletal: Normal range of motion. exhibits no edema, tenderness or deformity. Neurological: Oriented as above. No facial droop, speech though is slurred, patient has asterixis. Equal strength in the upper and lower extremities. No my clonus present. Skin: Skin is warm and dry. No rash noted. No erythema. Psychiatric: Patient is confused. Nursing note and vitals reviewed. Diagnostic Study Results Labs - No results found for this or any previous visit (from the past 12 hour(s)). Radiologic Studies - No orders to display CT Results  (Last 48 hours) None CXR Results  (Last 48 hours) None Medical Decision Making I am the first provider for this patient. I reviewed the vital signs, available nursing notes, past medical history, past surgical history, family history and social history. Vital Signs-Reviewed the patient's vital signs. Patient Vitals for the past 12 hrs: 
 Temp Resp BP SpO2  
05/14/19 1141 98.4 °F (36.9 °C) 18    
05/14/19 1138   113/55 (!) 87 % Pulse Oximetry Analysis -97% on 2 L nasal cannula Cardiac Monitor:  
Normal sinus rhythm EKG interpretation: (Preliminary) EKG shows sinus rhythm, rate 90. Normal axis. Normal intervals. No acute ischemic ST-T wave changes. Time of ECG 1206 Records Reviewed: Nursing Notes and Old Medical Records Provider Notes (Medical Decision Making):  
Differential diagnosis is broad includes hepatic encephalopathy, UTI, pneumonia, hypercapnic respiratory failure, COPD exacerbation, hyper/hypoglycemia  To name a few. Most likely this appears to be hypercapnic respiratory failure versus hepatic encephalopathy. Suspect hepatic encephalopathy, likely exacerbated by an underlying infection such as UTI. Patient's abdomen is nontender, doubt SBP at this time based off the history and physical exam, but may remain on differential.  Likely disposition of the hospitalist for worsening of mental status. ED Course:  
Initial assessment performed. The patients presenting problems have been discussed, and they are in agreement with the care plan formulated and outlined with them. I have encouraged them to ask questions as they arise throughout their visit. Critical Care Time: CRITICAL CARE NOTE : 
 
 
 
IMPENDING DETERIORATION -Metabolic and Hepatic ASSOCIATED RISK FACTORS - Metabolic changes and CNS Decompensation MANAGEMENT- Bedside Assessment and Supervision of Care INTERPRETATION -  Xrays, ECG and Blood Pressure INTERVENTIONS - Neurologic interventions  and Metobolic interventions CASE REVIEW - Hospitalist, Nursing and Family TREATMENT RESPONSE -Improved PERFORMED BY - Self NOTES   : 
 
 
I have spent 45 minutes of critical care time involved in lab review, consultations with specialist, family decision- making, bedside attention and documentation. During this entire length of time I was immediately available to the patient . Chris Caicedo DO Disposition: 
Discussed with Dr. Marlon Batista, Hospitalist. Will see and admit patient. PLAN: 
1. Current Discharge Medication List  
  
 
2. Follow-up Information None Return to ED if worse Diagnosis Clinical Impression: 1. Hepatic encephalopathy ([address] 2. Urinary tract infection without hematuria, site unspecified Attestations:  
This note was completed by Chris Caicedo DO

## 2019-05-15 NOTE — PROGRESS NOTES
Reason for Readmission:   Patient came to ed per  because of recurrent uti and elevated ammonia level with confusion. RRAT Score and Risk Level:     39 Level of Readmission:    2 Care Conference scheduled:   Spoke with md and family members. Resources/supports as identified by patient/family:  Patient has supportive family. Top Challenges facing patient (as identified by patient/family and CM): Finances/Medication cost?    denies problems with finances and obtaining patient's medications. Transportation      and family transports patient to her follow up. Support system or lack thereof? Patient has supportive family. Living arrangements? Patient lives in one story home with her . Self-care/ADLs/Cognition? Patient was independent but had started needing assistance with adl's from family. Current Advanced Directive/Advance Care Plan: On file. Plan for utilizing home health:   Patient has used home health in the past ([facility]). She has been to [facility] in the past however family does not want her to go back there. She was at [facility] recently and has been to [facility] in the past.   
          
Likelihood of additional readmission:   Due to comorbidities--high. Transition of Care Plan:    Based on readmission, the patient's previous Plan of Care 
 has been evaluated and/or modified. The current Transition of Care Plan is:    Patient will start working with therapy to assess her mobility/strengths. Family will consider snf if needed. Care Management Interventions PCP Verified by CM: Yes Transition of Care Consult (CM Consult): Discharge Planning Discharge Durable Medical Equipment: (She has oxygen her family states at home. ) Physical Therapy Consult: Yes Occupational Therapy Consult: Yes Current Support Network: Lives with Spouse Confirm Follow Up Transport: Family Plan discussed with Pt/Family/Caregiver: Yes Discharge Location Discharge Placement: Unable to determine at this time

## 2019-05-15 NOTE — WOUND CARE
Wound care consult: Chart reviewed and patient assessed. She is currently on Contact precautions due to an ESBL. Consult is for a DTI pressure injury that was Present on admission. Patient is alert but very confused and thinks that she has [place] at the airport. I reoriented her but she did not believe me. She agreed to let me check the skin on her back and buttocks. There is some chronic skin changes most likely due to incontinence / moisture. There is a very small DTI on the right buttocks \"cheek\" that is purplish-red. Pt. Denies pain on this site. Plan / recommendation:  Keep the skin clean and apply ES Desitin cream on the skin to heal and to protect. It is essential that the patient remain off of her buttocks as much as possible. Discussed with nursing today.   
Chloe Tian, RN, BSN, [facility]

## 2019-05-15 NOTE — PROGRESS NOTES
flux - neutrinity Telesitter Monitor initiated on 5/14/2019 at 2108 for the following reason(s):safety, fall prevention, preservation of lines/ tubing Patient educated on use of camera and is in agreement. If patient unable to verbalize understanding of camera necessity, the responsible party notified and educated:  Pt's spouse (Mr. Catalina Rivera ) called notified.

## 2019-05-15 NOTE — ROUTINE PROCESS
Bedside and Verbal shift change report [facility] [address] (oncoming nurse) by Shira/Carlo RN (offgoing nurse). Report included the following information SBAR, Kardex, Intake/Output and MAR. Zone Phone:   [phone] Significant changes during shift:  Patient has not been voiding during the night. When we noticed there was no output from the external catheter, Two nurses assisted her to bedside commode and she urinated a little but not much. She was bladder scanned and straight cathed for 441cc then again at 0 for 600cc. No trauma noted. Patient Information Carloz Macias 
61 y.o. 
5/14/2019 11:30 AM by Jerson Minor MD. Carloz Macias was admitted from Home 
 
Problem List 
 
Patient Active Problem List  
 Diagnosis Date Noted  Goals of care, counseling/discussion  Advanced care planning/counseling discussion  Debility  Neuropathic pain  Chronic bilateral low back pain with bilateral sciatica  Encephalopathy 04/24/2019  Acute respiratory failure with hypercapnia ([address] 04/02/2019  Counseling regarding advance care planning and goals of care 04/02/2019  Hepatic encephalopathy ([address] 03/28/2019  Colitis 03/28/2019  UTI (urinary tract infection) 03/28/2019  Septic shock ([address] 03/28/2019  Bilateral carotid artery stenosis 11/14/2018  TIA (transient ischemic attack) 11/13/2018  GERD (gastroesophageal reflux disease) 11/13/2018  Therapeutic drug monitoring 10/16/2018  Severe obesity ([address] 10/11/2018  Obesity (BMI 30.0-34.9) 07/10/2018  Sepsis ([address] 03/30/2017  CAP (community acquired pneumonia) 03/30/2017  IBIS (obstructive sleep apnea) 03/30/2017  Tobacco abuse 03/30/2017  Neuropathy 03/30/2017  COPD (chronic obstructive pulmonary disease) ([address] 03/28/2017  Acute deep vein thrombosis (DVT) of right lower extremity ([address] 11/10/2016  
 BALDERAS (nonalcoholic steatohepatitis) 03/10/2016  GI bleed 03/03/2016  Anemia 03/01/2016  Thrombocytopenia ([facility] 08/15/2013  Previous back surgery 08/15/2013  S/P CHACHO (total abdominal hysterectomy) 08/15/2013  Cirrhosis ([facility] 08/15/2013  Diabetes mellitus with neurological manifestations, uncontrolled [facility]  Essential hypertension, benign  Hyperlipidemia LDL goal <100 Past Medical History:  
Diagnosis Date  Asthma  Back pain  COPD (chronic obstructive pulmonary disease) (HCC)  Diabetes ([facility]  Esophageal varices in cirrhosis ([facility]   
 6/2014 banding x 2  
 Fibromyalgia  Gastrointestinal disorder  GERD (gastroesophageal reflux disease)  Hypercholesteremia  Liver cirrhosis secondary to BALDERAS ([facility]  Liver disease  Other and unspecified hyperlipidemia  Restless leg syndrome  Type II or unspecified type diabetes mellitus without mention of complication, uncontrolled  Unspecified essential hypertension Activity Status: OOB to Chair No 
Ambulated this shift No  
Bed Rest Yes Supplemental O2: (If Applicable) NC Yes On 2 Liters/min DVT prophylaxis: DVT prophylaxis Med- Yes DVT prophylaxis SCD or CRISTOPHER- No  
 
Wounds: (If Applicable) Wounds- DTI noted on buttocks. Foam dressing applied and wound consult was placed. Patient Safety: 
 
Falls Score Total Score: 3 Safety Level_______ Bed Alarm On? Yes Sitter? No 
 
Plan for upcoming shift: safety Discharge Plan: No  
 
Active Consults: 
None

## 2019-05-15 NOTE — PROGRESS NOTES
Problem: Risk for Spread of Infection Goal: Prevent transmission of infectious organism to others Description Prevent the transmission of infectious organisms to other patients, staff members, and visitors. Outcome: Progressing Towards Goal 
  
Problem: Patient Education:  Go to Education Activity Goal: Patient/Family Education Outcome: Progressing Towards Goal 
  
Problem: Falls - Risk of 
Goal: *Absence of Falls Description Document Lokesh Vaughn Fall Risk and appropriate interventions in the flowsheet. Outcome: Progressing Towards Goal 
  
Problem: Patient Education: Go to Patient Education Activity Goal: Patient/Family Education Outcome: Progressing Towards Goal 
  
Problem: Pressure Injury - Risk of 
Goal: *Prevention of pressure injury Description Document Nithin Scale and appropriate interventions in the flowsheet. Outcome: Progressing Towards Goal 
  
Problem: Patient Education: Go to Patient Education Activity Goal: Patient/Family Education Outcome: Progressing Towards Goal

## 2019-05-15 NOTE — H&P
Hospitalist Progress Note NAME: Dameon Huang :  1955 MRN:  [id number] Assessment / Plan: 
Acute Encephalopathy POA In settings of UTI Mental status is been waxing and for past year, suspect underlying dementia worsening with time Check CT A/P for recurrent UTI Neurology consult Check CT head 
doubt hepatic encephalopathy as ammonia at her baseline H/o Hepatic Encephalopathy Liver cirrhosis Continue lactulose and rifaximin Continue diuretics Lactic Acidosis POA No septic Not a good marker in liver disease as can be always be elevated 
  
DM 2 Continue lantus and SSI  
  
Code Status: Full Surrogate Decision Maker:  
  
DVT Prophylaxis: yes GI Prophylaxis: not indicated 
  
Baseline: just discharged from SNF to home   
             
  
Subjective: Pt seen and examined at bedside. Remain disoriented. Overnight events d/w RN  
 
CHIEF COMPLAINT: f/u \"altered mental status\" 
  
Review of Systems: 
Symptom Y/N Comments  Symptom Y/N Comments Fever/Chills    Chest Pain Poor Appetite    Edema Cough    Abdominal Pain Sputum    Joint Pain SOB/MARTINEZ    Pruritis/Rash Nausea/vomit    Tolerating PT/OT Diarrhea    Tolerating Diet Constipation    Other Could NOT obtain due to: Altered mental status Objective: VITALS:  
Last 24hrs VS reviewed since prior progress note. Most recent are: 
Patient Vitals for the past 24 hrs: 
 Temp Pulse Resp BP SpO2  
05/15/19 1048 97.7 °F (36.5 °C) 80 16 127/64 94 % 05/15/19 0755 97.8 °F (36.6 °C) 86 16 113/61 98 % 05/15/19 0310 98.1 °F (36.7 °C) 68 20 107/61 98 % 05/15/19 0003 98 °F (36.7 °C) 95 20 98/60 100 % 19 1821 97.7 °F (36.5 °C) 94 21 113/58 100 % 19 1700 97.8 °F (36.6 °C) 93 21 100/75 95 % 19 1640     92 % 19 1532  90 14  99 % 19 1530  90 15 101/47   
19 1525  89 15 108/46   
19 1500  90 16 95/52 97 % 05/14/19 1446  88 16 (!) 151/120 97 % Intake/Output Summary (Last 24 hours) at 5/15/2019 1244 Last data filed at 5/15/2019 2539 Gross per 24 hour Intake 2250 ml Output 1250 ml Net 1000 ml PHYSICAL EXAM: 
General: WD, WN. Alert, cooperative, no acute distress   
EENT:  EOMI. Anicteric sclerae. MMM Resp:  CTA bilaterally, no wheezing or rales. No accessory muscle use CV:  Regular  rhythm,  No edema GI:  Soft, Non distended, Non tender. +Bowel sounds Neurologic:  Alert, normal speech, Psych:   Poor insight. Not anxious nor agitated Skin:  No rashes. No jaundice Reviewed most current lab test results and cultures  YES Reviewed most current radiology test results   YES Review and summation of old records today    NO Reviewed patient's current orders and MAR    YES 
PMH/SH reviewed - no change compared to H&P 
________________________________________________________________________ Care Plan discussed with: 
  Comments Patient y Family  y  at bedside RN y   
Care Manager Consultant Multidiciplinary team rounds were held today with , nursing, pharmacist and clinical coordinator. Patient's plan of care was discussed; medications were reviewed and discharge planning was addressed. ________________________________________________________________________ Total NON critical care TIME:  35 Minutes Total CRITICAL CARE TIME Spent:   Minutes non procedure based Comments >50% of visit spent in counseling and coordination of care    
________________________________________________________________________ Luis Velasquez MD  
 
Procedures: see electronic medical records for all procedures/Xrays and details which were not copied into this note but were reviewed prior to creation of Plan. LABS: 
I reviewed today's most current labs and imaging studies. Pertinent labs include: 
Recent Labs 05/15/19 
0524 05/14/19 
0124 WBC 6.7 9.8 HGB 9.7* 11.3* HCT 30.5* 35.0  
PLT 60* 75* Recent Labs 05/15/19 
0524 05/14/19 
1209  136  
K 3.7 4.1  100 CO2 28 30 * 165* BUN 13 17 CREA 0.52* 0.62  
CA 7.7* 8.5 MG  --  1.9 ALB  --  2.6* TBILI  --  1.4* SGOT  --  41* ALT  --  41 INR  --  1.3* Signed: Arlet Olvera MD

## 2019-05-15 NOTE — PROGRESS NOTES
Spoke with  regarding wife's care. Educated him on the norman catheter  and risk of infection. (cauti) regarding the plan of care to potentially place a norman if patient needs to be straight cathed more than twice. The hospitalist stated patient already has a UTI. I stated that she could still develop a another bacteria in her urine. MD stated what will we do when she is ready to go to rehab , regarding the straight caths. This writer stated that if necessary a catheter can be placed on discharge with follow up? Pt is also confused at times and would also be at risk to pull it out. Pt's  asked how we are emptying bladder currently. I explained that we assisted her up to [facility] and she was able to void some and that we are bladder Scanning her Q4 and straight cathing her if she is retaining more than 300ML. I called MD Herzog and explained the husbands wishes. We will continue straight cathing as needed per  wishes at this time.

## 2019-05-15 NOTE — CDMP QUERY
Pt admitted with uti, Encephalopathy documented. Please further specify type of Encephalopathy in the medical record ? Hepatic Encephalopathy (please specify if with or without coma) ? Metabolic Encephalopathy ? Septic Encephalopathy ? Toxic Encephalopathy 
? Encephalopathy due to medications or drugs (please specify) ? Toxic Metabolic Encephalopathy ? Wernickes Encephalopathy 
? Other Encephalopathy 
? Other, please specify ? Clinically unable to determine The medical record reflects the following: 
   Risk Factors: cirrhosis, uti Clinical Indicators: H&P-encephalopathy: Likely related to Liver,   
   Treatment:IV fluids follow ammonia levels, am labs Lactulose Thanks, Smith Garcia Rn/CDMP

## 2019-05-15 NOTE — PROGRESS NOTES
Pt incontinent of small amount of urine, assisted up to [facility] and voided 100 ml of urine earlier. Bladder scanned now and has 350 in bladder. Called MD Herzog and obtained order for bladder checking Q4 and straight caths if greater than 300ml.

## 2019-05-15 NOTE — CONSULTS
NEUROLOGY NOTE Chief Complaint Patient presents with  Lethargy Reason for Consult I have been asked by Gume Marcial MD to see the patient in neurological consultation to render advice and opinion regarding alt mental status HPI Carla Porter is a 61 y.o. female who presents to the hospital because of Alt mental status. According to ER notes \"ED note \"  female, with past medical history significant for cirrhosis, diabetes on insulin presenting the emergency department brought in by  with concern for recurrent urinary tract infection and elevated ammonia level.  Patient is unable to provide much history. [facility] reports that patient had a recent hospitalization, will for UTI, has grown ESBL in the past. [facility] states that since she has been home from rehab she has had a progressive decline with now inability to ambulate and increasing confusion.  He reports she is compliant with her lactulose and having bowel movements\" Ammonia elevated and UTI +ve ROS A ten system review of constitutional, cardiovascular, respiratory, musculoskeletal, endocrine, skin, SHEENT, genitourinary, psychiatric and neurologic systems was obtained and is unremarkable except as stated in HPI  
 
PMH Past Medical History:  
Diagnosis Date  Asthma  Back pain  COPD (chronic obstructive pulmonary disease) (HCC)  Diabetes ([address]  Esophageal varices in cirrhosis ([address]   
 6/2014 banding x 2  
 Fibromyalgia  Gastrointestinal disorder  GERD (gastroesophageal reflux disease)  Hypercholesteremia  Liver cirrhosis secondary to BALDERAS ([address]  Liver disease  Other and unspecified hyperlipidemia  Restless leg syndrome  Type II or unspecified type diabetes mellitus without mention of complication, uncontrolled  Unspecified essential hypertension [facility] Family History Problem Relation Age of Onset  Diabetes Mother  Stroke Sister  Diabetes Paternal Aunt  Diabetes Paternal Uncle  Heart Disease Neg Hx 31 [address] Social History Socioeconomic History  Marital status:  Spouse name: Not on file  Number of children: Not on file  Years of education: Not on file  Highest education level: Not on file Tobacco Use  Smoking status: Current Every Day Smoker Packs/day: 1.00 Years: 40.00 Pack years: 40.00  Smokeless tobacco: Former User Substance and Sexual Activity  Alcohol use: No  
  Comment: rare  Drug use: No  
Social History Narrative Lives in [facility] with . Has 2 daughters and 1 granddaughter who she cares for. On disability for her back. Used to work as a  and . Used to bowl and fish. ALLERGIES Allergies Allergen Reactions  Hydrocodone Nausea and Vomiting  Lisinopril Cough  Lortab [Hydrocodone-Acetaminophen] Nausea and Vomiting  Percocet [Oxycodone-Acetaminophen] Nausea and Vomiting PHYSICAL EXAMINATION:  
Patient Vitals for the past 24 hrs: 
 Temp Pulse Resp BP SpO2  
05/15/19 1048 97.7 °F (36.5 °C) 80 16 127/64 94 % 05/15/19 0755 97.8 °F (36.6 °C) 86 16 113/61 98 % 05/15/19 0310 98.1 °F (36.7 °C) 68 20 107/61 98 % 05/15/19 0003 98 °F (36.7 °C) 95 20 98/60 100 % 05/14/19 1821 97.7 °F (36.5 °C) 94 21 113/58 100 % 05/14/19 1700 97.8 °F (36.6 °C) 93 21 100/75 95 % 05/14/19 1640     92 % 05/14/19 1532  90 14  99 % 05/14/19 1530  90 15 101/47   
05/14/19 1525  89 15 108/46   
05/14/19 1500  90 16 95/52 97 % 05/14/19 1446  88 16 (!) 151/120 97 % General:  
General appearance: Pt is in no acute distress Distal pulses are preserved Fundoscopic exam: attempted Neurological Examination:  
Mental Status:  AAO x3. Speech is fluent. Follows commands, has fair fund of knowledge, attention, short term recall, comprehension and insight. Cranial Nerves: Visual fields are full.  PERRL, Extraocular movements are full. Facial sensation intact. Facial movement intact. Hearing intact to conversation. Palate elevates symmetrically. Shoulder shrug symmetric. Tongue midline. Motor: Strength is 5/5 in all 4 ext. Normal tone. No atrophy. Sensation: Normal to light touch Reflexes: DTRs 2+ throughout. Plantar responses downgoing. Coordination/Cerebellar: Intact to finger-nose-finger Gait: deferred Skin: No significant bruising or lacerations. LAB DATA REVIEWED:   
Recent Results (from the past 24 hour(s)) POC G3 - PUL Collection Time: 05/14/19  1:09 PM  
Result Value Ref Range pH (POC) 7.429 7.35 - 7.45    
 pCO2 (POC) 40.9 35.0 - 45.0 MMHG  
 pO2 (POC) 86 80 - 100 MMHG  
 HCO3 (POC) 27.1 (H) 22 - 26 MMOL/L  
 sO2 (POC) 97 92 - 97 % Base excess (POC) 3 mmol/L Site RIGHT BRACHIAL Device: NASAL CANNULA Flow rate (POC) 3.5 L/M Allens test (POC) N/A Specimen type (POC) ARTERIAL Total resp. rate 16 URINALYSIS W/ REFLEX CULTURE Collection Time: 05/14/19  1:20 PM  
Result Value Ref Range Color YELLOW/STRAW Appearance CLOUDY (A) CLEAR Specific gravity 1.012 1.003 - 1.030    
 pH (UA) 5.5 5.0 - 8.0 Protein NEGATIVE  NEG mg/dL Glucose NEGATIVE  NEG mg/dL Ketone NEGATIVE  NEG mg/dL Bilirubin NEGATIVE  NEG Blood LARGE (A) NEG Urobilinogen 1.0 0.2 - 1.0 EU/dL Nitrites NEGATIVE  NEG Leukocyte Esterase SMALL (A) NEG    
 WBC 10-20 0 - 4 /hpf  
 RBC 10-20 0 - 5 /hpf Epithelial cells FEW FEW /lpf Bacteria 4+ (A) NEG /hpf  
 UA:UC IF INDICATED URINE CULTURE ORDERED (A) CNI Yeast PRESENT (A) NEG    
AMMONIA Collection Time: 05/14/19  1:20 PM  
Result Value Ref Range Ammonia 50 (H) <32 UMOL/L  
CULTURE, URINE Collection Time: 05/14/19  1:20 PM  
Result Value Ref Range Special Requests: NO SPECIAL REQUESTS Reflexed from [id number] [place] Count >100,000 COLONIES/mL Culture result: GRAM NEGATIVE RODS (A) LACTIC ACID Collection Time: 05/14/19  5:29 PM  
Result Value Ref Range Lactic acid 2.0 0.4 - 2.0 MMOL/L  
GLUCOSE, POC Collection Time: 05/14/19  9:16 PM  
Result Value Ref Range Glucose (POC) 105 (H) 65 - 100 mg/dL Performed by Bhavin Fleming (PCT) LACTIC ACID Collection Time: 05/15/19  5:24 AM  
Result Value Ref Range Lactic acid 1.3 0.4 - 2.0 MMOL/L  
METABOLIC PANEL, BASIC Collection Time: 05/15/19  5:24 AM  
Result Value Ref Range Sodium 138 136 - 145 mmol/L Potassium 3.7 3.5 - 5.1 mmol/L Chloride 104 97 - 108 mmol/L  
 CO2 28 21 - 32 mmol/L Anion gap 6 5 - 15 mmol/L Glucose 117 (H) 65 - 100 mg/dL BUN 13 6 - 20 MG/DL Creatinine 0.52 (L) 0.55 - 1.02 MG/DL  
 BUN/Creatinine ratio 25 (H) 12 - 20 GFR est AA >60 >60 ml/min/1.73m2 GFR est non-AA >60 >60 ml/min/1.73m2 Calcium 7.7 (L) 8.5 - 10.1 MG/DL  
CBC W/O DIFF Collection Time: 05/15/19  5:24 AM  
Result Value Ref Range WBC 6.7 3.6 - 11.0 K/uL  
 RBC 3.26 (L) 3.80 - 5.20 M/uL HGB 9.7 (L) 11.5 - 16.0 g/dL HCT 30.5 (L) 35.0 - 47.0 % MCV 93.6 80.0 - 99.0 FL  
 MCH 29.8 26.0 - 34.0 PG  
 MCHC 31.8 30.0 - 36.5 g/dL  
 RDW 19.9 (H) 11.5 - 14.5 % PLATELET 60 (L) 295 - 400 K/uL MPV 11.6 8.9 - 12.9 FL  
 NRBC 0.0 0  WBC ABSOLUTE NRBC 0.00 0.00 - 0.01 K/uL AMMONIA Collection Time: 05/15/19  5:24 AM  
Result Value Ref Range Ammonia 59 (H) <32 UMOL/L  
GLUCOSE, POC Collection Time: 05/15/19  6:23 AM  
Result Value Ref Range Glucose (POC) 124 (H) 65 - 100 mg/dL Performed by Bhavin Fleming (PCT) Imaging review: 
None HOME MEDS Prior to Admission Medications Prescriptions Last Dose Informant Patient Reported? Taking? albuterol (PROAIR HFA) 90 mcg/actuation inhaler 5/14/2019 at Unknown time Significant Other Yes Yes Sig: Take 2 Puffs by inhalation every four (4) hours as needed for Wheezing.   
albuterol-ipratropium (DUO-NEB) 2.5 mg-0.5 mg/3 ml nebu Not Taking at Unknown time Significant Other No No  
Sig: 3 mL by Nebulization route every four (4) hours as needed for Other (Wheezing or Shortness of breath). amitriptyline (ELAVIL) 150 mg tablet 2019 at Unknown time Significant Other Yes Yes Sig: Take 1 Tab by mouth nightly. aspirin 81 mg chewable tablet 2019 at Unknown time Significant Other No Yes Sig: Take 1 Tab by mouth daily. atorvastatin (LIPITOR) 40 mg tablet 2019 at Unknown time Significant Other No Yes Sig: Take 1 Tab by mouth nightly. ferrous sulfate 325 mg (65 mg iron) tablet 2019 at Unknown time Significant Other No Yes Sig: take 1 tablet by mouth once daily with BREAKFAST  
fluticasone propion-salmeterol (ADVAIR DISKUS) 500-50 mcg/dose diskus inhaler 2019 at Unknown time Significant Other Yes Yes Sig: Take 1 Puff by inhalation every twelve (12) hours. furosemide (LASIX) 20 mg tablet 2019 at Unknown time Significant Other Yes Yes Sig: Take 20 mg by mouth daily. gabapentin (NEURONTIN) 600 mg tablet 2019 at Unknown time Significant Other Yes Yes Sig: Take 1,200 mg by mouth three (3) times daily. insulin glargine (LANTUS) 100 unit/mL injection 2019 at Unknown time Significant Other No Yes Sig: 15 Units by SubCUTAneous route daily for 30 days. insulin lispro (HUMALOG U-100 INSULIN) 100 unit/mL injection 2019 at Unknown time Significant Other Yes Yes Si-15 Units by SubCUTAneous route Before breakfast, lunch, dinner and at bedtime. Blood Glucose (mg/dL)       Insulin Dose (units) 0-149                                   0  
            150-200                               2  
            201-250                               4  
            251-300                               6  
            301-350                               8  
            351-400                               10  
            401-450                               12 451-500                               15  
lactulose (CHRONULAC) 10 gram/15 mL solution 2019 at Unknown time Significant Other No Yes Sig: Take 45 mL by mouth three (3) times daily for 30 days. losartan (COZAAR) 25 mg tablet 2019 at Unknown time Significant Other No Yes Sig: take 1 tablet by mouth once daily , REPLACES LISINOPRIL FOR BLOOD PRESSURE AND KIDNEY PROTECTION  
magnesium oxide (MAG-OX) 400 mg tablet 2019 at Unknown time Significant Other Yes Yes Sig: Take 400 mg by mouth daily. metoprolol tartrate (LOPRESSOR) 25 mg tablet 2019 at Unknown time Significant Other No Yes Sig: Take 0.5 Tabs by mouth every twelve (12) hours for 30 days. nicotine (NICODERM CQ) 21 mg/24 hr 2019 at Unknown time Significant Other Yes Yes Si Patch by TransDERmal route every twenty-four (24) hours. omeprazole (PRILOSEC) 20 mg capsule 2019 at Unknown time Significant Other No Yes Sig: take 1 capsule by mouth once daily  
ondansetron (ZOFRAN ODT) 4 mg disintegrating tablet 2019 at Unknown time Significant Other No Yes Sig: dissolve 1 tablet ON TONGUE every 8 hours if needed for nausea  
rOPINIRole (REQUIP) 4 mg tab TAB 2019 at Unknown time Significant Other Yes Yes Sig: Take 4 mg by mouth nightly. rifAXIMin (XIFAXAN) 550 mg tablet 2019 at Unknown time Significant Other Yes Yes Sig: Take 550 mg by mouth two (2) times a day. spironolactone (ALDACTONE) 100 mg tablet 2019 at Unknown time Significant Other No Yes Sig: Take 2 Tabs by mouth daily. traMADol (ULTRAM) 50 mg tablet 2019 at Unknown time  Yes Yes Sig: Take 50 mg by mouth every six (6) hours as needed for Pain. Facility-Administered Medications: None CURRENT MEDS Current Facility-Administered Medications Medication Dose Route Frequency  fluconazole in 0.9% NaCl 100 mg IVPB  100 mg IntraVENous Q24H  
 amitriptyline (ELAVIL) tablet 150 mg  150 mg Oral QHS  aspirin chewable tablet 81 mg  81 mg Oral DAILY  atorvastatin (LIPITOR) tablet 40 mg  40 mg Oral QHS  ferrous sulfate tablet 325 mg  1 Tab Oral DAILY WITH BREAKFAST  fluticasone-vilanterol (BREO ELLIPTA) 200mcg-25mcg/puff  1 Puff Inhalation DAILY  gabapentin (NEURONTIN) capsule 1,200 mg  1,200 mg Oral TID  insulin glargine (LANTUS) injection 15 Units  15 Units SubCUTAneous DAILY  lactulose (CHRONULAC) 10 gram/15 mL solution 45 mL  45 mL Oral TID  losartan (COZAAR) tablet 25 mg  25 mg Oral DAILY  metoprolol tartrate (LOPRESSOR) tablet 12.5 mg  12.5 mg Oral Q12H  
 nicotine (NICODERM CQ) 21 mg/24 hr patch 1 Patch  1 Patch TransDERmal Q24H  pantoprazole (PROTONIX) tablet 40 mg  40 mg Oral ACB  rifAXIMin (XIFAXAN) tablet 550 mg  550 mg Oral BID  rOPINIRole (REQUIP) tablet 4 mg  4 mg Oral QHS  spironolactone (ALDACTONE) tablet 200 mg  200 mg Oral DAILY  sodium chloride (NS) flush 5-40 mL  5-40 mL IntraVENous Q8H  
 enoxaparin (LOVENOX) injection 40 mg  40 mg SubCUTAneous Q24H  
 levoFLOXacin (LEVAQUIN) 750 mg in D5W IVPB  750 mg IntraVENous Q24H  
 insulin lispro (HUMALOG) injection   SubCUTAneous AC&HS  
 0.9% sodium chloride infusion  75 mL/hr IntraVENous CONTINUOUS  
 furosemide (LASIX) tablet 20 mg  20 mg Oral DAILY IMPRESSION: 
Alanna Brown is a 61 y.o. female who presents with alt mental status. Suspect this to be multifactorial - elevated ammonia and UTI. Improving. RECOMMENDATIONS: 
1. EEG Thank you very much for this consultation.   
 
Matt Wilkins MD 
Neurologist

## 2019-05-16 NOTE — CDMP QUERY
Pt admitted with uti, Encephalopathy documented. Please further specify type of Encephalopathy in the medical record ? Hepatic Encephalopathy (please specify if with or without coma) ? Metabolic Encephalopathy ? Septic Encephalopathy ? Toxic Encephalopathy 
? Encephalopathy due to medications or drugs (please specify) ? Toxic Metabolic Encephalopathy ? Wernickes Encephalopathy 
? Other Encephalopathy 
? Other, please specify ? Clinically unable to determine The medical record reflects the following: 
   Risk Factors: cirrhosis, uti Clinical Indicators: H&P-encephalopathy: Likely related to Liver,   
   Treatment:IV fluids follow ammonia levels, am labs Lactulose Thanks, Vance Hastings Rn/CDMP

## 2019-05-16 NOTE — ROUTINE PROCESS
Bedside and Verbal shift change report given to [facility] (oncoming nurse) by Cynda Halsted (offgoing nurse). Report included the following information SBAR, Kardex, Intake/Output and MAR. Zone Phone:   [phone] Significant changes during shift:  
Continue antibiotics and reorient patient. Patient Information Marialuisa Reese 
61 y.o. 
5/14/2019 11:30 AM by Edgardo Sotelo MD. Marialuisa Reese was admitted from Home 
 
Problem List 
 
Patient Active Problem List  
 Diagnosis Date Noted  Goals of care, counseling/discussion  Advanced care planning/counseling discussion  Debility  Neuropathic pain  Chronic bilateral low back pain with bilateral sciatica  Encephalopathy 04/24/2019  Acute respiratory failure with hypercapnia ([address] 04/02/2019  Counseling regarding advance care planning and goals of care 04/02/2019  Hepatic encephalopathy ([address] 03/28/2019  Colitis 03/28/2019  UTI (urinary tract infection) 03/28/2019  Septic shock ([address] 03/28/2019  Bilateral carotid artery stenosis 11/14/2018  TIA (transient ischemic attack) 11/13/2018  GERD (gastroesophageal reflux disease) 11/13/2018  Therapeutic drug monitoring 10/16/2018  Severe obesity ([address] 10/11/2018  Obesity (BMI 30.0-34.9) 07/10/2018  Sepsis ([address] 03/30/2017  CAP (community acquired pneumonia) 03/30/2017  IBIS (obstructive sleep apnea) 03/30/2017  Tobacco abuse 03/30/2017  Neuropathy 03/30/2017  COPD (chronic obstructive pulmonary disease) ([address] 03/28/2017  Acute deep vein thrombosis (DVT) of right lower extremity ([address] 11/10/2016  
 BALDERAS (nonalcoholic steatohepatitis) 03/10/2016  GI bleed 03/03/2016  Anemia 03/01/2016  Thrombocytopenia ([address] 08/15/2013  Previous back surgery 08/15/2013  S/P CHACHO (total abdominal hysterectomy) 08/15/2013  Cirrhosis ([address] 08/15/2013  Diabetes mellitus with neurological manifestations, uncontrolled ([address]  Essential hypertension, benign  Hyperlipidemia LDL goal <100 Past Medical History:  
Diagnosis Date  Asthma  Back pain  COPD (chronic obstructive pulmonary disease) (HCC)  Diabetes ([facility]  Esophageal varices in cirrhosis ([facility]   
 6/2014 banding x 2  
 Fibromyalgia  Gastrointestinal disorder  GERD (gastroesophageal reflux disease)  Hypercholesteremia  Liver cirrhosis secondary to BALDERAS ([facility]  Liver disease  Other and unspecified hyperlipidemia  Restless leg syndrome  Type II or unspecified type diabetes mellitus without mention of complication, uncontrolled  Unspecified essential hypertension Activity Status: OOB to Chair yes Ambulated this shift yes Bed Rest No 
 
Supplemental O2: (If Applicable) Patient refuses to keep oxygen on. DVT prophylaxis: DVT prophylaxis Med- Yes DVT prophylaxis SCD or CRISTOPHER- No  
 
Wounds: (If Applicable) Wounds- DTI on buttocks, cream applied Patient Safety: 
 
Falls Score Total Score: 3 Safety Level_______ Bed Alarm On? Yes Sitter? No 
 
Plan for upcoming shift: safety Discharge Plan: No  
 
Active Consults: 
IP CONSULT TO NEUROLOGY

## 2019-05-16 NOTE — PROGRESS NOTES
Problem: Risk for Spread of Infection Goal: Prevent transmission of infectious organism to others Description Prevent the transmission of infectious organisms to other patients, staff members, and visitors. Outcome: Progressing Towards Goal 
  
Problem: Patient Education:  Go to Education Activity Goal: Patient/Family Education Outcome: Progressing Towards Goal 
  
Problem: Falls - Risk of 
Goal: *Absence of Falls Description Document Edgar Brunner Fall Risk and appropriate interventions in the flowsheet. Outcome: Progressing Towards Goal 
  
Problem: Patient Education: Go to Patient Education Activity Goal: Patient/Family Education Outcome: Progressing Towards Goal 
  
Problem: Pressure Injury - Risk of 
Goal: *Prevention of pressure injury Description Document Nithin Scale and appropriate interventions in the flowsheet. Outcome: Progressing Towards Goal 
  
Problem: Patient Education: Go to Patient Education Activity Goal: Patient/Family Education Outcome: Progressing Towards Goal

## 2019-05-16 NOTE — ROUTINE PROCESS
Bedside and Verbal shift change [facility] [address] [facility] [facility] by Carlo/Shira RN(offgoing nurse). Report included the following information SBAR, Kardex, Intake/Output and MAR. Zone Phone:   [phone] Significant changes during shift: no significant changes Patient Information Thad Denton 
61 y.o. 
5/14/2019 11:30 AM by Abdulaziz Clement MD. Thad Denton was admitted from Home 
 
Problem List 
 
Patient Active Problem List  
 Diagnosis Date Noted  Goals of care, counseling/discussion  Advanced care planning/counseling discussion  Debility  Neuropathic pain  Chronic bilateral low back pain with bilateral sciatica  Encephalopathy 04/24/2019  Acute respiratory failure with hypercapnia ([address] 04/02/2019  Counseling regarding advance care planning and goals of care 04/02/2019  Hepatic encephalopathy ([address] 03/28/2019  Colitis 03/28/2019  UTI (urinary tract infection) 03/28/2019  Septic shock ([address] 03/28/2019  Bilateral carotid artery stenosis 11/14/2018  TIA (transient ischemic attack) 11/13/2018  GERD (gastroesophageal reflux disease) 11/13/2018  Therapeutic drug monitoring 10/16/2018  Severe obesity ([address] 10/11/2018  Obesity (BMI 30.0-34.9) 07/10/2018  Sepsis ([address] 03/30/2017  CAP (community acquired pneumonia) 03/30/2017  IBIS (obstructive sleep apnea) 03/30/2017  Tobacco abuse 03/30/2017  Neuropathy 03/30/2017  COPD (chronic obstructive pulmonary disease) ([address] 03/28/2017  Acute deep vein thrombosis (DVT) of right lower extremity ([address] 11/10/2016  
 BALDERAS (nonalcoholic steatohepatitis) 03/10/2016  GI bleed 03/03/2016  Anemia 03/01/2016  Thrombocytopenia ([address] 08/15/2013  Previous back surgery 08/15/2013  S/P CHACHO (total abdominal hysterectomy) 08/15/2013  Cirrhosis ([address] 08/15/2013  Diabetes mellitus with neurological manifestations, uncontrolled ([address]  Essential hypertension, benign  Hyperlipidemia LDL goal <100 Past Medical History:  
Diagnosis Date  Asthma  Back pain  COPD (chronic obstructive pulmonary disease) (HCC)  Diabetes ([facility]  Esophageal varices in cirrhosis ([facility]   
 6/2014 banding x 2  
 Fibromyalgia  Gastrointestinal disorder  GERD (gastroesophageal reflux disease)  Hypercholesteremia  Liver cirrhosis secondary to BALDERAS ([facility]  Liver disease  Other and unspecified hyperlipidemia  Restless leg syndrome  Type II or unspecified type diabetes mellitus without mention of complication, uncontrolled  Unspecified essential hypertension Activity Status: OOB to Chair No 
Ambulated this shift No  
Bed Rest Yes Supplemental O2: (If Applicable) Patient refuses to keep oxygen on. DVT prophylaxis: DVT prophylaxis Med- Yes DVT prophylaxis SCD or CRISTOPHER- No  
 
Wounds: (If Applicable) Wounds- DTI on buttocks, cream applied Patient Safety: 
 
Falls Score Total Score: 3 Safety Level_______ Bed Alarm On? Yes Sitter? No 
 
Plan for upcoming shift: safety Discharge Plan: No  
 
Active Consults: 
IP CONSULT TO NEUROLOGY

## 2019-05-16 NOTE — PROGRESS NOTES
Attempted to see pt for OT services. Pt was having EEG at bedside. Will defer and continue to follow.

## 2019-05-16 NOTE — PROGRESS NOTES
Problem: Self Care Deficits Care Plan (Adult) Goal: *Acute Goals and Plan of Care (Insert Text) Description Occupational Therapy Goals: 
Initiated 5/16/2019 1. Patient will perform grooming standing with supervision/set-up within 7 days. 2. Patient will perform toileting with supervision/set-up within 7 days. 3. Patient will perform lower body dressing with supervision/set-up within 7 days. 4. Patient will transfer from toilet with supervision/set-up using the least restrictive device and appropriate durable medical equipment within 7 days. Outcome: Progressing Towards Goal 
 OCCUPATIONAL THERAPY EVALUATION Patient: Florina Billy (89 y.o. female) Date: 5/16/2019 Primary Diagnosis: Hepatic encephalopathy ([facility] [address] [K72.90] UTI (urinary tract infection) [N39.0] Precautions:   Contact(ESBL Simultaneous filing. User may not have seen previous data.) ASSESSMENT : 
Based on the objective data described below, the patient presents with confusion and was oriented to place and self only. Noted that it appears that pt was just recently discharged from SNF and was ambulatory and performing ADLs with assist as needed. Pt has all needed DME at home. CGA for supine to sit edge of bed this session. Able to pull up socks seated with CGA. Min to moderate assist for pt to mobilize to bathroom with RW due to weakness, right foot drop and right knee instability. Pt was attempting to sit on commode without being aligned with surface and would have sat on floor if therapist did not assist pt laterally over onto the commode with the gait belt. Pt was unaware that she was doing this. Performed radha care seated on commode with CGA. Mobilized back to bedside chair with moderate assist using RW for support and right LE was externally rotated with limited foot clearance in RLE.   Pt is performing UB ADLS at a supervision to min assist level and lower body ADLS at a moderate assist level. Pt is at high risk for falls and recommend SNF for rehab at discharge. Patient will benefit from skilled intervention to address the above impairments. Patient?s rehabilitation potential is considered to be Fair Factors which may influence rehabilitation potential include: ? None noted ? Mental ability/status ? Medical condition ? Home/family situation and support systems ? Safety awareness ? Pain tolerance/management ? Other: PLAN : 
Recommendations and Planned Interventions: 
?               Self Care Training                  ? Therapeutic Activities ? Functional Mobility Training    ? Cognitive Retraining 
? Therapeutic Exercises           ? Endurance Activities ? Balance Training                   ? Neuromuscular Re-Education ? Visual/Perceptual Training     ? Home Safety Training 
? Patient Education                 ? Family Training/Education ? Other (comment): Frequency/Duration: Patient will be followed by occupational therapy 4 times a week to address goals. Discharge Recommendations: Meño López Further Equipment Recommendations for Discharge: none SUBJECTIVE:  
Patient stated ? I keep getting urinary tract infections. ? OBJECTIVE DATA SUMMARY:  
HISTORY:  
Past Medical History:  
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   
 6/2014 banding x 2 Fibromyalgia Gastrointestinal disorder GERD (gastroesophageal reflux disease) Hypercholesteremia Liver cirrhosis secondary to BALDERAS ([facility] [address] Liver disease Other and unspecified hyperlipidemia Restless leg syndrome Type II or unspecified type diabetes mellitus without mention of complication, uncontrolled Unspecified essential hypertension Past Surgical History:  
Procedure Laterality Date HX BACK SURGERY HX CARPAL TUNNEL RELEASE    
 on right HX HYSTERECTOMY plus 1/2 of an ovary removed LA COLSC FLX W/RMVL OF TUMOR POLYP LESION SNARE TQ  5/30/2013 UPPER GI ENDOSCOPY,LIGAT VARIX  2/6/2015 Prior Level of Function/Environment/Context: per pt she was ambulatory in the home and performing ADLS on   
 
Expanded or extensive additional review of patient history:  
 
Home Situation Home Environment: Private residence # Steps to Enter: (ramp) One/Two Story Residence: One story Living Alone: No 
Support Systems: Spouse/Significant Other/Partner, Child(raj) Patient Expects to be Discharged to[de-identified] Rehabilitation facility Current DME Used/Available at Home: Shower chair, Wheelchair, [facility], [place], straight, Commode, bedside, Walker, rolling Tub or Shower Type: Tub/Shower combination Hand dominance: Right EXAMINATION OF PERFORMANCE DEFICITS: 
Cognitive/Behavioral Status: 
Neurologic State: Alert Orientation Level: Oriented to person;Oriented to place; Disoriented to time;Disoriented to situation Cognition: Impaired decision making; Impulsive;Poor safety awareness;Decreased attention/concentration;Decreased command following Perception: Appears intact Perseveration: No perseveration noted Safety/Judgement: Fall prevention;Decreased awareness of need for safety;Decreased awareness of need for assistance;Decreased insight into deficits Hearing: Auditory Auditory Impairment: None Vision/Perceptual:   
Tracking: Able to track stimulus in all quadrants w/o difficulty Acuity: (grossly intact) Range of Motion: 
AROM: Generally decreased, functional(Right foot drop and right knee instability ) Strength: 
 Strength: Generally decreased, functional(BUE functional RLE weaker than left) Coordination: 
Coordination: Generally decreased, functional(RLE) Fine Motor Skills-Upper: Left Intact; Right Intact Gross Motor Skills-Upper: Left Intact; Right Intact Tone & Sensation: 
Tone: Abnormal(RLE) Sensation: (unable to accurately test due to pts confusion) Balance: 
Sitting: Intact; Without support Standing: Impaired; With support Standing - Static: Constant support; Fair 
Standing - Dynamic : Poor Functional Mobility and Transfers for ADLs: 
Bed Mobility: 
Rolling: Contact guard assistance; Additional time Supine to Sit: Contact guard assistance Scooting: Contact guard assistance; Additional time Transfers: 
Sit to Stand: Minimum assistance; Moderate assistance;Assist x2 Stand to Sit: Maximum assistance; Moderate assistance;Assist x2 Bed to Chair: Minimum assistance; Moderate assistance;Assist x2 Bathroom Mobility: Minimum assistance; Moderate assistance(with RW) Toilet Transfer : Moderate assistance;Assist x2; Additional time(attempting to sit prior to being aligned with toilet/weak) ADL Assessment: 
Feeding: Supervision Oral Facial Hygiene/Grooming: Setup(seated, unable to perform standing due to weakness) Bathing: Minimum assistance Upper Body Dressing: Setup Lower Body Dressing: Moderate assistance Toileting: Moderate assistance ADL Intervention and task modifications: 
  
See assessment Cognitive Retraining Safety/Judgement: Fall prevention;Decreased awareness of need for safety;Decreased awareness of need for assistance;Decreased insight into deficits Functional Measure: 
Barthel Index: 
Bathin Bladder: 0 Bowels: 5 Groomin Dressin Feeding: 10 Mobility: 5 Stairs: 0 Toilet Use: 5 Transfer (Bed to Chair and Back): 5 Total: 40/100 Percentage of impairment  
0% 1-19% 20-39% 40-59% 60-79% 80-99% 100% Barthel Score 0-100 100 99-80 79-60 59-40 20-39 1-19 
 0 The Barthel ADL Index: Guidelines 1. The index should be used as a record of what a patient does, not as a record of what a patient could do. 2. The main aim is to establish degree of independence from any help, physical or verbal, however minor and for whatever reason. 3. The need for supervision renders the patient not independent. 4. A patient's performance should be established using the best available evidence. Asking the patient, friends/relatives and nurses are the usual sources, but direct observation and common sense are also important. However direct testing is not needed. 5. Usually the patient's performance over the preceding 24-48 hours is important, but occasionally longer periods will be relevant. 6. Middle categories imply that the patient supplies over 50 per cent of the effort. 7. Use of aids to be independent is allowed. Tejinder Barcenas., Barthel, D.W. ([id number]). Functional evaluation: the Barthel Index. [facility] (14)2. Jamilah Potts christiano RADHA Stark, Farooq Shaffer., Raquel Sherman., [place], [facility] (1999). Measuring the change indisability after inpatient rehabilitation; comparison of the responsiveness of the Barthel Index and Functional [place] Measure. Journal of Neurology, Neurosurgery, and Psychiatry, 66(4), 145-659. Marvin Britt [email or web address].DEJUAN, WILBUR Lui, & Debby Hopper, MMyeshaA. (2004.) Assessment of post-stroke quality of life in cost-effectiveness studies: The usefulness of the Barthel Index and the EuroQoL-5D. [facility], 13, 184-86 Occupational Therapy Evaluation Charge Determination History Examination Decision-Making MEDIUM Complexity : Expanded review of history including physical, cognitive and psychosocial  history  MEDIUM Complexity : 3-5 performance deficits relating to physical, cognitive , or psychosocial skils that result in activity limitations and / or participation restrictions MEDIUM Complexity : Patient may present with comorbidities that affect occupational performnce. Miniml to moderate modification of tasks or assistance (eg, physical or verbal ) with assesment(s) is necessary to enable patient to complete evaluation Based on the above components, the patient evaluation is determined to be of the following complexity level: MEDIUM Pain: 
Pain Scale 1: Numeric (0 - 10) Pain Intensity 1: 0 Pain Location 1: Back;Leg 
Pain Orientation 1: Anterior Pain Description 1: Aching Pain Intervention(s) 1: Repositioned(pt asked for medication, MD will be notified) Activity Tolerance:  
Please refer to the flowsheet for vital signs taken during this treatment. After treatment:  
? Patient left in no apparent distress sitting up in chair ? Patient left in no apparent distress in bed 
? Call bell left within reach ? Nursing notified ? Caregiver present ? Bed alarm activated COMMUNICATION/EDUCATION:  
The patient?s plan of care was discussed with: Physical Therapist, Registered Nurse and patient . ? Home safety education was provided and the patient/caregiver indicated understanding. ? Patient/family have participated as able in goal setting and plan of care. ? Patient agree to work toward stated goals and plan of care. ? Patient understands intent and goals of therapy, but is neutral about his/her participation. ? Patient is unable to participate in goal setting and plan of care. This patient?s plan of care is appropriate for delegation to [facility]. Thank you for this referral. 
Esau Snow OTR/L Time Calculation: 21 mins

## 2019-05-16 NOTE — PROGRESS NOTES
Problem: Mobility Impaired (Adult and Pediatric) Goal: *Acute Goals and Plan of Care (Insert Text) Description Physical Therapy Goals Initiated 5/16/2019 1. Patient will move from supine to sit and sit to supine , scoot up and down and roll side to side in bed with modified independence within 7 day(s). 2.  Patient will transfer from bed to chair and chair to bed with minimal assistance/contact guard assist using the least restrictive device within 7 day(s). 3.  Patient will perform sit to stand with supervision/set-up within 7 day(s). 4.  Patient will ambulate with minimal assistance/contact guard assist for 100 feet with the least restrictive device within 7 day(s). Functional status prior to admission: Patient was recently discharged from SNF rehab. She ambulates short distances with RW and requires assist for ADLS. She occasionally uses a wheelchair for mobility. Home support: The patient lived with her . Home setting: recent discharge from SNF to home. Outcome: Not Progressing Towards Goal 
 PHYSICAL THERAPY EVALUATION Patient: Marc Dominique (83 y.o. female) Date: 5/16/2019 Primary Diagnosis: Hepatic encephalopathy ([facility] [address] [K72.90] UTI (urinary tract infection) [N39.0] Precautions:   contact, bed alarm (ESBL) ASSESSMENT Based on the objective data described below, the patient presents with poor safety awareness, impaired functional mobility, poor balance, and unsteady gait. Current Level of Function (mobility/balance, ADL): patient is requiring assist x 2 to ambulate 20 feet with min-mod A x 2 and RW. Patient with poor balance and R foot drag, increased trunk sway and shuffled steps. Functional Outcome Measure: The patient scored 40/100 on the Barthel outcome measure which is indicative of moderately impaired functional mobility. Other factors to consider for discharge: confusion, safety awareness, balance, recent SNF stay Patient will benefit from skilled therapy intervention to address the above noted impairments. The current recommendation for discharge is SNF, in order for the patient to meet his/her long term goals. PLAN : 
Recommendations and Planned Interventions: bed mobility training, transfer training, gait training, therapeutic exercises, neuromuscular re-education, patient and family training/education and therapeutic activities Frequency/Duration: Patient will be followed by physical therapy  4 times a week to address goals. SUBJECTIVE:  
Patient stated ? I sometimes use a wheelchair and sometimes walk with a walker. ? OBJECTIVE DATA SUMMARY:  
HISTORY:   
Past Medical History:  
Diagnosis Date Asthma Back pain COPD (chronic obstructive pulmonary disease) ([facility] [address] Diabetes ([facility] [address] Esophageal varices in cirrhosis (HCC)   
 6/2014 banding x 2 Fibromyalgia Gastrointestinal disorder GERD (gastroesophageal reflux disease) Hypercholesteremia Liver cirrhosis secondary to BALDERAS ([facility] [address] Liver disease Other and unspecified hyperlipidemia Restless leg syndrome Type II or unspecified type diabetes mellitus without mention of complication, uncontrolled Unspecified essential hypertension Past Surgical History:  
Procedure Laterality Date HX BACK SURGERY HX CARPAL TUNNEL RELEASE    
 on right HX HYSTERECTOMY plus 1/2 of an ovary removed CT COLSC FLX W/RMVL OF TUMOR POLYP LESION SNARE TQ  5/30/2013 UPPER GI ENDOSCOPY,LIGAT VARIX  2/6/2015 Prior Level of Function/Home Situation: patient lives with her . Patient with recent SNF stay and recently discharged home. She ambulates with RW and occasionally uses a wheelchair for mobility. Patient requires assist for ADLS. Personal factors and/or comorbidities impacting plan of care: confusion, recent SNF, fall risk Home Situation Home Environment: Private residence # Steps to Enter: (ramp) One/Two Story Residence: One story Living Alone: No 
Support Systems: Spouse/Significant Other/Partner, Child(raj) Patient Expects to be Discharged to[de-identified] Rehabilitation facility Current DME Used/Available at Home: Shower chair, Wheelchair, Kavon Noemi, straight, Commode, bedside, Walker, rolling Tub or Shower Type: Tub/Shower combination EXAMINATION/PRESENTATION/DECISION MAKING:  
Critical Behavior: 
Neurologic State: Alert Orientation Level: Oriented to person, Oriented to place, Disoriented to time, Disoriented to situation Cognition: Impaired decision making, Impulsive, Poor safety awareness, Decreased attention/concentration, Decreased command following Safety/Judgement: Fall prevention, Decreased awareness of need for safety, Decreased awareness of need for assistance, Decreased insight into deficits Hearing: Auditory Auditory Impairment: None Skin:   
Edema:  
Range Of Motion: 
AROM: Generally decreased, functional(Right foot drop and right knee instability ) Strength:   
Strength: Generally decreased, functional(BUE functional RLE weaker than left) Tone & Sensation:  
Tone: Abnormal(RLE) Sensation: (unable to accurately test due to pts confusion) Coordination: 
Coordination: Generally decreased, functional(RLE) Vision:  
Tracking: Able to track stimulus in all quadrants w/o difficulty Acuity: (grossly intact) Functional Mobility: 
Bed Mobility: 
Rolling: Contact guard assistance; Additional time Supine to Sit: Contact guard assistance Scooting: Contact guard assistance; Additional time Transfers: 
Sit to Stand: Minimum assistance; Moderate assistance;Assist x2 Stand to Sit: Maximum assistance; Moderate assistance;Assist x2 Bed to Chair: Minimum assistance; Moderate assistance;Assist x2 Balance:  
Sitting: Intact; Without support Standing: Impaired; With support Standing - Static: Constant support; Fair 
Standing - Dynamic : Poor Ambulation/Gait Training: 
Distance (ft): 20 Feet (ft) Assistive Device: Gait belt;Walker, rolling Ambulation - Level of Assistance: Moderate assistance;Minimal assistance;Assist x2 Gait Description (WDL): Exceptions to [facility] Gait Abnormalities: Ataxic;Decreased step clearance; Hemiplegic; Foot drop; Shuffling gait;Trunk sway increased; Path deviations Base of Support: Widened;Shift to left Stance: Right decreased Speed/Mey: Pace decreased (<100 feet/min) Step Length: Right shortened;Left shortened Stairs: Therapeutic Exercises:  
 
 
Functional Measure: 
Barthel Index: 
Bathin Bladder: 0 Bowels: 5 Groomin Dressin Feeding: 10 Mobility: 5 Stairs: 0 Toilet Use: 5 Transfer (Bed to Chair and Back): 5 Total: 40/100 Percentage of impairment  
0% 1-19% 20-39% 40-59% 60-79% 80-99% 100% Barthel Score 0-100 100 99-80 79-60 59-40 20-39 1-19 
 0 The Barthel ADL Index: Guidelines 1. The index should be used as a record of what a patient does, not as a record of what a patient could do. 2. The main aim is to establish degree of independence from any help, physical or verbal, however minor and for whatever reason. 3. The need for supervision renders the patient not independent. 4. A patient's performance should be established using the best available evidence. Asking the patient, friends/relatives and nurses are the usual sources, but direct observation and common sense are also important. However direct testing is not needed. 5. Usually the patient's performance over the preceding 24-48 hours is important, but occasionally longer periods will be relevant. 6. Middle categories imply that the patient supplies over 50 per cent of the effort. 7. Use of aids to be independent is allowed. Yanira Munoz., Barthel, D.W. ([id number]).  Functional evaluation: the Barthel Index. [address] [facility] (14)2. RADHA Reed, Yu [place]., Jacki Carlos., [place], [address] (1999). Measuring the change indisability after inpatient rehabilitation; comparison of the responsiveness of the Barthel Index and Functional [place] Measure. Journal of Neurology, Neurosurgery, and Psychiatry, 66(4), 659-381. Dipika Srinivasan, [place].DEJUAN, WILBUR Lui, & Fredis Mcfarland M.A. (2004.) Assessment of post-stroke quality of life in cost-effectiveness studies: The usefulness of the Barthel Index and the EuroQoL-5D. [facility], 13, 041-24 Physical Therapy Evaluation Charge Determination History Examination Presentation Decision-Making MEDIUM  Complexity : 1-2 comorbidities / personal factors will impact the outcome/ POC  MEDIUM Complexity : 3 Standardized tests and measures addressing body structure, function, activity limitation and / or participation in recreation  MEDIUM Complexity : Evolving with changing characteristics  Other outcome measures Barthel  MEDIUM Based on the above components, the patient evaluation is determined to be of the following complexity level: MEDIUM Activity Tolerance:  
Fair Please refer to the flowsheet for vital signs taken during this treatment. After treatment patient left:  
Up in chair Bed alarm/tab alert on Bed/Chair-wheels locked Call light within reach RN notified Nurse at bedside COMMUNICATION/EDUCATION:  
The patient?s plan of care was discussed with: Occupational Therapist, Registered Nurse and . Fall prevention education was provided and the patient/caregiver indicated understanding., Patient/family have participated as able in goal setting and plan of care. and Patient/family agree to work toward stated goals and plan of care. Thank you for this referral. 
Neli Rolon, PT, DPT Time Calculation: 21 mins

## 2019-05-16 NOTE — PROGRESS NOTES
Hospitalist Progress Note NAME:  Verena Ards :  1955 MRN:  [id number] Assessment / Plan: 
Acute Encephalopathy POA In settings of UTI In settings of urinary retention Mental status is been waxing and for past year, suspect underlying dementia worsening with time Pt required multiple straight cath this admission, CT showed Bladder distention with several locules of gas. Will place norman's. She will need OP urology followup for urodynamic studies and voiding trial in 2 weeks Neurology consult appreciated, EEG done without seizure like activity CT head without acute changes 
doubt hepatic encephalopathy as ammonia at her baseline  
  
H/o Hepatic Encephalopathy Liver cirrhosis Continue lactulose and rifaximin Continue diuretics 
  
Lactic Acidosis POA No septic Not a good marker in liver disease as can be always be elevated 
  
DM 2 Continue lantus and SSI  
  
Code Status: Full Surrogate Decision Maker:  
  
DVT Prophylaxis: yes GI Prophylaxis: not indicated 
  
Baseline: just discharged from SNF to home   
             
  
Subjective: Pt seen and examined at bedside. Remain disoriented. Overnight events d/w RN  
  
CHIEF COMPLAINT: f/u \"altered mental status\" Review of Systems: 
Symptom Y/N Comments  Symptom Y/N Comments Fever/Chills    Chest Pain Poor Appetite    Edema Cough    Abdominal Pain Sputum    Joint Pain SOB/MARTINEZ    Pruritis/Rash Nausea/vomit    Tolerating PT/OT Diarrhea    Tolerating Diet Constipation    Other Could NOT obtain due to:   
 
Objective: VITALS:  
Last 24hrs VS reviewed since prior progress note. Most recent are: 
Patient Vitals for the past 24 hrs: 
 Temp Pulse Resp BP SpO2  
19 1525 98.5 °F (36.9 °C) 68 20 99/57 93 % 19 1128 98.6 °F (37 °C) 98 20 122/70 99 % 19 0741 98.6 °F (37 °C) 98 20 128/70 93 % 19 0401 98.7 °F (37.1 °C) 97 20 117/62 94 % 05/15/19 1943 98.5 °F (36.9 °C) (!) 111 20 127/71 94 % Intake/Output Summary (Last 24 hours) at 5/16/2019 1526 Last data filed at 5/16/2019 1304 Gross per 24 hour Intake 860 ml Output 4500 ml Net -3640 ml PHYSICAL EXAM: 
General: WD, WN. Alert, cooperative, no acute distress   
EENT:  EOMI. Anicteric sclerae. MMM Resp:  CTA bilaterally, no wheezing or rales. No accessory muscle use CV:  Regular  rhythm,  No edema GI:  Soft, Non distended, Non tender.  +Bowel sounds Neurologic:  Alert, normal speech, Psych:   Poor insight. Not anxious nor agitated Skin:  No rashes. No jaundice Reviewed most current lab test results and cultures  YES Reviewed most current radiology test results   YES Review and summation of old records today    NO Reviewed patient's current orders and MAR    YES 
PMH/SH reviewed - no change compared to H&P 
________________________________________________________________________ Care Plan discussed with: 
  Comments Patient y Family  y  RN y   
Care Manager Consultant Multidiciplinary team rounds were held today with , nursing, pharmacist and clinical coordinator. Patient's plan of care was discussed; medications were reviewed and discharge planning was addressed. ________________________________________________________________________ Total NON critical care TIME:  35  Minutes Total CRITICAL CARE TIME Spent:   Minutes non procedure based Comments >50% of visit spent in counseling and coordination of care    
________________________________________________________________________ Shaila Marion MD  
 
Procedures: see electronic medical records for all procedures/Xrays and details which were not copied into this note but were reviewed prior to creation of Plan. LABS: 
I reviewed today's most current labs and imaging studies. Pertinent labs include: 
Recent Labs 05/15/19 
0524 05/14/19 1209  
WBC 6.7 9.8 HGB 9.7* 11.3* HCT 30.5* 35.0  
PLT 60* 75* Recent Labs 05/16/19 
2779 05/15/19 
0524 05/14/19 
1209  138 136  
K 3.7 3.7 4.1  104 100 CO2 28 28 30 * 117* 165* BUN 8 13 17 CREA 0.65 0.52* 0.62  
CA 8.0* 7.7* 8.5 MG  --   --  1.9 ALB  --   --  2.6* TBILI  --   --  1.4* SGOT  --   --  41* ALT  --   --  41 INR  --   --  1.3* Signed: Leonor Del Valle MD

## 2019-05-16 NOTE — PROCEDURES
Patient Name: Mili Limon : 1955 Age: 61 y.o. Ordering physician: No ref. provider found Date of EE2019 EEG procedure number: [id number] Diagnosis: alt mental status Interpreting physician: Lili Mullen MD 
 
ELECTROENCEPHALOGRAM REPORT  
 
PROCEDURE: EEG. CLINICAL INDICATION: The patient is a 61 y.o. female with a history of possible seizures. EEG to rule out seizures, rule out stroke, rule out cortical abnormality. EEG CLASSIFICATION: Abnormal  
 
DESCRIPTION OF THE RECORD:  
The background of this recording contains a posteriorly-located occipital alpha rhythm of 7 Hz that attenuates with eye opening. Throughout the recording, there were no clear areas of focal slowing nor spike or spike-and-wave discharges seen. Photic stimulation produced no response. During the recording the patient did not achieve stage II sleep INTERPRETATION:  
Abnormal. Mild diffuse cerebral dysfunction which is non specific for etiology but can be seen in toxic/metabolic states. No seizures. Clinical correlation recommended.  
 
 
Lili Mullen MD 
Neurologist

## 2019-05-16 NOTE — PROGRESS NOTES
Saúl Yoon MD  
Physician Internal Medicine H&P Signed Date of Service:  05/15/19 8933 []Hide copied text []Bang for details 
 
 
  
 
Hospitalist Progress Note 
  
NAME:            Bernadette Moss :               1955 MRN:               [id number]  
  
Assessment / Plan: 
Acute Encephalopathy POA In settings of UTI Mental status is been waxing and for past year, suspect underlying dementia worsening with time Check CT A/P for recurrent UTI Neurology consult Check CT head 
doubt hepatic encephalopathy as ammonia at her baseline  
  
H/o Hepatic Encephalopathy Liver cirrhosis Continue lactulose and rifaximin Continue diuretics 
  
Lactic Acidosis POA No septic Not a good marker in liver disease as can be always be elevated 
  
DM 2 Continue lantus and SSI  
  
Code Status: Full Surrogate Decision Maker:  
  
DVT Prophylaxis: yes GI Prophylaxis: not indicated 
  
Baseline: just discharged from SNF to home   
             
  
Subjective: Pt seen and examined at bedside. Remain disoriented. Overnight events d/w RN  
  
CHIEF COMPLAINT: f/u \"altered mental status\" 
  
Review of Systems: 
         
Symptom Y/N Comments   Symptom Y/N Comments Fever/Chills       Chest Pain       
Poor Appetite       Edema       
Cough       Abdominal Pain       
Sputum       Joint Pain       
SOB/MARTINEZ       Pruritis/Rash       
Nausea/vomit       Tolerating PT/OT       
Diarrhea       Tolerating Diet       
Constipation       Other       
  
Could NOT obtain due to: Altered mental status  
  
Objective:  
  
VITALS:  
Last 24hrs VS reviewed since prior progress note. Most recent are: 
Patient Vitals for the past 24 hrs: 
  Temp Pulse Resp BP SpO2  
05/15/19 1048 97.7 °F (36.5 °C) 80 16 127/64 94 % 05/15/19 0755 97.8 °F (36.6 °C) 86 16 113/61 98 % 05/15/19 0310 98.1 °F (36.7 °C) 68 20 107/61 98 % 05/15/19 0003 98 °F (36.7 °C) 95 20 98/60 100 % 05/14/19 1821 97.7 °F (36.5 °C) 94 21 113/58 100 % 05/14/19 1700 97.8 °F (36.6 °C) 93 21 100/75 95 % 05/14/19 1640     92 % 05/14/19 1532  90 14  99 % 05/14/19 1530  90 15 101/47   
05/14/19 1525  89 15 108/46   
05/14/19 1500  90 16 95/52 97 % 05/14/19 1446  88 16 (!) 151/120 97 %   
  
Intake/Output Summary (Last 24 hours) at 5/15/2019 1244 Last data filed at 5/15/2019 1503 Gross per 24 hour Intake 2250 ml Output 1250 ml Net 1000 ml  
  
  
PHYSICAL EXAM: 
General:          WD, WN. Alert, cooperative, no acute distress   
EENT:              EOMI. Anicteric sclerae. MMM Resp:               CTA bilaterally, no wheezing or rales. No accessory muscle use CV:                  Regular  rhythm,  No edema GI:                   Soft, Non distended, Non tender. +Bowel sounds Neurologic:      Alert, normal speech, Psych:             Poor insight. Not anxious nor agitated Skin:                No rashes. No jaundice 
  
Reviewed most current lab test results and cultures  YES Reviewed most current radiology test results   YES Review and summation of old records today    NO Reviewed patient's current orders and MAR    YES 
PMH/ reviewed - no change compared to H&P 
________________________________________________________________________ Care Plan discussed with: 
    Comments Patient y    
Family  y  at bedside RN y    
Care Manager      
Consultant       
                   Multidiciplinary team rounds were held today with , nursing, pharmacist and clinical coordinator. Patient's plan of care was discussed; medications were reviewed and discharge planning was addressed. ________________________________________________________________________ Total NON critical care TIME:  35 Minutes 
  
Total CRITICAL CARE TIME Spent:   Minutes non procedure based 
  
    Comments >50% of visit spent in counseling and coordination of care      
 ________________________________________________________________________ Luis Velasquez MD  
  
Procedures: see electronic medical records for all procedures/Xrays and details which were not copied into this note but were reviewed prior to creation of Plan.   
  
LABS: 
I reviewed today's most current labs and imaging studies. Pertinent labs include: 
    
Recent Labs 05/15/19 
0524 05/14/19 
1209 WBC 6.7 9.8 HGB 9.7* 11.3* HCT 30.5* 35.0  
PLT 60* 75*  
  
    
Recent Labs 05/15/19 
0524 05/14/19 
1209  136  
K 3.7 4.1  100 CO2 28 30 * 165* BUN 13 17 CREA 0.52* 0.62  
CA 7.7* 8.5 MG  --  1.9 ALB  --  2.6* TBILI  --  1.4* SGOT  --  41* ALT  --  41 INR  --  1.3*  
  
  
Signed: Luis Velasquez MD

## 2019-05-16 NOTE — PROGRESS NOTES
SPEECH THERAPY SCREENING: 
SERVICES ARE NOT INDICATED AT THIS TIME [facility] referral was triggered for speech therapy based on results obtained during the nursing admission assessment. The patients chart was reviewed and the patient is not appropriate for a skilled therapy evaluation at this time. Please consult speech therapy if any therapy needs arise. Thank you. Shira Abraham, SLP

## 2019-05-16 NOTE — ROUTINE PROCESS
Bedside and Verbal shift change report given to Florence Sidhu (oncoming nurse) by [facility] nurse). Report included the following information SBAR, Kardex, Intake/Output and MAR. Zone Phone:   [phone] Significant changes during shift: Patient got CT done, patient received haldol today for agitation and being combative, she was also seen by wound care and it was determined that she has a DTI. Patient Information Chantell Granados 
61 y.o. 
5/14/2019 11:30 AM by Trev Scott MD. Chantell Granados was admitted from Home 
 
Problem List 
 
Patient Active Problem List  
 Diagnosis Date Noted  Goals of care, counseling/discussion  Advanced care planning/counseling discussion  Debility  Neuropathic pain  Chronic bilateral low back pain with bilateral sciatica  Encephalopathy 04/24/2019  Acute respiratory failure with hypercapnia ([address] 04/02/2019  Counseling regarding advance care planning and goals of care 04/02/2019  Hepatic encephalopathy ([address] 03/28/2019  Colitis 03/28/2019  UTI (urinary tract infection) 03/28/2019  Septic shock ([address] 03/28/2019  Bilateral carotid artery stenosis 11/14/2018  TIA (transient ischemic attack) 11/13/2018  GERD (gastroesophageal reflux disease) 11/13/2018  Therapeutic drug monitoring 10/16/2018  Severe obesity ([address] 10/11/2018  Obesity (BMI 30.0-34.9) 07/10/2018  Sepsis ([address] 03/30/2017  CAP (community acquired pneumonia) 03/30/2017  IBIS (obstructive sleep apnea) 03/30/2017  Tobacco abuse 03/30/2017  Neuropathy 03/30/2017  COPD (chronic obstructive pulmonary disease) ([address] 03/28/2017  Acute deep vein thrombosis (DVT) of right lower extremity ([address] 11/10/2016  
 BALDERAS (nonalcoholic steatohepatitis) 03/10/2016  GI bleed 03/03/2016  Anemia 03/01/2016  Thrombocytopenia ([address] 08/15/2013  Previous back surgery 08/15/2013  S/P CHACHO (total abdominal hysterectomy) 08/15/2013  Cirrhosis ([facility] 08/15/2013  Diabetes mellitus with neurological manifestations, uncontrolled ([facility]  Essential hypertension, benign  Hyperlipidemia LDL goal <100 Past Medical History:  
Diagnosis Date  Asthma  Back pain  COPD (chronic obstructive pulmonary disease) (HCC)  Diabetes ([facility]  Esophageal varices in cirrhosis ([facility]   
 6/2014 banding x 2  
 Fibromyalgia  Gastrointestinal disorder  GERD (gastroesophageal reflux disease)  Hypercholesteremia  Liver cirrhosis secondary to BALDERAS ([facility]  Liver disease  Other and unspecified hyperlipidemia  Restless leg syndrome  Type II or unspecified type diabetes mellitus without mention of complication, uncontrolled  Unspecified essential hypertension Activity Status: OOB to Chair No 
Ambulated this shift No  
Bed Rest Yes Supplemental O2: (If Applicable) Patient refuses to keep oxygen on. DVT prophylaxis: DVT prophylaxis Med- Yes DVT prophylaxis SCD or CRISTOPHER- No  
 
Wounds: (If Applicable) Wounds- DTI on buttocks, cream applied Patient Safety: 
 
Falls Score Total Score: 3 Safety Level_______ Bed Alarm On? Yes Sitter? No 
 
Plan for upcoming shift: safety Discharge Plan: No  
 
Active Consults: 
IP CONSULT TO NEUROLOGY

## 2019-05-16 NOTE — PROGRESS NOTES
NEUROLOGY NOTE Chief Complaint Patient presents with  Lethargy SUBJECTIVE: 
Pt's mental status is improving. HPI Robert Rahman is a 61 y.o. female who presents to the hospital because of Alt mental status. According to ER notes \"ED note \"  female, with past medical history significant for cirrhosis, diabetes on insulin presenting the emergency department brought in by  with concern for recurrent urinary tract infection and elevated ammonia level.  Patient is unable to provide much history. [facility] reports that patient had a recent hospitalization, will for UTI, has grown ESBL in the past. [facility] states that since she has been home from rehab she has had a progressive decline with now inability to ambulate and increasing confusion.  He reports she is compliant with her lactulose and having bowel movements\" Ammonia elevated and UTI +ve ROS A ten system review of constitutional, cardiovascular, respiratory, musculoskeletal, endocrine, skin, SHEENT, genitourinary, psychiatric and neurologic systems was obtained and is unremarkable except as stated in HPI  
 
PMH Past Medical History:  
Diagnosis Date  Asthma  Back pain  COPD (chronic obstructive pulmonary disease) (HCC)  Diabetes ([address]  Esophageal varices in cirrhosis ([address]   
 6/2014 banding x 2  
 Fibromyalgia  Gastrointestinal disorder  GERD (gastroesophageal reflux disease)  Hypercholesteremia  Liver cirrhosis secondary to BALDERAS ([address]  Liver disease  Other and unspecified hyperlipidemia  Restless leg syndrome  Type II or unspecified type diabetes mellitus without mention of complication, uncontrolled  Unspecified essential hypertension [facility] Family History Problem Relation Age of Onset  Diabetes Mother  Stroke Sister  Diabetes Paternal Aunt  Diabetes Paternal Uncle  Heart Disease Neg Hx 31 [address] Social History Socioeconomic History  Marital status:  Spouse name: Not on file  Number of children: Not on file  Years of education: Not on file  Highest education level: Not on file Tobacco Use  Smoking status: Current Every Day Smoker Packs/day: 1.00 Years: 40.00 Pack years: 40.00  Smokeless tobacco: Former User Substance and Sexual Activity  Alcohol use: No  
  Comment: rare  Drug use: No  
Social History Narrative Lives in [facility] with . Has 2 daughters and 1 granddaughter who she cares for. On disability for her back. Used to work as a  and . Used to bowl and fish. ALLERGIES Allergies Allergen Reactions  Hydrocodone Nausea and Vomiting  Lisinopril Cough  Lortab [Hydrocodone-Acetaminophen] Nausea and Vomiting  Percocet [Oxycodone-Acetaminophen] Nausea and Vomiting PHYSICAL EXAMINATION:  
Patient Vitals for the past 24 hrs: 
 Temp Pulse Resp BP SpO2  
05/16/19 0741 98.6 °F (37 °C) 98 20 128/70 93 % 05/16/19 0401 98.7 °F (37.1 °C) 97 20 117/62 94 % 05/15/19 1943 98.5 °F (36.9 °C) (!) 111 20 127/71 94 % 05/15/19 1456 97.9 °F (36.6 °C) 100 20 138/67 99 % 05/15/19 1048 97.7 °F (36.5 °C) 80 16 127/64 94 % General:  
General appearance: Pt is in no acute distress Distal pulses are preserved Neurological Examination:  
Mental Status:  AAO x3. Speech is fluent. Follows commands, has fair fund of knowledge, attention, short term recall, comprehension and insight. Cranial Nerves: Visual fields are full. PERRL, Extraocular movements are full. Facial sensation intact. Facial movement intact. Hearing intact to conversation. Palate elevates symmetrically. Shoulder shrug symmetric. Tongue midline. Motor: Strength is 5/5 in all 4 ext. Normal tone. No atrophy. Sensation: Normal to light touch Coordination/Cerebellar: Intact to finger-nose-finger Gait: deferred Skin: No significant bruising or lacerations. LAB DATA REVIEWED:   
Recent Results (from the past 24 hour(s)) GLUCOSE, POC Collection Time: 05/15/19 11:16 AM  
Result Value Ref Range Glucose (POC) 158 (H) 65 - 100 mg/dL Performed by Karina Acosta (PCT) GLUCOSE, POC Collection Time: 05/15/19  4:09 PM  
Result Value Ref Range Glucose (POC) 135 (H) 65 - 100 mg/dL Performed by Karina Acosta (PCT) GLUCOSE, POC Collection Time: 05/15/19  9:32 PM  
Result Value Ref Range Glucose (POC) 121 (H) 65 - 100 mg/dL Performed by Greta Lyons ([facility]) METABOLIC PANEL, BASIC Collection Time: 05/16/19  4:32 AM  
Result Value Ref Range Sodium 140 136 - 145 mmol/L Potassium 3.7 3.5 - 5.1 mmol/L Chloride 106 97 - 108 mmol/L  
 CO2 28 21 - 32 mmol/L Anion gap 6 5 - 15 mmol/L Glucose 116 (H) 65 - 100 mg/dL BUN 8 6 - 20 MG/DL Creatinine 0.65 0.55 - 1.02 MG/DL  
 BUN/Creatinine ratio 12 12 - 20 GFR est AA >60 >60 ml/min/1.73m2 GFR est non-AA >60 >60 ml/min/1.73m2 Calcium 8.0 (L) 8.5 - 10.1 MG/DL  
GLUCOSE, POC Collection Time: 05/16/19  6:45 AM  
Result Value Ref Range Glucose (POC) 105 (H) 65 - 100 mg/dL Performed by Greta Lyons (PCT) Imaging review: 
None HOME MEDS Prior to Admission Medications Prescriptions Last Dose Informant Patient Reported? Taking? albuterol (PROAIR HFA) 90 mcg/actuation inhaler 5/14/2019 at Unknown time Significant Other Yes Yes Sig: Take 2 Puffs by inhalation every four (4) hours as needed for Wheezing. albuterol-ipratropium (DUO-NEB) 2.5 mg-0.5 mg/3 ml nebu Not Taking at Unknown time Significant Other No No  
Sig: 3 mL by Nebulization route every four (4) hours as needed for Other (Wheezing or Shortness of breath). amitriptyline (ELAVIL) 150 mg tablet 5/13/2019 at Unknown time Significant Other Yes Yes Sig: Take 1 Tab by mouth nightly. aspirin 81 mg chewable tablet 5/14/2019 at Unknown time Significant Other No Yes Sig: Take 1 Tab by mouth daily. atorvastatin (LIPITOR) 40 mg tablet 2019 at Unknown time Significant Other No Yes Sig: Take 1 Tab by mouth nightly. ferrous sulfate 325 mg (65 mg iron) tablet 2019 at Unknown time Significant Other No Yes Sig: take 1 tablet by mouth once daily with BREAKFAST  
fluticasone propion-salmeterol (ADVAIR DISKUS) 500-50 mcg/dose diskus inhaler 2019 at Unknown time Significant Other Yes Yes Sig: Take 1 Puff by inhalation every twelve (12) hours. furosemide (LASIX) 20 mg tablet 2019 at Unknown time Significant Other Yes Yes Sig: Take 20 mg by mouth daily. gabapentin (NEURONTIN) 600 mg tablet 2019 at Unknown time Significant Other Yes Yes Sig: Take 1,200 mg by mouth three (3) times daily. insulin glargine (LANTUS) 100 unit/mL injection 2019 at Unknown time Significant Other No Yes Sig: 15 Units by SubCUTAneous route daily for 30 days. insulin lispro (HUMALOG U-100 INSULIN) 100 unit/mL injection 2019 at Unknown time Significant Other Yes Yes Si-15 Units by SubCUTAneous route Before breakfast, lunch, dinner and at bedtime. Blood Glucose (mg/dL)       Insulin Dose (units) 0-149                                   0  
            150-200                               2  
            201-250                               4  
            251-300                               6  
            301-350                               8  
            351-400                               10  
            401-450                               12  
             451-500                               15  
lactulose (CHRONULAC) 10 gram/15 mL solution 2019 at Unknown time Significant Other No Yes Sig: Take 45 mL by mouth three (3) times daily for 30 days. losartan (COZAAR) 25 mg tablet 2019 at Unknown time Significant Other No Yes Sig: take 1 tablet by mouth once daily , REPLACES LISINOPRIL FOR BLOOD PRESSURE AND KIDNEY PROTECTION  
 magnesium oxide (MAG-OX) 400 mg tablet 2019 at Unknown time Significant Other Yes Yes Sig: Take 400 mg by mouth daily. metoprolol tartrate (LOPRESSOR) 25 mg tablet 2019 at Unknown time Significant Other No Yes Sig: Take 0.5 Tabs by mouth every twelve (12) hours for 30 days. nicotine (NICODERM CQ) 21 mg/24 hr 2019 at Unknown time Significant Other Yes Yes Si Patch by TransDERmal route every twenty-four (24) hours. omeprazole (PRILOSEC) 20 mg capsule 2019 at Unknown time Significant Other No Yes Sig: take 1 capsule by mouth once daily  
ondansetron (ZOFRAN ODT) 4 mg disintegrating tablet 2019 at Unknown time Significant Other No Yes Sig: dissolve 1 tablet ON TONGUE every 8 hours if needed for nausea  
rOPINIRole (REQUIP) 4 mg tab TAB 2019 at Unknown time Significant Other Yes Yes Sig: Take 4 mg by mouth nightly. rifAXIMin (XIFAXAN) 550 mg tablet 2019 at Unknown time Significant Other Yes Yes Sig: Take 550 mg by mouth two (2) times a day. spironolactone (ALDACTONE) 100 mg tablet 2019 at Unknown time Significant Other No Yes Sig: Take 2 Tabs by mouth daily. traMADol (ULTRAM) 50 mg tablet 2019 at Unknown time  Yes Yes Sig: Take 50 mg by mouth every six (6) hours as needed for Pain. Facility-Administered Medications: None CURRENT MEDS Current Facility-Administered Medications Medication Dose Route Frequency  zinc oxide-cod liver oil (DESITIN) 40 % paste   Topical BID  QUEtiapine (SEROquel) tablet 25 mg  25 mg Oral QHS  fluconazole (DIFLUCAN) 200mg/100 mL IVPB (premix)  200 mg IntraVENous Q24H  
 amitriptyline (ELAVIL) tablet 150 mg  150 mg Oral QHS  aspirin chewable tablet 81 mg  81 mg Oral DAILY  atorvastatin (LIPITOR) tablet 40 mg  40 mg Oral QHS  ferrous sulfate tablet 325 mg  1 Tab Oral DAILY WITH BREAKFAST  fluticasone-vilanterol (BREO ELLIPTA) 200mcg-25mcg/puff  1 Puff Inhalation DAILY  gabapentin (NEURONTIN) capsule 1,200 mg  1,200 mg Oral TID  insulin glargine (LANTUS) injection 15 Units  15 Units SubCUTAneous DAILY  lactulose (CHRONULAC) 10 gram/15 mL solution 45 mL  45 mL Oral TID  losartan (COZAAR) tablet 25 mg  25 mg Oral DAILY  metoprolol tartrate (LOPRESSOR) tablet 12.5 mg  12.5 mg Oral Q12H  
 nicotine (NICODERM CQ) 21 mg/24 hr patch 1 Patch  1 Patch TransDERmal Q24H  pantoprazole (PROTONIX) tablet 40 mg  40 mg Oral ACB  rifAXIMin (XIFAXAN) tablet 550 mg  550 mg Oral BID  rOPINIRole (REQUIP) tablet 4 mg  4 mg Oral QHS  spironolactone (ALDACTONE) tablet 200 mg  200 mg Oral DAILY  sodium chloride (NS) flush 5-40 mL  5-40 mL IntraVENous Q8H  
 enoxaparin (LOVENOX) injection 40 mg  40 mg SubCUTAneous Q24H  
 levoFLOXacin (LEVAQUIN) 750 mg in D5W IVPB  750 mg IntraVENous Q24H  
 insulin lispro (HUMALOG) injection   SubCUTAneous AC&HS  
 0.9% sodium chloride infusion  75 mL/hr IntraVENous CONTINUOUS  
 furosemide (LASIX) tablet 20 mg  20 mg Oral DAILY IMPRESSION: 
Tayo Hdez is a 61 y.o. female who presents with alt mental status. Suspect this to be secondary  UTI. Ammonia is elevated and has been fluctuating in the past. Mental status is Improving. Will get EEG to r/o NCSE. RECOMMENDATIONS: 
1. EEG If eeg negative for seizures, treatment of UTI as per primary team.  
 
Shelley Torres MD 
Neurologist

## 2019-05-16 NOTE — PROGRESS NOTES
Problem: Risk for Spread of Infection Goal: Prevent transmission of infectious organism to others Description Prevent the transmission of infectious organisms to other patients, staff members, and visitors. Outcome: Progressing Towards Goal 
  
Problem: Patient Education:  Go to Education Activity Goal: Patient/Family Education Outcome: Progressing Towards Goal 
  
Problem: Falls - Risk of 
Goal: *Absence of Falls Description Document Kimmy Abreu Fall Risk and appropriate interventions in the flowsheet. Outcome: Progressing Towards Goal 
  
Problem: Patient Education: Go to Patient Education Activity Goal: Patient/Family Education Outcome: Progressing Towards Goal 
  
Problem: Pressure Injury - Risk of 
Goal: *Prevention of pressure injury Description Document Nithin Scale and appropriate interventions in the flowsheet. Outcome: Progressing Towards Goal 
  
Problem: Patient Education: Go to Patient Education Activity Goal: Patient/Family Education Outcome: Progressing Towards Goal 
  
Problem: Patient Education: Go to Patient Education Activity Goal: Patient/Family Education Outcome: Progressing Towards Goal 
  
Problem: Patient Education: Go to Patient Education Activity Goal: Patient/Family Education Outcome: Progressing Towards Goal

## 2019-05-17 NOTE — PROGRESS NOTES
Problem: Risk for Spread of Infection Goal: Prevent transmission of infectious organism to others Description Prevent the transmission of infectious organisms to other patients, staff members, and visitors. Outcome: Progressing Towards Goal 
  
Problem: Falls - Risk of 
Goal: *Absence of Falls Description Document Johnathon Miles Fall Risk and appropriate interventions in the flowsheet. Outcome: Progressing Towards Goal 
  
Problem: Pressure Injury - Risk of 
Goal: *Prevention of pressure injury Description Document Nithin Scale and appropriate interventions in the flowsheet.  
Outcome: Progressing Towards Goal

## 2019-05-17 NOTE — PROGRESS NOTES
Hospitalist Progress Note NAME:  Robert Rahman :  1955 MRN:  [id number] Assessment / Plan: 
Acute Metabolic Encephalopathy POA In settings of UTI In settings of urinary retention Mental Status not improving, now UC with ESBL, probably Levaquin not working (many literate suggested Quinolone may not work for ESBL) Will to IV Meropenem today Mental status is been waxing and for past year, suspect underlying dementia worsening with time Pt required multiple straight cath this admission, CT showed Bladder distention with several locules of gas. Will place norman's. She will need OP urology followup for urodynamic studies and voiding trial in 2 weeks Neurology consult appreciated, EEG done without seizure like activity CT head without acute changes 
doubt hepatic encephalopathy as ammonia at her baseline around 50-60 
  
H/o Hepatic Encephalopathy Liver cirrhosis Continue lactulose and rifaximin Continue diuretics 
  
Lactic Acidosis POA No septic Not a good marker in liver disease as can be always be elevated 
  
DM 2 Continue lantus and SSI  
  
Code Status: Full Surrogate Decision Maker:  
  
DVT Prophylaxis: yes GI Prophylaxis: not indicated 
  
Baseline: just discharged from SNF to home   
             
  
Subjective: Pt seen and examined at bedside. Remain disoriented. Overnight events d/w RN  
  
CHIEF COMPLAINT: f/u \"altered mental status\" Review of Systems: 
Symptom Y/N Comments  Symptom Y/N Comments Fever/Chills    Chest Pain Poor Appetite    Edema Cough    Abdominal Pain Sputum    Joint Pain SOB/MARTINEZ    Pruritis/Rash Nausea/vomit    Tolerating PT/OT Diarrhea    Tolerating Diet Constipation    Other Could NOT obtain due to:   
 
Objective: VITALS:  
Last 24hrs VS reviewed since prior progress note. Most recent are: 
Patient Vitals for the past 24 hrs: 
 Temp Pulse Resp BP SpO2 05/17/19 1534 98.4 °F (36.9 °C) 89 20 121/65 94 % 05/17/19 1225 98.5 °F (36.9 °C) 85 22 116/61 95 % 05/17/19 0646 98.4 °F (36.9 °C) 87 20 132/77 98 % 05/16/19 2344 98.7 °F (37.1 °C) 63 20 120/62 94 % 05/16/19 2015 98.3 °F (36.8 °C) 95 20 127/73 98 % Intake/Output Summary (Last 24 hours) at 5/17/2019 1626 Last data filed at 5/17/2019 1549 Gross per 24 hour Intake 100 ml Output 3950 ml Net -3850 ml PHYSICAL EXAM: 
General: WD, WN. Alert, cooperative, no acute distress   
EENT:  EOMI. Anicteric sclerae. MMM Resp:  CTA bilaterally, no wheezing or rales. No accessory muscle use CV:  Regular  rhythm,  No edema GI:  Soft, Non distended, Non tender.  +Bowel sounds Neurologic:  Alert, normal speech, Psych:   Poor insight. Not anxious nor agitated Skin:  No rashes. No jaundice Reviewed most current lab test results and cultures  YES Reviewed most current radiology test results   YES Review and summation of old records today    NO Reviewed patient's current orders and MAR    YES 
PMH/SH reviewed - no change compared to H&P 
________________________________________________________________________ Care Plan discussed with: 
  Comments Patient y Family  y  RN y   
Care Manager Consultant Multidiciplinary team rounds were held today with , nursing, pharmacist and clinical coordinator. Patient's plan of care was discussed; medications were reviewed and discharge planning was addressed. ________________________________________________________________________ Total NON critical care TIME:  30 Minutes Total CRITICAL CARE TIME Spent:   Minutes non procedure based Comments >50% of visit spent in counseling and coordination of care    
________________________________________________________________________ Judy Moreno MD  
 
Procedures: see electronic medical records for all procedures/Xrays and details which were not copied into this note but were reviewed prior to creation of Plan. LABS: 
I reviewed today's most current labs and imaging studies. Pertinent labs include: 
Recent Labs 05/15/19 
6804 WBC 6.7 HGB 9.7* HCT 30.5* PLT 60* Recent Labs 05/17/19 
0321 05/16/19 
4211 05/15/19 
7459  140 138  
K 3.7 3.7 3.7  106 104 CO2 26 28 28 * 116* 117* BUN 8 8 13 CREA 0.62 0.65 0.52* CA 7.8* 8.0* 7.7* Signed: Herve Harding MD

## 2019-05-17 NOTE — PROGRESS NOTES
Bedside and Verbal shift change report given to [facility] (oncoming nurse) by Fabian Ross (offgoing nurse). Report included the following information SBAR, Kardex, Intake/Output and MAR. 
  
Zone Phone:   [phone] 
  
  
Significant changes during shift:  
Continue antibiotics and reorient patient. 
  
Patient Information 
  
Ria Cortes 
61 y.o. 
5/14/2019 11:30 AM by Joan Kelley MD. Ria Cortes was admitted from Home 
  
Problem List 
  
    
Patient Active Problem List  
  Diagnosis Date Noted  Goals of care, counseling/discussion    
 Advanced care planning/counseling discussion    
 Debility    
 Neuropathic pain    
 Chronic bilateral low back pain with bilateral sciatica    
 Encephalopathy 04/24/2019  Acute respiratory failure with hypercapnia ([address] 04/02/2019  Counseling regarding advance care planning and goals of care 04/02/2019  Hepatic encephalopathy ([address] 03/28/2019  Colitis 03/28/2019  UTI (urinary tract infection) 03/28/2019  Septic shock ([address] 03/28/2019  Bilateral carotid artery stenosis 11/14/2018  TIA (transient ischemic attack) 11/13/2018  GERD (gastroesophageal reflux disease) 11/13/2018  Therapeutic drug monitoring 10/16/2018  Severe obesity ([address] 10/11/2018  Obesity (BMI 30.0-34.9) 07/10/2018  Sepsis ([address] 03/30/2017  CAP (community acquired pneumonia) 03/30/2017  IBIS (obstructive sleep apnea) 03/30/2017  Tobacco abuse 03/30/2017  Neuropathy 03/30/2017  COPD (chronic obstructive pulmonary disease) ([address] 03/28/2017  Acute deep vein thrombosis (DVT) of right lower extremity ([address] 11/10/2016  
 BALDERAS (nonalcoholic steatohepatitis) 03/10/2016  GI bleed 03/03/2016  Anemia 03/01/2016  Thrombocytopenia ([address] 08/15/2013  Previous back surgery 08/15/2013  S/P CHACHO (total abdominal hysterectomy) 08/15/2013  Cirrhosis ([address] 08/15/2013  Diabetes mellitus with neurological manifestations, uncontrolled ([address]    
  Essential hypertension, benign    
 Hyperlipidemia LDL goal <100    
  
    
Past Medical History:  
Diagnosis Date  Asthma    
 Back pain    
 COPD (chronic obstructive pulmonary disease) (HCC)    
 Diabetes (HCC)    
 Esophageal varices in [facility] [address]    
  6/2014 banding x 2  
 Fibromyalgia    
 Gastrointestinal disorder    
 GERD (gastroesophageal reflux disease)    
 Hypercholesteremia    
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    
 Liver disease    
 Other and unspecified hyperlipidemia    
 Restless leg syndrome    
 Type II or unspecified type diabetes mellitus without mention of complication, uncontrolled    
 Unspecified essential hypertension    
  
Activity Status: 
  
OOB to Chair yes Ambulated this shift yes Bed Rest No 
  
Supplemental O2: (If Applicable) 
  
Patient refuses to keep oxygen on. 
  
DVT prophylaxis: 
  
DVT prophylaxis Med- Yes DVT prophylaxis SCD or CRISTOPHER- No  
  
Wounds: (If Applicable) 
  
Wounds- DTI on buttocks, cream applied 
  
Patient Safety: 
  
Falls Score Total Score: 3 Safety Level_______ Bed Alarm On? Yes Sitter?  No 
  
Plan for upcoming shift: safety 
  
Discharge Plan: No  
  
Active Consults: 
IP CONSULT TO NEUROLOGY

## 2019-05-17 NOTE — ROUTINE PROCESS
Bedside and Verbal shift change report given to [facility] RN (oncoming nurse) by Jean Claude Sandoval (offgoing nurse). Report included the following information SBAR, Kardex, Intake/Output and MAR. 
  
Zone Phone:   [phone] 
  
  
Significant changes during shift:  
Continue antibiotics and reorient patient. 
  
Patient Information 
  
Oscar Grant 
61 y.o. 
5/14/2019 11:30 AM by José Manuel Cerna MD. Dhara Mahoney was admitted from Home 
  
Problem List 
  
       
Patient Active Problem List  
  Diagnosis Date Noted  Goals of care, counseling/discussion    
 Advanced care planning/counseling discussion    
 Debility    
 Neuropathic pain    
 Chronic bilateral low back pain with bilateral sciatica    
 Encephalopathy 04/24/2019  Acute respiratory failure with hypercapnia ([address] 04/02/2019  Counseling regarding advance care planning and goals of care 04/02/2019  Hepatic encephalopathy ([address] 03/28/2019  Colitis 03/28/2019  UTI (urinary tract infection) 03/28/2019  Septic shock ([address] 03/28/2019  Bilateral carotid artery stenosis 11/14/2018  TIA (transient ischemic attack) 11/13/2018  GERD (gastroesophageal reflux disease) 11/13/2018  Therapeutic drug monitoring 10/16/2018  Severe obesity ([address] 10/11/2018  Obesity (BMI 30.0-34.9) 07/10/2018  Sepsis ([address] 03/30/2017  CAP (community acquired pneumonia) 03/30/2017  IBIS (obstructive sleep apnea) 03/30/2017  Tobacco abuse 03/30/2017  Neuropathy 03/30/2017  COPD (chronic obstructive pulmonary disease) ([address] 03/28/2017  Acute deep vein thrombosis (DVT) of right lower extremity ([address] 11/10/2016  
 BALDERAS (nonalcoholic steatohepatitis) 03/10/2016  GI bleed 03/03/2016  Anemia 03/01/2016  Thrombocytopenia ([address] 08/15/2013  Previous back surgery 08/15/2013  S/P CHACHO (total abdominal hysterectomy) 08/15/2013  Cirrhosis ([address] 08/15/2013  Diabetes mellitus with neurological manifestations, uncontrolled ([address]    
  Essential hypertension, benign    
 Hyperlipidemia LDL goal <100    
  
       
Past Medical History:  
Diagnosis Date  Asthma    
 Back pain    
 COPD (chronic obstructive pulmonary disease) (HCC)    
 Diabetes (HCC)    
 Esophageal varices in cirrhosis ([facility]    
  6/2014 banding x 2  
 Fibromyalgia    
 Gastrointestinal disorder    
 GERD (gastroesophageal reflux disease)    
 Hypercholesteremia    
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    
 Liver disease    
 Other and unspecified hyperlipidemia    
 Restless leg syndrome    
 Type II or unspecified type diabetes mellitus without mention of complication, uncontrolled    
 Unspecified essential hypertension    
  
Activity Status: 
  
OOB to MtoV Ambulated this shift no  
Bed Rest No 
  
Supplemental W6: (DM Applicable) 
  
Patient refuses to keep oxygen on. 
  
DVT prophylaxis: 
  
DVT prophylaxis Med- Yes DVT prophylaxis SCD or CRISTOPHER- No  
  
Wounds: (If Applicable) 
  
Wounds- DTI on buttocks, cream applied 
  
Patient Safety: 
  
Falls Score Total Score: 3 Safety Level_______ Bed Alarm On? Yes Sitter? No 
  
Plan for upcoming shift: safety 
  
Discharge Plan: No  
  
Active Consults: 
IP CONSULT TO NEUROLOGY

## 2019-05-18 NOTE — PROGRESS NOTES
Bedside and Verbal shift change report given to Rafy FLORES (oncoming nurse) by Juan Manuel (offgoing nurse). Report included the following information SBAR, Kardex, Intake/Output and MAR. 
  
Zone Phone:   [phone] 
  
  
Significant changes during shift:  
Continue antibiotics and reorient patient. 
  
Patient Information 
  
Sadi Castellon 
61 y.o. 
5/14/2019 11:30 AM by Genie Rodriguez MD. Dhara Mahoney was admitted from Home 
  
Problem List 
  
       
Patient Active Problem List  
  Diagnosis Date Noted  Goals of care, counseling/discussion    
 Advanced care planning/counseling discussion    
 Debility    
 Neuropathic pain    
 Chronic bilateral low back pain with bilateral sciatica    
 Encephalopathy 04/24/2019  Acute respiratory failure with hypercapnia ([address] 04/02/2019  Counseling regarding advance care planning and goals of care 04/02/2019  Hepatic encephalopathy ([address] 03/28/2019  Colitis 03/28/2019  UTI (urinary tract infection) 03/28/2019  Septic shock ([address] 03/28/2019  Bilateral carotid artery stenosis 11/14/2018  TIA (transient ischemic attack) 11/13/2018  GERD (gastroesophageal reflux disease) 11/13/2018  Therapeutic drug monitoring 10/16/2018  Severe obesity ([address] 10/11/2018  Obesity (BMI 30.0-34.9) 07/10/2018  Sepsis ([address] 03/30/2017  CAP (community acquired pneumonia) 03/30/2017  IBIS (obstructive sleep apnea) 03/30/2017  Tobacco abuse 03/30/2017  Neuropathy 03/30/2017  COPD (chronic obstructive pulmonary disease) ([address] 03/28/2017  Acute deep vein thrombosis (DVT) of right lower extremity ([address] 11/10/2016  
 BALDERAS (nonalcoholic steatohepatitis) 03/10/2016  GI bleed 03/03/2016  Anemia 03/01/2016  Thrombocytopenia ([address] 08/15/2013  Previous back surgery 08/15/2013  S/P CHACHO (total abdominal hysterectomy) 08/15/2013  Cirrhosis ([address] 08/15/2013  Diabetes mellitus with neurological manifestations, uncontrolled ([address]    
  Essential hypertension, benign    
 Hyperlipidemia LDL goal <100    
  
       
Past Medical History:  
Diagnosis Date  Asthma    
 Back pain    
 COPD (chronic obstructive pulmonary disease) (HCC)    
 Diabetes (HCC)    
 Esophageal varices in cirrhosis [facility]    
  6/2014 banding x 2  
 Fibromyalgia    
 Gastrointestinal disorder    
 GERD (gastroesophageal reflux disease)    
 Hypercholesteremia    
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    
 Liver disease    
 Other and unspecified hyperlipidemia    
 Restless leg syndrome    
 Type II or unspecified type diabetes mellitus without mention of complication, uncontrolled    
 Unspecified essential hypertension    
  
Activity Status: 
  
OOB to [employer] Ambulated this shift no  
Bed Rest No 
  
Supplemental Y1: (EH Applicable) 
  
Patient refuses to keep oxygen on. 
  
DVT prophylaxis: 
  
DVT prophylaxis Med- Yes DVT prophylaxis SCD or CRISTOPHER- No  
  
Wounds: (If Applicable) 
  
Wounds- DTI on buttocks, cream applied 
  
Patient Safety: 
  
Falls Score Total Score: 3 Safety Level_______ Bed Alarm On? Yes Sitter?  No 
  
Plan for upcoming shift: safety 
  
Discharge Plan: No  
  
Active Consults: 
IP CONSULT TO NEUROLOGY

## 2019-05-18 NOTE — PROGRESS NOTES
Hospitalist Progress Note NAME:  Rich Duke :  1955 MRN:  [id number] Assessment / Plan: 
Acute Metabolic Encephalopathy POA In settings of UTI, In settings of urinary retention Mental Status not improving, now UC with ESBL, probably Levaquin not working (many literate suggested Quinolone may not work for ESBL) Changed to [place] on  D2 Mental status is been waxing and for past year, suspect underlying dementia worsening with time Pt required multiple straight cath this admission, CT showed Bladder distention with several locules of gas. Will place norman's. She will need OP urology followup for urodynamic studies and voiding trial in 2 weeks Neurology consult appreciated, EEG done without seizure like activity CT head without acute changes 
doubt hepatic encephalopathy as ammonia at her baseline around 50-60 Per the  pt is back to baseline in [place] 
  
H/o Hepatic Encephalopathy Liver cirrhosis Continue lactulose and rifaximin Continue diuretics 
  
Lactic Acidosis POA No septic Not a good marker in liver disease as can be always be elevated 
  
DM 2 Continue lantus and SSI  
  
 
PT/Ot recommended SNF 
CM consult placed Code Status: Full Surrogate Decision Maker:  
  
DVT Prophylaxis: yes GI Prophylaxis: not indicated 
  
Baseline: just discharged from SNF to home   
             
  
  
CHIEF COMPLAINT: f/u \"altered mental status\" I am ok Review of Systems: 
Symptom Y/N Comments  Symptom Y/N Comments Fever/Chills n   Chest Pain n   
Poor Appetite    Edema Cough n   Abdominal Pain n   
Sputum    Joint Pain SOB/MARTINEZ    Pruritis/Rash Nausea/vomit    Tolerating PT/OT Diarrhea    Tolerating Diet Constipation    Other Could NOT obtain due to:   
 
Objective: VITALS:  
Last 24hrs VS reviewed since prior progress note. Most recent are: 
Patient Vitals for the past 24 hrs: 
 Temp Pulse Resp BP SpO2 05/18/19 1125 97.8 °F (36.6 °C) 75 18 127/73 97 % 05/18/19 0814 98.1 °F (36.7 °C) 84 18 122/69 95 % 05/18/19 0539 98.6 °F (37 °C) 83 18 126/73 97 % 05/17/19 2306 98.1 °F (36.7 °C) 91 18 114/61 94 % 05/17/19 2016 97.9 °F (36.6 °C) 97 20 (!) 129/110 96 % 05/17/19 1534 98.4 °F (36.9 °C) 89 20 121/65 94 % Intake/Output Summary (Last 24 hours) at 5/18/2019 1312 Last data filed at 5/18/2019 7127 Gross per 24 hour Intake 330 ml Output 2900 ml Net -2570 ml PHYSICAL EXAM: 
General: WD, WN. Alert, cooperative, no acute distress   
EENT:  EOMI. Anicteric sclerae. MMM Resp:  CTA bilaterally, no wheezing or rales. No accessory muscle use CV:  Regular  rhythm,  No edema GI:  Soft, Non distended, Non tender.  +Bowel sounds Neurologic:  Alert, normal speech, Psych:   Poor insight. Not anxious nor agitated Skin:  No rashes. No jaundice Reviewed most current lab test results and cultures  YES Reviewed most current radiology test results   YES Review and summation of old records today    NO Reviewed patient's current orders and MAR    YES 
PMH/ reviewed - no change compared to H&P 
________________________________________________________________________ Care Plan discussed with: 
  Comments Patient y Family RN y   
Care Manager Consultant Multidiciplinary team rounds were held today with , nursing, pharmacist and clinical coordinator. Patient's plan of care was discussed; medications were reviewed and discharge planning was addressed. ________________________________________________________________________ Total NON critical care TIME:  25 Minutes Total CRITICAL CARE TIME Spent:   Minutes non procedure based Comments >50% of visit spent in counseling and coordination of care    
________________________________________________________________________ Myriam Leon MD  
 
 Procedures: see electronic medical records for all procedures/Xrays and details which were not copied into this note but were reviewed prior to creation of Plan. LABS: 
I reviewed today's most current labs and imaging studies. Pertinent labs include: 
Recent Labs 05/18/19 0410 WBC 5.7 HGB 10.6* HCT 34.5*  
PLT 71* Recent Labs 05/18/19 
0410 05/17/19 
0321 05/16/19 
0510  141 140  
K 3.3* 3.7 3.7  107 106 CO2 26 26 28 * 111* 116* BUN 7 8 8 CREA 0.63 0.62 0.65 CA 7.8* 7.8* 8.0* ALB 2.4*  --   --   
TBILI 0.7  --   --   
SGOT 47*  --   --   
ALT 36  --   --   
 
 
Signed: Bhavana Almonte MD

## 2019-05-19 NOTE — PROGRESS NOTES
[]Bang for details Bedside and Verbal shift change report given to Juan Manuel FLORES (oncoming nurse) by Rafy (offgoing nurse). Report included the following information SBAR, Kardex, Intake/Output and MAR. 
  
Zone Phone:   [phone] 
  
  
Significant changes during shift:  
2 bowel movements, ambulate to bedside commode 
  
Patient Information 
  
Alanna Brown 
61 y.o. 
5/14/2019 11:30 AM by Antonia Acevedo MD. Dhara Mahoney was admitted from Home 
  
Problem List 
  
       
Patient Active Problem List  
  Diagnosis Date Noted  Goals of care, counseling/discussion    
 Advanced care planning/counseling discussion    
 Debility    
 Neuropathic pain    
 Chronic bilateral low back pain with bilateral sciatica    
 Encephalopathy 04/24/2019  Acute respiratory failure with hypercapnia ([address] 04/02/2019  Counseling regarding advance care planning and goals of care 04/02/2019  Hepatic encephalopathy ([address] 03/28/2019  Colitis 03/28/2019  UTI (urinary tract infection) 03/28/2019  Septic shock ([address] 03/28/2019  Bilateral carotid artery stenosis 11/14/2018  TIA (transient ischemic attack) 11/13/2018  GERD (gastroesophageal reflux disease) 11/13/2018  Therapeutic drug monitoring 10/16/2018  Severe obesity ([address] 10/11/2018  Obesity (BMI 30.0-34.9) 07/10/2018  Sepsis ([address] 03/30/2017  CAP (community acquired pneumonia) 03/30/2017  IBIS (obstructive sleep apnea) 03/30/2017  Tobacco abuse 03/30/2017  Neuropathy 03/30/2017  COPD (chronic obstructive pulmonary disease) ([address] 03/28/2017  Acute deep vein thrombosis (DVT) of right lower extremity ([address] 11/10/2016  
 BALDERAS (nonalcoholic steatohepatitis) 03/10/2016  GI bleed 03/03/2016  Anemia 03/01/2016  Thrombocytopenia ([address] 08/15/2013  Previous back surgery 08/15/2013  S/P CHACHO (total abdominal hysterectomy) 08/15/2013  Cirrhosis ([address] 08/15/2013  Diabetes mellitus with neurological manifestations, uncontrolled ([facility]    
 Essential hypertension, benign    
 Hyperlipidemia LDL goal <100    
  
       
Past Medical History:  
Diagnosis Date  Asthma    
 Back pain    
 COPD (chronic obstructive pulmonary disease) (HCC)    
 Diabetes (HCC)    
 Esophageal varices in cirrhosis ([facility]    
  6/2014 banding x 2  
 Fibromyalgia    
 Gastrointestinal disorder    
 GERD (gastroesophageal reflux disease)    
 Hypercholesteremia    
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    
 Liver disease    
 Other and unspecified hyperlipidemia    
 Restless leg syndrome    
 Type II or unspecified type diabetes mellitus without mention of complication, uncontrolled    
 Unspecified essential hypertension    
  
Activity Status: 
  
OOB to [employer] Ambulated this shift Yes Bed Rest No 
  
Supplemental X9: (KE Applicable) 
  
No. 
  
DVT prophylaxis: 
  
DVT prophylaxis Med- Yes DVT prophylaxis SCD or CRISTOPHER- No  
  
Wounds: (If Applicable) 
  
Wounds- DTI on buttocks, cream applied 
  
Patient Safety: 
  
Falls Score Total Score: 3 Safety Level_______ Bed Alarm On? Yes Sitter?  No 
  
Plan for upcoming shift: safety 
  
Discharge Plan: No  
  
Active Consults: 
IP CONSULT TO NEUROLOGY

## 2019-05-19 NOTE — PROGRESS NOTES
Hospitalist Progress Note NAME:  Brady Yen :  1955 MRN:  [id number] Assessment / Plan: 
Acute Metabolic Encephalopathy POA In settings of UTI, In settings of urinary retention Given Mental Status not improving,  UC with ESBL, probably Levaquin not working (many literate suggested Quinolone may not work for ESBL)  Was Changed to [place] on  D3/5 (would complete for 5 days) Mental status is been waxing and for past year, suspect underlying dementia worsening with time. Pt needs formal neurocognitive testing as out patient Pt required multiple straight cath this admission, CT showed Bladder distention with several locules of gas. On norman now . She will need OP urology followup for urodynamic studies and voiding trial in 2 weeks Neurology consult appreciated, EEG done without seizure like activity CT head without acute changes 
doubt hepatic encephalopathy as ammonia at her baseline around 50-60 Per the  pt is back  To her baseline  
 
  
H/o Hepatic Encephalopathy Liver cirrhosis Continue lactulose and rifaximin Continue diuretics 
  
Lactic Acidosis POA No septic Not a good marker in liver disease as can be always be elevated 
  
DM 2 Continue lantus and SSI  
  
 
PT/OT  
CM consult placed for d/c planning Code Status: Full Surrogate Decision Maker:  
  
DVT Prophylaxis: yes GI Prophylaxis: not indicated 
  
Baseline: just discharged from SNF to home   
             
  
  
CHIEF COMPLAINT: f/u \"altered mental status\" Intermittently confused Review of Systems: 
Symptom Y/N Comments  Symptom Y/N Comments Fever/Chills n   Chest Pain n   
Poor Appetite    Edema Cough n   Abdominal Pain n   
Sputum    Joint Pain SOB/MARTINEZ    Pruritis/Rash Nausea/vomit    Tolerating PT/OT Diarrhea    Tolerating Diet Constipation    Other Could NOT obtain due to:   
 
Objective: VITALS:  
 Last 24hrs VS reviewed since prior progress note. Most recent are: 
Patient Vitals for the past 24 hrs: 
 Temp Pulse Resp BP SpO2  
05/19/19 1125 98.5 °F (36.9 °C) 83 18 130/76 96 % 05/19/19 0803 98.8 °F (37.1 °C) 86 18 129/70 96 % 05/19/19 0514 98.8 °F (37.1 °C) 82 18 123/71 92 % 05/18/19 2353 97.9 °F (36.6 °C) 88 18 144/69 93 % 05/18/19 2030 98 °F (36.7 °C) 89 18 127/68 92 % 05/18/19 1622 98.1 °F (36.7 °C) 80 18 115/74 94 % Intake/Output Summary (Last 24 hours) at 5/19/2019 1255 Last data filed at 5/18/2019 2030 Gross per 24 hour Intake 1956 ml Output 1800 ml Net 156 ml PHYSICAL EXAM: 
General: WD, WN. Alert, cooperative, no acute distress   
EENT:  EOMI. Anicteric sclerae. MMM Resp:  Bilateral air entry, no crackles  No accessory muscle use CV:  Regular  rhythm,  No edema GI:  Soft, Non distended, Non tender.  +Bowel sounds Neurologic:  Alert, normal speech, Psych:   Poor insight. Not anxious nor agitated Skin:  No rashes. No jaundice Reviewed most current lab test results and cultures  YES Reviewed most current radiology test results   YES Review and summation of old records today    NO Reviewed patient's current orders and MAR    YES 
PMH/ reviewed - no change compared to H&P 
________________________________________________________________________ Care Plan discussed with: 
  Comments Patient y Family   Updated pt  on 5/18 RN y   
Care Manager Consultant Multidiciplinary team rounds were held today with , nursing, pharmacist and clinical coordinator. Patient's plan of care was discussed; medications were reviewed and discharge planning was addressed. ________________________________________________________________________ Total NON critical care TIME:  20 Minutes Total CRITICAL CARE TIME Spent:   Minutes non procedure based Comments >50% of visit spent in counseling and coordination of care ________________________________________________________________________ Myriam Leon MD  
 
Procedures: see electronic medical records for all procedures/Xrays and details which were not copied into this note but were reviewed prior to creation of Plan. LABS: 
I reviewed today's most current labs and imaging studies. Pertinent labs include: 
Recent Labs 05/18/19 0410 WBC 5.7 HGB 10.6* HCT 34.5*  
PLT 71* Recent Labs 05/18/19 0410 05/17/19 
0321  141  
K 3.3* 3.7  107 CO2 26 26 * 111* BUN 7 8 CREA 0.63 0.62  
CA 7.8* 7.8* ALB 2.4*  --   
TBILI 0.7  --   
SGOT 47*  --   
ALT 36  --   
 
 
Signed: Myriam Leon MD

## 2019-05-19 NOTE — ROUTINE PROCESS
Made MD aware of patient's decreased mentation today, and that this decline was noted by patient's spouse, as well. MD requested nurse continue to call with changes in patient condition.

## 2019-05-19 NOTE — PROGRESS NOTES
[]Bang for details Bedside and Verbal shift change report given to Rafy FLORES (oncoming nurse) by Juan Manuel (offgoing nurse). Report included the following information SBAR, Kardex, Intake/Output and MAR. 
  
Zone Phone:   [phone] 
  
  
Significant changes during shift:  
Continue antibiotics and reorient patient. 
  
Patient Information 
  
Lindsey Wilson 
61 y.o. 
5/14/2019 11:30 AM by Mega Frankel MD. Dhara Mahoney was admitted from Home 
  
Problem List 
  
       
Patient Active Problem List  
  Diagnosis Date Noted  Goals of care, counseling/discussion    
 Advanced care planning/counseling discussion    
 Debility    
 Neuropathic pain    
 Chronic bilateral low back pain with bilateral sciatica    
 Encephalopathy 04/24/2019  Acute respiratory failure with hypercapnia ([address] 04/02/2019  Counseling regarding advance care planning and goals of care 04/02/2019  Hepatic encephalopathy ([address] 03/28/2019  Colitis 03/28/2019  UTI (urinary tract infection) 03/28/2019  Septic shock ([address] 03/28/2019  Bilateral carotid artery stenosis 11/14/2018  TIA (transient ischemic attack) 11/13/2018  GERD (gastroesophageal reflux disease) 11/13/2018  Therapeutic drug monitoring 10/16/2018  Severe obesity ([address] 10/11/2018  Obesity (BMI 30.0-34.9) 07/10/2018  Sepsis ([address] 03/30/2017  CAP (community acquired pneumonia) 03/30/2017  IBIS (obstructive sleep apnea) 03/30/2017  Tobacco abuse 03/30/2017  Neuropathy 03/30/2017  COPD (chronic obstructive pulmonary disease) ([address] 03/28/2017  Acute deep vein thrombosis (DVT) of right lower extremity ([address] 11/10/2016  
 BALDERAS (nonalcoholic steatohepatitis) 03/10/2016  GI bleed 03/03/2016  Anemia 03/01/2016  Thrombocytopenia ([address] 08/15/2013  Previous back surgery 08/15/2013  S/P CHACHO (total abdominal hysterectomy) 08/15/2013  Cirrhosis ([address] 08/15/2013  Diabetes mellitus with neurological manifestations, uncontrolled ([facility]    
 Essential hypertension, benign    
 Hyperlipidemia LDL goal <100    
  
       
Past Medical History:  
Diagnosis Date  Asthma    
 Back pain    
 COPD (chronic obstructive pulmonary disease) (HCC)    
 Diabetes (HCC)    
 Esophageal varices in cirrhosis ([facility]    
  6/2014 banding x 2  
 Fibromyalgia    
 Gastrointestinal disorder    
 GERD (gastroesophageal reflux disease)    
 Hypercholesteremia    
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    
 Liver disease    
 Other and unspecified hyperlipidemia    
 Restless leg syndrome    
 Type II or unspecified type diabetes mellitus without mention of complication, uncontrolled    
 Unspecified essential hypertension    
  
Activity Status: 
  
OOB to [employer] Ambulated this shift no  
Bed Rest No 
  
Supplemental P9: (VU Applicable) 
  
Patient refuses to keep oxygen on. 
  
DVT prophylaxis: 
  
DVT prophylaxis Med- Yes DVT prophylaxis SCD or CRISTOPHER- No  
  
Wounds: (If Applicable) 
  
Wounds- DTI on buttocks, cream applied 
  
Patient Safety: 
  
Falls Score Total Score: 3 Safety Level_______ Bed Alarm On? Yes Sitter?  No 
  
Plan for upcoming shift: safety 
  
Discharge Plan: No  
  
Active Consults: 
IP CONSULT TO NEUROLOGY

## 2019-05-20 NOTE — PROGRESS NOTES
BASILIO Plan--- 1.) Skilled Nursing Facility 
 
                      2.) Home with Home Health preferably after short term rehab. Spoke with  and he would like her to have short term rehab at [facility] before going home. Referral sent to [facility] and will await their response.

## 2019-05-20 NOTE — PROGRESS NOTES
Problem: Self Care Deficits Care Plan (Adult) Goal: *Acute Goals and Plan of Care (Insert Text) Description Occupational Therapy Goals: 
Initiated 5/16/2019 1. Patient will perform grooming standing with supervision/set-up within 7 days. 2. Patient will perform toileting with supervision/set-up within 7 days. 3. Patient will perform lower body dressing with supervision/set-up within 7 days. 4. Patient will transfer from toilet with supervision/set-up using the least restrictive device and appropriate durable medical equipment within 7 days. Outcome: Progressing Towards Goal 
 
OCCUPATIONAL THERAPY TREATMENT Patient: Verena Ards (34 y.o. female) Date: 5/20/2019 Diagnosis: Hepatic encephalopathy ([facility] [K72.90] UTI (urinary tract infection) [N39.0] <principal problem not specified> Precautions: Contact, Bed Alarm(EBSL) Chart, occupational therapy assessment, plan of care, and goals were reviewed. ASSESSMENT: 
Cleared by RN to see pt for therapy session. Pt received supine in bed, agreeable to participate with encouragement. Supervision for supine>sit, good sitting balance. Using RW pt ambulated to bathroom with CGA, min A to transfer to toilet. Unsteady but no LOB, pt with R foot externally rotated and difficulty advancing at times. Total A for bowel hygiene in standing, pt declined to attempt and family with differing responses on whether or not pt was I with toileting at home. Transferred to chair at end of session with call bell in reach and needs met. VSS throughout. Pt agitated during session and self-limiting with activity. At this time pt requires CGA-min A for transfers and setup-total A for ADLs. Pending progress recommend SNF at discharge. Progression toward goals: 
?       Improving appropriately and progressing toward goals ? Improving slowly and progressing toward goals ? Not making progress toward goals and plan of care will be adjusted PLAN: 
 Patient continues to benefit from skilled intervention to address the above impairments. Continue treatment per established plan of care. Discharge Recommendations:  [facility] Further Equipment Recommendations for Discharge:  TBD SUBJECTIVE:  
Patient stated ? How long am I going to sit here. ? OBJECTIVE DATA SUMMARY:  
Cognitive/Behavioral Status: 
Neurologic State: Alert Orientation Level: Oriented X4 Cognition: Follows commands;Decreased attention/concentration Perception: Appears intact Perseveration: No perseveration noted Safety/Judgement: Fall prevention;Decreased insight into deficits; Decreased awareness of need for safety Functional Mobility and Transfers for ADLs: 
Bed Mobility: 
Rolling: Supervision; Additional time Supine to Sit: Supervision; Additional time;Assist x1 Scooting: Supervision; Additional time;Assist x1 Transfers: 
Sit to Stand: Contact guard assistance;Assist x2 Functional Transfers Toilet Transfer : Minimum assistance; Additional time; Adaptive equipment(RW) 
  
 
Balance: 
Sitting: Intact Standing: Impaired; With support Standing - Static: Constant support; Fair 
Standing - Dynamic : Fair ADL Intervention: Lower Body Dressing Assistance Socks: Total assistance (dependent) Toileting Bowel Hygiene: Total assistance (dependent)(in standing) Cognitive Retraining Safety/Judgement: Fall prevention;Decreased insight into deficits; Decreased awareness of need for safety Pain: 
Pain Scale 1: Numeric (0 - 10) Pain Intensity 1: 8 Pain Location 1: Back;Leg;Foot Pain Orientation 1: Anterior Pain Description 1: Aching Pain Intervention(s) 1: Declines Activity Tolerance:  
Fair Please refer to the flowsheet for vital signs taken during this treatment. After treatment:  
? Patient left in no apparent distress sitting up in chair ? Patient left in no apparent distress in bed 
? Call bell left within reach ? Nursing notified ? Caregiver present ? Chair alarm activated COMMUNICATION/COLLABORATION:  
The patient?s plan of care was discussed with: Physical Therapist and Registered Nurse Ivy Gu OT Time Calculation: 22 mins

## 2019-05-20 NOTE — PROGRESS NOTES
Bedside and Verbal shift change report given to Monika FLORES (oncoming nurse) by Yair (offgoing nurse). Report included the following information SBAR, Kardex, Intake/Output and MAR. 
  
Zone Phone:   [phone] 
  
  
Significant changes during shift:  
2 bowel movements, ambulate to bedside commode 
  
Patient Information 
  
Florina Billy 
61 y.o. 
5/14/2019 11:30 AM by Phil Ruiz MD. Dhara Mahoney was admitted from Home 
  
Problem List 
  
       
Patient Active Problem List  
  Diagnosis Date Noted  Goals of care, counseling/discussion    
 Advanced care planning/counseling discussion    
 Debility    
 Neuropathic pain    
 Chronic bilateral low back pain with bilateral sciatica    
 Encephalopathy 04/24/2019  Acute respiratory failure with hypercapnia ([address] 04/02/2019  Counseling regarding advance care planning and goals of care 04/02/2019  Hepatic encephalopathy ([address] 03/28/2019  Colitis 03/28/2019  UTI (urinary tract infection) 03/28/2019  Septic shock ([address] 03/28/2019  Bilateral carotid artery stenosis 11/14/2018  TIA (transient ischemic attack) 11/13/2018  GERD (gastroesophageal reflux disease) 11/13/2018  Therapeutic drug monitoring 10/16/2018  Severe obesity ([address] 10/11/2018  Obesity (BMI 30.0-34.9) 07/10/2018  Sepsis ([address] 03/30/2017  CAP (community acquired pneumonia) 03/30/2017  IBIS (obstructive sleep apnea) 03/30/2017  Tobacco abuse 03/30/2017  Neuropathy 03/30/2017  COPD (chronic obstructive pulmonary disease) ([address] 03/28/2017  Acute deep vein thrombosis (DVT) of right lower extremity ([address] 11/10/2016  
 BALDERAS (nonalcoholic steatohepatitis) 03/10/2016  GI bleed 03/03/2016  Anemia 03/01/2016  Thrombocytopenia ([address] 08/15/2013  Previous back surgery 08/15/2013  S/P CHACHO (total abdominal hysterectomy) 08/15/2013  Cirrhosis ([address] 08/15/2013  Diabetes mellitus with neurological manifestations, uncontrolled ([address]    
  Essential hypertension, benign    
 Hyperlipidemia LDL goal <100    
  
       
Past Medical History:  
Diagnosis Date  Asthma    
 Back pain    
 COPD (chronic obstructive pulmonary disease) (HCC)    
 Diabetes (HCC)    
 Esophageal varices in cirrhosis ([facility]    
  6/2014 banding x 2  
 Fibromyalgia    
 Gastrointestinal disorder    
 GERD (gastroesophageal reflux disease)    
 Hypercholesteremia    
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    
 Liver disease    
 Other and unspecified hyperlipidemia    
 Restless leg syndrome    
 Type II or unspecified type diabetes mellitus without mention of complication, uncontrolled    
 Unspecified essential hypertension    
  
Activity Status: 
  
OOB to [employer] Ambulated this shift Yes Bed Rest No 
  
Supplemental G3: (CQ Applicable) 
  
No. 
  
DVT prophylaxis: 
  
DVT prophylaxis Med- Yes DVT prophylaxis SCD or CRISTOPHER- No  
  
Wounds: (If Applicable) 
  
Wounds- DTI on buttocks, cream applied 
  
Patient Safety: 
  
Falls Score Total Score: 3 Safety Level_______ Bed Alarm On? Yes Sitter?  No 
  
Plan for upcoming shift: safety 
  
Discharge Plan: No  
  
Active Consults: 
IP CONSULT TO NEUROLOGY

## 2019-05-20 NOTE — PROGRESS NOTES
Bedside and Verbal shift change report given to Symone FLORES (oncoming nurse) by Monika (offgoing nurse). Report included the following information SBAR, Kardex, Intake/Output and MAR. 
  
Zone Phone:   [phone] 
  
 
Significant changes during shift:  
Patient was moved to PO antibiotics, potential discharge tomorrow. Patient Information 
  
Marciano Wilson 
61 y.o. 
5/14/2019 11:30 AM by Michelle Minor MD. Dhara Mahoney was admitted from Home 
  
Problem List 
  
       
Patient Active Problem List  
  Diagnosis Date Noted  Goals of care, counseling/discussion    
 Advanced care planning/counseling discussion    
 Debility    
 Neuropathic pain    
 Chronic bilateral low back pain with bilateral sciatica    
 Encephalopathy 04/24/2019  Acute respiratory failure with hypercapnia ([address] 04/02/2019  Counseling regarding advance care planning and goals of care 04/02/2019  Hepatic encephalopathy ([address] 03/28/2019  Colitis 03/28/2019  UTI (urinary tract infection) 03/28/2019  Septic shock ([address] 03/28/2019  Bilateral carotid artery stenosis 11/14/2018  TIA (transient ischemic attack) 11/13/2018  GERD (gastroesophageal reflux disease) 11/13/2018  Therapeutic drug monitoring 10/16/2018  Severe obesity ([address] 10/11/2018  Obesity (BMI 30.0-34.9) 07/10/2018  Sepsis ([address] 03/30/2017  CAP (community acquired pneumonia) 03/30/2017  IBIS (obstructive sleep apnea) 03/30/2017  Tobacco abuse 03/30/2017  Neuropathy 03/30/2017  COPD (chronic obstructive pulmonary disease) ([address] 03/28/2017  Acute deep vein thrombosis (DVT) of right lower extremity ([address] 11/10/2016  
 BALDERAS (nonalcoholic steatohepatitis) 03/10/2016  GI bleed 03/03/2016  Anemia 03/01/2016  Thrombocytopenia ([address] 08/15/2013  Previous back surgery 08/15/2013  S/P CHACHO (total abdominal hysterectomy) 08/15/2013  Cirrhosis ([address] 08/15/2013  Diabetes mellitus with neurological manifestations, uncontrolled ([facility]    
 Essential hypertension, benign    
 Hyperlipidemia LDL goal <100    
  
       
Past Medical History:  
Diagnosis Date  Asthma    
 Back pain    
 COPD (chronic obstructive pulmonary disease) (HCC)    
 Diabetes (HCC)    
 Esophageal varices in cirrhosis ([facility]    
  6/2014 banding x 2  
 Fibromyalgia    
 Gastrointestinal disorder    
 GERD (gastroesophageal reflux disease)    
 Hypercholesteremia    
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    
 Liver disease    
 Other and unspecified hyperlipidemia    
 Restless leg syndrome    
 Type II or unspecified type diabetes mellitus without mention of complication, uncontrolled    
 Unspecified essential hypertension    
  
Activity Status: 
  
OOB to [email or web address] Ambulated this shift Yes Bed Rest No 
  
Supplemental U0: (II Applicable) 
  
No. 
  
DVT prophylaxis: 
  
DVT prophylaxis Med- Yes DVT prophylaxis SCD or CRISTOPHER- No  
  
Wounds: (If Applicable) 
  
Wounds- DTI on buttocks, cream applied 
  
Patient Safety: 
  
Falls Score Total Score: 3 Safety Level_______ Bed Alarm On? Yes Sitter?  No 
  
Plan for upcoming shift: safety 
  
Discharge Plan: No  
  
Active Consults: 
IP CONSULT TO NEUROLOGY

## 2019-05-20 NOTE — PROGRESS NOTES
Hospitalist Progress Note NAME:  Ramón Doan :  1955 MRN:  [id number] Assessment / Plan: 
 
Acute Metabolic Encephalopathy POA Suspect secondary to  complicated Urinary tract infection 
in settings of urinary retention CT head without acute changes -MS improved -on meropenem since , recently complicated 10 day course of meropenem a week ago and back with infection  
-will get ID consult to advise on abx 
-has norman for urinary retention,  need OP urology followup for urodynamic studies and voiding trial in 2 weeks  
-Neurology consult appreciated, EEG done without seizure like activity  
-no labs in last 2 days, will order for tomorrow 
  
Lactic Acidosis POA 
-Was not considered septic as it is not a good marker in liver disease BALDERAS POA Liver cirrhosis POA History of recurrent hepatic encephalopathy History of esophageal varices 
-cont Rifaxin and lactulose, lasix 
  
DM 2, controlled 
-Hba1c 4.8 back in 2019 
-Continue lantus and SSI  
  
COPD without exacerbation Recent acute respiratory failure with hypercarbia and hypoxia 
-Respiratory status stable 
-Breo ellipta, PRN nebs 
  
Essential HTN POA History of TIA  Hyperlipidemia POA 
-Cont home statin/ASA 
-Low dose metoprolol PT/OT  
CM consult placed for d/c planning Code Status: DNR, ACP in place Surrogate Decision Maker:  
  
DVT Prophylaxis: lovenox GI Prophylaxis: not indicated 
  
Baseline: just discharged from SNF to home   
             
  
  
CHIEF COMPLAINT:  
'I am doing fine, when they are bringing my breakfast\" Review of Systems: 
Symptom Y/N Comments  Symptom Y/N Comments Fever/Chills n   Chest Pain n   
Poor Appetite    Edema Cough n   Abdominal Pain n   
Sputum    Joint Pain SOB/MARTINEZ n   Pruritis/Rash n   
Nausea/vomit n   Tolerating PT/OT Diarrhea    Tolerating Diet y Constipation    Other Could NOT obtain due to:   
 
Objective: VITALS:  
 Last 24hrs VS reviewed since prior progress note. Most recent are: 
Patient Vitals for the past 24 hrs: 
 Temp Pulse Resp BP SpO2  
05/20/19 1146 98 °F (36.7 °C) 86 16 126/64 95 % 05/20/19 0823 97.8 °F (36.6 °C) 93 16 141/73 96 % 05/20/19 0312     92 % 05/20/19 0308 98.6 °F (37 °C) 88 16 129/62 93 % 05/19/19 2332 98.4 °F (36.9 °C) 100 20 106/60 98 % 05/19/19 2053     97 % 05/19/19 1945 98.3 °F (36.8 °C) 85 18 125/70 93 % 05/19/19 1554 98.1 °F (36.7 °C) 88 18 108/67 95 % Intake/Output Summary (Last 24 hours) at 5/20/2019 1219 Last data filed at 5/20/2019 1030 Gross per 24 hour Intake 900 ml Output 1550 ml Net -650 ml PHYSICAL EXAM: 
General: WD, WN. Alert, cooperative, no acute distress   
EENT:  EOMI. Anicteric sclerae. MMM Resp:  Bilateral air entry, no crackles  No accessory muscle use CV:  Regular  rhythm,  No edema GI:  Soft, Non distended, Non tender.  +Bowel sounds Neurologic:  Alert, normal speech, moving all extremities equally Psych:   Not anxious nor agitated Skin:  No rashes. No jaundice Reviewed most current lab test results and cultures  YES Reviewed most current radiology test results   YES Review and summation of old records today    NO Reviewed patient's current orders and MAR    YES 
PMH/SH reviewed - no change compared to H&P 
________________________________________________________________________ Care Plan discussed with: 
  Comments Patient x Family RN x Care Manager Consultant Multidiciplinary team rounds were held today with , nursing, pharmacist and clinical coordinator. Patient's plan of care was discussed; medications were reviewed and discharge planning was addressed. ________________________________________________________________________ Total NON critical care TIME:  25 Minutes Total CRITICAL CARE TIME Spent:   Minutes non procedure based Comments >50% of visit spent in counseling and coordination of care    
________________________________________________________________________ Jo Ann Mitchell MD  
 
Procedures: see electronic medical records for all procedures/Xrays and details which were not copied into this note but were reviewed prior to creation of Plan. LABS: 
I reviewed today's most current labs and imaging studies. Pertinent labs include: 
Recent Labs 05/18/19 0410 WBC 5.7 HGB 10.6* HCT 34.5*  
PLT 71* Recent Labs 05/18/19 0410   
K 3.3*  
 CO2 26 * BUN 7  
CREA 0.63  
CA 7.8* ALB 2.4* TBILI 0.7 SGOT 47* ALT 36  
 
 
Signed: Jo Ann Mitchell MD

## 2019-05-20 NOTE — PROGRESS NOTES
Problem: Mobility Impaired (Adult and Pediatric) Goal: *Acute Goals and Plan of Care (Insert Text) Description Physical Therapy Goals Initiated 5/16/2019 1. Patient will move from supine to sit and sit to supine , scoot up and down and roll side to side in bed with modified independence within 7 day(s). 2.  Patient will transfer from bed to chair and chair to bed with minimal assistance/contact guard assist using the least restrictive device within 7 day(s). 3.  Patient will perform sit to stand with supervision/set-up within 7 day(s). 4.  Patient will ambulate with minimal assistance/contact guard assist for 100 feet with the least restrictive device within 7 day(s). Functional status prior to admission: Patient was recently discharged from SNF rehab. She ambulates short distances with RW and requires assist for ADLS. She occasionally uses a wheelchair for mobility. Home support: The patient lived with her . Home setting: recent discharge from SNF to home. Outcome: Progressing Towards Goal 
 PHYSICAL THERAPY TREATMENT Patient: Vilma Pereira (56 y.o. female) Date: 5/20/2019 Diagnosis: Hepatic encephalopathy ([address] [K72.90] UTI (urinary tract infection) [N39.0] <principal problem not specified> Precautions: Contact, Bed Alarm(EBSL) Chart, physical therapy assessment, plan of care and goals were reviewed. ASSESSMENT: 
Patient overall limited today by agitation and reluctance to participate with PT/OT. Family present throughout session. Needs supervision to come to EOB with extra time to complete task. Sit to stand with CGA-Tyra x 2 and amb approx 10 feet <-> bathroom with RW and CGA. Patient declines to ambulate any further at this time and declines to participate with any additional tasks. No confusion noted during session.   Anticipate patient may be nearing her baseline but unable to get feedback regarding this from family or patient secondary to patient's current agitation level. If family feels comfortable with her going home recommend [facility] PT follow up. Otherwise, return to SNF rehab would be appropriate given her gait instability, decreased balance, and generalized weakness. Progression toward goals: 
?    Improving appropriately and progressing toward goals ? Improving slowly and progressing toward goals ? Not making progress toward goals and plan of care will be adjusted PLAN: 
Patient continues to benefit from skilled intervention to address the above impairments. Continue treatment per established plan of care. Discharge Recommendations:  Home Health with 24/7 supervision and [facility] Further Equipment Recommendations for Discharge:  TBD SUBJECTIVE:  
Patient stated ? I don't really want to do anything. I am in a bad mood. I'm just warning you.? OBJECTIVE DATA SUMMARY:  
Critical Behavior: 
Neurologic State: Alert Orientation Level: Oriented X4 Cognition: Impaired decision making, Impulsive, Follows commands, Recognition of people/places Safety/Judgement: Fall prevention, Decreased awareness of need for safety, Decreased awareness of need for assistance, Decreased insight into deficits Functional Mobility Training: 
Bed Mobility: 
Rolling: Supervision; Additional time Supine to Sit: Supervision; Additional time;Assist x1 Scooting: Supervision; Additional time;Assist x1 Transfers: 
Sit to Stand: Contact guard assistance;Assist x2 Stand to Sit: Contact guard assistance;Assist x2 Balance: 
Sitting: Intact Standing: Impaired; With support Standing - Static: Constant support; Fair 
Standing - Dynamic : Fair Ambulation/Gait Training: 
Distance (ft): 10 Feet (ft)(x 2, seated rest break on commode) Assistive Device: Gait belt;Walker, rolling Ambulation - Level of Assistance: Contact guard assistance;Assist x2 
  
  
 Gait Abnormalities: Decreased step clearance Base of Support: Widened Stance: Right decreased Speed/Mey: Pace decreased (<100 feet/min); Shuffled; Slow Step Length: Left shortened;Right shortened Pain: 
Pain Scale 1: Numeric (0 - 10) Pain Intensity 1: 8 Pain Location 1: Back;Leg;Foot Pain Orientation 1: Anterior Pain Description 1: Aching Pain Intervention(s) 1: Declines Activity Tolerance: VSS Please refer to the flowsheet for vital signs taken during this treatment. After treatment:  
?    Patient left in no apparent distress sitting up in chair ? Patient left in no apparent distress in bed 
? Call bell left within reach ? Nursing notified ? Caregiver present ? Bed alarm activated COMMUNICATION/COLLABORATION:  
The patient?s plan of care was discussed with: Physical Therapist, Occupational Therapist and Registered Nurse Lindsay Ovalles PT, DPT Time Calculation: 20 mins

## 2019-05-20 NOTE — CONSULTS
Infectious Disease Consult Note Reason for Consult: ESBL Klebsiella in urine Date of Consultation: May 20, 2019 Date of Admission: 5/14/2019 Referring Physician: Mayda Parker HPI: 
 
Ms Sarah Zheng is a 61year old lady with hx of Cirrhosis (BALDERAS), esophageal varices per chart, DM, Fibromyalgia, recent hospitalization ( April-may 2019) to [facility] S/P treatment for ESBL Klebsiella in urine ( meropenem). She was discharged to rehab and admitted back again for altered mentation. She is alert and oriented to self, knew she was in the hospital. She was able to tell me that her back is sore on her buttock. When asked about dysuria, she said that she has a \"tube\" now and would not know. When asked to recall before norman inserted, she denied any symptoms to me of burning, itching , painful, urination or incontinence. She says her buttock is sore. Denied fever, chills, cough, nausea, vomiting diarrhea. I also spoke with her  to get a full history. He reports that since March 2019, she has had mentation issues on and off. He says that when her ammonia level is high or when she has a UTI, she has altered mentation. He thinks that she is much better now in terms of mentation. I also spoke to her RN today who recalls having this patient last admission and that her mental status is much better now. She denied any other vaginal discharge. Antibiotics this admission Levaquin 5/14-5/17 Meropenem 5/17/19 CT A/P done this admission reported as  \"No mass, calculus, or hydronephrosis in kidney. Bladder distention with several locules of gas. These are mostly related to recent instrumentation, but recommend clinical correlation\" Past Medical History: 
Past Medical History:  
Diagnosis Date  Asthma  Back pain  COPD (chronic obstructive pulmonary disease) (HCC)  Diabetes ([address]  Esophageal varices in cirrhosis ([facility] [address]   
 6/2014 banding x 2  
 Fibromyalgia  Gastrointestinal disorder  GERD (gastroesophageal reflux disease)  Hypercholesteremia  Liver cirrhosis secondary to BALDERAS ([facility] [address]  Liver disease  Other and unspecified hyperlipidemia  Restless leg syndrome  Type II or unspecified type diabetes mellitus without mention of complication, uncontrolled  Unspecified essential hypertension Surgical History: 
Past Surgical History:  
Procedure Laterality Date  HX BACK SURGERY    
 HX CARPAL TUNNEL RELEASE    
 on right  HX HYSTERECTOMY plus 1/2 of an ovary removed  FL COLSC FLX W/RMVL OF TUMOR POLYP LESION SNARE TQ  5/30/2013  UPPER GI ENDOSCOPY,LIGAT VARIX  2/6/2015 Family History:  
Family History Problem Relation Age of Onset  Diabetes Mother  Stroke Sister  Diabetes Paternal Aunt  Diabetes Paternal Uncle  Heart Disease Neg Hx Social History: · Living Situation: recently from hospital and then to rehab back to hospital  
· Tobacco:denied · Alcohol:denied · Illicit Drugs:denied · Sexual History: past  
 
 
Allergies: Allergies Allergen Reactions  Hydrocodone Nausea and Vomiting  Lisinopril Cough  Lortab [Hydrocodone-Acetaminophen] Nausea and Vomiting  Percocet [Oxycodone-Acetaminophen] Nausea and Vomiting Review of Systems: 
 
 Gen: Negative for chills, fevers, weight loss, weight gain HEENT: Negative for headache, vision changes, ear ache or discharge, tingling,  nasal discharge, swelling, lumps in neck, sores on tongue CV:  Negative for chest pain, dyspnea on exertion, leg edema Lungs: Negative for shortness of breath, cough, wheezing Abdomen: Negative for abdominal pain, nausea, vomiting, diarrhea, constipation Genitourinary: Negative for genital pain or genital discharge Neuro: Negative for headache, numbness, tingling, extremity weakness,  syncope, seizures Skin: Negative for rash, sores/open wounds Musculoskeletal: Negative for joint pain, joint swelling, joint erythema Endocrine: Negative for high or low blood sugars, heat or cold intolerance Psych: Negative for manic behavior Medications: No current facility-administered medications on file prior to encounter. Current Outpatient Medications on File Prior to Encounter Medication Sig Dispense Refill  furosemide (LASIX) 20 mg tablet Take 20 mg by mouth daily.  fluticasone propion-salmeterol (ADVAIR DISKUS) 500-50 mcg/dose diskus inhaler Take 1 Puff by inhalation every twelve (12) hours.  insulin lispro (HUMALOG U-100 INSULIN) 100 unit/mL injection 2-15 Units by SubCUTAneous route Before breakfast, lunch, dinner and at bedtime. Blood Glucose (mg/dL)       Insulin Dose (units) 0-149                                   0  
            150-200                               2  
            201-250                               4  
            251-300                               6  
            301-350                               8  
            351-400                               10  
            401-450                               12  
             451-500                               15    
 magnesium oxide (MAG-OX) 400 mg tablet Take 400 mg by mouth daily.  traMADol (ULTRAM) 50 mg tablet Take 50 mg by mouth every six (6) hours as needed for Pain.  lactulose (CHRONULAC) 10 gram/15 mL solution Take 45 mL by mouth three (3) times daily for 30 days. 4050 mL 0  
 insulin glargine (LANTUS) 100 unit/mL injection 15 Units by SubCUTAneous route daily for 30 days. 1 Vial 1  
 metoprolol tartrate (LOPRESSOR) 25 mg tablet Take 0.5 Tabs by mouth every twelve (12) hours for 30 days. 30 Tab 0  
 gabapentin (NEURONTIN) 600 mg tablet Take 1,200 mg by mouth three (3) times daily.  rifAXIMin (XIFAXAN) 550 mg tablet Take 550 mg by mouth two (2) times a day.  nicotine (NICODERM CQ) 21 mg/24 hr 1 Patch by TransDERmal route every twenty-four (24) hours.  spironolactone (ALDACTONE) 100 mg tablet Take 2 Tabs by mouth daily. 60 Tab 3  
 losartan (COZAAR) 25 mg tablet take 1 tablet by mouth once daily , REPLACES LISINOPRIL FOR BLOOD PRESSURE AND KIDNEY PROTECTION 30 Tab 11  
 aspirin 81 mg chewable tablet Take 1 Tab by mouth daily. 30 Tab 6  
 atorvastatin (LIPITOR) 40 mg tablet Take 1 Tab by mouth nightly. 30 Tab 6  
 omeprazole (PRILOSEC) 20 mg capsule take 1 capsule by mouth once daily 30 Cap 5  
 amitriptyline (ELAVIL) 150 mg tablet Take 1 Tab by mouth nightly.  ferrous sulfate 325 mg (65 mg iron) tablet take 1 tablet by mouth once daily with BREAKFAST 90 Tab 1  
 ondansetron (ZOFRAN ODT) 4 mg disintegrating tablet dissolve 1 tablet ON TONGUE every 8 hours if needed for nausea 45 Tab 3  
 rOPINIRole (REQUIP) 4 mg tab TAB Take 4 mg by mouth nightly.  albuterol (PROAIR HFA) 90 mcg/actuation inhaler Take 2 Puffs by inhalation every four (4) hours as needed for Wheezing.  albuterol-ipratropium (DUO-NEB) 2.5 mg-0.5 mg/3 ml nebu 3 mL by Nebulization route every four (4) hours as needed for Other (Wheezing or Shortness of breath). 30 Nebule 0 Current Facility-Administered Medications:  
  albuterol-ipratropium (DUO-NEB) 2.5 MG-0.5 MG/3 ML, 3 mL, Nebulization, Q6H RT, Sagar Lee, NP, 3 mL at 05/20/19 1411   meropenem (MERREM) 1 g in 0.9% sodium chloride (MBP/ADV) 50 mL, 1 g, IntraVENous, Q8H, Jordan Herzog MD, Last Rate: 100 mL/hr at 05/20/19 1246, 1 g at 05/20/19 1246   acetaminophen (TYLENOL) solution 650 mg, 650 mg, Oral, Q6H PRN, Jordan Herzog MD 
  zinc oxide-cod liver oil (DESITIN) 40 % paste, , Topical, BID, Jordan Herzog MD 
  QUEtiapine (SEROquel) tablet 25 mg, 25 mg, Oral, QHS, Anis, MD Jordan, 25 mg at 05/19/19 5253   fluconazole (DIFLUCAN) 200mg/100 mL IVPB (premix), 200 mg, IntraVENous, Q24H, Jordan Herzog MD, Last Rate: 100 mL/hr at 05/19/19 2203, 200 mg at 05/19/19 2203   albuterol-ipratropium (DUO-NEB) 2.5 MG-0.5 MG/3 ML, 3 mL, Nebulization, Q4H PRN, Kole Castillo MD 
  amitriptyline (ELAVIL) tablet 150 mg, 150 mg, Oral, QHS, Dane Castillo MD, 150 mg at 05/19/19 2213   aspirin chewable tablet 81 mg, 81 mg, Oral, DAILY, Kole Castillo MD, 81 mg at 05/20/19 1014 
  atorvastatin (LIPITOR) tablet 40 mg, 40 mg, Oral, QHS, Kole Castillo MD, 40 mg at 05/19/19 2213   ferrous sulfate tablet 325 mg, 1 Tab, Oral, DAILY WITH BREAKFAST, Dane Castillo MD, 325 mg at 05/20/19 1013   fluticasone-vilanterol (BREO ELLIPTA) 200mcg-25mcg/puff, 1 Puff, Inhalation, DAILY, Dane Castillo MD, 1 Puff at 05/19/19 1345 
  gabapentin (NEURONTIN) capsule 1,200 mg, 1,200 mg, Oral, TID, Kole Castillo MD, 1,200 mg at 05/20/19 1013   insulin glargine (LANTUS) injection 15 Units, 15 Units, SubCUTAneous, DAILY, Dane Castillo MD, 15 Units at 05/20/19 1014 
  lactulose (CHRONULAC) 10 gram/15 mL solution 45 mL, 45 mL, Oral, TID, Kole Castillo MD, 45 mL at 05/20/19 1014   losartan (COZAAR) tablet 25 mg, 25 mg, Oral, DAILY, Kole Castillo MD, 25 mg at 05/20/19 1013   metoprolol tartrate (LOPRESSOR) tablet 12.5 mg, 12.5 mg, Oral, Q12H, Dane Castillo MD, 12.5 mg at 05/20/19 1011 
  nicotine (NICODERM CQ) 21 mg/24 hr patch 1 Patch, 1 Patch, TransDERmal, Q24H, Milena Castillo MD, 1 Patch at 05/19/19 1900   pantoprazole (PROTONIX) tablet 40 mg, 40 mg, Oral, ACB, Kole Castillo MD, 40 mg at 05/20/19 1011 
  rifAXIMin (XIFAXAN) tablet 550 mg, 550 mg, Oral, BID, Kole Castillo MD, 550 mg at 05/20/19 1013 
  rOPINIRole (REQUIP) tablet 4 mg, 4 mg, Oral, QHS, Kole Castillo MD, 4 mg at 05/19/19 1884   spironolactone (ALDACTONE) tablet 200 mg, 200 mg, Oral, DAILY, Dane Castillo MD, 200 mg at 05/20/19 0900 
  sodium chloride (NS) flush 5-40 mL, 5-40 mL, IntraVENous, Q8H, Kole Castillo MD, 10 mL at 05/20/19 1400 
  sodium chloride (NS) flush 5-40 mL, 5-40 mL, IntraVENous, PRN, Kole Castillo MD, 10 mL at 05/16/19 0430   enoxaparin (LOVENOX) injection 40 mg, 40 mg, SubCUTAneous, Q24H, Kole Castillo MD, 40 mg at 05/19/19 2204   insulin lispro (HUMALOG) injection, , SubCUTAneous, AC&HS, Dane Castillo MD, 2 Units at 05/20/19 1245 
  glucose chewable tablet 16 g, 4 Tab, Oral, PRN, Kole Castillo MD 
  dextrose (D50W) injection syrg 12.5-25 g, 12.5-25 g, IntraVENous, PRN, Kole Castillo MD 
  glucagon (GLUCAGEN) injection 1 mg, 1 mg, IntraMUSCular, PRN, Kole Castillo MD 
  furosemide (LASIX) tablet 20 mg, 20 mg, Oral, DAILY, Kole Castillo MD, 20 mg at 05/20/19 1013 Physical Exam: 
 
Vitals:  
Patient Vitals for the past 24 hrs: 
 Temp Pulse Resp BP SpO2  
05/20/19 1146 98 °F (36.7 °C) 86 16 126/64 95 % 05/20/19 0823 97.8 °F (36.6 °C) 93 16 141/73 96 % 05/20/19 0312     92 % 05/20/19 0308 98.6 °F (37 °C) 88 16 129/62 93 % 05/19/19 2332 98.4 °F (36.9 °C) 100 20 106/60 98 % 05/19/19 2053     97 % 05/19/19 1945 98.3 °F (36.8 °C) 85 18 125/70 93 % 05/19/19 1554 98.1 °F (36.7 °C) 88 18 108/67 95 % ·  
· GEN: NAD 
· HEENT: no scleral icterus,  no cervica lymphadenopathy, no sinus tenderness, no thrush · CV: S1, S2 heard regularly · Lungs: Clear to auscultation bilaterally · Abdomen: soft, non distended, non tender,  no CVA tenderness · Genitourinary: + norman · Extremities: no edema · Neuro: Alert, oriented to self, knew she was in hospital, knew it was may 2019 , moves all extremities to commands, verbal  
· Skin: no rash · Psych: good affect, good eye contact, non tearful Labs:  
Recent Results (from the past 24 hour(s)) GLUCOSE, POC Collection Time: 05/19/19  3:52 PM  
Result Value Ref Range Glucose (POC) 157 (H) 65 - 100 mg/dL Performed by Eris Mathur GLUCOSE, POC Collection Time: 05/19/19  9:45 PM  
Result Value Ref Range Glucose (POC) 137 (H) 65 - 100 mg/dL Performed by Darek Lewis (PCT) GLUCOSE, POC Collection Time: 05/20/19  6:43 AM  
Result Value Ref Range Glucose (POC) 113 (H) 65 - 100 mg/dL Performed by Darek Lewis (PCT) GLUCOSE, POC Collection Time: 05/20/19 11:40 AM  
Result Value Ref Range Glucose (POC) 146 (H) 65 - 100 mg/dL Performed by Elysia Parikh Microbiology Data: 
  
  Blood: 5/14/19 Special Requests: NO SPECIAL REQUESTS    Final  
Culture result: NO GROWTH 5 DAYS    Final  
Result History Urine 5/14/19 Special Requests: NO SPECIAL REQUESTS Reflexed from [id number]    Final  
[place] Count >100,000 COLONIES/mL    Final  
Culture result: Abnormal     Final  
KLEBSIELLA PNEUMONIAE ** (EXTENDED SPECTRUM BETA LACTAMASE ) ** Susceptibility Klebsiella pneumoniae SURINDER Amikacin ($) <=16 ug/mL S Aztreonam ($$$$) >16 ug/mL R Cefazolin ($) >16 ug/mL R Cefepime ($$) <=4 ug/mL R Cefotaxime 16 ug/mL R Ceftazidime ($) 16 ug/mL R Ceftriaxone ($) 32 ug/mL R Cefuroxime ($) >16 ug/mL R Ciprofloxacin ($) <=1 ug/mL S Gentamicin ($) 8 ug/mL I Imipenem <=1 ug/mL S Levofloxacin ($) <=2 ug/mL S Meropenem ($$) <=1 ug/mL S Nitrofurantoin <=32 ug/mL S Piperacillin/Tazobac ($) <=16 ug/mL S Tobramycin ($) 8 ug/mL I Trimeth/Sulfa <=2/38 ug/mL S Urine 4/25/19 Special Requests: NO SPECIAL REQUESTS    Final  
[place] Count >100,000 COLONIES/mL    Final  
Culture result: Abnormal     Final  
KLEBSIELLA PNEUMONIAE ** (EXTENDED SPECTRUM BETA LACTAMASE ) **   
Culture result:    Final  
 CALLED TO AND READ BACK BY  
SANDRA STALEY,AT 1125 ON 4/27/19. RC Susceptibility Klebsiella pneumoniae SURINDER Amikacin ($) <=16 ug/mL S Aztreonam ($$$$) >16 ug/mL R Cefazolin ($) >16 ug/mL R Cefepime ($$) 8 ug/mL R Cefotaxime >32 ug/mL R Ceftazidime ($) >16 ug/mL R Ceftriaxone ($) >32 ug/mL R Cefuroxime ($) >16 ug/mL R Ciprofloxacin ($) <=1 ug/mL S Gentamicin ($) >8 ug/mL R Imipenem <=1 ug/mL S Levofloxacin ($) <=2 ug/mL S Meropenem ($$) <=1 ug/mL S Nitrofurantoin <=32 ug/mL S Piperacillin/Tazobac ($) <=16 ug/mL S Tobramycin ($) 8 ug/mL I Trimeth/Sulfa <=2/38 ug/mL S Imaging:  
CT A/P WO contrast  
FINDINGS:  
LUNG BASES: Clear. INCIDENTALLY IMAGED HEART AND MEDIASTINUM: Unremarkable. LIVER: No mass or biliary dilatation. There is mild nodularity along the 
capsule. There is a small amount of perihepatic ascites which has improved. GALLBLADDER: Unremarkable. SPLEEN: The spleen is enlarged, but unchanged. No focal mass. PANCREAS: No mass or ductal dilatation. ADRENALS: Unremarkable. KIDNEYS/URETERS: No mass, calculus, or hydronephrosis. STOMACH: Unremarkable. SMALL BOWEL: No dilatation or wall thickening. COLON: No dilatation or wall thickening. A moderate amount of stool seen 
throughout the colon. APPENDIX: Unremarkable. PERITONEUM: No ascites or pneumoperitoneum. RETROPERITONEUM: No lymphadenopathy or aortic aneurysm. REPRODUCTIVE ORGANS: Status post hysterectomy. URINARY BLADDER: The bladder is distended. Several locules of gas are present. No wall thickening. No mass or stone. BONES: No destructive bone lesion. Status post posterior fusion from L4 to S1. 
ADDITIONAL COMMENTS: N/A 
  
IMPRESSION IMPRESSION: 
  
1. Bladder distention with several locules of gas. These are mostly related to 
recent instrumentation, but recommend clinical correlation. 2. Hepatic cirrhosis with a small amount of perihepatic ascites which has decreased. 3. Splenomegaly likely related to portal hypertension. CT Head FINDINGS: 
The ventricles and sulci are normal in size, shape and configuration and 
midline. There is no significant white matter disease. There is no intracranial 
hemorrhage, extra-axial collection, mass, mass effect or midline shift. The 
basilar cisterns are open. No acute infarct is identified. The bone windows 
demonstrate no abnormalities. The visualized portions of the paranasal sinuses 
and mastoid air cells are clear. 
  
IMPRESSION IMPRESSION: No evidence of acute process. 
  
CXR 
  
FINDINGS: AP portable imaging of the chest performed at 12:01 PM demonstrates a 
stable cardiomediastinal silhouette. The lungs are clear bilaterally. No 
significant osseous abnormalities are seen. 
  
IMPRESSION IMPRESSION: No evidence of acute cardiopulmonary process. MRI brain 11/14/18 FINDINGS:   
  
Ventricles:  Midline, no hydrocephalus. Intracranial Hemorrhage:  None. Brain Parenchyma/Brainstem:  Minimal chronic T2 hyperintensity in the 
supratentorial periventricular white matter. No acute infarction. Basal Cisterns:  Normal.  
Flow Voids:  Normal. 
Additional Comments:  N/A. 
  
Posterior Circulation:  No flow limiting stenosis or occlusion. Anterior Circulation:  No flow limiting stenosis or occlusion. Additional Comments:  No evidence of aneurysm or vascular malformation. 
  
IMPRESSION IMPRESSION: 
1. Minimal chronic supratentorial white matter disease with no acute 
infarction. 2.  No flow limiting stenosis or arterial occlusion 3/29/19 US abd 
IMPRESSION IMPRESSION:  
1. Cirrhosis without mass. 2. Trace ascites. 3. Splenomegaly. 4. No cholelithiasis or biliary dilation. 
  
 
EEG 5/16/19 INTERPRETATION:  
Abnormal. Mild diffuse cerebral dysfunction which is non specific for etiology but can be seen in toxic/metabolic states. No seizures. Clinical correlation recommended. Assessment / Plan: Ms Temi Mcpherson is a 61year old lady with hx of Cirrhosis (BALDERAS), esophageal varices per chart, DM, Fibromyalgia, recent hospitalization ( April-may 2019) to [facility] S/P treatment for ESBL Klebsiella in urine . She was recently treated with a course of Meropenem IV She now has a norman Her mental status is improved from speaking to her family 1) ESBL Klebsiella in urine Norman catheter Discussed with patient and her /daugther that would be difficult to distinguish between colonization and infection She also has other etiologies such as cirrhosis, possibly underlying dementia , ? Psych  Disorders, medications  that  Can complicate picture I am concerned about further development of resistance and discussed with family Given she has improved and has already had a recent round of Meropenem, suggest stopping Meropenem and giving her a dose of Fosphomycin at this time She received Meropenem since 5/17 -5/20 Fosfomycin's sensitivities not available but suggest completing therapy with it taking into account all the antibiotics she has had High risk for adverse events secondary to antibiotics discussed with patient and he family including CDI 
D/W family that if urine retested may not clear ESBL and would not repeatedly treat unless true infection/she has active symptoms Of note, she did not have significant pyuria on UA this admission Norman per primary team , would remove as soon as able if not truly necessary Avoid polypharmacy as it complicates the picture and also some medications cause cause urinary retention Urology follow up per primary team  
Discussed with family in future that asymptomatic bacteruria should not be treated unless there is clear indication to treat 2) Cirrhosis, BALDERAS, esophageal varices 3) DM 4) Fibromyalgia per chart 5) Thrombocytopenia: with known liver disease Antibiotics can make this worse Hep C and HIV screen if not done in past per primary team once consent obtained I will sign off at this time. Thank for the opportunity to participate in the care of this patient. Please contact with questions or concerns.

## 2019-05-20 NOTE — PROGRESS NOTES
Spiritual Care Assessment/Progress Note [address] 
 
 
NAME: Reilly Mercer      MRN: [id number] AGE: 61 y.o. SEX: female Denominational Affiliation: Roman Catholic Language: [place] 5/20/2019     Total Time (in minutes): 12 Spiritual Assessment begun in [facility] NEUROSCIENCE TELEMETRY through conversation with: 
  
    [x]Patient        [x] Family    [] Friend(s) Reason for Consult: Initial visit, Initial/Spiritual assessment, patient floor Spiritual beliefs: (Please include comment if needed) [x] Identifies with a erika tradition:  Roman Catholic   
   [] Supported by a erika community:        
   [] Claims no spiritual orientation:       
   [] Seeking spiritual identity:            
   [] Adheres to an individual form of spirituality:       
   [] Not able to assess:                   
 
    
Identified resources for coping:  
   [x] Prayer                           
   [] Music                  [] Guided Imagery [x] Family/friends                 [] Pet visits [] Devotional reading                         [] Unknown 
   [] Other:                                          
 
 
Interventions offered during this visit: (See comments for more details) Patient Interventions: Initial/Spiritual assessment, patient floor, Affirmation of erika, Affirmation of emotions/emotional suffering, Initial visit, Normalization of emotional/spiritual concerns Plan of Care: 
 
 [x] Support spiritual and/or cultural needs  
 [] Support AMD and/or advance care planning process    
 [] Support grieving process 
 [] Coordinate Rites and/or Rituals  
 [] Coordination with community clergy [] No spiritual needs identified at this time 
 [] Detailed Plan of Care below (See Comments)  [] Make referral to Music Therapy 
[] Make referral to Pet Therapy    
[] Make referral to Addiction services 
[] Make referral to [facility] 
[] Make referral to Spiritual Care Partner [] No future visits requested       
[x] Follow up visits as needed Comments:  made follow up visit to patients room in Neuro. Patient was sitting in bed eating breakfast when  arrived. Patient told briefly about her medical condition. She said she doesn't know when she's getting out as they keep telling her different things. Her family is her support system and just as she was talking about them her daughter and son in law walked in. She identifies as Buddhism and is as active as she can be.  provided pastoral care and support to the patient. Spiritual Care will follow up as needed. Ramiro Bautista Pager: [phone]

## 2019-05-20 NOTE — PROGRESS NOTES
ADULT PROTOCOL: JET AEROSOL ASSESSMENT Patient  Vilma Pereira     61 y.o.   female     5/19/2019  9:33 PM 
 
Breath Sounds Pre Procedure: Right Breath Sounds: Diminished, Coarse Left Breath Sounds: Diminished, Coarse Breath Sounds Post Procedure: Right Breath Sounds: Diminished, Clear Left Breath Sounds: Coarse Heart Rate: Pre procedure Pulse: 97 
         Post procedure Pulse: 107 Resp Rate: Pre procedure Respirations: 20 
         Post procedure Respirations: 20   
Cough: Pre procedure Post procedure Cough: Non-productive, Congested Oxygen: O2 Device: Room air Changed: NO SpO2: Pre procedure SpO2: 97 %   without oxygen Post procedure SpO2: 97 %  without oxygen Nebulizer Therapy: Current medications Aerosolized Medications: DuoNeb Changed: NO Smoking History: Current Smoker Problem List:  
Patient Active Problem List  
Diagnosis Code  Diabetes mellitus with neurological manifestations, uncontrolled (HCC) E11.49, E11.65  
 Essential hypertension, benign I10  
 Hyperlipidemia LDL goal <100 E78.5  Thrombocytopenia ([facility] [address] D69.6  Previous back surgery Z98.890  
 S/P CHACHO (total abdominal hysterectomy) Z90.710  Cirrhosis ([facility] [address] K74.60  Anemia D64.9  
 GI bleed K92.2  
 BALDERAS (nonalcoholic steatohepatitis) K75.81  
 Acute deep vein thrombosis (DVT) of right lower extremity (HCC) I82.401  COPD (chronic obstructive pulmonary disease) (HCC) J44.9  Sepsis ([facility] [address] A41.9  
 CAP (community acquired pneumonia) J18.9  IBIS (obstructive sleep apnea) G47.33  
 Tobacco abuse Z72.0  Neuropathy G62.9  Obesity (BMI 30.0-34. 9) E66.9  Severe obesity (HCC) E66.01  
 Therapeutic drug monitoring Z51.81  
 TIA (transient ischemic attack) G45.9  GERD (gastroesophageal reflux disease) K21.9  Bilateral carotid artery stenosis I65.23  
 Hepatic encephalopathy (HCC) K72.90  Colitis K52.9  
 UTI (urinary tract infection) N39.0  Septic shock (HCC) A41.9, R65.21  
 Acute respiratory failure with hypercapnia ([facility] [address] J96.02  
 Counseling regarding advance care planning and goals of care Z71.89  
 Encephalopathy G93.40  Goals of care, counseling/discussion Z71.89  
 Advanced care planning/counseling discussion Z71.89  
 Debility R53.81  
 Neuropathic pain M79.2  Chronic bilateral low back pain with bilateral sciatica M54.42, M54.41, G89.29 Respiratory Therapist: Rima Plasencia, RT

## 2019-05-20 NOTE — WOUND CARE
Wound care nurse consult from staff nurse stating \" progression of wound\". Patient is a 60 y/o CF admitted for hepatic encephalopathy and had a smal DTPI to right buttock POA (see Whit Scott note 5/15). Past Medical History:  
Diagnosis Date  Asthma  Back pain  COPD (chronic obstructive pulmonary disease) (HCC)  Diabetes ([facility] [address]  Esophageal varices in cirrhosis ([facility] [address]   
 6/2014 banding x 2  
 Fibromyalgia  Gastrointestinal disorder  GERD (gastroesophageal reflux disease)  Hypercholesteremia  Liver cirrhosis secondary to BALDERAS ([facility] [address]  Liver disease  Other and unspecified hyperlipidemia  Restless leg syndrome  Type II or unspecified type diabetes mellitus without mention of complication, uncontrolled  Unspecified essential hypertension Patient has a fading DTPI to right buttock/sacrum, skin is still intact. Patient c/o of it \"hurting when I sit in chair\". Patient states she re-positions self constantly in chair. Wound appears aprox 1 x 0.5 cm's, intact maroon/purple skin. Skin is dry, she is currently continent of of stool and norman cathter in. Recommend switching from ES Desitin to Venelex.  
 
Daniel Perla RN, [employer]

## 2019-05-21 NOTE — PROGRESS NOTES
Problem: Self Care Deficits Care Plan (Adult) Goal: *Acute Goals and Plan of Care (Insert Text) Description Occupational Therapy Goals: 
Initiated 5/16/2019 1. Patient will perform grooming standing with supervision/set-up within 7 days. 2. Patient will perform toileting with supervision/set-up within 7 days. 3. Patient will perform lower body dressing with supervision/set-up within 7 days. 4. Patient will transfer from toilet with supervision/set-up using the least restrictive device and appropriate durable medical equipment within 7 days. Outcome: Progressing Towards Goal 
 OCCUPATIONAL THERAPY TREATMENT Patient: Carla Porter (42 y.o. female) Date: 5/21/2019 Diagnosis: Hepatic encephalopathy ([facility] [address] [K72.90] UTI (urinary tract infection) [N39.0] <principal problem not specified> Precautions: Contact, Bed Alarm(EBSL) Chart, occupational therapy assessment, plan of care, and goals were reviewed. ASSESSMENT: 
Nursing cleared for therapy. Received sleeping in bed, easily awakened and agreeable to therapy. Completed bed mob with supervision. Ed on LB dressing techniques, toileting tasks and grooming standing at sink. Confusion and poor safety needing verbal cues through out. Recommend SNF. Left up in chair, set up for lunch. Progression toward goals: 
?       Improving appropriately and progressing toward goals ? Improving slowly and progressing toward goals ? Not making progress toward goals and plan of care will be adjusted PLAN: 
Patient continues to benefit from skilled intervention to address the above impairments. Continue treatment per established plan of care. Discharge Recommendations:  Meño López Further Equipment Recommendations for Discharge: SUBJECTIVE:  
Patient stated This is the most I have done.  OBJECTIVE DATA SUMMARY:  
Cognitive/Behavioral Status: 
Neurologic State: Alert;Confused Orientation Level: Disoriented to situation;Oriented to person;Oriented to place;Oriented to time Cognition: Follows commands Functional Mobility and Transfers for ADLs: 
Bed Mobility: 
Rolling: Supervision Supine to Sit: Supervision Scooting: Supervision Transfers: 
Sit to Stand: Contact guard assistance Functional Transfers Toilet Transfer : Minimum assistance(RW) 
  
 
Balance: 
Sitting: Intact Standing: Impaired; With support Standing - Static: Constant support; Fair 
Standing - Dynamic : Constant support; [facility] ADL Intervention: 
 Ed on LB dressing techniques, demo ability to complete crossed leg dressing with CGA and additional time at EOB. Amb to bathroom with RW, frequent cues for hand placement with sit <> stand from bed and to toilet. Standing at sink with continued cues for location of soap and paper towels and RW placement. No LOB Grooming Washing Hands: Contact guard assistance(frequent cues for location of soap, paper towel) Lower Body Dressing Assistance Socks: Contact guard assistance; Compensatory technique training Leg Crossed Method Used: Yes Toileting Bladder Hygiene: Contact guard assistance Pain: 
Pain Scale 1: Numeric (0 - 10) Pain Intensity 1: 0 Activity Tolerance:  
Good for LB dressing, toileting and grooming After treatment:  
x Patient left in no apparent distress sitting up in chair ? Patient left in no apparent distress in bed 
xCall bell left within reach ? Nursing notified ? Caregiver present 
x Bed alarm activated COMMUNICATION/COLLABORATION:  
The patients plan of care was discussed with: Physical Therapist and Registered Nurse Franky Macias OT Time Calculation: 16 mins

## 2019-05-21 NOTE — PROGRESS NOTES
Bedside and Verbal shift change report given to Rafy FLORES (oncoming nurse) by Symone (offgoing nurse). Report included the following information SBAR, Kardex, Intake/Output and MAR. 
  
Zone Phone:   [phone] 
  
 
Significant changes during shift:  
Patient was moved to PO antibiotics, except for Diflucan;  potential discharge today Jaun Reasoner Patient Information 
  
Tayo Master 
61 y.o. 
5/14/2019 11:30 AM by Ame Gaviria MD. Dhara Mahoney was admitted from Home 
  
Problem List 
  
       
Patient Active Problem List  
  Diagnosis Date Noted  Goals of care, counseling/discussion    
 Advanced care planning/counseling discussion    
 Debility    
 Neuropathic pain    
 Chronic bilateral low back pain with bilateral sciatica    
 Encephalopathy 04/24/2019  Acute respiratory failure with hypercapnia ([address] 04/02/2019  Counseling regarding advance care planning and goals of care 04/02/2019  Hepatic encephalopathy ([address] 03/28/2019  Colitis 03/28/2019  UTI (urinary tract infection) 03/28/2019  Septic shock ([address] 03/28/2019  Bilateral carotid artery stenosis 11/14/2018  TIA (transient ischemic attack) 11/13/2018  GERD (gastroesophageal reflux disease) 11/13/2018  Therapeutic drug monitoring 10/16/2018  Severe obesity ([address] 10/11/2018  Obesity (BMI 30.0-34.9) 07/10/2018  Sepsis ([address] 03/30/2017  CAP (community acquired pneumonia) 03/30/2017  IBIS (obstructive sleep apnea) 03/30/2017  Tobacco abuse 03/30/2017  Neuropathy 03/30/2017  COPD (chronic obstructive pulmonary disease) ([address] 03/28/2017  Acute deep vein thrombosis (DVT) of right lower extremity ([address] 11/10/2016  
 BALDERAS (nonalcoholic steatohepatitis) 03/10/2016  GI bleed 03/03/2016  Anemia 03/01/2016  Thrombocytopenia ([address] 08/15/2013  Previous back surgery 08/15/2013  S/P CHACHO (total abdominal hysterectomy) 08/15/2013  Cirrhosis ([address] 08/15/2013  Diabetes mellitus with neurological manifestations, uncontrolled ([facility]    
 Essential hypertension, benign    
 Hyperlipidemia LDL goal <100    
  
       
Past Medical History:  
Diagnosis Date  Asthma    
 Back pain    
 COPD (chronic obstructive pulmonary disease) (HCC)    
 Diabetes (HCC)    
 Esophageal varices in cirrhosis ([facility]    
  6/2014 banding x 2  
 Fibromyalgia    
 Gastrointestinal disorder    
 GERD (gastroesophageal reflux disease)    
 Hypercholesteremia    
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    
 Liver disease    
 Other and unspecified hyperlipidemia    
 Restless leg syndrome    
 Type II or unspecified type diabetes mellitus without mention of complication, uncontrolled    
 Unspecified essential hypertension    
  
Activity Status: 
  
OOB to linkedFA Ambulated this shift N Bed Rest No 
  
Supplemental I9: (DC Applicable) 
  
Room air 
  
DVT prophylaxis: 
  
DVT prophylaxis Med- Yes; Lovenox DVT prophylaxis SCD or CRISTOPHER- No  
  
Wounds: (If Applicable) 
  
Wounds- DTI on buttocks, cream buttocks 
  
Patient Safety: 
  
Falls Score Total Score: 3 Safety Level_______ Bed Alarm On? Yes Sitter?  No 
  
Plan for upcoming shift: safety 
  
Discharge Plan: No  
  
Active Consults: 
IP CONSULT TO NEUROLOGY

## 2019-05-21 NOTE — PROGRESS NOTES
Initial Nutrition Assessment: 
 
INTERVENTIONS/RECOMMENDATIONS:  
· Meals/Snacks: General/healthful diet: Continue cardiac diet, consider adding consistent carb to diet order ASSESSMENT:  
Patient medically noted for hepatic encephalopathy and UTI. PMH for DM, HTN, cirrhosis, and COPD. Chart reviewed for length of stay. Patient has been receiving a cardiac diet. Excellent PO intake noted; eating % of meals. Current weight is patient stated; unsure of accuracy given encephalopathy on admission. Episodes of hyperglycemia; would add CCD if BG remains elevated. Discharge planning noted. Diet Order: Cardiac 
% Eaten:   
Patient Vitals for the past 72 hrs: 
 % Diet Eaten 05/20/19 1030 100 % 05/19/19 1342 90 % 05/19/19 0803 70 % 05/18/19 1811 85 % Pertinent Medications: [x]Reviewed []Other: Atorvastatin, Lasix, Lantus, Humalog, Lactulose, Protonix, KCl, Seroquel, Aldactone Pertinent Labs: [x]Reviewed []Other: [phone] Food Allergies: [x]None []Yes:   
Last BM: 5/20   [x]Active     []Hyperactive  []Hypoactive       [] Absent BS Skin:    [x] Intact   [] Incision  [] Breakdown: [] Edema []Other: Anthropometrics:  
Height:   Weight: 80.7 kg (178 lb) IBW (%IBW):   ( ) UBW (%UBW):   (  %) Last Weight Metrics: 
Weight Loss Metrics 5/14/2019 4/24/2019 4/9/2019 3/28/2019 3/19/2019 1/1/2019 11/19/2018 Today's Wt 178 lb 190 lb 7.6 oz 230 lb 9.6 oz - 223 lb 12.8 oz 193 lb 9 oz 187 lb BMI 27.06 kg/m2 28.96 kg/m2 - 42.18 kg/m2 38.42 kg/m2 33.23 kg/m2 34.2 kg/m2 BMI: Body mass index is 27.06 kg/m². This BMI is indicative of: 
 []Underweight    []Normal    [x]Overweight    [] Obesity   [] Extreme Obesity (BMI>40) Estimated Nutrition Needs (Based on):  
1838 Kcals/day(BMR (1414) x 1. 3AF) , 65 g(0.8 g/kg bw) Protein Carbohydrate: At Least 130 g/day  Fluids: 1800 mL/day (1ml/kcal) Pt expected to meet estimated nutrient needs: [x]Yes []No 
 
NUTRITION DIAGNOSES:  
 Problem:  No nutritional diagnosis at this time Etiology: related to Signs/Symptoms: as evidenced by NUTRITION INTERVENTIONS: 
Meals/Snacks: General/healthful diet GOAL:  
PO intake >70% of meals next 5-7 days LEARNING NEEDS (Diet, Food/Nutrient-Drug Interaction):  
 [x] None Identified 
 [] Identified and Education Provided/Documented 
 [] Identified and Pt declined/was not appropriate Cultural, Islam, OR Ethnic Dietary Needs:  
 [x] None Identified 
 [] Identified and Addressed 
 
 [x] Interdisciplinary Care Plan Reviewed/Documented  
 [x] Discharge Planning:   Consistent carb/cardiac diet MONITORING Liu Dillon Outcomes: Behavior Food/Nutrient Intake Outcomes: Total energy intake Physical Signs/Symptoms Outcomes: Weight/weight change, Glucose profile, Electrolyte and renal profile NUTRITION RISK:  
 [x] Patient At Nutritional Risk              [] Patient Not at Nutritional Risk PT SEEN FOR:  
 []  MD Consult: []Calorie Count []Diabetic Diet Education []Diet Education []Electrolyte Management []General Nutrition Management and Supplements []Management of Tube Feeding []TPN Recommendations []  RN Referral:  []MST score >=2 
   []Enteral/Parenteral Nutrition PTA []Pregnant: Gestational DM or Multigestation 
   []Pressure Ulcer/Wound Care needs 
     
[]  Low BMI [x]  [facility] [phone] Pager [phone] Weekend Pager [phone]

## 2019-05-21 NOTE — PROGRESS NOTES
Medicare pt has received, reviewed, and signed 2nd IM letter informing them of their right to appeal the discharge. Signed copy has been placed on pt bedside chart. Ap Aj [phone]

## 2019-05-21 NOTE — PROGRESS NOTES
BASILIO Plan---1.) [facility] for Rehab. Discussed dcp with patient and her . He would like for her to have some short term rehab prior to going home. Choice given and he would like her to go to [facility] and referral was sent and they have accepted patient when medically stable.

## 2019-05-21 NOTE — PROGRESS NOTES
Problem: Mobility Impaired (Adult and Pediatric) Goal: *Acute Goals and Plan of Care (Insert Text) Description Physical Therapy Goals Initiated 5/16/2019 1. Patient will move from supine to sit and sit to supine , scoot up and down and roll side to side in bed with modified independence within 7 day(s). 2.  Patient will transfer from bed to chair and chair to bed with minimal assistance/contact guard assist using the least restrictive device within 7 day(s). 3.  Patient will perform sit to stand with supervision/set-up within 7 day(s). 4.  Patient will ambulate with minimal assistance/contact guard assist for 100 feet with the least restrictive device within 7 day(s). Functional status prior to admission: Patient was recently discharged from SNF rehab. She ambulates short distances with RW and requires assist for ADLS. She occasionally uses a wheelchair for mobility. Home support: The patient lived with her . Home setting: recent discharge from SNF to home. Outcome: Progressing Towards Goal 
PHYSICAL THERAPY TREATMENT Patient: Thad Denton (8 y.o. female) Date: 5/21/2019 Diagnosis: Hepatic encephalopathy ([address] [K72.90] UTI (urinary tract infection) [N39.0] <principal problem not specified> Precautions: Contact, Bed Alarm(EBSL) Chart, physical therapy assessment, plan of care and goals were reviewed. ASSESSMENT: 
Patient with improved attitude toward therapy today and quite agreeable to participate. Needs Tyra for supine to sit transfer. Sit to stand with CGA and cues for safety as patient prefers to pull up on walker. Amb approx 12 feet with Rw and CGA with ataxic pattern and slow radha but no overt LOB. Patient with increased fatigue post ambulation and reluctant to ambulate further at this time. Recommend SNF rehab vs [facility] PT with 24/7 supervision/assist. 
Progression toward goals: 
?    Improving appropriately and progressing toward goals ?    Improving slowly and progressing toward goals ? Not making progress toward goals and plan of care will be adjusted PLAN: 
Patient continues to benefit from skilled intervention to address the above impairments. Continue treatment per established plan of care. Discharge Recommendations:  [facility] L[facility]pez Further Equipment Recommendations for Discharge:  TBD SUBJECTIVE:  
Patient stated ? I'm doing better today. ? OBJECTIVE DATA SUMMARY:  
Critical Behavior: 
Neurologic State: Alert, Confused Orientation Level: Disoriented to situation, Oriented to person, Oriented to place, Oriented to time Cognition: Follows commands Safety/Judgement: Fall prevention, Decreased insight into deficits, Decreased awareness of need for safety Functional Mobility Training: 
Bed Mobility: 
Rolling: Supervision Supine to Sit: Tyra Scooting: Supervision Transfers: 
Sit to Stand: Contact guard assistance Stand to Sit: Contact guard assistance Balance: 
Sitting: Intact Standing: Impaired; With support Standing - Static: Constant support; Fair 
Standing - Dynamic : Constant support; Ernie Slater Ambulation/Gait Training: 
Distance (ft): 12 Feet (ft) Assistive Device: Gait belt;Walker, rolling Ambulation - Level of Assistance: Contact guard assistance Gait Abnormalities: Decreased step clearance Base of Support: Widened Stance: Right decreased Speed/Mey: Pace decreased (<100 feet/min); Slow Step Length: Left shortened;Right shortened Pain: 
Pain Scale 1: Numeric (0 - 10) Pain Intensity 1: 0 Activity Tolerance: VSS Please refer to the flowsheet for vital signs taken during this treatment. After treatment:  
?    Patient left in no apparent distress sitting up in chair ? Patient left in no apparent distress in bed 
? Call bell left within reach ? Nursing notified ? Caregiver present ? Bed alarm activated COMMUNICATION/COLLABORATION:  
The patient?s plan of care was discussed with: Physical Therapist, Occupational Therapist and Registered Nurse Sindhu Lin PT, DPT Time Calculation: 16 mins

## 2019-05-21 NOTE — PROGRESS NOTES
Pharmacy IV to PO Conversion Program 
 
Medication: fluconazole Indication: UTI Impression/Plan: changed to PO per protocol WBCs down, afebrile, tolerating other oral meds Thanks Rao Grajeda, PHARMD

## 2019-05-21 NOTE — PROGRESS NOTES
Patient informed practice she has been hospitalized several times over the past few months for HE/mental status changes and UTI. Currently at [facility], called concerned because she missed her appointment with Dr. Ruby Dotson. Patient was unable to recount details of her hospital stay. States she will call to make an appointment on discharge.

## 2019-05-22 NOTE — PROGRESS NOTES
ADULT PROTOCOL: JET AEROSOL  REASSESSMENT Patient  Marciano Wilson     61 y.o.   female     5/22/2019  1:18 AM 
 
Breath Sounds Pre Procedure: Right Breath Sounds: Diminished Left Breath Sounds: Diminished Breath Sounds Post Procedure: Right Breath Sounds: Diminished Left Breath Sounds: Diminished Breathing pattern: Pre procedure Breathing Pattern: Regular Post procedure Breathing Pattern: Regular Heart Rate: Pre procedure Pulse: 88 
         Post procedure Pulse: 90 Resp Rate: Pre procedure Respirations: 18 Post procedure Respirations: 18 Peak Flow: Pre bronchodilator   n/a Post bronchodilator   n/a Incentive Spirometry:   n/a 
    n/a Cough: Pre procedure Cough: Non-productive Post procedure Cough: Non-productive Sputum: Pre procedure  n/a Post procedure  n/a Oxygen: O2 Device: Room air   FiO2 (%) room air Changed: NO SpO2: Pre procedure SpO2: 95 %   without oxygen Post procedure SpO2: 94 %  without oxygen Nebulizer Therapy: Current medications Aerosolized Medications: DuoNeb Changed: YES Problem List:  
Patient Active Problem List  
Diagnosis Code  Diabetes mellitus with neurological manifestations, uncontrolled (HCC) E11.49, E11.65  
 Essential hypertension, benign I10  
 Hyperlipidemia LDL goal <100 E78.5  Thrombocytopenia ([address] D69.6  Previous back surgery Z98.890  
 S/P CHACHO (total abdominal hysterectomy) Z90.710  Cirrhosis ([address] K74.60  Anemia D64.9  
 GI bleed K92.2  
 BALDERAS (nonalcoholic steatohepatitis) K75.81  
 Acute deep vein thrombosis (DVT) of right lower extremity (HCC) I82.401  COPD (chronic obstructive pulmonary disease) (HCC) J44.9  Sepsis ([address] A41.9  
 CAP (community acquired pneumonia) J18.9  IBIS (obstructive sleep apnea) G47.33  
 Tobacco abuse Z72.0  Neuropathy G62.9  Obesity (BMI 30.0-34. 9) E66.9  Severe obesity (HCC) E66.01  
 Therapeutic drug monitoring Z51.81  
 TIA (transient ischemic attack) G45.9  GERD (gastroesophageal reflux disease) K21.9  Bilateral carotid artery stenosis I65.23  
 Hepatic encephalopathy (HCC) K72.90  Colitis K52.9  
 UTI (urinary tract infection) N39.0  Septic shock (HCC) A41.9, R65.21  
 Acute respiratory failure with hypercapnia ([facility] [address] J96.02  
 Counseling regarding advance care planning and goals of care Z71.89  
 Encephalopathy G93.40  Goals of care, counseling/discussion Z71.89  
 Advanced care planning/counseling discussion Z71.89  
 Debility R53.81  
 Neuropathic pain M79.2  Chronic bilateral low back pain with bilateral sciatica M54.42, M54.41, G89.29 Respiratory Therapist: Albert Gonzalez RT

## 2019-05-22 NOTE — PROGRESS NOTES
Hospitalist Progress Note NAME:  Meghann Armando :  1955 MRN:  [id number] Assessment / Plan: 
 
Acute Metabolic Encephalopathy POA- resolved Suspect secondary to  complicated Urinary tract infection 
in settings of urinary retention CT head without acute changes -MS improved -on meropenem since 3/00, recently complicated 10 day course of meropenem a week ago and back with infection  
-ID recommended Fosfomycin once. Stopped meropenem. 
-aaox4 now 
-no labs in last 2 days, will order for tomorrow 
  
Lactic Acidosis POA 
-Was not considered septic as it is not a good marker in liver disease BALDERAS POA Liver cirrhosis POA History of recurrent hepatic encephalopathy History of esophageal varices 
-cont Rifaxin and lactulose, lasix 
  
DM 2, controlled 
-Hba1c 4.8 back in 2019 
-Continue lantus and SSI  
  
COPD without exacerbation Recent acute respiratory failure with hypercarbia and hypoxia 
-Respiratory status stable 
-Breo ellipta, PRN nebs 
  
Essential HTN POA History of TIA  Hyperlipidemia POA 
-Cont home statin/ASA 
-Low dose metoprolol PT/OT  
CM consult placed for d/c planning Code Status: DNR, ACP in place Surrogate Decision Maker:  
  
DVT Prophylaxis: lovenox GI Prophylaxis: not indicated 
  
Baseline: just discharged from SNF to home   
             
  
  
CHIEF COMPLAINT:  
 at bedside. Back to her baseline. No abdominal pain Review of Systems: 
Symptom Y/N Comments  Symptom Y/N Comments Fever/Chills n   Chest Pain n   
Poor Appetite    Edema Cough n   Abdominal Pain n   
Sputum    Joint Pain SOB/MARTINEZ n   Pruritis/Rash n   
Nausea/vomit n   Tolerating PT/OT Diarrhea    Tolerating Diet y Constipation    Other Could NOT obtain due to:   
 
Objective: VITALS:  
Last 24hrs VS reviewed since prior progress note. Most recent are: 
Patient Vitals for the past 24 hrs: 
 Temp Pulse Resp BP SpO2 05/21/19 2053     95 % 05/21/19 2032 98.5 °F (36.9 °C) 99 22 120/59 94 % 05/21/19 1352     93 % 05/21/19 1104 98.4 °F (36.9 °C) 88 18 109/56 93 % 05/21/19 0838 98.4 °F (36.9 °C) 93 20 111/59 91 % 05/21/19 0826     92 % 05/21/19 0443 98.1 °F (36.7 °C) 90 18 125/64 91 % 05/21/19 0202     93 % 05/20/19 2338 98.4 °F (36.9 °C) 91 18 124/61 95 % 05/20/19 2115     96 % Intake/Output Summary (Last 24 hours) at 5/21/2019 2109 Last data filed at 5/21/2019 1058 Gross per 24 hour Intake 100 ml Output 1375 ml Net -1275 ml PHYSICAL EXAM: 
General: WD, WN. Alert, cooperative, no acute distress   
EENT:  EOMI. Anicteric sclerae. MMM Resp:  Bilateral air entry, no crackles  No accessory muscle use CV:  Regular  rhythm,  No edema GI:  Soft, Non distended, Non tender.  +Bowel sounds Neurologic:  Alert, normal speech, moving all extremities equally Psych:   Not anxious nor agitated Skin:  No rashes. No jaundice Reviewed most current lab test results and cultures  YES Reviewed most current radiology test results   YES Review and summation of old records today    NO Reviewed patient's current orders and MAR    YES 
PMH/ reviewed - no change compared to H&P 
________________________________________________________________________ Care Plan discussed with: 
  Comments Patient x Family  x   
RN x Care Manager Consultant Multidiciplinary team rounds were held today with , nursing, pharmacist and clinical coordinator. Patient's plan of care was discussed; medications were reviewed and discharge planning was addressed. ________________________________________________________________________ Total NON critical care TIME:  35 Minutes Total CRITICAL CARE TIME Spent:   Minutes non procedure based Comments >50% of visit spent in counseling and coordination of care ________________________________________________________________________ Olga Maria MD  
 
Procedures: see electronic medical records for all procedures/Xrays and details which were not copied into this note but were reviewed prior to creation of Plan. LABS: 
I reviewed today's most current labs and imaging studies. Pertinent labs include: No results for input(s): WBC, HGB, HCT, PLT, HGBEXT, HCTEXT, PLTEXT, HGBEXT, HCTEXT, PLTEXT in the last 72 hours. No results for input(s): NA, K, CL, CO2, GLU, BUN, CREA, CA, MG, PHOS, ALB, TBIL, TBILI, SGOT, ALT, INR in the last 72 hours. No lab exists for component: INREXT, INREXT Signed: Olga Maria MD

## 2019-05-22 NOTE — PROGRESS NOTES
Bedside and Verbal shift change report given to Kerri FLORES (oncoming nurse) by Symone (offgoing nurse). Report included the following information SBAR, Kardex, Intake/Output and MAR. 
  
Zone Phone:   [phone] 
  
 
Significant changes during shift:   Pt had no telesitter through the night, had BM with urine around 0630. Eunice Medel Patient Information 
  
Vilma Preeira 
61 y.o. 
5/14/2019 11:30 AM by Rafaela aDniel MD. Dhara Mahoney was admitted from Home 
  
Problem List 
  
       
Patient Active Problem List  
  Diagnosis Date Noted  Goals of care, counseling/discussion    
 Advanced care planning/counseling discussion    
 Debility    
 Neuropathic pain    
 Chronic bilateral low back pain with bilateral sciatica    
 Encephalopathy 04/24/2019  Acute respiratory failure with hypercapnia ([address] 04/02/2019  Counseling regarding advance care planning and goals of care 04/02/2019  Hepatic encephalopathy ([address] 03/28/2019  Colitis 03/28/2019  UTI (urinary tract infection) 03/28/2019  Septic shock ([address] 03/28/2019  Bilateral carotid artery stenosis 11/14/2018  TIA (transient ischemic attack) 11/13/2018  GERD (gastroesophageal reflux disease) 11/13/2018  Therapeutic drug monitoring 10/16/2018  Severe obesity ([address] 10/11/2018  Obesity (BMI 30.0-34.9) 07/10/2018  Sepsis ([address] 03/30/2017  CAP (community acquired pneumonia) 03/30/2017  IBIS (obstructive sleep apnea) 03/30/2017  Tobacco abuse 03/30/2017  Neuropathy 03/30/2017  COPD (chronic obstructive pulmonary disease) ([address] 03/28/2017  Acute deep vein thrombosis (DVT) of right lower extremity ([address] 11/10/2016  
 BALDERAS (nonalcoholic steatohepatitis) 03/10/2016  GI bleed 03/03/2016  Anemia 03/01/2016  Thrombocytopenia ([address] 08/15/2013  Previous back surgery 08/15/2013  S/P CHACHO (total abdominal hysterectomy) 08/15/2013  Cirrhosis ([address] 08/15/2013  Diabetes mellitus with neurological manifestations, uncontrolled ([facility]    
 Essential hypertension, benign    
 Hyperlipidemia LDL goal <100    
  
       
Past Medical History:  
Diagnosis Date  Asthma    
 Back pain    
 COPD (chronic obstructive pulmonary disease) (HCC)    
 Diabetes (HCC)    
 Esophageal varices in cirrhosis ([facility]    
  6/2014 banding x 2  
 Fibromyalgia    
 Gastrointestinal disorder    
 GERD (gastroesophageal reflux disease)    
 Hypercholesteremia    
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    
 Liver disease    
 Other and unspecified hyperlipidemia    
 Restless leg syndrome    
 Type II or unspecified type diabetes mellitus without mention of complication, uncontrolled    
 Unspecified essential hypertension    
  
Activity Status: 
  
OOB to BATS Ambulated this shift N Bed Rest No 
  
Supplemental R3: (VW Applicable) 
  
Room air 
  
DVT prophylaxis: 
  
DVT prophylaxis Med- Yes; Lovenox DVT prophylaxis SCD or CRISTOPHER- No  
  
Wounds: (If Applicable) 
  
Wounds- DTI on buttocks, cream buttocks 
  
Patient Safety: 
  
Falls Score Total Score: 3 Safety Level_______ Bed Alarm On? Yes Sitter?  No 
  
Plan for upcoming shift: safety 
  
Discharge Plan: No  
  
Active Consults: 
IP CONSULT TO NEUROLOGY

## 2019-05-22 NOTE — PROGRESS NOTES
Discharge via wheelchair with assistance of Get Shukla and Rosie Marcial TAs to car with family. Family to  Transfer to [facility]

## 2019-05-22 NOTE — DISCHARGE INSTRUCTIONS
HOSPITALIST DISCHARGE INSTRUCTIONS    NAME: Meghann Armando   :  1955   MRN:  [id number]     Date/Time:  2019 9:45 AM    ADMIT DATE: 2019   DISCHARGE DATE: 2019     Attending Physician: Kris Thornton MD    DISCHARGE DIAGNOSIS:    Acute Metabolic Encephalopathy POA- resolved  complicated Urinary tract infection  urinary retention  Lactic Acidosis POA  BALDERAS POA  Liver cirrhosis POA  History of recurrent hepatic encephalopathy  History of esophageal varices  DM 2, controlled  COPD without exacerbation  Recent acute respiratory failure with hypercarbia and hypoxia  Essential HTN POA  History of TIA   Hyperlipidemia POA    Medications: Per above medication reconciliation. Pain Management: per above medications    Recommended diet: Cardiac Diet    Recommended activity: Activity as tolerated    Wound care: None    Indwelling devices: Gimenez catheter, chronic with care per routine    Supplemental Oxygen: CPAP at night with Home settings    Required Lab work: Per SNF routine    Glucose management:  Accucheck ACHS with sliding scale per SNF protocol    Code status: DNR        Outside physician follow up: Follow-up Information     Follow up With Specialties Details Why Contact Info    [address] [place] [address] Ln Alpesh Javon) [facility]   [address], [facility] [address]      Lev Beach NP Nurse Practitioner   [address] [facility] [address] [id number]  [phone]                 Skilled nursing facility/ SNF MD responsible for above on discharge. Information obtained by :  I understand that if any problems occur once I am at home I am to contact my physician. I understand and acknowledge receipt of the instructions indicated above.                                                                                                                                            Physician's or R.N.'s Signature Date/Time                                                                                                                                              Patient or Repres

## 2019-05-22 NOTE — ROUTINE PROCESS
Change of shift report given to [facility]. Gimenez removed, video monitor removed.

## 2019-05-22 NOTE — PROGRESS NOTES
Patient is being discharged to [facility] today for short term rehab. The family states they  will transport her there. Spoke with Chirag Velazco and she has a bed for the patient at [facility] and they are aware she is being discharged today. FOC signed and placed on chart. Nursing to call report to .

## 2019-05-22 NOTE — PROGRESS NOTES
DR Jayne Cullen on unit and stated it was fine for patient  to have all of her BP meds even though she had yesterdays daily doses at 1630. and notified of present BP.

## 2019-05-22 NOTE — DISCHARGE SUMMARY
Hospitalist Discharge Summary Patient ID: 
Juan Sutherland [id number] 
61 y.o. 
1955 5/14/2019 PCP on record: Audra Rosario NP Admit date: 5/14/2019 Discharge date and time: 5/22/2019 DISCHARGE DIAGNOSIS: 
Acute Metabolic Encephalopathy POA- resolved 
complicated Urinary tract infection 
urinary retention Lactic Acidosis POA BALDERAS POA Liver cirrhosis POA History of recurrent hepatic encephalopathy History of esophageal varices DM 2, controlled COPD without exacerbation Recent acute respiratory failure with hypercarbia and hypoxia Essential HTN POA History of TIA 11/18 Hyperlipidemia POA 
 
CONSULTATIONS: 
IP CONSULT TO NEUROLOGY 
IP CONSULT TO INFECTIOUS DISEASES Excerpted HPI from H&P of Jayda Kirkpatrick MD: 
Lisa Glez is a 61 y.o. Patient is a poor historian with confusion so HPI taken from ED note \"  female, with past medical history significant for cirrhosis, diabetes on insulin presenting the emergency department brought in by  with concern for recurrent urinary tract infection and elevated ammonia level.  Patient is unable to provide much history. Merlin Borjas reports that patient had a recent hospitalization, will for UTI, has grown ESBL in the past. Merlin Borjas states that since she has been home from rehab she has had a progressive decline with now inability to ambulate and increasing confusion.  He reports she is compliant with her lactulose and having bowel movements.  Denies any fever, but admits to shaking.  He states that her breathing seems to have worsened as well, she was on oxygen as needed, now requiring it all the time. Constanza Cabrera has complaining of some hematuria at home. Yanet denies any falls or loss conscious numbness. \" 
  
 
 
 
______________________________________________________________________ DISCHARGE SUMMARY/HOSPITAL COURSE:  for full details see H&P, daily progress notes, labs, consult notes. Acute Metabolic Encephalopathy POA- resolved Suspect secondary to  complicated Urinary tract infection 
in settings of urinary retention CT head without acute changes -MS improved -on meropenem since 8/11, recently complicated 10 day course of meropenem a week ago and back with infection  
-ID recommended Fosfomycin once. Stopped meropenem. recived Fosfomycin 5/7-5/10. 
-was on Fluconazole for almost 10 days for yeast In urine, but culture normal, Dced it. 
-aaox4 now 
-Pysch evalv Outpatient 
  
Lactic Acidosis POA 
-Was not considered septic as it is not a good marker in liver disease  
  
BALDERAS POA Liver cirrhosis POA History of recurrent hepatic encephalopathy History of esophageal varices 
-cont Rifaxin and lactulose, lasix 
  
DM 2, controlled 
-Hba1c 4.8 back in march 2019 
-Continue lantus and SSI  
  
COPD without exacerbation Recent acute respiratory failure with hypercarbia and hypoxia 
-Respiratory status stable 
-Breo ellipta, PRN nebs 
  
Essential HTN POA History of TIA 11/18 Hyperlipidemia POA 
-Cont home statin/ASA 
-Low dose metoprolol 
 
_______________________________________________________________________ Patient seen and examined by me on discharge day. Pertinent Findings: 
Gen:    Not in distress Chest: Clear lungs CVS:   Regular rhythm. No edema Abd:  Soft, not distended, not tender Neuro:  Alert, orientedx3. 
_______________________________________________________________________ DISCHARGE MEDICATIONS:  
Current Discharge Medication List  
  
START taking these medications Details QUEtiapine (SEROQUEL) 25 mg tablet Take 1 Tab by mouth nightly. Qty: 30 Tab, Refills: 0 CONTINUE these medications which have CHANGED Details  
gabapentin (NEURONTIN) 600 mg tablet Take 1.5 Tabs by mouth three (3) times daily. Qty: 45 Tab, Refills: 0 CONTINUE these medications which have NOT CHANGED Details  
furosemide (LASIX) 20 mg tablet Take 20 mg by mouth daily. fluticasone propion-salmeterol (ADVAIR DISKUS) 500-50 mcg/dose diskus inhaler Take 1 Puff by inhalation every twelve (12) hours. insulin lispro (HUMALOG U-100 INSULIN) 100 unit/mL injection 2-15 Units by SubCUTAneous route Before breakfast, lunch, dinner and at bedtime. Blood Glucose (mg/dL)       Insulin Dose (units) 0-149                                   0  
            150-200                               2  
            201-250                               4  
            251-300                               6  
            301-350                               8  
            351-400                               10  
            401-450                               12  
             451-500                               15  
  
magnesium oxide (MAG-OX) 400 mg tablet Take 400 mg by mouth daily. traMADol (ULTRAM) 50 mg tablet Take 50 mg by mouth every six (6) hours as needed for Pain. lactulose (CHRONULAC) 10 gram/15 mL solution Take 45 mL by mouth three (3) times daily for 30 days. Qty: 4050 mL, Refills: 0  
  
insulin glargine (LANTUS) 100 unit/mL injection 15 Units by SubCUTAneous route daily for 30 days. Qty: 1 Vial, Refills: 1  
  
metoprolol tartrate (LOPRESSOR) 25 mg tablet Take 0.5 Tabs by mouth every twelve (12) hours for 30 days. Qty: 30 Tab, Refills: 0  
  
rifAXIMin (XIFAXAN) 550 mg tablet Take 550 mg by mouth two (2) times a day. nicotine (NICODERM CQ) 21 mg/24 hr 1 Patch by TransDERmal route every twenty-four (24) hours. spironolactone (ALDACTONE) 100 mg tablet Take 2 Tabs by mouth daily. Qty: 60 Tab, Refills: 3  
  
losartan (COZAAR) 25 mg tablet take 1 tablet by mouth once daily , REPLACES LISINOPRIL FOR BLOOD PRESSURE AND KIDNEY PROTECTION Qty: 30 Tab, Refills: 11  
  
aspirin 81 mg chewable tablet Take 1 Tab by mouth daily. Qty: 30 Tab, Refills: 6  
  
atorvastatin (LIPITOR) 40 mg tablet Take 1 Tab by mouth nightly. Qty: 30 Tab, Refills: 6 omeprazole (PRILOSEC) 20 mg capsule take 1 capsule by mouth once daily 
Qty: 30 Cap, Refills: 5  
  
amitriptyline (ELAVIL) 150 mg tablet Take 1 Tab by mouth nightly. ferrous sulfate 325 mg (65 mg iron) tablet take 1 tablet by mouth once daily with BREAKFAST Qty: 90 Tab, Refills: 1  
  
ondansetron (ZOFRAN ODT) 4 mg disintegrating tablet dissolve 1 tablet ON TONGUE every 8 hours if needed for nausea Qty: 45 Tab, Refills: 3  
  
rOPINIRole (REQUIP) 4 mg tab TAB Take 4 mg by mouth nightly. albuterol (PROAIR HFA) 90 mcg/actuation inhaler Take 2 Puffs by inhalation every four (4) hours as needed for Wheezing. albuterol-ipratropium (DUO-NEB) 2.5 mg-0.5 mg/3 ml nebu 3 mL by Nebulization route every four (4) hours as needed for Other (Wheezing or Shortness of breath). Qty: 30 Nebule, Refills: 0 STOP taking these medications  
  
 fluticasone furoate-vilanterol (BREO ELLIPTA) 200-25 mcg/dose inhaler Comments:  
Reason for Stopping:   
   
  
 
 
 
Patient Follow Up Instructions: Activity: Activity as tolerated Diet: Cardiac Diet Wound Care: None needed Follow-up Information Follow up With Specialties Details Why Contact Info [address] [facility]) Florence Wan [address] [facility]. 
[phone] Kalin Parikh NP Nurse Practitioner   [address] [place] [address] Drive 
[phone] Outpatient urology follow up  In 2 weeks    
  
 
________________________________________________________________ Risk of deterioration: Low 
 
Condition at Discharge:  Stable 
__________________________________________________________________ Disposition SNF/LTC 
 
____________________________________________________________________ Code Status: DNR/DNI 
___________________________________________________________________ Total time in minutes spent coordinating this discharge (includes going over instructions, follow-up, prescriptions, and preparing report for sign off to her PCP) :  35 minutes Signed: 
Olga Maria MD

## 2019-05-28 NOTE — TELEPHONE ENCOUNTER
Called pt to schedule ip f/u per provider, left message on mobile number. Upon review, noted patient is in [facility]. Contacted facility ([phone]) and spoke with Jeremy [place] who indiated she would speak to the patient about appointment availability and transportation and would get back to us in that regard.

## 2019-05-30 PROBLEM — R41.82 ALTERED MENTAL STATE: Status: ACTIVE | Noted: 2019-01-01

## 2019-05-30 NOTE — PROGRESS NOTES
Hospitalist Progress Note    NAME: Griffin Harding   :  1955   MRN:  [id number]       Assessment / Plan:  Shock: (POA) like?sepsis,   -UA only 5-10wbc, sly m LE  -Hx recent esbl UTI noted  -cont IV abx w merem, vanc  -continue vasopressors     AMS: metabolic encephalopathy due to sepsis  -CTH no acute findings  -ammonia 42-->39 mildly elevated  -US abd pending  -BALDERAS/cirrhosis  -place NGT and restart lactulose and rifaximin    Bright red blood per rectum  Hx esophageal varices  -hemorrhoids  -BRBBR noted  -GI consult  -repeat H/H stable  -monitor cbc    Type 2 DM   -SSI/POC checks     COPD: stable     DVT ppx: sq heparin  Code status: DNR/DNI  Disposition: TBD         Subjective:     Chief Complaint / Reason for Physician Visit  Can't give much history, no acute distress, arousable but confused    Discussed with RN events overnight. Review of Systems:  Symptom Y/N Comments  Symptom Y/N Comments   Fever/Chills    Chest Pain     Poor Appetite    Edema     Cough    Abdominal Pain     Sputum    Joint Pain     SOB/MARTINEZ    Pruritis/Rash     Nausea/vomit    Tolerating PT/OT     Diarrhea    Tolerating Diet     Constipation    Other       Could NOT obtain due to: ams     Objective:     VITALS:   Last 24hrs VS reviewed since prior progress note.  Most recent are:  Patient Vitals for the past 24 hrs:   Temp Pulse Resp BP SpO2   19 1729  (!) 132 26 122/49 95 %   19 1713  (!) 131 24 106/40 95 %   19 1712 100.1 °F (37.8 °C)       19 1600 99.5 °F (37.5 °C) (!) 129 24 (!) 103/39 95 %   19 1535     97 %   19 1500  (!) 124 24 121/51 96 %   19 1400  (!) 121 23 127/50 96 %   19 1325  (!) 120 24  96 %   19 1312 99.5 °F (37.5 °C) (!) 121 24 112/46 96 %   19 1200 100.4 °F (38 °C) (!) 121 24 115/47 96 %   19 1130  (!) 121 26 118/57 96 %   05/30/19 1107  (!) 121 24 103/50 94 %   19 1045  (!) 123 23 97/60 94 %   19 1030  (!) 123 27 111/88 95 %   05/30/19 0945  (!) 122 23 117/56 94 %   05/30/19 0915  (!) 122 24 129/54 95 %   05/30/19 0845  (!) 122 28 114/55 98 %   05/30/19 0815 99.7 °F (37.6 °C) (!) 121 24 121/58 98 %   05/30/19 0745  (!) 120 25 110/50 99 %   05/30/19 0715  (!) 119 24 115/51 99 %   05/30/19 0645 99.3 °F (37.4 °C) (!) 119 25 115/54 98 %   05/30/19 0630  (!) 118 19 119/51 98 %   05/30/19 0615  (!) 117 21 114/52 98 %   05/30/19 0600  (!) 117 23 116/51 98 %   05/30/19 0545  (!) 117 24 118/51 98 %   05/30/19 0530  (!) 117 22 120/47 98 %   05/30/19 0500 98.2 °F (36.8 °C) (!) 117 30 114/52 98 %   05/30/19 0445  (!) 115 25 112/53 98 %   05/30/19 0430  (!) 114 24 121/52 98 %   05/30/19 0421 98.2 °F (36.8 °C) (!) 113 22 109/50 98 %   05/30/19 0418  (!) 112 25 127/52 97 %   05/30/19 0415  (!) 112 24 128/51 97 %   05/30/19 0406  (!) 111 26 115/60 98 %   05/30/19 0357  (!) 111 25 127/54 98 %   05/30/19 0348  (!) 108 23 95/52 96 %   05/30/19 0343 98.1 °F (36.7 °C) (!) 108 28 (!) 80/32 97 %   05/30/19 0242  (!) 110 27 (!) 82/54 96 %   05/30/19 0240  (!) 109 25 (!) 50/30 93 %   05/30/19 0236  (!) 111 27 (!) 87/40 96 %   05/30/19 0233  (!) 110 27 (!) 84/65 97 %   05/30/19 0230  (!) 111 26 (!) 75/43 96 %   05/30/19 0227  (!) 111 27 (!) 64/45 97 %   05/30/19 0224  (!) 112 26 (!) 72/27 96 %   05/30/19 0221  (!) 112 30 (!) 70/38 97 %   05/30/19 0218  (!) 112 25 (!) 68/26 97 %   05/30/19 0215  (!) 112 24 (!) 60/28 97 %   05/30/19 0212  (!) 113 29 (!) 73/24 97 %   05/30/19 0206  (!) 113 27 (!) 85/42 96 %   05/30/19 0203  (!) 112 27 100/47 97 %   05/30/19 0200  (!) 113 (!) 35 (!) 78/36 97 %   05/30/19 0157  (!) 113 (!) 39 (!) 80/35 97 %   05/30/19 0153  (!) 114 27 (!) 79/35 97 %   05/30/19 0148  (!) 113 28 (!) 95/34 97 %   05/30/19 0146  (!) 114 29 (!) 95/37 97 %   05/30/19 0141  (!) 116 22 (!) 86/29 97 %   05/30/19 0136  (!) 116 27 (!) 88/33 97 %   05/30/19 0131     (!) 85 %   05/30/19 0131    (!) 89/40  05/30/19 0129     97 %   05/30/19 0119 (!) 101.6 °F (38.7 °C) (!) 119 30 (!) 73/31 (!) 85 %       Intake/Output Summary (Last 24 hours) at 5/30/2019 1754  Last data filed at 5/30/2019 1700  Gross per 24 hour   Intake 6208.42 ml   Output 120 ml   Net 6088.42 ml        PHYSICAL EXAM:  General: WD, WN. No acute distress,   EENT:  EOMI. Anicteric sclerae. MMM  Resp:  Course breath sounds, no wheezing or rales. No accessory muscle use  CV:  Regular  rhythm,  No edema  GI:  Soft, mod distended, Non tender.  +Bowel sounds  Neurologic:  Alert and oriented X 1, slow speech  Psych:   Not anxious nor agitated  Skin:  No rashes. No jaundice    Reviewed most current lab test results and cultures  YES  Reviewed most current radiology test results   YES  Review and summation of old records today    NO  Reviewed patient's current orders and MAR    YES  PMH/SH reviewed - no change compared to H&P  ________________________________________________________________________  Care Plan discussed with:    Comments   Patient x    Family      RN x    Care Manager     Consultant                        Multidiciplinary team rounds were held today with , nursing, pharmacist and clinical coordinator. Patient's plan of care was discussed; medications were reviewed and discharge planning was addressed. ________________________________________________________________________  Total NON critical care TIME: 35   Minutes    Total CRITICAL CARE TIME Spent:   Minutes non procedure based      Comments   >50% of visit spent in counseling and coordination of care x    ________________________________________________________________________  Cori Thae, DO     Procedures: see electronic medical records for all procedures/Xrays and details which were not copied into this note but were reviewed prior to creation of Plan. LABS:  I reviewed today's most current labs and imaging studies.   Pertinent labs include:  Recent Labs     05/30/19  1119 05/30/19 0130   WBC  --  21.7*   HGB 12.4 12.9   HCT 38.7 41.6   PLT  --  118*     Recent Labs     05/30/19  0139 05/30/19 0130   NA  --  136   K  --  4.1   CL  --  102   CO2  --  28   GLU  --  142*   BUN  --  11   CREA  --  1.08*   CA  --  8.3*   ALB  --  2.5*   TBILI  --  1.6*   SGOT  --  42*   ALT  --  40   INR 1.4*  --        Signed: Toya Schmidt DO

## 2019-05-30 NOTE — ED NOTES
TRANSFER - OUT REPORT: 
 
Verbal report given to SANDRA Wolff(name) on Jahaira Marino  being transferred to ED(unit) for routine progression of care Report consisted of patients Situation, Background, Assessment and  
Recommendations(SBAR). Information from the following report(s) SBAR was reviewed with the receiving nurse. Lines:  
Triple Lumen 05/30/19 Right Femoral (Active) Central Line Being Utilized Yes 5/30/2019  3:30 AM  
Site Assessment Clean, dry, & intact 5/30/2019  3:30 AM  
Infiltration Assessment 0 5/30/2019  3:30 AM  
Affected Extremity/Extremities Color distal to insertion site pink (or appropriate for race) 5/30/2019  3:30 AM  
Dressing Status Clean, dry, & intact 5/30/2019  3:30 AM  
Positive Blood Return (Medial Site) Yes 5/30/2019  3:30 AM  
Positive Blood Return (Lateral Site) Yes 5/30/2019  3:30 AM  
Positive Blood Return (Site #3) Yes 5/30/2019  3:30 AM  
   
Peripheral IV 05/30/19 Left Antecubital (Active) Site Assessment Clean, dry, & intact 5/30/2019  1:30 AM  
Phlebitis Assessment 0 5/30/2019  1:30 AM  
Dressing Status Clean, dry, & intact 5/30/2019  1:30 AM  
Dressing Type Transparent 5/30/2019  1:30 AM  
   
Peripheral IV 05/30/19 Right Antecubital (Active) Site Assessment Clean, dry, & intact 5/30/2019  1:31 AM  
Phlebitis Assessment 0 5/30/2019  1:31 AM  
Infiltration Assessment 0 5/30/2019  1:31 AM  
Dressing Status Clean, dry, & intact 5/30/2019  1:31 AM  
Dressing Type Transparent 5/30/2019  1:31 AM  
  
 
Opportunity for questions and clarification was provided.

## 2019-05-30 NOTE — ED NOTES
Pt. Resting quietly in bed at this time with call bell in reach and family at bedside. Family updated on plan of care.

## 2019-05-30 NOTE — ED NOTES
Pt presents via EMS from [facility], pt is unresponsive. Last known well was 2300 and was found unresponsive at [address] by staff. EMS reports they believe the pt may have been dx with a UTI at the nursing home. Pts baseline is confused, pt does not ambulate but is able to sit upright in a chair.

## 2019-05-30 NOTE — ED NOTES
Pt. With eyes opening spontaneously at this time. Pt. Attempting to speak but words are incomprehensible. Voluntary movement of all extremities.

## 2019-05-30 NOTE — ED PROVIDER NOTES
EMERGENCY DEPARTMENT HISTORY AND PHYSICAL EXAM 
     
 
Date: 5/30/2019 Patient Name: Kendra Kramer History of Presenting Illness Chief Complaint Patient presents with  Unresponsive History Provided By: EMS 
 
HPI: Kendra Kramer is a 61 y.o. female, pmhx COPD, fibromyalgia, GERD, asthma, cirrhosis with esophageal varices, who presents ambulatory to the ED c/o for mental status Per staff from [facility] patient was seen at 11:00 sitting on the side of her bed with her normal state of mentation (her baseline confusion). Approximately 1:00 they checked on her and she was unarousable thus they called EMS for transport. They told the medics that she has a diagnosed UTI but they have not started her antibiotics yet for some unknown reason. The medics were unable to obtain any other information regarding her history. PCP: Marily White NP Allergies: Hydrocodone, lisinopril, Lortab, Percocet Social Hx: Heavy tobacco, rare EtOH, -Illicit Drugs There are no other complaints, changes, or physical findings at this time. Current Facility-Administered Medications Medication Dose Route Frequency Provider Last Rate Last Dose  sodium chloride (NS) flush 5-10 mL  5-10 mL IntraVENous PRN Glendy Schaefer MD      
 0.9% sodium chloride infusion 250 mL  250 mL IntraVENous PRN Glendy Schaefer MD      
 PHENYLephrine (ROSANA-SYNEPHRINE) injection 0.3 mg  300 mcg IntraVENous NOW Glendy Schaefer MD      
 NOREPINephrine (LEVOPHED) 8 mg in 5% dextrose 250mL infusion  2-20 mcg/min IntraVENous TITRATE Glendy Schaefer MD 37.5 mL/hr at 05/30/19 0348 20 mcg/min at 05/30/19 0348  sodium chloride (NS) flush 5-40 mL  5-40 mL IntraVENous Q8H Keenan Piña MD      
 sodium chloride (NS) flush 5-40 mL  5-40 mL IntraVENous PRN Keenan Piña MD      
 acetaminophen (TYLENOL) suppository 650 mg  650 mg Rectal Q4H PRN Keenan Piña MD      
  ondansetron (ZOFRAN) injection 4 mg  4 mg IntraVENous Q4H PRN Pam Piña MD      
 heparin (porcine) injection 5,000 Units  5,000 Units SubCUTAneous Q8H Ryan Grace MD   5,000 Units at 05/30/19 1656  
 0.9% sodium chloride infusion  125 mL/hr IntraVENous CONTINUOUS Ryan Grace  mL/hr at 05/30/19 0651 125 mL/hr at 05/30/19 5330  insulin lispro (HUMALOG) injection   SubCUTAneous AC&HS Pam Piña MD      
 glucose chewable tablet 16 g  4 Tab Oral PRN Pam Piña MD      
 glucagon (GLUCAGEN) injection 1 mg  1 mg IntraMUSCular PRN Pam Piña MD      
 dextrose 10% infusion 240 mL  240 mL IntraVENous PRN Pam Piña MD      
 albuterol-ipratropium (DUO-NEB) 2.5 MG-0.5 MG/3 ML  3 mL Nebulization QID RT Pam Piña MD      
 albuterol (PROVENTIL VENTOLIN) nebulizer solution 2.5 mg  2.5 mg Nebulization Q4H PRN Pam Piña MD      
 meropenem (MERREM) 1 g in 0.9% sodium chloride (MBP/ADV) 50 mL  1 g IntraVENous NOW Ryan Grace  mL/hr at 05/30/19 0650 1 g at 05/30/19 8256 Followed by  
[facility] meropenem (MERREM) 1 g in 0.9% sodium chloride (MBP/ADV) 50 mL  1 g IntraVENous Q8H Pam Piña MD      
 
Current Outpatient Medications Medication Sig Dispense Refill  gabapentin (NEURONTIN) 600 mg tablet Take 1.5 Tabs by mouth three (3) times daily. 45 Tab 0  
 QUEtiapine (SEROQUEL) 25 mg tablet Take 1 Tab by mouth nightly. 30 Tab 0  
 furosemide (LASIX) 20 mg tablet Take 20 mg by mouth daily.  fluticasone propion-salmeterol (ADVAIR DISKUS) 500-50 mcg/dose diskus inhaler Take 1 Puff by inhalation every twelve (12) hours.  insulin lispro (HUMALOG U-100 INSULIN) 100 unit/mL injection 2-15 Units by SubCUTAneous route Before breakfast, lunch, dinner and at bedtime. Blood Glucose (mg/dL)       Insulin Dose (units)             0-149                                   0  
 150-200                               2  
            201-250                               4  
            251-300                               6  
            301-350                               8  
            351-400                               10  
            401-450                               12  
             451-500                               15    
 magnesium oxide (MAG-OX) 400 mg tablet Take 400 mg by mouth daily.  traMADol (ULTRAM) 50 mg tablet Take 50 mg by mouth every six (6) hours as needed for Pain.  lactulose (CHRONULAC) 10 gram/15 mL solution Take 45 mL by mouth three (3) times daily for 30 days. 4050 mL 0  
 insulin glargine (LANTUS) 100 unit/mL injection 15 Units by SubCUTAneous route daily for 30 days. 1 Vial 1  
 metoprolol tartrate (LOPRESSOR) 25 mg tablet Take 0.5 Tabs by mouth every twelve (12) hours for 30 days. 30 Tab 0  
 rifAXIMin (XIFAXAN) 550 mg tablet Take 550 mg by mouth two (2) times a day.  nicotine (NICODERM CQ) 21 mg/24 hr 1 Patch by TransDERmal route every twenty-four (24) hours.  albuterol-ipratropium (DUO-NEB) 2.5 mg-0.5 mg/3 ml nebu 3 mL by Nebulization route every four (4) hours as needed for Other (Wheezing or Shortness of breath). 30 Nebule 0  
 spironolactone (ALDACTONE) 100 mg tablet Take 2 Tabs by mouth daily. 60 Tab 3  
 losartan (COZAAR) 25 mg tablet take 1 tablet by mouth once daily , REPLACES LISINOPRIL FOR BLOOD PRESSURE AND KIDNEY PROTECTION 30 Tab 11  
 aspirin 81 mg chewable tablet Take 1 Tab by mouth daily. 30 Tab 6  
 atorvastatin (LIPITOR) 40 mg tablet Take 1 Tab by mouth nightly. 30 Tab 6  
 omeprazole (PRILOSEC) 20 mg capsule take 1 capsule by mouth once daily 30 Cap 5  
 amitriptyline (ELAVIL) 150 mg tablet Take 1 Tab by mouth nightly.     
 ferrous sulfate 325 mg (65 mg iron) tablet take 1 tablet by mouth once daily with BREAKFAST 90 Tab 1  
  ondansetron (ZOFRAN ODT) 4 mg disintegrating tablet dissolve 1 tablet ON TONGUE every 8 hours if needed for nausea 45 Tab 3  
 rOPINIRole (REQUIP) 4 mg tab TAB Take 4 mg by mouth nightly.  albuterol (PROAIR HFA) 90 mcg/actuation inhaler Take 2 Puffs by inhalation every four (4) hours as needed for Wheezing. Past History Past Medical History: 
Past Medical History:  
Diagnosis Date  Asthma  Back pain  COPD (chronic obstructive pulmonary disease) (HCC)  Diabetes ([facility] [address]  Esophageal varices in cirrhosis ([facility] [address]   
 6/2014 banding x 2  
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 
Past Surgical History:  
Procedure Laterality Date  HX BACK SURGERY    
 HX CARPAL TUNNEL RELEASE    
 on right  HX HYSTERECTOMY plus 1/2 of an ovary removed  CA COLSC FLX W/RMVL OF TUMOR POLYP LESION SNARE TQ  5/30/2013  UPPER GI ENDOSCOPY,LIGAT VARIX  2/6/2015 Family History: 
Family History Problem Relation Age of Onset  Diabetes Mother  Stroke Sister  Diabetes Paternal Aunt  Diabetes Paternal Uncle  Heart Disease Neg Hx Social History: 
Social History Tobacco Use  Smoking status: Current Every Day Smoker Packs/day: 1.00 Years: 40.00 Pack years: 40.00  Smokeless tobacco: Former User Substance Use Topics  Alcohol use: No  
  Comment: rare  Drug use: No  
 
 
Allergies: Allergies Allergen Reactions  Hydrocodone Nausea and Vomiting  Lisinopril Cough  Lortab [Hydrocodone-Acetaminophen] Nausea and Vomiting  Percocet [Oxycodone-Acetaminophen] Nausea and Vomiting Review of Systems Review of Systems Unable to perform ROS: Acuity of condition Physical Exam  
Physical Exam  
Constitutional: She appears well-developed and well-nourished. This is an ill-appearing, middle-aged female currently in severe distress HENT:  
Head: Normocephalic and atraumatic. Mouth/Throat: Oropharynx is clear and moist.  
Eyes: Pupils are equal, round, and reactive to light. Conjunctivae and EOM are normal. Right eye exhibits no discharge. Left eye exhibits no discharge. Neck: Normal range of motion. Neck supple. No JVD present. Cardiovascular: Regular rhythm, normal heart sounds and intact distal pulses. Exam reveals no gallop and no friction rub. No murmur heard. Tachycardia Pulmonary/Chest: Effort normal. No respiratory distress. She has no wheezes. She has rales. Tachypnea with intermittent rales Abdominal: Soft. Bowel sounds are normal. She exhibits no distension and no mass. There is no tenderness. There is no rebound and no guarding. Genitourinary:  
Genitourinary Comments: Hemorrhoid with small blood Musculoskeletal: Normal range of motion. She exhibits no edema. Neurological: No cranial nerve deficit. She exhibits normal muscle tone. Patient somnolent and responds minimally to painful stimuli Skin: Skin is warm and dry. No rash noted. She is not diaphoretic. Nursing note and vitals reviewed. Diagnostic Study Results Labs - Recent Results (from the past 12 hour(s)) POC LACTIC ACID Collection Time: 05/30/19  1:29 AM  
Result Value Ref Range Lactic Acid (POC) 2.30 (HH) 0.40 - 2.00 mmol/L METABOLIC PANEL, COMPREHENSIVE Collection Time: 05/30/19  1:30 AM  
Result Value Ref Range Sodium 136 136 - 145 mmol/L Potassium 4.1 3.5 - 5.1 mmol/L Chloride 102 97 - 108 mmol/L  
 CO2 28 21 - 32 mmol/L Anion gap 6 5 - 15 mmol/L Glucose 142 (H) 65 - 100 mg/dL BUN 11 6 - 20 MG/DL Creatinine 1.08 (H) 0.55 - 1.02 MG/DL  
 BUN/Creatinine ratio 10 (L) 12 - 20 GFR est AA >60 >60 ml/min/1.73m2 GFR est non-AA 51 (L) >60 ml/min/1.73m2 Calcium 8.3 (L) 8.5 - 10.1 MG/DL Bilirubin, total 1.6 (H) 0.2 - 1.0 MG/DL  
 ALT (SGPT) 40 12 - 78 U/L  
 AST (SGOT) 42 (H) 15 - 37 U/L Alk. phosphatase 186 (H) 45 - 117 U/L Protein, total 7.0 6.4 - 8.2 g/dL Albumin 2.5 (L) 3.5 - 5.0 g/dL Globulin 4.5 (H) 2.0 - 4.0 g/dL A-G Ratio 0.6 (L) 1.1 - 2.2    
CBC WITH AUTOMATED DIFF Collection Time: 05/30/19  1:30 AM  
Result Value Ref Range WBC 21.7 (H) 3.6 - 11.0 K/uL  
 RBC 4.48 3.80 - 5.20 M/uL  
 HGB 12.9 11.5 - 16.0 g/dL HCT 41.6 35.0 - 47.0 % MCV 92.9 80.0 - 99.0 FL  
 MCH 28.8 26.0 - 34.0 PG  
 MCHC 31.0 30.0 - 36.5 g/dL  
 RDW 19.6 (H) 11.5 - 14.5 % PLATELET 149 (L) 250 - 400 K/uL MPV 12.7 8.9 - 12.9 FL  
 NRBC 0.0 0  WBC ABSOLUTE NRBC 0.00 0.00 - 0.01 K/uL NEUTROPHILS 88 (H) 32 - 75 % LYMPHOCYTES 7 (L) 12 - 49 % MONOCYTES 4 (L) 5 - 13 % EOSINOPHILS 0 0 - 7 % BASOPHILS 0 0 - 1 % IMMATURE GRANULOCYTES 0 0.0 - 0.5 % ABS. NEUTROPHILS 19.1 (H) 1.8 - 8.0 K/UL  
 ABS. LYMPHOCYTES 1.5 0.8 - 3.5 K/UL  
 ABS. MONOCYTES 1.0 0.0 - 1.0 K/UL  
 ABS. EOSINOPHILS 0.0 0.0 - 0.4 K/UL  
 ABS. BASOPHILS 0.1 0.0 - 0.1 K/UL  
 ABS. IMM. GRANS. 0.1 (H) 0.00 - 0.04 K/UL  
 DF AUTOMATED URINE CULTURE HOLD SAMPLE Collection Time: 05/30/19  1:39 AM  
Result Value Ref Range Urine culture hold URINE ON HOLD IN MICROBIOLOGY DEPT FOR 3 DAYS. IF UNPRESERVED URINE IS SUBMITTED, IT CANNOT BE USED FOR ADDITIONAL TESTING AFTER 24 HRS, RECOLLECTION WILL BE REQUIRED. URINALYSIS W/ REFLEX CULTURE Collection Time: 05/30/19  1:39 AM  
Result Value Ref Range Color DARK YELLOW Appearance CLOUDY (A) CLEAR Specific gravity 1.022 1.003 - 1.030    
 pH (UA) 5.5 5.0 - 8.0 Protein NEGATIVE  NEG mg/dL Glucose NEGATIVE  NEG mg/dL Ketone TRACE (A) NEG mg/dL Blood NEGATIVE  NEG Urobilinogen 0.2 0.2 - 1.0 EU/dL Nitrites NEGATIVE  NEG Leukocyte Esterase SMALL (A) NEG    
 WBC 5-10 0 - 4 /hpf  
 RBC 0-5 0 - 5 /hpf Epithelial cells FEW FEW /lpf Bacteria 4+ (A) NEG /hpf  
 UA:UC IF INDICATED URINE CULTURE ORDERED (A) CNI AMMONIA Collection Time: 05/30/19  1:39 AM  
Result Value Ref Range Ammonia 39 (H) <32 UMOL/L  
PROTHROMBIN TIME + INR Collection Time: 05/30/19  1:39 AM  
Result Value Ref Range INR 1.4 (H) 0.9 - 1.1 Prothrombin time 14.1 (H) 9.0 - 11.1 sec BILIRUBIN, CONFIRM Collection Time: 05/30/19  1:39 AM  
Result Value Ref Range Bilirubin UA, confirm NEGATIVE  NEG    
TYPE + CROSSMATCH Collection Time: 05/30/19  2:24 AM  
Result Value Ref Range Crossmatch Expiration 06/02/2019 ABO/Rh(D) O POSITIVE Antibody screen NEG Unit number [id number] Blood component type RC LR Unit division 00 Status of unit ALLOCATED Crossmatch result Compatible POC G3 - PUL Collection Time: 05/30/19  3:11 AM  
Result Value Ref Range pH (POC) 7.289 (L) 7.35 - 7.45    
 pCO2 (POC) 45.5 (H) 35.0 - 45.0 MMHG  
 pO2 (POC) 80 80 - 100 MMHG  
 HCO3 (POC) 21.8 (L) 22 - 26 MMOL/L  
 sO2 (POC) 94 92 - 97 % Base deficit (POC) 5 mmol/L Site RIGHT RADIAL Device: ROOM AIR Allens test (POC) YES Specimen type (POC) ARTERIAL Total resp. rate 26 Radiologic Studies -  
CT HEAD WO CONT Final Result IMPRESSION: No acute findings. XR CHEST PORT Final Result IMPRESSION: No acute findings. CT Results  (Last 48 hours) 05/30/19 0522  CT HEAD WO CONT Final result Impression:  IMPRESSION: No acute findings. Narrative:  EXAM: CT HEAD WO CONT INDICATION: Altered mental status. COMPARISON: CT 5/15/2019. Eunice Gianfranco CONTRAST: None. TECHNIQUE: Unenhanced CT of the head was performed using 5 mm images. Brain and  
bone windows were generated. CT dose reduction was achieved through use of a  
standardized protocol tailored for this examination and automatic exposure  
control for dose modulation. FINDINGS:  
The ventricles and sulci are normal in size, shape and configuration and  
midline. There is no significant white matter disease. There is no intracranial  
hemorrhage, extra-axial collection, mass, mass effect or midline shift. The  
basilar cisterns are open. No acute infarct is identified. The bone windows  
demonstrate no abnormalities. The visualized portions of the paranasal sinuses  
and mastoid air cells are clear. CXR Results  (Last 48 hours) 05/30/19 0351  XR CHEST PORT Final result Impression:  IMPRESSION: No acute findings. Narrative:  EXAM: XR CHEST PORT INDICATION: meets SIRS criteria COMPARISON: Chest x-ray at 14 2019. FINDINGS: A portable AP radiograph of the chest was obtained at 03:29 hours. The  
patient is on a cardiac monitor. The lungs are clear. The cardiac and  
mediastinal contours and pulmonary vascularity are normal.  The bones and soft  
tissues are grossly within normal limits. Medical Decision Making I am the first provider for this patient. I reviewed the vital signs, available nursing notes, past medical history, past surgical history, family history and social history. Vital Signs-Reviewed the patient's vital signs. Patient Vitals for the past 12 hrs: 
 Temp Pulse Resp BP SpO2  
05/30/19 0500 98.2 °F (36.8 °C) (!) 117 30 114/52 98 % 05/30/19 0445  (!) 115 25 112/53 98 % 05/30/19 0430  (!) 114 24 121/52 98 % 05/30/19 0421 98.2 °F (36.8 °C) (!) 113 22 109/50 98 % 05/30/19 0418  (!) 112 25 127/52 97 % 05/30/19 0415  (!) 112 24 128/51 97 % 05/30/19 0406  (!) 111 26 115/60 98 % 05/30/19 0357  (!) 111 25 127/54 98 % 05/30/19 0348  (!) 108 23 95/52 96 % 05/30/19 0343 98.1 °F (36.7 °C) (!) 108 28 (!) 80/32 97 % 05/30/19 0242  (!) 110 27 (!) 82/54 96 % 05/30/19 0240  (!) 109 25 (!) 50/30 93 % 05/30/19 0236  (!) 111 27 (!) 87/40 96 % 05/30/19 0233  (!) 110 27 (!) 84/65 97 % 05/30/19 0230  (!) 111 26 (!) 75/43 96 % 05/30/19 0227  (!) 111 27 (!) 64/45 97 % 05/30/19 0224  (!) 112 26 (!) 72/27 96 % 05/30/19 0221  (!) 112 30 (!) 70/38 97 % 05/30/19 0218  (!) 112 25 (!) 68/26 97 % 05/30/19 0215  (!) 112 24 (!) 60/28 97 % 05/30/19 0212  (!) 113 29 (!) 73/24 97 % 05/30/19 0206  (!) 113 27 (!) 85/42 96 % 05/30/19 0203  (!) 112 27 100/47 97 % 05/30/19 0200  (!) 113 (!) 35 (!) 78/36 97 % 05/30/19 0157  (!) 113 (!) 39 (!) 80/35 97 % 05/30/19 0153  (!) 114 27 (!) 79/35 97 % 05/30/19 0148  (!) 113 28 (!) 95/34 97 % 05/30/19 0146  (!) 114 29 (!) 95/37 97 % 05/30/19 0141  (!) 116 22 (!) 86/29 97 % 05/30/19 0136  (!) 116 27 (!) 88/33 97 % 05/30/19 0131     (!) 85 % 05/30/19 0131    (!) 89/40   
05/30/19 0129     97 % 05/30/19 0119 (!) 101.6 °F (38.7 °C) (!) 119 30 (!) 73/31 (!) 85 % Pulse Oximetry Analysis - 85% on RA; 92% 2L Cardiac Monitor:  
Rate: 119bpm 
Rhythm: Normal Sinus Rhythm Records Reviewed: Nursing Notes, Old Medical Records, Previous electrocardiograms, Ambulance Run Sheet, Previous Radiology Studies and Previous Laboratory Studies Provider Notes (Medical Decision Making): MDM: Female with a known encephalopathy on prior evaluations and admission presenting for altered mental status. Difficult to determine if patient is a stroke candidate however her rectal temperature is notably elevated thus given her recent diagnosis of UTI is likely sepsis with endorgan dysfunction. Will initiate broad evaluation with broad coverage antibiotics.   Blood pressure also notably low and patient has a history of COPD without obvious history of CHF. Will initiate aggressive IV hydration as well. ED Course:  
Initial assessment performed. The patients presenting problems have been discussed, and they are in agreement with the care plan formulated and outlined with them. I have encouraged them to ask questions as they arise throughout their visit. PROGRESS NOTE: 
2:15 AM 
Pt continues to remain hypotensive despite 2 L of normal saline wide open. Will initiate vasopressors with phenylephrine and insert a central line.  is currently at bedside and consented to the procedure with nursing staff. 3:30 AM 
Central line has been placed and multiple doses of push dose phenylephrine were given with minimal change in blood pressure. ABG without concerning CO2 retention will continue on her current oxygen therapy and admit to the hospitalist for sepsis. Of note all veins were extremely collapsible during bedside ultrasound for central line placement so further IV fluids have been ordered in addition to further vasopressors. Procedure Note - Central Line Placement:  
3:39 AM 
Performed by: Rossi Mills MD  
 
Immediately prior to the procedure, the patient was reevaluated and found suitable for the planned procedure and any planned medications. Risks and benefits of the procedure were discussed with her  Immediately prior to the procedure a time out was called to verify the correct patient, procedure, equipment, staff, and marking as appropriate. Area was cleansed with Chlorprep and anesthetized with 5mLs of 1% lidocaine. Prepped and draped in sterile fashion. Landmarks identified. 18 gauge needle with triple lumen catheter was inserted into pt's Right, Femoral Vein with ultrasound guidance. The wire was visualized in the femoral vein that any evidence of arterial compromise. Guide wire viewed within the vein lumen and did not insert the artery. Guide wire was removed and all three ports flushed and draw without complications. Line sutured in place; sterile dressing applied. Position: Trendelenburg Number of attempts: 1 right femoral vein; Of note there were multiple attempts in the left and right IJ but was unable to pass the catheter over the wire due to patient's anatomy Estimated blood loss: 1 to 5 mL's The procedure took 46-60 minutes, and pt tolerated well. CONSULT NOTE:  
3:39 AM 
Kisha Galarza MD spoke with Dr Ramy Suarez, Specialty: Hospitalist 
Discussed pt's hx, disposition, and available diagnostic and imaging results. Reviewed care plans. Consultant agrees with plans as outlined. He would like to see her CT before he admits her here. He also has concern for her need of a Hepatologist. Given her INR and lack of acute hepatitis. Explained she is okay to stay here with GI consultation. 6 AM 
Head CT is finally been completed and read by the radiologist after delays due to some PACs issues. She does not appear to have any ischemia or intracranial hemorrhage. Called to the hospitalist who agreed to admission. Patient continues to do well after fourth liter fluid bolus and pressors. Critical Care Time: CRITICAL CARE NOTE : 
6:15 AM 
IMPENDING DETERIORATION -Airway, Respiratory, Cardiovascular, CNS, Metabolic, Renal and Hepatic ASSOCIATED RISK FACTORS - Hypotension, Shock, Hypoxia, Bleeding, Trauma, Dysrhythmia, Metabolic changes, Dehydration, Vascular Compromise and CNS Decompensation MANAGEMENT- Bedside Assessment and Supervision of Care INTERPRETATION -  Xrays, CT Scan, Blood Gases, ECG and Blood Pressure INTERVENTIONS - hemodynamic mngmt and Metobolic interventions CASE REVIEW - Hospitalist, Nursing and Family TREATMENT RESPONSE -Improved and Stable PERFORMED BY - Self NOTES   : 
I have spent 60 minutes of critical care time involved in lab review, consultations with specialist, family decision- making, bedside attention and documentation. During this entire length of time I was immediately available to the patient. Remi Hay. MD [place] 
 
 
 
Diagnosis Clinical Impression: 1. Somnolence 2. Sepsis, due to unspecified organism ([address] 3. Fever, unspecified fever cause 4. Hypotension, unspecified hypotension type PLAN: 
1. Admission to the internal medicine hospitalist team for further care and management Please note, this dictation was completed with [employer], the computer voice recognition software. Quite often unanticipated grammatical, syntax, homophones, and other interpretive errors are inadvertently transcribed by the computer software. Please disregard these errors. Please excuse any errors that have escaped final proof reading.

## 2019-05-30 NOTE — PROGRESS NOTES
Pharmacy Automatic Renal Dosing Protocol - Antimicrobials Indication for Antimicrobials: sepsis Current Regimen of Each Antimicrobial: 
Vancomycin pharmacy to dose (Start Date ; Day # 1) Meropenem 1g IV every 8hr (start date: , day 1 Previous Antimicrobial Therapy: 
Levofloxacin 750mg IV x 1 in ED (Start Date ) Goal Level: VANCOMYCIN TROUGH GOAL RANGE Vancomycin Trough: 15 - 20 mcg/mL Date Dose & Interval Measured (mcg/mL) Extrapolated (mcg/mL) Date & time of next level: to be determined Significant Cultures:  
: urine - pending : blood x 2 - pending Radiology / Imaging results: (X-ray, CT scan or MRI):  
: CT head - Impression: no acute findings : chest xray - IMPRESSION: No acute findings. Paralysis, amputations, malnutrition: none Labs: 
Recent Labs 19 
0130 CREA 1.08* BUN 11 WBC 21.7* Temp (24hrs), Av.2 °F (37.3 °C), Min:98.1 °F (36.7 °C), Max:101.6 °F (38.7 °C) Creatinine Clearance (mL/min) or Dialysis: ~58ml/min Impression/Plan:  
Vancomycin 1500mg IV x 1 loading dose, followed by 750mg IV every 12hr to yield a trough of ~15-16mcg/mL Continue meropenem (hx of ESBL klebsiella in urine) Antimicrobial stop date to be determined Pharmacy will follow daily and adjust medications as appropriate for renal function and/or serum levels. Thank you, MARA QuijanoD 
 
Recommended duration of therapy 
[email or web address]://[email or web address]/[facility]/Pharmacy/Clinical%[email or web address]. docx Renal Dosing 
http://[email or web address]/[facility]/Pharmacy/Clinical%20Companion/Renal%[email or web address]. pdf

## 2019-05-30 NOTE — PROGRESS NOTES
ADULT PROTOCOL: JET AEROSOL ASSESSMENT    Patient  Ava Harris     61 y.o.   female     5/30/2019  3:47 PM    Breath Sounds Pre Procedure: Right Breath Sounds: Scattered wheezing                               Left Breath Sounds: Scattered wheezing    Breath Sounds Post Procedure: Right Breath Sounds: Scattered wheezing                                 Left Breath Sounds: Scattered wheezing    Breathing pattern: Pre procedure Breathing Pattern: Tachypneic          Post procedure Breathing Pattern: Tachypneic    Heart Rate: Pre procedure Pulse: 117           Post procedure Pulse: 128    Resp Rate: Pre procedure Respirations: 32           Post procedure Respirations: 23     Oxygen: O2 Device: Nasal cannula   4L    SpO2: Pre procedure SpO2: 97 %                  Post procedure SpO2: 99 %      Nebulizer Therapy: Current medications Aerosolized Medications: DuoNeb       Problem List:   Patient Active Problem List   Diagnosis Code    Diabetes mellitus with neurological manifestations, uncontrolled ([facility] [address] E11.49, E11.65    Essential hypertension, benign I10    Hyperlipidemia LDL goal <100 E78.5    Thrombocytopenia (HCC) D69.6    Previous back surgery Z98.890    S/P CHACHO (total abdominal hysterectomy) Z90.710    Cirrhosis (HCC) K74.60    Anemia D64.9    GI bleed K92.2    BALDERAS (nonalcoholic steatohepatitis) K75.81    Acute deep vein thrombosis (DVT) of right lower extremity (HCC) I82.401    COPD (chronic obstructive pulmonary disease) (HCC) J44.9    Sepsis (HCC) A41.9    CAP (community acquired pneumonia) J18.9    IBIS (obstructive sleep apnea) G47.33    Tobacco abuse Z72.0    Neuropathy G62.9    Obesity (BMI 30.0-34. 9) E66.9    Severe obesity (HCC) E66.01    Therapeutic drug monitoring Z51.81    TIA (transient ischemic attack) G45.9    GERD (gastroesophageal reflux disease) K21.9    Bilateral carotid artery stenosis I65.23    Hepatic encephalopathy (HCC) K72.90    Colitis K52.9    UTI (urinary tract infection) N39.0    Septic shock (HCC) A41.9, R65.21    Acute respiratory failure with hypercapnia ([facility] [address] J96.02    Counseling regarding advance care planning and goals of care Z71.89    Encephalopathy G93.40    Goals of care, counseling/discussion Z71.89    Advanced care planning/counseling discussion Z71.89    Debility R53.81    Neuropathic pain M79.2    Chronic bilateral low back pain with bilateral sciatica M54.42, M54.41, G89.29    Altered mental state R41.82       Respiratory Therapist: Nallely Jeffrey

## 2019-05-30 NOTE — PROGRESS NOTES
Pharmacy Clarification of the Prior to Admission Medication Regimen Retrospective to the Admission Medication Reconciliation The patient was not interviewed regarding clarification of the prior to admission medication regimen. Patient was transferred from [address] [place] [address],[address], to [facility], with a current med list and MAR. T updated PTA Med List based on [facility] provided by SNF Information Obtained From: Transfer Papers Recommendations/Findings: The following amendments were made to the patient's active medication list on file at [facility]:  
 
1) Additions:  
sodium phosphate (FLEET'S) 19-7 gram/118 mL enema 
bisacodyl (DULCOLAX) 10 mg suppository 2) Removals: None 3) Changes: 
traMADol (ULTRAM) 50 mg tablet (Old regimen:  50 mg by mouth every six (6) hours as needed for Pain. Idamae Shouts regimen: 50 mg by mouth nightly and 50 mg by mouth every six (6) hours as needed for Pain.) 
magnesium oxide (MAG-OX) 400 mg tablet (Old regimen: 400 mg by mouth daily  /New regimen: 800 mg by mouth daily and 400 mg by mouth daily as needed (supplement).) 4) Pertinent Pharmacy Findings: 
Updated patient?s preferred outpatient pharmacy to: T did not update the outpatient pharmacy due to the patient living at a SNF 
albuterol-ipratropium (DUO-NEB) 2.5 mg-0.5 mg/3 ml nebu, bisacodyl (DULCOLAX) 10 mg suppository, magnesium hydroxide (SCHWARTZ MILK OF MAGNESIA) 400 mg/5 mL suspension, ondansetron (ZOFRAN ODT) 4 mg disintegrating tablet, sodium phosphate (FLEET'S) 19-7 gram/118 mL enema: Patient has not needed these PRN agents as of 5/30/19 PTA medication list was corrected to the following:  
 
Prior to Admission Medications Prescriptions Last Dose Informant Patient Reported? Taking? QUEtiapine (SEROQUEL) 25 mg tablet 5/29/2019 at 2100 Transfer Papers No Yes Sig: Take 1 Tab by mouth nightly. albuterol (PROAIR HFA) 90 mcg/actuation inhaler 5/25/2019 at 1441 Transfer Papers Yes Yes Sig: Take 2 Puffs by inhalation every four (4) hours as needed for Wheezing. albuterol-ipratropium (DUO-NEB) 2.5 mg-0.5 mg/3 ml nebu Not Taking at Unknown time Transfer Papers No No  
Sig: 3 mL by Nebulization route every four (4) hours as needed for Other (Wheezing or Shortness of breath). amitriptyline (ELAVIL) 150 mg tablet 2019 at  Transfer Papers Yes Yes Sig: Take 1 Tab by mouth nightly. aspirin 81 mg chewable tablet 2019 at 0900 Transfer Papers No Yes Sig: Take 1 Tab by mouth daily. atorvastatin (LIPITOR) 40 mg tablet 2019 at 2100 Transfer Papers No Yes Sig: Take 1 Tab by mouth nightly. bisacodyl (DULCOLAX) 10 mg suppository Not Taking at Unknown time Transfer Papers Yes No  
Sig: Insert 10 mg into rectum daily as needed (Constipation if no BM from Milk of Mag or Laxative). ferrous sulfate 325 mg (65 mg iron) tablet 2019 at 0900 Transfer Papers No Yes Sig: take 1 tablet by mouth once daily with BREAKFAST  
fluticasone propion-salmeterol (ADVAIR DISKUS) 500-50 mcg/dose diskus inhaler 2019 at 2100 Transfer Papers Yes Yes Sig: Take 1 Puff by inhalation every twelve (12) hours. furosemide (LASIX) 20 mg tablet 2019 at 0900 Transfer Papers Yes Yes Sig: Take 20 mg by mouth daily. gabapentin (NEURONTIN) 600 mg tablet 2019 at  Transfer Papers Yes Yes Sig: Take 600 mg by mouth three (3) times daily. insulin glargine (LANTUS) 100 unit/mL injection 2019 at 2100 Transfer Papers No Yes Sig: 15 Units by SubCUTAneous route daily for 30 days. insulin lispro (HUMALOG U-100 INSULIN) 100 unit/mL injection 2019 at 2000 Transfer Papers Yes Yes Si-15 Units by SubCUTAneous route Before breakfast, lunch, dinner and at bedtime. Blood Glucose (mg/dL)       Insulin Dose (units)             0-149                                   0  
            150-200                               2  
            201-250                               4  
 251-300                               6  
            301-350                               8  
            351-400                               10  
            401-450                               12  
             451-500                               15  
lactulose (CHRONULAC) 10 gram/15 mL solution 2019 at 2100 Transfer Papers No Yes Sig: Take 45 mL by mouth three (3) times daily for 30 days. losartan (COZAAR) 25 mg tablet 2019 at 0900 Transfer Papers No Yes Sig: take 1 tablet by mouth once daily , REPLACES LISINOPRIL FOR BLOOD PRESSURE AND KIDNEY PROTECTION  
magnesium hydroxide (SCHWARTZ MILK OF MAGNESIA) 400 mg/5 mL suspension Not Taking at Unknown time Transfer Papers Yes No  
Sig: Take 30 mL by mouth every three (3) days. PRN if no BM  
magnesium oxide (MAG-OX) 400 mg tablet 2019 at 0900 Transfer Papers Yes Yes Sig: Take 400 mg by mouth daily as needed (supplement). magnesium oxide (MAG-OX) 400 mg tablet 2019 at 0900 Transfer Papers Yes Yes Sig: Take 800 mg by mouth daily. metoprolol tartrate (LOPRESSOR) 25 mg tablet 2019 at 2100 Transfer Papers Yes Yes Sig: Take 25 mg by mouth two (2) times a day. nicotine (NICODERM CQ) 21 mg/24 hr 2019 at 0900 Transfer Papers Yes Yes Si Patch by TransDERmal route every twenty-four (24) hours. omeprazole (PRILOSEC) 20 mg capsule 2019 at 0600 Transfer Papers No Yes Sig: take 1 capsule by mouth once daily  
ondansetron (ZOFRAN ODT) 4 mg disintegrating tablet Not Taking at Unknown time Transfer Papers No No  
Sig: dissolve 1 tablet ON TONGUE every 8 hours if needed for nausea  
rOPINIRole (REQUIP) 4 mg tab TAB 2019 at 2100 Transfer Papers Yes Yes Sig: Take 4 mg by mouth nightly. rifAXIMin (XIFAXAN) 550 mg tablet 2019 at 2100 Transfer Papers Yes Yes Sig: Take 550 mg by mouth two (2) times a day.   
sodium phosphate (FLEET'S) 19-7 gram/118 mL enema Not Taking at Unknown time Transfer Papers Yes No  
Sig: Insert 1 Enema into rectum daily as needed (Constipation  if no result from Dulcolax suppository). spironolactone (ALDACTONE) 100 mg tablet 5/29/2019 at 0900 Transfer Papers No Yes Sig: Take 2 Tabs by mouth daily. traMADol (ULTRAM) 50 mg tablet 5/29/2019 at 1715 Transfer Papers Yes Yes Sig: Take 50 mg by mouth every six (6) hours as needed for Pain.  
traMADol (ULTRAM) 50 mg tablet 5/29/2019 at 2100 Transfer Papers Yes Yes Sig: Take 50 mg by mouth nightly Facility-Administered Medications: None Thank you, Latasha Nunez, Eliot Medication History Pharmacy Technician

## 2019-05-30 NOTE — H&P
HISTORY AND PHYSICAL 
 
 
PCP: Zhane Huffman NP History source: ER, the patient is unresponsive CC: unresponsiveness HPI: 61 y.o lady w/ COPD, cirrhosis, DM, recent admission for ESBL UTI, who presents from her nursing home with altered mental state. Per reports, she was last seen well sitting on the side of her bed around 11 PM. Staff checked on her around 1 AM and she was unresponsive. EMS was called and she was transported here. No other history available. PMH/PSH: 
Past Medical History:  
Diagnosis Date  Asthma  Back pain  COPD (chronic obstructive pulmonary disease) (HCC)  Diabetes ([facility] [address]  Esophageal varices in cirrhosis ([facility] [address]   
 6/2014 banding x 2  
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  
Procedure Laterality Date  HX BACK SURGERY    
 HX CARPAL TUNNEL RELEASE    
 on right  HX HYSTERECTOMY plus 1/2 of an ovary removed  CO COLSC FLX W/RMVL OF TUMOR POLYP LESION SNARE TQ  5/30/2013  UPPER GI ENDOSCOPY,LIGAT VARIX  2/6/2015 Home meds:  
Prior to Admission medications Medication Sig Start Date End Date Taking? Authorizing Provider  
gabapentin (NEURONTIN) 600 mg tablet Take 1.5 Tabs by mouth three (3) times daily. 5/22/19   Madison Nice MD  
QUEtiapine (SEROQUEL) 25 mg tablet Take 1 Tab by mouth nightly. 5/22/19   Madison Nice MD  
furosemide (LASIX) 20 mg tablet Take 20 mg by mouth daily. Provider, Historical  
fluticasone propion-salmeterol (ADVAIR DISKUS) 500-50 mcg/dose diskus inhaler Take 1 Puff by inhalation every twelve (12) hours.     Provider, Historical  
insulin lispro (HUMALOG U-100 INSULIN) 100 unit/mL injection 2-15 Units by SubCUTAneous route Before breakfast, lunch, dinner and at bedtime. Blood Glucose (mg/dL)       Insulin Dose (units) 0-149                                   0  
            150-200                               2  
            201-250                               4  
            251-300                               6  
            301-350                               8  
            351-400                               10  
            401-450                               12  
             451-500                               15    Provider, Historical  
magnesium oxide (MAG-OX) 400 mg tablet Take 400 mg by mouth daily. Provider, Historical  
traMADol (ULTRAM) 50 mg tablet Take 50 mg by mouth every six (6) hours as needed for Pain. Provider, Historical  
lactulose (CHRONULAC) 10 gram/15 mL solution Take 45 mL by mouth three (3) times daily for 30 days. 5/1/19 5/31/19  Delon Delgado MD  
insulin glargine (LANTUS) 100 unit/mL injection 15 Units by SubCUTAneous route daily for 30 days. 5/1/19 5/31/19  Delon Delgado MD  
metoprolol tartrate (LOPRESSOR) 25 mg tablet Take 0.5 Tabs by mouth every twelve (12) hours for 30 days. 5/1/19 5/31/19  Delon Delgado MD  
rifAXIMin (XIFAXAN) 550 mg tablet Take 550 mg by mouth two (2) times a day. Provider, Historical  
nicotine (NICODERM CQ) 21 mg/24 hr 1 Patch by TransDERmal route every twenty-four (24) hours. Provider, Historical  
albuterol-ipratropium (DUO-NEB) 2.5 mg-0.5 mg/3 ml nebu 3 mL by Nebulization route every four (4) hours as needed for Other (Wheezing or Shortness of breath). 4/9/19   Sinan Raman MD  
spironolactone (ALDACTONE) 100 mg tablet Take 2 Tabs by mouth daily.  3/19/19   HUMZA Vance  
losartan (COZAAR) 25 mg tablet take 1 tablet by mouth once daily , REPLACES LISINOPRIL FOR BLOOD PRESSURE AND KIDNEY PROTECTION 12/3/18 Eyal Pittman MD  
aspirin 81 mg chewable tablet Take 1 Tab by mouth daily. 11/16/18   Verna Valdes MD  
atorvastatin (LIPITOR) 40 mg tablet Take 1 Tab by mouth nightly. 11/15/18   Verna Valdes MD  
omeprazole (PRILOSEC) 20 mg capsule take 1 capsule by mouth once daily 11/8/18   Cathi Barksdale, NP  
amitriptyline (ELAVIL) 150 mg tablet Take 1 Tab by mouth nightly. 10/16/18   Eyal Pittman MD  
ferrous sulfate 325 mg (65 mg iron) tablet take 1 tablet by mouth once daily with BREAKFAST 10/2/18   Cathi Barksdale, NP  
ondansetron (ZOFRAN ODT) 4 mg disintegrating tablet dissolve 1 tablet ON TONGUE every 8 hours if needed for nausea 8/1/18   Cathi Barksdale, NP  
rOPINIRole (REQUIP) 4 mg tab TAB Take 4 mg by mouth nightly. Provider, Sergio  
albuterol (PROAIR HFA) 90 mcg/actuation inhaler Take 2 Puffs by inhalation every four (4) hours as needed for Wheezing. Other, MD Leonarda  
 
 
Allergies: Allergies Allergen Reactions  Hydrocodone Nausea and Vomiting  Lisinopril Cough  Lortab [Hydrocodone-Acetaminophen] Nausea and Vomiting  Percocet [Oxycodone-Acetaminophen] Nausea and Vomiting FH: 
Family History Problem Relation Age of Onset  Diabetes Mother  Stroke Sister  Diabetes Paternal Aunt  Diabetes Paternal Uncle  Heart Disease Neg Hx SH: Social History Tobacco Use  Smoking status: Current Every Day Smoker Packs/day: 1.00 Years: 40.00 Pack years: 40.00  Smokeless tobacco: Former User Substance Use Topics  Alcohol use: No  
  Comment: rare ROS: Review of systems not obtained due to patient factors. PHYSICAL EXAM: 
Visit Vitals /52 Pulse (!) 117 Temp 98.2 °F (36.8 °C) Resp 30 Wt 77.1 kg (170 lb) SpO2 98% BMI 25.85 kg/m² Gen: unresponsive HEENT: anicteric sclerae, normal conjunctiva Neck: supple, trachea midline, no adenopathy Heart: RR, tachycardic, no MRG, no JVD, no peripheral edema Lungs: feint b/l rhonchi anteriorly, non-labored respirations Abd: soft, NT, mildly distended, BS+ Extr: warm, R femoral vein central line Skin: dry, ecchymoses on lower extremities Neuro: pupils 4 mm b/l and minimally reactive to light but symmetric, she does not respond to verbal or physical stimuli Psych: normal mood, appropriate affect Labs/Imaging: 
Recent Results (from the past 24 hour(s)) POC LACTIC ACID Collection Time: 05/30/19  1:29 AM  
Result Value Ref Range Lactic Acid (POC) 2.30 (HH) 0.40 - 2.00 mmol/L METABOLIC PANEL, COMPREHENSIVE Collection Time: 05/30/19  1:30 AM  
Result Value Ref Range Sodium 136 136 - 145 mmol/L Potassium 4.1 3.5 - 5.1 mmol/L Chloride 102 97 - 108 mmol/L  
 CO2 28 21 - 32 mmol/L Anion gap 6 5 - 15 mmol/L Glucose 142 (H) 65 - 100 mg/dL BUN 11 6 - 20 MG/DL Creatinine 1.08 (H) 0.55 - 1.02 MG/DL  
 BUN/Creatinine ratio 10 (L) 12 - 20 GFR est AA >60 >60 ml/min/1.73m2 GFR est non-AA 51 (L) >60 ml/min/1.73m2 Calcium 8.3 (L) 8.5 - 10.1 MG/DL Bilirubin, total 1.6 (H) 0.2 - 1.0 MG/DL  
 ALT (SGPT) 40 12 - 78 U/L  
 AST (SGOT) 42 (H) 15 - 37 U/L Alk. phosphatase 186 (H) 45 - 117 U/L Protein, total 7.0 6.4 - 8.2 g/dL Albumin 2.5 (L) 3.5 - 5.0 g/dL Globulin 4.5 (H) 2.0 - 4.0 g/dL A-G Ratio 0.6 (L) 1.1 - 2.2    
CBC WITH AUTOMATED DIFF Collection Time: 05/30/19  1:30 AM  
Result Value Ref Range WBC 21.7 (H) 3.6 - 11.0 K/uL  
 RBC 4.48 3.80 - 5.20 M/uL  
 HGB 12.9 11.5 - 16.0 g/dL HCT 41.6 35.0 - 47.0 % MCV 92.9 80.0 - 99.0 FL  
 MCH 28.8 26.0 - 34.0 PG  
 MCHC 31.0 30.0 - 36.5 g/dL  
 RDW 19.6 (H) 11.5 - 14.5 % PLATELET 584 (L) 278 - 400 K/uL MPV 12.7 8.9 - 12.9 FL  
 NRBC 0.0 0  WBC ABSOLUTE NRBC 0.00 0.00 - 0.01 K/uL NEUTROPHILS 88 (H) 32 - 75 % LYMPHOCYTES 7 (L) 12 - 49 % MONOCYTES 4 (L) 5 - 13 % EOSINOPHILS 0 0 - 7 % BASOPHILS 0 0 - 1 % IMMATURE GRANULOCYTES 0 0.0 - 0.5 % ABS. NEUTROPHILS 19.1 (H) 1.8 - 8.0 K/UL  
 ABS. LYMPHOCYTES 1.5 0.8 - 3.5 K/UL  
 ABS. MONOCYTES 1.0 0.0 - 1.0 K/UL  
 ABS. EOSINOPHILS 0.0 0.0 - 0.4 K/UL  
 ABS. BASOPHILS 0.1 0.0 - 0.1 K/UL  
 ABS. IMM. GRANS. 0.1 (H) 0.00 - 0.04 K/UL  
 DF AUTOMATED URINE CULTURE HOLD SAMPLE Collection Time: 05/30/19  1:39 AM  
Result Value Ref Range Urine culture hold URINE ON HOLD IN MICROBIOLOGY DEPT FOR 3 DAYS. IF UNPRESERVED URINE IS SUBMITTED, IT CANNOT BE USED FOR ADDITIONAL TESTING AFTER 24 HRS, RECOLLECTION WILL BE REQUIRED. URINALYSIS W/ REFLEX CULTURE Collection Time: 05/30/19  1:39 AM  
Result Value Ref Range Color DARK YELLOW Appearance CLOUDY (A) CLEAR Specific gravity 1.022 1.003 - 1.030    
 pH (UA) 5.5 5.0 - 8.0 Protein NEGATIVE  NEG mg/dL Glucose NEGATIVE  NEG mg/dL Ketone TRACE (A) NEG mg/dL Blood NEGATIVE  NEG Urobilinogen 0.2 0.2 - 1.0 EU/dL Nitrites NEGATIVE  NEG Leukocyte Esterase SMALL (A) NEG    
 WBC 5-10 0 - 4 /hpf  
 RBC 0-5 0 - 5 /hpf Epithelial cells FEW FEW /lpf Bacteria 4+ (A) NEG /hpf  
 UA:UC IF INDICATED URINE CULTURE ORDERED (A) CNI AMMONIA Collection Time: 05/30/19  1:39 AM  
Result Value Ref Range Ammonia 39 (H) <32 UMOL/L  
PROTHROMBIN TIME + INR Collection Time: 05/30/19  1:39 AM  
Result Value Ref Range INR 1.4 (H) 0.9 - 1.1 Prothrombin time 14.1 (H) 9.0 - 11.1 sec BILIRUBIN, CONFIRM Collection Time: 05/30/19  1:39 AM  
Result Value Ref Range Bilirubin UA, confirm NEGATIVE  NEG    
TYPE + CROSSMATCH Collection Time: 05/30/19  2:24 AM  
Result Value Ref Range Crossmatch Expiration 06/02/2019 ABO/Rh(D) O POSITIVE Antibody screen NEG Unit number [id number] Blood component type RC LR Unit division 00 Status of unit ALLOCATED Crossmatch result Compatible POC G3 - PUL Collection Time: 05/30/19  3:11 AM  
Result Value Ref Range pH (POC) 7.289 (L) 7.35 - 7.45    
 pCO2 (POC) 45.5 (H) 35.0 - 45.0 MMHG  
 pO2 (POC) 80 80 - 100 MMHG  
 HCO3 (POC) 21.8 (L) 22 - 26 MMOL/L  
 sO2 (POC) 94 92 - 97 % Base deficit (POC) 5 mmol/L Site RIGHT RADIAL Device: ROOM AIR Allens test (POC) YES Specimen type (POC) ARTERIAL Total resp. rate 26 Recent Labs 05/30/19 0130 WBC 21.7* HGB 12.9 HCT 41.6 * Recent Labs 05/30/19 0130   
K 4.1  CO2 28 BUN 11  
CREA 1.08* * CA 8.3* Recent Labs 05/30/19 0130 SGOT 42* ALT 40 * TBILI 1.6* TP 7.0 ALB 2.5*  
GLOB 4.5* No results for input(s): CPK, CKNDX, TROIQ in the last 72 hours. No lab exists for component: CPKMB Recent Labs 05/30/19 0139 INR 1.4* PTP 14.1* No results for input(s): PH, PCO2, PO2 in the last 72 hours. Ct Head Wo Cont Result Date: 5/30/2019 IMPRESSION: No acute findings. Xr Chest [facility] Result Date: 5/30/2019 IMPRESSION: No acute findings. Assessment & Plan:  
 
Shock: (POA) likely septic and due to UTI 
-IV fluids 
-IV meropenem and vancomycin 
-f/u cultures 
-continue vasopressors 
-R femoral CVC placed in ED (unable to thread IJ's) AMS: metabolic encephalopathy due to sepsis? UA with possible UTI. CT head no acute findings. Ammonia only minimally elevated. Mixed acidosis with mildly elevated hypercapnia does not explain degree of her encephalopathy. 
-consider MRI brain, EEG 
 
UTI: (POA) recent ESBL kleb pneumo UTI 
 
BALDERAS cirrhosis: 
-resume lactulose and rifaximin when alert enough to take PO 
-if she remains unresponsive consider NGT to administer lactulose, or lactulose enemas Type 2 DM 
-SSI/POC checks COPD: stable DVT ppx: sq heparin Code status: DNR/DNI Disposition: TBD Signed By: Yesenia Hernandez MD   
 May 30, 2019

## 2019-05-30 NOTE — PROGRESS NOTES
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 - no details available      PLAN:   · O2  · Bronchodilators  · IV fluids  · IV antibiotics  · Pressors    · Cultures  · Check abdominal ultrasound - consider paracentesis  · Serial hemoglobin  · Insulin   · DVT prophylaxis  · DNR/DNI   · Critically ill. High risk for further decompensation/death. Discussed with  and daughter at bedside. Subjective/Interval History:   I have reviewed the flowsheet and previous days notes. The patient is unable to give any meaningful history or review of systems because the patient is:  Unresponsive - had declining mental status at [facility] over the last several days, became unarousable this morning, currently on pressors and lethargic     The patient is critically ill on:      pressors     Review of Systems   Unable to perform ROS: Mental status change     Objective:   Vital Signs:    Visit Vitals  /57   Pulse (!) 121   Temp 99.7 °F (37.6 °C)   Resp 26   Ht 5' 8.11\" (1.73 m)   Wt 77.1 kg (170 lb)   SpO2 96%   BMI 25.76 kg/m²       O2 Device: Nasal cannula   O2 Flow Rate (L/min): 4 l/min   Temp (24hrs), Av.2 °F (37.3 °C), Min:98.1 °F (36.7 °C), Max:101.6 °F (38.7 °C)       Intake/Output:   Last shift:      No intake/output data recorded.   Last 3 shifts:  1901 -  0700  In: 4413 [I.V.:4413]  Out: -     Intake/Output Summary (Last 24 hours) at 2019 1200  Last data filed at 2019 0342  Gross per 24 hour   Intake 4413 ml   Output    Net 4413 ml     Hemodynamics:   PAP:   CO:     Wedge:   CI:     CVP:    SVR:       PVR: Ventilator Settings:  Mode Rate Tidal Volume Pressure FiO2 PEEP                    Peak airway pressure:      Minute ventilation:         Physical Exam   Constitutional: She appears lethargic. She appears ill. HENT:   Head: Normocephalic and atraumatic. Mouth/Throat: No oropharyngeal exudate. Eyes: No scleral icterus. Cardiovascular: Normal rate and regular rhythm. Pulmonary/Chest: She has no wheezes. She has rhonchi. She has no rales. Abdominal: Soft. She exhibits distension and fluid wave. Musculoskeletal: She exhibits edema. Neurological: She appears lethargic. Skin: Skin is warm and dry.      Data:     Current Facility-Administered Medications   Medication Dose Route Frequency    NOREPINephrine (LEVOPHED) 8 mg in 5% dextrose 250mL infusion  2-20 mcg/min IntraVENous TITRATE    sodium chloride (NS) flush 5-40 mL  5-40 mL IntraVENous Q8H    heparin (porcine) injection 5,000 Units  5,000 Units SubCUTAneous Q8H    0.9% sodium chloride infusion  125 mL/hr IntraVENous CONTINUOUS    insulin lispro (HUMALOG) injection   SubCUTAneous AC&HS    albuterol-ipratropium (DUO-NEB) 2.5 MG-0.5 MG/3 ML  3 mL Nebulization QID RT    meropenem (MERREM) 1 g in 0.9% sodium chloride (MBP/ADV) 50 mL  1 g IntraVENous Q8H    vancomycin (VANCOCIN) 750 mg in 0.9% sodium chloride (MBP/ADV) 250 mL  750 mg IntraVENous Q12H                Labs:  Recent Labs     05/30/19  1119 05/30/19  0130   WBC  --  21.7*   HGB 12.4 12.9   HCT 38.7 41.6   PLT  --  118*     Recent Labs     05/30/19  0139 05/30/19  0130   NA  --  136   K  --  4.1   CL  --  102   CO2  --  28   GLU  --  142*   BUN  --  11   CREA  --  1.08*   CA  --  8.3*   ALB  --  2.5*   TBILI  --  1.6*   SGOT  --  42*   ALT  --  40   INR 1.4*  --      Recent Labs     05/30/19  0311   PHI 7.289*   PCO2I 45.5*   PO2I 80   HCO3I 21.8*     Imaging:  I have personally reviewed the patients radiographs and have reviewed the reports:  No acute process        Total critical care time exclusive of procedures: 35 minutes  Olivier Wilson MD

## 2019-05-30 NOTE — PROGRESS NOTES
[facility] Consult Service Progress NOTE  Pulmonary, Critical Care, and Sleep Medicine    Name: Ramón Doan MRN: [id number]   : 1955 Hospital: [facility]   Date: 2019  Admission Date: 2019     Chart and notes reviewed. Data reviewed. Pt seen by Ej Melendez earlier today. I have evaluated and examined the patient. Pt is unstable and acutely ill in the CCU. Hospital Day: 1     There is a DDNR on chart signed 19 by  Mr. Johnny Watts. Physician name illegible. Asked by staff to clarify and sign hospital inpatient code form.  unaware of NIV options for resuscitation. He was counseled by Dr. Lay Moran in the ER and knows that pt condition is critical and unpredictable. Pt now in CCU, unresponsive, unlabored but with shallow efforts. Pt evidently was intubated at [facility] couple of months ago and was discharged to a SNF. She has been hospitalized four times in past year.  and family has tried all they could but  Pt is frail and family accepts that pt has not been able to recover from advanced liver disease. He is clear that pt would not want machines keeping her alive. A mask NIV machine would function as  Life support which pt did not want. He reaffirms her DNR/ DNI code status but states that he does wish us to treat her with medicines but no CPR, shock. We will continue to treat as outlined in notes, orders and hope she can rally.      Zoë Tuttle MD

## 2019-05-30 NOTE — PROGRESS NOTES
Chart accessed because I was assigned this patient. Was informed that the patient is not coming to POD 1 due to an active infection.

## 2019-05-30 NOTE — ED NOTES
[facility] Radha ROWLEY, ED Summary, MAR and Recent Results was discussed.  
 
Jeffrey Millan RN

## 2019-05-30 NOTE — PROGRESS NOTES
1315: TRANSFER - IN REPORT:    Verbal report received from Hilary Torrez RN(name) on Rich Duke  being received from ED(unit) for routine progression of care      Report consisted of patients Situation, Background, Assessment and   Recommendations(SBAR). Information from the following report(s) SBAR, Intake/Output, MAR, Accordion, Recent Results and Med Rec Status was reviewed with the receiving nurse. Opportunity for questions and clarification was provided. Assessment completed upon patients arrival to unit and care assumed. 1320: Patient brought to unit from ED. Bedside shift report given. CHG bath performed and patient laying comfortably in bed. Patient's family out in waiting room. When performing a skin assessment noticed a potential DTI on sacrum and fresh blood from rectum. 1600: Patient assessment completed and charted in flowsheets. 1618: Spoke with Dr. Philip Ozuna regarding continuous elevated HR in 120s-130s. Stated would order a beta blocker of some sort. 1720: Patient continuing heart rate in 120s/130s, patient temperature 100.1, and /40 with a MAP of 57. Contacted Dr. Wicho Stein who will increase Levo orders, instructed to drop an NG tube and give Tylenol that way since rectum is still bleeding. 1729: Tylenol given to help with slight temperature. 1821: Patient is continuing to have heart rates in 130s. Continuing to increase Levo, currently at 30mcg/min and patient's MAP is still only 56. Paging on call intensivist.     Toñito Silva: Spoke with oncall physician, Dr. Sue Shirley, instructed to give patient 500mL saline bolus, begin Phenylephrine at 50mcg/min and titrate up to 300mcg/min, and hang CVP. 1900: Bedside shift change report given to SANDRA Sheikh (oncoming nurse) by Cornel Sofia RN (offgoing nurse). Report included the following information SBAR, Intake/Output, MAR, Accordion, Recent Results and Med Rec Status.

## 2019-05-30 NOTE — ED NOTES
Spoke with Dr. Bay Geronimo. Aware of lung sounds and bleeding. Verbal orders for labs at this time and to watch O2 sats closely. Will continue to monitor patient.

## 2019-05-30 NOTE — ED NOTES
Pt. Darren De La Fuente changed at this time. Large amount of blood coming from rectal area. Patient with \"wet\" lung sounds. Paged Dr. Kristie Ruffin. Awaiting call back.

## 2019-05-30 NOTE — ED NOTES
Verbal and written consent for central line placement obtained from pts , Stanley Howard, as pt is unresponsive.

## 2019-05-31 NOTE — ROUTINE PROCESS
Bedside and Verbal shift change report received from [facility] (offgoing nurse). Report included the following information SBAR, Kardex, ED Summary, Procedure Summary, Intake/Output, MAR, Accordion, Recent Results, Med Rec Status, Cardiac Rhythm sinus tachycardia, Alarm Parameters , Pre Procedure Checklist and Quality Measures. Awake, tachypnea, stated, \"I'm going to die\"; Lungs with bilat rhonchi. Levophed and Cirilo-Synephrine remains in-use via the right groin   0900:Her , Pato Sheridan, is at the bedside. Incontinent of a small amount of brown stool, therefore, skin care completed and repositioned for comfort. 1100:Status is unchanged. Vasopressors are being titrated to maintain a MAP of 65 mmHg. Less responsive, and she has a low urinary output. 1230:Assessment is unchanged. Her family members remains at the bedside for support. I attempted to suction her, but she refused.  1400. The family remains cooperative with her plan of care. 1600:No changes noted. There are other family members at the bedside. Repositioned for comfort. 1730:  in. Her , Luisana Cuenca and daughter, Naya Valentin are at the bedside. They suggested \"Withdraw everything, and go with Hospice\". But this order is being deferred until Monday, Shante 3,2019.  1930:Bedside and Verbal shift change report given to SELAM Chen RN(oncoming nurse) by myself (offgoing nurse). Report included the following information SBAR, Kardex, ED Summary, Procedure Summary, Intake/Output, MAR, Accordion, Recent Results, Med Rec Status, Cardiac Rhythm sinus tachycardia., Alarm Parameters  and Quality Measures.

## 2019-05-31 NOTE — CONSULTS
Gastroenterology Consult Note  NAME: Florina Billy : 1955 MRN: [id number]   ATTG: [unfilled] PCP: Consuelo Lee NP  Date/Time:  2019 7:40 AM  Subjective:   REASON FOR CONSULT:      Gonzalez Tracy is a 61 y.o.  female who I was asked to see for blood CA in a cirrhotic pt. Her hgb is normal.   She cannot give a meaningful hx as she mumbles unintelligibly. Hx is from chart review and d/w her overnight and current RNs. Blood CA is mixed with mucousy stool, on lactulose. Hgb is normal. She is 61 y.o lady w/ COPD, cirrhosis, DM, recent admission for ESBL UTI, who presented from her nursing home with altered mental state and now is found to be in septic shock. She is IV vancomycin and IV Merem. On pressors. Lactic acid is high. Hgb 12.9. She has PMHx of cirrhosis due to BALDERAS and hx of alcohol abuse, iron deficency anemia, esophageal and gastric varices as well as HE. Pt  f/u with Dr. Maged Jiménez. Was a pt of [facility]/Dr Lois Sierra and we were asked to take over (There is confusion about tranfer of care). She has hx of bx proven cirrhosis secondary to fatty liver and alcohol, quit drinking over 6-7 years ago according to family(per hx). Last EGD in May 2018 demonstrated 2 medium-sized esophageal varices that were banded.     Colonoscopy done for anemia: 3/3/16: no blood in colon, inadequate prep in transverse colon, remainder WNL(internal hemorrhoids reported in a colonoscopy by Dr Lois Sierra in the past)  EGD 3/3/16: gastric varices, multiple GOV2 and smaller esophageal varices, mild erosive anteritis, no stigmata of recent bleeding    US shows cirrhotic liver, splenomegaly, trace ascites.       Past Medical History:   Diagnosis Date    Asthma     Back pain     COPD (chronic obstructive pulmonary disease) (HCC)     Diabetes (HCC)     Esophageal varices in cirrhosis ([facility] [address]     2014 banding x 2    Fibromyalgia     Gastrointestinal disorder     GERD (gastroesophageal reflux disease)     Hypercholesteremia     Liver cirrhosis secondary to BALDERAS (HCC)     Liver disease     Other and unspecified hyperlipidemia     Restless leg syndrome     Type II or unspecified type diabetes mellitus without mention of complication, uncontrolled     Unspecified essential hypertension       Past Surgical History:   Procedure Laterality Date    HX BACK SURGERY      HX CARPAL TUNNEL RELEASE      on right    HX HYSTERECTOMY      plus 1/2 of an ovary removed    MD COLSC FLX W/RMVL OF TUMOR POLYP LESION SNARE TQ  5/30/2013         UPPER GI ENDOSCOPY,LIGAT VARIX  2/6/2015          Social History     Tobacco Use    Smoking status: Current Every Day Smoker     Packs/day: 1.00     Years: 40.00     Pack years: 40.00    Smokeless tobacco: Former User   Substance Use Topics    Alcohol use: No     Comment: rare      Family History   Problem Relation Age of Onset    Diabetes Mother     Stroke Sister     Diabetes Paternal Aunt     Diabetes Paternal Uncle     Heart Disease Neg Hx       Allergies   Allergen Reactions    Hydrocodone Nausea and Vomiting    Lisinopril Cough    Lortab [Hydrocodone-Acetaminophen] Nausea and Vomiting    Percocet [Oxycodone-Acetaminophen] Nausea and Vomiting      Home Medications:  Prior to Admission Medications   Prescriptions Last Dose Informant Patient Reported? Taking? QUEtiapine (SEROQUEL) 25 mg tablet 5/29/2019 at 2100 Transfer Papers No Yes   Sig: Take 1 Tab by mouth nightly. albuterol (PROAIR HFA) 90 mcg/actuation inhaler 5/25/2019 at 1441 Transfer Papers Yes Yes   Sig: Take 2 Puffs by inhalation every four (4) hours as needed for Wheezing. albuterol-ipratropium (DUO-NEB) 2.5 mg-0.5 mg/3 ml nebu Not Taking at Unknown time Transfer Papers No No   Sig: 3 mL by Nebulization route every four (4) hours as needed for Other (Wheezing or Shortness of breath). amitriptyline (ELAVIL) 150 mg tablet 5/29/2019 at 2100 Transfer Papers Yes Yes   Sig: Take 1 Tab by mouth nightly. aspirin 81 mg chewable tablet 2019 at 0900 Transfer Papers No Yes   Sig: Take 1 Tab by mouth daily. atorvastatin (LIPITOR) 40 mg tablet 2019 at 2100 Transfer Papers No Yes   Sig: Take 1 Tab by mouth nightly. bisacodyl (DULCOLAX) 10 mg suppository Not Taking at Unknown time Transfer Papers Yes No   Sig: Insert 10 mg into rectum daily as needed (Constipation if no BM from Milk of Mag or Laxative). ferrous sulfate 325 mg (65 mg iron) tablet 2019 at 0900 Transfer Papers No Yes   Sig: take 1 tablet by mouth once daily with BREAKFAST   fluticasone propion-salmeterol (ADVAIR DISKUS) 500-50 mcg/dose diskus inhaler 2019 at  Transfer Papers Yes Yes   Sig: Take 1 Puff by inhalation every twelve (12) hours. furosemide (LASIX) 20 mg tablet 2019 at 0900 Transfer Papers Yes Yes   Sig: Take 20 mg by mouth daily. gabapentin (NEURONTIN) 600 mg tablet 2019 at  Transfer Papers Yes Yes   Sig: Take 600 mg by mouth three (3) times daily. insulin glargine (LANTUS) 100 unit/mL injection 2019 at 2100 Transfer Papers No Yes   Sig: 15 Units by SubCUTAneous route daily for 30 days. insulin lispro (HUMALOG U-100 INSULIN) 100 unit/mL injection 2019 at 2000 Transfer Papers Yes Yes   Si-15 Units by SubCUTAneous route Before breakfast, lunch, dinner and at bedtime.  Blood Glucose (mg/dL)       Insulin Dose (units)              0-149                                   0               150-200                               2               201-250                               4               251-300                               6               301-350                               8               351-400                               10               401-450                               12                451-500                               15   lactulose (CHRONULAC) 10 gram/15 mL solution 2019 at 2100 Transfer Papers No Yes   Sig: Take 45 mL by mouth three (3) times daily for 30 days.   losartan (COZAAR) 25 mg tablet 2019 at 0900 Transfer Papers No Yes   Sig: take 1 tablet by mouth once daily , REPLACES LISINOPRIL FOR BLOOD PRESSURE AND KIDNEY PROTECTION   magnesium hydroxide (SCHWARTZ MILK OF MAGNESIA) 400 mg/5 mL suspension Not Taking at Unknown time Transfer Papers Yes No   Sig: Take 30 mL by mouth every three (3) days. PRN if no BM   magnesium oxide (MAG-OX) 400 mg tablet 2019 at 0900 Transfer Papers Yes Yes   Sig: Take 400 mg by mouth daily as needed (supplement). magnesium oxide (MAG-OX) 400 mg tablet 2019 at 0900 Transfer Papers Yes Yes   Sig: Take 800 mg by mouth daily. metoprolol tartrate (LOPRESSOR) 25 mg tablet 2019 at 2100 Transfer Papers Yes Yes   Sig: Take 25 mg by mouth two (2) times a day. nicotine (NICODERM CQ) 21 mg/24 hr 2019 at 0900 Transfer Papers Yes Yes   Si Patch by TransDERmal route every twenty-four (24) hours. omeprazole (PRILOSEC) 20 mg capsule 2019 at 0600 Transfer Papers No Yes   Sig: take 1 capsule by mouth once daily   ondansetron (ZOFRAN ODT) 4 mg disintegrating tablet Not Taking at Unknown time Transfer Papers No No   Sig: dissolve 1 tablet ON TONGUE every 8 hours if needed for nausea   rOPINIRole (REQUIP) 4 mg tab TAB 2019 at 2100 Transfer Papers Yes Yes   Sig: Take 4 mg by mouth nightly. rifAXIMin (XIFAXAN) 550 mg tablet 2019 at 2100 Transfer Papers Yes Yes   Sig: Take 550 mg by mouth two (2) times a day. sodium phosphate (FLEET'S) 19-7 gram/118 mL enema Not Taking at Unknown time Transfer Papers Yes No   Sig: Insert 1 Enema into rectum daily as needed (Constipation  if no result from Dulcolax suppository). spironolactone (ALDACTONE) 100 mg tablet 2019 at 0900 Transfer Papers No Yes   Sig: Take 2 Tabs by mouth daily.    traMADol (ULTRAM) 50 mg tablet 2019 at 1715 Transfer Papers Yes Yes   Sig: Take 50 mg by mouth every six (6) hours as needed for Pain.   traMADol (ULTRAM) 50 mg tablet 5/29/2019 at 2100 Transfer Papers Yes Yes   Sig: Take 50 mg by mouth every evening.       Facility-Administered Medications: None     Hospital medications:  Current Facility-Administered Medications   Medication Dose Route Frequency    sodium bicarbonate (8.4%) 150 mEq in sterile water 1,000 mL infusion   IntraVENous CONTINUOUS    0.9% sodium chloride infusion 250 mL  250 mL IntraVENous PRN    NOREPINephrine (LEVOPHED) 8 mg in 5% dextrose 250mL infusion  2-50 mcg/min IntraVENous TITRATE    sodium chloride (NS) flush 5-40 mL  5-40 mL IntraVENous Q8H    sodium chloride (NS) flush 5-40 mL  5-40 mL IntraVENous PRN    ondansetron (ZOFRAN) injection 4 mg  4 mg IntraVENous Q4H PRN    heparin (porcine) injection 5,000 Units  5,000 Units SubCUTAneous Q8H    insulin lispro (HUMALOG) injection   SubCUTAneous AC&HS    glucose chewable tablet 16 g  4 Tab Oral PRN    glucagon (GLUCAGEN) injection 1 mg  1 mg IntraMUSCular PRN    albuterol-ipratropium (DUO-NEB) 2.5 MG-0.5 MG/3 ML  3 mL Nebulization QID RT    albuterol (PROVENTIL VENTOLIN) nebulizer solution 2.5 mg  2.5 mg Nebulization Q4H PRN    meropenem (MERREM) 1 g in 0.9% sodium chloride (MBP/ADV) 50 mL  1 g IntraVENous Q8H    vancomycin (VANCOCIN) 750 mg in 0.9% sodium chloride (MBP/ADV) 250 mL  750 mg IntraVENous Q12H    mupirocin (BACTROBAN) 2 % ointment   Both Nostrils BID    dextrose 10% infusion 125-250 mL  125-250 mL IntraVENous PRN    acetaminophen (TYLENOL) solution 650 mg  650 mg Per NG tube Q4H PRN    PHENYLephrine (PF)(ROSANA-SYNEPHRINE) 30 mg in 0.9% sodium chloride 250 mL infusion   mcg/min IntraVENous TITRATE    rifAXIMin (XIFAXAN) tablet 550 mg  550 mg Oral BID    lactulose (CHRONULAC) 10 gram/15 mL solution 45 mL  45 mL Oral TID     REVIEW OF SYSTEMS:     [x]     Unable to obtain  ROS due to  [x]    mental status change  []    sedated   []    intubated   []    Total of 11 systems reviewed as follows:  Const:  negative fever, negative chills, negative weight loss  Eyes:   negative diplopia or visual changes, negative eye pain  ENT:   negative coryza, negative sore throat  Resp:   negative cough, hemoptysis, dyspnea  Cards:  negative for chest pain, palpitations, lower extremity edema  :  negative for frequency, dysuria and hematuria  Skin:   negative for rash and pruritus  Heme:  negative for easy bruising and gum/nose bleeding  MS:  negative for myalgias, arthralgias, back pain and muscle weakness  Neurolo:  negative for headaches, dizziness, vertigo, memory problems   Psych:  negative for feelings of anxiety, depression     Pertinent Positives include :    Objective:   VITALS:    Visit Vitals  /53   Pulse (!) 140   Temp 99.6 °F (37.6 °C)   Resp 26   Ht 5' 8.11\" (1.73 m)   Wt 87.5 kg (192 lb 14.4 oz)   SpO2 94%   Breastfeeding? No   BMI 29.24 kg/m²     Temp (24hrs), Av.6 °F (38.1 °C), Min:99.5 °F (37.5 °C), Max:102.3 °F (39.1 °C)    PHYSICAL EXAM:     General: mild distress. NG tube in. Mumbles  Eyes: No icterus; extraocular movements intact,   ENMT: Lips, tunremarkable. Chest: rhonchi  Heart: Htachycardia S1,S2  Abdomen: she mumbles more on exam, not sure if she is tender; bowel sounds present.  No hepatosplenomegaly   Lymphatic: NA  Neurologic: confused  Psyc: Affect is anxious   Extremities: No edema       LAB DATA REVIEWED:    Recent Results (from the past 48 hour(s))   EKG, 12 LEAD, INITIAL    Collection Time: 19  1:24 AM   Result Value Ref Range    Ventricular Rate 117 BPM    Atrial Rate 117 BPM    P-R Interval 154 ms    QRS Duration 82 ms    Q-T Interval 324 ms    QTC Calculation (Bezet) 451 ms    Calculated P Axis 70 degrees    Calculated R Axis 69 degrees    Calculated T Axis 67 degrees    Diagnosis       Sinus tachycardia  When compared with ECG of 14-MAY-2019 12:06,  No significant change was found  Normal except for the rate  Confirmed by Juan Alberto Tanner ([id number]) on 2019 9:33:45 AM     POC LACTIC ACID Collection Time: 05/30/19  1:29 AM   Result Value Ref Range    Lactic Acid (POC) 2.30 (HH) 0.40 - 2.00 mmol/L   CULTURE, BLOOD    Collection Time: 05/30/19  1:30 AM   Result Value Ref Range    Special Requests: NO SPECIAL REQUESTS      Culture result: NO GROWTH 1 DAY     METABOLIC PANEL, COMPREHENSIVE    Collection Time: 05/30/19  1:30 AM   Result Value Ref Range    Sodium 136 136 - 145 mmol/L    Potassium 4.1 3.5 - 5.1 mmol/L    Chloride 102 97 - 108 mmol/L    CO2 28 21 - 32 mmol/L    Anion gap 6 5 - 15 mmol/L    Glucose 142 (H) 65 - 100 mg/dL    BUN 11 6 - 20 MG/DL    Creatinine 1.08 (H) 0.55 - 1.02 MG/DL    BUN/Creatinine ratio 10 (L) 12 - 20      GFR est AA >60 >60 ml/min/1.73m2    GFR est non-AA 51 (L) >60 ml/min/1.73m2    Calcium 8.3 (L) 8.5 - 10.1 MG/DL    Bilirubin, total 1.6 (H) 0.2 - 1.0 MG/DL    ALT (SGPT) 40 12 - 78 U/L    AST (SGOT) 42 (H) 15 - 37 U/L    Alk. phosphatase 186 (H) 45 - 117 U/L    Protein, total 7.0 6.4 - 8.2 g/dL    Albumin 2.5 (L) 3.5 - 5.0 g/dL    Globulin 4.5 (H) 2.0 - 4.0 g/dL    A-G Ratio 0.6 (L) 1.1 - 2.2     CBC WITH AUTOMATED DIFF    Collection Time: 05/30/19  1:30 AM   Result Value Ref Range    WBC 21.7 (H) 3.6 - 11.0 K/uL    RBC 4.48 3.80 - 5.20 M/uL    HGB 12.9 11.5 - 16.0 g/dL    HCT 41.6 35.0 - 47.0 %    MCV 92.9 80.0 - 99.0 FL    MCH 28.8 26.0 - 34.0 PG    MCHC 31.0 30.0 - 36.5 g/dL    RDW 19.6 (H) 11.5 - 14.5 %    PLATELET 523 (L) 939 - 400 K/uL    MPV 12.7 8.9 - 12.9 FL    NRBC 0.0 0  WBC    ABSOLUTE NRBC 0.00 0.00 - 0.01 K/uL    NEUTROPHILS 88 (H) 32 - 75 %    LYMPHOCYTES 7 (L) 12 - 49 %    MONOCYTES 4 (L) 5 - 13 %    EOSINOPHILS 0 0 - 7 %    BASOPHILS 0 0 - 1 %    IMMATURE GRANULOCYTES 0 0.0 - 0.5 %    ABS. NEUTROPHILS 19.1 (H) 1.8 - 8.0 K/UL    ABS. LYMPHOCYTES 1.5 0.8 - 3.5 K/UL    ABS. MONOCYTES 1.0 0.0 - 1.0 K/UL    ABS. EOSINOPHILS 0.0 0.0 - 0.4 K/UL    ABS. BASOPHILS 0.1 0.0 - 0.1 K/UL    ABS. IMM.  GRANS. 0.1 (H) 0.00 - 0.04 K/UL    DF AUTOMATED     CULTURE, BLOOD    Collection Time: 05/30/19  1:39 AM   Result Value Ref Range    Special Requests: NO SPECIAL REQUESTS      Culture result: NO GROWTH 1 DAY     URINE CULTURE HOLD SAMPLE    Collection Time: 05/30/19  1:39 AM   Result Value Ref Range    Urine culture hold        URINE ON HOLD IN MICROBIOLOGY DEPT FOR 3 DAYS. IF UNPRESERVED URINE IS SUBMITTED, IT CANNOT BE USED FOR ADDITIONAL TESTING AFTER 24 HRS, RECOLLECTION WILL BE REQUIRED.    URINALYSIS W/ REFLEX CULTURE    Collection Time: 05/30/19  1:39 AM   Result Value Ref Range    Color DARK YELLOW      Appearance CLOUDY (A) CLEAR      Specific gravity 1.022 1.003 - 1.030      pH (UA) 5.5 5.0 - 8.0      Protein NEGATIVE  NEG mg/dL    Glucose NEGATIVE  NEG mg/dL    Ketone TRACE (A) NEG mg/dL    Blood NEGATIVE  NEG      Urobilinogen 0.2 0.2 - 1.0 EU/dL    Nitrites NEGATIVE  NEG      Leukocyte Esterase SMALL (A) NEG      WBC 5-10 0 - 4 /hpf    RBC 0-5 0 - 5 /hpf    Epithelial cells FEW FEW /lpf    Bacteria 4+ (A) NEG /hpf    UA:UC IF INDICATED URINE CULTURE ORDERED (A) CNI     AMMONIA    Collection Time: 05/30/19  1:39 AM   Result Value Ref Range    Ammonia 39 (H) <32 UMOL/L   PROTHROMBIN TIME + INR    Collection Time: 05/30/19  1:39 AM   Result Value Ref Range    INR 1.4 (H) 0.9 - 1.1      Prothrombin time 14.1 (H) 9.0 - 11.1 sec   BILIRUBIN, CONFIRM    Collection Time: 05/30/19  1:39 AM   Result Value Ref Range    Bilirubin UA, confirm NEGATIVE  NEG     TYPE + CROSSMATCH    Collection Time: 05/30/19  2:24 AM   Result Value Ref Range    Crossmatch Expiration 06/02/2019     ABO/Rh(D) O POSITIVE     Antibody screen NEG     Unit number [id number]     Blood component type RC LR     Unit division 00     Status of unit ALLOCATED     Crossmatch result Compatible    POC G3 - PUL    Collection Time: 05/30/19  3:11 AM   Result Value Ref Range    pH (POC) 7.289 (L) 7.35 - 7.45      pCO2 (POC) 45.5 (H) 35.0 - 45.0 MMHG    pO2 (POC) 80 80 - 100 MMHG    HCO3 (POC) 21.8 (L) 22 - 26 MMOL/L    sO2 (POC) 94 92 - 97 %    Base deficit (POC) 5 mmol/L    Site RIGHT RADIAL      Device: ROOM AIR      Allens test (POC) YES      Specimen type (POC) ARTERIAL      Total resp.  rate 26     GLUCOSE, POC    Collection Time: 05/30/19  7:49 AM   Result Value Ref Range    Glucose (POC) 189 (H) 65 - 100 mg/dL    Performed by Luiza Caballero    HGB & HCT    Collection Time: 05/30/19 11:19 AM   Result Value Ref Range    HGB 12.4 11.5 - 16.0 g/dL    HCT 38.7 35.0 - 47.0 %   GLUCOSE, POC    Collection Time: 05/30/19  1:23 PM   Result Value Ref Range    Glucose (POC) 196 (H) 65 - 100 mg/dL    Performed by Chrissy Bernal    GLUCOSE, POC    Collection Time: 05/30/19  4:30 PM   Result Value Ref Range    Glucose (POC) 215 (H) 65 - 100 mg/dL    Performed by Chrissy Bernal    LACTIC ACID    Collection Time: 05/30/19  9:56 PM   Result Value Ref Range    Lactic acid 5.5 (HH) 0.4 - 2.0 MMOL/L   GLUCOSE, POC    Collection Time: 05/30/19 10:14 PM   Result Value Ref Range    Glucose (POC) 195 (H) 65 - 100 mg/dL    Performed by Anila Sheikh    CBC W/O DIFF    Collection Time: 05/31/19  3:25 AM   Result Value Ref Range    WBC 22.7 (H) 3.6 - 11.0 K/uL    RBC 4.44 3.80 - 5.20 M/uL    HGB 13.0 11.5 - 16.0 g/dL    HCT 42.2 35.0 - 47.0 %    MCV 95.0 80.0 - 99.0 FL    MCH 29.3 26.0 - 34.0 PG    MCHC 30.8 30.0 - 36.5 g/dL    RDW 20.3 (H) 11.5 - 14.5 %    PLATELET 909 771 - 700 K/uL    MPV 11.8 8.9 - 12.9 FL    NRBC 0.0 0  WBC    ABSOLUTE NRBC 0.00 0.00 - 7.87 K/uL   METABOLIC PANEL, BASIC    Collection Time: 05/31/19  3:25 AM   Result Value Ref Range    Sodium 137 136 - 145 mmol/L    Potassium 4.4 3.5 - 5.1 mmol/L    Chloride 108 97 - 108 mmol/L    CO2 13 (LL) 21 - 32 mmol/L    Anion gap 16 (H) 5 - 15 mmol/L    Glucose 210 (H) 65 - 100 mg/dL    BUN 21 (H) 6 - 20 MG/DL    Creatinine 1.55 (H) 0.55 - 1.02 MG/DL    BUN/Creatinine ratio 14 12 - 20      GFR est AA 41 (L) >60 ml/min/1.73m2    GFR est non-AA 34 (L) >60 ml/min/1.73m2 Calcium 6.8 (L) 8.5 - 10.1 MG/DL   MAGNESIUM    Collection Time: 05/31/19  3:25 AM   Result Value Ref Range    Magnesium 2.0 1.6 - 2.4 mg/dL   HEPATIC FUNCTION PANEL    Collection Time: 05/31/19  3:25 AM   Result Value Ref Range    Protein, total 5.9 (L) 6.4 - 8.2 g/dL    Albumin 1.8 (L) 3.5 - 5.0 g/dL    Globulin 4.1 (H) 2.0 - 4.0 g/dL    A-G Ratio 0.4 (L) 1.1 - 2.2      Bilirubin, total 1.7 (H) 0.2 - 1.0 MG/DL    Bilirubin, direct 0.8 (H) 0.0 - 0.2 MG/DL    Alk. phosphatase 129 (H) 45 - 117 U/L    AST (SGOT) 37 15 - 37 U/L    ALT (SGPT) 30 12 - 78 U/L   LACTIC ACID    Collection Time: 05/31/19  3:25 AM   Result Value Ref Range    Lactic acid 8.4 (HH) 0.4 - 2.0 MMOL/L   PHOSPHORUS    Collection Time: 05/31/19  3:25 AM   Result Value Ref Range    Phosphorus 4.3 2.6 - 4.7 MG/DL     IMAGING RESULTS:   []      I have personally reviewed the actual   []    CXR  []    CT  [x]     US    Recommendations/Plan:     Blood PA  Normal hgb  Hx of internal hemorrhoids  Hx of Eso/gastric varices  ETOH/Fatty liver related cirrhosis  Splenomegaly    Active Problems:    Altered mental state (5/30/2019)       ___________________________________________________  RECOMMENDATIONS:      Her bleeding could be from internal hemorrhoids as her hgb is normal. She is in septic shock which is being aggressively treated. Would follow hgb closely and add Sandostatin gtt for her hx of EV/gastric varices . Pressors  And IV abx as per CC. Add anusol HC supp PA. Continue Lactulose/Xifaxan. KUB(grimaces on examining abdomen) reviewed. May need CT abdomen/pelvis if symptoms continue. Consider EGD for: dropping hgb and continuing bleed PA. Follow INR,  US with trace ascites: On IV Meropenem. ? Is there enough fluid to tap( r.o SBP)  Prognosis: fair to guarded. Thank you for entrusting me with this patient's care.  Please do not hesitate to contact me with any questions or if I can be of assistance with this patient or any of your other patients' GI needs. Discussed Code Status:    []    Full Code      []    DNR    ___________________________________________________  Care Plan discussed with:    [x]    Patient   []    Family   [x]    Nursing   []    Attending     ___________________________________________________  GI: Ethel Severs A. Shah,MD

## 2019-05-31 NOTE — PROGRESS NOTES
**Consult Information**  Member Facility: [facility] Tonia Polk MRN: [id number]  Consult ID: [id number]  Facility Time Zone: ET  Date and Time of Consult: 05/30/2019 09:03:20 PM  Requesting Clinician: Efraín Pal RN  Patient Name: Sandra Ayala  YOB: 1955  Gender: Female    **Clinical Note**  Clinical Note: Patient is febrile, tachycardic and tachypneic on multiple pressors (Levophed and Cirilo-synephrine) and IV antibiotics. Will order cooling blanket. Discussed with  nursing.

## 2019-05-31 NOTE — PROGRESS NOTES
0445;  Lactic acid 8.4 from 5.5, Bicarb 13, KF worse, on call paged. [phone]; Hospitalist on call updated about the patient, inotropes status, ordered to give Bicarb 1 amp, start Bicarb drip at same rate 125 ml/hr, to change to SSi Q 6 hrs, to update the CCU Dr. Eliezer Metzger;  CCU  updated, agreed to plan and changed the Bicarb to be in water at same concentration, 1 L NS bolus now. 0700  Bedside and Verbal shift change report given to SANDRA Knight (oncoming nurse) by Zaki Partida RN (offgoing nurse). Report included the following information SBAR, Kardex, Intake/Output, MAR, Accordion, Recent Results, Med Rec Status and Cardiac Rhythm Sinus Tachycardia 138.

## 2019-05-31 NOTE — DIABETES MGMT
[facility]    DTC Progress Note    Recommendations/ Comments: Pt discussed with rounding team and Dr. Kody Andrade. Plan to resume lantus at 6 units daily. Chart reviewed on Terri Rodríguez during Multidisciplinary Rounds. A1c:   Lab Results   Component Value Date/Time    Hemoglobin A1c 4.8 03/29/2019 02:56 PM           Recent Glucose Results:   Lab Results   Component Value Date/Time     (H) 05/31/2019 03:25 AM    GLUCPOC 195 (H) 05/30/2019 10:14 PM    GLUCPOC 215 (H) 05/30/2019 04:30 PM    GLUCPOC 196 (H) 05/30/2019 01:23 PM        Lab Results   Component Value Date/Time    Creatinine 1.55 (H) 05/31/2019 03:25 AM     Estimated Creatinine Clearance: 43.1 mL/min (A) (based on SCr of 1.55 mg/dL (H)). Active Orders   Diet    DIET NPO        PO intake:   Patient Vitals for the past 72 hrs:   % Diet Eaten   05/31/19 0751 0 %       Will continue to follow as needed. Thank you.   Faiza Davison, BSN, RN, CDE  [facility]          Time spent: 5 min

## 2019-05-31 NOTE — PROGRESS NOTES
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 - no details available      PLAN:   · O2  · Bronchodilators  · IV fluids with bicarb  · IV antibiotics  · Pressors    · Follow up cultures  · Insulin   · DVT prophylaxis  · DNR/DNI   · Critically ill. High risk for further decompensation/death. Subjective/Interval History:   I have reviewed the flowsheet and previous days notes. The patient is unable to give any meaningful history or review of systems because the patient is:  Confused - more alert but with nonsensical speech     The patient is critically ill on:      pressors     Review of Systems   Unable to perform ROS: Mental status change     Objective:   Vital Signs:    Visit Vitals  /53 (BP 1 Location: Left arm, BP Patient Position: Lying left side)   Pulse (!) 140   Temp 99.8 °F (37.7 °C)   Resp 26   Ht 5' 8.11\" (1.73 m)   Wt 87.5 kg (192 lb 14.4 oz)   SpO2 94%   Breastfeeding? No   BMI 29.24 kg/m²       O2 Device: Nasal cannula   O2 Flow Rate (L/min): 2 l/min   Temp (24hrs), Av.5 °F (38.1 °C), Min:99.5 °F (37.5 °C), Max:102.3 °F (39.1 °C)       Intake/Output:   Last shift:      No intake/output data recorded.   Last 3 shifts:  1901 -  0700  In: 76047.3 [I.V.:86798.3]  Out: 240 [Urine:240]    Intake/Output Summary (Last 24 hours) at 2019 0810  Last data filed at 2019 0751  Gross per 24 hour   Intake 8096.34 ml   Output 240 ml   Net 7856.34 ml     Hemodynamics:   PAP:   CO:     Wedge:   CI:     CVP:  CVP (mmHg): 12 mmHg (19 0000) SVR:       PVR:       Ventilator Settings:  Mode Rate Tidal Volume Pressure FiO2 PEEP                    Peak airway pressure:      Minute ventilation:         Physical Exam   Constitutional: She appears lethargic. She appears ill. HENT:   Head: Normocephalic and atraumatic. Mouth/Throat: No oropharyngeal exudate. Eyes: No scleral icterus. Cardiovascular: Normal rate and regular rhythm. Pulmonary/Chest: She has no wheezes. She has rhonchi. She has no rales. Abdominal: Soft. She exhibits distension and fluid wave. Musculoskeletal: She exhibits edema. Neurological: She appears lethargic. Skin: Skin is warm and dry.      Data:     Current Facility-Administered Medications   Medication Dose Route Frequency    sodium bicarbonate (8.4%) 150 mEq in sterile water 1,000 mL infusion   IntraVENous CONTINUOUS    hydrocortisone (ANUSOL-HC) suppository 25 mg  25 mg Rectal Q12H    octreotide (SANDOSTATIN) 500 mcg in 0.9% sodium chloride 500 mL infusion  50 mcg/hr IntraVENous CONTINUOUS    NOREPINephrine (LEVOPHED) 8 mg in 5% dextrose 250mL infusion  2-50 mcg/min IntraVENous TITRATE    sodium chloride (NS) flush 5-40 mL  5-40 mL IntraVENous Q8H    heparin (porcine) injection 5,000 Units  5,000 Units SubCUTAneous Q8H    insulin lispro (HUMALOG) injection   SubCUTAneous AC&HS    albuterol-ipratropium (DUO-NEB) 2.5 MG-0.5 MG/3 ML  3 mL Nebulization QID RT    meropenem (MERREM) 1 g in 0.9% sodium chloride (MBP/ADV) 50 mL  1 g IntraVENous Q8H    vancomycin (VANCOCIN) 750 mg in 0.9% sodium chloride (MBP/ADV) 250 mL  750 mg IntraVENous Q12H    mupirocin (BACTROBAN) 2 % ointment   Both Nostrils BID    PHENYLephrine (PF)(ROSANA-SYNEPHRINE) 30 mg in 0.9% sodium chloride 250 mL infusion   mcg/min IntraVENous TITRATE    rifAXIMin (XIFAXAN) tablet 550 mg  550 mg Oral BID    lactulose (CHRONULAC) 10 gram/15 mL solution 45 mL  45 mL Oral TID                Labs:  Recent Labs     05/31/19  0325 05/30/19  1119 05/30/19  0130   WBC 22.7*  --  21.7* HGB 13.0 12.4 12.9   HCT 42.2 38.7 41.6     --  118*     Recent Labs     05/31/19  0325 05/30/19  0139 05/30/19 0130     --  136   K 4.4  --  4.1     --  102   CO2 13*  --  28   *  --  142*   BUN 21*  --  11   CREA 1.55*  --  1.08*   CA 6.8*  --  8.3*   MG 2.0  --   --    PHOS 4.3  --   --    ALB 1.8*  --  2.5*   TBILI 1.7*  --  1.6*   SGOT 37  --  42*   ALT 30  --  40   INR  --  1.4*  --      Recent Labs     05/30/19 0311   PHI 7.289*   PCO2I 45.5*   PO2I 80   HCO3I 21.8*     Imaging:  I have personally reviewed the patients radiographs and have reviewed the reports:  None today        Total critical care time exclusive of procedures: 35 minutes  Sarah Naidu MD

## 2019-05-31 NOTE — PROGRESS NOTES
ADULT PROTOCOL: JET AEROSOL  REASSESSMENT    Patient  Kendra Kramer     61 y.o.   female     5/31/2019  10:08 AM    Breath Sounds Pre Procedure: Right Breath Sounds: Coarse                               Left Breath Sounds: Coarse    Breath Sounds Post Procedure: Right Breath Sounds: Coarse                                 Left Breath Sounds: Coarse    Breathing pattern: Pre procedure Breathing Pattern: Tachypneic          Post procedure Breathing Pattern: Tachypneic    Heart Rate: Pre procedure Pulse: 142           Post procedure Pulse: 142    Resp Rate: Pre procedure Respirations: 22           Post procedure Respirations: 26    Oxygen: O2 Device: Nasal cannula  2L    SpO2: Pre procedure SpO2: 95 %                 Post procedure SpO2: 93 %      Nebulizer Therapy: Current medications Aerosolized Medications: DuoNeb       Problem List:   Patient Active Problem List   Diagnosis Code    Diabetes mellitus with neurological manifestations, uncontrolled ([facility] [address] E11.49, E11.65    Essential hypertension, benign I10    Hyperlipidemia LDL goal <100 E78.5    Thrombocytopenia (HCC) D69.6    Previous back surgery Z98.890    S/P CHACHO (total abdominal hysterectomy) Z90.710    Cirrhosis (HCC) K74.60    Anemia D64.9    GI bleed K92.2    BALDERAS (nonalcoholic steatohepatitis) K75.81    Acute deep vein thrombosis (DVT) of right lower extremity (HCC) I82.401    COPD (chronic obstructive pulmonary disease) (HCC) J44.9    Sepsis (HCC) A41.9    CAP (community acquired pneumonia) J18.9    IBIS (obstructive sleep apnea) G47.33    Tobacco abuse Z72.0    Neuropathy G62.9    Obesity (BMI 30.0-34. 9) E66.9    Severe obesity (HCC) E66.01    Therapeutic drug monitoring Z51.81    TIA (transient ischemic attack) G45.9    GERD (gastroesophageal reflux disease) K21.9    Bilateral carotid artery stenosis I65.23    Hepatic encephalopathy (HCC) K72.90    Colitis K52.9    UTI (urinary tract infection) N39.0    Septic shock (HCC) A41.9, R65.21    Acute respiratory failure with hypercapnia (HCC) J96.02    Counseling regarding advance care planning and goals of care Z71.89    Encephalopathy G93.40    Goals of care, counseling/discussion Z71.89    Advanced care planning/counseling discussion Z71.89    Debility R53.81    Neuropathic pain M79.2    Chronic bilateral low back pain with bilateral sciatica M54.42, M54.41, G89.29    Altered mental state R41.82       Respiratory Therapist: Iveth Aguilar

## 2019-05-31 NOTE — PROGRESS NOTES
Nutrition Assessment:    RECOMMENDATIONS:   Advance diet as medically able  Consider initiation of NGT feedings once pressor needs decrease if unable to advance to PO diet in 2-4 days (RD to follow with TF recommendations prn)     DIETITIANS INTERVENTIONS/PLAN:   Monitor plan of care    ASSESSMENT:   Pt admitted with AMS and septic shock 2' UTI. PMH: cirrhosis, DM, COPD, GERD, esophageal varices. Chart reviewed, case discussed during CCU rounds. Pt is confused and nonverbal at time of visit. No family in the room to speak with. NGT to suction. MST triggered for PU but was otherwise negative for previous nutritional risk factors. GI is following for a possible lower GI bleed. Cirilo at 200 and levo at 32. SUBJECTIVE/OBJECTIVE:   Pt confused   Diet Order: NPO  % Eaten:    Patient Vitals for the past 72 hrs:   % Diet Eaten   05/31/19 0751 0 %     Pertinent Medications:albumin, lantus, humalog, lactulose, merrem, protonix, vancomycin; Jose [email or web address]); Drips: levo, cirilo. Chemistries:  Lab Results   Component Value Date/Time    Sodium 137 05/31/2019 03:25 AM    Potassium 4.4 05/31/2019 03:25 AM    Chloride 108 05/31/2019 03:25 AM    CO2 13 (LL) 05/31/2019 03:25 AM    Anion gap 16 (H) 05/31/2019 03:25 AM    Glucose 210 (H) 05/31/2019 03:25 AM    BUN 21 (H) 05/31/2019 03:25 AM    Creatinine 1.55 (H) 05/31/2019 03:25 AM    BUN/Creatinine ratio 14 05/31/2019 03:25 AM    GFR est AA 41 (L) 05/31/2019 03:25 AM    GFR est non-AA 34 (L) 05/31/2019 03:25 AM    Calcium 6.8 (L) 05/31/2019 03:25 AM    AST (SGOT) 37 05/31/2019 03:25 AM    Alk.  phosphatase 129 (H) 05/31/2019 03:25 AM    Protein, total 5.9 (L) 05/31/2019 03:25 AM    Albumin 1.8 (L) 05/31/2019 03:25 AM    Globulin 4.1 (H) 05/31/2019 03:25 AM    A-G Ratio 0.4 (L) 05/31/2019 03:25 AM    ALT (SGPT) 30 05/31/2019 03:25 AM      Anthropometrics: Height: 5' 8.11\" (173 cm) Weight: 87.5 kg (192 lb 14.4 oz)  [x]bed scale (5/30)   []stated   []unknown    IBW (%IBW): ( ) UBW (%UBW):   (  %)    BMI: Body mass index is 29.24 kg/m². This BMI is indicative of:  []Underweight   []Normal   [x]Overweight   [] Obesity   [] Extreme Obesity (BMI>40)  Estimated Nutrition Needs (Based on): 1774 Kcals/day(MSJ 1478 x 1.2) , 88 g(1gPro/kg) Protein  Carbohydrate: At Least 130 g/day  Fluids: 1800 mL/day or per MD     Last BM: 5/31   [x]Active     []Hyperactive  []Hypoactive       [] Absent   BS  Skin:    [] Intact   [] Incision  [] Breakdown   [x] DTI (sacrum)  [] Tears/Excoriation/Abrasion  []Edema [] Other: Wt Readings from Last 30 Encounters:   05/30/19 87.5 kg (192 lb 14.4 oz)   05/14/19 80.7 kg (178 lb)   04/24/19 86.4 kg (190 lb 7.6 oz)   04/09/19 104.6 kg (230 lb 9.6 oz)   03/19/19 101.5 kg (223 lb 12.8 oz)   01/01/19 87.8 kg (193 lb 9 oz)   11/19/18 84.8 kg (187 lb)   11/13/18 84.7 kg (186 lb 11.7 oz)   11/13/18 84.7 kg (186 lb 11.7 oz)   10/16/18 86.7 kg (191 lb 3.2 oz)   10/11/18 89.3 kg (196 lb 12.8 oz)   07/10/18 83.6 kg (184 lb 3.2 oz)   05/31/18 86.6 kg (191 lb)   04/12/18 86.7 kg (191 lb 3.2 oz)   03/14/18 90.4 kg (199 lb 4.7 oz)   10/06/17 88.5 kg (195 lb 3.2 oz)   10/02/17 95.3 kg (210 lb)   08/06/17 95.3 kg (210 lb)   07/06/17 95.3 kg (210 lb)   05/05/17 93 kg (205 lb)   04/06/17 93 kg (205 lb)   03/28/17 93.8 kg (206 lb 12.7 oz)   02/13/17 98.1 kg (216 lb 6 oz)   01/24/17 95.7 kg (211 lb)   11/19/16 97.5 kg (215 lb)   11/17/16 97.5 kg (215 lb)   11/17/16 96.6 kg (213 lb)   11/10/16 96.9 kg (213 lb 10 oz)   11/10/16 96.1 kg (211 lb 12.8 oz)   09/06/16 94.3 kg (207 lb 12.8 oz)      NUTRITION DIAGNOSES:   Problem:  Inadequate protein-energy intake      Etiology: related to AMS      Signs/Symptoms: as evidenced by NGT to suction, NPO. NUTRITION INTERVENTIONS:                     GOAL:   Plan of care will be determined re: nutrition in 2-4 days.      Cultural, Synagogue, or Ethnic Dietary Needs: None     LEARNING NEEDS (Diet, Food/Nutrient-Drug Interaction):    [x] None Identified   [] Identified and Education Provided/Documented   [] Identified and Pt declined/was not appropriate      [x] Interdisciplinary Care Plan Reviewed/Documented    [x] Participated in Discharge Planning:  Unable to determine   [x] Interdisciplinary Rounds     NUTRITION RISK:    [x] High              [] Moderate           []  Low  []  Minimal/Uncompromised    PT SEEN FOR:    []  MD Consult: []Calorie Count      []Diabetic Diet Education        []Diet Education     []Electrolyte Management     []General Nutrition Management and Supplements     []Management of Tube Feeding     []TPN Recommendations    [x]  RN Referral:  []MST score >=2/PU     []Enteral/Parenteral Nutrition PTA     []Pregnant: Gestational DM or Multigestation   []  Low BMI  []  Re-Screen   []  LOS   []  NPO/clears x 5 days   []  New TF/TPN    Carol Garcia RD, [address]    Pager [phone]  Weekend Pager [phone]

## 2019-05-31 NOTE — PROGRESS NOTES
Care Management:    [facility]ILIO is anticipated to be a SNF but  will not want [facility] or [facility]. 1. Patient was in ED [facility]  May 14-May 22 for UTI. She discharged to [facility]. 2. She was also here at [facility] 4/24-5/2 for UTI. 3. [facility] 3/28-4/9 with COPD/ Resp Failure and discharged to [facility]. Reason for Readmission:    UTI/Sepsis/AMS       She has a hx of COPD, resp failure, recurrent UTI and Sepsis, deconditioned, 02 dependence. Fibromyalgia, GERD, cirrhosis caused by BALDERAS. She remains confused in the ICU on IV ABX , pressors and 02. RRAT Score and Risk Level:    49      Level of Readmission:    2      Care Conference scheduled:          Resources/supports as identified by patient/family:   Patient has her insurance and  is receptive to SNF. He says he tries to explain to the SNF's that she is getting sick again and they do not seem to be receptive and than she is readmitted to hospital. He is frustrated. He says they cannot handle her at home at this time until she gets a little stronger. She is active with her PCP when she is at home. Her home is a one story home and she has DME including 02. Top Challenges facing patient (as identified by patient/family and CM): Finances/Medication cost?  Has insurance. Transportation    BLS      Support system or lack thereof? Family    Living arrangements? SNF with goal to return home with  once she is stronger. Self-care/ADL's/Cognition? She is AMS at this time and full care.  did say she was participating in rehab for the first few days at the SNF until she started getting sick again. Current Advanced Directive/Advance Care Plan:  Partial Code and  is her decision maker. She does have two grown DTR's who are involved with her care. Plan for utilizing home health:  Undetermined. Anticipate HH once she is well enough to return home. Likelihood of additional readmission:   High             Care Management Interventions  PCP Verified by CM: Yes  Palliative Care Criteria Met (RRAT>21 & CHF Dx)?: No  Mode of Transport at Discharge: \[facility] [place] [facility]\""  Transition of Care Consult (CM Consult): SNF(She has been to [facility] and [facility] [facility] in the past few months and  will not want her to go back to either of these establishments, not happy with her care there. )  Current Support Network: Nursing Facility(Patient has been unable to take care of patient the last few months so SNF has been needed. Goal is for patient to get rehab and than get back home with family. Osvaldo Delgado )  Plan discussed with Pt/Family/Caregiver: Yes(I called and spoke with  Chirag on the phone.)  Discharge Location  Discharge Placement: (Anticipate rehab/SNF needs. Not sure which facility  will choose. )     We will cont to follow and see how she does and work with family to choose a rehab /SNF .     oCra Keeann RN [facility] 6794

## 2019-05-31 NOTE — PROGRESS NOTES
Pharmacy Automatic Renal Dosing Protocol - Antimicrobials    Indication for Antimicrobials: Sepsis (Hx of ESBL)    Current Regimen of Each Antimicrobial:  Meropenem 1 gram IV every 8 hours (Started 19; Day #2)  Vancomycin 750 mg IV every 12 hours (Started 19; Day #2)    Previous Antimicrobial Therapy:  Levofloxacin 750mg IV x 1 in ED (Start Date )    Goal Vancomycin Trough: 15-20 mcg/mL    Vancomycin Level Assessment:  Date Dose & Interval Measured (mcg/mL) Extrapolated (mcg/mL)           Significant Cultures:   19 Blood culture = No growth x 1 day (Results pending)  19 Urine culture = Greater than 100K CFU/mL GNR (Results pending)  19 Blood culture = No growth x 1 day (Results pending)    Labs:  Recent Labs     19  0325 19  0130   CREA 1.55* 1.08*   BUN 21* 11   WBC 22.7* 21.7*   Temp (24hrs), Av.5 °F (38.1 °C), Min:99.5 °F (37.5 °C), Max:102.3 °F (39.1 °C)    Creatinine Clearance (mL/min) or Dialysis: ~38 mL/min    Impression/Plan:   Due to worsening renal function, meropenem dose adjusted to 1 gram IV every 12 hours. Due to worsening renal function, vancomycin dose adjusted to 1000 mg IV every 18 hours. Antimicrobial stop date: To be determined. Pharmacy will follow daily and adjust medications as appropriate for renal function and/or serum levels.     Thank you,  Berna Quiroz, PHARMD

## 2019-05-31 NOTE — WOUND CARE
Wound Care Consult: Chart reviewed and patient assessed for her sacral and buttocks discoloration that was present on admission. Patient has been sick for about two months according to her family. She sits around a lot and is sedentary. Prior to this admission she was in a nursing facility. Assessment of the sacrum: The skin is hyperpigmented with some pink areas but this does not appear to be a new DTI. Patient is most likely recovering from a Moisture associated skin damage and is currently is several phases of healing. Treatment recommended: Venelex ointment to be applied two times per day with skin cleansing. This ointment will most likely cause some debridement of the outer skin layer as it is already peeling some. The skin is blanchable between each lesion and all around the outer part of the sacrum. Treatment discussed with nurse, [place].    Enid Shaw RN, BSN, [employer]

## 2019-05-31 NOTE — PROGRESS NOTES
2103;  Temp 101.6 from 101.9, , RR 27, Levophed now at 38 from 40, Cirilo started and titrated up to 40 dara/min to keep MAP > 65, now 66 mmHg, CVP reading through Fem 15-17 mmHg, Hospitalist paged for cooling blanket. 2148;  Bladder Probe connected showed 102.3 > cooling blanket applied, Patient more awake respond to simple Question. 2200;  New order for Lactulose 45 ml for Ammonia today  & Rifaximin tomorrow, Lactic acid sent.  2300;  Lab report Lactic acid 5.5, hospitalist paged. 0999; Hospitalist updated about Lactic acid, I&Os, UOP 5 ml/hr, order to give 500 ml NS bolus, repeat Lactic acid with am Lab. Temp now 100.3 Bladder, to follow up.

## 2019-05-31 NOTE — PROGRESS NOTES
Hospitalist Progress Note    NAME: Sadi Castellon   :  1955   MRN:  [id number]       Assessment / Plan:  Shock: (POA) like?sepsis,   -UTI Gneg rods, final cultures pending  -cont vanc and merrem  -severely elevated lactate, 8.4 from 5.5  -requiring multiple pressors  -Hx recent esbl UTI noted     AMS: metabolic encephalopathy due to sepsis  -CTH no acute findings  -ammonia 42-->39 mildly elevated  -US abd pending  -BALDERAS/cirrhosis  -cont lactulose and rifaximin    Bright red blood per rectum  Hx esophageal varices  -hemorrhoids  -BRBBR noted  -appreciate GI input  -repeat H/H stable  -monitor cbc  -Hx esophageal varices s/p banding  -KUB noted, nonspecific bowel gas pattern  -consider CT Abd pending course    Type 2 DM   -SSI/POC checks     COPD: stable     DVT ppx: sq heparin  Code status: DNR/DNI  Disposition: TBD         Subjective:     Chief Complaint / Reason for Physician Visit  AMS, no intelligible speech. Can't given any history d/t acute illness    Discussed with RN events overnight. Review of Systems:  Symptom Y/N Comments  Symptom Y/N Comments   Fever/Chills    Chest Pain     Poor Appetite    Edema     Cough    Abdominal Pain     Sputum    Joint Pain     SOB/MARTINEZ    Pruritis/Rash     Nausea/vomit    Tolerating PT/OT     Diarrhea    Tolerating Diet     Constipation    Other       Could NOT obtain due to: ams     Objective:     VITALS:   Last 24hrs VS reviewed since prior progress note.  Most recent are:  Patient Vitals for the past 24 hrs:   Temp Pulse Resp BP SpO2   19 1535     93 %   19 1430  (!) 155 29 136/49 93 %   19 1415  (!) 155 27 148/50 93 %   19 1400  (!) 155 26 155/54 93 %   19 1347  (!) 154  169/60    19 1345  (!) 153 28 169/60 93 %   19 1330  (!) 146 28 138/50 92 %   19 1315  (!) 142 28 (!) 85/39 91 %   19 1300  (!) 140 28 (!) 85/41 91 %   19 1245  (!) 143 (!) 34 (!) 87/44 90 %   19 1230  (!) 144 (!) 31 (!) 85/38 91 %   05/31/19 1203     92 %   05/31/19 1200  (!) 143 30 (!) 88/43 91 %   05/31/19 1130 99.7 °F (37.6 °C) (!) 144 (!) 32 101/44 93 %   05/31/19 1100  (!) 142 (!) 32 94/49 93 %   05/31/19 1030  (!) 141 29 90/55 94 %   05/31/19 1000  (!) 145 30 96/43 93 %   05/31/19 0930  (!) 144 (!) 32 97/43 94 %   05/31/19 0904  (!) 144 27 (!) 91/34 93 %   05/31/19 0837     95 %   05/31/19 0830  (!) 143 26 (!) 100/27 94 %   05/31/19 0800  (!) 141 27 113/40 95 %   05/31/19 0730 99.8 °F (37.7 °C) (!) 140 26 108/53 94 %   05/31/19 0715  (!) 139 24 108/50 94 %   05/31/19 0700  (!) 138 25 104/45 95 %   05/31/19 0631  (!) 137 (!) 40 113/45 95 %   05/31/19 0627  (!) 136 23 90/42 95 %   05/31/19 0619  (!) 137 24 94/48 93 %   05/31/19 0600  (!) 136 23 (!) 98/39 94 %   05/31/19 0526  (!) 139 25 120/49 93 %   05/31/19 0523  (!) 140 23 116/65 94 %   05/31/19 0430  (!) 138 25 101/69 93 %   05/31/19 0400 99.6 °F (37.6 °C) (!) 137 23 104/59 93 %   05/31/19 0345  (!) 138 26 103/69 93 %   05/31/19 0335  (!) 137 23 98/77    05/31/19 0330  (!) 136 21 (!) 87/44    05/31/19 0326  (!) 137 29 (!) 80/65 93 %   05/31/19 0321  (!) 137 25 (!) 85/51 92 %   05/31/19 0300  (!) 134 24 100/49 95 %   05/31/19 0253  (!) 135 22 106/50 94 %   05/31/19 0245  (!) 134 22 123/52 94 %   05/31/19 0230  (!) 133 24 (!) 107/31 95 %   05/31/19 0200  (!) 132 22 95/49 94 %   05/31/19 0145 99.6 °F (37.6 °C) (!) 133 24 100/48 94 %   05/31/19 0130  (!) 133 24 92/40 94 %   05/31/19 0115  (!) 133 22 102/48 95 %   05/31/19 0100 99.8 °F (37.7 °C) (!) 134 22 107/43 93 %   05/31/19 0030  (!) 136 22 116/59 93 %   05/31/19 0008  (!) 139 24 101/42 92 %   05/31/19 0000 100.4 °F (38 °C) (!) 138 23 110/48 93 %   05/30/19 2330  (!) 138 23 115/50 94 %   05/30/19 2317  (!) 138 27 116/49 93 %   05/30/19 2315  (!) 138 (!) 38 102/41 93 %   05/30/19 2300 (!) 100.8 °F (38.2 °C) (!) 137 28 (!) 111/38 94 %   05/30/19 2245  (!) 138 24 114/40 94 % 05/30/19 2230 (!) 101.3 °F (38.5 °C) (!) 138 27 122/50 94 %   05/30/19 2200 (!) 102 °F (38.9 °C) (!) 138 22 105/45 94 %   05/30/19 2148 (!) 102.3 °F (39.1 °C) (!) 140 25 94/48 95 %   05/30/19 2100 (!) 101.6 °F (38.7 °C) (!) 139 27 107/53 93 %   05/30/19 2031     96 %   05/30/19 2030  (!) 139 28 (!) 112/38 95 %   05/30/19 2018  (!) 141 26 109/54 95 %   05/30/19 2000 (!) 101.9 °F (38.8 °C) (!) 138 (!) 39 105/42 96 %   05/30/19 1900  (!) 137 (!) 31 106/43 96 %   05/30/19 1815  (!) 133 22 93/48 95 %   05/30/19 1729  (!) 132 26 122/49 95 %   05/30/19 1713  (!) 131 24 106/40 95 %   05/30/19 1712 100.1 °F (37.8 °C)       05/30/19 1600 99.5 °F (37.5 °C) (!) 129 24 (!) 103/39 95 %       Intake/Output Summary (Last 24 hours) at 5/31/2019 1537  Last data filed at 5/31/2019 1309  Gross per 24 hour   Intake 9211.2 ml   Output 190 ml   Net 9021.2 ml        PHYSICAL EXAM:  General: WD, WN. No acute distress,   EENT:  EOMI. Anicteric sclerae. MMM  Resp:  Course breath sounds, no wheezing or rales. No accessory muscle use  CV:  Regular  rhythm,  No edema  GI:  Soft, mod distended, Non tender.  +Bowel sounds  Neurologic:  Alert and oriented X 1, slow speech  Psych:   Not anxious nor agitated  Skin:  No rashes. No jaundice    Reviewed most current lab test results and cultures  YES  Reviewed most current radiology test results   YES  Review and summation of old records today    NO  Reviewed patient's current orders and MAR    YES  PMH/SH reviewed - no change compared to H&P  ________________________________________________________________________  Care Plan discussed with:    Comments   Patient x    Family  x    RN x    Care Manager     Consultant                        Multidiciplinary team rounds were held today with , nursing, pharmacist and clinical coordinator. Patient's plan of care was discussed; medications were reviewed and discharge planning was addressed. ________________________________________________________________________  Total NON critical care TIME: 35   Minutes    Total CRITICAL CARE TIME Spent:   Minutes non procedure based      Comments   >50% of visit spent in counseling and coordination of care x    ________________________________________________________________________  Lyubov Christopher DO     Procedures: see electronic medical records for all procedures/Xrays and details which were not copied into this note but were reviewed prior to creation of Plan. LABS:  I reviewed today's most current labs and imaging studies.   Pertinent labs include:  Recent Labs     05/31/19 0325 05/30/19  1119 05/30/19 0130   WBC 22.7*  --  21.7*   HGB 13.0 12.4 12.9   HCT 42.2 38.7 41.6     --  118*     Recent Labs     05/31/19 0325 05/30/19  0139 05/30/19  0130     --  136   K 4.4  --  4.1     --  102   CO2 13*  --  28   *  --  142*   BUN 21*  --  11   CREA 1.55*  --  1.08*   CA 6.8*  --  8.3*   MG 2.0  --   --    PHOS 4.3  --   --    ALB 1.8*  --  2.5*   TBILI 1.7*  --  1.6*   SGOT 37  --  42*   ALT 30  --  40   INR  --  1.4*  --        Signed: Lyubov Christopher DO

## 2019-05-31 NOTE — PROGRESS NOTES
**Consult Information**  [facility] MRN: [id number]  Consult ID: [id number]  Facility Time Zone: ET  Date and Time of Consult: 05/30/2019 10:58:09 PM  Requesting Clinician: Emmanuelle Capps RN  Time of Call : 05/31/2019 12:13:00 AM  Patient Name: Hailey Zarate  YOB: 1955  Gender: Female    **Clinical Note**  Clinical Note: Patient's lactic is up to 5.5 from 2.3. She is already on 2 Pressors. Will give 500ml NS IVF bolus and recheck lactic acid. Discussed with nursing.

## 2019-05-31 NOTE — PROGRESS NOTES
**Consult Information**  [facility] MRN: [id number]  Consult ID: [id number]  Facility Time Zone: ET  Date and Time of Consult: 05/31/2019 04:44:50 AM  Requesting Clinician: Toya Gallego RN  Time of Call : 05/31/2019 04:47:00 AM  Patient Name: Yamile Johnson  YOB: 1955  Gender: Female    **Clinical Note**  Clinical Note: Patient's lactic acid is 8.4 this morning while her HCO3 is 13. Will give 1 push of IV Bicarb and then start on a Bicarb drip. I have also asked nursing to notify Pulmonary/Critical Care.

## 2019-06-01 NOTE — PROGRESS NOTES
Bedside shift change report given to Yehuda Powell RN (oncoming nurse) by Geoffery Spencer RN (offgoing nurse). Report included the following information SBAR, Intake/Output, MAR, Accordion, Recent Results and Med Rec Status. 0800: Morning assessment completed and charted in flowsheets. Patient repositioned and turned. Patient heart continuing to stay in 140s, physician is aware. Patient more verbal this morning although words are incomprehensible. 0900:  and daughter are at bedside. 0930: GI, Dr. Sharon Rogers in to assessment patient. 1042: Received call from [facility] lab regarding urine culture, which is positive for ESBL. Will alert physician. 1200: Assessment completed and charted in flowsheets. 1230: Family requesting a  come pray over their mother/wife.  paged. 1320: Patient's  stopped this nurse in the hallway. He stated that \"she told me she did not want to do this anymore. She told her sister too. I know we are supposed to have a meeting on Monday but I do not want to wait anymore. This is not fair to her. I wanted to see if you could speak to the physician and tell them we do not want to wait and do this to her anymore. \" Intensivist and hospitalist notified. 1340: Spoke with hospitalist who stated the family would not be withdrawing care after he spoke with them. Will continue care. 1400: EKG completed. 1530: Patient taken to CT for CT of Abdomen and Pelvis. 1600: Patient returned from CT. Patient's assessment completed and charted in flowsheets. 1740: Patient unable to keep O2 sats above 88-89%. Patient switched to non-rebreather at 13L, sating at 98% now. Patient in distress and moaning. Will page physician for pain medication orders. [id number]: Patient blood pressure dropped to 65/40 after weaning Levo to 50mcg/min. Levo titrated back up to 57mcg/min. Patient is agonally breathing with non-rebreather mask on.  Will page hospitalist. Dr. Bentley Campos ordering Morphine 2mg then q4 PRN. 1820: Patient given 2mg of Morphine to help with restlessness, agitation, and pain. 1900: Bedside shift change report given to Og Kennedy RN (oncoming nurse) by Rolf Shukla RN (offgoing nurse). Report included the following information SBAR, Intake/Output, MAR, Accordion, Recent Results and Med Rec Status. 1924: Critical lab value of 20.8 for Vanc trough. Informed physician.

## 2019-06-01 NOTE — PROGRESS NOTES
Spiritual Care Assessment/Progress Note  [facility]      NAME: Marc Dominique      MRN: [id number]  AGE: 61 y.o. SEX: female  Synagogue Affiliation: Yazidi   Language: English     6/1/2019     Total Time (in minutes): 22     Spiritual Assessment begun in [facility] CRITICAL CARE 3 through conversation with:         []Patient        [x] Family    [] Friend(s)        Reason for Consult: Request by family/friend(s)     Spiritual beliefs: (Please include comment if needed)     [x] Identifies with a erika tradition:         [x] Supported by a eirka community:            [] Claims no spiritual orientation:           [] Seeking spiritual identity:                [] Adheres to an individual form of spirituality:           [] Not able to assess:                           Identified resources for coping:      [x] Prayer                               [] Music                  [] Guided Imagery     [x] Family/friends                 [] Pet visits     [] Devotional reading                         [] Unknown     [] Other:                                              Interventions offered during this visit: (See comments for more details)    Patient Interventions: Initial/Spiritual assessment, Critical care, Prayer (actual), Prayer (assurance of)     Family/Friend(s):  Affirmation of emotions/emotional suffering, Affirmation of erika, Prayer (assurance of), Prayer (actual), Normalization of emotional/spiritual concerns     Plan of Care:     [] Support spiritual and/or cultural needs    [] Support AMD and/or advance care planning process      [] Support grieving process   [] Coordinate Rites and/or Rituals    [] Coordination with community clergy   [] No spiritual needs identified at this time   [] Detailed Plan of Care below (See Comments)  [] Make referral to Music Therapy  [] Make referral to Pet Therapy     [] Make referral to Addiction services  [] Make referral to [facility]  [] Make referral to Spiritual Care Partner  [] No future visits requested        [x] Follow up visits as needed     Comments:  Responded to request/page/referral from patient's nurse  in [address] for pastoral support. Consulted with patient's nurse and patient's family. Provided prayer and effective listening as patient/family shared about patient and beliefs. Advised of  Availability. Chaplains will follow as able and/or needed. [address]  Paging Service [phone])

## 2019-06-01 NOTE — PROGRESS NOTES
2000, Bedside and Verbal shift change report given to Shorty Marina RN (oncoming nurse) by Cory Aguero RN (offgoing nurse). Report included the following information SBAR, Kardex, Intake/Output, MAR, Accordion, Recent Results, Med Rec Status and Cardiac Rhythm Sinus tachycardia . Temp;  Fever 101.7 Bladder > cooling [place] > turned off for a while resumed at am for temp 100.2  Neuro; Drowsy & mumble some words, no obvious follow command, eyes contact, pupils reactive  GCS;  Eye; 3-4, verbal; 4, motor; 5.  Reassessment; Tylenol given at am for pain and temp, seems she hurt in her back when turn her,   Respiratory; Sat 94% on NC 4 L/min, coarse Rhonchi diminished lung sounds. Reassessment; NT suction once thick [facility] small, NC 5 L/min for desat to 87% while turning    Cardiac; Sinus Tachycardia, 156 B/min, NIBP 142/44 mmHg, Levophed at 75 dara/min, Cirilo 210 dara/min  Reassessment;  Levophed titrated down to 57 dara/min,   GI; NPO with NG tube ( 60 cm at nare) connected to suction, orange as Med color, obese [place] Abd Distended at Upper part and semi soft at lower with very Hypoactive bowel sound, on Bicarb 150 mEq/L of water at 125 ml/hr. Reassessment; abd more distended at am, seems she did not absorbed any thing, no BM   Renal; norman cath with Anuria out put sedimentation present. Reassessment; Total 255 ml/24 hrs  Skin; scattered ecchymosis in both hands and abd, old DTI at sacral > venelex . Endo; AC/HS, Lantus 6 units OD,   Lines; right Femoral CVC, NGT, [place]. PIV X2   Code; DNR, DNI anti arrhythmia, pressor   ACLS med ok . Lab; Lactic acid down today to 5.8 from 8.4, Ammonia 93 from 39  DVT; heparin SQ   Plan; Reorient the patient, titrated oxygen as tolerated, titrate pressor as tolerated,  pain and agitation management, follow lab tomorrow,    0700; Bedside and Verbal shift change report given to Cliff Polk RN (oncoming nurse) by Shorty Marina RN (offgoing nurse).  Report included the following information SBAR, Kardex, Intake/Output, MAR, Accordion, Recent Results, Med Rec Status and Cardiac Rhythm Sinus Tachycardia 144.

## 2019-06-01 NOTE — PROGRESS NOTES
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 No bleeding. Complaint Y/N Description   Abdominal Pain     Hematemesis     Hematochezia     Melena     Constipation     Diarrhea     Dyspepsia     Dysphagia     Jaundiced     Nausea/vomiting       Review of Systems:  Symptom Y/N Comments  Symptom Y/N Comments   Fever/Chills    Chest Pain     Cough    Headaches     Sputum    Joint Pain     SOB/MARTINEZ    Pruritis/Rash     Tolerating Diet    Other       Could NOT obtain due to:      Objective:   VITALS:   Last 24hrs VS reviewed since prior progress note. Most recent are:  Visit Vitals  /48 (BP 1 Location: Left arm, BP Patient Position: At rest)   Pulse (!) 143   Temp 99.3 °F (37.4 °C)   Resp 23   Ht 5' 8.11\" (1.73 m)   Wt 95.6 kg (210 lb 12.2 oz)   SpO2 (!) 89%   Breastfeeding? No   BMI 31.94 kg/m²       Intake/Output Summary (Last 24 hours) at 6/1/2019 1221  Last data filed at 6/1/2019 1000  Gross per 24 hour   Intake 7097.97 ml   Output 1027 ml   Net 6070.97 ml     PHYSICAL EXAM:  General: Awake but doesn't converse  HEENT: NC, Atraumatic. PERRLA, EOMI. Anicteric sclerae. Lungs:  CTA Bilaterally. No Wheezing/Rhonchi/Rales.   Heart:  Regular  rhythm,  No murmur (), No Rubs, No Gallops  Abdomen: +Distended, diffuse TTP  Extremities: No c/c/e      Lab and Radiology Data Reviewed: (see below)    Medications Reviewed: (see below)  PMH/SH reviewed - no change compared to H&P  ________________________________________________________________________    Care Plan discussed with:  Patient    Family  x   RN x              Consultant:       Grier Goldberg, MD     Procedures: see electronic medical records for all procedures/Xrays and details which were not copied into this note but were reviewed prior to creation of Plan. LABS:  Recent Labs     06/01/19 0357 05/31/19 0325   WBC 20.1* 22.7*   HGB 11.7 13.0   HCT 37.3 42.2    189     Recent Labs     06/01/19 0357 05/31/19 0325 05/30/19 0130    137 136   K 4.0 4.4 4.1    108 102   CO2 20* 13* 28   BUN 27* 21* 11   CREA 1.92* 1.55* 1.08*   * 210* 142*   CA 6.6* 6.8* 8.3*   MG 1.9 2.0  --    PHOS 4.1 4.3  --      Recent Labs     06/01/19 0357 05/31/19 0325 05/30/19 0130   SGOT 465* 37 42*    129* 186*   TP 6.2* 5.9* 7.0   ALB 2.7* 1.8* 2.5*   GLOB 3.5 4.1* 4.5*     Recent Labs     05/30/19 0139   INR 1.4*   PTP 14.1*      No results for input(s): FE, TIBC, PSAT, FERR in the last 72 hours. Lab Results   Component Value Date/Time    Folate 24.6 (H) 03/01/2016 11:20 AM     No results for input(s): PH, PCO2, PO2 in the last 72 hours. No results for input(s): CPK, CKMB in the last 72 hours.     No lab exists for component: TROPONINI  Lab Results   Component Value Date/Time    Color DARK YELLOW 05/30/2019 01:39 AM    Appearance CLOUDY (A) 05/30/2019 01:39 AM    Specific gravity 1.022 05/30/2019 01:39 AM    pH (UA) 5.5 05/30/2019 01:39 AM    Protein NEGATIVE  05/30/2019 01:39 AM    Glucose NEGATIVE  05/30/2019 01:39 AM    Ketone TRACE (A) 05/30/2019 01:39 AM    Bilirubin NEGATIVE  05/14/2019 01:20 PM    Urobilinogen 0.2 05/30/2019 01:39 AM    Nitrites NEGATIVE  05/30/2019 01:39 AM    Leukocyte Esterase SMALL (A) 05/30/2019 01:39 AM    Epithelial cells FEW 05/30/2019 01:39 AM    Bacteria 4+ (A) 05/30/2019 01:39 AM    WBC 5-10 05/30/2019 01:39 AM    RBC 0-5 05/30/2019 01:39 AM MEDICATIONS:  Current Facility-Administered Medications   Medication Dose Route Frequency    sodium bicarbonate (8.4%) 150 mEq in sterile water 1,000 mL infusion   IntraVENous CONTINUOUS    hydrocortisone (ANUSOL-HC) suppository 25 mg  25 mg Rectal Q12H    octreotide (SANDOSTATIN) 500 mcg in 0.9% sodium chloride 500 mL infusion  50 mcg/hr IntraVENous CONTINUOUS    pantoprazole (PROTONIX) 40 mg in sodium chloride 0.9% 10 mL injection  40 mg IntraVENous DAILY    insulin glargine (LANTUS) injection 6 Units  6 Units SubCUTAneous DAILY    vancomycin (VANCOCIN) 1,000 mg in 0.9% sodium chloride (MBP/ADV) 250 mL  1,000 mg IntraVENous Q18H    meropenem (MERREM) 1 g in 0.9% sodium chloride (MBP/ADV) 50 mL  1 g IntraVENous Q12H    NOREPINephrine (LEVOPHED) 32 mg in 5% dextrose 250 mL infusion  0-200 mcg/min IntraVENous TITRATE    PHENYLephrine (ROSANA-SYNEPHRINE) 100 mg in 0.9% sodium chloride 250 mL infusion  0-300 mcg/min IntraVENous TITRATE    balsam peru-castor oil (VENELEX) ointment   Topical BID    0.9% sodium chloride infusion 250 mL  250 mL IntraVENous PRN    sodium chloride (NS) flush 5-40 mL  5-40 mL IntraVENous Q8H    sodium chloride (NS) flush 5-40 mL  5-40 mL IntraVENous PRN    ondansetron (ZOFRAN) injection 4 mg  4 mg IntraVENous Q4H PRN    heparin (porcine) injection 5,000 Units  5,000 Units SubCUTAneous Q8H    insulin lispro (HUMALOG) injection   SubCUTAneous AC&HS    glucose chewable tablet 16 g  4 Tab Oral PRN    glucagon (GLUCAGEN) injection 1 mg  1 mg IntraMUSCular PRN    albuterol-ipratropium (DUO-NEB) 2.5 MG-0.5 MG/3 ML  3 mL Nebulization QID RT    albuterol (PROVENTIL VENTOLIN) nebulizer solution 2.5 mg  2.5 mg Nebulization Q4H PRN    mupirocin (BACTROBAN) 2 % ointment   Both Nostrils BID    dextrose 10% infusion 125-250 mL  125-250 mL IntraVENous PRN    acetaminophen (TYLENOL) solution 650 mg  650 mg Per NG tube Q4H PRN    rifAXIMin (XIFAXAN) tablet 550 mg  550 mg Oral BID    lactulose (CHRONULAC) 10 gram/15 mL solution 45 mL  45 mL Oral TID

## 2019-06-01 NOTE — PROGRESS NOTES
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 - no details available      PLAN:   · O2  · Repeat ABG  · Bronchodilators  · IV fluids with bicarb  · IV antibiotics  · Pressors for goal SBP ; add stress-dose steroids  · Follow up cultures  · Insulin   · DVT prophylaxis  · DNR/DNI   · Critically ill. High risk for further decompensation/death. Family is considering comfort care. Subjective/Interval History:   I have reviewed the flowsheet and previous days notes. The patient is unable to give any meaningful history or review of systems because the patient is: Altered. Not able to provide a history. The patient is critically ill on:      pressors     Review of Systems   Unable to perform ROS: Mental status change     Objective:   Vital Signs:    Visit Vitals  /53   Pulse (!) 141   Temp 99.3 °F (37.4 °C)   Resp 23   Ht 5' 8.11\" (1.73 m)   Wt 95.6 kg (210 lb 12.2 oz)   SpO2 93%   Breastfeeding?  No   BMI 31.94 kg/m²       O2 Device: Nasal cannula   O2 Flow Rate (L/min): 6 l/min   Temp (24hrs), Av.2 °F (37.9 °C), Min:99.3 °F (37.4 °C), Max:101.7 °F (38.7 °C)       Intake/Output:   Last shift:      701 - 1900  In: 1940.3 [I.V.:1820.3]  Out: 20 [Urine:20]  Last 3 shifts: 1901 -  0700  In: 50674.8 [I.V.:67841.8]  Out: 1159 [Urine:355]    Intake/Output Summary (Last 24 hours) at 2019 1410  Last data filed at 2019 1300  Gross per 24 hour   Intake 7493.69 ml   Output 997 ml   Net 6496.69 ml     Hemodynamics:   PAP:   CO:     Wedge:   CI:     CVP:  CVP (mmHg): 12 mmHg (05/31/19 0000) SVR:       PVR:       Ventilator Settings:  Mode Rate Tidal Volume Pressure FiO2 PEEP                    Peak airway pressure:      Minute ventilation:         Physical Exam   Constitutional: She appears lethargic. She appears ill. HENT:   Head: Normocephalic and atraumatic. Mouth/Throat: No oropharyngeal exudate. Eyes: No scleral icterus. Cardiovascular: Normal rate and regular rhythm. Pulmonary/Chest: She has no wheezes. She has rhonchi. She has no rales. Abdominal: Soft. She exhibits distension and fluid wave. Musculoskeletal: She exhibits edema. Neurological: She appears lethargic. Skin: Skin is warm and dry.      Data:     Current Facility-Administered Medications   Medication Dose Route Frequency    hydrocortisone Sod Succ (PF) (SOLU-CORTEF) injection 50 mg  50 mg IntraVENous Q6H    sodium bicarbonate (8.4%) 150 mEq in sterile water 1,000 mL infusion   IntraVENous CONTINUOUS    hydrocortisone (ANUSOL-HC) suppository 25 mg  25 mg Rectal Q12H    octreotide (SANDOSTATIN) 500 mcg in 0.9% sodium chloride 500 mL infusion  50 mcg/hr IntraVENous CONTINUOUS    pantoprazole (PROTONIX) 40 mg in sodium chloride 0.9% 10 mL injection  40 mg IntraVENous DAILY    insulin glargine (LANTUS) injection 6 Units  6 Units SubCUTAneous DAILY    vancomycin (VANCOCIN) 1,000 mg in 0.9% sodium chloride (MBP/ADV) 250 mL  1,000 mg IntraVENous Q18H    meropenem (MERREM) 1 g in 0.9% sodium chloride (MBP/ADV) 50 mL  1 g IntraVENous Q12H    NOREPINephrine (LEVOPHED) 32 mg in 5% dextrose 250 mL infusion  0-200 mcg/min IntraVENous TITRATE    PHENYLephrine (ROSANA-SYNEPHRINE) 100 mg in 0.9% sodium chloride 250 mL infusion  0-300 mcg/min IntraVENous TITRATE    balsam peru-castor oil (VENELEX) ointment   Topical BID    sodium chloride (NS) flush 5-40 mL  5-40 mL IntraVENous Q8H    heparin (porcine) injection 5,000 Units  5,000 Units SubCUTAneous Q8H    insulin lispro (HUMALOG) injection SubCUTAneous AC&HS    albuterol-ipratropium (DUO-NEB) 2.5 MG-0.5 MG/3 ML  3 mL Nebulization QID RT    mupirocin (BACTROBAN) 2 % ointment   Both Nostrils BID    rifAXIMin (XIFAXAN) tablet 550 mg  550 mg Oral BID    lactulose (CHRONULAC) 10 gram/15 mL solution 45 mL  45 mL Oral TID                Labs:  Recent Labs     06/01/19 0357 05/31/19 0325 05/30/19  1119 05/30/19  0130   WBC 20.1* 22.7*  --  21.7*   HGB 11.7 13.0 12.4 12.9   HCT 37.3 42.2 38.7 41.6    189  --  118*     Recent Labs     06/01/19 0357 05/31/19 0325 05/30/19 0139 05/30/19  0130    137  --  136   K 4.0 4.4  --  4.1    108  --  102   CO2 20* 13*  --  28   * 210*  --  142*   BUN 27* 21*  --  11   CREA 1.92* 1.55*  --  1.08*   CA 6.6* 6.8*  --  8.3*   MG 1.9 2.0  --   --    PHOS 4.1 4.3  --   --    ALB 2.7* 1.8*  --  2.5*   TBILI 2.7* 1.7*  --  1.6*   SGOT 465* 37  --  42*   * 30  --  40   INR  --   --  1.4*  --      Recent Labs     05/30/19  0311   PHI 7.289*   PCO2I 45.5*   PO2I 80   HCO3I 21.8*     Imaging:  I have personally reviewed the patients radiographs and have reviewed the reports:  None today        Total critical care time exclusive of procedures: 35 minutes  Eliazar Lucas MD

## 2019-06-01 NOTE — PROGRESS NOTES
Hospitalist Progress Note    NAME: Rich Duke   :  1955   MRN:  [id number]       Assessment / Plan:  Shock: (POA) like?sepsis,   -UTI recurrent klebsiella ESBL  -cont vanc and merrem  -severely elevated lactate, 8.4 from 5.5  -still requiring multiple pressors. Tolerating tachycardia  -Hx recent esbl UTI noted  -lacate now 5.8, trending down  -blood cxs NGTD     AMS: metabolic encephalopathy due to sepsis  -CTH no acute findings  -ammonia 42-->39 mildly elevated  -US abd pending  -BALDERAS/cirrhosis  -cont lactulose and rifaximin    Bright red blood per rectum  Hx esophageal varices  -hemorrhoids  -BRBBR noted  -appreciate GI input  -repeat H/H stable  -monitor cbc  -Hx esophageal varices s/p banding  -KUB noted, nonspecific bowel gas pattern  -consider CT Abd pending course    Type 2 DM   -SSI/POC checks     COPD: stable    Discussed care with patient's . Palliative consult placed for Mon. Considering comfort care pending course.      DVT ppx: sq heparin  Code status: DNR/DNI  Disposition: TBD         Subjective:     Chief Complaint / Reason for Physician Visit  AMS, Pt can't provide history. Localizes to firm palp    Discussed with RN events overnight. Review of Systems:  Symptom Y/N Comments  Symptom Y/N Comments   Fever/Chills    Chest Pain     Poor Appetite    Edema     Cough    Abdominal Pain     Sputum    Joint Pain     SOB/MARTINEZ    Pruritis/Rash     Nausea/vomit    Tolerating PT/OT     Diarrhea    Tolerating Diet     Constipation    Other       Could NOT obtain due to: ams     Objective:     VITALS:   Last 24hrs VS reviewed since prior progress note.  Most recent are:  Patient Vitals for the past 24 hrs:   Temp Pulse Resp BP SpO2   19 1623     93 %   19 1600 99.4 °F (37.4 °C) (!) 142 22 132/57 91 %   19 1500  (!) 141 20 141/59 90 %   19 1430  (!) 140 22 144/55 94 %   19 1400  (!) 140 21 144/55 94 %   19 1330  (!) 141 23 139/53 93 %   19 1300  (!) 142 22 140/58 (!) 89 %   06/01/19 1230  (!) 141 22 143/58 91 %   06/01/19 1200 99.3 °F (37.4 °C) (!) 143 23 126/48 (!) 89 %   06/01/19 1130  (!) 143 24 133/61 94 %   06/01/19 1100  (!) 143 24 122/51 94 %   06/01/19 1030  (!) 143 25 128/50 94 %   06/01/19 1000  (!) 143 21 156/76 92 %   06/01/19 0930  (!) 143 22 144/51 93 %   06/01/19 0900  (!) 142 22 138/56 92 %   06/01/19 0830  (!) 143 22 127/52 92 %   06/01/19 0800 99.3 °F (37.4 °C) (!) 144 22 122/42 94 %   06/01/19 0730  (!) 144 25 144/53 94 %   06/01/19 0707     93 %   06/01/19 0700  (!) 144 25 142/53 93 %   06/01/19 0600 100.1 °F (37.8 °C) (!) 146 28 118/49 92 %   06/01/19 0500 100.2 °F (37.9 °C) (!) 145 27 127/51 92 %   06/01/19 0400 100 °F (37.8 °C) (!) 146 23 132/49 90 %   06/01/19 0300  (!) 146 25 129/42 92 %   06/01/19 0200  (!) 147 27 140/43 92 %   06/01/19 0100  (!) 146 24 141/43 92 %   06/01/19 0000 99.6 °F (37.6 °C) (!) 147 24 132/48 93 %   05/31/19 2300 100 °F (37.8 °C) (!) 149 22 142/51 94 %   05/31/19 2200 (!) 100.5 °F (38.1 °C) (!) 152 27 144/48 92 %   05/31/19 2130  (!) 153 27 139/47 92 %   05/31/19 2100  (!) 154 21 123/46 93 %   05/31/19 2030  (!) 154 28 133/48 99 %   05/31/19 2015     94 %   05/31/19 2000 (!) 101.7 °F (38.7 °C) (!) 155 29 142/44 94 %   05/31/19 1915  (!) 156 29 139/46 93 %   05/31/19 1900  (!) 156 30 140/46 93 %   05/31/19 1845  (!) 156 (!) 31 116/45 93 %   05/31/19 1836  (!) 156  120/44    05/31/19 1830  (!) 156 30 120/44 93 %   05/31/19 1815  (!) 156 26 132/45 94 %   05/31/19 1800  (!) 156 30 137/48 95 %   05/31/19 1745  (!) 156 (!) 31 138/45 95 %   05/31/19 1730  (!) 157 30 133/48 93 %   05/31/19 1715  (!) 156 30 132/48 93 %   05/31/19 1700 100.4 °F (38 °C) (!) 156 29 137/44 93 %   05/31/19 1645  (!) 157 (!) 31 131/44 93 %   05/31/19 1630  (!) 156 30 (!) 129/39 94 %       Intake/Output Summary (Last 24 hours) at 6/1/2019 1628  Last data filed at 6/1/2019 1600  Gross per 24 hour Intake 7464.54 ml   Output 1176 ml   Net 6288.54 ml        PHYSICAL EXAM:  General: WD, WN. No acute distress,   EENT:  EOMI. Anicteric sclerae. MMM  Resp:  Course breath sounds, no wheezing or rales. No accessory muscle use  CV:  Regular  rhythm,  No edema  GI:  Soft, mod distended, Non tender.  +Bowel sounds  Neurologic:  Alert and oriented x 0, moves all extremities. PERRL  Psych:   Not anxious nor agitated  Skin:  No rashes. No jaundice    Reviewed most current lab test results and cultures  YES  Reviewed most current radiology test results   YES  Review and summation of old records today    NO  Reviewed patient's current orders and MAR    YES  PMH/SH reviewed - no change compared to H&P  ________________________________________________________________________  Care Plan discussed with:    Comments   Patient x    Family  x    RN x    Care Manager     Consultant                        Multidiciplinary team rounds were held today with , nursing, pharmacist and clinical coordinator. Patient's plan of care was discussed; medications were reviewed and discharge planning was addressed. ________________________________________________________________________  Total NON critical care TIME: 35   Minutes    Total CRITICAL CARE TIME Spent:   Minutes non procedure based      Comments   >50% of visit spent in counseling and coordination of care x    ________________________________________________________________________  Cydne Bring, DO     Procedures: see electronic medical records for all procedures/Xrays and details which were not copied into this note but were reviewed prior to creation of Plan. LABS:  I reviewed today's most current labs and imaging studies.   Pertinent labs include:  Recent Labs     06/01/19  0357 05/31/19  0325 05/30/19  1119 05/30/19  0130   WBC 20.1* 22.7*  --  21.7*   HGB 11.7 13.0 12.4 12.9   HCT 37.3 42.2 38.7 41.6    189  --  118*     Recent Labs 06/01/19  0357 05/31/19  0325 05/30/19  0139 05/30/19  0130    137  --  136   K 4.0 4.4  --  4.1    108  --  102   CO2 20* 13*  --  28   * 210*  --  142*   BUN 27* 21*  --  11   CREA 1.92* 1.55*  --  1.08*   CA 6.6* 6.8*  --  8.3*   MG 1.9 2.0  --   --    PHOS 4.1 4.3  --   --    ALB 2.7* 1.8*  --  2.5*   TBILI 2.7* 1.7*  --  1.6*   SGOT 465* 37  --  42*   * 30  --  40   INR  --   --  1.4*  --        Signed: Ravi Martinez, DO

## 2019-06-01 NOTE — PROGRESS NOTES
Pharmacy Automatic Renal Dosing Protocol - Antimicrobials  Indication for Antimicrobials: Sepsis (Hx of ESBL), ESBL UTI    Current Regimen of Each Antimicrobial:  Meropenem 1 gram IV every 12 hours (Started 19; Day #3)  Vancomycin 750 mg IV every 18 hours (Started 19; Day #3)    Previous Antimicrobial Therapy:  Levofloxacin 750mg IV x 1 in ED (Start Date )    Goal Vancomycin Trough: 15-20 mcg/mL    Vancomycin Level Assessment:  Date Dose & Interval Measured (mcg/mL) Extrapolated (mcg/mL)           Significant Cultures:   19 Blood culture = No growth x 2 day (Results pending)  19 Urine culture = Greater than 100K ESBL Klebsiella (FINAL)  19 Blood culture = No growth x 2 day (Results pending)    Labs:  Recent Labs     19  0357 19  0325 19  0130   CREA 1.92* 1.55* 1.08*   BUN 27* 21* 11   WBC 20.1* 22.7* 21.7*   Temp (24hrs), Av.2 °F (37.9 °C), Min:99.3 °F (37.4 °C), Max:101.7 °F (38.7 °C)    Creatinine Clearance (mL/min) or Dialysis: ~30mL/min    Impression/Plan:   · Creatinine still rising, WBC around 20  · Continue Merrem as above as appropriate for Crcl 26-50 ml/min  · Continue present dose and schedule of Vancomycin, Will check Vancomycin trough level before the 8pm dose tonight  · Antimicrobial stop date: To be determined. Pharmacy will follow daily and adjust medications as appropriate for renal function and/or serum levels.     Thank you,  Miguelito Stewart, [facility]

## 2019-06-02 NOTE — PROGRESS NOTES
Hospitalist Progress Note    NAME: Terri Rodríguez   :  1955   MRN:  [id number]       Assessment / Plan:    Shock: POA  Suspect septic POA  suspect secondary to UTI recurrent klebsiella ESBL  Lactic acidosis  Leukocytosis  No labs drawn today  Acute metabolic encephalopathy due to above  Severe metabolic acidosis  Abnormal LFT, shock liver  Acute kidney failure  H/O BALDERAS/Cirrhosis  Remains unresponsive  Noticed anisocoria, pupil fixed, left dilated, no previous documentation, ? New finding  -on vanc and merrem  -on multiple pressors, remains tachycardic  -on bicarb gtt  -blood cxs NGTD  -cont lactulose and rifaximin  -overall prognosis poor    Bright red blood per rectum  Hx esophageal varices  -hemorrhoids  -BRBBR noted  -appreciate GI input  -repeat H/H stable  -monitor cbc  -Hx esophageal varices s/p banding  -KUB noted, nonspecific bowel gas pattern  -consider CT Abd pending course    Type 2 DM   -SSI/POC checks     COPD: stable    Discussed care with patient's . Palliative consult placed for Monday. Considering comfort care pending course.      DVT ppx: sq heparin  Code status: partial code  Disposition: TBD         Subjective:     Chief Complaint / Reason for Physician Visit  unresponsive    Discussed with RN events overnight. Review of Systems:  Symptom Y/N Comments  Symptom Y/N Comments   Fever/Chills    Chest Pain     Poor Appetite    Edema     Cough    Abdominal Pain     Sputum    Joint Pain     SOB/MARTINEZ    Pruritis/Rash     Nausea/vomit    Tolerating PT/OT     Diarrhea    Tolerating Diet     Constipation    Other       Could NOT obtain due to: unresposive     Objective:     VITALS:   Last 24hrs VS reviewed since prior progress note.  Most recent are:  Patient Vitals for the past 24 hrs:   Temp Pulse Resp BP SpO2   19 0800 98.5 °F (36.9 °C) (!) 132 15 145/56 96 %   19 0733     99 %   19 0700  (!) 132 16 146/56 100 %   19 0600  (!) 134 18 149/40 96 % 06/02/19 0500  (!) 136 19 131/46 97 %   06/02/19 0400 99.7 °F (37.6 °C) (!) 138 21 140/44 99 %   06/02/19 0300  (!) 138 21 136/45 99 %   06/02/19 0200  (!) 138 21 131/45 99 %   06/02/19 0100  (!) 138 22 136/45 98 %   06/02/19 0000 99.7 °F (37.6 °C) (!) 139 26 126/51 98 %   06/01/19 2300  (!) 140 26 (!) 125/33 98 %   06/01/19 2200  (!) 139 26 116/41 98 %   06/01/19 2100  (!) 139 26 131/50 98 %   06/01/19 2000 99.6 °F (37.6 °C) (!) 138 25 116/47 96 %   06/01/19 1954     96 %   06/01/19 1800  (!) 137 26 140/52 98 %   06/01/19 1754  (!) 137 26 101/47 96 %   06/01/19 1751  (!) 137 29 (!) 84/47 96 %   06/01/19 1747  (!) 135 29 (!) 79/46 96 %   06/01/19 1746  (!) 136 29 (!) 65/40 97 %   06/01/19 1623     93 %   06/01/19 1600 99.4 °F (37.4 °C) (!) 142 22 132/57 91 %   06/01/19 1500  (!) 141 20 141/59 90 %   06/01/19 1430  (!) 140 22 144/55 94 %   06/01/19 1400  (!) 140 21 144/55 94 %   06/01/19 1330  (!) 141 23 139/53 93 %   06/01/19 1300  (!) 142 22 140/58 (!) 89 %   06/01/19 1230  (!) 141 22 143/58 91 %   06/01/19 1200 99.3 °F (37.4 °C) (!) 143 23 126/48 (!) 89 %   06/01/19 1130  (!) 143 24 133/61 94 %   06/01/19 1100  (!) 143 24 122/51 94 %   06/01/19 1030  (!) 143 25 128/50 94 %   06/01/19 1000  (!) 143 21 156/76 92 %   06/01/19 0930  (!) 143 22 144/51 93 %   06/01/19 0900  (!) 142 22 138/56 92 %       Intake/Output Summary (Last 24 hours) at 6/2/2019 0852  Last data filed at 6/2/2019 0700  Gross per 24 hour   Intake 5649.07 ml   Output 540 ml   Net 5109.07 ml        PHYSICAL EXAM:  General: unresponsive  EENT:  Pupil fixed, anisocoria, left pupil >right  Resp:  Course breath sounds, no wheezing or rales. No accessory muscle use  CV:  tachycardic  GI:  Soft, mod distended, Non tender.  +Bowel sounds  Neurologic:  Unresponsive. Unable to do   Psych:   Unresponsive.  Unable to do     Reviewed most current lab test results and cultures  YES  Reviewed most current radiology test results YES  Review and summation of old records today    NO  Reviewed patient's current orders and MAR    YES  PMH/SH reviewed - no change compared to H&P  ________________________________________________________________________  Care Plan discussed with:    Comments   Patient     Family      RN x    Care Manager     Consultant                        Multidiciplinary team rounds were held today with , nursing, pharmacist and clinical coordinator. Patient's plan of care was discussed; medications were reviewed and discharge planning was addressed. ________________________________________________________________________  Total NON critical care TIME: 35   Minutes    Total CRITICAL CARE TIME Spent:   Minutes non procedure based      Comments   >50% of visit spent in counseling and coordination of care x    ________________________________________________________________________  Emma Belle MD     Procedures: see electronic medical records for all procedures/Xrays and details which were not copied into this note but were reviewed prior to creation of Plan. LABS:  I reviewed today's most current labs and imaging studies.   Pertinent labs include:  Recent Labs     06/01/19 0357 05/31/19  0325 05/30/19  1119   WBC 20.1* 22.7*  --    HGB 11.7 13.0 12.4   HCT 37.3 42.2 38.7    189  --      Recent Labs     06/01/19 0357 05/31/19  0325    137   K 4.0 4.4    108   CO2 20* 13*   * 210*   BUN 27* 21*   CREA 1.92* 1.55*   CA 6.6* 6.8*   MG 1.9 2.0   PHOS 4.1 4.3   ALB 2.7* 1.8*   TBILI 2.7* 1.7*   SGOT 465* 37   * 30       Signed: Emma Belle MD

## 2019-06-02 NOTE — PROGRESS NOTES
GI Progress Note (for [facility])  Natasha Wan PGK:[id number]   Prim GI: Curtis Arce MD  PCP: Presley Gonzalez NP  Date/Time:  6/2/2019 12:55 PM   Assessment:   · Cirrhosis  · Trivial resolved rectal bleeding  · H/o EV/GV but no blood in NGT  · Abdominal distension/AP  · Septic shock  · Pt clinically not improving and is unresponsive  · Very poor prognosis     Plan:   · No plans for endoscopic evaluation at this time  · Can continue Octreotide/PPI for now  · CT Abd/Pelvis given AP/distension w/ colitis  · Continue Lactulose/RIfaxamin  · Abx  · U/S with only trace ascites; not enough for paracentesis  · Trend Hgb    Notably family is leaning towards Comfort care which is very reasonable    DrMyesha [facility] to resume care tomorrow     Subjective:   Discussed with RN events overnight. Complaint Y/N Description   Abdominal Pain     Hematemesis     Hematochezia     Melena     Constipation     Diarrhea     Dyspepsia     Dysphagia     Jaundiced     Nausea/vomiting       Review of Systems:  Symptom Y/N Comments  Symptom Y/N Comments   Fever/Chills    Chest Pain     Cough    Headaches     Sputum    Joint Pain     SOB/MARTINEZ    Pruritis/Rash     Tolerating Diet    Other       Could NOT obtain due to:      Objective:   VITALS:   Last 24hrs VS reviewed since prior progress note. Most recent are:  Visit Vitals  /47   Pulse (!) 135   Temp 99.1 °F (37.3 °C)   Resp 19   Ht 5' 8.11\" (1.73 m)   Wt 95.6 kg (210 lb 12.2 oz)   SpO2 98%   Breastfeeding? No   BMI 31.94 kg/m²       Intake/Output Summary (Last 24 hours) at 6/2/2019 1255  Last data filed at 6/2/2019 1200  Gross per 24 hour   Intake 6039.57 ml   Output 567 ml   Net 5472.57 ml     PHYSICAL EXAM:  General: Unresponsive  HEENT: NC, Atraumatic. PERRLA, EOMI. Anicteric sclerae.   Lungs:  +coarse BS  Heart:  +tachycardic  Abdomen: +distended  Extremities: No c/c/e    Lab and Radiology Data Reviewed: (see below)    Medications Reviewed: (see below)  PMH/SH reviewed - no change compared to H&P  ________________________________________________________________________  _______________________________  Care Plan discussed with:  Patient    Family     RN x              Consultant:       Morena Encinas MD     Procedures: see electronic medical records for all procedures/Xrays and details which were not copied into this note but were reviewed prior to creation of Plan. LABS:  Recent Labs     06/01/19 0357 05/31/19  0325   WBC 20.1* 22.7*   HGB 11.7 13.0   HCT 37.3 42.2    189     Recent Labs     06/01/19 0357 05/31/19  0325    137   K 4.0 4.4    108   CO2 20* 13*   BUN 27* 21*   CREA 1.92* 1.55*   * 210*   CA 6.6* 6.8*   MG 1.9 2.0   PHOS 4.1 4.3     Recent Labs     06/01/19 0357 05/31/19  0325   SGOT 465* 37    129*   TP 6.2* 5.9*   ALB 2.7* 1.8*   GLOB 3.5 4.1*     No results for input(s): INR, PTP, APTT in the last 72 hours. No lab exists for component: INREXT   No results for input(s): FE, TIBC, PSAT, FERR in the last 72 hours. Lab Results   Component Value Date/Time    Folate 24.6 (H) 03/01/2016 11:20 AM     No results for input(s): PH, PCO2, PO2 in the last 72 hours. No results for input(s): CPK, CKMB in the last 72 hours.     No lab exists for component: TROPONINI  Lab Results   Component Value Date/Time    Color DARK YELLOW 05/30/2019 01:39 AM    Appearance CLOUDY (A) 05/30/2019 01:39 AM    Specific gravity 1.022 05/30/2019 01:39 AM    pH (UA) 5.5 05/30/2019 01:39 AM    Protein NEGATIVE  05/30/2019 01:39 AM    Glucose NEGATIVE  05/30/2019 01:39 AM    Ketone TRACE (A) 05/30/2019 01:39 AM    Bilirubin NEGATIVE  05/14/2019 01:20 PM    Urobilinogen 0.2 05/30/2019 01:39 AM    Nitrites NEGATIVE  05/30/2019 01:39 AM    Leukocyte Esterase SMALL (A) 05/30/2019 01:39 AM    Epithelial cells FEW 05/30/2019 01:39 AM    Bacteria 4+ (A) 05/30/2019 01:39 AM    WBC 5-10 05/30/2019 01:39 AM    RBC 0-5 05/30/2019 01:39 AM MEDICATIONS:  Current Facility-Administered Medications   Medication Dose Route Frequency    octreotide (SANDOSTATIN) 500 mcg in 0.9% sodium chloride 500 mL infusion  50 mcg/hr IntraVENous CONTINUOUS    hydrocortisone Sod Succ (PF) (SOLU-CORTEF) injection 50 mg  50 mg IntraVENous Q6H    morphine injection 2 mg  2 mg IntraVENous Q4H PRN    vancomycin (VANCOCIN) 750 mg in 0.9% sodium chloride (MBP/ADV) 250 mL  750 mg IntraVENous Q18H    sodium bicarbonate (8.4%) 150 mEq in sterile water 1,000 mL infusion   IntraVENous CONTINUOUS    hydrocortisone (ANUSOL-HC) suppository 25 mg  25 mg Rectal Q12H    pantoprazole (PROTONIX) 40 mg in sodium chloride 0.9% 10 mL injection  40 mg IntraVENous DAILY    insulin glargine (LANTUS) injection 6 Units  6 Units SubCUTAneous DAILY    meropenem (MERREM) 1 g in 0.9% sodium chloride (MBP/ADV) 50 mL  1 g IntraVENous Q12H    NOREPINephrine (LEVOPHED) 32 mg in 5% dextrose 250 mL infusion  0-200 mcg/min IntraVENous TITRATE    PHENYLephrine (ROSANA-SYNEPHRINE) 100 mg in 0.9% sodium chloride 250 mL infusion  0-300 mcg/min IntraVENous TITRATE    balsam peru-castor oil (VENELEX) ointment   Topical BID    0.9% sodium chloride infusion 250 mL  250 mL IntraVENous PRN    sodium chloride (NS) flush 5-40 mL  5-40 mL IntraVENous Q8H    sodium chloride (NS) flush 5-40 mL  5-40 mL IntraVENous PRN    ondansetron (ZOFRAN) injection 4 mg  4 mg IntraVENous Q4H PRN    heparin (porcine) injection 5,000 Units  5,000 Units SubCUTAneous Q8H    insulin lispro (HUMALOG) injection   SubCUTAneous AC&HS    glucose chewable tablet 16 g  4 Tab Oral PRN    glucagon (GLUCAGEN) injection 1 mg  1 mg IntraMUSCular PRN    albuterol-ipratropium (DUO-NEB) 2.5 MG-0.5 MG/3 ML  3 mL Nebulization QID RT    albuterol (PROVENTIL VENTOLIN) nebulizer solution 2.5 mg  2.5 mg Nebulization Q4H PRN    mupirocin (BACTROBAN) 2 % ointment   Both Nostrils BID    dextrose 10% infusion 125-250 mL  125-250 mL IntraVENous PRN    acetaminophen (TYLENOL) solution 650 mg  650 mg Per NG tube Q4H PRN    rifAXIMin (XIFAXAN) tablet 550 mg  550 mg Oral BID    lactulose (CHRONULAC) 10 gram/15 mL solution 45 mL  45 mL Oral TID

## 2019-06-02 NOTE — PROGRESS NOTES
attempted to visit patient and patient's family. Patient was sleeping with no family present. Left Pastoral Care note. [address] Paging Service [phone]

## 2019-06-02 NOTE — PROGRESS NOTES
Pharmacy Automatic Renal Dosing Protocol - Antimicrobials  Indication for Antimicrobials: Sepsis (Hx of ESBL), ESBL UTI    Current Regimen of Each Antimicrobial:  Meropenem 1 gram IV every 12 hours (Started 19; Day #3)  Vancomycin 750 mg IV every 18 hours (Started 19; Day #3)    Previous Antimicrobial Therapy:  Levofloxacin 750mg IV x 1 in ED (Start Date )    Goal Vancomycin Trough: 15-20 mcg/mL    Vancomycin Level Assessment:  Date Dose & Interval Measured (mcg/mL) Extrapolated (mcg/mL)   19 1000 mg Q18H 20.8 20.6     Significant Cultures:   19 Blood culture = No growth x 2 day (Results pending)  19 Urine culture = Greater than 100K ESBL Klebsiella (FINAL)  19 Blood culture = No growth x 2 day (Results pending)    Labs:  Recent Labs     19  0357 19  0325 19  0130   CREA 1.92* 1.55* 1.08*   BUN 27* 21* 11   WBC 20.1* 22.7* 21.7*   Temp (24hrs), Av.8 °F (37.7 °C), Min:99.3 °F (37.4 °C), Max:100.5 °F (38.1 °C)    Creatinine Clearance (mL/min) or Dialysis: ~30mL/min    Impression/Plan:   Creatinine still rising, WBC around 20  Continue Merrem as above as appropriate for Crcl 26-50 ml/min  Serum creatinine increasing, vancomycin trough 20.6 on 1000 mg Q18H. Dose adjusted to 750 mg Q18H with anticipated trough of 16 mcg/ml. Antimicrobial stop date: To be determined. Pharmacy will follow daily and adjust medications as appropriate for renal function and/or serum levels.     Thank you,  Jose Guadalupe Mercer, [facility]

## 2019-06-02 NOTE — ACP (ADVANCE CARE PLANNING)
Advance Care Planning Note      NAME: Kerri Darnell   :  1955   MRN:  [id number]     Date/Time:  2019 10:56 AM    Active Diagnoses:  Hospital Problems  Date Reviewed: 3/19/2019          Codes Class Noted POA    Altered mental state ICD-10-CM: R41.82  ICD-9-CM: 780.97  2019 Unknown              These active diagnoses are of sufficient risk that focused discussion on advance care planning is indicated in order to allow the patient to thoughtfully consider personal goals of care, and if situations arise that prevent the ability to personally give input, to ensure appropriate representation of their personal desires for different levels and aggressiveness of care. Discussion:   Pt is currently in coma and Partial Code. Pt's  Otilia Velarde is surrogate decision maker. He is at bedside with pt's daughter. I updated them regarding current medical condition.  is inclined towards initiating comfort care but waiting for pt's other daughter who will arriving either tonight or tomorrow.  is clear about pt's wishes that she did not want to suffer or be on futile care. He requested no escalation of care and transition to comfort care tomorrow. Palliative has been consulted. I'll place a hospice consult. Plan of care discussed with pt's nurse regarding no escalation of care. .    Persons present and participating in discussion: Willian Danielle MD.       Time Spent:   Total time spent face-to-face in education and discussion:  16 minutes.          Roberto Bird MD   Hospitalist

## 2019-06-02 NOTE — PROGRESS NOTES
Dr. Stefani Alba in to see patient and talking with patients family. MD Stefani Alba aware of patients low urine output and of pupils being unequal. No change in orders. 200 Dr. Valentine Wolf in to see patient. 1618 Patient respirations labored at this time. Morphine 2mg IV given. 1645 Patient breathing easier at this time. O2 sat 99% RR 20.  1722 calcium level 6.4 and lactic acid 3.6. Dr. Magalie Deal notified and no change in orders. 1900 No changes. Report to Ana Burns RN.

## 2019-06-02 NOTE — PROGRESS NOTES
2000 Unresponsive to pain. Pupils 5 equal but very sluggish to react to light. Agonal breathing at times. Lung sounds are coarse with crackles. Remains on 100% NRB mask. Right groin TLC site noted to have blisters and small skin tears. Remains on Levophed, Phenylephrine, Sandostatin and sodium bicarbonate gtts. No urinary output at this time. 0017 Medicated with Morphine 2 mg IV for comfort.  0601  Medicated with Morphine 2 mg IV for comfort.  0700 Bedside and Verbal shift change report given to Vikki Luevano.  Report included the following information SBAR, Kardex, ED Summary, OR Summary, Intake/Output, MAR, Recent Results and Cardiac Rhythm ST.

## 2019-06-03 NOTE — PROGRESS NOTES
[facility] EXPIRATION NOTE  Pulmonary, Critical Care, and Sleep Medicine    Name: Griffin Harding MRN: [id number]   : 1955 Hospital: [facility]   Date: 6/3/2019  Admission Date: 2019     Chart and notes reviewed. Called to see patient with absence of vital signs. On my exam there is no spontaneous pulse or respirations. Patient was a DNR and family requested full comfort measures this AM. Patient was pronounced dead at 12:30PM . Family not present but was with pt prior. Attending to be notified and will take care or death certificate and discharge summary. Appreciate everyone's wonderful care and compassion towards patient and family.       12:34 Ambar Chris MD

## 2019-06-03 NOTE — HOSPICE
[facility] RN note:  Consult noted. Reviewing chart. Discussed pt with NP Kirstin. Will evaluate pt shortly. 12:48---Notified by staff RN Sierra Clayton that pt . No family present. Passing reported as peaceful. Thank you for the opportunity to care for this pt and family. Please contact hospice at [phone] with any questions or concerns.

## 2019-06-03 NOTE — PROGRESS NOTES
met with patient's family to offer spiritual care.  consulted with patient's nurse. Plan of care is to follow up as needed with patient's family. [address] Paging Service [phone]

## 2019-06-03 NOTE — PROGRESS NOTES
Hospitalist Progress Note    NAME: Sadi Castellon   :  1955   MRN:  [id number]       Assessment / Plan:    Transitioned to comfort care today. Appreciate palliative care consult. Hospital course as follows  Shock: POA  Suspect septic POA  suspect secondary to UTI recurrent klebsiella ESBL  Lactic acidosis  Leukocytosis  Acute metabolic encephalopathy due to above  Severe metabolic acidosis  Abnormal LFT, shock liver  Acute kidney failure  H/O BALDERAS/Cirrhosis  Remains unresponsive  Noticed anisocoria, pupil fixed, left dilated, no previous documentation, ? New finding  -was on vanc and merrem  -was on multiple pressors, remained tachycardic  -treated with bicarb gtt  -blood cxs NGTD  -was on lactulose and rifaximin    Bright red blood per rectum  Hx esophageal varices  -hemorrhoids  -BRBBR noted  -appreciate GI input  - H/H stable  -Hx esophageal varices s/p banding  -KUB noted, nonspecific bowel gas pattern  -consider CT Abd pending course    Type 2 DM   COPD     DVT ppx: not needed  Code status: partial code  Disposition: TBD         Subjective:     Chief Complaint / Reason for Physician Visit  unresponsive    Discussed with RN events overnight. Review of Systems:  Symptom Y/N Comments  Symptom Y/N Comments   Fever/Chills    Chest Pain     Poor Appetite    Edema     Cough    Abdominal Pain     Sputum    Joint Pain     SOB/MARTINEZ    Pruritis/Rash     Nausea/vomit    Tolerating PT/OT     Diarrhea    Tolerating Diet     Constipation    Other       Could NOT obtain due to: unresposive     Objective:     VITALS:   Last 24hrs VS reviewed since prior progress note.  Most recent are:  Patient Vitals for the past 24 hrs:   Temp Pulse Resp BP SpO2   19 0808     95 %   19 0800 99.4 °F (37.4 °C) (!) 129 19 144/47 100 %   19 0700  (!) 130 19 141/53 100 %   19 0600  (!) 132 21 136/49 100 %   19 0500  (!) 132 24 147/54 98 %   19 0400 100.2 °F (37.9 °C) (!) 133 22 148/58 98 %   06/03/19 0300  (!) 134 24 155/56 99 %   06/03/19 0200  (!) 135 23 142/54 99 %   06/03/19 0100  (!) 135 23 142/49 100 %   06/03/19 0000 (!) 100.6 °F (38.1 °C) (!) 136 22 142/49 99 %   06/02/19 2300  (!) 136 22 141/52 98 %   06/02/19 2200  (!) 136 22 134/50 99 %   06/02/19 2100  (!) 139 27 (!) 148/38 99 %   06/02/19 2007     98 %   06/02/19 2000 (!) 100.7 °F (38.2 °C) (!) 136 20 149/54 98 %   06/02/19 1900  (!) 137 22 146/46 99 %   06/02/19 1800  (!) 137 22 148/54 99 %   06/02/19 1700  (!) 136 21 152/47 98 %   06/02/19 1600  (!) 137 28 146/55 90 %   06/02/19 1551     97 %   06/02/19 1500  (!) 135 23 144/54 97 %   06/02/19 1400  (!) 135 20 144/50 97 %   06/02/19 1300  (!) 135 19 144/53 97 %   06/02/19 1212  (!) 135  147/47    06/02/19 1200 99.1 °F (37.3 °C) (!) 134 19 141/47 98 %   06/02/19 1133     98 %   06/02/19 1100  (!) 133 17 150/56 98 %       Intake/Output Summary (Last 24 hours) at 6/3/2019 1036  Last data filed at 6/3/2019 0900  Gross per 24 hour   Intake 5507.02 ml   Output 769 ml   Net 4738.02 ml        PHYSICAL EXAM:  General: unresponsive  EENT:  Pupil fixed, anisocoria, left pupil >right  Resp:  Course breath sounds, no wheezing or rales. No accessory muscle use  CV:  tachycardic  GI:  Soft, mod distended, Non tender.  +Bowel sounds  Neurologic:  Unresponsive. Unable to do   Psych:   Unresponsive. Unable to do     Reviewed most current lab test results and cultures  YES  Reviewed most current radiology test results   YES  Review and summation of old records today    NO  Reviewed patient's current orders and MAR    YES  PMH/SH reviewed - no change compared to H&P  ________________________________________________________________________  Care Plan discussed with:    Comments   Patient     Family      RN x    Care Manager     Consultant                        Multidiciplinary team rounds were held today with , nursing, pharmacist and clinical coordinator. Patient's plan of care was discussed; medications were reviewed and discharge planning was addressed. ________________________________________________________________________  Total NON critical care TIME: 20 Minutes    Total CRITICAL CARE TIME Spent:   Minutes non procedure based      Comments   >50% of visit spent in counseling and coordination of care x    ________________________________________________________________________  Phil Ruiz MD     Procedures: see electronic medical records for all procedures/Xrays and details which were not copied into this note but were reviewed prior to creation of Plan. LABS:  I reviewed today's most current labs and imaging studies.   Pertinent labs include:  Recent Labs     06/03/19  0434 06/02/19  1609 06/01/19  0357   WBC 20.3* 22.6* 20.1*   HGB 11.5 11.5 11.7   HCT 37.3 36.9 37.3   * 157 159     Recent Labs     06/03/19  0434 06/02/19  1609 06/01/19  0357   * 135* 136   K 4.7 4.4 4.0   CL 98 98 105   CO2 30 30 20*   * 228* 190*   BUN 48* 41* 27*   CREA 2.46* 2.31* 1.92*   CA 6.5* 6.4* 6.6*   MG 2.1 2.2 1.9   PHOS 6.8* 6.3* 4.1   ALB 2.4* 2.5* 2.7*   TBILI 1.9* 2.1* 2.7*   SGOT 256* 315* 465*   * 178* 156*       Signed: Phil Ruiz MD

## 2019-06-03 NOTE — PROGRESS NOTES
0024 Medicated with Morphine 2 mg IV due to agonal breathing and for comfort. 4395 Medicated with Morphine 2 mg IV due to agonal breathing and for comfort.

## 2019-06-03 NOTE — INTERDISCIPLINARY ROUNDS
Interdisciplinary team rounds were held 6/3/2019 with the following team members:Care Management, Diabetes Treatment Specialist, Nursing, Nutrition, Pharmacy, Physical Therapy, Physician and Respiratory Therapy. Plan of care discussed. See clinical pathway and/or care plan for interventions and desired outcomes.

## 2019-06-03 NOTE — PROGRESS NOTES
[facility] acknowleged Hospice Consult. Palliative Care has met with family at bedside. FOC signed. Copy on chart and to family. Referral submitted to [address] [place]. [facility] informed.      Adiel Cobb RN CM  Ext [phone]

## 2019-06-03 NOTE — PROGRESS NOTES
notified of patient's death. Consulted with patient's nurse. [address]  Paging Service [phone]

## 2019-06-03 NOTE — PROGRESS NOTES
Palliative Medicine Social Work  [address] [place] [address]: [phone]  [facility]: [phone])        Code Status: DNR    Advance Care Planning:  Advance Care Planning 5/30/2019   Patient's Healthcare Decision Maker is: -   Confirm Advance Directive Yes, on file   Patient Would Like to Complete Advance Directive -     Primary Decision Maker: Patrizia Sanchez - [phone]  Patient has POST    Patient / Family Encounter Documentation    Participants (names):Patient family ( Nilay Irby, daughters); Palliative Medicine Dalila Kerr NP    Narrative:     Per chart:  Robert Rahman is a 61 y.o. with a past history of chronic back pain, COPD, DM, esophageal varices in cirrhosis s/p banding x 2 in 2014, Fibromyalgia, GERD, and Liver cirrhosis secondary to BALDERAS, who was admitted from rehab with encephalopathy (UTI). She has been worked up for septic shock, metabolic encephalopathy, severe lactic acidosis  Patient is well known to palliative medicine from recent admissions/care discussions. Current medical issues leading to Palliative Medicine involvement include: end stage dx. Shahrzad Mccormick NP and I met with patient  and daughters. Patient in coma in ICU and on pressors. Family is ready for comfort measures, which they decided over weekend. They said when patient was lucid she told them that she is tired and soon after that slipped into coma. We have had many [address] conversations in recent past and patient was clear she did not want to suffer.  was accepting/preparing himself. Comfort orders initiative. Family at bedside. Goals of Care / Plan:    Comfort orders initiatied      Thank you for the opportunity to be involved in the care of Ms. Urbano Manuel and her family.     Aby Enrique, [facility]  Palliative Medicine   [phone]

## 2019-06-03 NOTE — PROGRESS NOTES
Bedside shift change report given to Cintia Mcpherson RN (oncoming nurse) by Osbaldo De La Rosa RN (offgoing nurse). Report included the following information SBAR, Intake/Output, MAR, Accordion, Recent Results and Med Rec Status. Told in report not to escalate care and that a palliative meeting would be happening today which would probably lead to comfort care and hospice today. 0830: Spoke with Dr. Vesna Beckwith regarding patient not digesting stomach contents. Dr. Vesna Beckwith agreed to holding patient medication. 0930:  and daughters at bedside. Stating they would like to make her comfort. Will call palliative and speak with intensivist.    1000: Patient's family and  have decided to withdraw care. Palliative care will handle orders. 1005: Pressors and all fluids stopped. Morphine will be given q 15min for comfort. 1030: Patient switched to NC and will wean down oxygen slowly to 2L. 1143: Patient agonally breathing. Will call Palliative to see if Morphine dose can be increased. Monika at [facility]. 1218: Patient passed. Waiting for MD to pronounce. 1230: Calling [employer] with time of death. MD pronounced patient.

## 2019-06-03 NOTE — DISCHARGE SUMMARY
Hospitalist Discharge Summary     Patient ID:  Kalen Escobar  [id number]  89 y.o.  1955    PCP on record: Kalin Parikh NP    Admit date: 2019  Discharge date and time: 6/3/2019    DISCHARGE DIAGNOSIS:    Shock, Suspect septic   suspect secondary to UTI recurrent klebsiella ESBL  Lactic acidosis  Leukocytosis  Acute metabolic encephalopathy  Severe metabolic acidosis  Abnormal LFT, shock liver  Acute kidney failure  H/O BALDERAS/Cirrhosis  Anisocoria  Bright red blood per rectum  Hx esophageal varices  Type 2 DM   COPD    CONSULTATIONS:  IP CONSULT TO HOSPITALIST  IP CONSULT TO GASTROENTEROLOGY  IP CONSULT TO PALLIATIVE CARE - PROVIDER    Excerpted HPI from H&P of Melissa Patricia MD:    61 y.o lady w/ COPD, cirrhosis, DM, recent admission for ESBL UTI, who presents from her nursing home with altered mental state. Per reports, she was last seen well sitting on the side of her bed around 11 PM. Staff checked on her around 1 AM and she was unresponsive. EMS was called and she was transported here. No other history available.        ______________________________________________________________________  DISCHARGE SUMMARY/HOSPITAL COURSE:  for full details see H&P, daily progress notes, labs, consult notes. Pt was transitioned to comfort care earlier today. Pt passed away before seen by hospice.  Pt pronounced dead at 12:30PM.    _______________________________________________________________________    _______________________________________________________________________  DISCHARGE MEDICATIONS:   Current Discharge Medication List            Patient Follow Up Instructions:   Patient     Follow-up Information    None ________________________________________________________________    __________________________________________________________________    Disposition      ____________________________________________________________________    ___________________________________________________________________      Total time in minutes spent coordinating this discharge (includes going over instructions, follow-up, prescriptions, and preparing report for sign off to her PCP) :  40 minutes    Signed:  Jos éMiguel Montelongo MD

## 2019-06-03 NOTE — PROGRESS NOTES
GI Progress Note Christell Drain)  Phuc Lopez Coad CRUZ:[id number] MUW:[id number]   Prim GI: Ruiz Allison MD  PCP: Ibrahima Hough NP  Date/Time:  6/3/2019 12:55 PM   Assessment:   · Cirrhosis  · Trivial resolved rectal bleeding  · H/o EV/GV  · Abdominal distension/AP  · Septic shock  · Clinically not improving and is unresponsive. Very poor prognosis     Plan:   · Possible Hospice/Comfort care plans for today. She is a partial code currently. · Octreotide/PPI   · CT Abd/Pelvis given AP/distension w/ colitis  · on Lactulose/RIfaxamin and IV Abx  · U/S with trace ascites; not enough for paracentesis. Subjective:   Discussed with RN weekend overnight and plan. Complaint Y/N Description   Abdominal Pain     Hematemesis     Hematochezia     Melena     Constipation     Diarrhea     Dyspepsia     Dysphagia     Jaundiced     Nausea/vomiting       Review of Systems:  Symptom Y/N Comments  Symptom Y/N Comments   Fever/Chills    Chest Pain     Cough    Headaches     Sputum    Joint Pain     SOB/MARTINEZ    Pruritis/Rash     Tolerating Diet    Other       Could NOT obtain due to: MS issues     Objective:   VITALS:   Last 24hrs VS reviewed since prior progress note. Most recent are:  Visit Vitals  /49   Pulse (!) 132   Temp 100.2 °F (37.9 °C)   Resp 21   Ht 5' 8\" (1.727 m)   Wt 108.2 kg (238 lb 8.6 oz)   SpO2 100%   Breastfeeding? No   BMI 36.27 kg/m²       Intake/Output Summary (Last 24 hours) at 6/3/2019 0734  Last data filed at 6/3/2019 0600  Gross per 24 hour   Intake 5621.52 ml   Output 454 ml   Net 5167.52 ml     PHYSICAL EXAM:  General: Unresponsive.gasping  HEENT: NC, Atraumatic.     Lungs:  +coarse BS  Heart:  +tachycardic  Abdomen: +distended  Extremities: No c/c/e    Lab and Radiology Data Reviewed: (see below)    Medications Reviewed: (see below)  PMH/SH reviewed - no change compared to H&P  ________________________________________________________________________  _______________________________  Care Plan discussed with:  Patient    Family     RN x              Consultant:       Manju Beltre MD     Procedures: see electronic medical records for all procedures/Xrays and details which were not copied into this note but were reviewed prior to creation of Plan. LABS:  Recent Labs     06/03/19 0434 06/02/19  1609   WBC 20.3* 22.6*   HGB 11.5 11.5   HCT 37.3 36.9   * 157     Recent Labs     06/03/19 0434 06/02/19  1609 06/01/19  0357   * 135* 136   K 4.7 4.4 4.0   CL 98 98 105   CO2 30 30 20*   BUN 48* 41* 27*   CREA 2.46* 2.31* 1.92*   * 228* 190*   CA 6.5* 6.4* 6.6*   MG 2.1 2.2 1.9   PHOS 6.8* 6.3* 4.1     Recent Labs     06/03/19 0434 06/02/19  1609 06/01/19 0357   SGOT 256* 315* 465*   * 131* 116   TP 6.3* 6.2* 6.2*   ALB 2.4* 2.5* 2.7*   GLOB 3.9 3.7 3.5     No results for input(s): INR, PTP, APTT in the last 72 hours. No lab exists for component: INREXT, INREXT   No results for input(s): FE, TIBC, PSAT, FERR in the last 72 hours. Lab Results   Component Value Date/Time    Folate 24.6 (H) 03/01/2016 11:20 AM     No results for input(s): PH, PCO2, PO2 in the last 72 hours. No results for input(s): CPK, CKMB in the last 72 hours.     No lab exists for component: TROPONINI  Lab Results   Component Value Date/Time    Color DARK YELLOW 05/30/2019 01:39 AM    Appearance CLOUDY (A) 05/30/2019 01:39 AM    Specific gravity 1.022 05/30/2019 01:39 AM    pH (UA) 5.5 05/30/2019 01:39 AM    Protein NEGATIVE  05/30/2019 01:39 AM    Glucose NEGATIVE  05/30/2019 01:39 AM    Ketone TRACE (A) 05/30/2019 01:39 AM    Bilirubin NEGATIVE  05/14/2019 01:20 PM    Urobilinogen 0.2 05/30/2019 01:39 AM    Nitrites NEGATIVE  05/30/2019 01:39 AM    Leukocyte Esterase SMALL (A) 05/30/2019 01:39 AM    Epithelial cells FEW 05/30/2019 01:39 AM    Bacteria 4+ (A) 05/30/2019 01:39 AM    WBC 5-10 05/30/2019 01:39 AM    RBC 0-5 05/30/2019 01:39 AM       MEDICATIONS:  Current Facility-Administered Medications   Medication Dose Route Frequency    octreotide (SANDOSTATIN) 500 mcg in 0.9% sodium chloride 500 mL infusion  50 mcg/hr IntraVENous CONTINUOUS    hydrocortisone Sod Succ (PF) (SOLU-CORTEF) injection 50 mg  50 mg IntraVENous Q6H    morphine injection 2 mg  2 mg IntraVENous Q4H PRN    vancomycin (VANCOCIN) 750 mg in 0.9% sodium chloride (MBP/ADV) 250 mL  750 mg IntraVENous Q18H    sodium bicarbonate (8.4%) 150 mEq in sterile water 1,000 mL infusion   IntraVENous CONTINUOUS    hydrocortisone (ANUSOL-HC) suppository 25 mg  25 mg Rectal Q12H    pantoprazole (PROTONIX) 40 mg in sodium chloride 0.9% 10 mL injection  40 mg IntraVENous DAILY    insulin glargine (LANTUS) injection 6 Units  6 Units SubCUTAneous DAILY    meropenem (MERREM) 1 g in 0.9% sodium chloride (MBP/ADV) 50 mL  1 g IntraVENous Q12H    NOREPINephrine (LEVOPHED) 32 mg in 5% dextrose 250 mL infusion  0-200 mcg/min IntraVENous TITRATE    PHENYLephrine (ROSANA-SYNEPHRINE) 100 mg in 0.9% sodium chloride 250 mL infusion  0-300 mcg/min IntraVENous TITRATE    balsam peru-castor oil (VENELEX) ointment   Topical BID    0.9% sodium chloride infusion 250 mL  250 mL IntraVENous PRN    sodium chloride (NS) flush 5-40 mL  5-40 mL IntraVENous Q8H    sodium chloride (NS) flush 5-40 mL  5-40 mL IntraVENous PRN    ondansetron (ZOFRAN) injection 4 mg  4 mg IntraVENous Q4H PRN    heparin (porcine) injection 5,000 Units  5,000 Units SubCUTAneous Q8H    insulin lispro (HUMALOG) injection   SubCUTAneous AC&HS    glucose chewable tablet 16 g  4 Tab Oral PRN    glucagon (GLUCAGEN) injection 1 mg  1 mg IntraMUSCular PRN    albuterol-ipratropium (DUO-NEB) 2.5 MG-0.5 MG/3 ML  3 mL Nebulization QID RT    albuterol (PROVENTIL VENTOLIN) nebulizer solution 2.5 mg  2.5 mg Nebulization Q4H PRN    mupirocin (BACTROBAN) 2 % ointment   Both Nostrils BID    dextrose 10% infusion 125-250 mL  125-250 mL IntraVENous PRN    acetaminophen (TYLENOL) solution 650 mg 650 mg Per NG tube Q4H PRN    rifAXIMin (XIFAXAN) tablet 550 mg  550 mg Oral BID    lactulose (CHRONULAC) 10 gram/15 mL solution 45 mL  45 mL Oral TID

## 2019-06-03 NOTE — CONSULTS
Palliative Medicine Consult  Willy: [phone] ([phone])    Patient Name: Robert Rahman  YOB: 1955    Date of Initial Consult: 6/1/2019  Reason for Consult: End Stage Disease  Requesting Provider: Jessie Rey MD  Primary Care Physician: Nyla Zaman NP     SUMMARY:   Robert Rahman is a 61 y.o. with a past history of asthma, Back pain, COPD, DM2, cirrhosis, with esophageal varices s/p banding x2, GERD, BALDERAS, who was admitted on 5/30/2019 from nursing home with a diagnosis of altered mental status. Family reports that she was ready on Saturday to de-escalate care- so Palliative Medicine was consulted to help through this transtion      PALLIATIVE DIAGNOSES:   1. Comfort Measures Only  2. Shortness of breath  3. Debility  4. Obtunded  5. Goals of Care       PLAN:   1. Met with family at the bedside, both daughters and , along with Aby Enrique LCSW  2. Mrs Urbano Manuel herself was unresponsive, with labored breathing, on non-rebreather  3.  tells me that on Saturday, Mrs Urbano Manuel shared that she was ready to pass, and was tired  4. He was very appreciative that we could deescalate care now, and so we talked through what this would look like  5. Ordered Morphine and Lorazepam IV for comfort  6. Stopped pressors and all other meds not directly related to comfort  7. Family supporting each other well, and grieving appropriately, but seem to be well prepared for this phase of her life  8. Spoke to them about turning the monitors off, but they asked that we leave them on  9. Prepared them that this journey make take minutes, to hours, to days  10. They seem Ok with this, but want to make sure that she is comfortable during this transition  11. Initial consult note routed to primary continuity provider and/or primary health care team members  12.  Communicated plan of care with: Palliative Collin MARTINEZ Team    Addendum: 11:40am  Spoke with nursing  Patient still with some labored breathing, and family feels that she is uncomfortable  Requesting an increase in Morphine dose- increased from 2mg to 4mg to help with work of breathing during her transtion  Also increase ativan from 1mg to 2mg,although family feels morphine is working better     [address] / TREATMENT PREFERENCES:     GOALS OF CARE:  Patient/Health Care Proxy Stated Goals: Comfort    TREATMENT PREFERENCES:   Code Status: DNR    Advance Care Planning:  [x] [facility] Interdisciplinary Team has updated the ACP Navigator with [place] and Patient Capacity      Primary Decision Maker: Britney Toribio - [phone]  Advance Care Planning 5/30/2019   Patient's Healthcare Decision Maker is: -   Confirm Advance Directive Yes, on file   Patient Would Like to Complete Advance Directive -       Medical Interventions: Comfort measures     Other Instructions:   Artificially Administered Nutrition: No feeding tube     Other:    As far as possible, the palliative care team has discussed with patient / health care proxy about goals of care / treatment preferences for patient. HISTORY:     History obtained from: spouse, chart    CHIEF COMPLAINT: unresponsive    HPI/SUBJECTIVE:    The patient is:   [] Verbal and participatory  [x] Non-participatory due to:      Unresponsive  Labored breathing  Family at bedside    Clinical Pain Assessment (nonverbal scale for severity on nonverbal patients):   Clinical Pain Assessment  Severity: 0     Activity (Movement): Lying quietly, normal position    Duration: for how long has pt been experiencing pain (e.g., 2 days, 1 month, years)  Frequency: how often pain is an issue (e.g., several times per day, once every few days, constant)     FUNCTIONAL ASSESSMENT:     Palliative Performance Scale (PPS):  PPS: 20       PSYCHOSOCIAL/SPIRITUAL SCREENING:     Palliative IDT has assessed this patient for cultural preferences / practices and a referral made as appropriate to needs (Cultural Services, Patient Advocacy, Ethics, etc.)    Any spiritual / Mormonism concerns:  [] Yes /  [x] No    Caregiver Burnout:  [] Yes /  [x] No /  [] No Caregiver Present      Anticipatory grief assessment:   [x] Normal  / [] Maladaptive       ESAS Anxiety:      ESAS Depression:          REVIEW OF SYSTEMS:     Positive and pertinent negative findings in ROS are noted above in HPI. The following systems were [] reviewed / [x] unable to be reviewed as noted in HPI  Other findings are noted below. Systems: constitutional, ears/nose/mouth/throat, respiratory, gastrointestinal, genitourinary, musculoskeletal, integumentary, neurologic, psychiatric, endocrine. Positive findings noted below. Modified ESAS Completed by: provider   Fatigue: 10       Pain: 0           Dyspnea: 8           Stool Occurrence(s): 1        PHYSICAL EXAM:     From RN flowsheet:  Wt Readings from Last 3 Encounters:   06/02/19 238 lb 8.6 oz (108.2 kg)   05/14/19 178 lb (80.7 kg)   04/24/19 190 lb 7.6 oz (86.4 kg)     Blood pressure 144/47, pulse (!) 129, temperature 99.4 °F (37.4 °C), resp. rate 19, height 5' 8\" (1.727 m), weight 238 lb 8.6 oz (108.2 kg), SpO2 95 %, not currently breastfeeding.     Pain Scale 1: Behavioral Pain Scale (BPS)  Pain Intensity 1: 3     Pain Location 1: Back  Pain Orientation 1: Lower  Pain Description 1: Sharp  Pain Intervention(s) 1: Medication (see MAR)  Last bowel movement, if known:     Constitutional: Unresponsive  ENMT: no nasal discharge, moist mucous membranes  Respiratory: breathing labored, symmetric  Gastrointestinal: soft non-tender, +bowel sounds  Musculoskeletal: no deformity, no tenderness to palpation  Skin: warm, dry  Neurologic: following commands, moving all extremities  Psychiatric: full affect, no hallucinations  Other:       HISTORY:     Active Problems:    Altered mental state (5/30/2019)      Past Medical History:   Diagnosis Date    Asthma     Back pain     [facility]     Diabetes [facility] [facility]     Esophageal varices in cirrhosis [facility] [address]     6/2014 banding x 2    Fibromyalgia     Gastrointestinal disorder     GERD (gastroesophageal reflux disease)     Hypercholesteremia     Liver cirrhosis secondary to BALDERAS (HCC)     Liver disease     Other and unspecified hyperlipidemia     Restless leg syndrome     Type II or unspecified type diabetes mellitus without mention of complication, uncontrolled     Unspecified essential hypertension       Past Surgical History:   Procedure Laterality Date    HX BACK SURGERY      HX CARPAL TUNNEL RELEASE      on right    HX HYSTERECTOMY      plus 1/2 of an ovary removed    NH COLSC FLX W/RMVL OF TUMOR POLYP LESION SNARE TQ  5/30/2013         UPPER GI ENDOSCOPY,LIGAT VARIX  2/6/2015           Family History   Problem Relation Age of Onset    Diabetes Mother     Stroke Sister     Diabetes Paternal Aunt     Diabetes Paternal Uncle     Heart Disease Neg Hx       History reviewed, no pertinent family history.   Social History     Tobacco Use    Smoking status: Current Every Day Smoker     Packs/day: 1.00     Years: 40.00     Pack years: 40.00    Smokeless tobacco: Former User   Substance Use Topics    Alcohol use: No     Comment: rare     Allergies   Allergen Reactions    Hydrocodone Nausea and Vomiting    Lisinopril Cough    Lortab [Hydrocodone-Acetaminophen] Nausea and Vomiting    Percocet [Oxycodone-Acetaminophen] Nausea and Vomiting      Current Facility-Administered Medications   Medication Dose Route Frequency    morphine injection 2 mg  2 mg IntraVENous Q15MIN PRN    LORazepam (ATIVAN) injection 1 mg  1 mg IntraVENous Q15MIN PRN    balsam peru-castor oil (VENELEX) ointment   Topical BID    sodium chloride (NS) flush 5-40 mL  5-40 mL IntraVENous PRN    ondansetron (ZOFRAN) injection 4 mg  4 mg IntraVENous Q4H PRN          LAB AND IMAGING FINDINGS:     Lab Results   Component Value Date/Time    WBC 20.3 (H) 06/03/2019 04:34 AM    HGB 11.5 06/03/2019 04:34 AM    PLATELET 698 (L) 48/30/5462 04:34 AM     Lab Results   Component Value Date/Time    Sodium 135 (L) 06/03/2019 04:34 AM    Potassium 4.7 06/03/2019 04:34 AM    Chloride 98 06/03/2019 04:34 AM    CO2 30 06/03/2019 04:34 AM    BUN 48 (H) 06/03/2019 04:34 AM    Creatinine 2.46 (H) 06/03/2019 04:34 AM    Calcium 6.5 (L) 06/03/2019 04:34 AM    Magnesium 2.1 06/03/2019 04:34 AM    Phosphorus 6.8 (H) 06/03/2019 04:34 AM      Lab Results   Component Value Date/Time    AST (SGOT) 256 (H) 06/03/2019 04:34 AM    Alk. phosphatase 133 (H) 06/03/2019 04:34 AM    Protein, total 6.3 (L) 06/03/2019 04:34 AM    Albumin 2.4 (L) 06/03/2019 04:34 AM    Globulin 3.9 06/03/2019 04:34 AM     Lab Results   Component Value Date/Time    INR 1.4 (H) 05/30/2019 01:39 AM    Prothrombin time 14.1 (H) 05/30/2019 01:39 AM    aPTT 26.2 04/02/2019 04:07 AM      Lab Results   Component Value Date/Time    Iron 22 (L) 03/19/2019 01:48 PM    TIBC 379 03/19/2019 01:48 PM    Iron % saturation 6 (LL) 03/19/2019 01:48 PM    Ferritin 358 (H) 11/19/2018 12:00 AM      No results found for: PH, PCO2, PO2  No components found for: [place]   Lab Results   Component Value Date/Time    CK 47 03/28/2019 10:13 AM    CK - MB <1.0 11/13/2018 12:53 PM                Total time:   Counseling / coordination time, spent as noted above:   > 50% counseling / coordination?:     Prolonged service was provided for  []30 min   []75 min in face to face time in the presence of the patient, spent as noted above. Time Start:   Time End:   Note: this can only be billed with 79476 (initial) or 48854 (follow up). If multiple start / stop times, list each separately.

## 2021-06-13 NOTE — PROGRESS NOTES
"Assessment / Impression     1. Persistent atrial fibrillation     2. Medication therapy management recommendation refused by patient     3. ESRD (end stage renal disease) on dialysis     4. Tachycardia-bradycardia syndrome     5. Essential hypertension with goal blood pressure less than 140/90     6. Visit for screening mammogram  Mammo Screening Bilateral   7. Need for TD vaccine  Td, Preservative Free (green label)   8. Need for vaccination  Pneumococcal conjugate vaccine 13-valent 6wks-17yrs; >50yrs           Plan:     Discussed with patient that given her persistent atrial fibrillation and other risk factors, I strongly recommend she be on anticoagulation to reduce risk of stroke or other embolic event. Discussed with patient why plavix and aspirin were not adequate forms of anticoagulation for her persistent atrial fibrillation, and discussed with patient the potential risks involved without adequate anticoagulation, including but not limited to death.  Patient understands, will continue her current medications, per her choice, and not continue coumadin.  Discussed with patient I would reach out to our [place] pharmacist as well to see if they have any other recommendations for anticoagulation while she waits for Watch[facility].      She has not had recent mammogram screening, counseled patient, this was ordered today.    I have discussed the risks and benefits of all of the vaccine components with the patient.  All questions have been answered.    Follow-up with me in 1-2 months.    TT 40 minutes CT 25 minutes reviewing patient's current medications and use, counseling regarding therapy treatment options, risks and benefits.      Subjective:      HPI: Belia Rodriguez \"Itzel\" is a 70 y.o. female patient, here today with her  \"Cayden\" who presents for establishing care.  Previously had been seen by Raysa Kitchen NP, however, due to complexity, provider had recommended patient establish care with me.  She has a " 0730: Bedside shift change report given to Lara Everett RN (oncoming nurse) by Jacque Walden RN (offgoing nurse). Report included the following information SBAR, Intake/Output, MAR, Accordion and Recent Results. 1653: Patient bladder scanned due to not urinating. Bladder scan resulted in 315mL. Informed patient if she did not urinate soon, she would have to be straight cathed. Patient said \"I do not want that. I will drink more water. \" 
 
1926: Bedside shift change report given to Jair Manley RN (oncoming nurse) by Lara Everett RN (offgoing nurse). Report included the following information SBAR, Intake/Output, MAR, Accordion, Recent Results and Med Rec Status. history of ESRD with history of extensive aortic dissection from ascending to descending aorta leading to compromise of renal artery, ultimately leading to renal failure, on dialysis MWF, tachycardia-bradycardia syndrome, persistent AF, ventricular rate well controlled, COPD, hypertension, hyperlipidemia.    Patient self-stopped coumadin because she believed it was causing bilateral lower extremity swelling, and once she stopped it, noted her LE swelling improve.  Her cardiologist did not feel coumadin was cause, and recommended she discuss with nephrology, however, patient stated even with extra run of dialysis, that did not improve her LE edema, and wasn't until she self-stopped coumadin that her swelling improved.  She self-started old prescription of Plavix and aspirin to take instead of coumadin.  She's unable to take other NOACs due to her ESRD.  Currently denies any chest pain, SOB      Medical History:     Patient Active Problem List   Diagnosis     Chronic obstructive pulmonary disease, unspecified COPD type     ESRD (end stage renal disease)     Chronic anemia     Tachycardia-bradycardia syndrome     Essential hypertension with goal blood pressure less than 140/90     Hyperlipidemia     Persistent atrial fibrillation     Bacteremia     ZULEIKA (obstructive sleep apnea), severe, intolerant of CPAP     Anemia of chronic renal failure, stage 5     Right knee pain     Dyspnea     Volume overload     Acute bacterial conjunctivitis of both eyes     Hypervolemia, unspecified hypervolemia type     Dissection of thoracoabdominal aorta     CHF (congestive heart failure)     Fall, initial encounter     Pure hypercholesterolemia     Gastroesophageal reflux disease without esophagitis     Gout     GERD (gastroesophageal reflux disease)     Leg weakness     Essential hypertension     Right foot drop     Leg weakness, bilateral     Anticoagulated on warfarin     GI bleeding     Gastrointestinal hemorrhage with hematemesis      Anticoagulant long-term use     Acute midline low back pain with right-sided sciatica     History of compression fracture of spine       Past Medical History:   Diagnosis Date     Arthritis      CHF (congestive heart failure)      Chronic anemia 6/1/2014     Chronic kidney disease      Chronic thoracic aortic dissection 10/7/2015    Descending thoracic aorta; treated medically per notes of Dragan Singh and Jennifer.     COPD (chronic obstructive pulmonary disease)      CVA (cerebral infarction)      Disease of thyroid gland      Dyslipidemia      ESRD (end stage renal disease) 06/03/2009    on dialysis with Dr. Mitchell     Essential hypertension 6/30/2014     GI (gastrointestinal bleed)      Gout      L3 vertebral fracture 11/16/2015     Obesity      ZULEIKA (obstructive sleep apnea), severe, intolerant of CPAP 10/22/2015     Pneumonia 9-7-2015     Right foot drop      Spinal stenosis 3/28/2016     Stroke 3/24/2016       Past Surgical History:   Procedure Laterality Date     BACK SURGERY      [facility]     COLONOSCOPY N/A 3/23/2016    Procedure: COLONOSCOPY;  Surgeon: Ruddy Tejada MD;  Location: [facility];  Service:      DILATION AND CURETTAGE OF UTERUS       EYE SURGERY       HERNIA REPAIR       GA COLSC FLEXIBLE W/CONTROL BLEEDING ANY METHOD N/A 6/7/2017    Procedure: COLONOSCOPY;  Surgeon: Luis Mckeon MD;  Location: [facility];  Service: Gastroenterology     TONSILLECTOMY         Current Medications:     Current Outpatient Prescriptions   Medication Sig     acetaminophen (TYLENOL) 500 MG tablet Take 500 mg by mouth every 6 (six) hours as needed for pain.     allopurinol (ZYLOPRIM) 100 MG tablet TAKE ONE TABLET BY MOUTH DAILY     atorvastatin (LIPITOR) 10 MG tablet TAKE 1 TABLET BY MOUTH DAILY     CHLORPHENIRAMINE/DEXTROMETHORP (CORICIDIN HBP COUGH AND COLD ORAL) Take by mouth as needed.     cholecalciferol, vitamin D3, 2,000 unit cap Take 2,000 Units by mouth daily with lunch.      digoxin  (LANOXIN) 125 mcg tablet TAKE ONE TABLET BY MOUTH 3 TIMES A WEEK     diphenhydrAMINE (BENADRYL) 50 MG tablet Take 50 mg by mouth at bedtime as needed for sleep.      folic acid (FOLVITE) 1 MG tablet TAKE ONE TABLET BY MOUTH DAILY     gabapentin (NEURONTIN) 100 MG capsule TAKE 1 CAPSULE BY MOUTH THREE TIMES DAILY     Lactobacillus rhamnosus GG (CULTURELLE) 10-15 Billion cell capsule Take 1 capsule by mouth daily with lunch.     metoprolol tartrate (LOPRESSOR) 25 MG tablet Take 25 mg by mouth 2 (two) times a day.     midodrine (PROAMATINE) 5 MG tablet TAKE 1 TO 2 TABLETS ( 5-10 MG TOTAL) BY MOUTH 3 TIMES A WEEK ( MON, WED AND FRI) 30 MINUTES PRIOR TO DIALYSIS.     OXYGEN-AIR DELIVERY SYSTEMS MISC 2 L/min into each nostril daily as needed (SHORTNESS OF BREATH). Indications: shortness of breath     ranitidine (ZANTAC) 150 MG tablet Take 1 tablet (150 mg total) by mouth daily.     senna-docusate (SENNOSIDES-DOCUSATE SODIUM) 8.6-50 mg tablet Take 1 tablet by mouth at bedtime as needed for constipation.      sevelamer carbonate (RENVELA) 800 mg tablet Take 1,600 mg by mouth 3 (three) times a day with meals.     timolol maleate (TIMOPTIC) 0.5 % ophthalmic solution Administer 1 drop to both eyes 2 (two) times a day.      vit B comp no.3-folic-C-biotin (NEPHRO-OLGA) 1- mg-mg-mcg Tab tablet Take 1 tablet by mouth daily.     warfarin (COUMADIN) 2.5 MG tablet Take 5 mg M,W,F and 5 mg all other days of the week.       Family History:     Family History   Problem Relation Age of Onset     Dementia Mother      Diabetes Mother      Arthritis Mother      Cancer Mother      Depression Mother      Heart disease Mother      Vision loss Mother      Stroke Father      Heart disease Father        Review of Systems  All other systems reviewed and are negative.         Social History:     History   Smoking Status     Former Smoker     Packs/day: 1.50     Years: 37.00     Types: Cigarettes     Quit date: 1/1/2009   Smokeless Tobacco      "Never Used     Social History     Social History Narrative    Lives with her . Daughter in [place] and daughter in [place].         Objective:     /58 (Patient Site: Left Arm, Patient Position: Sitting, Cuff Size: Adult Regular)  Pulse 80 Comment: irregular  Resp 16  Ht 5' 6\" (1.676 m)  Wt 183 lb 9.6 oz (83.3 kg)  BMI 29.63 kg/m2  Physical Examination: General appearance - alert, well appearing, and in no distress  Eyes: pupils equal and reactive, extraocular eye movements intact  Ears: normal external ears, clear canals,  TMs appear normal, with no redness or bulging noted.  Mouth: mucous membranes moist, pharynx normal without lesions  Neck: supple, no significant adenopathy or thyromegaly  Lungs: distant breath sounds, no wheezes, rales or rhonchi, symmetric air entry  Heart: irregularly irregular, normal S1, S2, no murmurs.  Abdomen: soft, nontender, nondistended, no masses or organomegaly  Neurological: alert, oriented, normal speech, no focal findings or movement disorder noted.    Extremities: 2+ b/l lower extremity edema  Psychiatric: Normal affect. Does not appear anxious or depressed.    No results found for this or any previous visit (from the past 168 hour(s)).      Amanda Arellano MD  10/27/2017  8:28 PM          "

## 2022-03-07 NOTE — PROGRESS NOTES
Bedside and Verbal shift change report given to [facility] (oncoming nurse) by [facility] Ryder (offgoing nurse). Report included the following information SBAR, Kardex and MAR. 
  
Zone Phone:   [phone] 
  
  
Significant changes during shift:  none 
  
  
  
Patient Information 
  
Elena Gomez 
61 y.o. 
11/13/2018 12:03 PM by Willy Martines MD. Elena Gomez was admitted from Home 
  
Problem List 
  
    
Patient Active Problem List  
  Diagnosis Date Noted  TIA (transient ischemic attack) 11/13/2018  GERD (gastroesophageal reflux disease) 11/13/2018  Therapeutic drug monitoring 10/16/2018  Severe obesity ([address] 10/11/2018  Obesity (BMI 30.0-34.9) 07/10/2018  Sepsis ([address] 03/30/2017  CAP (community acquired pneumonia) 03/30/2017  IBIS (obstructive sleep apnea) 03/30/2017  Tobacco abuse 03/30/2017  Neuropathy 03/30/2017  COPD (chronic obstructive pulmonary disease) ([address] 03/28/2017  Acute deep vein thrombosis (DVT) of right lower extremity ([address] 11/10/2016  
 BALDERAS (nonalcoholic steatohepatitis) 03/10/2016  GI bleed 03/03/2016  Anemia 03/01/2016  Thrombocytopenia ([address] 08/15/2013  Previous back surgery 08/15/2013  S/P CHACHO (total abdominal hysterectomy) 08/15/2013  Cirrhosis ([address] 08/15/2013  Diabetes mellitus with neurological manifestations, uncontrolled ([address]    
 Essential hypertension, benign    
 Hyperlipidemia LDL goal <100    
  
    
Past Medical History:  
Diagnosis Date  Asthma    
 Back pain    
 COPD (chronic obstructive pulmonary disease) (HCC)    
 Diabetes (HCC)    
 Esophageal varices in cirrhosis ([address]    
  6/2014 banding x 2  
 Fibromyalgia    
 Gastrointestinal disorder    
 GERD (gastroesophageal reflux disease)    
 Hypercholesteremia    
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    
 Liver disease    
 Other and unspecified hyperlipidemia    
 Restless leg syndrome    
  Type II or unspecified type diabetes mellitus without mention of complication, uncontrolled    
 Unspecified essential hypertension    
  
  
  
Core Measures: 
  
CVA: Yes Yes CHF:No No 
PNA:No No 
  
  
  
Activity Status: 
  
OOB to Chair Yes Ambulated this shift Yes Bed Rest No 
  
Supplemental O2: (If Applicable) 
  
NC Yes NRB No 
Venti-mask No 
On 2 Liters/min 
  
  
  
  
DVT prophylaxis: 
  
DVT prophylaxis Med- No 
DVT prophylaxis SCD or CRISTOPHER- No  
  
Wounds: (If Applicable) 
  
Wounds- No 
  
Location none 
  
Patient Safety: 
  
Falls Score Total Score: 3 Safety Level_______ Bed Alarm On? No 
Sitter? No 
  
Plan for upcoming shift: MRI 
  
  
  
Discharge Plan: No  
  
Active Consults: 
IP CONSULT TO NEUROLOGY Good

## 2022-03-18 NOTE — TELEPHONE ENCOUNTER
----- Message from Yann Wiley MD sent at 3/18/2022  2:39 PM CDT -----  Still improving thyroid function - cont current dose methimazole, obtain TSH, free T4, Total T3 prior to scheduled follow up. Pending metanephrines - will f/up with this. If still mildly abnormal, may perform 24hour urine metanephrines/catecholamines.    Patient is still in the hospital and she wanted Dr. Wm Rhodes to know that she was put on a low dose aspirin.

## 2022-12-05 NOTE — PROGRESS NOTES
TRANSFER - OUT REPORT: 
 
Verbal report given to Kirstin(name) on Jahaira Marino  being transferred to [facility](unit) for routine progression of care Report consisted of patients Situation, Background, Assessment and  
Recommendations(SBAR). Information from the following report(s) SBAR was reviewed with the receiving nurse. Lines:  
Peripheral IV 05/14/19 Right Forearm (Active) Site Assessment Clean, dry, & intact 5/22/2019  8:29 AM  
Phlebitis Assessment 0 5/22/2019  8:29 AM  
Infiltration Assessment 0 5/22/2019  8:29 AM  
Dressing Status Clean, dry, & intact 5/22/2019  8:29 AM  
Dressing Type Tape;Transparent 5/22/2019  8:29 AM  
Hub Color/Line Status Pink 5/22/2019  4:45 AM  
  
 
Opportunity for questions and clarification was provided. Patient transported with: family Split-Thickness Skin Graft Text: The defect edges were debeveled with a #15 scalpel blade.  Given the location of the defect, shape of the defect and the proximity to free margins a split thickness skin graft was deemed most appropriate.  Using a sterile surgical marker, the primary defect shape was transferred to the donor site. The split thickness graft was then harvested.  The skin graft was then placed in the primary defect and oriented appropriately.

## 2023-03-30 NOTE — TELEPHONE ENCOUNTER
Patient claims she has been unable to use machine as she [place] her [place]. Advised patient to use machine as much as possible to help increase compliance and reminded her of her appointment for her titration on 5/11/17. 95.3 93.9

## 2023-10-16 NOTE — PROGRESS NOTES
[facility] has accepted and can take patient today. FOC signed and placed on chart.  and patient aware and in agreement. [facility] can transport patient to [facility] at 1500pm today. Roney Gamez and family aware of transport time. Nursing to call report to . Called Deb and left message for admissions that patient would not be coming there and to cancel their referral.  
. Spiral Flap Text: The defect edges were debeveled with a #15 scalpel blade. Given the location of the defect, shape of the defect and the proximity to free margins a spiral flap was deemed most appropriate. Using a sterile surgical marker, an appropriate rotation flap was drawn incorporating the defect and placing the expected incisions within the relaxed skin tension lines where possible. The area thus outlined was incised deep to adipose tissue with a #15 scalpel blade. The skin margins were undermined to an appropriate distance in all directions utilizing iris scissors. Following this, the designed flap was carried over into the primary defect and sutured into place.

## 2024-02-12 NOTE — PROGRESS NOTES
Pharmacy Clarification of Prior to Admission Medication Regimen-Follow up Needed The patient was not interviewed regarding clarification of the prior to admission medication regimen due to AMS. Patient's  and daughter were present in room and obtained permission from patient to discuss drug regimen with visitor(s) present. Patient's  and daughter stated they did not know what medications the patient takes. MHT called the patient's outpatient pharmacy, [facility], [phone], and spoke with Elizabeth Ramos, who was able to verify the patient's refill history. PTA med list was updated based on the information received from the outpatient pharmacy. Information Obtained From: outpatient pharmacy, RX Query Pertinent Pharmacy Findings: The medication history will need to be re-evaluated at a later time during admission when patient is willing/able to participate or if more information is provided. PTA medication list was corrected to the following:  
 
Prior to Admission Medications Prescriptions Last Dose Informant Patient Reported? Taking? CONSTULOSE 10 gram/15 mL solution Unknown at Unknown time Other No No  
Sig: take 2 tablespoonfuls by mouth twice a day  
albuterol (PROAIR HFA) 90 mcg/actuation inhaler Unknown at Unknown time Other Yes No  
Sig: Take 2 Puffs by inhalation every four (4) hours as needed for Wheezing. amitriptyline (ELAVIL) 150 mg tablet Unknown at Unknown time Other Yes No  
Sig: Take 1 Tab by mouth nightly. aspirin 81 mg chewable tablet Unknown at Unknown time Other No No  
Sig: Take 1 Tab by mouth daily. atorvastatin (LIPITOR) 40 mg tablet Unknown at Unknown time Other No No  
Sig: Take 1 Tab by mouth nightly.   
ferrous sulfate 325 mg (65 mg iron) tablet Unknown at Unknown time Other No No  
Sig: take 1 tablet by mouth once daily with BREAKFAST  
fluticasone furoate-vilanterol (BREO ELLIPTA) 200-25 mcg/dose inhaler Unknown at Unknown time Other Yes No  
 Sig: Take 1 Puff by inhalation daily. furosemide (LASIX) 40 mg tablet Unknown at Unknown time Other No No  
Sig: Take 2 Tabs by mouth daily. gabapentin (NEURONTIN) 600 mg tablet Unknown at Unknown time Other No No  
Sig: take 2 tablets by mouth three times a day  
insulin NPH (NOVOLIN N NPH U-100 INSULIN) 100 unit/mL injection Unknown at Unknown time Other No No  
Sig: Inject 55 units every 12 hours--dispense relion brand--patient will pay cash  
insulin glargine (LANTUS SOLOSTAR U-100 INSULIN) 100 unit/mL (3 mL) inpn Unknown at Unknown time Other Yes No  
Si Units by SubCUTAneous route daily. insulin lispro (HUMALOG KWIKPEN INSULIN) 100 unit/mL kwikpen Unknown at Unknown time Other No No  
Sig: Inject as needed up to 3 times per day for sugars over 150: 4 units for every 50 points. Max 50 units per day  
losartan (COZAAR) 25 mg tablet Unknown at Unknown time Other No No  
Sig: take 1 tablet by mouth once daily , REPLACES LISINOPRIL FOR BLOOD PRESSURE AND KIDNEY PROTECTION  
metFORMIN (GLUCOPHAGE) 1,000 mg tablet Unknown at Unknown time Other No No  
Sig: take 1 tablet by mouth twice a day with meals  
omeprazole (PRILOSEC) 20 mg capsule Unknown at Unknown time Other No No  
Sig: take 1 capsule by mouth once daily  
ondansetron (ZOFRAN ODT) 4 mg disintegrating tablet Unknown at Unknown time Other No No  
Sig: dissolve 1 tablet ON TONGUE every 8 hours if needed for nausea  
potassium chloride SR (KLOR-CON 10) 10 mEq tablet Unknown at Unknown time Other Yes No  
Sig: Take 10 mEq by mouth two (2) times a day. propranolol (INDERAL) 20 mg tablet Unknown at Unknown time Other No No  
Sig: Take 1 Tab by mouth two (2) times a day. rOPINIRole (REQUIP) 4 mg tab TAB Unknown at Unknown time Other Yes No  
Sig: Take 4 mg by mouth nightly. spironolactone (ALDACTONE) 100 mg tablet Unknown at Unknown time Other No No  
Sig: Take 2 Tabs by mouth daily. traMADol (ULTRAM) 50 mg tablet Unknown at Unknown time Other Yes No  
Sig: take 1-2 tablets by mouth twice a day if needed for DIABETIC NEUROPATHY PAIN Facility-Administered Medications: None Thank you, 
Judson Tracey CPhT Medication History Pharmacy Technician Respiratory

## 2024-02-20 NOTE — DISCHARGE SUMMARY
Hospitalist Discharge Summary Patient ID: 
Thad Denton [id number] 
61 y.o. 
1955 PCP on record: Scot Silvestre NP Admit date: 4/24/2019 Discharge date and time: 5/2/2019 DISCHARGE DIAGNOSIS: 
 
Acute encephalopathy suspect hepatic POA Complicated UTI (recent norman cath) POA urine culture with ESBL klebsiella Urinary retention new Hypokalemia BALDERAS POA Liver cirrhosis POA History of recurrent hepatic encephalopathy History of esophageal varices COPD without exacerbation Recent acute respiratory failure with hypercarbia and hypoxia DM type 2 on insulin Essential HTN POA History of TIA 11/18 Hyperlipidemia POA Overweight POA Body mass index is 28.96 kg/m². Code Status: DNR 
 
 
CONSULTATIONS: 
IP CONSULT TO CARDIOLOGY Excerpted HPI from H&P of Gilford Lack, MD: 
Selina Tapia is a 61 y.o.  female with PMHx significant for liver cirrhosis/BALDERAS, recurrent hepatic encephalopathy due to noncompliance with meds, COPD, IBIS, present to the ER from [facility] for evaluation of AMS started last Thursday per patient's . Pt is conversant but is confused, unable to give meaning hx. Per pt's , when pt became confused, he thought it was due to hyperammonemia again, however level was checked at facility which was normal. c onfuion progressively worsened today which lead the patient to the ER for evaluation. No known fever, chills, cp, sob, palpitations, n/v/d. In the ER, vitals: TT07.4, P109, /68, Spo2 96% on [facility] We were asked to admit for work up and evaluation of the above problems. ______________________________________________________________________ DISCHARGE SUMMARY/HOSPITAL COURSE:  for full details see H&P, daily progress notes, labs, consult notes. Hospital course problem wise Acute encephalopathy suspect hepatic POA Complicated UTI (recent norman cath) POA urine culture with ESBL klebsiella Known cirrhosis and portal HTN Prior hepatic encephalopathy, uses lactulose and rifaximin Normal ammonia but definitely had asterixis at admit ? SBP, US several weeks ago with trace ascites, non distended abdomen Blood culture negative Urine culture with ESBL klebsiella and will need meropenem for a total of 10 days until 5/4. Per case mgmt can get abx at [facility] Continue rifaximin and lactulose Mental status is now improved and back to base line 
  
Urinary retention new She is now voiding with PVR ranging between 150-200 Bladder scan qid and straight cath if PVR is more than 300 ml for 2 more days at [facility]. Cont aggressive rehab at [facility] and attempt voiding trial in 2 days and consider norman if not better Cont PT/OT 
  
Hypokalemia Replaced  
  
  
BALDERAS POA Liver cirrhosis POA History of recurrent hepatic encephalopathy History of esophageal varices Rifaxin and lactulose, lasix 
 
  
COPD without exacerbation Recent acute respiratory failure with hypercarbia and hypoxia Intubated last admit CXR showed mild bibasilar discoid atelectasis Resume home Meds ABG on 2 L NC 7.459/50/71 CXR with mild bibasilar discoid atelectasis. Respiratory status stable Breo ellipta, PRN nebs 
  
DM type 2 on insulin Cont Low dose lantus, SSI 
BS are under 180 
  
Essential HTN POA History of TIA 11/18 Hyperlipidemia POA Cont home  statin/ASA Low dose metoprolol NSVT Replace K and Mg. Cards consulted and recommended to cont Metoprolol. Out pt follow up with cards. Cards cleared for discharge. 
  
Overweight POA Body mass index is 28.96 kg/m². 
  
Code Status: DNR, discussed with pt's  at bedside She is seen and examined today. She is doing much better and symptoms improved. Vitals are stable, lab work is at [facility] and she was cleared by all consulted parties including cardiology to be discharged for out pt follow up.  Discharge was held on 5/1 as SNF was not able to take her so she is being released to [facility] today. 
 
_______________________________________________________________________ Patient seen and examined by me on discharge day. Pertinent Findings: 
Gen:    Not in distress Chest: Clear lungs CVS:   Regular rhythm. No edema Abd:  Soft, not distended, not tender Neuro:  Alert, awake 
_______________________________________________________________________ DISCHARGE MEDICATIONS:  
Current Discharge Medication List  
  
START taking these medications Details  
lactulose (CHRONULAC) 10 gram/15 mL solution Take 45 mL by mouth three (3) times daily for 30 days. Qty: 4050 mL, Refills: 0  
  
insulin glargine (LANTUS) 100 unit/mL injection 15 Units by SubCUTAneous route daily for 30 days. Qty: 1 Vial, Refills: 1  
  
insulin lispro (HUMALOG) 100 unit/mL injection Sliding scale as above. Qty: 1 Vial, Refills: 1  
  
meropenem 1 g, ADDaptor 1 Device IVPB 1 g by IntraVENous route every eight (8) hours for 3 days. Qty: 3 Dose, Refills: 0  
  
metoprolol tartrate (LOPRESSOR) 25 mg tablet Take 0.5 Tabs by mouth every twelve (12) hours for 30 days. Qty: 30 Tab, Refills: 0 CONTINUE these medications which have NOT CHANGED Details  
gabapentin (NEURONTIN) 600 mg tablet Take 1,200 mg by mouth two (2) times a day. rifAXIMin (XIFAXAN) 550 mg tablet Take 550 mg by mouth two (2) times a day. nicotine (NICODERM CQ) 21 mg/24 hr 1 Patch by TransDERmal route every twenty-four (24) hours. albuterol-ipratropium (DUO-NEB) 2.5 mg-0.5 mg/3 ml nebu 3 mL by Nebulization route every four (4) hours as needed for Other (Wheezing or Shortness of breath). Qty: 30 Nebule, Refills: 0  
  
fluticasone furoate-vilanterol (BREO ELLIPTA) 200-25 mcg/dose inhaler Take 1 Puff by inhalation daily. furosemide (LASIX) 40 mg tablet Take 2 Tabs by mouth daily. Qty: 60 Tab, Refills: 3  
  
spironolactone (ALDACTONE) 100 mg tablet Take 2 Tabs by mouth daily. Qty: 60 Tab, Refills: 3  
  
losartan (COZAAR) 25 mg tablet take 1 tablet by mouth once daily , REPLACES LISINOPRIL FOR BLOOD PRESSURE AND KIDNEY PROTECTION Qty: 30 Tab, Refills: 11  
  
aspirin 81 mg chewable tablet Take 1 Tab by mouth daily. Qty: 30 Tab, Refills: 6  
  
atorvastatin (LIPITOR) 40 mg tablet Take 1 Tab by mouth nightly. Qty: 30 Tab, Refills: 6  
  
omeprazole (PRILOSEC) 20 mg capsule take 1 capsule by mouth once daily 
Qty: 30 Cap, Refills: 5  
  
amitriptyline (ELAVIL) 150 mg tablet Take 1 Tab by mouth nightly. ferrous sulfate 325 mg (65 mg iron) tablet take 1 tablet by mouth once daily with BREAKFAST Qty: 90 Tab, Refills: 1  
  
ondansetron (ZOFRAN ODT) 4 mg disintegrating tablet dissolve 1 tablet ON TONGUE every 8 hours if needed for nausea Qty: 45 Tab, Refills: 3  
  
rOPINIRole (REQUIP) 4 mg tab TAB Take 4 mg by mouth nightly. albuterol (PROAIR HFA) 90 mcg/actuation inhaler Take 2 Puffs by inhalation every four (4) hours as needed for Wheezing. STOP taking these medications  
  
 insulin glargine (LANTUS SOLOSTAR U-100 INSULIN) 100 unit/mL (3 mL) inpn Comments:  
Reason for Stopping:   
   
 CONSTULOSE 10 gram/15 mL solution Comments:  
Reason for Stopping:   
   
 traMADol (ULTRAM) 50 mg tablet Comments:  
Reason for Stopping:   
   
 insulin lispro (HUMALOG KWIKPEN INSULIN) 100 unit/mL kwikpen Comments:  
Reason for Stopping: My Recommended Diet, Activity, Wound Care, and follow-up labs are listed in the patient's Discharge Insturctions which I have personally completed and reviewed. _______________________________________________________________________ DISPOSITION:   
Home with Family:   
Home with HH/PT/OT/RN:   
SNF/LTC: y  
CARMINE:   
OTHER:   
 
 
Condition at Discharge:  Stable 
_______________________________________________________________________ Follow up with: PCP : Jammie Tam NP Follow-up Information Follow up With Specialties Details Why Contact Info Zhane Huffman NP Nurse Practitioner  Please call to schedule hospital follow up appointment [address] 
[phone] Tiny Lee MD Cardiology Go on 5/13/2019 Please follow up on May 13, 2019 at 11:00 [address] 
[phone] Total time in minutes spent coordinating this discharge (includes going over instructions, follow-up, prescriptions, and preparing report for sign off to her PCP) :  35 minutes Signed: 
Marifer Zapata MD 
 
 (3) slightly limited

## 2024-11-13 NOTE — IP AVS SNAPSHOT
Body mass index is 29.76 kg/m².     Chronic condition.  Recommend pt to follow a healthy , well balance diet  Pt to continue with regular exercise activity and to maintain ideal weight.    Summary of Care Report The Summary of Care report has been created to help improve care coordination. Users with access to [employer] or [address] (Web-based application) may access additional patient information including the Discharge Summary. If you are not currently a [address] user and need more information, please call the number listed below in the [place] [address] section and ask to be connected with Medical Records. Facility Information Name Address Phone [address] [place] [id number] [phone] Patient Information Patient Name Sex  Candice Galloway ([id number]) Female 1955 Discharge Information Admitting Provider Service Area Unit Carlo Kingston MD / [address] [place] [address] / [phone] Discharge Provider Discharge Date/Time Discharge Disposition Destination (none) (none) (none) (none) Patient Language Language ENGLISH [13] Hospital Problems as of 10/2/2017  Reviewed: 3/30/2017 12:44 PM by Nuria Rodriguez NP None Non-Hospital Problems as of 10/2/2017  Reviewed: 3/30/2017 12:44 PM by Nuria Rodriguez NP Class Noted - Resolved Last Modified Active Problems Diabetes mellitus with neurological manifestations, uncontrolled ([address]  Unknown - Present 3/30/2017 by Nuria Rodriguez NP Entered by Bashir Campos MD  
  Essential hypertension, benign  Unknown - Present 3/30/2017 by Nuria Rodriguez NP Entered by Bashir Campos MD  
  Hyperlipidemia LDL goal <100  Unknown - Present 2016 by Bashir Campos MD  
  Entered by Bashir Campos MD  
  Thrombocytopenia ([address]  8/15/2013 - Present 3/30/2017 by Nuria Rodriguez NP   Entered by Carlo Kingston MD  
  Previous back surgery  8/15/2013 - Present 8/15/2013 by Carlo Kingston MD  
 Entered by Pedro Cruz MD  
  S/P CHACHO (total abdominal hysterectomy)  8/15/2013 - Present 8/15/2013 by Pedro Cruz MD  
  Entered by Pedro Cruz MD  
  Cirrhosis ([facility]  8/15/2013 - Present 3/30/2017 by Bentley Martines, NP Entered by Pedro Cruz MD  
  Acute upper GI bleeding  2/5/2015 - Present 3/1/2016 by Yamilet Swann MD  
  Entered by Maida Beckford MD  
  Anemia  3/1/2016 - Present 3/30/2017 by Bentley Martines, NP Entered by Yamilet Swann MD  
  GI bleed  3/3/2016 - Present 3/3/2016 by Anil Paulino, NP Entered by Anil Paulino NP  
  BALDERAS (nonalcoholic steatohepatitis)  3/10/2016 - Present 3/30/2017 by Bentley Martines, NP Entered by Cathi Lopez NP Acute deep vein thrombosis (DVT) of right lower extremity ([facility]  11/10/2016 - Present 11/10/2016 by Kerri Berger MD  
  Entered by Kerri Berger MD  
  COPD (chronic obstructive pulmonary disease) ([facility]  3/28/2017 - Present 3/30/2017 by Bentley Martines, NP Entered by Marycarmen Rodriguez MD  
  Sepsis ([facility]  3/30/2017 - Present 3/30/2017 by Bentley Martines, NP Entered by Bentley Martines NP  
  CAP (community acquired pneumonia)  3/30/2017 - Present 3/30/2017 by Bentley Martines, NP Entered by Bentley Martines NP  
  IBIS (obstructive sleep apnea)  3/30/2017 - Present 3/30/2017 by Bentley Martines, NP Entered by Bentley Martines, NP Tobacco abuse  3/30/2017 - Present 3/30/2017 by Bentley Martines, NP Entered by Bentley Martines, NP Neuropathy ([facility]  3/30/2017 - Present 3/30/2017 by Bentley Martines, NP Entered by Bentley Martines NP Respiratory failure ([facility]  3/30/2017 - Present 3/30/2017 by Bentley Martines, NP Entered by Bentley Conrad, NP You are allergic to the following Allergen Reactions Hydrocodone Nausea and Vomiting Lisinopril Cough Lortab (Hydrocodone-Acetaminophen) Nausea and Vomiting Percocet (Oxycodone-Acetaminophen) Nausea and Vomiting Current Discharge Medication List  
  
ASK your doctor about these medications Dose & Instructions Dispensing Information Comments ADVAIR DISKUS 500-50 mcg/dose diskus inhaler Generic drug:  fluticasone-salmeterol Dose:  1 Puff Take 1 Puff by inhalation two (2) times a day. Refills:  0  
   
 albuterol-ipratropium 2.5 mg-0.5 mg/3 ml Nebu Commonly known as:  Jimbo Fulshear Dose:  3 mL  
3 mL by Nebulization route every four (4) hours as needed. Quantity:  30 Nebule Refills:  0  
   
 amitriptyline 150 mg tablet Commonly known as:  ELAVIL Dose:  150 mg Take 150 mg by mouth nightly. Refills:  0  
   
 CONSTULOSE 10 gram/15 mL solution Generic drug:  lactulose  
 take 2 tablespoonfuls by mouth twice a day Quantity:  1000 mL Refills:  5  
   
 ferrous sulfate 325 mg (65 mg iron) tablet Dose:  325 mg Take 1 Tab by mouth daily (with breakfast). Quantity:  30 Tab Refills:  5  
   
 furosemide 20 mg tablet Commonly known as:  LASIX  
 take 1 tablet by mouth once daily Quantity:  30 Tab Refills:  5  
   
 gabapentin 600 mg tablet Commonly known as:  NEURONTIN  
 take 2 tablets by mouth three times a day (NEW HIGHER DOSE) Quantity:  180 Tab Refills:  11 New higher dose replaces prior script on file  
  
 guaiFENesin  mg ER tablet Commonly known as:  Roe & Roe Dose:  600 mg Take 1 Tab by mouth two (2) times a day. Quantity:  14 Tab Refills:  0 HumaLOG KwikPen 100 unit/mL kwikpen Generic drug:  insulin lispro  
 inject 35 units with meals (+5 UNITS FOR EVERY 50 MG/DL ABOVE 150 MG/DL)  UNITS PER DAY Quantity:  45 mL Refills:  11  
   
 * HYDROmorphone 2 mg tablet Commonly known as:  DILAUDID Dose:  2 mg Take 1 Tab by mouth every four (4) hours as needed for Pain. Max Daily Amount: 12 mg. Quantity:  10 Tab Refills:  0  
   
 * HYDROmorphone 2 mg tablet Commonly known as:  DILAUDID Dose:  2 mg Take 1 Tab by mouth every six (6) hours as needed for Pain. Max Daily Amount: 8 mg. Quantity:  10 Tab Refills:  0  
   
 insulin glargine 100 unit/mL (3 mL) Inpn Commonly known as:  LANTUS SOLOSTAR Inject 120 units every morning as 2 separate shots of 60 units back to back--Dose change 11/18/16--updated med list--did not send prescription to the pharmacy Quantity:  45 mL Refills:  11  
   
 levoFLOXacin 750 mg tablet Commonly known as:  Lamount Chalet Dose:  750 mg Take 1 Tab by mouth every twenty-four (24) hours. Quantity:  6 Tab Refills:  0  
   
 losartan 25 mg tablet Commonly known as:  COZAAR Dose:  25 mg Take 1 Tab by mouth daily. Replaces lisinopril for blood pressure and kidney protection Quantity:  30 Tab Refills:  11  
   
 metFORMIN 1,000 mg tablet Commonly known as:  GLUCOPHAGE  
 take 1 tablet by mouth twice a day with meals Quantity:  180 Tab Refills:  3 Bambi Pen Needle 32 gauge x 5/32\" Ndle Generic drug:  Insulin Needles (Disposable)  
 use as directed four times a day Quantity:  100 Pen Needle Refills:  11 Nebulizer & Compressor machine UAD Quantity:  1 Each Refills:  0  
   
 omeprazole 20 mg capsule Commonly known as:  PRILOSEC  
 take 1 capsule by mouth once daily Quantity:  30 Cap Refills:  5  
   
 ondansetron 4 mg disintegrating tablet Commonly known as:  ZOFRAN ODT  
 dissolve 1 tablet ON TONGUE every 8 hours if needed for nausea Quantity:  45 Tab Refills:  3  
   
 potassium chloride SR 10 mEq tablet Commonly known as:  KLOR-CON 10  
 take 1 tablet by mouth twice a day Quantity:  60 Tab Refills:  5 PROAIR HFA 90 mcg/actuation inhaler Generic drug:  albuterol Dose:  2 Puff Take 2 Puffs by inhalation every four (4) hours as needed. Refills:  0  
   
 propranolol 20 mg tablet Commonly known as:  INDERAL Dose:  20 mg Take 1 Tab by mouth two (2) times a day. Quantity:  60 Tab Refills:  11  
   
 rOPINIRole 4 mg Tab TAB Commonly known as:  Ramona Dakin Dose:  4 mg Take 4 mg by mouth nightly. Refills:  0  
   
 traMADol 50 mg tablet Commonly known as:  ULTRAM  
 Take 1-2 tablets twice daily as needed for diabetic neuropathy pain. Dx. D25.794 Quantity:  120 Tab Refills:  5 Fax to [facility]: [phone] * Notice: This list has 2 medication(s) that are the same as other medications prescribed for you. Read the directions carefully, and ask your doctor or other care provider to review them with you. Current Immunizations Name Date Influenza Vaccine 10/6/2014, 10/3/2013 Influenza Vaccine (Quad) PF 3/30/2017 Pneumococcal Vaccine (Unspecified Type) 10/3/2013 Tdap 11/19/2016 Surgery Information ID Date/Time Status Primary Surgeon All Procedures Location [id number] 10/2/2017 [facility] RAD ANGIO IR OR-DO NOT SCHEDULE Follow-up Information None Discharge Instructions Home Care Instructions: You can resume your regular diet. Do not shower, bathe, swim, drink alcohol, or make any important legal decisions in the next 48 hours. Do not lift anything heavier than a gallon of milk for 5 days. If you take Glucophage (metformin) for diabetes, do not take it for the next 48 hours. If you were asked to hold any blood thinners prior to the procedure, you may restart that medicine the day after the procedure is completed. Recline your car seat for the ride home. You may remove neck bandage tomorrow and let warm soap and water wash over puncture site; replace with bandaid. Keep replacing site with bandaid for at least 3 days until site is covered. Call If: 
 You should call your Physician and/or the Radiology Nurse if you have any signs of infection; fever, drainage, redness, and/or swelling. If the puncture site should ooze, please call.   Also call if you have any pain, decreased sensation, numbness, tingling, swelling, or change in color to the affected extremity. SEEK IMMEDIATE MEDICAL CARE IF YOUR PUNCTURE SITE STARTS TO BLEED. APPLY ENOUGH FIRM PRESSURE TO THE SITE WITH THE TIPS OF YOUR FINGERS TO STOP THE BLEEDING. Follow-Up Instructions:  Please see your ordering doctor as he/she has requested. To Reach Us: Side effects of sedation medications and other medications used today have been reviewed. Notify us of nausea, itching, hives, dizziness, or anything else out of the ordinary. Should you experience any of these significant changes, please call [phone] between the hours of 7:30 am and 10 pm or [phone] after hours. After hours, ask the  to page the [address] Technologist, and describe the problem to the technologist.  
 
 
 
Patient Signature: 
Date: 10/2/2017 Discharging Nurse: Jayleen Braden RN Chart Review Routing History Recipient Method Report Sent By Idalia Rhodes NP Fax: [phone] Phone: [phone] Fax Provider Comm Report Thee Pollen 4/6/2012  8:29 AM 04/05/2012 Dr. Jose L Crawford Fax: [phone] Fax Provider Comm Report Thee Pollen 4/6/2012  8:29 AM 04/05/2012 Lakesha Rhodes NP Fax: [phone] Phone: [phone] Fax Provider Comm Report Charo Lines [[id number]] 8/7/2012  8:20 AM 08/07/2012 Jose L Crawford MD  
Fax: [phone] Phone: [phone] Fax Provider Comm Report Charo Lines [[id number]] 8/7/2012  8:20 AM 08/07/2012 Lakesha Rhodes NP Fax: [phone] Phone: [phone] Fax Provider Comm Report Ondina Ellsworth [[id number]] 11/14/2012 11:00 AM 11/14/2012 Jose L Crawford MD  
Fax: [phone] Phone: [phone] Fax Provider Comm Report Ondina Ellsworth [[id number]] 11/14/2012 11:01 AM 11/14/2012 Lakesha Rhodes NP Fax: [phone] Phone: [phone] Fax Note Review Eleonore Jeffry [[id number]] 4/29/2013  4:01 PM 04/29/2013 Herman Gonzalez MD  
Fax: [phone] Phone: [phone] Fax Note Review Eleonore Jeffry [[id number]] 4/29/2013  4:01 PM 04/29/2013 Leonidas Ponce MD  
Fax: [phone] Phone: [phone] Fax Note Review Eleonore Jeffry [[id number]] 4/29/2013  4:01 PM 04/29/2013 Sita Leyva MD  
Fax: [phone] Phone: [phone] Fax Note Review Eleonore Jeffry [[id number]] 4/29/2013  4:01 PM 04/29/2013 Marietta Salgado NP Fax: [phone] Phone: [phone] Fax Note Review Eleonore Jeffry [[id number]] 8/5/2013  2:29 PM 08/05/2013 Herman Gonzalez MD  
Fax: [phone] Phone: [phone] Fax Note Review Eleonore Jeffry [[id number]] 8/5/2013  2:29 PM 08/05/2013 Leonidas Ponce MD  
Fax: [phone] Phone: [phone] Fax Note Review Eleonore Jeffry [[id number]] 8/5/2013  2:29 PM 08/05/2013 Sita Leyva MD  
Fax: [phone] Phone: [phone] Fax Note Review Eleonore Jeffry [[id number]] 8/5/2013  2:29 PM 08/05/2013 [address] Arizmendi MD  
Phone: [phone] In Basket Note Review George Maldonado [[id number]] 12/2/2013 12:39 PM 12/02/2013 Leonidas Ponce MD  
Fax: [phone] Phone: [phone] Fax Note Review George Maldonado [[id number]] 12/2/2013 12:39 PM 12/02/2013 Herman Gonzalez MD  
Fax: [phone] Phone: [phone] Fax Note Review George Maldonado [[id number]] 12/2/2013 12:39 PM 12/02/2013 Sita Leyva MD  
Fax: [phone] Phone: [phone] Fax Note Review George Maldonado [[id number]] 12/2/2013 12:39 PM 12/02/2013 Marietta Salgado NP Fax: [phone] Phone: [phone] Fax Note Review George Maldonado [[id number]] 12/2/2013 12:39 PM 12/02/2013 Marietta Salgado NP Fax: [phone] Phone: [phone] Fax Note Review George Maldonado [[id number]] 4/11/2014  8:01 AM 04/10/2014 Herman Gonzalez MD  
Fax: [phone] Phone: [phone] Fax Note Review Leesa Dueñasjeffery [[id number]] 4/11/2014  8:01 AM 04/10/2014 Lindsey Galarza MD  
Fax: [phone] Phone: [phone] Fax Note Review Leesa Aiken [[id number]] 4/11/2014  8:01 AM 04/10/2014 Jeanne Constantino MD  
Phone: [phone] In Basket Note Review Leesa Quicksofya [[id number]] 4/11/2014  8:01 AM 04/10/2014 Guerrero Mcduffie MD  
Fax: [phone] Phone: [phone] Fax Note Review Leesa Quicksofya [[id number]] 4/11/2014  8:01 AM 04/10/2014 Jeanne Constantino MD  
Phone: [phone] In Basket Note Review Leesa Aiken [[id number]] 12/12/2014  2:04 PM 12/12/2014 Sarita Garcia NP Fax: [phone] Phone: [phone] Fax Note Review Leesaus Quicksofya [[id number]] 12/12/2014  2:04 PM 12/12/2014 Sarita Garcia NP Fax: [phone] Phone: [phone] Fax SUSIEMAXX CALVO MD [employer]OUT[employer] REPORT Mayela Gilmore MD [[id number]] 1/8/2015 12:45 PM 01/08/2015 Sarita Garcia NP Fax: [phone] Phone: [phone] Fax Tabatha Saez MD [employer]ES [employer] ROUTING REPORT Abhilash Brewster MD [[id number]] 2/6/2015 12:19 AM 02/06/2015 Sarita Garcia NP Fax: [phone] Phone: [phone] Fax Tabatha Saez MD [employer]ES [employer] ROUTING REPORT Corey Verduzco MD [[id number]] 2/11/2015  7:49 AM 02/11/2015 Sarita Garcia NP Fax: [phone] Fax Note Review Niya Hernandez [[id number]] 4/17/2015  8:29 AM 04/16/2015 Jeanne Constantino MD  
Phone: [phone] In Basket Note Review Niya Hernandez [[id number]] 4/17/2015  8:31 AM 04/16/2015 Sarita Garcia NP Fax: [phone] Phone: [phone] Fax Note Review Niya Hernandez [[id number]] 9/8/2015  2:59 PM 09/08/2015 Jeanne Constantino MD  
Phone: [phone] In Basket Note Review Niya Hernandez [[id number]] 9/8/2015  3:01 PM 09/08/2015 Jeanne Constantino MD  
Phone: [phone] In Basket Note Review Niya Hernandez [[id number]] 9/8/2015  3:01 PM 09/08/2015 Sarita Garcia NP Fax: [phone] Phone: [phone] Fax Note Review Atif Dominguez [[id number]] 1/28/2016  1:11 PM 01/28/2016 Brady Singh MD  
Phone: [phone] In Basket Note Review Atif Dominguez [[id number]] 1/28/2016  1:11 PM 01/28/2016 Jackie Perez NP Fax: [phone] Phone: [phone] Fax [address] [place] [address] [[id number]] 2/23/2016  1:12 PM 02/23/2016 Jackie Perez NP Fax: [phone] Phone: [phone] Fax Nancy York MD [employer]ES [employer] ROUTING REPORT Oleg Machuca MD [[id number]] 3/1/2016  8:27 PM 03/01/2016 Jackie Perez NP Fax: [phone] Phone: [phone] Fax Nancy York MD [employer]ES [employer] ROUTING REPORT Juan Luis Hatch MD [[id number]] 3/5/2016 11:58 AM 03/05/2016 Jackie Perez NP Fax: [phone] Phone: [phone] Fax Note Review Moon Baires [[id number]] 6/7/2016  2:55 PM 06/07/2016 Brady Singh MD  
Phone: [phone] In Basket Note Review Red Baires [[id number]] 6/7/2016  2:55 PM 06/07/2016 Jackie Perez NP Fax: [phone] Phone: [phone] Fax Notes Report Zev Duran MD [[id number]] 11/10/2016 12:42 PM 11/10/2016 Brady Singh MD  
Phone: [phone] In Basket Notes Report Zev Duran MD [[id number]] 11/10/2016 12:42 PM 11/10/2016 Denise Ying DO Phone: [phone] In Basket Notes Report Zev Duran MD [[id number]] 11/10/2016 12:42 PM 11/10/2016 Jackie Perez NP Fax: [phone] Phone: [phone] Fax Nancy York MD NOTES [employer] ROUTING REPORT Ta Ornelas MD [[id number]] 3/29/2017  1:58 AM 03/29/2017 Jackie Perez NP Fax: [phone] Phone: [phone] Fax Nancy York MD NOTES AUT[employer] ROUTING REPORT Ta Ornelas MD [facility] 3/29/2017 11:01 AM 03/29/2017 Jackie Perez NP Fax: [phone] Phone: [phone] Fax Nancy York MD NOTES AUT[employer] ROUTING REPORT Nette Nieto MD [[id number]] 3/30/2017  8:21 PM 03/30/2017 Tyra Raphael NP Fax: [phone] Phone: [phone] Fax Farhad Plama MD NOTES [employer] ROUTING REPORT Vinnie Barnes MD [[id number]] 4/6/2017 12:44 PM 04/06/2017 Tyra Raphael NP Fax: [phone] Phone: [phone] Fax ELLIOTT CALVO MD NOTES [employer] ROUTING REPORT Vinnie Barnes MD [[id number]] 10/2/2017 11:02 AM 10/02/2017

## (undated) DEVICE — CATH IV AUTOGRD BC BLU 22GA 25 -- INSYTE

## (undated) DEVICE — KENDALL RADIOLUCENT FOAM MONITORING ELECTRODE -RECTANGULAR SHAPE: Brand: KENDALL

## (undated) DEVICE — BW-412T DISP COMBO CLEANING BRUSH: Brand: SINGLE USE COMBINATION CLEANING BRUSH

## (undated) DEVICE — 1200 GUARD II KIT W/5MM TUBE W/O VAC TUBE: Brand: GUARDIAN

## (undated) DEVICE — KIT IV STRT W CHLORAPREP PD 1ML

## (undated) DEVICE — BAG BELONG PT PERS CLEAR HANDL

## (undated) DEVICE — ENDO CARRY-ON PROCEDURE KIT INCLUDES ENZYMATIC SPONGE, GAUZE, BIOHAZARD LABEL, TRAY, LUBRICANT, DIRTY SCOPE LABEL, WATER LABEL, TRAY, DRAWSTRING PAD, AND DEFENDO 4-PIECE KIT.: Brand: ENDO CARRY-ON PROCEDURE KIT

## (undated) DEVICE — MULTIPLE BAND LIGATOR: Brand: SPEEDBAND SUPERVIEW SUPER 7

## (undated) DEVICE — SOLIDIFIER FLUID 3000 CC ABSORB

## (undated) DEVICE — CANN NASAL O2 CAPNOGRAPHY AD -- FILTERLINE

## (undated) DEVICE — SET ADMIN 16ML TBNG L100IN 2 Y INJ SITE IV PIGGY BK DISP

## (undated) DEVICE — NEEDLE HYPO 18GA L1.5IN PNK S STL HUB POLYPR SHLD REG BVL

## (undated) DEVICE — SYRINGE MED 20ML STD CLR PLAS LUERLOCK TIP N CTRL DISP

## (undated) DEVICE — BITE BLK ENDOSCP AD 54FR GRN POLYETH ENDOSCP W STRP SLD

## (undated) DEVICE — Z DISCONTINUED NO SUB IDED SET EXTN W/ 4 W STPCOCK M SPIN LOK 36IN

## (undated) DEVICE — SET EXTN TBNG L BOR 4 W STPCOCK ST 32IN PRIMING VOL 6ML